# Patient Record
Sex: FEMALE | Race: BLACK OR AFRICAN AMERICAN | NOT HISPANIC OR LATINO | URBAN - METROPOLITAN AREA
[De-identification: names, ages, dates, MRNs, and addresses within clinical notes are randomized per-mention and may not be internally consistent; named-entity substitution may affect disease eponyms.]

---

## 2022-11-29 ENCOUNTER — INPATIENT (INPATIENT)
Facility: HOSPITAL | Age: 62
LOS: 20 days | Discharge: LEFT BEFORE DISCHARGE | End: 2022-12-20
Attending: INTERNAL MEDICINE | Admitting: INTERNAL MEDICINE
Payer: MEDICARE

## 2022-11-29 VITALS
OXYGEN SATURATION: 99 % | DIASTOLIC BLOOD PRESSURE: 84 MMHG | SYSTOLIC BLOOD PRESSURE: 140 MMHG | TEMPERATURE: 105 F | RESPIRATION RATE: 24 BRPM | HEART RATE: 135 BPM

## 2022-11-29 LAB
A1C WITH ESTIMATED AVERAGE GLUCOSE RESULT: 6 % — HIGH (ref 4–5.6)
ALBUMIN SERPL ELPH-MCNC: 4.2 G/DL — SIGNIFICANT CHANGE UP (ref 3.3–5)
ALP SERPL-CCNC: 133 U/L — HIGH (ref 40–120)
ALT FLD-CCNC: 50 U/L — HIGH (ref 4–33)
AMMONIA BLD-MCNC: 28 UMOL/L — SIGNIFICANT CHANGE UP (ref 11–55)
ANION GAP SERPL CALC-SCNC: 15 MMOL/L — HIGH (ref 7–14)
ANISOCYTOSIS BLD QL: SIGNIFICANT CHANGE UP
APPEARANCE UR: ABNORMAL
APTT BLD: 28 SEC — SIGNIFICANT CHANGE UP (ref 27–36.3)
AST SERPL-CCNC: 103 U/L — HIGH (ref 4–32)
BACTERIA # UR AUTO: NEGATIVE — SIGNIFICANT CHANGE UP
BASE EXCESS BLDV CALC-SCNC: -0.3 MMOL/L — SIGNIFICANT CHANGE UP (ref -2–3)
BASE EXCESS BLDV CALC-SCNC: 1.9 MMOL/L — SIGNIFICANT CHANGE UP (ref -2–3)
BASOPHILS # BLD AUTO: 0 K/UL — SIGNIFICANT CHANGE UP (ref 0–0.2)
BASOPHILS NFR BLD AUTO: 0 % — SIGNIFICANT CHANGE UP (ref 0–2)
BILIRUB SERPL-MCNC: 0.6 MG/DL — SIGNIFICANT CHANGE UP (ref 0.2–1.2)
BILIRUB UR-MCNC: NEGATIVE — SIGNIFICANT CHANGE UP
BLD GP AB SCN SERPL QL: NEGATIVE — SIGNIFICANT CHANGE UP
BLOOD GAS VENOUS COMPREHENSIVE RESULT: SIGNIFICANT CHANGE UP
BLOOD GAS VENOUS COMPREHENSIVE RESULT: SIGNIFICANT CHANGE UP
BUN SERPL-MCNC: 20 MG/DL — SIGNIFICANT CHANGE UP (ref 7–23)
CALCIUM SERPL-MCNC: 10.1 MG/DL — SIGNIFICANT CHANGE UP (ref 8.4–10.5)
CHLORIDE BLDV-SCNC: 100 MMOL/L — SIGNIFICANT CHANGE UP (ref 96–108)
CHLORIDE BLDV-SCNC: 102 MMOL/L — SIGNIFICANT CHANGE UP (ref 96–108)
CHLORIDE SERPL-SCNC: 99 MMOL/L — SIGNIFICANT CHANGE UP (ref 98–107)
CK MB BLD-MCNC: 1.2 % — SIGNIFICANT CHANGE UP (ref 0–2.5)
CK MB CFR SERPL CALC: 2.4 NG/ML — SIGNIFICANT CHANGE UP
CK SERPL-CCNC: 192 U/L — HIGH (ref 25–170)
CO2 BLDV-SCNC: 25.1 MMOL/L — SIGNIFICANT CHANGE UP (ref 22–26)
CO2 BLDV-SCNC: 27.9 MMOL/L — HIGH (ref 22–26)
CO2 SERPL-SCNC: 25 MMOL/L — SIGNIFICANT CHANGE UP (ref 22–31)
COLOR SPEC: SIGNIFICANT CHANGE UP
CREAT SERPL-MCNC: 1.01 MG/DL — SIGNIFICANT CHANGE UP (ref 0.5–1.3)
DACRYOCYTES BLD QL SMEAR: SLIGHT — SIGNIFICANT CHANGE UP
DIFF PNL FLD: ABNORMAL
EGFR: 63 ML/MIN/1.73M2 — SIGNIFICANT CHANGE UP
EOSINOPHIL # BLD AUTO: 0 K/UL — SIGNIFICANT CHANGE UP (ref 0–0.5)
EOSINOPHIL NFR BLD AUTO: 0 % — SIGNIFICANT CHANGE UP (ref 0–6)
EPI CELLS # UR: 3 /HPF — SIGNIFICANT CHANGE UP (ref 0–5)
ESTIMATED AVERAGE GLUCOSE: 126 — SIGNIFICANT CHANGE UP
FLUAV AG NPH QL: SIGNIFICANT CHANGE UP
FLUBV AG NPH QL: SIGNIFICANT CHANGE UP
GAS PNL BLDV: 135 MMOL/L — LOW (ref 136–145)
GAS PNL BLDV: 135 MMOL/L — LOW (ref 136–145)
GAS PNL BLDV: SIGNIFICANT CHANGE UP
GAS PNL BLDV: SIGNIFICANT CHANGE UP
GIANT PLATELETS BLD QL SMEAR: PRESENT — SIGNIFICANT CHANGE UP
GLUCOSE BLDV-MCNC: 132 MG/DL — HIGH (ref 70–99)
GLUCOSE BLDV-MCNC: 177 MG/DL — HIGH (ref 70–99)
GLUCOSE SERPL-MCNC: 229 MG/DL — HIGH (ref 70–99)
GLUCOSE UR QL: ABNORMAL
HCO3 BLDV-SCNC: 24 MMOL/L — SIGNIFICANT CHANGE UP (ref 22–29)
HCO3 BLDV-SCNC: 27 MMOL/L — SIGNIFICANT CHANGE UP (ref 22–29)
HCT VFR BLD CALC: 31.2 % — LOW (ref 34.5–45)
HCT VFR BLDA CALC: 29 % — LOW (ref 34.5–46.5)
HCT VFR BLDA CALC: 30 % — LOW (ref 34.5–46.5)
HGB BLD CALC-MCNC: 10 G/DL — LOW (ref 11.5–15.5)
HGB BLD CALC-MCNC: 9.5 G/DL — LOW (ref 11.5–15.5)
HGB BLD-MCNC: 10.3 G/DL — LOW (ref 11.5–15.5)
HYALINE CASTS # UR AUTO: 2 /LPF — SIGNIFICANT CHANGE UP (ref 0–7)
IANC: 0.99 K/UL — LOW (ref 1.8–7.4)
INR BLD: 1.14 RATIO — SIGNIFICANT CHANGE UP (ref 0.88–1.16)
KETONES UR-MCNC: NEGATIVE — SIGNIFICANT CHANGE UP
LACTATE BLDV-MCNC: 3.7 MMOL/L — HIGH (ref 0.5–2)
LACTATE BLDV-MCNC: 4.3 MMOL/L — CRITICAL HIGH (ref 0.5–2)
LEUKOCYTE ESTERASE UR-ACNC: NEGATIVE — SIGNIFICANT CHANGE UP
LYMPHOCYTES # BLD AUTO: 0.89 K/UL — LOW (ref 1–3.3)
LYMPHOCYTES # BLD AUTO: 43.6 % — SIGNIFICANT CHANGE UP (ref 13–44)
MACROCYTES BLD QL: SIGNIFICANT CHANGE UP
MCHC RBC-ENTMCNC: 33 GM/DL — SIGNIFICANT CHANGE UP (ref 32–36)
MCHC RBC-ENTMCNC: 35 PG — HIGH (ref 27–34)
MCV RBC AUTO: 106.1 FL — HIGH (ref 80–100)
MICROCYTES BLD QL: SLIGHT — SIGNIFICANT CHANGE UP
MONOCYTES # BLD AUTO: 0.22 K/UL — SIGNIFICANT CHANGE UP (ref 0–0.9)
MONOCYTES NFR BLD AUTO: 10.7 % — SIGNIFICANT CHANGE UP (ref 2–14)
NEUTROPHILS # BLD AUTO: 0.89 K/UL — LOW (ref 1.8–7.4)
NEUTROPHILS NFR BLD AUTO: 43.6 % — SIGNIFICANT CHANGE UP (ref 43–77)
NITRITE UR-MCNC: NEGATIVE — SIGNIFICANT CHANGE UP
NRBC # BLD: 3 /100 — HIGH (ref 0–0)
PCO2 BLDV: 37 MMHG — LOW (ref 39–42)
PCO2 BLDV: 41 MMHG — SIGNIFICANT CHANGE UP (ref 39–42)
PH BLDV: 7.42 — SIGNIFICANT CHANGE UP (ref 7.32–7.43)
PH BLDV: 7.42 — SIGNIFICANT CHANGE UP (ref 7.32–7.43)
PH UR: 6 — SIGNIFICANT CHANGE UP (ref 5–8)
PLAT MORPH BLD: NORMAL — SIGNIFICANT CHANGE UP
PLATELET # BLD AUTO: 95 K/UL — LOW (ref 150–400)
PLATELET COUNT - ESTIMATE: ABNORMAL
PO2 BLDV: 40 MMHG — SIGNIFICANT CHANGE UP
PO2 BLDV: 60 MMHG — SIGNIFICANT CHANGE UP
POIKILOCYTOSIS BLD QL AUTO: SLIGHT — SIGNIFICANT CHANGE UP
POLYCHROMASIA BLD QL SMEAR: SLIGHT — SIGNIFICANT CHANGE UP
POTASSIUM BLDV-SCNC: 2.5 MMOL/L — CRITICAL LOW (ref 3.5–5.1)
POTASSIUM BLDV-SCNC: 3.1 MMOL/L — LOW (ref 3.5–5.1)
POTASSIUM SERPL-MCNC: 3.2 MMOL/L — LOW (ref 3.5–5.3)
POTASSIUM SERPL-SCNC: 3.2 MMOL/L — LOW (ref 3.5–5.3)
PROT SERPL-MCNC: 7 G/DL — SIGNIFICANT CHANGE UP (ref 6–8.3)
PROT UR-MCNC: ABNORMAL
PROTHROM AB SERPL-ACNC: 13.2 SEC — SIGNIFICANT CHANGE UP (ref 10.5–13.4)
RBC # BLD: 2.94 M/UL — LOW (ref 3.8–5.2)
RBC # FLD: 14.9 % — HIGH (ref 10.3–14.5)
RBC BLD AUTO: ABNORMAL
RBC CASTS # UR COMP ASSIST: 1 /HPF — SIGNIFICANT CHANGE UP (ref 0–4)
RH IG SCN BLD-IMP: POSITIVE — SIGNIFICANT CHANGE UP
RSV RNA NPH QL NAA+NON-PROBE: SIGNIFICANT CHANGE UP
SAO2 % BLDV: 64 % — SIGNIFICANT CHANGE UP
SAO2 % BLDV: 90.8 % — SIGNIFICANT CHANGE UP
SARS-COV-2 RNA SPEC QL NAA+PROBE: SIGNIFICANT CHANGE UP
SODIUM SERPL-SCNC: 139 MMOL/L — SIGNIFICANT CHANGE UP (ref 135–145)
SP GR SPEC: 1.03 — SIGNIFICANT CHANGE UP (ref 1.01–1.05)
TROPONIN T, HIGH SENSITIVITY RESULT: 22 NG/L — SIGNIFICANT CHANGE UP
UROBILINOGEN FLD QL: SIGNIFICANT CHANGE UP
VARIANT LYMPHS # BLD: 2.1 % — SIGNIFICANT CHANGE UP (ref 0–6)
WBC # BLD: 2.04 K/UL — LOW (ref 3.8–10.5)
WBC # FLD AUTO: 2.04 K/UL — LOW (ref 3.8–10.5)
WBC UR QL: 0 /HPF — SIGNIFICANT CHANGE UP (ref 0–5)

## 2022-11-29 PROCEDURE — 99285 EMERGENCY DEPT VISIT HI MDM: CPT | Mod: FS

## 2022-11-29 PROCEDURE — 70496 CT ANGIOGRAPHY HEAD: CPT | Mod: 26,MA

## 2022-11-29 PROCEDURE — 71045 X-RAY EXAM CHEST 1 VIEW: CPT | Mod: 26

## 2022-11-29 PROCEDURE — 0042T: CPT

## 2022-11-29 PROCEDURE — 70498 CT ANGIOGRAPHY NECK: CPT | Mod: 26,MA

## 2022-11-29 RX ORDER — SODIUM CHLORIDE 9 MG/ML
1000 INJECTION INTRAMUSCULAR; INTRAVENOUS; SUBCUTANEOUS ONCE
Refills: 0 | Status: COMPLETED | OUTPATIENT
Start: 2022-11-29 | End: 2022-11-29

## 2022-11-29 RX ORDER — CEFTRIAXONE 500 MG/1
2000 INJECTION, POWDER, FOR SOLUTION INTRAMUSCULAR; INTRAVENOUS ONCE
Refills: 0 | Status: COMPLETED | OUTPATIENT
Start: 2022-11-29 | End: 2022-11-29

## 2022-11-29 RX ORDER — SODIUM CHLORIDE 9 MG/ML
50 INJECTION INTRAMUSCULAR; INTRAVENOUS; SUBCUTANEOUS
Refills: 0 | Status: DISCONTINUED | OUTPATIENT
Start: 2022-11-29 | End: 2022-11-29

## 2022-11-29 RX ORDER — ACYCLOVIR SODIUM 500 MG
650 VIAL (EA) INTRAVENOUS ONCE
Refills: 0 | Status: COMPLETED | OUTPATIENT
Start: 2022-11-29 | End: 2022-11-29

## 2022-11-29 RX ORDER — POTASSIUM CHLORIDE 20 MEQ
10 PACKET (EA) ORAL ONCE
Refills: 0 | Status: COMPLETED | OUTPATIENT
Start: 2022-11-29 | End: 2022-11-29

## 2022-11-29 RX ORDER — AMPICILLIN TRIHYDRATE 250 MG
2 CAPSULE ORAL ONCE
Refills: 0 | Status: COMPLETED | OUTPATIENT
Start: 2022-11-29 | End: 2022-11-29

## 2022-11-29 RX ORDER — VANCOMYCIN HCL 1 G
1000 VIAL (EA) INTRAVENOUS ONCE
Refills: 0 | Status: COMPLETED | OUTPATIENT
Start: 2022-11-29 | End: 2022-11-29

## 2022-11-29 RX ORDER — ACETAMINOPHEN 500 MG
1000 TABLET ORAL ONCE
Refills: 0 | Status: COMPLETED | OUTPATIENT
Start: 2022-11-29 | End: 2022-11-29

## 2022-11-29 RX ORDER — ACYCLOVIR SODIUM 500 MG
800 VIAL (EA) INTRAVENOUS ONCE
Refills: 0 | Status: DISCONTINUED | OUTPATIENT
Start: 2022-11-29 | End: 2022-11-29

## 2022-11-29 RX ADMIN — Medication 100 MILLIEQUIVALENT(S): at 18:44

## 2022-11-29 RX ADMIN — SODIUM CHLORIDE 1000 MILLILITER(S): 9 INJECTION INTRAMUSCULAR; INTRAVENOUS; SUBCUTANEOUS at 17:33

## 2022-11-29 RX ADMIN — SODIUM CHLORIDE 1000 MILLILITER(S): 9 INJECTION INTRAMUSCULAR; INTRAVENOUS; SUBCUTANEOUS at 18:44

## 2022-11-29 RX ADMIN — Medication 400 MILLIGRAM(S): at 17:32

## 2022-11-29 RX ADMIN — Medication 250 MILLIGRAM(S): at 23:23

## 2022-11-29 RX ADMIN — CEFTRIAXONE 100 MILLIGRAM(S): 500 INJECTION, POWDER, FOR SOLUTION INTRAMUSCULAR; INTRAVENOUS at 20:56

## 2022-11-29 NOTE — ED PROVIDER NOTE - PROGRESS NOTE DETAILS
-Yumiko Aguilar D.O: given no known source of fever and presentation of AMS will obtain LP, consent signed, son at bedside with patient, will treat empirically w/ abx and acyclovir. Patient still slurring words mildly but mental status has improved, more conversant. Neuro recs appreciated. TBA for MRI/vEEG.

## 2022-11-29 NOTE — ED ADULT NURSE NOTE - SEPSIS REFERENCE DATA CRITERIA 2
Patient, Diana M Sippy calling for medication refill. Medication(s) set up as pending orders from medication list.    Caller has been advised that their call does not guarantee an immediate refill. This refill will be reviewed within 24-72 hours by a qualified provider who will determine whether he or she can refill the medication.    Patient has contacted the pharmacy?  Yes    Patient advised he or she will receive a call back.    Additional information:     patient has a few pills left.     Patient’s preferred pharmacy has been noted and populated.       [facility] - [place], [place] - [address]  [address] [address]  Phone: [phone] Fax: [phone]       Abnormal Lactate: > 2

## 2022-11-29 NOTE — ED PROVIDER NOTE - PHYSICAL EXAMINATION
CONSTITUTIONAL: nontoxic appearing; well-nourished; in no apparent distress;  HEAD: Normocephalic, atraumatic;  EYES: PERRL, EOM intact, conjunctiva and sclera WNL;  ENT: normal nose;   NECK/LYMPH: Supple; non-tender;  CARD: Normal S1, S2; no murmurs, rubs, or gallops noted  RESP: Normal chest excursion with respiration; breath sounds clear and equal bilaterally; no wheezes, rhonchi, or rales noted  ABD/GI: soft, non-distended; non-tender; no palpable organomegaly, no pulsatile mass  EXT/MS: moves all extremities; distal pulses are normal,   SKIN: Normal for age and race; warm; dry; good turgor; no apparent lesions or exudate noted  NEURO: Awake, altered mentation, slurred speech, no facial droop, + R arm drift, ?asterixis

## 2022-11-29 NOTE — ED ADULT NURSE NOTE - OBJECTIVE STATEMENT
Pt sleepy and OX4, brought in from home, last known well at approx 1400, noticed to be weaker than usual; code stroke called then cancelled s/p CT scan; pt noted to be febrile 105.6 rectally, tachy 125 bpm; skin intact; no urinary complaints; 18 G placed in Right AC and 18G in Left using aseptic technique; Pt has Hx of Multiple Myeloma, last tx Weds Nov 23rd at [facility] in [place]; pt has been treated for MM for 10 yrs; is now on a trial medication Blenrep, approx 4 months of tx q monthly; Labs drawn and sent, fluid up as per MD. Family at bedside.

## 2022-11-29 NOTE — STROKE CODE NOTE - MRS SCORE
(0) No symptoms (1) No significant disability. Able to carry out all usual activities, despite some symptoms.

## 2022-11-29 NOTE — CONSULT NOTE ADULT - ATTENDING COMMENTS
Seen and examined with house staff, agree with note with exceptions    Myeloma, heavily treated, currently on belantamab mafadotin at [facility] (per son on protocol since late 9/22), also promacta for chronic low plt  she presented to ER with confusion  was found to have fever to 105  CT/CTA negative  Labs notable for neutropenia, elevated LA and procalcitonin  She had an LP which was acellular and with normal protein  bcx positive for polymicrobial sepsis  CT chest with multilobar PNA  BP has been labile and tenuous and currently on BIPAP for hypoxemia    On exam she is alert, inattentive, able to tell me her name and name of her oncologist  no focal weakness   no asterixis noted  not walked for safety    Impression is toxic metabolic encephalopathy in setting of neutropenic sepsis likely lung source  symptoms not related to myeloma or myeloma med  doubt need for EEG or MRI  recommend care per MICU/medicine/onc teams  I did d/w son by phone

## 2022-11-29 NOTE — ED PROVIDER NOTE - ATTENDING APP SHARED VISIT CONTRIBUTION OF CARE
I have personally performed a face to face medical and diagnostic evaluation of the patient. I have discussed with and reviewed the Resident's and/or ACP's and/or Medical/PA/NP student's note and agree with the History, ROS, Physical Exam and MDM unless otherwise indicated. A brief summary of my personal evaluation and impression can be found below.    62y F with PMHx of Multiple myeloma formerly on prednisone, BIBEMS w/ concern AMS. Patient arrived to ED as code stroke given acute change in mental status, slurred speech and RUE drift. Per son, patient's LNW at 2:45 when pt was dropped off from optho clinic visit. Per son, pt was scheduled to have f/u appointment w/ optho today from prior ED visit for "dry eyes", received no medication or sedation in clinic. States friend drove her to appointment and when she picked up patient around 2:30 noted patient began to appear "sleepy" in the car ride home and then noted slurred speech and confusion at 2:45pm. Patient has no prior known hx of stroke, ICH, no recent trauma or falls. Not on AC. At baseline per son pt is A&Ox4 ambulates with cane.    PMD [facility]    On exam: patient slurring words, difficulty to understand, +RUE drift and ?asterixis like motion of R wrist, MS 5/5 B/L, no appreciable R-facial droop as reported by EMS, is following commands. Lungs CTAB, tachycardic on arrival, no LE edema, abd soft, NT, no rash, PERRLA 2mm.     Arrived as code stroke, CT head neg, plan initially for tPA, once pt arrived to room rectal temp 105F, c./f stroke mimicker, possible encephalitis vs. other infectious etiology. Will obtain w/u for infectious source, if negative, likely LP, will cover for meningoencephalitis, IVF, IV tylenol, appreciate neuro recs, reassess. TBA.

## 2022-11-29 NOTE — CONSULT NOTE ADULT - SUBJECTIVE AND OBJECTIVE BOX
INCOMPLETE     HPI: Patient is a 63 yo F w/ pmhx of       REVIEW OF SYSTEMS    A 10-system ROS was performed and is negative except for those items noted above and/or in the HPI.    PAST MEDICAL & SURGICAL HISTORY:    FAMILY HISTORY:    SOCIAL HISTORY:   T/E/D:   Occupation:   Lives with:     MEDICATIONS (HOME):  Home Medications:    MEDICATIONS  (STANDING):  acetaminophen   IVPB .. 1000 milliGRAM(s) IV Intermittent Once  sodium chloride 0.9% Bolus 1000 milliLiter(s) IV Bolus once    MEDICATIONS  (PRN):    ALLERGIES/INTOLERANCES:  Allergies  No Known Allergies    Intolerances    VITALS & EXAMINATION:  Vital Signs Last 24 Hrs  T(C): --  T(F): --  HR: --  BP: --  BP(mean): --  RR: --  SpO2: --        General:  Constitutional: Female, appears stated age, in no apparent distress including pain. Warm to the touch over forehead.   Head: Normocephalic & Atraumatic.    Neurological:  MS: Eyes open. Awake, alert, initially A&Ox0 then improved to A&Ox4. Initially following some simple commands then following all simple, 1 step midline commands.     Language: Speech is mildly dysarthric, initially with mild to moderate dysfluency that improved to fluent. Intact repetition throughout the exam.     CNs: VFF. EOMI no nystagmus. V1-3 intact to LT b/l. R nasolabial fold flattening that corrects with movements.     Motor: Normal muscle bulk & tone. No noticeable tremor. Initially with b/l UE drift, then was without drift b/l. B/l LE initially no effort against gravity then improved to no drifts b/l.     Sensation: Grimaces to noxious stimuli b/l symmetric on both sides.     Cortical: Extinction on DSS (neglect): none    Coordination: No dysmetria but moderate action tremors and asterixis seen b/l UE.     Gait: Deferred.         LABORATORY:  CBC                       10.3   2.04  )-----------( 95       ( 29 Nov 2022 16:40 )             31.2     Chem 11-29    139  |  99  |  20  ----------------------------<  229<H>  3.2<L>   |  25  |  1.01    Ca    10.1      29 Nov 2022 16:40    TPro  7.0  /  Alb  4.2  /  TBili  0.6  /  DBili  x   /  AST  103<H>  /  ALT  50<H>  /  AlkPhos  133<H>  11-29    LFTs LIVER FUNCTIONS - ( 29 Nov 2022 16:40 )  Alb: 4.2 g/dL / Pro: 7.0 g/dL / ALK PHOS: 133 U/L / ALT: 50 U/L / AST: 103 U/L / GGT: x           Coagulopathy PT/INR - ( 29 Nov 2022 16:40 )   PT: 13.2 sec;   INR: 1.14 ratio         PTT - ( 29 Nov 2022 16:40 )  PTT:28.0 sec  Lipid Panel   A1c   Cardiac enzymes CARDIAC MARKERS ( 29 Nov 2022 16:40 )  x     / x     / 192 U/L / x     / 2.4 ng/mL      U/A   CSF  Immunological  Other    STUDIES & IMAGING:  Studies (EKG, EEG, EMG, etc):     Radiology (XR, CT, MR, U/S, TTE/SHERLEY):    Brain CT:    No acute hemorrhage, hydrocephalus, midline shift or extra-axial   collections are identified.    The orbits are not remarkable in appearance.    The calvarium is intact.    The visualized paranasal sinuses and tympanomastoid cavities are free of   acute disease.    Neck CTA:    The study is limited by poor bolus timing.    The aortic arch and great vessels are patent.    The common carotid, internal carotid external carotid arteries are   patent. Both vertebral arteries and their origins are well visualized and   are patent.    There is a partially visualized right lung mass measuring 1.8 x 2.2 cm.    Brain CTA:    No large vessel occlusion or stenosis is identified. No vascular aneurysm   is visualized. No abnormal vessels are seen.    Perfusion maps:    CBF less than 30%: 0 mL  Tmax greater than 6 seconds: 0 mL  Mismatch volume 0 mL.    There is no evidence for core infarct or area of brain at risk.    IMPRESSION:    Brain CT: No acute hemorrhage, extra-axial collection or displaced   calvarial fracture.    Neck CTA: Limited study. No hemodynamically significant stenosis.   Partially visualized right lung mass measuring 1.8 x 2.2 cm. Follow-up   chest CT is recommended.    Brain CTA: Limited study No evidence for large vessel occlusion.    Perfusion maps: No evidence for core infarct or area of brain at risk.          The results of the brain CT were discussed with Dr. Aguilar emergency   Department at 4:50 PM on 11/29/2022    The results of the perfusion maps and head and neck CTA were discussed   with WILL Morrow at 5:05 PM on 11/29/2022    --- End of Report ---   HPI: Patient is a 63 yo R-H F w/ pmhx of MM (diagnosed ~10 years ago, currently on Promacta treatments since June 2022 - most recently last Wednesday; followed by oncologist, Dr. Lopez at [facility]), HTN, neuropathic R hip pain, recent R corneal tear, presents to [facility] ED for change in mental status. LKW approximately 14:30. Patient was in normal state of health this morning as per son, Nacho (presenting with patient), when he awoke and briefly witnessed patient walking and speaking normally. Patient went to a follow up ophtho appointment for a recently diagnosed corneal abrasion this past Monday. Patient was picked up by a friend around 10:00. During the appointment, while in the waiting room, patient was sleeping but without complaints. Son reports patient has been sleeping poorly lately. As per son/friend, Ophtho did not notice any differences in her mental status at that time. On the way back from appointment around 14:30, patient stopped responding to friend appropriately in the car and she appeared more tired & weaker than usual. This change in speech persisted when they arrived home and then son called EMS. On their evaluation, patient with persisting word finding difficulties and generalized weakness, but leaning to the R side. Throughout code stroke patient's exam fluctuating and tpa was pre mixed but then patient was excluded for reasons listed below. Patient reports some difficulty remembering what happened today, but reports she feels generally weak and very tired. Patient then denied HA, changes in vision, N/V, imbalance, numbness or tingling. Denies recent infection or sick contacts. At baseline patient uses cane to ambulate for chronic pain, but occasionally does not need. Otherwise independent with ADLs. Lives alone, but above her sister home.       REVIEW OF SYSTEMS    A 10-system ROS was performed and is negative except for those items noted above and/or in the HPI.    PAST MEDICAL & SURGICAL HISTORY:    FAMILY HISTORY:    SOCIAL HISTORY:   T/E/D:   Occupation:   Lives with:     MEDICATIONS (HOME):  Home Medications:    MEDICATIONS  (STANDING):  acetaminophen   IVPB .. 1000 milliGRAM(s) IV Intermittent Once  sodium chloride 0.9% Bolus 1000 milliLiter(s) IV Bolus once    MEDICATIONS  (PRN):    ALLERGIES/INTOLERANCES:  Allergies  No Known Allergies    Intolerances    VITALS & EXAMINATION:  Vital Signs Last 24 Hrs  T(C): --  T(F): --  HR: --  BP: --  BP(mean): --  RR: --  SpO2: --        General:  Constitutional: Female, appears stated age, in no apparent distress including pain. Warm to the touch over forehead.   Head: Normocephalic & Atraumatic.    Neurological:  MS: Eyes open. Awake, alert, initially A&Ox0 then improved to A&Ox4. Initially following some simple commands then following all simple, 1 step midline commands.     Language: Speech is mildly dysarthric, initially with mild to moderate dysfluency that improved to fluent. Intact repetition throughout the exam.     CNs: VFF. EOMI no nystagmus. V1-3 intact to LT b/l. R nasolabial fold flattening that corrects with movements.     Motor: Normal muscle bulk & tone. No noticeable tremor. Initially with b/l UE drift, then was without drift b/l. B/l LE initially no effort against gravity then improved to no drifts b/l. On subsequent examination, patient with drift in RLE, but reports she has chronic pain. Grossly 5/5 throughout UE b/l. 4-/5 in R HF, 5/5 in L HF, and 5/5 in DF/PF b/l.    Sensation: Grimaces to noxious stimuli b/l symmetric on both sides.     Cortical: Extinction on DSS (neglect): none    Coordination: No dysmetria but moderate action tremors and asterixis seen b/l UE.     Gait: Deferred.         LABORATORY:  CBC                       10.3   2.04  )-----------( 95       ( 29 Nov 2022 16:40 )             31.2     Chem 11-29    139  |  99  |  20  ----------------------------<  229<H>  3.2<L>   |  25  |  1.01    Ca    10.1      29 Nov 2022 16:40    TPro  7.0  /  Alb  4.2  /  TBili  0.6  /  DBili  x   /  AST  103<H>  /  ALT  50<H>  /  AlkPhos  133<H>  11-29    LFTs LIVER FUNCTIONS - ( 29 Nov 2022 16:40 )  Alb: 4.2 g/dL / Pro: 7.0 g/dL / ALK PHOS: 133 U/L / ALT: 50 U/L / AST: 103 U/L / GGT: x           Coagulopathy PT/INR - ( 29 Nov 2022 16:40 )   PT: 13.2 sec;   INR: 1.14 ratio         PTT - ( 29 Nov 2022 16:40 )  PTT:28.0 sec  Lipid Panel   A1c   Cardiac enzymes CARDIAC MARKERS ( 29 Nov 2022 16:40 )  x     / x     / 192 U/L / x     / 2.4 ng/mL      U/A   CSF  Immunological  Other    STUDIES & IMAGING:  Studies (EKG, EEG, EMG, etc):     Radiology (XR, CT, MR, U/S, TTE/SHERLEY):    Brain CT:    No acute hemorrhage, hydrocephalus, midline shift or extra-axial   collections are identified.    The orbits are not remarkable in appearance.    The calvarium is intact.    The visualized paranasal sinuses and tympanomastoid cavities are free of   acute disease.    Neck CTA:    The study is limited by poor bolus timing.    The aortic arch and great vessels are patent.    The common carotid, internal carotid external carotid arteries are   patent. Both vertebral arteries and their origins are well visualized and   are patent.    There is a partially visualized right lung mass measuring 1.8 x 2.2 cm.    Brain CTA:    No large vessel occlusion or stenosis is identified. No vascular aneurysm   is visualized. No abnormal vessels are seen.    Perfusion maps:    CBF less than 30%: 0 mL  Tmax greater than 6 seconds: 0 mL  Mismatch volume 0 mL.    There is no evidence for core infarct or area of brain at risk.    IMPRESSION:    Brain CT: No acute hemorrhage, extra-axial collection or displaced   calvarial fracture.    Neck CTA: Limited study. No hemodynamically significant stenosis.   Partially visualized right lung mass measuring 1.8 x 2.2 cm. Follow-up   chest CT is recommended.    Brain CTA: Limited study No evidence for large vessel occlusion.    Perfusion maps: No evidence for core infarct or area of brain at risk.          The results of the brain CT were discussed with Dr. Aguilar emergency   Department at 4:50 PM on 11/29/2022    The results of the perfusion maps and head and neck CTA were discussed   with WILL Morrow at 5:05 PM on 11/29/2022    --- End of Report ---   HPI: Patient is a 63 yo R-H F w/ pmhx of MM (diagnosed ~10 years ago, currently on Promacta treatments since June 2022 - most recently last Wednesday; followed by oncologist, Dr. Lopez at [facility]), HTN, neuropathic R hip pain, recent R corneal tear, presents to [facility] ED for change in mental status. LKW approximately 14:30. Patient was in normal state of health this morning as per son, Nacho (presenting with patient), when he awoke and briefly witnessed patient walking and speaking normally. Patient went to a follow up ophtho appointment for a recently diagnosed corneal abrasion this past Monday. Patient was picked up by a friend around 10:00. During the appointment, while in the waiting room, patient was sleeping but without complaints. Son reports patient has been sleeping poorly lately. As per son/friend, Ophtho did not notice any differences in her mental status at that time. On the way back from appointment around 14:30, patient stopped responding to friend appropriately in the car and she appeared more tired & weaker than usual. This change in speech persisted when they arrived home and then son called EMS. On their evaluation, patient with persisting word finding difficulties and generalized weakness, but leaning to the R side. Throughout code stroke patient's exam fluctuating and tpa was pre mixed but then patient was excluded for reasons listed below. Patient reports some difficulty remembering what happened today, but reports she feels generally weak and very tired. Son reports patient's speech is close to baseline now. Patient then denied HA, changes in vision, N/V, imbalance, numbness or tingling. Denies recent infection or sick contacts. At baseline patient uses cane to ambulate for chronic pain, but occasionally does not need. Otherwise independent with ADLs. Lives alone, but above her sister home.       REVIEW OF SYSTEMS    A 10-system ROS was performed and is negative except for those items noted above and/or in the HPI.    PAST MEDICAL & SURGICAL HISTORY:    FAMILY HISTORY:    SOCIAL HISTORY:   T/E/D:   Occupation:   Lives with:     MEDICATIONS (HOME):  Home Medications:    MEDICATIONS  (STANDING):  acetaminophen   IVPB .. 1000 milliGRAM(s) IV Intermittent Once  sodium chloride 0.9% Bolus 1000 milliLiter(s) IV Bolus once    MEDICATIONS  (PRN):    ALLERGIES/INTOLERANCES:  Allergies  No Known Allergies    Intolerances    VITALS & EXAMINATION:  Vital Signs Last 24 Hrs  T(C): --  T(F): --  HR: --  BP: --  BP(mean): --  RR: --  SpO2: --        General:  Constitutional: Female, appears stated age, in no apparent distress including pain. Warm to the touch over forehead.   Head: Normocephalic & Atraumatic.    Neurological:  MS: Eyes open. Awake, alert, initially A&Ox0 then improved to A&Ox4. Initially following some simple commands then following all simple, 1 step midline commands.     Language: Speech is mildly dysarthric, initially with mild to moderate dysfluency that improved to fluent. Intact repetition throughout the exam.     CNs: VFF. EOMI no nystagmus. V1-3 intact to LT b/l. R nasolabial fold flattening that corrects with movements.     Motor: Normal muscle bulk & tone. No noticeable tremor. Initially with b/l UE drift, then was without drift b/l. B/l LE initially no effort against gravity then improved to no drifts b/l. On subsequent examination, patient with drift in RLE, but reports she has chronic pain. Grossly 5/5 throughout UE b/l. 4-/5 in R HF, 5/5 in L HF, and 5/5 in DF/PF b/l.    Sensation: Grimaces to noxious stimuli b/l symmetric on both sides.     Cortical: Extinction on DSS (neglect): none    Coordination: No dysmetria but moderate action tremors and asterixis seen b/l UE.     Gait: Deferred.         LABORATORY:  CBC                       10.3   2.04  )-----------( 95       ( 29 Nov 2022 16:40 )             31.2     Chem 11-29    139  |  99  |  20  ----------------------------<  229<H>  3.2<L>   |  25  |  1.01    Ca    10.1      29 Nov 2022 16:40    TPro  7.0  /  Alb  4.2  /  TBili  0.6  /  DBili  x   /  AST  103<H>  /  ALT  50<H>  /  AlkPhos  133<H>  11-29    LFTs LIVER FUNCTIONS - ( 29 Nov 2022 16:40 )  Alb: 4.2 g/dL / Pro: 7.0 g/dL / ALK PHOS: 133 U/L / ALT: 50 U/L / AST: 103 U/L / GGT: x           Coagulopathy PT/INR - ( 29 Nov 2022 16:40 )   PT: 13.2 sec;   INR: 1.14 ratio         PTT - ( 29 Nov 2022 16:40 )  PTT:28.0 sec  Lipid Panel   A1c   Cardiac enzymes CARDIAC MARKERS ( 29 Nov 2022 16:40 )  x     / x     / 192 U/L / x     / 2.4 ng/mL      U/A   CSF  Immunological  Other    STUDIES & IMAGING:  Studies (EKG, EEG, EMG, etc):     Radiology (XR, CT, MR, U/S, TTE/SHERLEY):    Brain CT:    No acute hemorrhage, hydrocephalus, midline shift or extra-axial   collections are identified.    The orbits are not remarkable in appearance.    The calvarium is intact.    The visualized paranasal sinuses and tympanomastoid cavities are free of   acute disease.    Neck CTA:    The study is limited by poor bolus timing.    The aortic arch and great vessels are patent.    The common carotid, internal carotid external carotid arteries are   patent. Both vertebral arteries and their origins are well visualized and   are patent.    There is a partially visualized right lung mass measuring 1.8 x 2.2 cm.    Brain CTA:    No large vessel occlusion or stenosis is identified. No vascular aneurysm   is visualized. No abnormal vessels are seen.    Perfusion maps:    CBF less than 30%: 0 mL  Tmax greater than 6 seconds: 0 mL  Mismatch volume 0 mL.    There is no evidence for core infarct or area of brain at risk.    IMPRESSION:    Brain CT: No acute hemorrhage, extra-axial collection or displaced   calvarial fracture.    Neck CTA: Limited study. No hemodynamically significant stenosis.   Partially visualized right lung mass measuring 1.8 x 2.2 cm. Follow-up   chest CT is recommended.    Brain CTA: Limited study No evidence for large vessel occlusion.    Perfusion maps: No evidence for core infarct or area of brain at risk.          The results of the brain CT were discussed with Dr. Aguilar emergency   Department at 4:50 PM on 11/29/2022    The results of the perfusion maps and head and neck CTA were discussed   with WILL Morrow at 5:05 PM on 11/29/2022    --- End of Report ---

## 2022-11-29 NOTE — ED PROVIDER NOTE - CLINICAL SUMMARY MEDICAL DECISION MAKING FREE TEXT BOX
62yoF with PMH Multiple myeloma formerly on prednisone, BIBEMS w/ concern for stroke/syncope. LKN 2:30PM after opthalmology appt after having eye irritation. pt returned from appt and was altered, ?facial droop by EMS, slurred speech, and weakness/fatigued. no witnessed syncope. pt with no prior hx of CVA. on no anticoagulants. no known fevers, cough, sore throat, numbness, tingling, dizziness.    code stroke initiated, CTAs, perfusion negative, + large lung mass found on CTA neck, found to be febrile to 105F, deemed no longer candidate for TPA    plan for sepsis w/u, r/o UTI, PNA, meningitis, encephalitis, admit

## 2022-11-29 NOTE — ED PROVIDER NOTE - OBJECTIVE STATEMENT
62yoF with PMH Multiple myeloma 62yoF with PMH Multiple myeloma formerly on prednisone, BIBEMS w/ concern for stroke/syncope. LKN 2:30PM after opthalmology appt after having eye irritation. pt returned from appt and was altered, ?facial droop by EMS, slurred speech, and weakness/fatigued. no witnessed syncope. pt with no prior hx of CVA. on no anticoagulants. no known fevers, cough, sore throat, numbness, tingling, dizziness. 62yoF with PMH Multiple myeloma formerly on prednisone, BIBEMS w/ concern for stroke/syncope. LKN 2:30PM after opthalmology appt after having eye irritation. pt returned from appt and was altered, ?facial droop by EMS, slurred speech, and weakness/fatigued. no witnessed syncope. pt with no prior hx of CVA. on no anticoagulants. no known fevers, cough, sore throat, numbness, tingling, dizziness.    PMD [facility]

## 2022-11-29 NOTE — ED ADULT TRIAGE NOTE - CHIEF COMPLAINT QUOTE
pt notification to r/o stroke. pt LKN 13:45. as per EMS pt had syncopal episode then developed expressive aphasia and R upper extremity weakness. Code stroke called, pt brought directly to CT scan.

## 2022-11-29 NOTE — ED PROVIDER NOTE - NS ED ATTENDING STATEMENT MOD
This was a shared visit with the ARDEN. I reviewed and verified the documentation and independently performed the documented:

## 2022-11-29 NOTE — ED ADULT NURSE NOTE - NSIMPLEMENTINTERV_GEN_ALL_ED
Implemented All Fall Risk Interventions:  [place] to call system. Call bell, personal items and telephone within reach. Instruct patient to call for assistance. Room bathroom lighting operational. Non-slip footwear when patient is off stretcher. Physically safe environment: no spills, clutter or unnecessary equipment. Stretcher in lowest position, wheels locked, appropriate side rails in place. Provide visual cue, wrist band, yellow gown, etc. Monitor gait and stability. Monitor for mental status changes and reorient to person, place, and time. Review medications for side effects contributing to fall risk. Reinforce activity limits and safety measures with patient and family.

## 2022-11-29 NOTE — CONSULT NOTE ADULT - ASSESSMENT
INCOMPLETE     Assessment:    NIHSS: 14 -->   mRS: 1    Patient is not a tPA candidate because their symptoms were rapidly improving and other cause of deficits found to be more likely given fever (discussed with stroke fellow).   Patient is not a mechanical thrombectomy candidate because no evidence of LVO.    Impression: AMS, tremors/asterixis, generalized weakness, fevers, likely due to diffuse cerebral dysfunction from toxic metabolic or infectious etiologies. Low suspicion for acute ischemic stroke.     Recommendations    [] Consider MRI brain w/w/o contrast if workup negative and symptoms persist  [] UA, Utox, UCx, CXR, BCx, CBC, CMP, Coag, Mag, phos, syphillis screen  [] TSH, Folate, B12, homocysteine, methylmalonic acid, lactate, CPK, ammonia  [] Further toxic metabolic workup as per primary team  [] No need for permissive HTN at this time, BP parameters per primary team.  [] Neurochecks q4hr   [] PT/OT evaluation if patient experiencing persistent weakness while inpatient  [] NPO until dysphagia screening    Patient case discussed with stroke fellow, Dr. Rodriguez, under supervision of stroke attending, Dr. Adair. Patient to be seen on morning rounds by general neurology.      61 yo R-H F w/ pmhx of MM (diagnosed ~10 years ago, currently on Promacta treatments since June 2022 - most recently last Wednesday; followed by oncologist, Dr. Lopez at [facility]), HTN, neuropathic R hip pain, recent R corneal tear, presents to [facility] ED for change in mental status. LKW approximately 14:30. Patient was in normal state of health this morning as per son, Nacho (presenting with patient), when he awoke and briefly witnessed patient walking and speaking normally. Patient went to a follow up ophtho appointment for a recently diagnosed corneal abrasion this past Monday. Patient was picked up by a friend around 10:00. During the appointment, while in the waiting room, patient was sleeping but without complaints. Son reports patient has been sleeping poorly lately. As per son/friend, Ophtho did not notice any differences in her mental status at that time. On the way back from appointment around 14:30, patient stopped responding to friend appropriately in the car and she appeared more tired & weaker than usual. This change in speech persisted when they arrived home and then son called EMS. On their evaluation, patient with persisting word finding difficulties and generalized weakness, but leaning to the R side. Throughout code stroke patient's exam fluctuating and tpa was pre mixed but then patient was excluded for reasons listed below. Patient reports some difficulty remembering what happened today, but reports she feels generally weak and very tired. CTH unremarkable, CTA H/N w/ IV contrast w/o LVO hemodynamically significant stenosis. CTP unremarkable Exam as above, fluctuating.    NIHSS: 14 --> 3  mRS: 1    Patient is not a tPA candidate because their symptoms were rapidly improving, no significant stenosis or LVO to implicate cause of aphasia, and other cause of deficits found to be more likely given fever (discussed with stroke fellow).   Patient is not a mechanical thrombectomy candidate because no evidence of LVO.    Impression: AMS, tremors/asterixis, generalized weakness, fevers, likely due to diffuse cerebral dysfunction from toxic metabolic or infectious etiologies. If basic workup is negative, would rule out meningoencephalitis. Consider transient focal neurologic symptoms due to focal seizures w/ impaired awareness. Also rule out brain metastases given malignancy & R lung mass. Low suspicion for acute ischemic stroke.     Recommendations    [] Consider MRI brain w/w/o contrast if workup negative and symptoms persist  [] vEEG  [] Consider LP after basic workup, w/ CSF studies, culture   [] UA, Utox, UCx, CXR, BCx, CBC, CMP, Coag, Mag, phos, syphillis screen  [] TSH, Folate, B12, homocysteine, methylmalonic acid, lactate, CPK, ammonia  [] Further toxic metabolic workup as per primary team  [] Workup of lung mass per primary team  [] Empiric coverage for meningoencephalitis   [] No need for permissive HTN at this time, BP parameters per primary team.  [] Neurochecks q4hr   [] PT/OT evaluation if patient experiencing persistent weakness while inpatient  [] NPO until dysphagia screening    Patient case discussed with stroke fellow, Dr. Rodriguez, under supervision of stroke attending, Dr. Adair. Patient to be seen on morning rounds by general neurology.      61 yo R-H F w/ pmhx of MM (diagnosed ~10 years ago, currently on Promacta treatments since June 2022 - most recently last Wednesday; followed by oncologist, Dr. Lopez at [facility]), HTN, neuropathic R hip pain, recent R corneal tear, presents to [facility] ED for change in mental status. LKW approximately 14:30. Patient was in normal state of health this morning as per son, Nacho (presenting with patient), when he awoke and briefly witnessed patient walking and speaking normally. Patient went to a follow up ophtho appointment for a recently diagnosed corneal abrasion this past Monday. Patient was picked up by a friend around 10:00. During the appointment, while in the waiting room, patient was sleeping but without complaints. Son reports patient has been sleeping poorly lately. As per son/friend, Ophtho did not notice any differences in her mental status at that time. On the way back from appointment around 14:30, patient stopped responding to friend appropriately in the car and she appeared more tired & weaker than usual. This change in speech persisted when they arrived home and then son called EMS. On their evaluation, patient with persisting word finding difficulties and generalized weakness, but leaning to the R side. Throughout code stroke patient's exam fluctuating and tpa was pre mixed but then patient was excluded for reasons listed below. Patient reports some difficulty remembering what happened today, but reports she feels generally weak and very tired. CTH unremarkable, CTA H/N w/ IV contrast w/o LVO hemodynamically significant stenosis. CTP unremarkable Exam as above, fluctuating.    NIHSS: 14 --> 3  mRS: 1    Patient is not a tPA candidate because their symptoms were rapidly improving, no significant stenosis or LVO to implicate cause of aphasia, and other cause of deficits found to be more likely given fever (discussed with stroke fellow).   Patient is not a mechanical thrombectomy candidate because no evidence of LVO.    Impression: AMS, tremors/asterixis, generalized weakness, fevers, likely due to diffuse cerebral dysfunction from toxic metabolic or infectious etiologies. If basic workup is negative, would rule out meningoencephalitis. Consider transient focal neurologic symptoms due to focal seizures w/ impaired awareness. Also rule out brain metastases given malignancy & R lung mass. Low suspicion for acute ischemic stroke.     Recommendations    [] Consider MRI brain w/w/o contrast if workup negative and symptoms persist  [] vEEG  [] Consider LP after basic workup, w/ CSF studies, culture   [] rCTH for change in neuro exam   [] UA, Utox, UCx, CXR, BCx, CBC, CMP, Coag, Mag, phos, syphillis screen  [] TSH, Folate, B12, homocysteine, methylmalonic acid, lactate, CPK, ammonia  [] Further toxic metabolic workup as per primary team  [] Workup of lung mass per primary team  [] Empiric coverage for meningoencephalitis   [] DVT PPx with Lovenox 40mg subq daily  [] No need for permissive HTN at this time, BP parameters per primary team.  [] Neurochecks q4hr   [] PT/OT evaluation if patient experiencing persistent weakness while inpatient  [] NPO until dysphagia screening  [] if patient has a convulsion, please document accurately the length of the episode, and specifically what the patient was doing paying attention to eye opening vs closure, gaze deviation, shaking of extremities, tongue bite, urinary incontinence, any derangement of vital signs  [] If patient has a generalized tonic clonic episode for >3 minutes or significant derangement of vital signs may administer Ativan 2mg IV  [] Seizure precautions  [] Fall Precautions    RESCUE: Ativan 2mg IV and 2nd line     Patient case discussed with stroke fellow, Dr. Rodriguez, under supervision of stroke attending, Dr. Adair. Patient to be seen on morning rounds by general neurology.      63 yo R-H F w/ pmhx of MM (diagnosed ~10 years ago, currently on Promacta treatments since June 2022 - most recently last Wednesday; followed by oncologist, Dr. Lopez at [facility]), HTN, neuropathic R hip pain, recent R corneal tear, presents to [facility] ED for change in mental status. LKW approximately 14:30. Patient was in normal state of health this morning as per son, Nacho (presenting with patient), when he awoke and briefly witnessed patient walking and speaking normally. Patient went to a follow up ophtho appointment for a recently diagnosed corneal abrasion this past Monday. Patient was picked up by a friend around 10:00. During the appointment, while in the waiting room, patient was sleeping but without complaints. Son reports patient has been sleeping poorly lately. As per son/friend, Ophtho did not notice any differences in her mental status at that time. On the way back from appointment around 14:30, patient stopped responding to friend appropriately in the car and she appeared more tired & weaker than usual. This change in speech persisted when they arrived home and then son called EMS. On their evaluation, patient with persisting word finding difficulties and generalized weakness, but leaning to the R side. Throughout code stroke patient's exam fluctuating and tpa was pre mixed but then patient was excluded for reasons listed below. Patient reports some difficulty remembering what happened today, but reports she feels generally weak and very tired. CTH unremarkable, CTA H/N w/ IV contrast w/o LVO hemodynamically significant stenosis. CTP unremarkable Exam as above, fluctuating.    NIHSS: 14 --> 3  mRS: 1    Patient is not a tPA candidate because their symptoms were rapidly improving, no significant stenosis or LVO to implicate cause of aphasia, and other cause of deficits found to be more likely given fever (discussed with stroke fellow).   Patient is not a mechanical thrombectomy candidate because no evidence of LVO.    Impression: AMS, tremors/asterixis, generalized weakness, fevers, likely due to diffuse cerebral dysfunction from toxic metabolic or infectious etiologies. If basic workup is negative, would rule out meningoencephalitis. Consider transient focal neurologic symptoms due to focal seizures w/ impaired awareness. Also rule out brain metastases given malignancy & R lung mass. Low suspicion for acute ischemic stroke.     Recommendations    [] Consider MRI brain w/w/o contrast if workup negative and symptoms persist  [] vEEG  [] Consider LP after basic workup, w/ CSF studies, culture   [] rCTH for change in neuro exam   [] UA, Utox, UCx, CXR, BCx, CBC, CMP, Coag, Mag, phos, syphillis screen  [] TSH, Folate, B12, homocysteine, methylmalonic acid, lactate, CPK, ammonia  [] Further toxic metabolic workup as per primary team  [] Workup of lung mass per primary team  [] Empiric coverage for meningoencephalitis   [] DVT PPx with Lovenox 40mg subq daily  [] No need for permissive HTN at this time, BP parameters per primary team.  [] Neurochecks q4hr   [] PT/OT evaluation if patient experiencing persistent weakness while inpatient  [] NPO until dysphagia screening  [] if patient has a convulsion, please document accurately the length of the episode, and specifically what the patient was doing paying attention to eye opening vs closure, gaze deviation, shaking of extremities, tongue bite, urinary incontinence, any derangement of vital signs  [] If patient has a generalized tonic clonic episode for >3 minutes or significant derangement of vital signs may administer Ativan 2mg IV  [] Seizure precautions  [] Fall Precautions    Patient case discussed with stroke fellow, Dr. Rodriguez, under supervision of stroke attending, Dr. Adair. Patient to be seen on morning rounds by general neurology.

## 2022-11-30 DIAGNOSIS — J96.01 ACUTE RESPIRATORY FAILURE WITH HYPOXIA: ICD-10-CM

## 2022-11-30 DIAGNOSIS — I10 ESSENTIAL (PRIMARY) HYPERTENSION: ICD-10-CM

## 2022-11-30 DIAGNOSIS — Z29.9 ENCOUNTER FOR PROPHYLACTIC MEASURES, UNSPECIFIED: ICD-10-CM

## 2022-11-30 DIAGNOSIS — A41.9 SEPSIS, UNSPECIFIED ORGANISM: ICD-10-CM

## 2022-11-30 DIAGNOSIS — C90.00 MULTIPLE MYELOMA NOT HAVING ACHIEVED REMISSION: ICD-10-CM

## 2022-11-30 DIAGNOSIS — R47.81 SLURRED SPEECH: ICD-10-CM

## 2022-11-30 DIAGNOSIS — E11.9 TYPE 2 DIABETES MELLITUS WITHOUT COMPLICATIONS: ICD-10-CM

## 2022-11-30 DIAGNOSIS — R91.8 OTHER NONSPECIFIC ABNORMAL FINDING OF LUNG FIELD: ICD-10-CM

## 2022-11-30 DIAGNOSIS — R41.82 ALTERED MENTAL STATUS, UNSPECIFIED: ICD-10-CM

## 2022-11-30 LAB
ALBUMIN SERPL ELPH-MCNC: 2.8 G/DL — LOW (ref 3.3–5)
ALBUMIN SERPL ELPH-MCNC: 3.2 G/DL — LOW (ref 3.3–5)
ALP SERPL-CCNC: 81 U/L — SIGNIFICANT CHANGE UP (ref 40–120)
ALP SERPL-CCNC: 91 U/L — SIGNIFICANT CHANGE UP (ref 40–120)
ALT FLD-CCNC: 49 U/L — HIGH (ref 4–33)
ALT FLD-CCNC: 66 U/L — HIGH (ref 4–33)
AMMONIA BLD-MCNC: 30 UMOL/L — SIGNIFICANT CHANGE UP (ref 11–55)
ANION GAP SERPL CALC-SCNC: 16 MMOL/L — HIGH (ref 7–14)
ANION GAP SERPL CALC-SCNC: 19 MMOL/L — HIGH (ref 7–14)
APPEARANCE CSF: CLEAR — SIGNIFICANT CHANGE UP
APPEARANCE SPUN FLD: COLORLESS — SIGNIFICANT CHANGE UP
AST SERPL-CCNC: 121 U/L — HIGH (ref 4–32)
AST SERPL-CCNC: 175 U/L — HIGH (ref 4–32)
B PERT DNA SPEC QL NAA+PROBE: SIGNIFICANT CHANGE UP
B PERT+PARAPERT DNA PNL SPEC NAA+PROBE: SIGNIFICANT CHANGE UP
BASE EXCESS BLDV CALC-SCNC: -5.5 MMOL/L — LOW (ref -2–3)
BASOPHILS # BLD AUTO: 0.01 K/UL — SIGNIFICANT CHANGE UP (ref 0–0.2)
BASOPHILS # BLD AUTO: 0.01 K/UL — SIGNIFICANT CHANGE UP (ref 0–0.2)
BASOPHILS NFR BLD AUTO: 0.5 % — SIGNIFICANT CHANGE UP (ref 0–2)
BASOPHILS NFR BLD AUTO: 1.3 % — SIGNIFICANT CHANGE UP (ref 0–2)
BILIRUB SERPL-MCNC: 0.6 MG/DL — SIGNIFICANT CHANGE UP (ref 0.2–1.2)
BILIRUB SERPL-MCNC: 0.9 MG/DL — SIGNIFICANT CHANGE UP (ref 0.2–1.2)
BLOOD GAS ARTERIAL COMPREHENSIVE RESULT: SIGNIFICANT CHANGE UP
BORDETELLA PARAPERTUSSIS (RAPRVP): SIGNIFICANT CHANGE UP
BUN SERPL-MCNC: 24 MG/DL — HIGH (ref 7–23)
BUN SERPL-MCNC: 32 MG/DL — HIGH (ref 7–23)
C PNEUM DNA SPEC QL NAA+PROBE: SIGNIFICANT CHANGE UP
CA-I SERPL-SCNC: 1.05 MMOL/L — LOW (ref 1.15–1.33)
CALCIUM SERPL-MCNC: 8 MG/DL — LOW (ref 8.4–10.5)
CALCIUM SERPL-MCNC: 8.4 MG/DL — SIGNIFICANT CHANGE UP (ref 8.4–10.5)
CHLORIDE BLDV-SCNC: 104 MMOL/L — SIGNIFICANT CHANGE UP (ref 96–108)
CHLORIDE SERPL-SCNC: 103 MMOL/L — SIGNIFICANT CHANGE UP (ref 98–107)
CHLORIDE SERPL-SCNC: 105 MMOL/L — SIGNIFICANT CHANGE UP (ref 98–107)
CK SERPL-CCNC: 1882 U/L — HIGH (ref 25–170)
CO2 BLDV-SCNC: 23 MMOL/L — SIGNIFICANT CHANGE UP (ref 22–26)
CO2 SERPL-SCNC: 19 MMOL/L — LOW (ref 22–31)
CO2 SERPL-SCNC: 21 MMOL/L — LOW (ref 22–31)
COLOR CSF: COLORLESS — SIGNIFICANT CHANGE UP
CREAT SERPL-MCNC: 1.38 MG/DL — HIGH (ref 0.5–1.3)
CREAT SERPL-MCNC: 1.51 MG/DL — HIGH (ref 0.5–1.3)
CSF PCR RESULT: SIGNIFICANT CHANGE UP
D DIMER BLD IA.RAPID-MCNC: 3097 NG/ML DDU — HIGH
DAT POLY-SP REAG RBC QL: NEGATIVE — SIGNIFICANT CHANGE UP
E COLI DNA BLD POS QL NAA+NON-PROBE: SIGNIFICANT CHANGE UP
EGFR: 39 ML/MIN/1.73M2 — LOW
EGFR: 43 ML/MIN/1.73M2 — LOW
EOSINOPHIL # BLD AUTO: 0 K/UL — SIGNIFICANT CHANGE UP (ref 0–0.5)
EOSINOPHIL # BLD AUTO: 0 K/UL — SIGNIFICANT CHANGE UP (ref 0–0.5)
EOSINOPHIL NFR BLD AUTO: 0 % — SIGNIFICANT CHANGE UP (ref 0–6)
EOSINOPHIL NFR BLD AUTO: 0 % — SIGNIFICANT CHANGE UP (ref 0–6)
FIBRINOGEN PPP-MCNC: 347 MG/DL — SIGNIFICANT CHANGE UP (ref 200–465)
FLUAV SUBTYP SPEC NAA+PROBE: SIGNIFICANT CHANGE UP
FLUBV RNA SPEC QL NAA+PROBE: SIGNIFICANT CHANGE UP
FOLATE SERPL-MCNC: 9.3 NG/ML — SIGNIFICANT CHANGE UP (ref 3.1–17.5)
GAS PNL BLDA: SIGNIFICANT CHANGE UP
GAS PNL BLDV: 136 MMOL/L — SIGNIFICANT CHANGE UP (ref 136–145)
GAS PNL BLDV: SIGNIFICANT CHANGE UP
GAS PNL BLDV: SIGNIFICANT CHANGE UP
GLUCOSE BLDC GLUCOMTR-MCNC: 113 MG/DL — HIGH (ref 70–99)
GLUCOSE BLDC GLUCOMTR-MCNC: 125 MG/DL — HIGH (ref 70–99)
GLUCOSE BLDC GLUCOMTR-MCNC: 134 MG/DL — HIGH (ref 70–99)
GLUCOSE BLDC GLUCOMTR-MCNC: 134 MG/DL — HIGH (ref 70–99)
GLUCOSE BLDV-MCNC: 123 MG/DL — HIGH (ref 70–99)
GLUCOSE CSF-MCNC: 95 MG/DL — HIGH (ref 40–70)
GLUCOSE SERPL-MCNC: 125 MG/DL — HIGH (ref 70–99)
GLUCOSE SERPL-MCNC: 128 MG/DL — HIGH (ref 70–99)
GRAM STN FLD: SIGNIFICANT CHANGE UP
HADV DNA SPEC QL NAA+PROBE: SIGNIFICANT CHANGE UP
HAPTOGLOB SERPL-MCNC: <20 MG/DL — LOW (ref 34–200)
HCO3 BLDV-SCNC: 22 MMOL/L — SIGNIFICANT CHANGE UP (ref 22–29)
HCOV 229E RNA SPEC QL NAA+PROBE: SIGNIFICANT CHANGE UP
HCOV HKU1 RNA SPEC QL NAA+PROBE: SIGNIFICANT CHANGE UP
HCOV NL63 RNA SPEC QL NAA+PROBE: SIGNIFICANT CHANGE UP
HCOV OC43 RNA SPEC QL NAA+PROBE: SIGNIFICANT CHANGE UP
HCT VFR BLD CALC: 27 % — LOW (ref 34.5–45)
HCT VFR BLD CALC: 30.1 % — LOW (ref 34.5–45)
HCT VFR BLDA CALC: 30 % — LOW (ref 34.5–46.5)
HCYS SERPL-MCNC: 4.7 UMOL/L — SIGNIFICANT CHANGE UP
HGB BLD CALC-MCNC: 10 G/DL — LOW (ref 11.5–15.5)
HGB BLD-MCNC: 8.4 G/DL — LOW (ref 11.5–15.5)
HGB BLD-MCNC: 9.3 G/DL — LOW (ref 11.5–15.5)
HMPV RNA SPEC QL NAA+PROBE: SIGNIFICANT CHANGE UP
HPIV1 RNA SPEC QL NAA+PROBE: SIGNIFICANT CHANGE UP
HPIV2 RNA SPEC QL NAA+PROBE: SIGNIFICANT CHANGE UP
HPIV3 RNA SPEC QL NAA+PROBE: SIGNIFICANT CHANGE UP
HPIV4 RNA SPEC QL NAA+PROBE: SIGNIFICANT CHANGE UP
IANC: 0.39 K/UL — LOW (ref 1.8–7.4)
IANC: 0.97 K/UL — LOW (ref 1.8–7.4)
IMM GRANULOCYTES NFR BLD AUTO: 6.3 % — HIGH (ref 0–0.9)
IMM GRANULOCYTES NFR BLD AUTO: 6.6 % — HIGH (ref 0–0.9)
LABORATORY COMMENT REPORT: SIGNIFICANT CHANGE UP
LACTATE BLDV-MCNC: 4.8 MMOL/L — CRITICAL HIGH (ref 0.5–2)
LACTATE SERPL-SCNC: 4.3 MMOL/L — CRITICAL HIGH (ref 0.5–2)
LACTATE SERPL-SCNC: 6.3 MMOL/L — CRITICAL HIGH (ref 0.5–2)
LDH SERPL L TO P-CCNC: 1080 U/L — HIGH (ref 135–225)
LYMPHOCYTES # BLD AUTO: 0.25 K/UL — LOW (ref 1–3.3)
LYMPHOCYTES # BLD AUTO: 0.47 K/UL — LOW (ref 1–3.3)
LYMPHOCYTES # BLD AUTO: 25.7 % — SIGNIFICANT CHANGE UP (ref 13–44)
LYMPHOCYTES # BLD AUTO: 31.3 % — SIGNIFICANT CHANGE UP (ref 13–44)
LYMPHOCYTES # CSF: 7 % — SIGNIFICANT CHANGE UP
M PNEUMO DNA SPEC QL NAA+PROBE: SIGNIFICANT CHANGE UP
MAGNESIUM SERPL-MCNC: 1.2 MG/DL — LOW (ref 1.6–2.6)
MAGNESIUM SERPL-MCNC: 1.9 MG/DL — SIGNIFICANT CHANGE UP (ref 1.6–2.6)
MCHC RBC-ENTMCNC: 30.9 GM/DL — LOW (ref 32–36)
MCHC RBC-ENTMCNC: 31.1 GM/DL — LOW (ref 32–36)
MCHC RBC-ENTMCNC: 33.7 PG — SIGNIFICANT CHANGE UP (ref 27–34)
MCHC RBC-ENTMCNC: 34.6 PG — HIGH (ref 27–34)
MCV RBC AUTO: 109.1 FL — HIGH (ref 80–100)
MCV RBC AUTO: 111.1 FL — HIGH (ref 80–100)
METHOD TYPE: SIGNIFICANT CHANGE UP
MONOCYTES # BLD AUTO: 0.1 K/UL — SIGNIFICANT CHANGE UP (ref 0–0.9)
MONOCYTES # BLD AUTO: 0.26 K/UL — SIGNIFICANT CHANGE UP (ref 0–0.9)
MONOCYTES NFR BLD AUTO: 12.5 % — SIGNIFICANT CHANGE UP (ref 2–14)
MONOCYTES NFR BLD AUTO: 14.2 % — HIGH (ref 2–14)
MONOS+MACROS NFR CSF: 5 % — SIGNIFICANT CHANGE UP
NEUTROPHILS # BLD AUTO: 0.39 K/UL — LOW (ref 1.8–7.4)
NEUTROPHILS # BLD AUTO: 0.97 K/UL — LOW (ref 1.8–7.4)
NEUTROPHILS # CSF: 0 % — SIGNIFICANT CHANGE UP
NEUTROPHILS NFR BLD AUTO: 48.6 % — SIGNIFICANT CHANGE UP (ref 43–77)
NEUTROPHILS NFR BLD AUTO: 53 % — SIGNIFICANT CHANGE UP (ref 43–77)
NIGHT BLUE STAIN TISS: SIGNIFICANT CHANGE UP
NRBC # BLD: 7 /100 WBCS — HIGH (ref 0–0)
NRBC # BLD: 9 /100 WBCS — HIGH (ref 0–0)
NRBC # FLD: 0.07 K/UL — HIGH (ref 0–0)
NRBC # FLD: 0.12 K/UL — HIGH (ref 0–0)
NRBC NFR CSF: 1 CELLS/UL — SIGNIFICANT CHANGE UP (ref 0–5)
PCO2 BLDV: 48 MMHG — HIGH (ref 39–42)
PH BLDV: 7.26 — LOW (ref 7.32–7.43)
PHOSPHATE SERPL-MCNC: 3.8 MG/DL — SIGNIFICANT CHANGE UP (ref 2.5–4.5)
PHOSPHATE SERPL-MCNC: 5.8 MG/DL — HIGH (ref 2.5–4.5)
PLATELET # BLD AUTO: 42 K/UL — LOW (ref 150–400)
PLATELET # BLD AUTO: 52 K/UL — LOW (ref 150–400)
PO2 BLDV: 23 MMHG — SIGNIFICANT CHANGE UP
POTASSIUM BLDV-SCNC: 4.5 MMOL/L — SIGNIFICANT CHANGE UP (ref 3.5–5.1)
POTASSIUM SERPL-MCNC: 3.4 MMOL/L — LOW (ref 3.5–5.3)
POTASSIUM SERPL-MCNC: 4.7 MMOL/L — SIGNIFICANT CHANGE UP (ref 3.5–5.3)
POTASSIUM SERPL-SCNC: 3.4 MMOL/L — LOW (ref 3.5–5.3)
POTASSIUM SERPL-SCNC: 4.7 MMOL/L — SIGNIFICANT CHANGE UP (ref 3.5–5.3)
PROCALCITONIN SERPL-MCNC: 8.73 NG/ML — HIGH (ref 0.02–0.1)
PROT CSF-MCNC: 33 MG/DL — SIGNIFICANT CHANGE UP (ref 15–45)
PROT SERPL-MCNC: 4.8 G/DL — LOW (ref 6–8.3)
PROT SERPL-MCNC: 5.8 G/DL — LOW (ref 6–8.3)
RAPID RVP RESULT: SIGNIFICANT CHANGE UP
RBC # BLD: 2.43 M/UL — LOW (ref 3.8–5.2)
RBC # BLD: 2.76 M/UL — LOW (ref 3.8–5.2)
RBC # CSF: 0 CELLS/UL — SIGNIFICANT CHANGE UP (ref 0–0)
RBC # FLD: 15.1 % — HIGH (ref 10.3–14.5)
RBC # FLD: 15.6 % — HIGH (ref 10.3–14.5)
RETICS #: 46.6 K/UL — SIGNIFICANT CHANGE UP (ref 25–125)
RETICS/RBC NFR: 1.9 % — SIGNIFICANT CHANGE UP (ref 0.5–2.5)
RSV RNA SPEC QL NAA+PROBE: SIGNIFICANT CHANGE UP
RV+EV RNA SPEC QL NAA+PROBE: SIGNIFICANT CHANGE UP
SAO2 % BLDV: 27.1 % — SIGNIFICANT CHANGE UP
SARS-COV-2 RNA SPEC QL NAA+PROBE: SIGNIFICANT CHANGE UP
SODIUM SERPL-SCNC: 141 MMOL/L — SIGNIFICANT CHANGE UP (ref 135–145)
SODIUM SERPL-SCNC: 142 MMOL/L — SIGNIFICANT CHANGE UP (ref 135–145)
SOURCE HSV 1/2: SIGNIFICANT CHANGE UP
SPECIMEN SOURCE: SIGNIFICANT CHANGE UP
T PALLIDUM AB TITR SER: NEGATIVE — SIGNIFICANT CHANGE UP
TOTAL CELLS COUNTED, SPINAL FLUID: 12 CELLS — SIGNIFICANT CHANGE UP
TROPONIN T, HIGH SENSITIVITY RESULT: 38 NG/L — SIGNIFICANT CHANGE UP
TROPONIN T, HIGH SENSITIVITY RESULT: 38 NG/L — SIGNIFICANT CHANGE UP
TSH SERPL-MCNC: 3.83 UIU/ML — SIGNIFICANT CHANGE UP (ref 0.27–4.2)
TUBE TYPE: SIGNIFICANT CHANGE UP
VIT B12 SERPL-MCNC: 1144 PG/ML — HIGH (ref 200–900)
WBC # BLD: 0.8 K/UL — CRITICAL LOW (ref 3.8–10.5)
WBC # BLD: 1.83 K/UL — LOW (ref 3.8–10.5)
WBC # FLD AUTO: 0.8 K/UL — CRITICAL LOW (ref 3.8–10.5)
WBC # FLD AUTO: 1.83 K/UL — LOW (ref 3.8–10.5)

## 2022-11-30 PROCEDURE — 74018 RADEX ABDOMEN 1 VIEW: CPT | Mod: 26

## 2022-11-30 PROCEDURE — 71275 CT ANGIOGRAPHY CHEST: CPT | Mod: 26

## 2022-11-30 PROCEDURE — 99223 1ST HOSP IP/OBS HIGH 75: CPT | Mod: GC

## 2022-11-30 PROCEDURE — 71045 X-RAY EXAM CHEST 1 VIEW: CPT | Mod: 26,76

## 2022-11-30 PROCEDURE — 93306 TTE W/DOPPLER COMPLETE: CPT | Mod: 26

## 2022-11-30 PROCEDURE — 99223 1ST HOSP IP/OBS HIGH 75: CPT

## 2022-11-30 PROCEDURE — 43753 TX GASTRO INTUB W/ASP: CPT

## 2022-11-30 PROCEDURE — 86077 PHYS BLOOD BANK SERV XMATCH: CPT

## 2022-11-30 PROCEDURE — 12345: CPT | Mod: NC

## 2022-11-30 PROCEDURE — 31500 INSERT EMERGENCY AIRWAY: CPT

## 2022-11-30 RX ORDER — SODIUM CHLORIDE 9 MG/ML
1000 INJECTION, SOLUTION INTRAVENOUS ONCE
Refills: 0 | Status: COMPLETED | OUTPATIENT
Start: 2022-11-30 | End: 2022-11-30

## 2022-11-30 RX ORDER — NOREPINEPHRINE BITARTRATE/D5W 8 MG/250ML
0.2 PLASTIC BAG, INJECTION (ML) INTRAVENOUS
Qty: 8 | Refills: 0 | Status: DISCONTINUED | OUTPATIENT
Start: 2022-11-30 | End: 2022-12-02

## 2022-11-30 RX ORDER — AMPICILLIN TRIHYDRATE 250 MG
2 CAPSULE ORAL EVERY 4 HOURS
Refills: 0 | Status: DISCONTINUED | OUTPATIENT
Start: 2022-11-30 | End: 2022-11-30

## 2022-11-30 RX ORDER — PROPOFOL 10 MG/ML
10 INJECTION, EMULSION INTRAVENOUS
Qty: 1000 | Refills: 0 | Status: DISCONTINUED | OUTPATIENT
Start: 2022-11-30 | End: 2022-12-01

## 2022-11-30 RX ORDER — ENOXAPARIN SODIUM 100 MG/ML
40 INJECTION SUBCUTANEOUS EVERY 24 HOURS
Refills: 0 | Status: DISCONTINUED | OUTPATIENT
Start: 2022-11-30 | End: 2022-12-03

## 2022-11-30 RX ORDER — ATOVAQUONE 750 MG/5ML
5 SUSPENSION ORAL
Qty: 0 | Refills: 0 | DISCHARGE

## 2022-11-30 RX ORDER — DEXTROSE 50 % IN WATER 50 %
25 SYRINGE (ML) INTRAVENOUS ONCE
Refills: 0 | Status: DISCONTINUED | OUTPATIENT
Start: 2022-11-30 | End: 2022-12-20

## 2022-11-30 RX ORDER — ACYCLOVIR SODIUM 500 MG
650 VIAL (EA) INTRAVENOUS EVERY 8 HOURS
Refills: 0 | Status: DISCONTINUED | OUTPATIENT
Start: 2022-11-30 | End: 2022-11-30

## 2022-11-30 RX ORDER — SODIUM CHLORIDE 9 MG/ML
1000 INJECTION, SOLUTION INTRAVENOUS
Refills: 0 | Status: DISCONTINUED | OUTPATIENT
Start: 2022-11-30 | End: 2022-12-20

## 2022-11-30 RX ORDER — GABAPENTIN 400 MG/1
1 CAPSULE ORAL
Qty: 0 | Refills: 0 | DISCHARGE

## 2022-11-30 RX ORDER — MIDODRINE HYDROCHLORIDE 2.5 MG/1
10 TABLET ORAL EVERY 8 HOURS
Refills: 0 | Status: DISCONTINUED | OUTPATIENT
Start: 2022-11-30 | End: 2022-11-30

## 2022-11-30 RX ORDER — MAGNESIUM SULFATE 500 MG/ML
2 VIAL (ML) INJECTION ONCE
Refills: 0 | Status: COMPLETED | OUTPATIENT
Start: 2022-11-30 | End: 2022-11-30

## 2022-11-30 RX ORDER — MIDODRINE HYDROCHLORIDE 2.5 MG/1
20 TABLET ORAL EVERY 8 HOURS
Refills: 0 | Status: DISCONTINUED | OUTPATIENT
Start: 2022-11-30 | End: 2022-12-01

## 2022-11-30 RX ORDER — MAGNESIUM OXIDE 400 MG ORAL TABLET 241.3 MG
2 TABLET ORAL
Qty: 0 | Refills: 0 | DISCHARGE

## 2022-11-30 RX ORDER — PYRIDOXINE HCL (VITAMIN B6) 100 MG
1 TABLET ORAL
Qty: 0 | Refills: 0 | DISCHARGE

## 2022-11-30 RX ORDER — ACYCLOVIR SODIUM 500 MG
1 VIAL (EA) INTRAVENOUS
Qty: 0 | Refills: 0 | DISCHARGE

## 2022-11-30 RX ORDER — CEFEPIME 1 G/1
2000 INJECTION, POWDER, FOR SOLUTION INTRAMUSCULAR; INTRAVENOUS EVERY 8 HOURS
Refills: 0 | Status: DISCONTINUED | OUTPATIENT
Start: 2022-11-30 | End: 2022-11-30

## 2022-11-30 RX ORDER — CHOLECALCIFEROL (VITAMIN D3) 125 MCG
1 CAPSULE ORAL
Qty: 0 | Refills: 0 | DISCHARGE

## 2022-11-30 RX ORDER — INSULIN LISPRO 100/ML
VIAL (ML) SUBCUTANEOUS EVERY 6 HOURS
Refills: 0 | Status: DISCONTINUED | OUTPATIENT
Start: 2022-11-30 | End: 2022-12-05

## 2022-11-30 RX ORDER — FILGRASTIM 480MCG/1.6
300 VIAL (ML) INJECTION ONCE
Refills: 0 | Status: COMPLETED | OUTPATIENT
Start: 2022-11-30 | End: 2022-11-30

## 2022-11-30 RX ORDER — CHLORHEXIDINE GLUCONATE 213 G/1000ML
15 SOLUTION TOPICAL EVERY 12 HOURS
Refills: 0 | Status: DISCONTINUED | OUTPATIENT
Start: 2022-11-30 | End: 2022-12-03

## 2022-11-30 RX ORDER — TIMOLOL 0.5 %
1 DROPS OPHTHALMIC (EYE)
Qty: 0 | Refills: 0 | DISCHARGE

## 2022-11-30 RX ORDER — ELTROMBOPAG OLAMINE 50 MG/1
1 TABLET, FILM COATED ORAL
Qty: 0 | Refills: 0 | DISCHARGE

## 2022-11-30 RX ORDER — DEXTROSE 50 % IN WATER 50 %
12.5 SYRINGE (ML) INTRAVENOUS ONCE
Refills: 0 | Status: DISCONTINUED | OUTPATIENT
Start: 2022-11-30 | End: 2022-12-20

## 2022-11-30 RX ORDER — SODIUM CHLORIDE 9 MG/ML
1000 INJECTION INTRAMUSCULAR; INTRAVENOUS; SUBCUTANEOUS ONCE
Refills: 0 | Status: COMPLETED | OUTPATIENT
Start: 2022-11-30 | End: 2022-11-30

## 2022-11-30 RX ORDER — AMLODIPINE BESYLATE 2.5 MG/1
1 TABLET ORAL
Qty: 0 | Refills: 0 | DISCHARGE

## 2022-11-30 RX ORDER — PROPOFOL 10 MG/ML
8 INJECTION, EMULSION INTRAVENOUS ONCE
Refills: 0 | Status: COMPLETED | OUTPATIENT
Start: 2022-11-30 | End: 2022-11-30

## 2022-11-30 RX ORDER — TENOFOVIR DISOPROXIL FUMARATE 300 MG/1
1 TABLET, FILM COATED ORAL
Qty: 0 | Refills: 0 | DISCHARGE

## 2022-11-30 RX ORDER — MIDODRINE HYDROCHLORIDE 2.5 MG/1
20 TABLET ORAL ONCE
Refills: 0 | Status: COMPLETED | OUTPATIENT
Start: 2022-11-30 | End: 2022-11-30

## 2022-11-30 RX ORDER — CEFEPIME 1 G/1
2000 INJECTION, POWDER, FOR SOLUTION INTRAMUSCULAR; INTRAVENOUS EVERY 12 HOURS
Refills: 0 | Status: DISCONTINUED | OUTPATIENT
Start: 2022-11-30 | End: 2022-12-05

## 2022-11-30 RX ORDER — GLUCAGON INJECTION, SOLUTION 0.5 MG/.1ML
1 INJECTION, SOLUTION SUBCUTANEOUS ONCE
Refills: 0 | Status: DISCONTINUED | OUTPATIENT
Start: 2022-11-30 | End: 2022-12-20

## 2022-11-30 RX ORDER — ACETAMINOPHEN 500 MG
1000 TABLET ORAL ONCE
Refills: 0 | Status: COMPLETED | OUTPATIENT
Start: 2022-12-01 | End: 2022-12-01

## 2022-11-30 RX ORDER — ACETAMINOPHEN 500 MG
1000 TABLET ORAL ONCE
Refills: 0 | Status: COMPLETED | OUTPATIENT
Start: 2022-11-30 | End: 2022-11-30

## 2022-11-30 RX ORDER — DEXTROSE 50 % IN WATER 50 %
15 SYRINGE (ML) INTRAVENOUS ONCE
Refills: 0 | Status: DISCONTINUED | OUTPATIENT
Start: 2022-11-30 | End: 2022-12-20

## 2022-11-30 RX ORDER — METFORMIN HYDROCHLORIDE 850 MG/1
1 TABLET ORAL
Qty: 0 | Refills: 0 | DISCHARGE

## 2022-11-30 RX ORDER — MIDAZOLAM HYDROCHLORIDE 1 MG/ML
6 INJECTION, SOLUTION INTRAMUSCULAR; INTRAVENOUS ONCE
Refills: 0 | Status: DISCONTINUED | OUTPATIENT
Start: 2022-11-30 | End: 2022-11-30

## 2022-11-30 RX ORDER — VANCOMYCIN HCL 1 G
1250 VIAL (EA) INTRAVENOUS EVERY 12 HOURS
Refills: 0 | Status: DISCONTINUED | OUTPATIENT
Start: 2022-11-30 | End: 2022-11-30

## 2022-11-30 RX ADMIN — CEFEPIME 100 MILLIGRAM(S): 1 INJECTION, POWDER, FOR SOLUTION INTRAMUSCULAR; INTRAVENOUS at 06:40

## 2022-11-30 RX ADMIN — Medication 200 GRAM(S): at 07:53

## 2022-11-30 RX ADMIN — Medication 0.25 MILLIGRAM(S): at 15:48

## 2022-11-30 RX ADMIN — MIDAZOLAM HYDROCHLORIDE 6 MILLIGRAM(S): 1 INJECTION, SOLUTION INTRAMUSCULAR; INTRAVENOUS at 22:31

## 2022-11-30 RX ADMIN — SODIUM CHLORIDE 1000 MILLILITER(S): 9 INJECTION INTRAMUSCULAR; INTRAVENOUS; SUBCUTANEOUS at 06:07

## 2022-11-30 RX ADMIN — PROPOFOL 5.22 MICROGRAM(S)/KG/MIN: 10 INJECTION, EMULSION INTRAVENOUS at 22:31

## 2022-11-30 RX ADMIN — SODIUM CHLORIDE 1000 MILLILITER(S): 9 INJECTION, SOLUTION INTRAVENOUS at 03:54

## 2022-11-30 RX ADMIN — Medication 32.6 MICROGRAM(S)/KG/MIN: at 20:46

## 2022-11-30 RX ADMIN — Medication 263 MILLIGRAM(S): at 02:04

## 2022-11-30 RX ADMIN — Medication 1000 MILLIGRAM(S): at 20:56

## 2022-11-30 RX ADMIN — Medication 200 GRAM(S): at 00:31

## 2022-11-30 RX ADMIN — CEFEPIME 100 MILLIGRAM(S): 1 INJECTION, POWDER, FOR SOLUTION INTRAMUSCULAR; INTRAVENOUS at 17:29

## 2022-11-30 RX ADMIN — Medication 400 MILLIGRAM(S): at 20:46

## 2022-11-30 RX ADMIN — MIDODRINE HYDROCHLORIDE 20 MILLIGRAM(S): 2.5 TABLET ORAL at 12:25

## 2022-11-30 RX ADMIN — MIDODRINE HYDROCHLORIDE 20 MILLIGRAM(S): 2.5 TABLET ORAL at 15:47

## 2022-11-30 RX ADMIN — Medication 25 GRAM(S): at 09:09

## 2022-11-30 RX ADMIN — MIDODRINE HYDROCHLORIDE 10 MILLIGRAM(S): 2.5 TABLET ORAL at 08:10

## 2022-11-30 RX ADMIN — PROPOFOL 8 MILLIGRAM(S): 10 INJECTION, EMULSION INTRAVENOUS at 22:00

## 2022-11-30 NOTE — CONSULT NOTE ADULT - ASSESSMENT
Patient is a 62 yoF w/ pmhx of MM (diagnosed ~10 years ago, currently on Promacta treatments since June 2022 - most recently last Wednesday; followed by oncologist, Dr. Lopez at [facility]), HTN, neuropathic R hip pain, recent R corneal tear admitted for AMS, code stroke called no acute infarct or hemorrhage, s/p LP findings not consistent with meningitis, course complicated by hypotension and hypoxia, placed on BiPAP. MICU consulted for new BiPAP.     #Hypoxia  - s/p 4L IVF in ED  - may be in setting of volume overload from recent fluid resucitation  - recommend stat IV lasix 20  - agree with stat CTA chest to rule out PE given history of MM  - continue with BiPAP   - obtain ECHO  - obtain abg while on BiPAP    #Hypotension  - procal elevated to 8.73 with high fever up to 105.5, concern for bacterial infection unclear source  - s/p LP results unrevealing  - continue with broad spectrum antibiotics   - give stat dose midodrine 10 now, continue as needed for MAP goal > 65  - send full RVP  - f/u cultures, infectious workup      The patient does not have any MICU needs at this time. Please re-consult as needed.       Johnson Thompson MD  Internal Medicine, PGY-2

## 2022-11-30 NOTE — RAPID RESPONSE TEAM SUMMARY - NSADDTLFINDINGSRRT_GEN_ALL_CORE
Given additional 20 mg midodrine without improvement in BP. No additional fluids given pulmonary edema noted on imaging. Started on Levophed for BP support. DDx septic shock from bacteremia/PNA, active hemolysis (TTP?). Transferred to MICU for management.    Given 20 mg midodrine in addition to 10 mg midodrine patient received prior to RRT without improvement in BP. No additional fluids given pulmonary edema noted on imaging. Started on Levophed for BP support. DDx septic shock from bacteremia/PNA. Concern for active hemolysis (TTP?, severe sepsis, DIC). Transferred to MICU for management.    Given 20 mg midodrine in addition to 10 mg midodrine patient received prior to RRT without improvement in BP. No additional fluids given pulmonary edema noted on imaging. Started on Levophed for BP support. DDx septic shock from bacteremia/PNA. Concern for active hemolysis (TMA, severe sepsis, DIC, TTP?). Transferred to MICU for management.

## 2022-11-30 NOTE — PROGRESS NOTE ADULT - PROBLEM SELECTOR PLAN 3
-Pt diagnosed w/ MM ~10 years ago, currently on Promacta treatments since June 2022 - most recently last Wednesday; followed by oncologist, Dr. Lopez at [facility])  -Will f/u heme/onc recs -Pt diagnosed w/ MM ~10 years ago, currently on Promacta treatments since June 2022 - most recently last Wednesday; followed by oncologist, Dr. Lopez at [facility])  -Will f/u heme/onc recs-specifically about toxicity related to promacta. LFTs WNL -Pt with AMS and slurred speech at admission. Now improved and close to baseline-likely 2/2 to toxic metabolic encephalopathy  -Code stoke called and pt evaluated by neurology. No TPA given because AMS etiology likely 2/2 diffuse cerebral dysfunction from toxic metabolic or infectious etiologies.  -Infectious workup as above. Metabolic workup pending  -CTA head and neck showed no acute hemorrhage, extra-axial collection or displaced calvarial fracture. No hemodynamically significant stenosis. Partially visualized right lung mass measuring 1.8 x 2.2 cm. No evidence for large vessel occlusion.

## 2022-11-30 NOTE — CHART NOTE - NSCHARTNOTEFT_GEN_A_CORE
Johnson Thompson MD  Internal Medicine, PGY-2      MICU Accept Note    HPI / INTERVAL HISTORY:  HPI:  Patient is a 62 yoF w/ pmhx of MM (diagnosed ~10 years ago, currently on Promacta treatments since 2022 - most recently last Wednesday; followed by oncologist, Dr. Lopez at [facility]), HTN, neuropathic R hip pain, recent R corneal tear, presents to [facility] ED for change in mental status. LKW approximately 14:30. Patient was in normal state of health this morning as per son, Nacho. Patient went to a follow up ophtho appointment for a recently diagnosed corneal abrasion this past Monday. Patient was picked up by a friend around 10:00 for an optho appt with normal mental status, pt was just complaining of being sleepy. On the way back from appointment around 14:30, patient stopped responding to friend appropriately in the car and she appeared more tired & weaker than usual. She started having slurred speech when they arrived home and then son called EMS. In the ED, patient with persisting word finding difficulties and generalized weakness, but leaning to the R side. Patient reports some difficulty remembering what happened today, but reports she feels generally weak and very tired. Code stroke called, but TPA not given because of improvement in pt's symptoms. Son reports patient's speech is close to baseline now. Patient then denied chest pain, SOB, HA, changes in vision, N/V, imbalance, numbness or tingling. Denies recent infection or sick contacts. Pt states that she had a similar episode a few years ago with fever which she was told was likely from a MM, she improved after getting steroids.     In the ED pt was febrile 105.5, , /84, RR 24 and saturating well on RA. Pt given 2L IVF bolus and 1x vanc/ceftriaxone/acyclovir/ampicillin.     Two RRTs called for hypoxia and hypotension. The patient was placed on BiPAP with improvement in oxygen saturations. She was given midodrine 30 but continued to be hypotensive requiring pressor support. Assessed at bedside. The patient is AOx2 (name, location). She does not report any shortness of breath, currently comfortable on BiPAP. She will be admitted to MICU.         REVIEW OF SYSTEMS:  [ ] Unable to assess ROS because ______  [ ] Negative except as stated in HPI  CONSTITUTIONAL: No fever, chills, night sweats, or fatigue  EYES: No eye pain, visual disturbances, or discharge  ENMT:  No difficulty hearing, tinnitus, vertigo; No sinus or throat pain  NECK: No pain or stiffness  BREASTS: No pain, masses, or nipple discharge  RESPIRATORY: No cough, wheezing, or hemoptysis; No shortness of breath  CARDIOVASCULAR: No chest pain, palpitations, dizziness, or leg swelling  GASTROINTESTINAL: No abdominal or epigastric pain. No nausea, vomiting, or hematemesis; No diarrhea or constipation. No melena or hematochezia.  GENITOURINARY: No dysuria, frequency, hematuria, or incontinence  NEUROLOGICAL: No headaches, memory loss, loss of strength, numbness, or tremors  SKIN: No itching, burning, rashes, or lesions   LYMPH NODES: No enlarged glands  ENDOCRINE: No heat or cold intolerance; No hair loss  MUSCULOSKELETAL: No joint pain or swelling; No muscle, back, or extremity pain  PSYCHIATRIC: No depression, anxiety, mood swings, or difficulty sleeping  HEME/LYMPH: No easy bruising, or bleeding gums  ALLERGY AND IMMUNOLOGIC: No hives or eczema    PMH/PSH:  PAST MEDICAL & SURGICAL HISTORY:  Multiple myeloma      Type 2 diabetes mellitus      FAMILY HISTORY:  No pertinent family history in first degree relatives      SOCIAL HISTORY:  No history of alcohol, tobacco, or recreational drug use.       HOME MEDICATIONS:  Home Medications:  acyclovir 400 mg oral tablet: 1 tab(s) orally 2 times a day (:56)  amLODIPine 5 mg oral tablet: 1 tab(s) orally once a day (:56)  atovaquone 750 mg/5 mL oral suspension: 5 milliliter(s) orally once a day (:56)  cholecalciferol 50 mcg (2000 intl units) oral tablet: 1 tab(s) orally once a day (:56)  gabapentin 400 mg oral tablet: 1 tab(s) orally 3 times a day (:56)  magnesium oxide 250 mg oral tablet: 2 tab(s) orally once a day, As Needed (:56)  metFORMIN 500 mg oral tablet, extended release: 1 tab(s) orally once a day (:56)  predniSONE 5 mg oral tablet: 1 tab(s) orally once a day (2022 02:56)  Promacta 50 mg oral tablet: 1 tab(s) orally once a day (2022 02:56)  pyridoxine 100 mg oral tablet: 1 tab(s) orally once a day (2022 02:56)  Timoptic 0.5% ophthalmic solution: 1 drop(s) to each affected eye once a day (2022 02:56)  Vemlidy 25 mg oral tablet: 1 tab(s) orally once a day (2022 02:56)      ALLERGIES:  Allergies    Bactrim (Other)  fluconazole (Vomiting; Nausea)  levofloxacin (Vomiting; Nausea)  penicillin (Other)    Intolerances        OBJECTIVE:  ICU Vital Signs Last 24 Hrs  T(C): 37.5 (2022 11:35), Max: 40.8 (2022 16:43)  T(F): 99.5 (2022 11:35), Max: 105.5 (2022 16:43)  HR: 120 (2022 11:35) (70 - 135)  BP: 76/56 (2022 11:35) (67/39 - 151/83)  BP(mean): 64 (2022 11:11) (49 - 80)  ABP: --  ABP(mean): --  RR: 32 (2022 11:35) (18 - 33)  SpO2: 88% (2022 11:35) (82% - 100%)    O2 Parameters below as of 2022 11:11  Patient On (Oxygen Delivery Method): BiPAP/CPAP            I/O Summary 24H    CAPILLARY BLOOD GLUCOSE      POCT Blood Glucose.: 125 mg/dL (2022 11:44)      PHYSICAL EXAM  GENERAL: NAD, lying in bed comfortably  HEAD: Atraumatic, Normocephalic  EYES: EOMI, PERRLA, conjunctiva and sclera clear  ENT: Moist mucous membranes  NECK: Supple, No JVD  CHEST/LUNG: BiPAP  HEART: Regular rate and rhythm; No murmurs, rubs, or gallops  ABDOMEN: Bowel sounds present; Soft, Nontender, Nondistended. No hepatomegaly  EXTREMITIES: 2+ Peripheral Pulses, brisk capillary refill. No clubbing, cyanosis, or edema  NERVOUS SYSTEM: Alert & Oriented X2, speech clear. Follows commands  MSK: FROM all 4 extremities, full and equal strength  SKIN: No rashes or lesions      HOSPITAL MEDICATIONS:  MEDICATIONS  (STANDING):  acetaminophen   IVPB .. 1000 milliGRAM(s) IV Intermittent once  cefepime   IVPB 2000 milliGRAM(s) IV Intermittent every 12 hours  dextrose 5%. 1000 milliLiter(s) (50 mL/Hr) IV Continuous <Continuous>  dextrose 5%. 1000 milliLiter(s) (100 mL/Hr) IV Continuous <Continuous>  dextrose 50% Injectable 25 Gram(s) IV Push once  dextrose 50% Injectable 12.5 Gram(s) IV Push once  dextrose 50% Injectable 25 Gram(s) IV Push once  enoxaparin Injectable 40 milliGRAM(s) SubCutaneous every 24 hours  glucagon  Injectable 1 milliGRAM(s) IntraMuscular once  insulin lispro (ADMELOG) corrective regimen sliding scale   SubCutaneous every 6 hours  midodrine 20 milliGRAM(s) Oral every 8 hours    MEDICATIONS  (PRN):  dextrose Oral Gel 15 Gram(s) Oral once PRN Blood Glucose LESS THAN 70 milliGRAM(s)/deciliter        LABS:  CBC 22 @ 07:19                        8.4    1.83  )-----------( 42                    27.0       Hgb trend: 8.4 <-- , 10.3 <--   WBC trend: 1.83 <-- , 2.04 <--     CMP 22 @ 07:19    142  |  105  |  24<H>  ----------------------------<  125<H>  3.4<L>   |  21<L>  |  1.38<H>    Ca    8.0<L>      22 @ 07:19  Phos  3.8       Mg     1.20         TPro  4.8<L>  /  Alb  2.8<L>  /  TBili  0.6  /  DBili  x   /  AST  121<H>  /  ALT  49<H>  /  AlkPhos  81        Serum Cr trend: 1.38 <-- , 1.01 <--   PT/INR - ( 2022 16:40 )   PT: 13.2 sec;   INR: 1.14 ratio         PTT - ( 2022 16:40 ):28.0 sec  ABG Trend:   22 @ 07:44 pH 7.29<L> | pCO2 41<H> | PO2 140<H> | HCO3 20<L> | FiO2 40    VBG Trend:   22 @ 18:53 - pH: 7.42  | pCO2: 37    | pO2: 60    | HCO3: 24    | Lactate: 3.7    22 @ 17:41 - pH: 7.42  | pCO2: 41    | pO2: 40    | HCO3: 27    | Lactate: 4.3        Urinalysis Basic - ( 2022 17:00 )    Color: Light Yellow / Appearance: Turbid / S.032 / pH: x  Gluc: x / Ketone: Negative  / Bili: Negative / Urobili: <2 mg/dL   Blood: x / Protein: 30 mg/dL / Nitrite: Negative   Leuk Esterase: Negative / RBC: 1 /HPF / WBC 0 /HPF   Sq Epi: x / Non Sq Epi: 3 /HPF / Bacteria: Negative        MICROBIOLOGY:     Culture - CSF with Gram Stain (collected 2022 23:00)  Source: .CSF CSF  Gram Stain (2022 03:10):    No polymorphonuclear leukocytes seen    No organisms seen    by cytocentrifuge    Culture - Acid Fast - CSF (collected 2022 23:00)  Source: .CSF CSF    Culture - Blood (collected 2022 18:33)  Source: .Blood Blood-Peripheral  Gram Stain (2022 11:07):    Growth in anaerobic bottle: Gram Negative Rods    Growth in aerobic bottle: Gram Negative Rods  Preliminary Report (2022 11:08):    Growth in anaerobic bottle: Gram Negative Rods    Growth in aerobic bottle: Gram Negative Rods    Culture - Blood (collected 2022 18:33)  Source: .Blood Blood-Peripheral  Gram Stain (2022 11:33):    Growth in anaerobic bottle: Gram Negative Rods    Growth in aerobic bottle: Gram Negative Rods  Preliminary Report (2022 11:33):    Growth in anaerobic bottle: Gram Negative Rods    Growth in aerobic bottle: Gram Negative Rods    ***Blood Panel PCR results on this specimen are available    approximately 3 hours after the Gram stain result.***    Gram stain, PCR, and/or culture results may not always    correspond due to difference in methodologies.    ************************************************************    This PCR assay was performed by multiplex PCR. This    Assay tests for 66 bacterial and resistance gene targets.    Please refer to the [facility] test directory    at [email or web address] for details.  Organism: Blood Culture PCR (2022 11:48)  Organism: Blood Culture PCR (2022 11:48)        RADIOLOGY & ADDITIONAL TESTS: Reviewed.    ASSESSMENT  Patient is a 63 yo F w/ PMHx of MM (diagnosed ~10 years ago, currently on Promacta treatments since 2022 - most recently last Wednesday; followed by oncologist, Dr. Lopez at [facility]), HTN, neuropathic R hip pain, recent R corneal tear admitted for AMS, code stroke called no acute infarct or hemorrhage, s/p LP findings not consistent with meningitis, course complicated by hypotension and hypoxia, placed on BiPAP, requiring pressors, admitted to MICU with sepsis likely secondary to pneumonia.     PLAN  Neuro  - s/p code stroke --> no ischemia or hemorrhage on CT H+N  - s/p LP results not consistent with encephalitis/meningitis   - AOx2, does not know year  - baseline is AOx3  - Neurology following    Cardiovascular  #Shock  - suspect sepsis with pulmonary source  - s/p 4L IVF in ED  - started on levophed gtt, goal MAP > 65  - obtain ECHO   - TSH wnl  - trend lactate q8h    Pulmonary  #AHRF  - placed on BiPAP, 10/5 on 50%  - suspect secondary to infection with procalcitonin of 8.73  - continue on cefepime for now  - POCUS with B-lines, R > L, hold off on more fluids  - f/u MRSA/MSSA     GI  - keep NPO for now    Renal  - SCr 1.38 up from 1.01  - CPK elevated up to 1800  - hold off on fluid resuscitation for now give POCUS findings above  - continue to trend BMP and CPK    ID  #GNR bacteremia  - s/p vanc/CTX/ampicillin/acyclovir  - continue with cefepime 2g q8h  - f/u speciation and sensitivities  - consider ID consult    Endo  - no history of diabetes, A1C 6.0  - TSH wnl    Heme  - new thrombocytopenia, anemia, and +hemolysis labs (although bili is wnl)  - concern for MAHA  - check Direct Yisel  - can check smear for schistocytes  - monitor CBC q8h    hold off on DVT ppx for now    Ethics  - updated son         [facility] Johnson Thompson MD  Internal Medicine, PGY-2      MICU Accept Note    HPI / INTERVAL HISTORY:  HPI:  Patient is a 62 yoF w/ pmhx of MM (diagnosed ~10 years ago, currently on Promacta treatments since 2022 - most recently last Wednesday; followed by oncologist, Dr. Lopez at [facility]), HTN, neuropathic R hip pain, recent R corneal tear, presents to [facility] ED for change in mental status. LKW approximately 14:30. Patient was in normal state of health this morning as per son, Nacho. Patient went to a follow up ophtho appointment for a recently diagnosed corneal abrasion this past Monday. Patient was picked up by a friend around 10:00 for an optho appt with normal mental status, pt was just complaining of being sleepy. On the way back from appointment around 14:30, patient stopped responding to friend appropriately in the car and she appeared more tired & weaker than usual. She started having slurred speech when they arrived home and then son called EMS. In the ED, patient with persisting word finding difficulties and generalized weakness, but leaning to the R side. Patient reports some difficulty remembering what happened today, but reports she feels generally weak and very tired. Code stroke called, but TPA not given because of improvement in pt's symptoms. Son reports patient's speech is close to baseline now. Patient then denied chest pain, SOB, HA, changes in vision, N/V, imbalance, numbness or tingling. Denies recent infection or sick contacts. Pt states that she had a similar episode a few years ago with fever which she was told was likely from a MM, she improved after getting steroids.     In the ED pt was febrile 105.5, , /84, RR 24 and saturating well on RA. Pt given 2L IVF bolus and 1x vanc/ceftriaxone/acyclovir/ampicillin.     Two RRTs called for hypoxia and hypotension. The patient was placed on BiPAP with improvement in oxygen saturations. She was given midodrine 30 but continued to be hypotensive requiring pressor support. Assessed at bedside. The patient is AOx2 (name, location). She does not report any shortness of breath, currently comfortable on BiPAP. She will be admitted to MICU.         REVIEW OF SYSTEMS:  [ ] Unable to assess ROS because ______  [ ] Negative except as stated in HPI  CONSTITUTIONAL: No fever, chills, night sweats, or fatigue  EYES: No eye pain, visual disturbances, or discharge  ENMT:  No difficulty hearing, tinnitus, vertigo; No sinus or throat pain  NECK: No pain or stiffness  BREASTS: No pain, masses, or nipple discharge  RESPIRATORY: No cough, wheezing, or hemoptysis; No shortness of breath  CARDIOVASCULAR: No chest pain, palpitations, dizziness, or leg swelling  GASTROINTESTINAL: No abdominal or epigastric pain. No nausea, vomiting, or hematemesis; No diarrhea or constipation. No melena or hematochezia.  GENITOURINARY: No dysuria, frequency, hematuria, or incontinence  NEUROLOGICAL: No headaches, memory loss, loss of strength, numbness, or tremors  SKIN: No itching, burning, rashes, or lesions   LYMPH NODES: No enlarged glands  ENDOCRINE: No heat or cold intolerance; No hair loss  MUSCULOSKELETAL: No joint pain or swelling; No muscle, back, or extremity pain  PSYCHIATRIC: No depression, anxiety, mood swings, or difficulty sleeping  HEME/LYMPH: No easy bruising, or bleeding gums  ALLERGY AND IMMUNOLOGIC: No hives or eczema    PMH/PSH:  PAST MEDICAL & SURGICAL HISTORY:  Multiple myeloma      Type 2 diabetes mellitus      FAMILY HISTORY:  No pertinent family history in first degree relatives      SOCIAL HISTORY:  No history of alcohol, tobacco, or recreational drug use.       HOME MEDICATIONS:  Home Medications:  acyclovir 400 mg oral tablet: 1 tab(s) orally 2 times a day (:56)  amLODIPine 5 mg oral tablet: 1 tab(s) orally once a day (:56)  atovaquone 750 mg/5 mL oral suspension: 5 milliliter(s) orally once a day (:56)  cholecalciferol 50 mcg (2000 intl units) oral tablet: 1 tab(s) orally once a day (:56)  gabapentin 400 mg oral tablet: 1 tab(s) orally 3 times a day (:56)  magnesium oxide 250 mg oral tablet: 2 tab(s) orally once a day, As Needed (:56)  metFORMIN 500 mg oral tablet, extended release: 1 tab(s) orally once a day (:56)  predniSONE 5 mg oral tablet: 1 tab(s) orally once a day (2022 02:56)  Promacta 50 mg oral tablet: 1 tab(s) orally once a day (2022 02:56)  pyridoxine 100 mg oral tablet: 1 tab(s) orally once a day (2022 02:56)  Timoptic 0.5% ophthalmic solution: 1 drop(s) to each affected eye once a day (2022 02:56)  Vemlidy 25 mg oral tablet: 1 tab(s) orally once a day (2022 02:56)      ALLERGIES:  Allergies    Bactrim (Other)  fluconazole (Vomiting; Nausea)  levofloxacin (Vomiting; Nausea)  penicillin (Other)    Intolerances        OBJECTIVE:  ICU Vital Signs Last 24 Hrs  T(C): 37.5 (2022 11:35), Max: 40.8 (2022 16:43)  T(F): 99.5 (2022 11:35), Max: 105.5 (2022 16:43)  HR: 120 (2022 11:35) (70 - 135)  BP: 76/56 (2022 11:35) (67/39 - 151/83)  BP(mean): 64 (2022 11:11) (49 - 80)  ABP: --  ABP(mean): --  RR: 32 (2022 11:35) (18 - 33)  SpO2: 88% (2022 11:35) (82% - 100%)    O2 Parameters below as of 2022 11:11  Patient On (Oxygen Delivery Method): BiPAP/CPAP            I/O Summary 24H    CAPILLARY BLOOD GLUCOSE      POCT Blood Glucose.: 125 mg/dL (2022 11:44)      PHYSICAL EXAM  GENERAL: NAD, lying in bed comfortably  HEAD: Atraumatic, Normocephalic  EYES: EOMI, PERRLA, conjunctiva and sclera clear  ENT: Moist mucous membranes  NECK: Supple, No JVD  CHEST/LUNG: BiPAP  HEART: Regular rate and rhythm; No murmurs, rubs, or gallops  ABDOMEN: Bowel sounds present; Soft, Nontender, Nondistended. No hepatomegaly  EXTREMITIES: 2+ Peripheral Pulses, brisk capillary refill. No clubbing, cyanosis, or edema  NERVOUS SYSTEM: Alert & Oriented X2, speech clear. Follows commands  MSK: FROM all 4 extremities, full and equal strength  SKIN: No rashes or lesions      HOSPITAL MEDICATIONS:  MEDICATIONS  (STANDING):  acetaminophen   IVPB .. 1000 milliGRAM(s) IV Intermittent once  cefepime   IVPB 2000 milliGRAM(s) IV Intermittent every 12 hours  dextrose 5%. 1000 milliLiter(s) (50 mL/Hr) IV Continuous <Continuous>  dextrose 5%. 1000 milliLiter(s) (100 mL/Hr) IV Continuous <Continuous>  dextrose 50% Injectable 25 Gram(s) IV Push once  dextrose 50% Injectable 12.5 Gram(s) IV Push once  dextrose 50% Injectable 25 Gram(s) IV Push once  enoxaparin Injectable 40 milliGRAM(s) SubCutaneous every 24 hours  glucagon  Injectable 1 milliGRAM(s) IntraMuscular once  insulin lispro (ADMELOG) corrective regimen sliding scale   SubCutaneous every 6 hours  midodrine 20 milliGRAM(s) Oral every 8 hours    MEDICATIONS  (PRN):  dextrose Oral Gel 15 Gram(s) Oral once PRN Blood Glucose LESS THAN 70 milliGRAM(s)/deciliter        LABS:  CBC 22 @ 07:19                        8.4    1.83  )-----------( 42                    27.0       Hgb trend: 8.4 <-- , 10.3 <--   WBC trend: 1.83 <-- , 2.04 <--     CMP 22 @ 07:19    142  |  105  |  24<H>  ----------------------------<  125<H>  3.4<L>   |  21<L>  |  1.38<H>    Ca    8.0<L>      22 @ 07:19  Phos  3.8       Mg     1.20         TPro  4.8<L>  /  Alb  2.8<L>  /  TBili  0.6  /  DBili  x   /  AST  121<H>  /  ALT  49<H>  /  AlkPhos  81        Serum Cr trend: 1.38 <-- , 1.01 <--   PT/INR - ( 2022 16:40 )   PT: 13.2 sec;   INR: 1.14 ratio         PTT - ( 2022 16:40 ):28.0 sec  ABG Trend:   22 @ 07:44 pH 7.29<L> | pCO2 41<H> | PO2 140<H> | HCO3 20<L> | FiO2 40    VBG Trend:   22 @ 18:53 - pH: 7.42  | pCO2: 37    | pO2: 60    | HCO3: 24    | Lactate: 3.7    22 @ 17:41 - pH: 7.42  | pCO2: 41    | pO2: 40    | HCO3: 27    | Lactate: 4.3        Urinalysis Basic - ( 2022 17:00 )    Color: Light Yellow / Appearance: Turbid / S.032 / pH: x  Gluc: x / Ketone: Negative  / Bili: Negative / Urobili: <2 mg/dL   Blood: x / Protein: 30 mg/dL / Nitrite: Negative   Leuk Esterase: Negative / RBC: 1 /HPF / WBC 0 /HPF   Sq Epi: x / Non Sq Epi: 3 /HPF / Bacteria: Negative        MICROBIOLOGY:     Culture - CSF with Gram Stain (collected 2022 23:00)  Source: .CSF CSF  Gram Stain (2022 03:10):    No polymorphonuclear leukocytes seen    No organisms seen    by cytocentrifuge    Culture - Acid Fast - CSF (collected 2022 23:00)  Source: .CSF CSF    Culture - Blood (collected 2022 18:33)  Source: .Blood Blood-Peripheral  Gram Stain (2022 11:07):    Growth in anaerobic bottle: Gram Negative Rods    Growth in aerobic bottle: Gram Negative Rods  Preliminary Report (2022 11:08):    Growth in anaerobic bottle: Gram Negative Rods    Growth in aerobic bottle: Gram Negative Rods    Culture - Blood (collected 2022 18:33)  Source: .Blood Blood-Peripheral  Gram Stain (2022 11:33):    Growth in anaerobic bottle: Gram Negative Rods    Growth in aerobic bottle: Gram Negative Rods  Preliminary Report (2022 11:33):    Growth in anaerobic bottle: Gram Negative Rods    Growth in aerobic bottle: Gram Negative Rods    ***Blood Panel PCR results on this specimen are available    approximately 3 hours after the Gram stain result.***    Gram stain, PCR, and/or culture results may not always    correspond due to difference in methodologies.    ************************************************************    This PCR assay was performed by multiplex PCR. This    Assay tests for 66 bacterial and resistance gene targets.    Please refer to the [facility] test directory    at [email or web address] for details.  Organism: Blood Culture PCR (2022 11:48)  Organism: Blood Culture PCR (2022 11:48)        RADIOLOGY & ADDITIONAL TESTS: Reviewed.    ASSESSMENT  Patient is a 61 yo F w/ PMHx of MM (diagnosed ~10 years ago, currently on Promacta treatments since 2022 - most recently last Wednesday; followed by oncologist, Dr. Lopez at [facility]), HTN, neuropathic R hip pain, recent R corneal tear admitted for AMS, code stroke called no acute infarct or hemorrhage, s/p LP findings not consistent with meningitis, course complicated by hypotension and hypoxia, placed on BiPAP, requiring pressors, admitted to MICU with sepsis likely secondary to pneumonia.     PLAN  Neuro  - s/p code stroke --> no ischemia or hemorrhage on CT H+N  - s/p LP results not consistent with encephalitis/meningitis   - AOx2, does not know year  - baseline is AOx3  - Neurology following    Cardiovascular  #Shock  - suspect sepsis with pulmonary source  - s/p 4L IVF in ED  - started on levophed gtt, goal MAP > 65  - obtain ECHO   - TSH wnl  - trend lactate q8h    Pulmonary  #AHRF  - placed on BiPAP, 10/5 on 50%  - suspect secondary to infection with procalcitonin of 8.73  - continue on cefepime for now  - POCUS with B-lines, R > L, hold off on more fluids  - f/u MRSA/MSSA     GI  - keep NPO for now    Renal  - SCr 1.38 up from 1.01  - CPK elevated up to 1800  - hold off on fluid resuscitation for now give POCUS findings above  - continue to trend BMP and CPK    ID  #GNR bacteremia  - s/p vanc/CTX/ampicillin/acyclovir  - continue with cefepime 2g q8h  - f/u speciation and sensitivities  - consider ID consult    Endo  - no history of diabetes, A1C 6.0  - TSH wnl    Heme  - new thrombocytopenia, anemia, and +hemolysis labs (although bili is wnl)  - concern for MAHA  - check Direct Yisel  - can check smear for schistocytes  - monitor CBC q8h    hold off on DVT ppx for now    Ethics  - updated son, he is pediatric resident and patient's HCP  - currently full code          GOC

## 2022-11-30 NOTE — H&P ADULT - PROBLEM SELECTOR PLAN 1
-Pt febrile in the ED to 105.5, tachycardic to 135, WBC of 2.04 and tachypneic to 24   -UA negative, RVP negative and chest x-ray with clear lung  -UCx and BCx pending  -Due to high fevers with AMS/slurred speech, concern for meningitis. LP done in the ED, will f/u results.  -Will cont to monitor fever curve and wbc -Pt febrile in the ED to 105.5, tachycardic to 135, WBC of 2.04 and tachypneic to 24   -UA negative, RVP negative and chest x-ray with clear lung  -UCx and BCx pending  -Due to high fevers with AMS/slurred speech, concern for meningitis. LP done in the ED, will f/u results.  -Will cont to monitor fever curve and wbc  -Pt given IV acyclovir, vanc and ceftriaxone in the ED. Will cont for empiric meningitis coverage -Pt febrile in the ED to 105.5, tachycardic to 135, WBC of 2.04 and tachypneic to 24   -UA negative, RVP negative and chest x-ray with clear lung  -UCx and BCx pending  -Due to high fevers with AMS/slurred speech, concern for meningitis. LP done in the ED, will f/u results.  -Will cont to monitor fever curve and wbc  -Pt given IV acyclovir, ampicillin, vanc and ceftriaxone in the ED. Will cont for empiric meningitis coverage -Pt febrile in the ED to 105.5, tachycardic to 135, WBC of 2.04 and tachypneic to 24   -UA negative, RVP negative and chest x-ray with clear lung  -UCx and BCx pending  -Due to high fevers with AMS/slurred speech, concern for meningitis. LP done in the ED, will f/u results.  -Will cont to monitor fever curve and wbc  -Pt given IV acyclovir, ampicillin, vanc and ceftriaxone in the ED. Will cont for empiric meningitis coverage  -ID consult in the AM

## 2022-11-30 NOTE — ED ADULT NURSE REASSESSMENT NOTE - NS ED NURSE REASSESS COMMENT FT1
Report received from RIVER Noel. Patient is arousable to verbal, alert and oriented x3, on BiPAP. Respirations even, unlabored, saturating 90% to 92% on the BiPAP. She is on cardiac monitor showing sinus tachycardia in 100s to 110s.

## 2022-11-30 NOTE — H&P ADULT - NSHPLABSRESULTS_GEN_ALL_CORE
.  LABS:                         10.3   2.04  )-----------( 95       ( 2022 16:40 )             31.2         139  |  99  |  20  ----------------------------<  229<H>  3.2<L>   |  25  |  1.01    Ca    10.1      2022 16:40    TPro  7.0  /  Alb  4.2  /  TBili  0.6  /  DBili  x   /  AST  103<H>  /  ALT  50<H>  /  AlkPhos  133<H>      PT/INR - ( 2022 16:40 )   PT: 13.2 sec;   INR: 1.14 ratio         PTT - ( 2022 16:40 )  PTT:28.0 sec  Urinalysis Basic - ( 2022 17:00 )    Color: Light Yellow / Appearance: Turbid / S.032 / pH: x  Gluc: x / Ketone: Negative  / Bili: Negative / Urobili: <2 mg/dL   Blood: x / Protein: 30 mg/dL / Nitrite: Negative   Leuk Esterase: Negative / RBC: 1 /HPF / WBC 0 /HPF   Sq Epi: x / Non Sq Epi: 3 /HPF / Bacteria: Negative            RADIOLOGY, EKG & ADDITIONAL TESTS: Reviewed.

## 2022-11-30 NOTE — H&P ADULT - ASSESSMENT
Patient is a 62 yoF w/ pmhx of MM (diagnosed ~10 years ago, currently on Promacta treatments since June 2022 - most recently last Wednesday; followed by oncologist, Dr. Lopez at [facility]), HTN, neuropathic R hip pain, recent R corneal tear admitted for AMS

## 2022-11-30 NOTE — ED ADULT NURSE REASSESSMENT NOTE - NS ED NURSE REASSESS COMMENT FT1
Rapid response called due to concern for patient at 11:35 AM. Patient is AA&Ox3, follows commands. She demonstrates increased work of breathing on FiO2 60% on BiPAP, is tachypneic. . BiPAP FiO2 increased to 100% and patient saturates 100%. Team arrived to evaluate patient at bedside.

## 2022-11-30 NOTE — H&P ADULT - PROBLEM SELECTOR PLAN 2
diffuse cerebral dysfunction from toxic metabolic or infectious etiologies -Pt with AMS and slurred speech at admission. Now improved and close to baseline  -Code stoke called and pt evaluated by neurology. No TPA given because AMS etiology likely 2/2 diffuse cerebral dysfunction from toxic metabolic or infectious etiologies.  -Infectious workup as above. Metabolic workup pending  -Pt with similar history in the past due to MM and resolved after steroid administration per pt. Will consult heme/onc in the AM for further recommendations  -Cont neurochecks Q4  -NPO until S&S eval   -Will f/u neurology recs -Pt with AMS and slurred speech at admission. Now improved and close to baseline  -Code stoke called and pt evaluated by neurology. No TPA given because AMS etiology likely 2/2 diffuse cerebral dysfunction from toxic metabolic or infectious etiologies.  -Infectious workup as above. Metabolic workup pending  -Pt with similar history in the past due to MM and resolved after steroid administration per pt. Will consult heme/onc in the AM for further recommendations. Will hold steroid for now due to concern for meningitis   -Cont neurochecks Q4  -NPO until S&S eval   -Will f/u neurology recs -Pt with AMS and slurred speech at admission. Now improved and close to baseline  -Code stoke called and pt evaluated by neurology. No TPA given because AMS etiology likely 2/2 diffuse cerebral dysfunction from toxic metabolic or infectious etiologies.  -Infectious workup as above. Metabolic workup pending  -CTA head and neck showed no acute hemorrhage, extra-axial collection or displaced calvarial fracture. No hemodynamically significant stenosis. Partially visualized right lung mass measuring 1.8 x 2.2 cm. No evidence for large vessel occlusion.  -Pt with similar history in the past due to MM and resolved after steroid administration per pt. Will consult heme/onc in the AM for further recommendations. Will hold steroid for now due to concern for meningitis   -Cont neurochecks Q4  -NPO until S&S eval   -Will f/u neurology recs

## 2022-11-30 NOTE — H&P ADULT - ATTENDING COMMENTS
Pt with multiple myeloma p/w AMS and slurred speech.  Pt now hypotensive and tachycardic.  Initially febrile to 105F.  On exam: NAD, confused, A+O to self and  "hospital".  No neck stiffness.  Heart tachy, regular.  Lungs CTA, abd s/nt/nd BS normal.    Labs reviewed   CXR film reviewed : clear lungs  CT head reviewed  Will treat sepsis with vancomycin and cefepime.  CSF studies not c/w meningitis, however will continue acyclovir and ampicillin for now  f/u cultues  fluid resuscitation

## 2022-11-30 NOTE — H&P ADULT - NSHPPHYSICALEXAM_GEN_ALL_CORE
LOS:     VITALS:   T(C): 36.7 (11-29-22 @ 22:51), Max: 40.8 (11-29-22 @ 16:43)  HR: 111 (11-29-22 @ 23:31) (70 - 135)  BP: 105/48 (11-29-22 @ 23:31) (105/48 - 151/83)  RR: 19 (11-29-22 @ 23:31) (18 - 25)  SpO2: 95% (11-29-22 @ 23:31) (95% - 99%)    GENERAL: NAD, lying in bed comfortably  HEAD:  Atraumatic, Normocephalic  EYES: EOMI, PERRLA, conjunctiva and sclera clear  ENT: Moist mucous membranes  NECK: Supple, No JVD  CHEST/LUNG: Clear to auscultation bilaterally; No rales, rhonchi, wheezing, or rubs. Unlabored respirations  HEART: Tachycardic and Regular rhythm; No murmurs, rubs, or gallops  ABDOMEN: BSx4; Soft, nontender, nondistended  EXTREMITIES:  2+ Peripheral Pulses, brisk capillary refill. No clubbing, cyanosis, or edema  NERVOUS SYSTEM:  A&Ox3, no focal deficits. Slurred speech with moderate tremors   SKIN: No rashes or lesions  Psych: Normal mood and affect

## 2022-11-30 NOTE — H&P ADULT - PROBLEM SELECTOR PLAN 4
-Hold home medication  -Will start pt on SSI -Pt with Partially visualized right lung mass measuring 1.8 x 2.2 cm  -Will get CT chest w/ IV contrast to further evaluate

## 2022-11-30 NOTE — CONSULT NOTE ADULT - SUBJECTIVE AND OBJECTIVE BOX
CHIEF COMPLAINT: slurred speech    HPI:  Patient is a 62 yoF w/ pmhx of MM (diagnosed ~10 years ago, currently on Promacta treatments since 2022 - most recently last Wednesday; followed by oncologist, Dr. Lopez at [facility]), HTN, neuropathic R hip pain, recent R corneal tear, presents to [facility] ED for change in mental status. LKW approximately 14:30. Patient was in normal state of health this morning as per son, Nacho. Patient went to a follow up ophtho appointment for a recently diagnosed corneal abrasion this past Monday. Patient was picked up by a friend around 10:00 for an optho appt with normal mental status, pt was just complaining of being sleepy. On the way back from appointment around 14:30, patient stopped responding to friend appropriately in the car and she appeared more tired & weaker than usual. She started having slurred speech when they arrived home and then son called EMS. In the ED, patient with persisting word finding difficulties and generalized weakness, but leaning to the R side. Patient reports some difficulty remembering what happened today, but reports she feels generally weak and very tired. Code stroke called, but TPA not given because of imporvement in pt's symptoms. Son reports patient's speech is close to baseline now. Patient then denied chest pain, SOB, HA, changes in vision, N/V, imbalance, numbness or tingling. Denies recent infection or sick contacts. Pt states that she had a similar episode a few years ago with fever which she was told was likely from a MM, she improved after getting steroids.     In the ED pt was febrile 105.5, , /84, RR 24 and saturating well on RA. Pt given 2L IVF bolus and 1x vanc/ceftriaxone/acyclovir/ampicillin (2022 02:42)      PAST MEDICAL & SURGICAL HISTORY:  Multiple myeloma      Type 2 diabetes mellitus          FAMILY HISTORY:  No pertinent family history in first degree relatives        SOCIAL HISTORY:  No alcohol, tobacco, or recreational drug use.     Allergies    Bactrim (Other)  fluconazole (Vomiting; Nausea)  levofloxacin (Vomiting; Nausea)  penicillin (Other)    Intolerances        HOME MEDICATIONS:    REVIEW OF SYSTEMS:  CONSTITUTIONAL: No weakness, fevers or chills  EYES/ENT: No visual changes;  No vertigo or throat pain   NECK: No pain or stiffness  RESPIRATORY: No cough, wheezing, hemoptysis; No shortness of breath  CARDIOVASCULAR: No chest pain or palpitations  GASTROINTESTINAL: No abdominal or epigastric pain. No nausea, vomiting, or hematemesis; No diarrhea or constipation. No melena or hematochezia.  GENITOURINARY: No dysuria, frequency or hematuria  NEUROLOGICAL: No numbness or weakness  SKIN: No itching, rashes      OBJECTIVE:  ICU Vital Signs Last 24 Hrs  T(C): 37.2 (2022 03:54), Max: 40.8 (2022 16:43)  T(F): 98.9 (2022 03:54), Max: 105.5 (2022 16:43)  HR: 119 (2022 08:00) (70 - 135)  BP: 85/57 (2022 08:00) (70/47 - 151/83)  BP(mean): 64 (2022 03:54) (61 - 64)  ABP: --  ABP(mean): --  RR: 18 (2022 03:17) (18 - 25)  SpO2: 100% (2022 08:00) (95% - 100%)    O2 Parameters below as of 2022 08:00  Patient On (Oxygen Delivery Method): BiPAP/CPAP      CAPILLARY BLOOD GLUCOSE      POCT Blood Glucose.: 134 mg/dL (2022 07:09)      PHYSICAL EXAM:  GENERAL: NAD, lying in bed comfortably  HEAD:  Atraumatic, Normocephalic  EYES: EOMI, PERRLA, conjunctiva and sclera clear  ENT: Moist mucous membranes  NECK: Supple, No JVD  CHEST/LUNG: Clear to auscultation bilaterally; No rales, rhonchi, wheezing, or rubs. Unlabored respirations  HEART: Regular rate and rhythm; No murmurs, rubs, or gallops  ABDOMEN: Bowel sounds present; Soft, Nontender, Nondistended. No hepatomegally  EXTREMITIES:  2+ Peripheral Pulses, brisk capillary refill. No clubbing, cyanosis, or edema  NERVOUS SYSTEM:  Alert & Oriented X3, speech clear. No deficits   MSK: FROM all 4 extremities, full and equal strength  SKIN: No rashes or lesions      HOSPITAL MEDICATIONS:  MEDICATIONS  (STANDING):  acetaminophen   IVPB .. 1000 milliGRAM(s) IV Intermittent once  acyclovir IVPB 650 milliGRAM(s) IV Intermittent every 8 hours  ampicillin  IVPB 2 Gram(s) IV Intermittent every 4 hours  cefepime   IVPB 2000 milliGRAM(s) IV Intermittent every 8 hours  dextrose 5%. 1000 milliLiter(s) (50 mL/Hr) IV Continuous <Continuous>  dextrose 5%. 1000 milliLiter(s) (100 mL/Hr) IV Continuous <Continuous>  dextrose 50% Injectable 25 Gram(s) IV Push once  dextrose 50% Injectable 12.5 Gram(s) IV Push once  dextrose 50% Injectable 25 Gram(s) IV Push once  enoxaparin Injectable 40 milliGRAM(s) SubCutaneous every 24 hours  glucagon  Injectable 1 milliGRAM(s) IntraMuscular once  insulin lispro (ADMELOG) corrective regimen sliding scale   SubCutaneous every 6 hours  midodrine 10 milliGRAM(s) Oral every 8 hours  vancomycin  IVPB 1250 milliGRAM(s) IV Intermittent every 12 hours    MEDICATIONS  (PRN):  dextrose Oral Gel 15 Gram(s) Oral once PRN Blood Glucose LESS THAN 70 milliGRAM(s)/deciliter      LABS:                        10.3   2.04  )-----------( 95       ( 2022 16:40 )             31.2         139  |  99  |  20  ----------------------------<  229<H>  3.2<L>   |  25  |  1.01    Ca    10.1      2022 16:40    TPro  7.0  /  Alb  4.2  /  TBili  0.6  /  DBili  x   /  AST  103<H>  /  ALT  50<H>  /  AlkPhos  133<H>      PT/INR - ( 2022 16:40 )   PT: 13.2 sec;   INR: 1.14 ratio         PTT - ( 2022 16:40 )  PTT:28.0 sec  Urinalysis Basic - ( 2022 17:00 )    Color: Light Yellow / Appearance: Turbid / S.032 / pH: x  Gluc: x / Ketone: Negative  / Bili: Negative / Urobili: <2 mg/dL   Blood: x / Protein: 30 mg/dL / Nitrite: Negative   Leuk Esterase: Negative / RBC: 1 /HPF / WBC 0 /HPF   Sq Epi: x / Non Sq Epi: 3 /HPF / Bacteria: Negative      Arterial Blood Gas:   @ 07:44  7.29/41/140/20/98.6/-6.4  ABG lactate: --    Venous Blood Gas:   @ 18:53  7.42/37/60/24/90.8  VBG Lactate: 3.7  Venous Blood Gas:   @ 17:41  7.42/41/40/27/64.0  VBG Lactate: 4.3      MICROBIOLOGY:     RADIOLOGY:  [ ] Reviewed and interpreted by me    EKG:      Clarence Rubio   Internal Medicine PGY2

## 2022-11-30 NOTE — H&P ADULT - PROBLEM SELECTOR PLAN 6
DVT ppx: Lovenox 40mg  Hold PO meds until S&S eval in the AM -Pt hypotensive in the ED 87/49. Hold home medications

## 2022-11-30 NOTE — CONSULT NOTE ADULT - ATTENDING COMMENTS
62 f with HTN, MM (diagnosed ~10 years ago, currently on Promacta treatments since June 2022m brought in for AMS, lethargy, ?slurred speech here febrile to 105.5, hypotensive, tachycardic, tachypneic and hypoxic   WBC: 1.8, ANC: 970  lactate: 6  s/p code stroke but CT negative, s/p LP WBC: 1, neutrophil: 0, glucose: 93, protein 33, negative PCR and gram stain  blood cx: E-coli  chest CT: Posterior right upper lobe and right basilar consolidation and additional right middle lobe and left basilar patchy opacities; findings compatible with aspiration or infection.    fever, hypotension, septic shock with elevated lactate and worsening renal function, hypoxic resp failure due to RUL, RML and basilar pneumonia  MM with leukopenia  AMS due to septic encephalopathy, LP negative no meningoencephalitis    * discontinue amp, vanco, acyclovir  * switch cefepime to ceftriaxone as it is an E-coli and no ESBL detected  * f/u with MICU  * monitor CBC/diff and renal function  * f/u the final blood cx and repeat blood cx tomorrow    The above assessment and plan was discussed with the primary team    Michaela Lovell MD  contact on teams  After 5pm and on weekends call [phone] 62 f with HTN, MM (diagnosed ~10 years ago, currently on Promacta treatments since June 2022m brought in for AMS, lethargy, ?slurred speech here febrile to 105.5, hypotensive, tachycardic, tachypneic and hypoxic   WBC: 1.8, ANC: 970  lactate: 6  s/p code stroke but CT negative, s/p LP WBC: 1, neutrophil: 0, glucose: 93, protein 33, negative PCR and gram stain  blood cx: E-coli  chest CT: Posterior right upper lobe and right basilar consolidation and additional right middle lobe and left basilar patchy opacities; findings compatible with aspiration or infection.    fever, hypotension, septic shock with elevated lactate and worsening renal function, hypoxic resp failure due to E-coli bacteremia, RUL, RML and basilar pneumonia  MM with leukopenia  AMS due to septic encephalopathy, LP negative no meningoencephalitis    * discontinue amp, vanco, acyclovir  * switch cefepime to ceftriaxone as it is an E-coli and no ESBL detected  * f/u with MICU  * monitor CBC/diff and renal function  * f/u the final blood cx and repeat blood cx tomorrow    The above assessment and plan was discussed with the primary team    Michaela Lovell MD  contact on teams  After 5pm and on weekends call [phone]

## 2022-11-30 NOTE — PROGRESS NOTE ADULT - PROBLEM SELECTOR PLAN 5
-Hold home medication  -Will start pt on SSI -Pt with Partially visualized right lung mass measuring 1.8 x 2.2 cm  -CT shows multifocal PNA

## 2022-11-30 NOTE — H&P ADULT - NSHPREVIEWOFSYSTEMS_GEN_ALL_CORE
REVIEW OF SYSTEMS:    CONSTITUTIONAL: + weakness and fevers. No chills  EYES:  No visual changes, no eye pain   ENT: No vertigo or throat pain   NECK: No pain or stiffness  RESPIRATORY: No cough, wheezing, hemoptysis; No shortness of breath  CARDIOVASCULAR: No chest pain or palpitations  GASTROINTESTINAL: No abdominal or epigastric pain. No nausea, vomiting, or hematemesis; No diarrhea or constipation. No melena or hematochezia.  GENITOURINARY: No dysuria, frequency or hematuria  NEUROLOGICAL: + slurred speech  SKIN: No itching, rashes  Psych: no anxiety or depression

## 2022-11-30 NOTE — SWALLOW BEDSIDE ASSESSMENT ADULT - COMMENTS
Patient seen at bedside in the [facility], however, patient on BiPap at this time. T3, Juan Garcias, present at bedside and requesting to defer the swallow assessment at this time and to follow up this afternoon. This service to follow up this afternoon as schedule permits. Patient seen at bedside in the EDU, however, patient on BiPap at this time. Juan PENA, present at bedside and requesting to defer the swallow assessment at this time and to follow up this afternoon. This service to follow up this afternoon as schedule permits.    UPDATE: Per discussion with Juan PENA, team requesting swallow assessment to be deferred given patient is being transferred to MICU at this time. Team to reconsult this service when patient is medically optimized.

## 2022-11-30 NOTE — PROGRESS NOTE ADULT - SUBJECTIVE AND OBJECTIVE BOX
PROGRESS NOTE:     Patient is a 62y old  Female who presents with a chief complaint of slurred speech (2022 02:42)      ---------------------------------------------------  Juan Garcias  PGY-1, Internal Medicine  Available on [employer] Teams  Pager: [phone] ([facility]), or [phone] ([facility])  ---------------------------------------------------    SUBJECTIVE / OVERNIGHT EVENTS:    No acute events overnight. Patient examined at bedside with no acute complaints.     Pain:  Bowel Movements:  Urination:  OOB:  PT:    REVIEW OF SYSTEMS:    CONSTITUTIONAL: No weakness, fevers or chills  EYES/ENT: No visual changes;  No vertigo or throat pain   NECK: No pain or stiffness  RESPIRATORY: No cough, wheezing, hemoptysis; No shortness of breath  CARDIOVASCULAR: No chest pain or palpitations  GASTROINTESTINAL: No abdominal or epigastric pain. No nausea, vomiting, or hematemesis; No diarrhea or constipation. No melena or hematochezia.  GENITOURINARY: No dysuria, frequency or hematuria  NEUROLOGICAL: No numbness or weakness  SKIN: No itching, rashes      MEDICATIONS  (STANDING):  acetaminophen   IVPB .. 1000 milliGRAM(s) IV Intermittent once  acyclovir IVPB 650 milliGRAM(s) IV Intermittent every 8 hours  ampicillin  IVPB 2 Gram(s) IV Intermittent every 4 hours  cefepime   IVPB 2000 milliGRAM(s) IV Intermittent every 8 hours  dextrose 5%. 1000 milliLiter(s) (50 mL/Hr) IV Continuous <Continuous>  dextrose 5%. 1000 milliLiter(s) (100 mL/Hr) IV Continuous <Continuous>  dextrose 50% Injectable 25 Gram(s) IV Push once  dextrose 50% Injectable 12.5 Gram(s) IV Push once  dextrose 50% Injectable 25 Gram(s) IV Push once  enoxaparin Injectable 40 milliGRAM(s) SubCutaneous every 24 hours  glucagon  Injectable 1 milliGRAM(s) IntraMuscular once  insulin lispro (ADMELOG) corrective regimen sliding scale   SubCutaneous every 6 hours  vancomycin  IVPB 1250 milliGRAM(s) IV Intermittent every 12 hours    MEDICATIONS  (PRN):  dextrose Oral Gel 15 Gram(s) Oral once PRN Blood Glucose LESS THAN 70 milliGRAM(s)/deciliter      CAPILLARY BLOOD GLUCOSE      POCT Blood Glucose.: 134 mg/dL (2022 07:09)  POCT Blood Glucose.: 226 mg/dL (2022 16:28)    I&O's Summary      VITALS:   T(C): 37.2 (22 @ 03:54), Max: 40.8 (22 @ 16:43)  HR: 113 (22 @ 07:16) (70 - 135)  BP: 89/55 (22 @ 03:54) (87/49 - 151/83)  RR: 18 (22 @ 03:17) (18 - 25)  SpO2: 96% (22 @ 07:16) (95% - 100%)    GENERAL: NAD, lying in bed comfortably  HEAD:  Atraumatic, normocephalic  EYES: EOMI, PERRLA, conjunctiva and sclera clear  ENT: Moist mucous membranes  NECK: Supple, no JVD  HEART: Regular rate and rhythm, no murmurs, rubs, or gallops  LUNGS: Unlabored respirations.  Clear to auscultation bilaterally, no crackles, wheezing, or rhonchi  ABDOMEN: Soft, nontender, nondistended, +BS  EXTREMITIES: 2+ peripheral pulses bilaterally. No clubbing, cyanosis, or edema  NERVOUS SYSTEM:  A&Ox3, no focal deficits   SKIN: No rashes or lesions    LABS:                        10.3   2.04  )-----------( 95       ( 2022 16:40 )             31.2         139  |  99  |  20  ----------------------------<  229<H>  3.2<L>   |  25  |  1.01    Ca    10.1      2022 16:40    TPro  7.0  /  Alb  4.2  /  TBili  0.6  /  DBili  x   /  AST  103<H>  /  ALT  50<H>  /  AlkPhos  133<H>      PT/INR - ( 2022 16:40 )   PT: 13.2 sec;   INR: 1.14 ratio         PTT - ( 2022 16:40 )  PTT:28.0 sec  CARDIAC MARKERS ( 2022 16:40 )  x     / x     / 192 U/L / x     / 2.4 ng/mL      Urinalysis Basic - ( 2022 17:00 )    Color: Light Yellow / Appearance: Turbid / S.032 / pH: x  Gluc: x / Ketone: Negative  / Bili: Negative / Urobili: <2 mg/dL   Blood: x / Protein: 30 mg/dL / Nitrite: Negative   Leuk Esterase: Negative / RBC: 1 /HPF / WBC 0 /HPF   Sq Epi: x / Non Sq Epi: 3 /HPF / Bacteria: Negative        Culture - CSF with Gram Stain (collected 2022 23:00)  Source: .CSF CSF  Gram Stain (2022 03:10):    No polymorphonuclear leukocytes seen    No organisms seen    by cytocentrifuge        RADIOLOGY & ADDITIONAL TESTS:  Results Reviewed:   Imaging Personally Reviewed:  Electrocardiogram Personally Reviewed:    COORDINATION OF CARE:  Care Discussed with Consultants/Other Providers [Y/N]:  Prior or Outpatient Records Reviewed [Y/N]:     PROGRESS NOTE:     Patient is a 62y old  Female who presents with a chief complaint of slurred speech (2022 02:42)      ---------------------------------------------------  Juan Garcias  PGY-1, Internal Medicine  Available on [employer] Teams  Pager: [phone] ([facility]), or [phone] ([facility])  ---------------------------------------------------    SUBJECTIVE / OVERNIGHT EVENTS: Overnight noted to be hypotensive with worsening respiratory distress. at the bedside, pt is alert, able to speak with mask on, reports breathing is more comfortable on the bipap. received dose of midodrine in AM with mild improvement of BPs of SBPS of 70s to now 80s    No acute events overnight. Patient examined at bedside with no acute complaints.     Pain:  Bowel Movements:  Urination:  OOB:  PT:    REVIEW OF SYSTEMS:    CONSTITUTIONAL: No weakness, fevers or chills  EYES/ENT: No visual changes;  No vertigo or throat pain   NECK: No pain or stiffness  RESPIRATORY: No cough, wheezing, hemoptysis; No shortness of breath  CARDIOVASCULAR: No chest pain or palpitations  GASTROINTESTINAL: No abdominal or epigastric pain. No nausea, vomiting, or hematemesis; No diarrhea or constipation. No melena or hematochezia.  GENITOURINARY: No dysuria, frequency or hematuria  NEUROLOGICAL: No numbness or weakness  SKIN: No itching, rashes      MEDICATIONS  (STANDING):  acetaminophen   IVPB .. 1000 milliGRAM(s) IV Intermittent once  acyclovir IVPB 650 milliGRAM(s) IV Intermittent every 8 hours  ampicillin  IVPB 2 Gram(s) IV Intermittent every 4 hours  cefepime   IVPB 2000 milliGRAM(s) IV Intermittent every 8 hours  dextrose 5%. 1000 milliLiter(s) (50 mL/Hr) IV Continuous <Continuous>  dextrose 5%. 1000 milliLiter(s) (100 mL/Hr) IV Continuous <Continuous>  dextrose 50% Injectable 25 Gram(s) IV Push once  dextrose 50% Injectable 12.5 Gram(s) IV Push once  dextrose 50% Injectable 25 Gram(s) IV Push once  enoxaparin Injectable 40 milliGRAM(s) SubCutaneous every 24 hours  glucagon  Injectable 1 milliGRAM(s) IntraMuscular once  insulin lispro (ADMELOG) corrective regimen sliding scale   SubCutaneous every 6 hours  vancomycin  IVPB 1250 milliGRAM(s) IV Intermittent every 12 hours    MEDICATIONS  (PRN):  dextrose Oral Gel 15 Gram(s) Oral once PRN Blood Glucose LESS THAN 70 milliGRAM(s)/deciliter      CAPILLARY BLOOD GLUCOSE      POCT Blood Glucose.: 134 mg/dL (2022 07:09)  POCT Blood Glucose.: 226 mg/dL (2022 16:28)    I&O's Summary      VITALS:   T(C): 37.2 (22 @ 03:54), Max: 40.8 (22 @ 16:43)  HR: 113 (22 @ 07:16) (70 - 135)  BP: 89/55 (22 @ 03:54) (87/49 - 151/83)  RR: 18 (22 @ 03:17) (18 - 25)  SpO2: 96% (22 @ 07:16) (95% - 100%)    GENERAL: NAD, moderate distress  HEAD:  Atraumatic, normocephalic  EYES: EOMI, PERRLA, conjunctiva and sclera clear  ENT: Moist mucous membranes  NECK: Supple, no JVD  HEART: Regular rate and rhythm, no murmurs, rubs, or gallops  LUNGS: +accessory muscle use, mostly clear to auscultation, labored  ABDOMEN: Soft, nontender, nondistended, +BS  EXTREMITIES: 2+ peripheral pulses bilaterally. No clubbing, cyanosis, or edema  NERVOUS SYSTEM:  A&Ox3, no focal deficits   SKIN: No rashes or lesions    LABS:                        10.3   2.04  )-----------( 95       ( 2022 16:40 )             31.2         139  |  99  |  20  ----------------------------<  229<H>  3.2<L>   |  25  |  1.01    Ca    10.1      2022 16:40    TPro  7.0  /  Alb  4.2  /  TBili  0.6  /  DBili  x   /  AST  103<H>  /  ALT  50<H>  /  AlkPhos  133<H>  11-29    PT/INR - ( 2022 16:40 )   PT: 13.2 sec;   INR: 1.14 ratio         PTT - ( 2022 16:40 )  PTT:28.0 sec  CARDIAC MARKERS ( 2022 16:40 )  x     / x     / 192 U/L / x     / 2.4 ng/mL      Urinalysis Basic - ( 2022 17:00 )    Color: Light Yellow / Appearance: Turbid / S.032 / pH: x  Gluc: x / Ketone: Negative  / Bili: Negative / Urobili: <2 mg/dL   Blood: x / Protein: 30 mg/dL / Nitrite: Negative   Leuk Esterase: Negative / RBC: 1 /HPF / WBC 0 /HPF   Sq Epi: x / Non Sq Epi: 3 /HPF / Bacteria: Negative        Culture - CSF with Gram Stain (collected 2022 23:00)  Source: .CSF CSF  Gram Stain (2022 03:10):    No polymorphonuclear leukocytes seen    No organisms seen    by cytocentrifuge        RADIOLOGY & ADDITIONAL TESTS:  Results Reviewed:   Imaging Personally Reviewed:  Electrocardiogram Personally Reviewed:    COORDINATION OF CARE:  Care Discussed with Consultants/Other Providers [Y/N]:  Prior or Outpatient Records Reviewed [Y/N]:     PROGRESS NOTE:     Patient is a 62y old  Female who presents with a chief complaint of slurred speech (2022 02:42)      ---------------------------------------------------  Juan Garcias  PGY-1, Internal Medicine  Available on [employer] Teams  Pager: [phone] ([facility]), or [phone] ([facility])  ---------------------------------------------------    SUBJECTIVE / OVERNIGHT EVENTS: Overnight noted to be hypotensive with worsening respiratory distress. at the bedside, pt is alert, able to speak with mask on, reports breathing is more comfortable on the bipap. received dose of midodrine in AM with mild improvement of BPs of SBPS of 70s to now 80s    Patient admitted overnight; in brief, 63 yo F PMHx of MM on Promacta (last dose 2022) p/w with AMS and slurred speech, found to be in severe sepsis (T 105.5F, , RR 25, WBC 2.9 with IANC of 0.99) 2/2 to PNA confirmed on CT chest. Patient persistently hypotensive despite fluid resuscitations (s/p 4L NS) and midodrine 30 mg x1. Patient also with worsening respiratory distress, notable increased work of breathing with accessory muscle use, requiring BIPAP support. RRT called for worsening WOB and hypotension, patient started on levophed and transferred to MICU.    REVIEW OF SYSTEMS:    CONSTITUTIONAL: +weakness, fevers or chills  EYES/ENT: No visual changes;  No vertigo or throat pain   NECK: No pain or stiffness  RESPIRATORY: No cough, wheezing, hemoptysis; +shortness of breath  CARDIOVASCULAR: No chest pain or palpitations  GASTROINTESTINAL: No abdominal or epigastric pain. No nausea, vomiting, or hematemesis; No diarrhea or constipation. No melena or hematochezia.  GENITOURINARY: No dysuria, frequency or hematuria  NEUROLOGICAL: No numbness or weakness  SKIN: No itching, rashes      MEDICATIONS  (STANDING):  acetaminophen   IVPB .. 1000 milliGRAM(s) IV Intermittent once  acyclovir IVPB 650 milliGRAM(s) IV Intermittent every 8 hours  ampicillin  IVPB 2 Gram(s) IV Intermittent every 4 hours  cefepime   IVPB 2000 milliGRAM(s) IV Intermittent every 8 hours  dextrose 5%. 1000 milliLiter(s) (50 mL/Hr) IV Continuous <Continuous>  dextrose 5%. 1000 milliLiter(s) (100 mL/Hr) IV Continuous <Continuous>  dextrose 50% Injectable 25 Gram(s) IV Push once  dextrose 50% Injectable 12.5 Gram(s) IV Push once  dextrose 50% Injectable 25 Gram(s) IV Push once  enoxaparin Injectable 40 milliGRAM(s) SubCutaneous every 24 hours  glucagon  Injectable 1 milliGRAM(s) IntraMuscular once  insulin lispro (ADMELOG) corrective regimen sliding scale   SubCutaneous every 6 hours  vancomycin  IVPB 1250 milliGRAM(s) IV Intermittent every 12 hours    MEDICATIONS  (PRN):  dextrose Oral Gel 15 Gram(s) Oral once PRN Blood Glucose LESS THAN 70 milliGRAM(s)/deciliter      CAPILLARY BLOOD GLUCOSE      POCT Blood Glucose.: 134 mg/dL (2022 07:09)  POCT Blood Glucose.: 226 mg/dL (2022 16:28)        VITALS:   T(C): 37.2 (22 @ 03:54), Max: 40.8 (22 @ 16:43)  HR: 113 (22 @ 07:16) (70 - 135)  BP: 89/55 (22 @ 03:54) (87/49 - 151/83)  RR: 18 (22 @ 03:17) (18 - 25)  SpO2: 96% (22 @ 07:16) (95% - 100%)    GENERAL: Uncomfortable appearing, writhing and repositioning 2/2 to discomfort, abdominally breathing, in moderate distress  HEAD:  Atraumatic, normocephalic  EYES: EOMI, PERRLA, conjunctiva and sclera clear  ENT: Moist mucous membranes  NECK: Supple, no JVD  HEART: Regular rate and rhythm, no murmurs, rubs, or gallops  LUNGS: +accessory muscle use, decreased breath sounds at the bases L > R, mild crackles appreciated at the mid lung fields   ABDOMEN: Soft, nontender, nondistended, +BS  EXTREMITIES: +1 nonpitting edema; 2+ peripheral pulses bilaterally  NERVOUS SYSTEM:  A&Ox3, no focal deficits, hypophonic no slurring of speech but limited evaluation 2/2 to BIPAP  SKIN: No rashes or lesions    LABS:                        10.3   2.04  )-----------( 95       ( 2022 16:40 )             31.2         139  |  99  |  20  ----------------------------<  229<H>  3.2<L>   |  25  |  1.01    Ca    10.1      2022 16:40    TPro  7.0  /  Alb  4.2  /  TBili  0.6  /  DBili  x   /  AST  103<H>  /  ALT  50<H>  /  AlkPhos  133<H>  11    PT/INR - ( 2022 16:40 )   PT: 13.2 sec;   INR: 1.14 ratio      Procal: 8.7    Lactate: 4.1    PTT - ( 2022 16:40 )  PTT:28.0 sec  CARDIAC MARKERS ( 2022 16:40 )  x     / x     / 192 U/L / x     / 2.4 ng/mL      Urinalysis Basic - ( 2022 17:00 )    Color: Light Yellow / Appearance: Turbid / S.032 / pH: x  Gluc: x / Ketone: Negative  / Bili: Negative / Urobili: <2 mg/dL   Blood: x / Protein: 30 mg/dL / Nitrite: Negative   Leuk Esterase: Negative / RBC: 1 /HPF / WBC 0 /HPF   Sq Epi: x / Non Sq Epi: 3 /HPF / Bacteria: Negative        Culture - CSF with Gram Stain (collected 2022 23:00)  Source: .CSF CSF  Gram Stain (2022 03:10):    No polymorphonuclear leukocytes seen    No organisms seen    by cytocentrifuge        RADIOLOGY & ADDITIONAL TESTS:    < from: CT Angio Chest PE Protocol w/ IV Cont (22 @ 10:12) >  IMPRESSION:    No pulmonary embolism.    Posterior right upper lobe and right basilar consolidation and additional   right middle lobe and left basilar patchy opacities; findings compatible   with aspiration or infection.    --- End of Report ---    < end of copied text >      < from: CT Angio Neck w/ IV Cont (22 @ 16:51) >  IMPRESSION:    Brain CT: No acute hemorrhage, extra-axial collection or displaced   calvarial fracture.    Neck CTA: Limited study. No hemodynamically significant stenosis.   Partially visualized right lung mass measuring 1.8 x 2.2 cm. Follow-up   chest CT is recommended.    Brain CTA: Limited study No evidence for large vessel occlusion.    Perfusion maps: No evidence for core infarct or area of brain at risk.    < end of copied text >      < from: Xray Chest 1 View AP/PA (22 @ 20:13) >  IMPRESSION: Right midlung opacity that should be further evaluated with   CT.    < end of copied text >

## 2022-11-30 NOTE — PROGRESS NOTE ADULT - PROBLEM SELECTOR PLAN 4
-Pt with Partially visualized right lung mass measuring 1.8 x 2.2 cm  -Will get CT chest w/ IV contrast to further evaluate -Pt with Partially visualized right lung mass measuring 1.8 x 2.2 cm  -Will get CT chest w/ IV contrast to further evaluate-shows multifocal PNA -Pt diagnosed w/ MM ~10 years ago, currently on Promacta treatments since June 2022 - most recently last Wednesday; followed by oncologist, Dr. Lopez at [facility])  -Will f/u heme/onc recs-specifically about toxicity related to promacta. LFTs WNL

## 2022-11-30 NOTE — H&P ADULT - PROBLEM SELECTOR PLAN 3
-Pt diagnosed w/ MM ~10 years ago, currently on Promacta treatments since June 2022 - most recently last Wednesday; followed by oncologist, Dr. Lopez at [facility] [place])  -Will f/u heme/onc recs

## 2022-11-30 NOTE — PROGRESS NOTE ADULT - PROBLEM SELECTOR PLAN 2
-Pt with AMS and slurred speech at admission. Now improved and close to baseline  -Code stoke called and pt evaluated by neurology. No TPA given because AMS etiology likely 2/2 diffuse cerebral dysfunction from toxic metabolic or infectious etiologies.  -Infectious workup as above. Metabolic workup pending  -CTA head and neck showed no acute hemorrhage, extra-axial collection or displaced calvarial fracture. No hemodynamically significant stenosis. Partially visualized right lung mass measuring 1.8 x 2.2 cm. No evidence for large vessel occlusion.  -Pt with similar history in the past due to MM and resolved after steroid administration per pt. Will consult heme/onc in the AM for further recommendations. Will hold steroid for now due to concern for meningitis   -Cont neurochecks Q4  -NPO until S&S eval   -Will f/u neurology recs -Pt with AMS and slurred speech at admission. Now improved and close to baseline-likely 2/2 to toxic metabolic encephalopathy  -Code stoke called and pt evaluated by neurology. No TPA given because AMS etiology likely 2/2 diffuse cerebral dysfunction from toxic metabolic or infectious etiologies.  -Infectious workup as above. Metabolic workup pending  -CTA head and neck showed no acute hemorrhage, extra-axial collection or displaced calvarial fracture. No hemodynamically significant stenosis. Partially visualized right lung mass measuring 1.8 x 2.2 cm. No evidence for large vessel occlusion.  -Pt with similar history in the past due to MM and resolved after steroid administration per pt. Will consult heme/onc in the AM for further recommendations. Will hold steroid for now due to concern for meningitis   -Cont neurochecks Q4  -NPO until S&S eval   -Will f/u neurology recs -Patient presented with resp distress, tachypneic to 25  -Progressively worsening oxygen requirements since then, requiring BIPAP support  -on BIPAP requiring uptitration to FiO2 100%, still in notable resp distress   -Pt requiring transfer to MICU for pressor support and likely mechnical ventilation  -ABG with pH 7.29, pCO2 41

## 2022-11-30 NOTE — H&P ADULT - HISTORY OF PRESENT ILLNESS
Patient is a 62 yoF w/ pmhx of MM (diagnosed ~10 years ago, currently on Promacta treatments since June 2022 - most recently last Wednesday; followed by oncologist, Dr. Lopez at [facility]), HTN, neuropathic R hip pain, recent R corneal tear, presents to [facility] ED for change in mental status. LKW approximately 14:30. Patient was in normal state of health this morning as per son, Nacho. Patient went to a follow up ophtho appointment for a recently diagnosed corneal abrasion this past Monday. Patient was picked up by a friend around 10:00 for an optho appt with normal mental status, pt was just complaining of being sleepy. On the way back from appointment around 14:30, patient stopped responding to friend appropriately in the car and she appeared more tired & weaker than usual. She started having slurred speech when they arrived home and then son called EMS. In the ED, patient with persisting word finding difficulties and generalized weakness, but leaning to the R side. Patient reports some difficulty remembering what happened today, but reports she feels generally weak and very tired. Code stroke called, but TPA not given because of imporvement in pt's symptoms. Son reports patient's speech is close to baseline now. Patient then denied chest pain, SOB, HA, changes in vision, N/V, imbalance, numbness or tingling. Denies recent infection or sick contacts. Pt states that she had a similar episode a few years ago with fever which she was told was likely from a MM, she improved after getting steroids.     In Patient is a 62 yoF w/ pmhx of MM (diagnosed ~10 years ago, currently on Promacta treatments since June 2022 - most recently last Wednesday; followed by oncologist, Dr. Lopez at [facility]), HTN, neuropathic R hip pain, recent R corneal tear, presents to [facility] ED for change in mental status. LKW approximately 14:30. Patient was in normal state of health this morning as per son, Nacho. Patient went to a follow up ophtho appointment for a recently diagnosed corneal abrasion this past Monday. Patient was picked up by a friend around 10:00 for an optho appt with normal mental status, pt was just complaining of being sleepy. On the way back from appointment around 14:30, patient stopped responding to friend appropriately in the car and she appeared more tired & weaker than usual. She started having slurred speech when they arrived home and then son called EMS. In the ED, patient with persisting word finding difficulties and generalized weakness, but leaning to the R side. Patient reports some difficulty remembering what happened today, but reports she feels generally weak and very tired. Code stroke called, but TPA not given because of imporvement in pt's symptoms. Son reports patient's speech is close to baseline now. Patient then denied chest pain, SOB, HA, changes in vision, N/V, imbalance, numbness or tingling. Denies recent infection or sick contacts. Pt states that she had a similar episode a few years ago with fever which she was told was likely from a MM, she improved after getting steroids.     In the ED pt was febrile 105.5, , /84, RR 24 and saturating well on RA. Pt given 2L IVF bolus and 1x vanc/ceftriaxone/acyclovir  Patient is a 62 yoF w/ pmhx of MM (diagnosed ~10 years ago, currently on Promacta treatments since June 2022 - most recently last Wednesday; followed by oncologist, Dr. Lopez at [facility]), HTN, neuropathic R hip pain, recent R corneal tear, presents to [facility] ED for change in mental status. LKW approximately 14:30. Patient was in normal state of health this morning as per son, [place]. Patient went to a follow up ophtho appointment for a recently diagnosed corneal abrasion this past Monday. Patient was picked up by a friend around 10:00 for an optho appt with normal mental status, pt was just complaining of being sleepy. On the way back from appointment around 14:30, patient stopped responding to friend appropriately in the car and she appeared more tired & weaker than usual. She started having slurred speech when they arrived home and then son called EMS. In the ED, patient with persisting word finding difficulties and generalized weakness, but leaning to the R side. Patient reports some difficulty remembering what happened today, but reports she feels generally weak and very tired. Code stroke called, but TPA not given because of imporvement in pt's symptoms. Son reports patient's speech is close to baseline now. Patient then denied chest pain, SOB, HA, changes in vision, N/V, imbalance, numbness or tingling. Denies recent infection or sick contacts. Pt states that she had a similar episode a few years ago with fever which she was told was likely from a MM, she improved after getting steroids.     In the ED pt was febrile 105.5, , /84, RR 24 and saturating well on RA. Pt given 2L IVF bolus and 1x vanc/ceftriaxone/acyclovir/ampicillin

## 2022-11-30 NOTE — CONSULT NOTE ADULT - ASSESSMENT
WORKUP  -Pt initially arrived with AMS, slurred speech and weakness, with Temp of 105.5.     -Concern for stroke vs ICH vs meningitis  -Pt received several Liters IVF, as well as IV acyclovir, ampicillin, vanc and ceftriaxone.  -Mental status improved; stroke/ICH workup negative; LP CSF grossly normal.   -Subsequently, pt     DIAGNOSIS and IMPRESSION    RECOMMENDATIONS    Pt to be seen. Preliminary Note.   All recommendations are tentative pending Attending Attestation.    Alfred Leslie MD PhD  PGY-1, Internal Medicine  [employer] Teams Preferred  After 5pm/weekends call [phone]  WORKUP  -Pt initially arrived with AMS, slurred speech and weakness, with Temp of 105.5.     -Concern for stroke vs ICH vs meningitis  -Pt received several Liters IVF, as well as IV acyclovir, ampicillin, vanc and ceftriaxone.  -Mental status improved; stroke/ICH workup negative; LP CSF grossly normal.   -Subsequently, pt with increasing hypoxia and tachypnea requiring BIPAP, hypotensive, and tachycardic, c/w septic shock  -CT Chest notable for opacification of R middle lobe and partially R upper lobe  -BCx from admission growing GNR    DIAGNOSIS and IMPRESSION  Pt likely with septic shock 2/2 GNR bacteremia 2/2 pneumonia. Unlikely meningitis.    RECOMMENDATIONS  -DC vancomycin, acyclovir, and ampicillin  -Change Cefopime dosing to 2g q12h iso GARCIA  -Narrow Abx based on culture speciation and sensitivities    All recommendations are tentative pending Attending Attestation.    Alfred Leslie MD PhD  PGY-1, Internal Medicine  [employer] Teams Preferred  After 5pm/weekends call [phone]  WORKUP  -Pt initially arrived with AMS, slurred speech and weakness, with Temp of 105.5.     -Concern for stroke vs ICH vs meningitis  -Pt received several Liters IVF, as well as IV acyclovir, ampicillin, vanc and ceftriaxone.  -Mental status improved; stroke/ICH workup negative; LP CSF grossly normal.   -Subsequently, pt with increasing hypoxia and tachypnea requiring BIPAP, hypotensive, and tachycardic, c/w septic shock  -CT Chest notable for opacification of R middle lobe and partially R upper lobe  -BCx from admission growing GNR    DIAGNOSIS and IMPRESSION  Pt likely with septic shock 2/2 E-coli bacteremia 2/2 pneumonia. Unlikely meningitis.    RECOMMENDATIONS  -DC vancomycin, acyclovir, and ampicillin  -Change Cefepime to ceftriaxone  -Narrow Abx based on culture speciation and sensitivities    All recommendations are tentative pending Attending Attestation.    Alfred Leslie MD PhD  PGY-1, Internal Medicine  [employer] Teams Preferred  After 5pm/weekends call [phone]

## 2022-11-30 NOTE — CONSULT NOTE ADULT - SUBJECTIVE AND OBJECTIVE BOX
Patient is a 62y old  Female who presents with a chief complaint of slurred speech (2022 08:24)    HPI:  Patient is a 62 yoF w/ pmhx of MM (diagnosed ~10 years ago, currently on Promacta treatments since 2022 - most recently last Wednesday; followed by oncologist, Dr. Lopez at [facility]), HTN, neuropathic R hip pain, recent R corneal tear, presents to [facility] ED for change in mental status. LKW approximately 14:30. Patient was in normal state of health this morning as per son, Nacho. Patient went to a follow up ophtho appointment for a recently diagnosed corneal abrasion this past Monday. Patient was picked up by a friend around 10:00 for an optho appt with normal mental status, pt was just complaining of being sleepy. On the way back from appointment around 14:30, patient stopped responding to friend appropriately in the car and she appeared more tired & weaker than usual. She started having slurred speech when they arrived home and then son called EMS. In the ED, patient with persisting word finding difficulties and generalized weakness, but leaning to the R side. Patient reports some difficulty remembering what happened today, but reports she feels generally weak and very tired. Code stroke called, but TPA not given because of imporvement in pt's symptoms. Son reports patient's speech is close to baseline now. Patient then denied chest pain, SOB, HA, changes in vision, N/V, imbalance, numbness or tingling. Denies recent infection or sick contacts. Pt states that she had a similar episode a few years ago with fever which she was told was likely from a MM, she improved after getting steroids.     In the ED pt was febrile 105.5, , /84, RR 24 and saturating well on RA. Pt given 2L IVF bolus and 1x vanc/ceftriaxone/acyclovir/ampicillin (2022 02:42)       REVIEW OF SYSTEMS  Constitutional: No fevers, chills, weight loss or fatigue   Skin: No rash, no phlebitis	  Eyes: No discharge	  ENMT: No sore throat, oral thrush, ulcers or exudate  Respiratory: No cough, no SOB  Cardiovascular:  No chest pain, palpitations or edema   Gastrointestinal: No pain, nausea, vomiting, diarrhea or constipation	  Genitourinary: No dysuria, discharge or flank pain  MSK: No arthralgias or back pain   Neurological: No HA, no weakness, no seizures, no AMS       prior hospital charts reviewed [V]  primary team notes reviewed [V]  other consultant notes reviewed [V]    PAST MEDICAL & SURGICAL HISTORY:  Multiple myeloma      Type 2 diabetes mellitus          SOCIAL HISTORY:  - Denied smoking/vaping/alcohol/recreational drug use    FAMILY HISTORY:  No pertinent family history in first degree relatives        Allergies  Bactrim (Other)  fluconazole (Vomiting; Nausea)  levofloxacin (Vomiting; Nausea)  penicillin (Other)        ANTIMICROBIALS:  acyclovir IVPB 650 every 8 hours  ampicillin  IVPB 2 every 4 hours  cefepime   IVPB 2000 every 8 hours  vancomycin  IVPB 1250 every 12 hours      ANTIMICROBIALS (past 90 days):  MEDICATIONS  (STANDING):    acyclovir IVPB   263 mL/Hr IV Intermittent (22 @ 02:04)    ampicillin  IVPB   200 mL/Hr IV Intermittent (22 @ 07:53)    ampicillin  IVPB   200 mL/Hr IV Intermittent (22 @ 00:31)    cefepime   IVPB   100 mL/Hr IV Intermittent (22 @ 06:40)    cefTRIAXone   IVPB   100 mL/Hr IV Intermittent (22 @ 20:56)    vancomycin  IVPB.   250 mL/Hr IV Intermittent (22 @ 23:23)        OTHER MEDS:   MEDICATIONS  (STANDING):  acetaminophen   IVPB .. 1000 once  dextrose 50% Injectable 25 once  dextrose 50% Injectable 12.5 once  dextrose 50% Injectable 25 once  dextrose Oral Gel 15 once PRN  enoxaparin Injectable 40 every 24 hours  glucagon  Injectable 1 once  insulin lispro (ADMELOG) corrective regimen sliding scale  every 6 hours  midodrine 10 every 8 hours      VITALS:  Vital Signs Last 24 Hrs  T(F): 98.4 (22 @ 08:45), Max: 105.5 (22 @ 16:43)    Vital Signs Last 24 Hrs  HR: 108 (22 @ 09:12) (70 - 135)  BP: 77/60 (22 @ 09:12) (67/39 - 151/83)  RR: 27 (22 @ 09:12)  SpO2: 91% (22 @ 09:12) (91% - 100%)  Wt(kg): --    EXAM:  General: Patient in no acute distress   HEENT: NCAT, EOMI, PERRL, no oral lesions  CV: S1+S2, no m/r/g appreciated   Lungs: No respiratory distress, CTAB  Abd: Soft, nontender, no guarding, no rebound tenderness, + bowel sounds   Ext: No cyanosis, no edema  Neuro: Alert and oriented, no focal deficits, CN II-XII grossly intact   Skin: No rash   IV: No phlebitis      Labs:                        8.4    1.83  )-----------( 42       ( 2022 07:19 )             27.0         142  |  105  |  24<H>  ----------------------------<  125<H>  3.4<L>   |  21<L>  |  1.38<H>    Ca    8.0<L>      2022 07:19  Phos  3.8       Mg     1.20         TPro  4.8<L>  /  Alb  2.8<L>  /  TBili  0.6  /  DBili  x   /  AST  121<H>  /  ALT  49<H>  /  AlkPhos  81        WBC Trend:  WBC Count: 1.83 (22 @ 07:19)  WBC Count: 2.04 (22 @ 16:40)      Auto Neutrophil #: 0.97 K/uL (22 @ 07:19)  Auto Neutrophil #: 0.89 K/uL (22 @ 16:40)      Creatine Trend:  Creatinine, Serum: 1.38 ()  Creatinine, Serum: 1.01 ()      Liver Biochemical Testing Trend:  Alanine Aminotransferase (ALT/SGPT): 49 *H* ()  Alanine Aminotransferase (ALT/SGPT): 50 *H* ()  Aspartate Aminotransferase (AST/SGOT): 121 (22 @ 07:19)  Aspartate Aminotransferase (AST/SGOT): 103 (22 @ 16:40)  Bilirubin Total, Serum: 0.6 ()  Bilirubin Total, Serum: 0.6 ()      Trend LDH      Auto Eosinophil %: 0.0 % (22 @ 07:19)  Auto Eosinophil %: 0.0 % (22 @ 16:40)      Urinalysis Basic - ( 2022 17:00 )    Color: Light Yellow / Appearance: Turbid / S.032 / pH: x  Gluc: x / Ketone: Negative  / Bili: Negative / Urobili: <2 mg/dL   Blood: x / Protein: 30 mg/dL / Nitrite: Negative   Leuk Esterase: Negative / RBC: 1 /HPF / WBC 0 /HPF   Sq Epi: x / Non Sq Epi: 3 /HPF / Bacteria: Negative        MICROBIOLOGY:        Culture - CSF with Gram Stain (collected 2022 23:00)  Source: .CSF CSF    Culture - Acid Fast - CSF (collected 2022 23:00)  Source: .CSF CSF    Culture - Blood (collected 2022 18:33)  Source: .Blood Blood-Peripheral  Preliminary Report:    Growth in anaerobic bottle: Gram Negative Rods    ***Blood Panel PCR results on this specimen are available    approximately 3 hours after the Gram stain result.***    Gram stain, PCR, and/or culture results may not always    correspond due to difference in methodologies.    ************************************************************    This PCR assay was performed by multiplex PCR. This    Assay tests for 66 bacterial and resistance gene targets.    Please refer to the [facility] test directory    at [email or web address] for details.            Treponema Pallidum Antibody Interpretation: Negative (22 @ 18:53)                      Procalcitonin, Serum: 8.73 ()        D-Dimer Assay, Quantitative: 3097 ()        Troponin T, High Sensitivity Result: 38 ()  Troponin T, High Sensitivity Result: 38 ()  Troponin T, High Sensitivity Result: 22 ()    Blood Gas Arterial, Lactate: 4.1 ( @ 07:44)  Lactate, Blood: 6.3 ( @ 07:19)  Blood Gas Venous - Lactate: 3.7 ( @ 18:53)  Blood Gas Venous - Lactate: 4.3 ( @ 17:41)    A1C with Estimated Average Glucose Result: 6.0 % (22 @ 17:41)      RADIOLOGY:  imaging below personally reviewed   Patient is a 62y old  Female who presents with a chief complaint of slurred speech (2022 08:24)    HPI:  Patient is a 62 yoF w/ pmhx of MM (diagnosed ~10 years ago, currently on Promacta treatments since 2022 - most recently last Wednesday; followed by oncologist, Dr. Lopez at [facility]), HTN, neuropathic R hip pain, recent R corneal tear, presents to [facility] ED for change in mental status. LKW approximately 14:30. Patient was in normal state of health this morning as per son, Nacho. Patient went to a follow up ophtho appointment for a recently diagnosed corneal abrasion this past Monday. Patient was picked up by a friend around 10:00 for an optho appt with normal mental status, pt was just complaining of being sleepy. On the way back from appointment around 14:30, patient stopped responding to friend appropriately in the car and she appeared more tired & weaker than usual. She started having slurred speech when they arrived home and then son called EMS. In the ED, patient with persisting word finding difficulties and generalized weakness, but leaning to the R side. Patient reports some difficulty remembering what happened today, but reports she feels generally weak and very tired. Code stroke called, but TPA not given because of imporvement in pt's symptoms. Son reports patient's speech is close to baseline now. Patient then denied chest pain, SOB, HA, changes in vision, N/V, imbalance, numbness or tingling. Denies recent infection or sick contacts. Pt states that she had a similar episode a few years ago with fever which she was told was likely from a MM, she improved after getting steroids.     In the ED pt was febrile 105.5, , /84, RR 24 and saturating well on RA. Pt given 2L IVF bolus and 1x vanc/ceftriaxone/acyclovir/ampicillin (2022 02:42)       REVIEW OF SYSTEMS  Constitutional: No fevers, chills, weight loss; +fatigue   Skin: No rash, no phlebitis	  Eyes: No discharge	  ENMT: No sore throat, oral thrush, ulcers or exudate  Respiratory: +productive cough, +SOB  Cardiovascular:  No chest pain, palpitations or edema   Gastrointestinal: No pain, nausea, vomiting, diarrhea or constipation	  Genitourinary: No dysuria, discharge or flank pain  MSK: No arthralgias or back pain   Neurological: No HA, no weakness, no seizures, no AMS       prior hospital charts reviewed [V]  primary team notes reviewed [V]  other consultant notes reviewed [V]    PAST MEDICAL & SURGICAL HISTORY:  Multiple myeloma      Type 2 diabetes mellitus          SOCIAL HISTORY:  - Denied smoking/vaping/alcohol/recreational drug use    FAMILY HISTORY:  No pertinent family history in first degree relatives        Allergies  Bactrim (Other)  fluconazole (Vomiting; Nausea)  levofloxacin (Vomiting; Nausea)  penicillin (Other)        ANTIMICROBIALS:  acyclovir IVPB 650 every 8 hours  ampicillin  IVPB 2 every 4 hours  cefepime   IVPB 2000 every 8 hours  vancomycin  IVPB 1250 every 12 hours      ANTIMICROBIALS (past 90 days):  MEDICATIONS  (STANDING):    acyclovir IVPB   263 mL/Hr IV Intermittent (22 @ 02:04)    ampicillin  IVPB   200 mL/Hr IV Intermittent (22 @ 07:53)    ampicillin  IVPB   200 mL/Hr IV Intermittent (22 @ 00:31)    cefepime   IVPB   100 mL/Hr IV Intermittent (22 @ 06:40)    cefTRIAXone   IVPB   100 mL/Hr IV Intermittent (22 @ 20:56)    vancomycin  IVPB.   250 mL/Hr IV Intermittent (22 @ 23:23)        OTHER MEDS:   MEDICATIONS  (STANDING):  acetaminophen   IVPB .. 1000 once  dextrose 50% Injectable 25 once  dextrose 50% Injectable 12.5 once  dextrose 50% Injectable 25 once  dextrose Oral Gel 15 once PRN  enoxaparin Injectable 40 every 24 hours  glucagon  Injectable 1 once  insulin lispro (ADMELOG) corrective regimen sliding scale  every 6 hours  midodrine 10 every 8 hours      VITALS:  Vital Signs Last 24 Hrs  T(F): 98.4 (22 @ 08:45), Max: 105.5 (22 @ 16:43)    Vital Signs Last 24 Hrs  HR: 108 (22 @ 09:12) (70 - 135)  BP: 77/60 (22 @ 09:12) (67/39 - 151/83)  RR: 27 (22 @ 09:12)  SpO2: 91% (22 @ 09:12) (91% - 100%)  Wt(kg): --    EXAM:  General: Patient in respiratory distress  HEENT: NCAT, EOMI, PERRL, no oral lesions  CV: S1+S2, no m/r/g appreciated   Lungs: Diminished lung sounds in R mid-lung fields; tachypnea; accessory muscle use; no crackles  Abd: Soft, nontender, no guarding, no rebound tenderness, + bowel sounds   Ext: No cyanosis, no edema  Neuro: Alert and oriented, no focal deficits, CN II-XII grossly intact   Skin: No rash   IV: No phlebitis      Labs:                        8.4    1.83  )-----------( 42       ( 2022 07:19 )             27.0         142  |  105  |  24<H>  ----------------------------<  125<H>  3.4<L>   |  21<L>  |  1.38<H>    Ca    8.0<L>      2022 07:19  Phos  3.8       Mg     1.20         TPro  4.8<L>  /  Alb  2.8<L>  /  TBili  0.6  /  DBili  x   /  AST  121<H>  /  ALT  49<H>  /  AlkPhos  81        WBC Trend:  WBC Count: 1.83 (22 @ 07:19)  WBC Count: 2.04 (22 @ 16:40)      Auto Neutrophil #: 0.97 K/uL (22 @ 07:19)  Auto Neutrophil #: 0.89 K/uL (22 @ 16:40)      Creatine Trend:  Creatinine, Serum: 1.38 ()  Creatinine, Serum: 1.01 ()      Liver Biochemical Testing Trend:  Alanine Aminotransferase (ALT/SGPT): 49 *H* ()  Alanine Aminotransferase (ALT/SGPT): 50 *H* ()  Aspartate Aminotransferase (AST/SGOT): 121 (22 @ 07:19)  Aspartate Aminotransferase (AST/SGOT): 103 (22 @ 16:40)  Bilirubin Total, Serum: 0.6 ()  Bilirubin Total, Serum: 0.6 ()      Trend LDH      Auto Eosinophil %: 0.0 % (22 @ 07:19)  Auto Eosinophil %: 0.0 % (22 @ 16:40)      Urinalysis Basic - ( 2022 17:00 )    Color: Light Yellow / Appearance: Turbid / S.032 / pH: x  Gluc: x / Ketone: Negative  / Bili: Negative / Urobili: <2 mg/dL   Blood: x / Protein: 30 mg/dL / Nitrite: Negative   Leuk Esterase: Negative / RBC: 1 /HPF / WBC 0 /HPF   Sq Epi: x / Non Sq Epi: 3 /HPF / Bacteria: Negative        MICROBIOLOGY:        Culture - CSF with Gram Stain (collected 2022 23:00)  Source: .CSF CSF    Culture - Acid Fast - CSF (collected 2022 23:00)  Source: .CSF CSF    Culture - Blood (collected 2022 18:33)  Source: .Blood Blood-Peripheral  Preliminary Report:    Growth in anaerobic bottle: Gram Negative Rods    ***Blood Panel PCR results on this specimen are available    approximately 3 hours after the Gram stain result.***    Gram stain, PCR, and/or culture results may not always    correspond due to difference in methodologies.    ************************************************************    This PCR assay was performed by multiplex PCR. This    Assay tests for 66 bacterial and resistance gene targets.    Please refer to the [facility] test directory    at [email or web address] for details.            Treponema Pallidum Antibody Interpretation: Negative (22 @ 18:53)                      Procalcitonin, Serum: 8.73 ()        D-Dimer Assay, Quantitative: 3097 ()        Troponin T, High Sensitivity Result: 38 ()  Troponin T, High Sensitivity Result: 38 ()  Troponin T, High Sensitivity Result: 22 ()    Blood Gas Arterial, Lactate: 4.1 ( @ 07:44)  Lactate, Blood: 6.3 ( @ 07:19)  Blood Gas Venous - Lactate: 3.7 ( @ 18:53)  Blood Gas Venous - Lactate: 4.3 ( @ 17:41)    A1C with Estimated Average Glucose Result: 6.0 % (22 @ 17:41)      RADIOLOGY:  imaging below personally reviewed   Patient is a 62y old  Female who presents with a chief complaint of slurred speech (2022 08:24)    HPI:  Patient is a 62 yoF w/ pmhx of MM (diagnosed ~10 years ago, currently on Promacta treatments since 2022 - most recently last Wednesday; followed by oncologist, Dr. Lopez at [facility]), HTN, neuropathic R hip pain, recent R corneal tear, presents to [facility] ED for change in mental status. LKW approximately 14:30. Patient was in normal state of health this morning as per son, Nacho. Patient went to a follow up ophtho appointment for a recently diagnosed corneal abrasion this past Monday. Patient was picked up by a friend around 10:00 for an optho appt with normal mental status, pt was just complaining of being sleepy. On the way back from appointment around 14:30, patient stopped responding to friend appropriately in the car and she appeared more tired & weaker than usual. She started having slurred speech when they arrived home and then son called EMS. In the ED, patient with persisting word finding difficulties and generalized weakness, but leaning to the R side. Patient reports some difficulty remembering what happened today, but reports she feels generally weak and very tired. Code stroke called, but TPA not given because of imporvement in pt's symptoms. Son reports patient's speech is close to baseline now. Patient then denied chest pain, SOB, HA, changes in vision, N/V, imbalance, numbness or tingling. Denies recent infection or sick contacts. Pt states that she had a similar episode a few years ago with fever which she was told was likely from a MM, she improved after getting steroids.     In the ED pt was febrile 105.5, , /84, RR 24 and saturating well on RA. Pt given 2L IVF bolus and 1x vanc/ceftriaxone/acyclovir/ampicillin (2022 02:42)       REVIEW OF SYSTEMS  Constitutional: + fevers, chills, weight loss; +fatigue   Skin: No rash, no phlebitis	  Eyes: No discharge	  ENMT: No sore throat, oral thrush, ulcers or exudate  Respiratory: +productive cough, +SOB  Cardiovascular:  No chest pain, palpitations or edema   Gastrointestinal: No pain, nausea, vomiting, diarrhea or constipation	  Genitourinary: No dysuria, discharge or flank pain  MSK: No arthralgias or back pain   Neurological: No HA, no weakness, no seizures, no AMS   psych: no anxiety    prior hospital charts reviewed [V]  primary team notes reviewed [V]  other consultant notes reviewed [V]    PAST MEDICAL & SURGICAL HISTORY:  Multiple myeloma      Type 2 diabetes mellitus          SOCIAL HISTORY:  used to live alone but now with her sister, independent walks with a cane  no smoking/vaping/alcohol/recreational drug use    FAMILY HISTORY:  No recent febrile illness in family members        Allergies  Bactrim (Other)  fluconazole (Vomiting; Nausea)  levofloxacin (Vomiting; Nausea)  penicillin (Other)        ANTIMICROBIALS:  acyclovir IVPB 650 every 8 hours  ampicillin  IVPB 2 every 4 hours  cefepime   IVPB 2000 every 8 hours  vancomycin  IVPB 1250 every 12 hours      ANTIMICROBIALS (past 90 days):  MEDICATIONS  (STANDING):    acyclovir IVPB   263 mL/Hr IV Intermittent (22 @ 02:04)    ampicillin  IVPB   200 mL/Hr IV Intermittent (22 @ 07:53)    ampicillin  IVPB   200 mL/Hr IV Intermittent (22 @ 00:31)    cefepime   IVPB   100 mL/Hr IV Intermittent (22 @ 06:40)    cefTRIAXone   IVPB   100 mL/Hr IV Intermittent (22 @ 20:56)    vancomycin  IVPB.   250 mL/Hr IV Intermittent (22 @ 23:23)        OTHER MEDS:   MEDICATIONS  (STANDING):  acetaminophen   IVPB .. 1000 once  dextrose 50% Injectable 25 once  dextrose 50% Injectable 12.5 once  dextrose 50% Injectable 25 once  dextrose Oral Gel 15 once PRN  enoxaparin Injectable 40 every 24 hours  glucagon  Injectable 1 once  insulin lispro (ADMELOG) corrective regimen sliding scale  every 6 hours  midodrine 10 every 8 hours      VITALS:  Vital Signs Last 24 Hrs  T(F): 98.4 (22 @ 08:45), Max: 105.5 (22 @ 16:43)    Vital Signs Last 24 Hrs  HR: 108 (22 @ 09:12) (70 - 135)  BP: 77/60 (22 @ 09:12) (67/39 - 151/83)  RR: 27 (22 @ 09:12)  SpO2: 91% (11-30-22 @ 09:12) (91% - 100%)  Wt(kg): --    EXAM:  General: Patient in respiratory distress  Head: atraumatic, normocephalic  Eyes:  NCAT, EOMI  ENT: no oral lesions  CV: S1+S2, no m/r/g appreciated   Lungs: Diminished lung sounds in R mid-lung fields; tachypnea; accessory muscle use; no crackles, tachypneic on BiPAP  Abd: Soft, nontender, no guarding, no rebound tenderness, + bowel sounds   : no suprapubic or CVA tenderness  Ext: No cyanosis, no edema  Neuro: Alert and oriented, no focal deficits, CN II-XII grossly intact   Skin: No rash   vascular: No phlebitis  psych: normal affect      Labs:                        8.4    1.83  )-----------( 42       ( 2022 07:19 )             27.0         142  |  105  |  24<H>  ----------------------------<  125<H>  3.4<L>   |  21<L>  |  1.38<H>    Ca    8.0<L>      2022 07:19  Phos  3.8       Mg     1.20         TPro  4.8<L>  /  Alb  2.8<L>  /  TBili  0.6  /  DBili  x   /  AST  121<H>  /  ALT  49<H>  /  AlkPhos  81        WBC Trend:  WBC Count: 1.83 (22 @ 07:19)  WBC Count: 2.04 (22 @ 16:40)      Auto Neutrophil #: 0.97 K/uL (22 @ 07:19)  Auto Neutrophil #: 0.89 K/uL (22 @ 16:40)      Creatine Trend:  Creatinine, Serum: 1.38 ()  Creatinine, Serum: 1.01 ()      Liver Biochemical Testing Trend:  Alanine Aminotransferase (ALT/SGPT): 49 *H* ()  Alanine Aminotransferase (ALT/SGPT): 50 *H* ()  Aspartate Aminotransferase (AST/SGOT): 121 (22 @ 07:19)  Aspartate Aminotransferase (AST/SGOT): 103 (22 @ 16:40)  Bilirubin Total, Serum: 0.6 ()  Bilirubin Total, Serum: 0.6 ()      Trend LDH      Auto Eosinophil %: 0.0 % (22 @ 07:19)  Auto Eosinophil %: 0.0 % (22 @ 16:40)      Urinalysis Basic - ( 2022 17:00 )    Color: Light Yellow / Appearance: Turbid / S.032 / pH: x  Gluc: x / Ketone: Negative  / Bili: Negative / Urobili: <2 mg/dL   Blood: x / Protein: 30 mg/dL / Nitrite: Negative   Leuk Esterase: Negative / RBC: 1 /HPF / WBC 0 /HPF   Sq Epi: x / Non Sq Epi: 3 /HPF / Bacteria: Negative        MICROBIOLOGY:        Culture - CSF with Gram Stain (collected 2022 23:00)  Source: .CSF CSF    Culture - Acid Fast - CSF (collected 2022 23:00)  Source: .CSF CSF    Culture - Blood (collected 2022 18:33)  Source: .Blood Blood-Peripheral  Preliminary Report:    Growth in anaerobic bottle: Gram Negative Rods    ***Blood Panel PCR results on this specimen are available    approximately 3 hours after the Gram stain result.***    Gram stain, PCR, and/or culture results may not always    correspond due to difference in methodologies.    ************************************************************    This PCR assay was performed by multiplex PCR. This    Assay tests for 66 bacterial and resistance gene targets.    Please refer to the [facility] test directory    at [email or web address] for details.            Treponema Pallidum Antibody Interpretation: Negative (22 @ 18:53)                      Procalcitonin, Serum: 8.73 ()        D-Dimer Assay, Quantitative: 3097 ()        Troponin T, High Sensitivity Result: 38 ()  Troponin T, High Sensitivity Result: 38 ()  Troponin T, High Sensitivity Result: 22 ()    Blood Gas Arterial, Lactate: 4.1 ( @ 07:44)  Lactate, Blood: 6.3 ( @ 07:19)  Blood Gas Venous - Lactate: 3.7 ( @ 18:53)  Blood Gas Venous - Lactate: 4.3 ( @ 17:41)    A1C with Estimated Average Glucose Result: 6.0 % (22 @ 17:41)      RADIOLOGY:  imaging below personally reviewed  < from: CT Angio Chest PE Protocol w/ IV Cont (22 @ 10:12) >  IMPRESSION:    No pulmonary embolism.    Posterior right upper lobe and right basilar consolidation and additional   right middle lobe and left basilar patchy opacities; findings compatible   with aspiration or infection.    < end of copied text >

## 2022-11-30 NOTE — PATIENT PROFILE ADULT - FALL HARM RISK - HARM RISK INTERVENTIONS

## 2022-11-30 NOTE — H&P ADULT - NSHPSOCIALHISTORY_GEN_ALL_CORE
At baseline patient uses cane to ambulate for chronic pain, but occasionally does not need. Otherwise independent with ADLs. Lives alone, but above her sister home.

## 2022-11-30 NOTE — PROGRESS NOTE ADULT - ASSESSMENT
Patient is a 62 yoF w/ pmhx of MM (diagnosed ~10 years ago, currently on Promacta treatments since June 2022 - most recently last Wednesday; followed by oncologist, Dr. Lopez at [facility]), HTN, neuropathic R hip pain, recent R corneal tear admitted for AMS  Patient is a 62 yoF w/ pmhx of MM (on Promacta, last dose 11/23/2022) BIBEMS for AMS and slurred speech, found to be in severe sepsis 2/2 to GNR bacteremia with CT evidence of R middle lobe PNA. Patient transferred to MICU for acute hypoxemic respiratory failure and hypotension requiring pressor support

## 2022-11-30 NOTE — PROGRESS NOTE ADULT - PROBLEM SELECTOR PLAN 1
-Pt febrile in the ED to 105.5, tachycardic to 135, WBC of 2.04 and tachypneic to 24   -UA negative, RVP negative and chest x-ray with clear lung  -UCx and BCx pending  -Due to high fevers with AMS/slurred speech, concern for meningitis. LP done in the ED, will f/u results.  -Will cont to monitor fever curve and wbc  -Pt given IV acyclovir, ampicillin, vanc and ceftriaxone in the ED. Will cont for empiric meningitis coverage  -ID consult in the AM -2/2 to CAP  -stat CTA notes no PE but multifocal PNA  -Pt febrile in the ED to 105.5, tachycardic to 135, WBC of 2.04 and tachypneic to 24   -UA negative, RVP negative and chest x-ray with clear lung  -UCx and BCx pending  -Due to high fevers with AMS/slurred speech, concern for meningitis. LP done in the ED, will f/u results however does not look concerning for meningitis  -Will cont to monitor fever curve and wbc  -Pt given IV acyclovir, ampicillin, vanc and ceftriaxone in the ED. Will cont for empiric meningitis coverage  -ID consult in the AM however can likely titrate antibiotics to vanc and cefepime -2/2 to CAP  -stat CTA notes no PE but multifocal PNA  -Pt febrile in the ED to 105.5, tachycardic to 135, WBC of 2.04 and tachypneic to 24   -UA negative, RVP negative and chest x-ray with clear lung  -BCx c/f GNR bacteremia  - per ID: continue with cefepime (renally dose to 2g q12), can dc vanc, ampicillin, acyclovir  -Due to high fevers with AMS/slurred speech, concern for meningitis. LP done in the ED, will f/u results however does not look concerning for meningitis  -Will cont to monitor fever curve and wbc

## 2022-11-30 NOTE — CHART NOTE - NSCHARTNOTEFT_GEN_A_CORE
: Karen Hightower    INDICATION: critical illness    PROCEDURE:  [x] LIMITED ECHO  [x] LIMITED CHEST  [ ] LIMITED RETROPERITONEAL  [ ] LIMITED ABDOMINAL  [ ] LIMITED DVT  [ ] NEEDLE GUIDANCE VASCULAR  [ ] NEEDLE GUIDANCE THORACENTESIS  [ ] NEEDLE GUIDANCE PARACENTESIS  [ ] NEEDLE GUIDANCE PERICARDIOCENTESIS  [ ] OTHER    FINDINGS:  R anterior and basilar B lines with basilar consolidation  L a lines with intermittent B lines  LVEF grossly normal  RV < LV  IVC 1.6 cm    INTERPRETATION:    R side B lines > L  R basilar consolidation  cardiac function grossly normal      Images uploaded on [employer]

## 2022-12-01 LAB
-  AMIKACIN: SIGNIFICANT CHANGE UP
-  AMPICILLIN/SULBACTAM: SIGNIFICANT CHANGE UP
-  AMPICILLIN: SIGNIFICANT CHANGE UP
-  AZTREONAM: SIGNIFICANT CHANGE UP
-  CEFAZOLIN: SIGNIFICANT CHANGE UP
-  CEFEPIME: SIGNIFICANT CHANGE UP
-  CEFOXITIN: SIGNIFICANT CHANGE UP
-  CEFTRIAXONE: SIGNIFICANT CHANGE UP
-  CIPROFLOXACIN: SIGNIFICANT CHANGE UP
-  ERTAPENEM: SIGNIFICANT CHANGE UP
-  GENTAMICIN: SIGNIFICANT CHANGE UP
-  IMIPENEM: SIGNIFICANT CHANGE UP
-  LEVOFLOXACIN: SIGNIFICANT CHANGE UP
-  MEROPENEM: SIGNIFICANT CHANGE UP
-  PIPERACILLIN/TAZOBACTAM: SIGNIFICANT CHANGE UP
-  TOBRAMYCIN: SIGNIFICANT CHANGE UP
-  TRIMETHOPRIM/SULFAMETHOXAZOLE: SIGNIFICANT CHANGE UP
ALBUMIN SERPL ELPH-MCNC: 2.7 G/DL — LOW (ref 3.3–5)
ALBUMIN SERPL ELPH-MCNC: 2.8 G/DL — LOW (ref 3.3–5)
ALP SERPL-CCNC: 101 U/L — SIGNIFICANT CHANGE UP (ref 40–120)
ALP SERPL-CCNC: 85 U/L — SIGNIFICANT CHANGE UP (ref 40–120)
ALT FLD-CCNC: 56 U/L — HIGH (ref 4–33)
ALT FLD-CCNC: 60 U/L — HIGH (ref 4–33)
ANION GAP SERPL CALC-SCNC: 14 MMOL/L — SIGNIFICANT CHANGE UP (ref 7–14)
ANION GAP SERPL CALC-SCNC: 16 MMOL/L — HIGH (ref 7–14)
AST SERPL-CCNC: 139 U/L — HIGH (ref 4–32)
AST SERPL-CCNC: 174 U/L — HIGH (ref 4–32)
BASOPHILS # BLD AUTO: 0 K/UL — SIGNIFICANT CHANGE UP (ref 0–0.2)
BASOPHILS # BLD AUTO: 0.01 K/UL — SIGNIFICANT CHANGE UP (ref 0–0.2)
BASOPHILS NFR BLD AUTO: 0 % — SIGNIFICANT CHANGE UP (ref 0–2)
BASOPHILS NFR BLD AUTO: 1.1 % — SIGNIFICANT CHANGE UP (ref 0–2)
BILIRUB SERPL-MCNC: 1.3 MG/DL — HIGH (ref 0.2–1.2)
BILIRUB SERPL-MCNC: 1.5 MG/DL — HIGH (ref 0.2–1.2)
BUN SERPL-MCNC: 39 MG/DL — HIGH (ref 7–23)
BUN SERPL-MCNC: 44 MG/DL — HIGH (ref 7–23)
CALCIUM SERPL-MCNC: 7.5 MG/DL — LOW (ref 8.4–10.5)
CALCIUM SERPL-MCNC: 8 MG/DL — LOW (ref 8.4–10.5)
CHLORIDE SERPL-SCNC: 102 MMOL/L — SIGNIFICANT CHANGE UP (ref 98–107)
CHLORIDE SERPL-SCNC: 109 MMOL/L — HIGH (ref 98–107)
CK SERPL-CCNC: 2428 U/L — HIGH (ref 25–170)
CK SERPL-CCNC: 2881 U/L — HIGH (ref 25–170)
CO2 SERPL-SCNC: 18 MMOL/L — LOW (ref 22–31)
CO2 SERPL-SCNC: 18 MMOL/L — LOW (ref 22–31)
CREAT SERPL-MCNC: 1.28 MG/DL — SIGNIFICANT CHANGE UP (ref 0.5–1.3)
CREAT SERPL-MCNC: 1.48 MG/DL — HIGH (ref 0.5–1.3)
CRYPTOC AG CSF-ACNC: NEGATIVE — SIGNIFICANT CHANGE UP
CULTURE RESULTS: SIGNIFICANT CHANGE UP
EGFR: 40 ML/MIN/1.73M2 — LOW
EGFR: 47 ML/MIN/1.73M2 — LOW
EOSINOPHIL # BLD AUTO: 0 K/UL — SIGNIFICANT CHANGE UP (ref 0–0.5)
EOSINOPHIL # BLD AUTO: 0 K/UL — SIGNIFICANT CHANGE UP (ref 0–0.5)
EOSINOPHIL NFR BLD AUTO: 0 % — SIGNIFICANT CHANGE UP (ref 0–6)
EOSINOPHIL NFR BLD AUTO: 0 % — SIGNIFICANT CHANGE UP (ref 0–6)
GAS PNL BLDA: SIGNIFICANT CHANGE UP
GLUCOSE BLDC GLUCOMTR-MCNC: 108 MG/DL — HIGH (ref 70–99)
GLUCOSE BLDC GLUCOMTR-MCNC: 120 MG/DL — HIGH (ref 70–99)
GLUCOSE BLDC GLUCOMTR-MCNC: 166 MG/DL — HIGH (ref 70–99)
GLUCOSE BLDC GLUCOMTR-MCNC: 87 MG/DL — SIGNIFICANT CHANGE UP (ref 70–99)
GLUCOSE BLDC GLUCOMTR-MCNC: 89 MG/DL — SIGNIFICANT CHANGE UP (ref 70–99)
GLUCOSE SERPL-MCNC: 123 MG/DL — HIGH (ref 70–99)
GLUCOSE SERPL-MCNC: 170 MG/DL — HIGH (ref 70–99)
HAPTOGLOB SERPL-MCNC: 74 MG/DL — SIGNIFICANT CHANGE UP (ref 34–200)
HCT VFR BLD CALC: 28.6 % — LOW (ref 34.5–45)
HCT VFR BLD CALC: 29.3 % — LOW (ref 34.5–45)
HCV AB S/CO SERPL IA: 0.04 S/CO — SIGNIFICANT CHANGE UP (ref 0–0.99)
HCV AB SERPL-IMP: SIGNIFICANT CHANGE UP
HGB BLD-MCNC: 9.3 G/DL — LOW (ref 11.5–15.5)
HGB BLD-MCNC: 9.5 G/DL — LOW (ref 11.5–15.5)
IANC: 0.5 K/UL — LOW (ref 1.8–7.4)
IANC: 0.79 K/UL — LOW (ref 1.8–7.4)
IMM GRANULOCYTES NFR BLD AUTO: 2.2 % — HIGH (ref 0–0.9)
IMM GRANULOCYTES NFR BLD AUTO: 4.8 % — HIGH (ref 0–0.9)
LDH SERPL L TO P-CCNC: 1352 U/L — HIGH (ref 135–225)
LDH SERPL L TO P-CCNC: 878 U/L — HIGH (ref 135–225)
LYMPHOCYTES # BLD AUTO: 0.31 K/UL — LOW (ref 1–3.3)
LYMPHOCYTES # BLD AUTO: 0.33 K/UL — LOW (ref 1–3.3)
LYMPHOCYTES # BLD AUTO: 26.4 % — SIGNIFICANT CHANGE UP (ref 13–44)
LYMPHOCYTES # BLD AUTO: 33.7 % — SIGNIFICANT CHANGE UP (ref 13–44)
MAGNESIUM SERPL-MCNC: 2 MG/DL — SIGNIFICANT CHANGE UP (ref 1.6–2.6)
MAGNESIUM SERPL-MCNC: 2.1 MG/DL — SIGNIFICANT CHANGE UP (ref 1.6–2.6)
MCHC RBC-ENTMCNC: 32.4 GM/DL — SIGNIFICANT CHANGE UP (ref 32–36)
MCHC RBC-ENTMCNC: 32.5 GM/DL — SIGNIFICANT CHANGE UP (ref 32–36)
MCHC RBC-ENTMCNC: 33.7 PG — SIGNIFICANT CHANGE UP (ref 27–34)
MCHC RBC-ENTMCNC: 35.1 PG — HIGH (ref 27–34)
MCV RBC AUTO: 103.6 FL — HIGH (ref 80–100)
MCV RBC AUTO: 108.1 FL — HIGH (ref 80–100)
METHOD TYPE: SIGNIFICANT CHANGE UP
MONOCYTES # BLD AUTO: 0.07 K/UL — SIGNIFICANT CHANGE UP (ref 0–0.9)
MONOCYTES # BLD AUTO: 0.08 K/UL — SIGNIFICANT CHANGE UP (ref 0–0.9)
MONOCYTES NFR BLD AUTO: 5.6 % — SIGNIFICANT CHANGE UP (ref 2–14)
MONOCYTES NFR BLD AUTO: 8.7 % — SIGNIFICANT CHANGE UP (ref 2–14)
NEUTROPHILS # BLD AUTO: 0.5 K/UL — LOW (ref 1.8–7.4)
NEUTROPHILS # BLD AUTO: 0.79 K/UL — LOW (ref 1.8–7.4)
NEUTROPHILS NFR BLD AUTO: 54.3 % — SIGNIFICANT CHANGE UP (ref 43–77)
NEUTROPHILS NFR BLD AUTO: 63.2 % — SIGNIFICANT CHANGE UP (ref 43–77)
NRBC # BLD: 5 /100 WBCS — HIGH (ref 0–0)
NRBC # BLD: 5 /100 WBCS — HIGH (ref 0–0)
NRBC # FLD: 0.05 K/UL — HIGH (ref 0–0)
NRBC # FLD: 0.06 K/UL — HIGH (ref 0–0)
ORGANISM # SPEC MICROSCOPIC CNT: SIGNIFICANT CHANGE UP
PHOSPHATE SERPL-MCNC: 4.9 MG/DL — HIGH (ref 2.5–4.5)
PHOSPHATE SERPL-MCNC: 5.2 MG/DL — HIGH (ref 2.5–4.5)
PLATELET # BLD AUTO: 37 K/UL — LOW (ref 150–400)
PLATELET # BLD AUTO: 41 K/UL — LOW (ref 150–400)
POTASSIUM SERPL-MCNC: 3.5 MMOL/L — SIGNIFICANT CHANGE UP (ref 3.5–5.3)
POTASSIUM SERPL-MCNC: 5.6 MMOL/L — HIGH (ref 3.5–5.3)
POTASSIUM SERPL-SCNC: 3.5 MMOL/L — SIGNIFICANT CHANGE UP (ref 3.5–5.3)
POTASSIUM SERPL-SCNC: 5.6 MMOL/L — HIGH (ref 3.5–5.3)
PROT SERPL-MCNC: 5.4 G/DL — LOW (ref 6–8.3)
PROT SERPL-MCNC: 5.5 G/DL — LOW (ref 6–8.3)
RBC # BLD: 2.71 M/UL — LOW (ref 3.8–5.2)
RBC # BLD: 2.76 M/UL — LOW (ref 3.8–5.2)
RBC # FLD: 15.1 % — HIGH (ref 10.3–14.5)
RBC # FLD: 15.7 % — HIGH (ref 10.3–14.5)
SODIUM SERPL-SCNC: 136 MMOL/L — SIGNIFICANT CHANGE UP (ref 135–145)
SODIUM SERPL-SCNC: 141 MMOL/L — SIGNIFICANT CHANGE UP (ref 135–145)
SPECIMEN SOURCE: SIGNIFICANT CHANGE UP
VANCOMYCIN FLD-MCNC: 7.8 UG/ML — SIGNIFICANT CHANGE UP
WBC # BLD: 0.92 K/UL — CRITICAL LOW (ref 3.8–10.5)
WBC # BLD: 1.25 K/UL — LOW (ref 3.8–10.5)
WBC # FLD AUTO: 0.92 K/UL — CRITICAL LOW (ref 3.8–10.5)
WBC # FLD AUTO: 1.25 K/UL — LOW (ref 3.8–10.5)

## 2022-12-01 PROCEDURE — 36620 INSERTION CATHETER ARTERY: CPT | Mod: LT,GC

## 2022-12-01 PROCEDURE — 99232 SBSQ HOSP IP/OBS MODERATE 35: CPT

## 2022-12-01 RX ORDER — MIDAZOLAM HYDROCHLORIDE 1 MG/ML
2 INJECTION, SOLUTION INTRAMUSCULAR; INTRAVENOUS ONCE
Refills: 0 | Status: DISCONTINUED | OUTPATIENT
Start: 2022-12-01 | End: 2022-12-01

## 2022-12-01 RX ORDER — ACETAMINOPHEN 500 MG
650 TABLET ORAL ONCE
Refills: 0 | Status: COMPLETED | OUTPATIENT
Start: 2022-12-01 | End: 2022-12-01

## 2022-12-01 RX ORDER — ACETAMINOPHEN 500 MG
1000 TABLET ORAL ONCE
Refills: 0 | Status: COMPLETED | OUTPATIENT
Start: 2022-12-01 | End: 2022-12-01

## 2022-12-01 RX ORDER — FILGRASTIM 480MCG/1.6
480 VIAL (ML) INJECTION DAILY
Refills: 0 | Status: DISCONTINUED | OUTPATIENT
Start: 2022-12-01 | End: 2022-12-07

## 2022-12-01 RX ORDER — VANCOMYCIN HCL 1 G
1500 VIAL (EA) INTRAVENOUS ONCE
Refills: 0 | Status: COMPLETED | OUTPATIENT
Start: 2022-12-01 | End: 2022-12-01

## 2022-12-01 RX ORDER — DEXMEDETOMIDINE HYDROCHLORIDE IN 0.9% SODIUM CHLORIDE 4 UG/ML
0.02 INJECTION INTRAVENOUS
Qty: 400 | Refills: 0 | Status: DISCONTINUED | OUTPATIENT
Start: 2022-12-01 | End: 2022-12-03

## 2022-12-01 RX ORDER — ACETAMINOPHEN 500 MG
650 TABLET ORAL EVERY 6 HOURS
Refills: 0 | Status: DISCONTINUED | OUTPATIENT
Start: 2022-12-01 | End: 2022-12-02

## 2022-12-01 RX ORDER — PHENYLEPHRINE HYDROCHLORIDE 10 MG/ML
0.03 INJECTION INTRAVENOUS
Qty: 40 | Refills: 0 | Status: DISCONTINUED | OUTPATIENT
Start: 2022-12-01 | End: 2022-12-03

## 2022-12-01 RX ADMIN — Medication 300 MILLIGRAM(S): at 04:50

## 2022-12-01 RX ADMIN — CHLORHEXIDINE GLUCONATE 15 MILLILITER(S): 213 SOLUTION TOPICAL at 18:54

## 2022-12-01 RX ADMIN — MIDODRINE HYDROCHLORIDE 20 MILLIGRAM(S): 2.5 TABLET ORAL at 10:55

## 2022-12-01 RX ADMIN — DEXMEDETOMIDINE HYDROCHLORIDE IN 0.9% SODIUM CHLORIDE 0.5 MICROGRAM(S)/KG/HR: 4 INJECTION INTRAVENOUS at 20:00

## 2022-12-01 RX ADMIN — DEXMEDETOMIDINE HYDROCHLORIDE IN 0.9% SODIUM CHLORIDE 0.5 MICROGRAM(S)/KG/HR: 4 INJECTION INTRAVENOUS at 19:49

## 2022-12-01 RX ADMIN — Medication 32.6 MICROGRAM(S)/KG/MIN: at 19:49

## 2022-12-01 RX ADMIN — CHLORHEXIDINE GLUCONATE 15 MILLILITER(S): 213 SOLUTION TOPICAL at 06:32

## 2022-12-01 RX ADMIN — CEFEPIME 100 MILLIGRAM(S): 1 INJECTION, POWDER, FOR SOLUTION INTRAMUSCULAR; INTRAVENOUS at 18:10

## 2022-12-01 RX ADMIN — PHENYLEPHRINE HYDROCHLORIDE 1 MICROGRAM(S)/KG/MIN: 10 INJECTION INTRAVENOUS at 20:00

## 2022-12-01 RX ADMIN — PHENYLEPHRINE HYDROCHLORIDE 1 MICROGRAM(S)/KG/MIN: 10 INJECTION INTRAVENOUS at 19:49

## 2022-12-01 RX ADMIN — ENOXAPARIN SODIUM 40 MILLIGRAM(S): 100 INJECTION SUBCUTANEOUS at 11:38

## 2022-12-01 RX ADMIN — CEFEPIME 100 MILLIGRAM(S): 1 INJECTION, POWDER, FOR SOLUTION INTRAMUSCULAR; INTRAVENOUS at 06:32

## 2022-12-01 RX ADMIN — Medication 1: at 07:02

## 2022-12-01 RX ADMIN — PROPOFOL 5.22 MICROGRAM(S)/KG/MIN: 10 INJECTION, EMULSION INTRAVENOUS at 10:56

## 2022-12-01 RX ADMIN — Medication 300 MICROGRAM(S): at 04:50

## 2022-12-01 RX ADMIN — Medication 400 MILLIGRAM(S): at 04:50

## 2022-12-01 RX ADMIN — MIDAZOLAM HYDROCHLORIDE 2 MILLIGRAM(S): 1 INJECTION, SOLUTION INTRAMUSCULAR; INTRAVENOUS at 16:28

## 2022-12-01 RX ADMIN — Medication 400 MILLIGRAM(S): at 10:55

## 2022-12-01 RX ADMIN — Medication 32.6 MICROGRAM(S)/KG/MIN: at 20:00

## 2022-12-01 RX ADMIN — Medication 1000 MILLIGRAM(S): at 11:25

## 2022-12-01 RX ADMIN — Medication 650 MILLIGRAM(S): at 21:25

## 2022-12-01 RX ADMIN — Medication 1000 MILLIGRAM(S): at 06:24

## 2022-12-01 RX ADMIN — Medication 32.6 MICROGRAM(S)/KG/MIN: at 11:06

## 2022-12-01 NOTE — DIETITIAN INITIAL EVALUATION ADULT - PERTINENT MEDS FT
MEDICATIONS  (STANDING):  cefepime   IVPB 2000 milliGRAM(s) IV Intermittent every 12 hours  chlorhexidine 0.12% Liquid 15 milliLiter(s) Oral Mucosa every 12 hours  dexMEDEtomidine Infusion 0.023 MICROgram(s)/kG/Hr (0.5 mL/Hr) IV Continuous <Continuous>  dextrose 5%. 1000 milliLiter(s) (50 mL/Hr) IV Continuous <Continuous>  dextrose 5%. 1000 milliLiter(s) (100 mL/Hr) IV Continuous <Continuous>  dextrose 50% Injectable 25 Gram(s) IV Push once  dextrose 50% Injectable 12.5 Gram(s) IV Push once  dextrose 50% Injectable 25 Gram(s) IV Push once  enoxaparin Injectable 40 milliGRAM(s) SubCutaneous every 24 hours  filgrastim-sndz (ZARXIO) Injectable 480 MICROGram(s) SubCutaneous daily  glucagon  Injectable 1 milliGRAM(s) IntraMuscular once  insulin lispro (ADMELOG) corrective regimen sliding scale   SubCutaneous every 6 hours  midodrine 20 milliGRAM(s) Oral every 8 hours  norepinephrine Infusion 0.2 MICROgram(s)/kG/Min (32.6 mL/Hr) IV Continuous <Continuous>  propofol Infusion 10 MICROgram(s)/kG/Min (5.22 mL/Hr) IV Continuous <Continuous>    MEDICATIONS  (PRN):  dextrose Oral Gel 15 Gram(s) Oral once PRN Blood Glucose LESS THAN 70 milliGRAM(s)/deciliter

## 2022-12-01 NOTE — PROGRESS NOTE ADULT - SUBJECTIVE AND OBJECTIVE BOX
Follow Up:  septic shock, E-coli bacteremia, pneumonia    Interval History/ROS: pt was intubated and transferred to MICU, now neutropenic and still febrile    ROS:    Unobtainable because: intubated        Allergies  Bactrim (Other)  fluconazole (Vomiting; Nausea)  levofloxacin (Vomiting; Nausea)  penicillin (Other)        ANTIMICROBIALS:  cefepime   IVPB 2000 every 12 hours      OTHER MEDS:  chlorhexidine 0.12% Liquid 15 milliLiter(s) Oral Mucosa every 12 hours  dexMEDEtomidine Infusion 0.023 MICROgram(s)/kG/Hr IV Continuous <Continuous>  dextrose 5%. 1000 milliLiter(s) IV Continuous <Continuous>  dextrose 5%. 1000 milliLiter(s) IV Continuous <Continuous>  dextrose 50% Injectable 25 Gram(s) IV Push once  dextrose 50% Injectable 12.5 Gram(s) IV Push once  dextrose 50% Injectable 25 Gram(s) IV Push once  dextrose Oral Gel 15 Gram(s) Oral once PRN  enoxaparin Injectable 40 milliGRAM(s) SubCutaneous every 24 hours  filgrastim-sndz (ZARXIO) Injectable 480 MICROGram(s) SubCutaneous daily  glucagon  Injectable 1 milliGRAM(s) IntraMuscular once  insulin lispro (ADMELOG) corrective regimen sliding scale   SubCutaneous every 6 hours  norepinephrine Infusion 0.2 MICROgram(s)/kG/Min IV Continuous <Continuous>  phenylephrine    Infusion 0.031 MICROgram(s)/kG/Min IV Continuous <Continuous>  propofol Infusion 10 MICROgram(s)/kG/Min IV Continuous <Continuous>      Vital Signs Last 24 Hrs  T(C): 37.9 (01 Dec 2022 12:00), Max: 38.8 (2022 20:30)  T(F): 100.2 (01 Dec 2022 12:00), Max: 101.8 (2022 20:30)  HR: 145 (01 Dec 2022 12:00) (110 - 145)  BP: 70/53 (01 Dec 2022 12:00) (70/53 - 125/104)  BP(mean): 59 (01 Dec 2022 12:00) (50 - 113)  RR: 30 (01 Dec 2022 12:00) (21 - 41)  SpO2: 98% (01 Dec 2022 12:00) (88% - 100%)    Parameters below as of 01 Dec 2022 08:00  Patient On (Oxygen Delivery Method): ventilator,A/C    O2 Concentration (%): 40    Physical Exam:  General:    intubated sedated  Cardio:    tachy regular S1, S2  Respiratory:    ET on vent  abd:     soft,   BS +  :     ortiz  Musculoskeletal:   no joint swelling  vascular: no phlebitis  Skin:    no rash                        9.5    0.92  )-----------( 41       ( 01 Dec 2022 08:30 )             29.3       12-    136  |  102  |  44<H>  ----------------------------<  170<H>  5.6<H>   |  18<L>  |  1.48<H>    Ca    7.5<L>      01 Dec 2022 08:30  Phos  5.2       Mg     2.00         TPro  5.5<L>  /  Alb  2.8<L>  /  TBili  1.3<H>  /  DBili  x   /  AST  174<H>  /  ALT  60<H>  /  AlkPhos  85        Urinalysis Basic - ( 2022 17:00 )    Color: Light Yellow / Appearance: Turbid / S.032 / pH: x  Gluc: x / Ketone: Negative  / Bili: Negative / Urobili: <2 mg/dL   Blood: x / Protein: 30 mg/dL / Nitrite: Negative   Leuk Esterase: Negative / RBC: 1 /HPF / WBC 0 /HPF   Sq Epi: x / Non Sq Epi: 3 /HPF / Bacteria: Negative        MICROBIOLOGY:  Vancomycin Level, Random: 7.8 ug/mL (22 @ 23:05)  v  .CSF CSF  22   No growth  --    No polymorphonuclear leukocytes seen  No organisms seen  by cytocentrifuge      .Blood Blood-Peripheral  22   Growth in anaerobic bottle: Escherichia coli  Growth in aerobic bottle: Gram Negative Rods  ***Blood Panel PCR results on this specimen are available  approximately 3 hours after the Gram stain result.***  Gram stain, PCR, and/or culture results may not always  correspond due to difference in methodologies.  ************************************************************  This PCR assay was performed by multiplex PCR. This  Assay tests for 66 bacterial and resistance gene targets.  Please refer [facility] test directory  at [email or web address] for details.  --  Blood Culture PCR      Clean Catch Clean Catch (Midstream)  22   >=3 organisms. Probable collection contamination.  --  --          Rapid RVP Result: NotDetec ( @ 09:12)  HSV 1/2 PCR: NotDetec ( @ 23:00)        RADIOLOGY:  Images independently visualized and reviewed personally, findings as below  < from: Xray Chest 1 View- PORTABLE-Urgent (Xray Chest 1 View- PORTABLE-Urgent .) (22 @ 21:19) >  IMPRESSION:  Status post intubation and enteric tube placement.  Right upper and lower lobe pneumonia.    < end of copied text >  < from: CT Angio Chest PE Protocol w/ IV Cont (22 @ 10:12) >  IMPRESSION:    No pulmonary embolism.    Posterior right upper lobe and right basilar consolidation and additional   right middle lobe and left basilar patchy opacities; findings compatible   with aspiration or infection.    < end of copied text >

## 2022-12-01 NOTE — PROGRESS NOTE ADULT - ASSESSMENT
Patient is a 63 yo F w/ PMHx of MM (diagnosed ~10 years ago, currently on Promacta treatments since June 2022 - most recently last Wednesday; followed by oncologist, Dr. Lopez at [facility]), HTN, neuropathic R hip pain, recent R corneal tear admitted for AMS, code stroke called no acute infarct or hemorrhage, s/p LP findings not consistent with meningitis, course complicated by hypotension and hypoxia, placed on BiPAP, requiring pressors, admitted to MICU with sepsis likely secondary to pneumonia.     PLAN  Neuro  - s/p code stroke --> no ischemia or hemorrhage on CT H+N  - s/p LP results not consistent with encephalitis/meningitis   - AOx2, does not know year  - baseline is AOx3  - Neurology following    Cardiovascular  #Shock  - suspect sepsis with pulmonary source  - s/p 4L IVF in ED  - started on levophed gtt, goal MAP > 65  - obtain ECHO   - TSH wnl  - trend lactate q8h    Pulmonary  #AHRF  - placed on BiPAP, 10/5 on 50%  - suspect secondary to infection with procalcitonin of 8.73  - continue on cefepime for now  - POCUS with B-lines, R > L, hold off on more fluids  - f/u MRSA/MSSA     GI  - keep NPO for now    Renal  - SCr 1.38 up from 1.01  - CPK elevated up to 1800  - hold off on fluid resuscitation for now give POCUS findings above  - continue to trend BMP and CPK    ID  #GNR bacteremia  - s/p vanc/CTX/ampicillin/acyclovir  - continue with cefepime 2g q8h  - f/u speciation and sensitivities  - consider ID consult    Endo  - no history of diabetes, A1C 6.0  - TSH wnl    Heme  - new thrombocytopenia, anemia, and +hemolysis labs (although bili is wnl)  - concern for MAHA  - check Direct Yisel  - can check smear for schistocytes  - monitor CBC q8h    hold off on DVT ppx for now    Ethics  - updated son, he is pediatric resident and patient's HCP  - currently full code   Patient is a 61 yo F w/ PMHx of MM (diagnosed ~10 years ago, currently on Promacta treatments since June 2022 - most recently last Wednesday; followed by oncologist, Dr. Lopez at [facility]), HTN, neuropathic R hip pain, recent R corneal tear admitted for AMS, code stroke called no acute infarct or hemorrhage, s/p LP findings not consistent with meningitis, course complicated by hypotension and hypoxia, placed on BiPAP, requiring pressors, admitted to MICU with sepsis likely secondary to pneumonia.     PLAN  Neuro  - s/p code stroke --> no ischemia or hemorrhage on CT H+N  - s/p LP results not consistent with encephalitis/meningitis   - baseline is AOx3  - Neurology following  - currently sedated on propofol, transitioned to precedex only    Cardiovascular  #Shock  - suspect sepsis from E. Coli bacteremia with pulmonary or urinary source?  - s/p 4L IVF in ED  - started on levophed gtt, goal MAP > 65, wean off and transition to phenylephrine due to persistent tachycardia  - ECHO wnl  - TSH wnl  - trend lactate q8h    Pulmonary  #AHRF  - placed on BiPAP, 10/5 on 50%, then intubated 11/30 for increased work of breathing  - suspect secondary to infection with procalcitonin of 8.73  - continue on cefepime for now for E. Coli bacteremia   - POCUS with B-lines, R > L, hold off on more fluids  - f/u MRSA/MSSA     GI  - keep NPO for now,     Renal  - SCr 1.5 up from 1.01  - CPK elevated up to 1800  - hold off on fluid resuscitation for now give POCUS findings above  - continue to trend BMP and CPK  - suspect secondary to sepsis    ID  #E. Coli bacteremia  - s/p vanc/CTX/ampicillin/acyclovir  - continue with cefepime 2g q8h  - f/u sensitivities  - consider ID consult    Endo  - no history of diabetes, A1C 6.0  - TSH wnl    Heme  #MM  - holding anti-myeloma therapy  - started filgrastim per heme/onc    - new thrombocytopenia, anemia, and +hemolysis labs (bili uptrending)  - direct dede negative  - concern for MAHA, possible HUS with E. Coli bacteremia?  - some schistocytes on peripheral smear, confirmed with hematology/oncology, however higher suspicion for lab abnormalities secondary to sepsis  - monitor CBC q8h and hemolysis labs daily    hold off on DVT ppx for now    Ethics  - updated son, he is pediatric resident and patient's HCP  - currently full code

## 2022-12-01 NOTE — DIETITIAN INITIAL EVALUATION ADULT - PERTINENT LABORATORY DATA
12-01    136  |  102  |  44<H>  ----------------------------<  170<H>  5.6<H>   |  18<L>  |  1.48<H>    Ca    7.5<L>      01 Dec 2022 08:30  Phos  5.2     12-01  Mg     2.00     12-01    TPro  5.5<L>  /  Alb  2.8<L>  /  TBili  1.3<H>  /  DBili  x   /  AST  174<H>  /  ALT  60<H>  /  AlkPhos  85  12-01  POCT Blood Glucose.: 120 mg/dL (12-01-22 @ 11:36)  A1C with Estimated Average Glucose Result: 6.0 % (11-29-22 @ 17:41)

## 2022-12-01 NOTE — PROGRESS NOTE ADULT - SUBJECTIVE AND OBJECTIVE BOX
INTERVAL HPI/OVERNIGHT EVENTS: Intubated overnight.    SUBJECTIVE: Patient seen and examined at bedside.       VITAL SIGNS:  ICU Vital Signs Last 24 Hrs  T(C): 37.8 (01 Dec 2022 04:00), Max: 38.8 (2022 20:30)  T(F): 100.1 (01 Dec 2022 04:00), Max: 101.8 (2022 20:30)  HR: 117 (01 Dec 2022 07:30) (94 - 134)  BP: 105/71 (01 Dec 2022 07:30) (67/39 - 132/90)  BP(mean): 83 (01 Dec 2022 07:30) (49 - 113)  ABP: --  ABP(mean): --  RR: 26 (01 Dec 2022 07:30) (21 - 41)  SpO2: 98% (01 Dec 2022 07:30) (82% - 100%)    O2 Parameters below as of 01 Dec 2022 07:00  Patient On (Oxygen Delivery Method): Volume AC 26/450/10    O2 Concentration (%): 40      Mode: AC/ CMV (Assist Control/ Continuous Mandatory Ventilation), RR (machine): 26, TV (machine): 450, FiO2: 100, PEEP: 10, ITime: 0.75, MAP: 15, PIP: 26  Plateau pressure:   P/F ratio:     11-30 @ 07:01  -  12-01 @ 07:00  --------------------------------------------------------  IN: 1240 mL / OUT: 1480 mL / NET: -240 mL      CAPILLARY BLOOD GLUCOSE      POCT Blood Glucose.: 166 mg/dL (01 Dec 2022 06:35)    ECG:    PHYSICAL EXAM:    General:   HEENT:   Neck:   Respiratory:   Cardiovascular:   Abdomen:   Extremities:  Neurological:    MEDICATIONS:  MEDICATIONS  (STANDING):  cefepime   IVPB 2000 milliGRAM(s) IV Intermittent every 12 hours  chlorhexidine 0.12% Liquid 15 milliLiter(s) Oral Mucosa every 12 hours  dextrose 5%. 1000 milliLiter(s) (50 mL/Hr) IV Continuous <Continuous>  dextrose 5%. 1000 milliLiter(s) (100 mL/Hr) IV Continuous <Continuous>  dextrose 50% Injectable 25 Gram(s) IV Push once  dextrose 50% Injectable 12.5 Gram(s) IV Push once  dextrose 50% Injectable 25 Gram(s) IV Push once  enoxaparin Injectable 40 milliGRAM(s) SubCutaneous every 24 hours  glucagon  Injectable 1 milliGRAM(s) IntraMuscular once  insulin lispro (ADMELOG) corrective regimen sliding scale   SubCutaneous every 6 hours  midodrine 20 milliGRAM(s) Oral every 8 hours  norepinephrine Infusion 0.2 MICROgram(s)/kG/Min (32.6 mL/Hr) IV Continuous <Continuous>  propofol Infusion 10 MICROgram(s)/kG/Min (5.22 mL/Hr) IV Continuous <Continuous>    MEDICATIONS  (PRN):  dextrose Oral Gel 15 Gram(s) Oral once PRN Blood Glucose LESS THAN 70 milliGRAM(s)/deciliter      ALLERGIES:  Allergies    Bactrim (Other)  fluconazole (Vomiting; Nausea)  levofloxacin (Vomiting; Nausea)  penicillin (Other)    Intolerances        LABS:                        9.3    0.80  )-----------( 52       ( 2022 15:30 )             30.1     11-30    141  |  103  |  32<H>  ----------------------------<  128<H>  4.7   |  19<L>  |  1.51<H>    Ca    8.4      2022 15:30  Phos  5.8     11-30  Mg     1.90     11-30    TPro  5.8<L>  /  Alb  3.2<L>  /  TBili  0.9  /  DBili  x   /  AST  175<H>  /  ALT  66<H>  /  AlkPhos  91  11-30    PT/INR - ( 2022 16:40 )   PT: 13.2 sec;   INR: 1.14 ratio         PTT - ( 2022 16:40 )  PTT:28.0 sec  Urinalysis Basic - ( 2022 17:00 )    Color: Light Yellow / Appearance: Turbid / S.032 / pH: x  Gluc: x / Ketone: Negative  / Bili: Negative / Urobili: <2 mg/dL   Blood: x / Protein: 30 mg/dL / Nitrite: Negative   Leuk Esterase: Negative / RBC: 1 /HPF / WBC 0 /HPF   Sq Epi: x / Non Sq Epi: 3 /HPF / Bacteria: Negative        RADIOLOGY & ADDITIONAL TESTS: Reviewed.         INTERVAL HPI/OVERNIGHT EVENTS: Intubated overnight for increased work of breathing, concern for airway compromise.     SUBJECTIVE: Patient seen and examined at bedside. Intubated and sedated on propofol. On levophed for pressors. Net negative 1L UOP. OG tube in place.       VITAL SIGNS:  ICU Vital Signs Last 24 Hrs  T(C): 37.8 (01 Dec 2022 04:00), Max: 38.8 (2022 20:30)  T(F): 100.1 (01 Dec 2022 04:00), Max: 101.8 (2022 20:30)  HR: 117 (01 Dec 2022 07:30) (94 - 134)  BP: 105/71 (01 Dec 2022 07:30) (67/39 - 132/90)  BP(mean): 83 (01 Dec 2022 07:30) (49 - 113)  ABP: --  ABP(mean): --  RR: 26 (01 Dec 2022 07:30) (21 - 41)  SpO2: 98% (01 Dec 2022 07:30) (82% - 100%)    O2 Parameters below as of 01 Dec 2022 07:00  Patient On (Oxygen Delivery Method): Volume AC 26/450/10    O2 Concentration (%): 40      Mode: AC/ CMV (Assist Control/ Continuous Mandatory Ventilation), RR (machine): 26, TV (machine): 450, FiO2: 100, PEEP: 10, ITime: 0.75, MAP: 15, PIP: 26  Plateau pressure:   P/F ratio:     11-30 @ 07:01  -  12-01 @ 07:00  --------------------------------------------------------  IN: 1240 mL / OUT: 1480 mL / NET: -240 mL      CAPILLARY BLOOD GLUCOSE      POCT Blood Glucose.: 166 mg/dL (01 Dec 2022 06:35)    ECG:    PHYSICAL EXAM:  GENERAL: intubated, sedated  HEAD: Atraumatic, Normocephalic  EYES: EOMI, PERRLA, conjunctiva and sclera clear  ENT: Moist mucous membranes  NECK: Supple, No JVD  CHEST/LUNG: Clear to auscultation bilaterally; No rales, rhonchi, wheezing, or rubs. Unlabored respirations  HEART: Tachycardiac  ABDOMEN: Bowel sounds present; Soft, Nontender, Nondistended.   EXTREMITIES: 2+ Peripheral Pulses, brisk capillary refill. No clubbing, cyanosis, or edema  NERVOUS SYSTEM: sedated  MSK: FROM all 4 extremities, full and equal strength  SKIN: No rashes or lesions    MEDICATIONS:  MEDICATIONS  (STANDING):  cefepime   IVPB 2000 milliGRAM(s) IV Intermittent every 12 hours  chlorhexidine 0.12% Liquid 15 milliLiter(s) Oral Mucosa every 12 hours  dextrose 5%. 1000 milliLiter(s) (50 mL/Hr) IV Continuous <Continuous>  dextrose 5%. 1000 milliLiter(s) (100 mL/Hr) IV Continuous <Continuous>  dextrose 50% Injectable 25 Gram(s) IV Push once  dextrose 50% Injectable 12.5 Gram(s) IV Push once  dextrose 50% Injectable 25 Gram(s) IV Push once  enoxaparin Injectable 40 milliGRAM(s) SubCutaneous every 24 hours  glucagon  Injectable 1 milliGRAM(s) IntraMuscular once  insulin lispro (ADMELOG) corrective regimen sliding scale   SubCutaneous every 6 hours  midodrine 20 milliGRAM(s) Oral every 8 hours  norepinephrine Infusion 0.2 MICROgram(s)/kG/Min (32.6 mL/Hr) IV Continuous <Continuous>  propofol Infusion 10 MICROgram(s)/kG/Min (5.22 mL/Hr) IV Continuous <Continuous>    MEDICATIONS  (PRN):  dextrose Oral Gel 15 Gram(s) Oral once PRN Blood Glucose LESS THAN 70 milliGRAM(s)/deciliter      ALLERGIES:  Allergies    Bactrim (Other)  fluconazole (Vomiting; Nausea)  levofloxacin (Vomiting; Nausea)  penicillin (Other)    Intolerances        LABS:                        9.3    0.80  )-----------( 52       ( 2022 15:30 )             30.1     11-30    141  |  103  |  32<H>  ----------------------------<  128<H>  4.7   |  19<L>  |  1.51<H>    Ca    8.4      2022 15:30  Phos  5.8     11-30  Mg     1.90     -30    TPro  5.8<L>  /  Alb  3.2<L>  /  TBili  0.9  /  DBili  x   /  AST  175<H>  /  ALT  66<H>  /  AlkPhos  91  11-30    PT/INR - ( 2022 16:40 )   PT: 13.2 sec;   INR: 1.14 ratio         PTT - ( 2022 16:40 )  PTT:28.0 sec  Urinalysis Basic - ( 2022 17:00 )    Color: Light Yellow / Appearance: Turbid / S.032 / pH: x  Gluc: x / Ketone: Negative  / Bili: Negative / Urobili: <2 mg/dL   Blood: x / Protein: 30 mg/dL / Nitrite: Negative   Leuk Esterase: Negative / RBC: 1 /HPF / WBC 0 /HPF   Sq Epi: x / Non Sq Epi: 3 /HPF / Bacteria: Negative        RADIOLOGY & ADDITIONAL TESTS: Reviewed.

## 2022-12-01 NOTE — DIETITIAN INITIAL EVALUATION ADULT - OTHER INFO
63 y/o female  w/ PMHx of MM (diagnosed ~10 years ago, currently on Promacta treatments since June 2022 - most recently last Wednesday; followed by oncologist, Dr. Lopez at [facility]), HTN, neuropathic R hip pain, recent R corneal tear admitted for AMS, code stroke called no acute infarct or hemorrhage, s/p LP findings not consistent with meningitis, course complicated by hypotension and hypoxia, placed on BiPAP, requiring pressors, admitted to MICU with sepsis likely secondary to pneumonia. Pt intubated and sedated on precedex and propofol. Propofol is currently running @ 26.1 mL/hr and will contribute approx 626 non-nutritive lipid calories/day if propofol maintained at this hourly rate. Pt hemodynamically unstable to initiate TF and remains NPO. NGT to LWCS with 100 mL output over the past 24 hrs. Per RN, pt with a dark, red BM earlier today - will monitor. Renal electrolites elevated along with BUN/Crea. If TF is initiated, would suggest Nepro with Carb Steady @ goal rate of 25 mL/hr x 24 hrs which will offer pt 1080 kcals, 49 gms protein, 436 mL free H2O in 600 mL total volume. To meet pt's estimated protein need, suggest adding No Carb Prosource x 4 (60 gms protein) which will provide a daily protein amount of 105 gms. Enteral recommendation will give pt 19 kcal/kg (TF+Propofol) and 1.2 gms protein/kg (TF+Prosource) of dosing weight. FS 89 - 166 with Admelog ISS coverage. RDN services to remain available as needed.

## 2022-12-01 NOTE — PROGRESS NOTE ADULT - ASSESSMENT
62 f with HTN, MM (diagnosed ~10 years ago, currently on Promacta treatments since June 2022m brought in for AMS, lethargy, ?slurred speech here febrile to 105.5, hypotensive, tachycardic, tachypneic and hypoxic   WBC: 1.8, ANC: 970  lactate: 6  s/p code stroke but CT negative, s/p LP WBC: 1, neutrophil: 0, glucose: 93, protein 33, negative PCR and gram stain  blood cx: E-coli  chest CT: Posterior right upper lobe and right basilar consolidation and additional right middle lobe and left basilar patchy opacities; findings compatible with aspiration or infection.    fever, hypotension, septic shock with elevated lactate and worsening renal function, hypoxic resp failure due to E-coli bacteremia, RUL, RML and basilar pneumonia  MM with leukopenia and now also neutropenic  AMS due to septic encephalopathy, LP negative no meningoencephalitis    * f/u the E-coli sensitivities  * repeat blood cx  * sputum cx  * c/w cefepime as pt is neutropenic  * monitor CBC/diff and renal function    The above assessment and plan was discussed with the primary team    Michaela Lovell MD  contact on teams  After 5pm and on weekends call [phone].

## 2022-12-01 NOTE — CONSULT NOTE ADULT - SUBJECTIVE AND OBJECTIVE BOX
HPI  63 yo woman on multiple immune suppressive medications for ITP and myeloma presented with slurred speech fatigue to ED code stroke canceled as sx improved then became impressively febrile to 105F started biPaP, broad spectrum antibiotics. Eventually deteriorated required intubation.  Myeloma dx 2009 rev/dx initially, then d/c. 2011 back pain igA spike 4900 high k/l ratio. Rt to l spne, SI jts. restarted Rev/Dex with improvement M-spike   jan 2015 progessed rx with bortezomib with response briefly. July 2015 worse M-spike rx with carfilzomib, pom/Dex then DCEP chemo, followed by PBSCT.  then maintenance revlimid.  2017 reactivation hep B. 2018 new skeletal lesions started brown/velcade/dex developed neutr fever and superficial phlebitis tx with abx and apixaban a/c.  2020 had another PBSCT then haplo allo stem cell transplant.  2020 maintenance pomalidomide.   nov 2021 new lesions in vert bodies and scattered lesions c/w extraosseal myeloma.  dec 2021 started belantamab has had marked thrombocytopenia requiring plt transfusions. BM may 2022 no myeloma  june 2022 started Promacta.  multiple adm for neutropenia and thrombocytopenia  sept 2022 incr light chains restarted belantamab.  pmh sh fh unchanged comp ros unable pt intubated  physical  intubated  vs  pulse 117 bp 105/71 resp 26   lungs clear   cor s1s2  abd soft nontender  ext no edema  skin warm, dry    data  wbc 0.8 hgb 9.3 hct 30 plt 61271 anc 0.39 cr 1.5 ast 175 alt 66

## 2022-12-01 NOTE — DIETITIAN INITIAL EVALUATION ADULT - NSFNSGIIOFT_GEN_A_CORE
11-30-22 @ 07:01  -  12-01-22 @ 07:00  --------------------------------------------------------  OUT:    Nasogastric/Oral tube (mL): 100 mL  Total OUT: 100 mL    Total NET: -100 mL

## 2022-12-01 NOTE — CONSULT NOTE ADULT - ASSESSMENT
multiple myeloma, s/p 2 transplants lately on belantamab with neutropenia  likely sepsis intubated     hold antimyeloma therapy for now  care per ICU for sepsis, hypotension  would start filgrastim 480 mcg sq daily until anc nl.

## 2022-12-02 LAB
ALBUMIN SERPL ELPH-MCNC: 2.5 G/DL — LOW (ref 3.3–5)
ALP SERPL-CCNC: 95 U/L — SIGNIFICANT CHANGE UP (ref 40–120)
ALT FLD-CCNC: 58 U/L — HIGH (ref 4–33)
ANION GAP SERPL CALC-SCNC: 14 MMOL/L — SIGNIFICANT CHANGE UP (ref 7–14)
ANISOCYTOSIS BLD QL: SIGNIFICANT CHANGE UP
APPEARANCE UR: ABNORMAL
AST SERPL-CCNC: 131 U/L — HIGH (ref 4–32)
BASOPHILS # BLD AUTO: 0.01 K/UL — SIGNIFICANT CHANGE UP (ref 0–0.2)
BASOPHILS NFR BLD AUTO: 0.9 % — SIGNIFICANT CHANGE UP (ref 0–2)
BILIRUB SERPL-MCNC: 1.4 MG/DL — HIGH (ref 0.2–1.2)
BILIRUB UR-MCNC: NEGATIVE — SIGNIFICANT CHANGE UP
BUN SERPL-MCNC: 44 MG/DL — HIGH (ref 7–23)
BURR CELLS BLD QL SMEAR: PRESENT — SIGNIFICANT CHANGE UP
CALCIUM SERPL-MCNC: 7.8 MG/DL — LOW (ref 8.4–10.5)
CHLORIDE SERPL-SCNC: 109 MMOL/L — HIGH (ref 98–107)
CK SERPL-CCNC: 2070 U/L — HIGH (ref 25–170)
CO2 SERPL-SCNC: 18 MMOL/L — LOW (ref 22–31)
COLOR SPEC: YELLOW — SIGNIFICANT CHANGE UP
CREAT SERPL-MCNC: 1.3 MG/DL — SIGNIFICANT CHANGE UP (ref 0.5–1.3)
CULTURE RESULTS: NO GROWTH — SIGNIFICANT CHANGE UP
DIFF PNL FLD: ABNORMAL
EBV PCR: SIGNIFICANT CHANGE UP IU/ML
EGFR: 46 ML/MIN/1.73M2 — LOW
ELLIPTOCYTES BLD QL SMEAR: SLIGHT — SIGNIFICANT CHANGE UP
EOSINOPHIL # BLD AUTO: 0 K/UL — SIGNIFICANT CHANGE UP (ref 0–0.5)
EOSINOPHIL NFR BLD AUTO: 0 % — SIGNIFICANT CHANGE UP (ref 0–6)
FERRITIN SERPL-MCNC: HIGH NG/ML (ref 15–150)
GAS PNL BLDA: SIGNIFICANT CHANGE UP
GIANT PLATELETS BLD QL SMEAR: PRESENT — SIGNIFICANT CHANGE UP
GLUCOSE BLDC GLUCOMTR-MCNC: 119 MG/DL — HIGH (ref 70–99)
GLUCOSE BLDC GLUCOMTR-MCNC: 168 MG/DL — HIGH (ref 70–99)
GLUCOSE BLDC GLUCOMTR-MCNC: 234 MG/DL — HIGH (ref 70–99)
GLUCOSE BLDC GLUCOMTR-MCNC: 87 MG/DL — SIGNIFICANT CHANGE UP (ref 70–99)
GLUCOSE SERPL-MCNC: 114 MG/DL — HIGH (ref 70–99)
GLUCOSE UR QL: NEGATIVE — SIGNIFICANT CHANGE UP
GRAM STN FLD: SIGNIFICANT CHANGE UP
HCT VFR BLD CALC: 27.6 % — LOW (ref 34.5–45)
HGB BLD-MCNC: 8.9 G/DL — LOW (ref 11.5–15.5)
IANC: 0.72 K/UL — LOW (ref 1.8–7.4)
KETONES UR-MCNC: ABNORMAL
LACTATE SERPL-SCNC: 3 MMOL/L — HIGH (ref 0.5–2)
LEUKOCYTE ESTERASE UR-ACNC: NEGATIVE — SIGNIFICANT CHANGE UP
LYMPHOCYTES # BLD AUTO: 0.38 K/UL — LOW (ref 1–3.3)
LYMPHOCYTES # BLD AUTO: 30.3 % — SIGNIFICANT CHANGE UP (ref 13–44)
MACROCYTES BLD QL: SIGNIFICANT CHANGE UP
MAGNESIUM SERPL-MCNC: 2.2 MG/DL — SIGNIFICANT CHANGE UP (ref 1.6–2.6)
MANUAL SMEAR VERIFICATION: SIGNIFICANT CHANGE UP
MCHC RBC-ENTMCNC: 32.2 GM/DL — SIGNIFICANT CHANGE UP (ref 32–36)
MCHC RBC-ENTMCNC: 34.5 PG — HIGH (ref 27–34)
MCV RBC AUTO: 107 FL — HIGH (ref 80–100)
METAMYELOCYTES # FLD: 0.9 % — SIGNIFICANT CHANGE UP (ref 0–1)
METHYLMALONATE SERPL-SCNC: 142 NMOL/L — SIGNIFICANT CHANGE UP (ref 0–378)
MONOCYTES # BLD AUTO: 0.14 K/UL — SIGNIFICANT CHANGE UP (ref 0–0.9)
MONOCYTES NFR BLD AUTO: 11.6 % — SIGNIFICANT CHANGE UP (ref 2–14)
MRSA PCR RESULT.: SIGNIFICANT CHANGE UP
NEUTROPHILS # BLD AUTO: 0.68 K/UL — LOW (ref 1.8–7.4)
NEUTROPHILS NFR BLD AUTO: 51.8 % — SIGNIFICANT CHANGE UP (ref 43–77)
NEUTS BAND # BLD: 2.7 % — SIGNIFICANT CHANGE UP (ref 0–6)
NITRITE UR-MCNC: NEGATIVE — SIGNIFICANT CHANGE UP
NRBC # BLD: 8 /100 — HIGH (ref 0–0)
OVALOCYTES BLD QL SMEAR: SLIGHT — SIGNIFICANT CHANGE UP
PH UR: 6 — SIGNIFICANT CHANGE UP (ref 5–8)
PHOSPHATE SERPL-MCNC: 4.6 MG/DL — HIGH (ref 2.5–4.5)
PLAT MORPH BLD: ABNORMAL
PLATELET # BLD AUTO: 32 K/UL — LOW (ref 150–400)
PLATELET COUNT - ESTIMATE: ABNORMAL
POIKILOCYTOSIS BLD QL AUTO: SIGNIFICANT CHANGE UP
POLYCHROMASIA BLD QL SMEAR: SLIGHT — SIGNIFICANT CHANGE UP
POTASSIUM SERPL-MCNC: 3.6 MMOL/L — SIGNIFICANT CHANGE UP (ref 3.5–5.3)
POTASSIUM SERPL-SCNC: 3.6 MMOL/L — SIGNIFICANT CHANGE UP (ref 3.5–5.3)
PROT SERPL-MCNC: 5.4 G/DL — LOW (ref 6–8.3)
PROT UR-MCNC: ABNORMAL
RBC # BLD: 2.58 M/UL — LOW (ref 3.8–5.2)
RBC # FLD: 15.1 % — HIGH (ref 10.3–14.5)
RBC BLD AUTO: ABNORMAL
S AUREUS DNA NOSE QL NAA+PROBE: SIGNIFICANT CHANGE UP
SCHISTOCYTES BLD QL AUTO: SLIGHT — SIGNIFICANT CHANGE UP
SMUDGE CELLS # BLD: PRESENT — SIGNIFICANT CHANGE UP
SODIUM SERPL-SCNC: 141 MMOL/L — SIGNIFICANT CHANGE UP (ref 135–145)
SP GR SPEC: 1.03 — SIGNIFICANT CHANGE UP (ref 1.01–1.05)
SPECIMEN SOURCE: SIGNIFICANT CHANGE UP
SPECIMEN SOURCE: SIGNIFICANT CHANGE UP
UROBILINOGEN FLD QL: SIGNIFICANT CHANGE UP
VARIANT LYMPHS # BLD: 1.8 % — SIGNIFICANT CHANGE UP (ref 0–6)
VDRL CSF-TITR: SIGNIFICANT CHANGE UP
WBC # BLD: 1.24 K/UL — LOW (ref 3.8–10.5)
WBC # FLD AUTO: 1.24 K/UL — LOW (ref 3.8–10.5)
WNV IGG CSF IA-ACNC: NEGATIVE — SIGNIFICANT CHANGE UP
WNV IGM CSF IA-ACNC: NEGATIVE — SIGNIFICANT CHANGE UP

## 2022-12-02 PROCEDURE — 99232 SBSQ HOSP IP/OBS MODERATE 35: CPT | Mod: GC

## 2022-12-02 PROCEDURE — 99291 CRITICAL CARE FIRST HOUR: CPT | Mod: FS

## 2022-12-02 RX ORDER — ACETAMINOPHEN 500 MG
1000 TABLET ORAL ONCE
Refills: 0 | Status: COMPLETED | OUTPATIENT
Start: 2022-12-02 | End: 2022-12-02

## 2022-12-02 RX ORDER — AZITHROMYCIN 500 MG/1
TABLET, FILM COATED ORAL
Refills: 0 | Status: DISCONTINUED | OUTPATIENT
Start: 2022-12-02 | End: 2022-12-03

## 2022-12-02 RX ORDER — AZITHROMYCIN 500 MG/1
500 TABLET, FILM COATED ORAL EVERY 24 HOURS
Refills: 0 | Status: DISCONTINUED | OUTPATIENT
Start: 2022-12-03 | End: 2022-12-03

## 2022-12-02 RX ORDER — POLYETHYLENE GLYCOL 3350 17 G/17G
17 POWDER, FOR SOLUTION ORAL DAILY
Refills: 0 | Status: DISCONTINUED | OUTPATIENT
Start: 2022-12-02 | End: 2022-12-11

## 2022-12-02 RX ORDER — SENNA PLUS 8.6 MG/1
10 TABLET ORAL DAILY
Refills: 0 | Status: DISCONTINUED | OUTPATIENT
Start: 2022-12-02 | End: 2022-12-11

## 2022-12-02 RX ORDER — AZITHROMYCIN 500 MG/1
500 TABLET, FILM COATED ORAL ONCE
Refills: 0 | Status: COMPLETED | OUTPATIENT
Start: 2022-12-02 | End: 2022-12-02

## 2022-12-02 RX ADMIN — Medication 400 MILLIGRAM(S): at 13:58

## 2022-12-02 RX ADMIN — CHLORHEXIDINE GLUCONATE 15 MILLILITER(S): 213 SOLUTION TOPICAL at 06:31

## 2022-12-02 RX ADMIN — Medication 1 DROP(S): at 17:08

## 2022-12-02 RX ADMIN — SENNA PLUS 10 MILLILITER(S): 8.6 TABLET ORAL at 17:07

## 2022-12-02 RX ADMIN — Medication 1000 MILLIGRAM(S): at 21:57

## 2022-12-02 RX ADMIN — PHENYLEPHRINE HYDROCHLORIDE 1 MICROGRAM(S)/KG/MIN: 10 INJECTION INTRAVENOUS at 19:41

## 2022-12-02 RX ADMIN — Medication 2: at 23:58

## 2022-12-02 RX ADMIN — AZITHROMYCIN 255 MILLIGRAM(S): 500 TABLET, FILM COATED ORAL at 12:27

## 2022-12-02 RX ADMIN — POLYETHYLENE GLYCOL 3350 17 GRAM(S): 17 POWDER, FOR SOLUTION ORAL at 12:27

## 2022-12-02 RX ADMIN — Medication 480 MICROGRAM(S): at 12:55

## 2022-12-02 RX ADMIN — CEFEPIME 100 MILLIGRAM(S): 1 INJECTION, POWDER, FOR SOLUTION INTRAMUSCULAR; INTRAVENOUS at 17:07

## 2022-12-02 RX ADMIN — Medication 400 MILLIGRAM(S): at 04:42

## 2022-12-02 RX ADMIN — Medication 400 MILLIGRAM(S): at 21:27

## 2022-12-02 RX ADMIN — Medication 1: at 17:15

## 2022-12-02 RX ADMIN — CHLORHEXIDINE GLUCONATE 15 MILLILITER(S): 213 SOLUTION TOPICAL at 17:07

## 2022-12-02 RX ADMIN — DEXMEDETOMIDINE HYDROCHLORIDE IN 0.9% SODIUM CHLORIDE 0.5 MICROGRAM(S)/KG/HR: 4 INJECTION INTRAVENOUS at 19:41

## 2022-12-02 RX ADMIN — DEXMEDETOMIDINE HYDROCHLORIDE IN 0.9% SODIUM CHLORIDE 0.5 MICROGRAM(S)/KG/HR: 4 INJECTION INTRAVENOUS at 08:28

## 2022-12-02 RX ADMIN — PHENYLEPHRINE HYDROCHLORIDE 1 MICROGRAM(S)/KG/MIN: 10 INJECTION INTRAVENOUS at 08:28

## 2022-12-02 RX ADMIN — CEFEPIME 100 MILLIGRAM(S): 1 INJECTION, POWDER, FOR SOLUTION INTRAMUSCULAR; INTRAVENOUS at 06:32

## 2022-12-02 NOTE — PROGRESS NOTE ADULT - ASSESSMENT
62 f with HTN, MM (diagnosed ~10 years ago, currently on Promacta treatments since June 2022m brought in for AMS, lethargy, ?slurred speech here febrile to 105.5, hypotensive, tachycardic, tachypneic and hypoxic   WBC: 1.8, ANC: 970  lactate: 6  s/p code stroke but CT negative, s/p LP WBC: 1, neutrophil: 0, glucose: 93, protein 33, negative PCR and gram stain  blood cx: E-coli  chest CT: Posterior right upper lobe and right basilar consolidation and additional right middle lobe and left basilar patchy opacities; findings compatible with aspiration or infection.    fever, hypotension, septic shock with elevated lactate and worsening renal function, hypoxic resp failure due to E-coli bacteremia (pan sensitive), RUL, RML and basilar pneumonia  MM with leukopenia and then neutropenic s/p zarxio now ANC>500  AMS due to septic encephalopathy, LP negative no meningoencephalitis    * f/u the repeat blood cx  * f/u the sputum cx  * c/w cefepime as pt is neutropenic  * monitor CBC/diff and renal function    The above assessment and plan was discussed with ALISON Lovell MD  contact on teams  After 5pm and on weekends call [phone].

## 2022-12-02 NOTE — PROGRESS NOTE ADULT - CRITICAL CARE ATTENDING COMMENT
Agree with above  MM ?remission now AMS and fever. LP no evidence of meningitis  Intubated for airway protection and Type 4 respiratory failure 26/450/30%/+10, decreased PEE P to 5  Tachycardic, transitioned from Levophed to Neosynephrine  E. coli bacteremia, on cefepime  Mild-to-moderate LV dysfunction, B-lines at bases  Renal dysfunction, trend creatinine and urine output, avoid nephrotoxins

## 2022-12-02 NOTE — PROGRESS NOTE ADULT - SUBJECTIVE AND OBJECTIVE BOX
Follow Up:  septic shock, E-coli bacteremia, pneumonia    Interval History/ROS: s/p zarxio and now ANC>500 but pt continues to be febrile    ROS:    Unobtainable because: intubated      Allergies  Bactrim (Other)  fluconazole (Vomiting; Nausea)  levofloxacin (Vomiting; Nausea)  penicillin (Other)        ANTIMICROBIALS:  cefepime   IVPB 2000 every 12 hours      OTHER MEDS:  acetaminophen     Tablet .. 650 milliGRAM(s) Oral every 6 hours PRN  chlorhexidine 0.12% Liquid 15 milliLiter(s) Oral Mucosa every 12 hours  dexMEDEtomidine Infusion 0.023 MICROgram(s)/kG/Hr IV Continuous <Continuous>  dextrose 5%. 1000 milliLiter(s) IV Continuous <Continuous>  dextrose 5%. 1000 milliLiter(s) IV Continuous <Continuous>  dextrose 50% Injectable 25 Gram(s) IV Push once  dextrose 50% Injectable 12.5 Gram(s) IV Push once  dextrose 50% Injectable 25 Gram(s) IV Push once  dextrose Oral Gel 15 Gram(s) Oral once PRN  enoxaparin Injectable 40 milliGRAM(s) SubCutaneous every 24 hours  filgrastim-sndz (ZARXIO) Injectable 480 MICROGram(s) SubCutaneous daily  glucagon  Injectable 1 milliGRAM(s) IntraMuscular once  insulin lispro (ADMELOG) corrective regimen sliding scale   SubCutaneous every 6 hours  phenylephrine    Infusion 0.031 MICROgram(s)/kG/Min IV Continuous <Continuous>      Vital Signs Last 24 Hrs  T(C): 38.8 (02 Dec 2022 08:00), Max: 38.9 (02 Dec 2022 04:00)  T(F): 101.9 (02 Dec 2022 08:00), Max: 102 (02 Dec 2022 04:00)  HR: 84 (02 Dec 2022 10:00) (80 - 145)  BP: 99/66 (02 Dec 2022 09:00) (70/53 - 110/76)  BP(mean): 73 (02 Dec 2022 09:00) (59 - 92)  RR: 17 (02 Dec 2022 10:00) (17 - 30)  SpO2: 100% (02 Dec 2022 10:00) (96% - 100%)    Parameters below as of 02 Dec 2022 10:00  Patient On (Oxygen Delivery Method): ventilator    O2 Concentration (%): 30    Physical Exam:  General:    intubated sedated  Cardio:    tachy regular S1, S2  Respiratory:    ET on vent  abd:     soft,   BS +  :     ortiz  Musculoskeletal:   no joint swelling  vascular: no phlebitis  Skin:    no rash                          8.9    1.24  )-----------( 32       ( 02 Dec 2022 02:56 )             27.6       12-02    141  |  109<H>  |  44<H>  ----------------------------<  114<H>  3.6   |  18<L>  |  1.30    Ca    7.8<L>      02 Dec 2022 02:56  Phos  4.6     12-02  Mg     2.20     12-02    TPro  5.4<L>  /  Alb  2.5<L>  /  TBili  1.4<H>  /  DBili  x   /  AST  131<H>  /  ALT  58<H>  /  AlkPhos  95  12-02          MICROBIOLOGY:  v  ET Tube ET Tube  12-01-22 --  --    Moderate polymorphonuclear leukocytes per low power field  No Squamous epithelial cells per low power field  No organisms seen per oil power field      .CSF CSF  11-29-22   No growth  --    No polymorphonuclear leukocytes seen  No organisms seen  by cytocentrifuge      .Blood Blood-Peripheral  11-29-22   Growth in aerobic and anaerobic bottles: Escherichia coli  ***Blood Panel PCR results on this specimen are available  approximately 3 hours after the Gram stain result.***  Gram stain, PCR, and/or culture results may not always  correspond due to difference in methodologies.  ************************************************************  This PCR assay was performed by multiplex PCR. This  Assay tests for 66 bacterial and resistance gene targets.  Please refer to the [facility] test directory  at [email or web address] for details.  --  Blood Culture PCR  Escherichia coli      Clean Catch Clean Catch (Midstream)  11-29-22   >=3 organisms. Probable collection contamination.  --  --          Rapid RVP Result: [place] (11-30 @ 09:12)  HSV 1/2 PCR: NotDetec (11-29 @ 23:00)        RADIOLOGY:  Images independently visualized and reviewed personally, findings as below  < from: Xray Chest 1 View- PORTABLE-Urgent (Xray Chest 1 View- PORTABLE-Urgent .) (11.30.22 @ 21:19) >    IMPRESSION:  Status post intubation and enteric tube placement.  Right upper and lower lobe pneumonia.    < end of copied text >  < from: CT Angio Chest PE Protocol w/ IV Cont (11.30.22 @ 10:12) >  IMPRESSION:    No pulmonary embolism.    Posterior right upper lobe and right basilar consolidation and additional   right middle lobe and left basilar patchy opacities; findings compatible   with aspiration or infection.      < end of copied text >

## 2022-12-02 NOTE — PROGRESS NOTE ADULT - SUBJECTIVE AND OBJECTIVE BOX
Significant recent/past 24 hr events:    Subjective:    Review of Systems         [ ] A ten-point review of systems was otherwise negative except as noted.  [ ] Due to altered mental status/intubation, subjective information were not able to be obtained from the patient. History was obtained, to the extent possible, from review of the chart and collateral sources of information.      Patient is a 62y old  Female who presents with a chief complaint of slurred speech (02 Dec 2022 11:03)    HPI:  Patient is a 62 yoF w/ pmhx of MM (diagnosed ~10 years ago, currently on Promacta treatments since June 2022 - most recently last Wednesday; followed by oncologist, Dr. Lopez at [facility]), HTN, neuropathic R hip pain, recent R corneal tear, presents to [facility] ED for change in mental status. LKW approximately 14:30. Patient was in normal state of health this morning as per son, Nacho. Patient went to a follow up ophtho appointment for a recently diagnosed corneal abrasion this past Monday. Patient was picked up by a friend around 10:00 for an optho appt with normal mental status, pt was just complaining of being sleepy. On the way back from appointment around 14:30, patient stopped responding to friend appropriately in the car and she appeared more tired & weaker than usual. She started having slurred speech when they arrived home and then son called EMS. In the ED, patient with persisting word finding difficulties and generalized weakness, but leaning to the R side. Patient reports some difficulty remembering what happened today, but reports she feels generally weak and very tired. Code stroke called, but TPA not given because of imporvement in pt's symptoms. Son reports patient's speech is close to baseline now. Patient then denied chest pain, SOB, HA, changes in vision, N/V, imbalance, numbness or tingling. Denies recent infection or sick contacts. Pt states that she had a similar episode a few years ago with fever which she was told was likely from a MM, she improved after getting steroids.     In the ED pt was febrile 105.5, , /84, RR 24 and saturating well on RA. Pt given 2L IVF bolus and 1x vanc/ceftriaxone/acyclovir/ampicillin (30 Nov 2022 02:42)    PAST MEDICAL & SURGICAL HISTORY:  Multiple myeloma      Type 2 diabetes mellitus        FAMILY HISTORY:  No pertinent family history in first degree relatives        Vitals   ICU Vital Signs Last 24 Hrs  T(C): 38.8 (02 Dec 2022 08:00), Max: 38.9 (02 Dec 2022 04:00)  T(F): 101.9 (02 Dec 2022 08:00), Max: 102 (02 Dec 2022 04:00)  HR: 81 (02 Dec 2022 11:06) (80 - 145)  BP: 99/66 (02 Dec 2022 09:00) (70/53 - 110/76)  BP(mean): 73 (02 Dec 2022 09:00) (59 - 92)  ABP: 106/60 (02 Dec 2022 11:00) (91/68 - 124/69)  ABP(mean): 77 (02 Dec 2022 11:00) (72 - 89)  RR: 18 (02 Dec 2022 11:00) (17 - 30)  SpO2: 99% (02 Dec 2022 11:06) (96% - 100%)    O2 Parameters below as of 02 Dec 2022 11:00  Patient On (Oxygen Delivery Method): ventilator    O2 Concentration (%): 30        Physical Exam:   Constitutional: NAD, well-groomed, well-developed  HEENT: PERRLA, EOMI, no drainage or redness  Neck: supple,  No JVD, Trachea midline  Back: Normal spine flexure, No CVA tenderness, No deformity or limitation of movement  Respiratory: Breath Sounds equal & clear bilaterally to auscultation, no accessory muscle use noted  Cardiovascular: Regular rate, regular rhythm, normal S1, S2; no murmurs or rub  Gastrointestinal: Soft, non-tender, non distended, no hepatosplenomegaly, normal bowel sounds  Extremities: [place] x 4, no peripheral edema, no cyanosis, no clubbing   Vascular: Equal and normal pulses: 2+ peripheral pulses throughout  Neurological: A+O x 3; speech clear and intact; no sensory, motor  deficits, normal reflexes  Psychiatric: calm, normal mood, normal affect  Musculoskeletal: No joint swelling or deformity; no limitation of movement  Skin: warm, dry, well perfused, no rashes    VENT SETTINGS   Mode: AC/ CMV (Assist Control/ Continuous Mandatory Ventilation)  RR (machine): 14  TV (machine): 450  FiO2: 30  PEEP: 5  ITime: 0.6  MAP: 9  PIP: 24    ABG - ( 02 Dec 2022 01:40 )  pH, Arterial: 7.36  pH, Blood: x     /  pCO2: 33    /  pO2: 137   / HCO3: 19    / Base Excess: -6.1  /  SaO2: 98.5                I&O's Detail    01 Dec 2022 07:01  -  02 Dec 2022 07:00  --------------------------------------------------------  IN:    Dexmedetomidine: 54.2 mL    Dexmedetomidine: 489 mL    IV PiggyBack: 150 mL    Norepinephrine: 374.4 mL    Phenylephrine: 498.2 mL    Propofol: 62.6 mL  Total IN: 1628.4 mL    OUT:    Indwelling Catheter - Urethral (mL): 1901 mL  Total OUT: 1901 mL    Total NET: -272.6 mL      02 Dec 2022 07:01  -  02 Dec 2022 11:32  --------------------------------------------------------  IN:    Dexmedetomidine: 87.1 mL    Enteral Tube Flush: 30 mL    Glucerna 1.5: 20 mL    Phenylephrine: 84.9 mL  Total IN: 222 mL    OUT:    Indwelling Catheter - Urethral (mL): 160 mL  Total OUT: 160 mL    Total NET: 62 mL          LABS                        8.9    1.24  )-----------( 32       ( 02 Dec 2022 02:56 )             27.6     12-02    141  |  109<H>  |  44<H>  ----------------------------<  114<H>  3.6   |  18<L>  |  1.30    Ca    7.8<L>      02 Dec 2022 02:56  Phos  4.6     12-02  Mg     2.20     12-02    TPro  5.4<L>  /  Alb  2.5<L>  /  TBili  1.4<H>  /  DBili  x   /  AST  131<H>  /  ALT  58<H>  /  AlkPhos  95  12-02    LIVER FUNCTIONS - ( 02 Dec 2022 02:56 )  Alb: 2.5 g/dL / Pro: 5.4 g/dL / ALK PHOS: 95 U/L / ALT: 58 U/L / AST: 131 U/L / GGT: x             CARDIAC MARKERS ( 02 Dec 2022 02:56 )  [id number] U/L / CKMBx     / Troponin Tx     /  CARDIAC MARKERS ( 01 Dec 2022 18:30 )  [id number] U/L / CKMBx     / Troponin Tx     /          POCT Blood Glucose.: 87 mg/dL (12-02-22 @ 06:30)  POCT Blood Glucose.: 108 mg/dL *H* (12-01-22 @ 23:30)  POCT Blood Glucose.: 87 mg/dL (12-01-22 @ 17:24)  POCT Blood Glucose.: 120 mg/dL *H* (12-01-22 @ 11:36)        MEDICATIONS  (STANDING):  cefepime   IVPB 2000 milliGRAM(s) IV Intermittent every 12 hours  chlorhexidine 0.12% Liquid 15 milliLiter(s) Oral Mucosa every 12 hours  dexMEDEtomidine Infusion 0.023 MICROgram(s)/kG/Hr (0.5 mL/Hr) IV Continuous <Continuous>  dextrose 5%. 1000 milliLiter(s) (50 mL/Hr) IV Continuous <Continuous>  dextrose 5%. 1000 milliLiter(s) (100 mL/Hr) IV Continuous <Continuous>  dextrose 50% Injectable 25 Gram(s) IV Push once  dextrose 50% Injectable 12.5 Gram(s) IV Push once  dextrose 50% Injectable 25 Gram(s) IV Push once  enoxaparin Injectable 40 milliGRAM(s) SubCutaneous every 24 hours  filgrastim-sndz (ZARXIO) Injectable 480 MICROGram(s) SubCutaneous daily  glucagon  Injectable 1 milliGRAM(s) IntraMuscular once  insulin lispro (ADMELOG) corrective regimen sliding scale   SubCutaneous every 6 hours  phenylephrine    Infusion 0.031 MICROgram(s)/kG/Min (1 mL/Hr) IV Continuous <Continuous>    MEDICATIONS  (PRN):  acetaminophen     Tablet .. 650 milliGRAM(s) Oral every 6 hours PRN Temp greater or equal to 38C (100.4F)  dextrose Oral Gel 15 Gram(s) Oral once PRN Blood Glucose LESS THAN 70 milliGRAM(s)/deciliter      Allergies:  Bactrim (Other)  fluconazole (Vomiting; Nausea)  levofloxacin (Vomiting; Nausea)  penicillin (Other)        CRITICAL CARE TIME SPENT:  minutes of critical care time spent providing medical care for patient's acute illness/conditions that impairs at least one vital organ system and/or poses a high risk of imminent or life threatening deterioration in the patient's condition. It includes time spent evaluating and treating the patient's acute illness as well as time spent reviewing labs, radiology, discussing goals of care with patient and/or patient's family, and discussing the case with a multidisciplinary team, in an effort to prevent further life threatening deterioration or end organ damage. This time is independent of any procedures performed.         Significant recent/past 24 hr events:    Subjective:    Review of Systems         [ ] A ten-point review of systems was otherwise negative except as noted.  [x ] Due to altered mental status/intubation, subjective information were not able to be obtained from the patient. History was obtained, to the extent possible, from review of the chart and collateral sources of information.      Patient is a 62y old  Female who presents with a chief complaint of slurred speech (02 Dec 2022 11:03)    HPI:  Patient is a 62 yoF w/ pmhx of MM (diagnosed ~10 years ago, currently on Promacta treatments since June 2022 - most recently last Wednesday; followed by oncologist, Dr. Lopez at [facility]), HTN, neuropathic R hip pain, recent R corneal tear, presents to [facility] ED for change in mental status. LKW approximately 14:30. Patient was in normal state of health this morning as per son, Nacho. Patient went to a follow up ophtho appointment for a recently diagnosed corneal abrasion this past Monday. Patient was picked up by a friend around 10:00 for an optho appt with normal mental status, pt was just complaining of being sleepy. On the way back from appointment around 14:30, patient stopped responding to friend appropriately in the car and she appeared more tired & weaker than usual. She started having slurred speech when they arrived home and then son called EMS. In the ED, patient with persisting word finding difficulties and generalized weakness, but leaning to the R side. Patient reports some difficulty remembering what happened today, but reports she feels generally weak and very tired. Code stroke called, but TPA not given because of imporvement in pt's symptoms. Son reports patient's speech is close to baseline now. Patient then denied chest pain, SOB, HA, changes in vision, N/V, imbalance, numbness or tingling. Denies recent infection or sick contacts. Pt states that she had a similar episode a few years ago with fever which she was told was likely from a MM, she improved after getting steroids.     In the ED pt was febrile 105.5, , /84, RR 24 and saturating well on RA. Pt given 2L IVF bolus and 1x vanc/ceftriaxone/acyclovir/ampicillin (30 Nov 2022 02:42)    PAST MEDICAL & SURGICAL HISTORY:  Multiple myeloma      Type 2 diabetes mellitus        FAMILY HISTORY:  No pertinent family history in first degree relatives        Vitals   ICU Vital Signs Last 24 Hrs  T(C): 38.8 (02 Dec 2022 08:00), Max: 38.9 (02 Dec 2022 04:00)  T(F): 101.9 (02 Dec 2022 08:00), Max: 102 (02 Dec 2022 04:00)  HR: 81 (02 Dec 2022 11:06) (80 - 145)  BP: 99/66 (02 Dec 2022 09:00) (70/53 - 110/76)  BP(mean): 73 (02 Dec 2022 09:00) (59 - 92)  ABP: 106/60 (02 Dec 2022 11:00) (91/68 - 124/69)  ABP(mean): 77 (02 Dec 2022 11:00) (72 - 89)  RR: 18 (02 Dec 2022 11:00) (17 - 30)  SpO2: 99% (02 Dec 2022 11:06) (96% - 100%)    O2 Parameters below as of 02 Dec 2022 11:00  Patient On (Oxygen Delivery Method): ventilator    O2 Concentration (%): 30        Physical Exam:   HEENT: no drainage or redness  Neck: supple,  No JVD, Trachea midline  Gastrointestinal: Soft, non-tender, non distended, normal bowel sounds  Extremities: no peripheral edema, no cyanosis, no clubbing   Neurological: Pt is sedated and intubated   Psychiatric: Pt is sedated and intubated.   Musculoskeletal: No joint swelling or deformity  Skin: warm, dry     VENT SETTINGS   Mode: AC/ CMV (Assist Control/ Continuous Mandatory Ventilation)  RR (machine): 14  TV (machine): 450  FiO2: 30  PEEP: 5  ITime: 0.6  MAP: 9  PIP: 24    ABG - ( 02 Dec 2022 01:40 )  pH, Arterial: 7.36  pH, Blood: x     /  pCO2: 33    /  pO2: 137   / HCO3: 19    / Base Excess: -6.1  /  SaO2: 98.5                I&O's Detail    01 Dec 2022 07:01  -  02 Dec 2022 07:00  --------------------------------------------------------  IN:    Dexmedetomidine: 54.2 mL    Dexmedetomidine: 489 mL    IV PiggyBack: 150 mL    Norepinephrine: 374.4 mL    Phenylephrine: 498.2 mL    Propofol: 62.6 mL  Total IN: 1628.4 mL    OUT:    Indwelling Catheter - Urethral (mL): 1901 mL  Total OUT: 1901 mL    Total NET: -272.6 mL      02 Dec 2022 07:01  -  02 Dec 2022 11:32  --------------------------------------------------------  IN:    Dexmedetomidine: 87.1 mL    Enteral Tube Flush: 30 mL    Glucerna 1.5: 20 mL    Phenylephrine: 84.9 mL  Total IN: 222 mL    OUT:    Indwelling Catheter - Urethral (mL): 160 mL  Total OUT: 160 mL    Total NET: 62 mL          LABS                        8.9    1.24  )-----------( 32       ( 02 Dec 2022 02:56 )             27.6     12-02    141  |  109<H>  |  44<H>  ----------------------------<  114<H>  3.6   |  18<L>  |  1.30    Ca    7.8<L>      02 Dec 2022 02:56  Phos  4.6     12-02  Mg     2.20     12-02    TPro  5.4<L>  /  Alb  2.5<L>  /  TBili  1.4<H>  /  DBili  x   /  AST  131<H>  /  ALT  58<H>  /  AlkPhos  95  12-02    LIVER FUNCTIONS - ( 02 Dec 2022 02:56 )  Alb: 2.5 g/dL / Pro: 5.4 g/dL / ALK PHOS: 95 U/L / ALT: 58 U/L / AST: 131 U/L / GGT: x             CARDIAC MARKERS ( 02 Dec 2022 02:56 )  [id number] U/L / CKMBx     / Troponin Tx     /  CARDIAC MARKERS ( 01 Dec 2022 18:30 )  [id number] U/L / CKMBx     / Troponin Tx     /          POCT Blood Glucose.: 87 mg/dL (12-02-22 @ 06:30)  POCT Blood Glucose.: 108 mg/dL *H* (12-01-22 @ 23:30)  POCT Blood Glucose.: 87 mg/dL (12-01-22 @ 17:24)  POCT Blood Glucose.: 120 mg/dL *H* (12-01-22 @ 11:36)        MEDICATIONS  (STANDING):  cefepime   IVPB 2000 milliGRAM(s) IV Intermittent every 12 hours  chlorhexidine 0.12% Liquid 15 milliLiter(s) Oral Mucosa every 12 hours  dexMEDEtomidine Infusion 0.023 MICROgram(s)/kG/Hr (0.5 mL/Hr) IV Continuous <Continuous>  dextrose 5%. 1000 milliLiter(s) (50 mL/Hr) IV Continuous <Continuous>  dextrose 5%. 1000 milliLiter(s) (100 mL/Hr) IV Continuous <Continuous>  dextrose 50% Injectable 25 Gram(s) IV Push once  dextrose 50% Injectable 12.5 Gram(s) IV Push once  dextrose 50% Injectable 25 Gram(s) IV Push once  enoxaparin Injectable 40 milliGRAM(s) SubCutaneous every 24 hours  filgrastim-sndz (ZARXIO) Injectable 480 MICROGram(s) SubCutaneous daily  glucagon  Injectable 1 milliGRAM(s) IntraMuscular once  insulin lispro (ADMELOG) corrective regimen sliding scale   SubCutaneous every 6 hours  phenylephrine    Infusion 0.031 MICROgram(s)/kG/Min (1 mL/Hr) IV Continuous <Continuous>    MEDICATIONS  (PRN):  acetaminophen     Tablet .. 650 milliGRAM(s) Oral every 6 hours PRN Temp greater or equal to 38C (100.4F)  dextrose Oral Gel 15 Gram(s) Oral once PRN Blood Glucose LESS THAN 70 milliGRAM(s)/deciliter      Allergies:  Bactrim (Other)  fluconazole (Vomiting; Nausea)  levofloxacin (Vomiting; Nausea)  penicillin (Other)           Significant recent/past 24 hr events: Pt off levo and started phenylephrine. Put on precedex. POCUS showed B lines posteriorly with plump IVC. Two BCx and one sputum Cx sent- results pending.     Subjective: Pt is sedated and intubated- subjective information was not obtained.     Review of Systems         [ ] A ten-point review of systems was otherwise negative except as noted.  [x ] Due to altered mental status/intubation, subjective information were not able to be obtained from the patient. History was obtained, to the extent possible, from review of the chart and collateral sources of information.      Patient is a 62y old  Female who presents with a chief complaint of slurred speech (02 Dec 2022 11:03)    HPI:  Patient is a 62 yoF w/ pmhx of MM (diagnosed ~10 years ago, currently on Promacta treatments since June 2022 - most recently last Wednesday; followed by oncologist, Dr. Lopez at [facility]), HTN, neuropathic R hip pain, recent R corneal tear, presents to [facility] ED for change in mental status. LKW approximately 14:30. Patient was in normal state of health this morning as per son, Nacho. Patient went to a follow up ophtho appointment for a recently diagnosed corneal abrasion this past Monday. Patient was picked up by a friend around 10:00 for an optho appt with normal mental status, pt was just complaining of being sleepy. On the way back from appointment around 14:30, patient stopped responding to friend appropriately in the car and she appeared more tired & weaker than usual. She started having slurred speech when they arrived home and then son called EMS. In the ED, patient with persisting word finding difficulties and generalized weakness, but leaning to the R side. Patient reports some difficulty remembering what happened today, but reports she feels generally weak and very tired. Code stroke called, but TPA not given because of imporvement in pt's symptoms. Son reports patient's speech is close to baseline now. Patient then denied chest pain, SOB, HA, changes in vision, N/V, imbalance, numbness or tingling. Denies recent infection or sick contacts. Pt states that she had a similar episode a few years ago with fever which she was told was likely from a MM, she improved after getting steroids.     In the ED pt was febrile 105.5, , /84, RR 24 and saturating well on RA. Pt given 2L IVF bolus and 1x vanc/ceftriaxone/acyclovir/ampicillin (30 Nov 2022 02:42)    PAST MEDICAL & SURGICAL HISTORY:  Multiple myeloma      Type 2 diabetes mellitus        FAMILY HISTORY:  No pertinent family history in first degree relatives        Vitals   ICU Vital Signs Last 24 Hrs  T(C): 38.8 (02 Dec 2022 08:00), Max: 38.9 (02 Dec 2022 04:00)  T(F): 101.9 (02 Dec 2022 08:00), Max: 102 (02 Dec 2022 04:00)  HR: 81 (02 Dec 2022 11:06) (80 - 145)  BP: 99/66 (02 Dec 2022 09:00) (70/53 - 110/76)  BP(mean): 73 (02 Dec 2022 09:00) (59 - 92)  ABP: 106/60 (02 Dec 2022 11:00) (91/68 - 124/69)  ABP(mean): 77 (02 Dec 2022 11:00) (72 - 89)  RR: 18 (02 Dec 2022 11:00) (17 - 30)  SpO2: 99% (02 Dec 2022 11:06) (96% - 100%)    O2 Parameters below as of 02 Dec 2022 11:00  Patient On (Oxygen Delivery Method): ventilator    O2 Concentration (%): 30        Physical Exam:   HEENT: no drainage or redness  Neck: supple,  No JVD, Trachea midline  Gastrointestinal: Soft, non-tender, non distended, normal bowel sounds  Extremities: no peripheral edema, no cyanosis, no clubbing   Neurological: Pt is sedated and intubated   Psychiatric: Pt is sedated and intubated.   Musculoskeletal: No joint swelling or deformity  Skin: warm, dry     VENT SETTINGS   Mode: AC/ CMV (Assist Control/ Continuous Mandatory Ventilation)  RR (machine): 14  TV (machine): 450  FiO2: 30  PEEP: 5  ITime: 0.6  MAP: 9  PIP: 24    ABG - ( 02 Dec 2022 01:40 )  pH, Arterial: 7.36  pH, Blood: x     /  pCO2: 33    /  pO2: 137   / HCO3: 19    / Base Excess: -6.1  /  SaO2: 98.5                I&O's Detail    01 Dec 2022 07:01  -  02 Dec 2022 07:00  --------------------------------------------------------  IN:    Dexmedetomidine: 54.2 mL    Dexmedetomidine: 489 mL    IV PiggyBack: 150 mL    Norepinephrine: 374.4 mL    Phenylephrine: 498.2 mL    Propofol: 62.6 mL  Total IN: 1628.4 mL    OUT:    Indwelling Catheter - Urethral (mL): 1901 mL  Total OUT: 1901 mL    Total NET: -272.6 mL      02 Dec 2022 07:01  -  02 Dec 2022 11:32  --------------------------------------------------------  IN:    Dexmedetomidine: 87.1 mL    Enteral Tube Flush: 30 mL    Glucerna 1.5: 20 mL    Phenylephrine: 84.9 mL  Total IN: 222 mL    OUT:    Indwelling Catheter - Urethral (mL): 160 mL  Total OUT: 160 mL    Total NET: 62 mL          LABS                        8.9    1.24  )-----------( 32       ( 02 Dec 2022 02:56 )             27.6     12-02    141  |  109<H>  |  44<H>  ----------------------------<  114<H>  3.6   |  18<L>  |  1.30    Ca    7.8<L>      02 Dec 2022 02:56  Phos  4.6     12-02  Mg     2.20     12-02    TPro  5.4<L>  /  Alb  2.5<L>  /  TBili  1.4<H>  /  DBili  x   /  AST  131<H>  /  ALT  58<H>  /  AlkPhos  95  12-02    LIVER FUNCTIONS - ( 02 Dec 2022 02:56 )  Alb: 2.5 g/dL / Pro: 5.4 g/dL / ALK PHOS: 95 U/L / ALT: 58 U/L / AST: 131 U/L / GGT: x             CARDIAC MARKERS ( 02 Dec 2022 02:56 )  [id number] U/L / CKMBx     / Troponin Tx     /  CARDIAC MARKERS ( 01 Dec 2022 18:30 )  [id number] U/L / CKMBx     / Troponin Tx     /          POCT Blood Glucose.: 87 mg/dL (12-02-22 @ 06:30)  POCT Blood Glucose.: 108 mg/dL *H* (12-01-22 @ 23:30)  POCT Blood Glucose.: 87 mg/dL (12-01-22 @ 17:24)  POCT Blood Glucose.: 120 mg/dL *H* (12-01-22 @ 11:36)        MEDICATIONS  (STANDING):  cefepime   IVPB 2000 milliGRAM(s) IV Intermittent every 12 hours  chlorhexidine 0.12% Liquid 15 milliLiter(s) Oral Mucosa every 12 hours  dexMEDEtomidine Infusion 0.023 MICROgram(s)/kG/Hr (0.5 mL/Hr) IV Continuous <Continuous>  dextrose 5%. 1000 milliLiter(s) (50 mL/Hr) IV Continuous <Continuous>  dextrose 5%. 1000 milliLiter(s) (100 mL/Hr) IV Continuous <Continuous>  dextrose 50% Injectable 25 Gram(s) IV Push once  dextrose 50% Injectable 12.5 Gram(s) IV Push once  dextrose 50% Injectable 25 Gram(s) IV Push once  enoxaparin Injectable 40 milliGRAM(s) SubCutaneous every 24 hours  filgrastim-sndz (ZARXIO) Injectable 480 MICROGram(s) SubCutaneous daily  glucagon  Injectable 1 milliGRAM(s) IntraMuscular once  insulin lispro (ADMELOG) corrective regimen sliding scale   SubCutaneous every 6 hours  phenylephrine    Infusion 0.031 MICROgram(s)/kG/Min (1 mL/Hr) IV Continuous <Continuous>    MEDICATIONS  (PRN):  acetaminophen     Tablet .. 650 milliGRAM(s) Oral every 6 hours PRN Temp greater or equal to 38C (100.4F)  dextrose Oral Gel 15 Gram(s) Oral once PRN Blood Glucose LESS THAN 70 milliGRAM(s)/deciliter      Allergies:  Bactrim (Other)  fluconazole (Vomiting; Nausea)  levofloxacin (Vomiting; Nausea)  penicillin (Other)           Significant recent/past 24 hr events: Pt off levo and started phenylephrine. Put on precedex. POCUS showed B lines posteriorly with plump IVC. Two BCx and one sputum Cx sent- results pending.     Subjective: Pt is sedated and intubated- subjective information was not obtained.     Review of Systems         [ ] A ten-point review of systems was otherwise negative except as noted.  [x ] Due to altered mental status/intubation, subjective information were not able to be obtained from the patient. History was obtained, to the extent possible, from review of the chart and collateral sources of information.      Patient is a 62y old  Female who presents with a chief complaint of slurred speech (02 Dec 2022 11:03)    HPI:  Patient is a 62 yoF w/ pmhx of MM (diagnosed ~10 years ago, currently on Promacta treatments since June 2022 - most recently last Wednesday; followed by oncologist, Dr. Lopez at [facility]), HTN, neuropathic R hip pain, recent R corneal tear, presents to [facility] ED for change in mental status. LKW approximately 14:30. Patient was in normal state of health this morning as per son, Nacho. Patient went to a follow up ophtho appointment for a recently diagnosed corneal abrasion this past Monday. Patient was picked up by a friend around 10:00 for an optho appt with normal mental status, pt was just complaining of being sleepy. On the way back from appointment around 14:30, patient stopped responding to friend appropriately in the car and she appeared more tired & weaker than usual. She started having slurred speech when they arrived home and then son called EMS. In the ED, patient with persisting word finding difficulties and generalized weakness, but leaning to the R side. Patient reports some difficulty remembering what happened today, but reports she feels generally weak and very tired. Code stroke called, but TPA not given because of imporvement in pt's symptoms. Son reports patient's speech is close to baseline now. Patient then denied chest pain, SOB, HA, changes in vision, N/V, imbalance, numbness or tingling. Denies recent infection or sick contacts. Pt states that she had a similar episode a few years ago with fever which she was told was likely from a MM, she improved after getting steroids.     In the ED pt was febrile 105.5, , /84, RR 24 and saturating well on RA. Pt given 2L IVF bolus and 1x vanc/ceftriaxone/acyclovir/ampicillin (30 Nov 2022 02:42)    PAST MEDICAL & SURGICAL HISTORY:  Multiple myeloma      Type 2 diabetes mellitus        FAMILY HISTORY:  No pertinent family history in first degree relatives        Vitals   ICU Vital Signs Last 24 Hrs  T(C): 38.8 (02 Dec 2022 08:00), Max: 38.9 (02 Dec 2022 04:00)  T(F): 101.9 (02 Dec 2022 08:00), Max: 102 (02 Dec 2022 04:00)  HR: 81 (02 Dec 2022 11:06) (80 - 145)  BP: 99/66 (02 Dec 2022 09:00) (70/53 - 110/76)  BP(mean): 73 (02 Dec 2022 09:00) (59 - 92)  ABP: 106/60 (02 Dec 2022 11:00) (91/68 - 124/69)  ABP(mean): 77 (02 Dec 2022 11:00) (72 - 89)  RR: 18 (02 Dec 2022 11:00) (17 - 30)  SpO2: 99% (02 Dec 2022 11:06) (96% - 100%)    O2 Parameters below as of 02 Dec 2022 11:00  Patient On (Oxygen Delivery Method): ventilator    O2 Concentration (%): 30        Physical Exam:   HEENT: no drainage or redness  Neck: supple,  No JVD, Trachea midline  Gastrointestinal: Soft, non-tender, non distended, normal bowel sounds  Extremities: no peripheral edema, no cyanosis, no clubbing   Neurological: Pt is sedated and intubated   Psychiatric: Pt is sedated and intubated.   Musculoskeletal: No joint swelling or deformity  Skin: warm, dry    VENT SETTINGS   Mode: AC/ CMV (Assist Control/ Continuous Mandatory Ventilation)  RR (machine): 14  TV (machine): 450  FiO2: 30  PEEP: 5  ITime: 0.6  MAP: 9  PIP: 24    ABG - ( 02 Dec 2022 01:40 )  pH, Arterial: 7.36  pH, Blood: x     /  pCO2: 33    /  pO2: 137   / HCO3: 19    / Base Excess: -6.1  /  SaO2: 98.5                I&O's Detail    01 Dec 2022 07:01  -  02 Dec 2022 07:00  --------------------------------------------------------  IN:    Dexmedetomidine: 54.2 mL    Dexmedetomidine: 489 mL    IV PiggyBack: 150 mL    Norepinephrine: 374.4 mL    Phenylephrine: 498.2 mL    Propofol: 62.6 mL  Total IN: 1628.4 mL    OUT:    Indwelling Catheter - Urethral (mL): 1901 mL  Total OUT: 1901 mL    Total NET: -272.6 mL      02 Dec 2022 07:01  -  02 Dec 2022 11:32  --------------------------------------------------------  IN:    Dexmedetomidine: 87.1 mL    Enteral Tube Flush: 30 mL    Glucerna 1.5: 20 mL    Phenylephrine: 84.9 mL  Total IN: 222 mL    OUT:    Indwelling Catheter - Urethral (mL): 160 mL  Total OUT: 160 mL    Total NET: 62 mL          LABS                        8.9    1.24  )-----------( 32       ( 02 Dec 2022 02:56 )             27.6     12-02    141  |  109<H>  |  44<H>  ----------------------------<  114<H>  3.6   |  18<L>  |  1.30    Ca    7.8<L>      02 Dec 2022 02:56  Phos  4.6     12-02  Mg     2.20     12-02    TPro  5.4<L>  /  Alb  2.5<L>  /  TBili  1.4<H>  /  DBili  x   /  AST  131<H>  /  ALT  58<H>  /  AlkPhos  95  12-02    LIVER FUNCTIONS - ( 02 Dec 2022 02:56 )  Alb: 2.5 g/dL / Pro: 5.4 g/dL / ALK PHOS: 95 U/L / ALT: 58 U/L / AST: 131 U/L / GGT: x             CARDIAC MARKERS ( 02 Dec 2022 02:56 )  [id number] U/L / CKMBx     / Troponin Tx     /  CARDIAC MARKERS ( 01 Dec 2022 18:30 )  [id number] U/L / CKMBx     / Troponin Tx     /          POCT Blood Glucose.: 87 mg/dL (12-02-22 @ 06:30)  POCT Blood Glucose.: 108 mg/dL *H* (12-01-22 @ 23:30)  POCT Blood Glucose.: 87 mg/dL (12-01-22 @ 17:24)  POCT Blood Glucose.: 120 mg/dL *H* (12-01-22 @ 11:36)        MEDICATIONS  (STANDING):  cefepime   IVPB 2000 milliGRAM(s) IV Intermittent every 12 hours  chlorhexidine 0.12% Liquid 15 milliLiter(s) Oral Mucosa every 12 hours  dexMEDEtomidine Infusion 0.023 MICROgram(s)/kG/Hr (0.5 mL/Hr) IV Continuous <Continuous>  dextrose 5%. 1000 milliLiter(s) (50 mL/Hr) IV Continuous <Continuous>  dextrose 5%. 1000 milliLiter(s) (100 mL/Hr) IV Continuous <Continuous>  dextrose 50% Injectable 25 Gram(s) IV Push once  dextrose 50% Injectable 12.5 Gram(s) IV Push once  dextrose 50% Injectable 25 Gram(s) IV Push once  enoxaparin Injectable 40 milliGRAM(s) SubCutaneous every 24 hours  filgrastim-sndz (ZARXIO) Injectable 480 MICROGram(s) SubCutaneous daily  glucagon  Injectable 1 milliGRAM(s) IntraMuscular once  insulin lispro (ADMELOG) corrective regimen sliding scale   SubCutaneous every 6 hours  phenylephrine    Infusion 0.031 MICROgram(s)/kG/Min (1 mL/Hr) IV Continuous <Continuous>    MEDICATIONS  (PRN):  acetaminophen     Tablet .. 650 milliGRAM(s) Oral every 6 hours PRN Temp greater or equal to 38C (100.4F)  dextrose Oral Gel 15 Gram(s) Oral once PRN Blood Glucose LESS THAN 70 milliGRAM(s)/deciliter      Allergies:  Bactrim (Other)  fluconazole (Vomiting; Nausea)  levofloxacin (Vomiting; Nausea)  penicillin (Other)           Significant recent/past 24 hr events: Pt off levo and started phenylephrine. Put on precedex. POCUS showed B lines posteriorly with plump IVC. Two BCx and one sputum Cx sent- results pending.     Subjective: Pt is sedated and intubated- subjective information was not obtained.     Review of Systems         [ ] A ten-point review of systems was otherwise negative except as noted.  [x ] Due to altered mental status/intubation, subjective information were not able to be obtained from the patient. History was obtained, to the extent possible, from review of the chart and collateral sources of information.      Patient is a 62y old  Female who presents with a chief complaint of slurred speech (02 Dec 2022 11:03)    HPI:  Patient is a 62 yoF w/ pmhx of MM (diagnosed ~10 years ago, currently on Promacta treatments since June 2022 - most recently last Wednesday; followed by oncologist, Dr. Lopez at [facility]), HTN, neuropathic R hip pain, recent R corneal tear, presents to [facility] ED for change in mental status. LKW approximately 14:30. Patient was in normal state of health this morning as per son, Nacho. Patient went to a follow up ophtho appointment for a recently diagnosed corneal abrasion this past Monday. Patient was picked up by a friend around 10:00 for an optho appt with normal mental status, pt was just complaining of being sleepy. On the way back from appointment around 14:30, patient stopped responding to friend appropriately in the car and she appeared more tired & weaker than usual. She started having slurred speech when they arrived home and then son called EMS. In the ED, patient with persisting word finding difficulties and generalized weakness, but leaning to the R side. Patient reports some difficulty remembering what happened today, but reports she feels generally weak and very tired. Code stroke called, but TPA not given because of imporvement in pt's symptoms. Son reports patient's speech is close to baseline now. Patient then denied chest pain, SOB, HA, changes in vision, N/V, imbalance, numbness or tingling. Denies recent infection or sick contacts. Pt states that she had a similar episode a few years ago with fever which she was told was likely from a MM, she improved after getting steroids.     In the ED pt was febrile 105.5, , /84, RR 24 and saturating well on RA. Pt given 2L IVF bolus and 1x vanc/ceftriaxone/acyclovir/ampicillin s/p LP in ER negative results.  Patient was RR later that day for hypotension/ hypoxia requiring intubation and pressor support, brought to MICU in shock.     PAST MEDICAL & SURGICAL HISTORY:  Multiple myeloma      Type 2 diabetes mellitus        FAMILY HISTORY:  No pertinent family history in first degree relatives        Vitals   ICU Vital Signs Last 24 Hrs  T(C): 38.8 (02 Dec 2022 08:00), Max: 38.9 (02 Dec 2022 04:00)  T(F): 101.9 (02 Dec 2022 08:00), Max: 102 (02 Dec 2022 04:00)  HR: 81 (02 Dec 2022 11:06) (80 - 145)  BP: 99/66 (02 Dec 2022 09:00) (70/53 - 110/76)  BP(mean): 73 (02 Dec 2022 09:00) (59 - 92)  ABP: 106/60 (02 Dec 2022 11:00) (91/68 - 124/69)  ABP(mean): 77 (02 Dec 2022 11:00) (72 - 89)  RR: 18 (02 Dec 2022 11:00) (17 - 30)  SpO2: 99% (02 Dec 2022 11:06) (96% - 100%)    O2 Parameters below as of 02 Dec 2022 11:00  Patient On (Oxygen Delivery Method): ventilator    O2 Concentration (%): 30        Physical Exam:   HEENT: no drainage or redness  Neck: supple,  No JVD, Trachea midline  Gastrointestinal: Soft, non-tender, non distended, normal bowel sounds  Extremities: no peripheral edema, no cyanosis, no clubbing   Neurological: Pt is sedated and intubated   Psychiatric: Pt is sedated and intubated.   Musculoskeletal: No joint swelling or deformity  Skin: warm, dry    VENT SETTINGS   Mode: AC/ CMV (Assist Control/ Continuous Mandatory Ventilation)  RR (machine): 14  TV (machine): 450  FiO2: 30  PEEP: 5  ITime: 0.6  MAP: 9  PIP: 24    ABG - ( 02 Dec 2022 01:40 )  pH, Arterial: 7.36  pH, Blood: x     /  pCO2: 33    /  pO2: 137   / HCO3: 19    / Base Excess: -6.1  /  SaO2: 98.5                I&O's Detail    01 Dec 2022 07:01  -  02 Dec 2022 07:00  --------------------------------------------------------  IN:    Dexmedetomidine: 54.2 mL    Dexmedetomidine: 489 mL    IV PiggyBack: 150 mL    Norepinephrine: 374.4 mL    Phenylephrine: 498.2 mL    Propofol: 62.6 mL  Total IN: 1628.4 mL    OUT:    Indwelling Catheter - Urethral (mL): 1901 mL  Total OUT: 1901 mL    Total NET: -272.6 mL      02 Dec 2022 07:01  -  02 Dec 2022 11:32  --------------------------------------------------------  IN:    Dexmedetomidine: 87.1 mL    Enteral Tube Flush: 30 mL    Glucerna 1.5: 20 mL    Phenylephrine: 84.9 mL  Total IN: 222 mL    OUT:    Indwelling Catheter - Urethral (mL): 160 mL  Total OUT: 160 mL    Total NET: 62 mL          LABS                        8.9    1.24  )-----------( 32       ( 02 Dec 2022 02:56 )             27.6     12-02    141  |  109<H>  |  44<H>  ----------------------------<  114<H>  3.6   |  18<L>  |  1.30    Ca    7.8<L>      02 Dec 2022 02:56  Phos  4.6     12-02  Mg     2.20     12-02    TPro  5.4<L>  /  Alb  2.5<L>  /  TBili  1.4<H>  /  DBili  x   /  AST  131<H>  /  ALT  58<H>  /  AlkPhos  95  12-02    LIVER FUNCTIONS - ( 02 Dec 2022 02:56 )  Alb: 2.5 g/dL / Pro: 5.4 g/dL / ALK PHOS: 95 U/L / ALT: 58 U/L / AST: 131 U/L / GGT: x             CARDIAC MARKERS ( 02 Dec 2022 02:56 )  [id number] U/L / CKMBx     / Troponin Tx     /  CARDIAC MARKERS ( 01 Dec 2022 18:30 )  [id number] U/L / CKMBx     / Troponin Tx     /          POCT Blood Glucose.: 87 mg/dL (12-02-22 @ 06:30)  POCT Blood Glucose.: 108 mg/dL *H* (12-01-22 @ 23:30)  POCT Blood Glucose.: 87 mg/dL (12-01-22 @ 17:24)  POCT Blood Glucose.: 120 mg/dL *H* (12-01-22 @ 11:36)        MEDICATIONS  (STANDING):  cefepime   IVPB 2000 milliGRAM(s) IV Intermittent every 12 hours  chlorhexidine 0.12% Liquid 15 milliLiter(s) Oral Mucosa every 12 hours  dexMEDEtomidine Infusion 0.023 MICROgram(s)/kG/Hr (0.5 mL/Hr) IV Continuous <Continuous>  dextrose 5%. 1000 milliLiter(s) (50 mL/Hr) IV Continuous <Continuous>  dextrose 5%. 1000 milliLiter(s) (100 mL/Hr) IV Continuous <Continuous>  dextrose 50% Injectable 25 Gram(s) IV Push once  dextrose 50% Injectable 12.5 Gram(s) IV Push once  dextrose 50% Injectable 25 Gram(s) IV Push once  enoxaparin Injectable 40 milliGRAM(s) SubCutaneous every 24 hours  filgrastim-sndz (ZARXIO) Injectable 480 MICROGram(s) SubCutaneous daily  glucagon  Injectable 1 milliGRAM(s) IntraMuscular once  insulin lispro (ADMELOG) corrective regimen sliding scale   SubCutaneous every 6 hours  phenylephrine    Infusion 0.031 MICROgram(s)/kG/Min (1 mL/Hr) IV Continuous <Continuous>    MEDICATIONS  (PRN):  acetaminophen     Tablet .. 650 milliGRAM(s) Oral every 6 hours PRN Temp greater or equal to 38C (100.4F)  dextrose Oral Gel 15 Gram(s) Oral once PRN Blood Glucose LESS THAN 70 milliGRAM(s)/deciliter      Allergies:  Bactrim (Other)  fluconazole (Vomiting; Nausea)  levofloxacin (Vomiting; Nausea)  penicillin (Other)         Significant recent/past 24 hr events: Pt off levo and started phenylephrine. Put on precedex. POCUS showed B lines posteriorly with plump IVC. Two BCx and one sputum Cx sent- results pending.     Subjective: Pt is sedated and intubated- subjective information was not obtained.     Review of Systems         [ ] A ten-point review of systems was otherwise negative except as noted.  [x ] Due to altered mental status/intubation, subjective information were not able to be obtained from the patient. History was obtained, to the extent possible, from review of the chart and collateral sources of information.      Patient is a 62y old  Female who presents with a chief complaint of slurred speech (02 Dec 2022 11:03)    HPI:  Patient is a 62 yoF w/ pmhx of MM (diagnosed ~10 years ago, currently on Promacta treatments since June 2022 - most recently last Wednesday; followed by oncologist, Dr. Lopez at [facility]), HTN, neuropathic R hip pain, recent R corneal tear, presents to [facility] ED for change in mental status. LKW approximately 14:30. Patient was in normal state of health this morning as per son, Nacho. Patient went to a follow up ophtho appointment for a recently diagnosed corneal abrasion this past Monday. Patient was picked up by a friend around 10:00 for an optho appt with normal mental status, pt was just complaining of being sleepy. On the way back from appointment around 14:30, patient stopped responding to friend appropriately in the car and she appeared more tired & weaker than usual. She started having slurred speech when they arrived home and then son called EMS. In the ED, patient with persisting word finding difficulties and generalized weakness, but leaning to the R side. Patient reports some difficulty remembering what happened today, but reports she feels generally weak and very tired. Code stroke called, but TPA not given because of imporvement in pt's symptoms. Son reports patient's speech is close to baseline now. Patient then denied chest pain, SOB, HA, changes in vision, N/V, imbalance, numbness or tingling. Denies recent infection or sick contacts. Pt states that she had a similar episode a few years ago with fever which she was told was likely from a MM, she improved after getting steroids.     In the ED pt was febrile 105.5, , /84, RR 24 and saturating well on RA. Pt given 2L IVF bolus and 1x vanc/ceftriaxone/acyclovir/ampicillin s/p LP in ER negative results.  Patient was RR later that day for hypotension/ hypoxia requiring intubation and pressor support, brought to MICU in shock.     PAST MEDICAL & SURGICAL HISTORY:  Multiple myeloma      Type 2 diabetes mellitus        FAMILY HISTORY:  No pertinent family history in first degree relatives        Vitals   ICU Vital Signs Last 24 Hrs  T(C): 38.8 (02 Dec 2022 08:00), Max: 38.9 (02 Dec 2022 04:00)  T(F): 101.9 (02 Dec 2022 08:00), Max: 102 (02 Dec 2022 04:00)  HR: 81 (02 Dec 2022 11:06) (80 - 145)  BP: 99/66 (02 Dec 2022 09:00) (70/53 - 110/76)  BP(mean): 73 (02 Dec 2022 09:00) (59 - 92)  ABP: 106/60 (02 Dec 2022 11:00) (91/68 - 124/69)  ABP(mean): 77 (02 Dec 2022 11:00) (72 - 89)  RR: 18 (02 Dec 2022 11:00) (17 - 30)  SpO2: 99% (02 Dec 2022 11:06) (96% - 100%)    O2 Parameters below as of 02 Dec 2022 11:00  Patient On (Oxygen Delivery Method): ventilator    O2 Concentration (%): 30        Physical Exam:   HEENT: no drainage or redness  Neck: supple,  No JVD, Trachea midline  Gastrointestinal: Soft, non-tender, non distended, normal bowel sounds  Extremities: no peripheral edema, no cyanosis, no clubbing   Neurological: Pt is sedated and intubated   Psychiatric: Pt is sedated and intubated.   Musculoskeletal: No joint swelling or deformity  Skin: warm, dry    VENT SETTINGS   Mode: AC/ CMV (Assist Control/ Continuous Mandatory Ventilation)  RR (machine): 14  TV (machine): 450  FiO2: 30  PEEP: 5  ITime: 0.6  MAP: 9  PIP: 24    ABG - ( 02 Dec 2022 01:40 )  pH, Arterial: 7.36  pH, Blood: x     /  pCO2: 33    /  pO2: 137   / HCO3: 19    / Base Excess: -6.1  /  SaO2: 98.5                I&O's Detail    01 Dec 2022 07:01  -  02 Dec 2022 07:00  --------------------------------------------------------  IN:    Dexmedetomidine: 54.2 mL    Dexmedetomidine: 489 mL    IV PiggyBack: 150 mL    Norepinephrine: 374.4 mL    Phenylephrine: 498.2 mL    Propofol: 62.6 mL  Total IN: 1628.4 mL    OUT:    Indwelling Catheter - Urethral (mL): 1901 mL  Total OUT: 1901 mL    Total NET: -272.6 mL      02 Dec 2022 07:01  -  02 Dec 2022 11:32  --------------------------------------------------------  IN:    Dexmedetomidine: 87.1 mL    Enteral Tube Flush: 30 mL    Glucerna 1.5: 20 mL    Phenylephrine: 84.9 mL  Total IN: 222 mL    OUT:    Indwelling Catheter - Urethral (mL): 160 mL  Total OUT: 160 mL    Total NET: 62 mL          LABS                        8.9    1.24  )-----------( 32       ( 02 Dec 2022 02:56 )             27.6     12-02    141  |  109<H>  |  44<H>  ----------------------------<  114<H>  3.6   |  18<L>  |  1.30    Ca    7.8<L>      02 Dec 2022 02:56  Phos  4.6     12-02  Mg     2.20     12-02    TPro  5.4<L>  /  Alb  2.5<L>  /  TBili  1.4<H>  /  DBili  x   /  AST  131<H>  /  ALT  58<H>  /  AlkPhos  95  12-02    LIVER FUNCTIONS - ( 02 Dec 2022 02:56 )  Alb: 2.5 g/dL / Pro: 5.4 g/dL / ALK PHOS: 95 U/L / ALT: 58 U/L / AST: 131 U/L / GGT: x             CARDIAC MARKERS ( 02 Dec 2022 02:56 )  [id number] U/L / CKMBx     / Troponin Tx     /  CARDIAC MARKERS ( 01 Dec 2022 18:30 )  [id number] U/L / CKMBx     / Troponin Tx     /          POCT Blood Glucose.: 87 mg/dL (12-02-22 @ 06:30)  POCT Blood Glucose.: 108 mg/dL *H* (12-01-22 @ 23:30)  POCT Blood Glucose.: 87 mg/dL (12-01-22 @ 17:24)  POCT Blood Glucose.: 120 mg/dL *H* (12-01-22 @ 11:36)        MEDICATIONS  (STANDING):  cefepime   IVPB 2000 milliGRAM(s) IV Intermittent every 12 hours  chlorhexidine 0.12% Liquid 15 milliLiter(s) Oral Mucosa every 12 hours  dexMEDEtomidine Infusion 0.023 MICROgram(s)/kG/Hr (0.5 mL/Hr) IV Continuous <Continuous>  dextrose 5%. 1000 milliLiter(s) (50 mL/Hr) IV Continuous <Continuous>  dextrose 5%. 1000 milliLiter(s) (100 mL/Hr) IV Continuous <Continuous>  dextrose 50% Injectable 25 Gram(s) IV Push once  dextrose 50% Injectable 12.5 Gram(s) IV Push once  dextrose 50% Injectable 25 Gram(s) IV Push once  enoxaparin Injectable 40 milliGRAM(s) SubCutaneous every 24 hours  filgrastim-sndz (ZARXIO) Injectable 480 MICROGram(s) SubCutaneous daily  glucagon  Injectable 1 milliGRAM(s) IntraMuscular once  insulin lispro (ADMELOG) corrective regimen sliding scale   SubCutaneous every 6 hours  phenylephrine    Infusion 0.031 MICROgram(s)/kG/Min (1 mL/Hr) IV Continuous <Continuous>    MEDICATIONS  (PRN):  acetaminophen     Tablet .. 650 milliGRAM(s) Oral every 6 hours PRN Temp greater or equal to 38C (100.4F)  dextrose Oral Gel 15 Gram(s) Oral once PRN Blood Glucose LESS THAN 70 milliGRAM(s)/deciliter      Allergies:  Bactrim (Other)  fluconazole (Vomiting; Nausea)  levofloxacin (Vomiting; Nausea)  penicillin (Other)

## 2022-12-02 NOTE — PROGRESS NOTE ADULT - ASSESSMENT
Assessment and Plan:   · Assessment	  Patient is a 61 yo F w/ PMHx of MM (diagnosed ~10 years ago, currently on Promacta treatments since June 2022 - most recently last Wednesday; followed by oncologist, Dr. Lopez at [facility]), HTN, neuropathic R hip pain, recent R corneal tear admitted for AMS, code stroke called no acute infarct or hemorrhage, s/p LP findings not consistent with meningitis, course complicated by hypotension and hypoxia, placed on BiPAP, requiring pressors, admitted to MICU with sepsis likely secondary to pneumonia.     PLAN  Neuro  - s/p code stroke --> no ischemia or hemorrhage on CT H+N  - s/p LP results not consistent with encephalitis/meningitis   - baseline is AOx3  - Neurology following  - currently sedated on propofol, transitioned to precedex only    Cardiovascular  #Shock  - suspect sepsis from E. Coli bacteremia with pulmonary or urinary source?  - s/p 4L IVF in ED  - started on levophed gtt, goal MAP > 65, wean off and transition to phenylephrine due to persistent tachycardia  - ECHO wnl  - TSH wnl  - trend lactate q8h    Pulmonary  #AHRF  - placed on BiPAP, 10/5 on 50%, then intubated 11/30 for increased work of breathing  - suspect secondary to infection with procalcitonin of 8.73  - continue on cefepime for now for E. Coli bacteremia   - POCUS with B-lines, R > L, hold off on more fluids  - f/u MRSA/MSSA     GI  - keep NPO for now,     Renal  - SCr 1.5 up from 1.01  - CPK elevated up to 1800  - hold off on fluid resuscitation for now give POCUS findings above  - continue to trend BMP and CPK  - suspect secondary to sepsis    ID  #E. Coli bacteremia  - s/p vanc/CTX/ampicillin/acyclovir  - continue with cefepime 2g q8h  - f/u sensitivities  - consider ID consult    Endo  - no history of diabetes, A1C 6.0  - TSH wnl    Heme  #MM  - holding anti-myeloma therapy  - started filgrastim per heme/onc    - new thrombocytopenia, anemia, and +hemolysis labs (bili uptrending)  - direct dede negative  - concern for MAHA, possible HUS with E. Coli bacteremia?  - some schistocytes on peripheral smear, confirmed with hematology/oncology, however higher suspicion for lab abnormalities secondary to sepsis  - monitor CBC q8h and hemolysis labs daily    hold off on DVT ppx for now    Ethics  - updated son, he is pediatric resident and patient's HCP  - currently full code   Assessment and Plan:   · Assessment	  Patient is a 63 yo F w/ PMHx of MM (diagnosed ~10 years ago, currently on Promacta treatments since June 2022 - most recently last Wednesday; followed by oncologist, Dr. Lopez at [facility]), HTN, neuropathic R hip pain, recent R corneal tear admitted for AMS, code stroke called no acute infarct or hemorrhage, s/p LP findings not consistent with meningitis, course complicated by hypotension and hypoxia, placed on BiPAP, requiring pressors, admitted to MICU with sepsis likely secondary to pneumonia.     PLAN  Neuro  - s/p code stroke --> no ischemia or hemorrhage on CT H+N  - s/p LP results not consistent with encephalitis/meningitis   - baseline is AOx3  - Neurology following  - currently on precedex only    Cardiovascular  #Shock  - suspect sepsis from E. Coli bacteremia with pulmonary or urinary source?  - s/p 4L IVF in ED  - started on levophed gtt, goal MAP > 65, wean off and transition to phenylephrine due to persistent tachycardia  - ECHO wnl  - TSH wnl  - trend lactate q8h; lactate this morning 3.0     Pulmonary  #AHRF  - placed on BiPAP, 10/5 on 50%, then intubated 11/30 for increased work of breathing  - suspect secondary to infection with procalcitonin of 8.73  - continue on cefepime for now for E. Coli bacteremia   - POCUS with B-lines, R > L, hold off on more fluids  - f/u MRSA/MSSA   - ID following     GI  - Start tube feeds now as FS in the 70's this morning     Renal  - SCr 1.5 up from 1.01  - CPK elevated up to 1800  - hold off on fluid resuscitation for now give POCUS findings above  - continue to trend BMP and CPK  - suspect secondary to sepsis    ID  #E. Coli bacteremia  - s/p vanc/CTX/ampicillin/acyclovir  - continue with cefepime 2g q8h  - f/u sensitivities  - ID following     Endo  - no history of diabetes, A1C 6.0  - TSH wnl    Heme  #MM  - holding anti-myeloma therapy  - started filgrastim per heme/onc    - new thrombocytopenia, anemia, and +hemolysis labs (bili uptrending)  - direct dede negative  - concern for MAHA, possible HUS with E. Coli bacteremia?  - some schistocytes on peripheral smear, confirmed with hematology/oncology, however higher suspicion for lab abnormalities secondary to sepsis  - monitor CBC q8h and hemolysis labs daily    hold off on DVT ppx for now  Ordered     Ethics  - updated son, he is pediatric resident and patient's HCP  - currently full code   Assessment and Plan:   · Assessment	  Patient is a 63 yo F w/ PMHx of MM (diagnosed ~10 years ago, currently on Promacta treatments since June 2022 - most recently last Wednesday; followed by oncologist, Dr. Lopez at [facility]), HTN, neuropathic R hip pain, recent R corneal tear admitted for AMS, code stroke called no acute infarct or hemorrhage, s/p LP findings not consistent with meningitis, course complicated by hypotension and hypoxia, placed on BiPAP, requiring pressors, admitted to MICU with sepsis likely secondary to pneumonia.     PLAN  Neuro  - s/p code stroke --> no ischemia or hemorrhage on CT H+N  - s/p LP results not consistent with encephalitis/meningitis   - baseline is AOx3  - Neurology following  - currently on precedex only    Cardiovascular  #Shock  - suspect sepsis from E. Coli bacteremia with pulmonary or urinary source?  - s/p 4L IVF in ED  - started on levophed gtt, goal MAP > 65, wean off and transition to phenylephrine due to persistent tachycardia  - ECHO wnl  - TSH wnl  - trend lactate q8h; lactate this morning 3.0     Pulmonary  #AHRF  - placed on BiPAP, 10/5 on 50%, then intubated 11/30 for increased work of breathing  - suspect secondary to infection with procalcitonin of 8.73  - continue on cefepime for now for E. Coli bacteremia   - POCUS with B-lines, R > L, hold off on more fluids  - f/u MRSA/MSSA, blood culture and sputum culture results pending   - ID following     GI  - Start tube feeds now as FS in the 70's this morning     Renal  - SCr 1.5 up from 1.01  - CPK elevated up to 1800  - hold off on fluid resuscitation for now give POCUS findings above  - continue to trend BMP and CPK  - suspect secondary to sepsis    ID  #E. Coli bacteremia  - s/p vanc/CTX/ampicillin/acyclovir  - continue with cefepime 2g q8h  - f/u sensitivities  - ID following     Endo  - no history of diabetes, A1C 6.0  - TSH wnl    Heme  #MM  - holding anti-myeloma therapy  - started filgrastim per heme/onc    - new thrombocytopenia, anemia, and +hemolysis labs (bili uptrending)  - direct dede negative  - concern for MAHA, possible HUS with E. Coli bacteremia?  - some schistocytes on peripheral smear, confirmed with hematology/oncology, however higher suspicion for lab abnormalities secondary to sepsis  - monitor CBC q8h and hemolysis labs daily  - Transfusion protocol: 10 always, 20 fever, 50 procedures/active bleeding/etc     hold off on DVT ppx for now  Ordered intermittent pneumatic compression     Ethics  - updated son, he is pediatric resident and patient's HCP  - currently full code   Assessment and Plan:   · Assessment	  Patient is a 63 yo F w/ PMHx of MM (diagnosed ~10 years ago, currently on Promacta treatments since June 2022 - most recently last Wednesday; followed by oncologist, Dr. Lopez at [facility]), HTN, neuropathic R hip pain, recent R corneal tear admitted for AMS, code stroke called no acute infarct or hemorrhage, s/p LP findings not consistent with meningitis, course complicated by hypotension and hypoxia, placed on BiPAP, requiring pressors, admitted to MICU with sepsis likely secondary to pneumonia.     PLAN  Neuro  - s/p code stroke --> no ischemia or hemorrhage on CT H+N  - s/p LP results not consistent with encephalitis/meningitis   - baseline is AOx3  - Neurology following  - currently on precedex     Cardiovascular  #Shock  - suspect sepsis from E. Coli bacteremia with pulmonary or urinary source?  - s/p 4L IVF in ED  - started on levophed gtt, goal MAP > 65, wean off and transition to phenylephrine due to persistent tachycardia  - POCUS today 12/2 showed mild to moderate decreased contractility of the LV and decreased LVOT   - ECHO wnl  - TSH wnl  - trend lactate q8h; lactate this morning 3.0     Pulmonary  #AHRF  - placed on BiPAP, 10/5 on 50%, then intubated 11/30 for increased work of breathing  - suspect secondary to infection with procalcitonin of 8.73  - continue on cefepime for now for E. Coli bacteremia   - POCUS with B-lines inferiorly 12/2/2022  - f/u MRSA/MSSA, blood culture and sputum culture results pending   - ID following     GI  - Start tube feeds now as FS 83 this morning, currently 109      Renal  - SCr 1.5 up from 1.01  - CPK elevated up to 1800  - hold off on fluid resuscitation for now give POCUS findings above  - continue to trend BMP and CPK  - suspect secondary to sepsis    ID  #E. Coli bacteremia  - s/p vanc/CTX/ampicillin/acyclovir  - continue with cefepime 2g q8h  - f/u sensitivities  - ID following     Endo  - no history of diabetes, A1C 6.0  - TSH wnl    Heme  #MM  - holding anti-myeloma therapy  - started filgrastim per heme/onc    - new thrombocytopenia, anemia, and +hemolysis labs (bili uptrending)  - direct dede negative  - concern for MAHA, possible HUS with E. Coli bacteremia?  - some schistocytes on peripheral smear, confirmed with hematology/oncology, however higher suspicion for lab abnormalities secondary to sepsis  - monitor CBC q8h and hemolysis labs daily  - Transfusion protocol: 10 always, 20 fever, 50 procedures/active bleeding/etc     hold off on DVT ppx for now  Ordered intermittent pneumatic compression     Ethics  - updated son, he is pediatric resident and patient's HCP  - currently full code   Assessment and Plan:   · Assessment	  Patient is a 61 yo F w/ PMHx of MM (diagnosed ~10 years ago, currently on Promacta treatments since June 2022 - most recently last Wednesday; followed by oncologist, Dr. Lopez at [facility] [place]), HTN, neuropathic R hip pain, recent R corneal tear admitted for AMS, code stroke called no acute infarct or hemorrhage, s/p LP findings not consistent with meningitis, course complicated by hypotension and hypoxia, placed on BiPAP, requiring pressors, admitted to MICU with sepsis likely secondary to pneumonia.     PLAN  Neuro  - s/p code stroke --> no ischemia or hemorrhage on CT H+N  - s/p LP results not consistent with encephalitis/meningitis   - baseline is AOx3  - Neurology following  - currently on precedex     Cardiovascular  #Shock  - suspect sepsis from E. Coli bacteremia with pulmonary or urinary source?  - s/p 4L IVF in ED  - started on levophed gtt, goal MAP > 65, wean off and transition to phenylephrine due to persistent tachycardia  - POCUS today 12/2 showed mild to moderate decreased contractility of the LV and decreased LVOT   - ECHO wnl  - TSH wnl  - trend lactate q8h; lactate this morning 3.0     Pulmonary  #AHRF  - placed on BiPAP, 10/5 on 50%, then intubated 11/30 for increased work of breathing  - suspect secondary to infection with procalcitonin of 8.73  - continue on cefepime for now for E. Coli bacteremia   - POCUS today with B-lines inferiorly 12/2/2022  - f/u MRSA/MSSA, blood culture and sputum culture results pending   - ID following     GI  - Start tube feeds now as FS 83 this morning, currently 109      Renal  - SCr 1.5 up from 1.01, today 1.3  - CPK elevated up to 1800  - hold off on fluid resuscitation for now give POCUS findings above  - continue to trend BMP and CPK  - suspect secondary to sepsis    ID  #E. Coli bacteremia  - s/p vanc/CTX/ampicillin/acyclovir  - continue with cefepime 2g q8h  - Continue azithromycin 500 mg, pending urinary legionella results   - f/u sensitivities  - ID following     Endo  - no history of diabetes, A1C 6.0  - TSH wnl    Heme  #MM  - holding anti-myeloma therapy  - started filgrastim per heme/onc    - new thrombocytopenia, anemia, and +hemolysis labs (bili uptrending)  - direct dede negative  - concern for MAHA, possible HUS with E. Coli bacteremia?  - some schistocytes on peripheral smear, confirmed with hematology/oncology, however higher suspicion for lab abnormalities secondary to sepsis  - monitor CBC q8h and hemolysis labs daily  - Transfusion protocol: 10 always, 20 fever, 50 procedures/active bleeding/etc     hold off on DVT ppx for now  Ordered intermittent pneumatic compression     Ethics  - updated son, he is pediatric resident and patient's HCP  - currently full code   Assessment and Plan:   · Assessment	  Patient is a 61 yo F w/ PMHx of MM (diagnosed ~10 years ago, currently on Promacta treatments since June 2022 - most recently last Wednesday; followed by oncologist, Dr. Lopez at [facility]), HTN, neuropathic R hip pain, recent R corneal tear admitted for AMS, code stroke called no acute infarct or hemorrhage, s/p LP findings not consistent with meningitis, course complicated by hypotension and hypoxia, placed on BiPAP, requiring pressors, admitted to MICU with sepsis likely secondary to pneumonia.     PLAN  Neuro  - s/p code stroke --> no ischemia or hemorrhage on CT H+N  - s/p LP results not consistent with encephalitis/meningitis   - baseline is AOx3  - Neurology following  - currently on precedex     Cardiovascular  #Shock  - suspect sepsis from E. Coli bacteremia with pulmonary or urinary source?  - s/p 4L IVF in ED  - started on levophed gtt, goal MAP > 65, wean off and transition to phenylephrine due to persistent tachycardia  - POCUS today 12/2 showed mild to moderate decreased contractility of the LV and decreased LVOT   - ECHO wnl  - TSH wnl  - trend lactate q8h; lactate this morning 3.0     Pulmonary  #AHRF  - placed on BiPAP, 10/5 on 50%, then intubated 11/30 for increased work of breathing  - suspect secondary to infection with procalcitonin of 8.73  - continue on cefepime for now for E. Coli bacteremia   - POCUS today with B-lines inferiorly 12/2/2022  - f/u MRSA/MSSA, blood culture and sputum culture results pending   - ID following     GI  - Start tube feeds now as FS 83 this morning, currently 109      Renal  - SCr 1.5 up from 1.01, today 1.3  - CPK elevated up to 1800  - hold off on fluid resuscitation for now give POCUS findings above  - continue to trend BMP and CPK  - suspect secondary to sepsis    ID  #E. Coli bacteremia  - s/p vanc/CTX/ampicillin/acyclovir  - continue with cefepime 2g q8h  - Continue azithromycin 500 mg, pending urinary legionella results   - f/u sensitivities  - ID following   - as per nurse, pt has sacral decub. f/u with wound care nurse     Endo  - no history of diabetes, A1C 6.0  - TSH wnl    Heme  #MM  - holding anti-myeloma therapy  - started filgrastim per heme/onc    - new thrombocytopenia, anemia, and +hemolysis labs (bili uptrending)  - direct dede negative  - concern for MAHA, possible HUS with E. Coli bacteremia?  - some schistocytes on peripheral smear, confirmed with hematology/oncology, however higher suspicion for lab abnormalities secondary to sepsis  - monitor CBC q8h and hemolysis labs daily  - Transfusion protocol: 10 always, 20 fever, 50 procedures/active bleeding/etc     hold off on DVT ppx for now  Ordered intermittent pneumatic compression     Ethics  - updated son, he is pediatric resident and patient's HCP  - currently full code   Assessment and Plan:   · Assessment	  Patient is a 61 yo F w/ PMHx of MM (diagnosed ~10 years ago, currently on Promacta treatments since June 2022 - most recently last Wednesday; followed by oncologist, Dr. Lopez at [facility]), HTN, neuropathic R hip pain, recent R corneal tear admitted for AMS, code stroke called no acute infarct or hemorrhage, s/p LP findings not consistent with meningitis, course complicated by hypotension and hypoxia, placed on BiPAP, requiring pressors, admitted to MICU with sepsis likely secondary to pneumonia.     PLAN  Neuro  - s/p code stroke --> no ischemia or hemorrhage on CT H+N  - s/p LP results not consistent with encephalitis/meningitis   - baseline is AOx3  - Neurology following  - currently on precedex     Cardiovascular  #Shock  - suspect sepsis from E. Coli bacteremia with pulmonary or urinary source?  - s/p 4L IVF in ED  - patient tachycardic on levophed-> transitioned to phenylephrine   - POCUS today 12/2 showed mild to moderate decreased contractility of the LV VTI 14.4   - ECHO 11/30 EF 55 normal LV/RV   - TSH wnl  - lactate this morning 3.0- trend daily      Pulmonary  #AHRF- likely secondary to PNA infection vs Aspiration   - placed on BiPAP, 10/5 on 50%, then intubated 11/30 for increased work of breathing  - MV: 14/450/5/30%   - suspect secondary to infection with procalcitonin of 8.73  - continue on cefepime for now for E. Coli bacteremia and neutropenia   - POCUS today with B-lines inferiorly 12/2/2022  - f/u MRSA/MSSA, Sputum negative   - ID following recs appreciated   - CTA 11/30: neg for PE.  RUL R basilar consolidation RML basilar opacity    GI  #NPO with tube feeds  - Glucerna started today titrate to goal  - Nutrition consult placed F/U recs  - bowel regimen started today 12/2    #Elevated liver enzymes  - likely in setting of sepsis and hypotension  - LFTs trending down   - bili also elevated 1.3-> 1.5-> 1.4 continue to monitor     Renal  - SCr 1.5 up from 1.01, today 1.3 peaked at 1.5   - CPK elevated up to 1800-> 2800-> 1800 trend daily   - hold off on fluid resuscitation for now give POCUS findings above  - suspect secondary to sepsis  - Is and Os -272/ -512 for LOS making 50-70cc/ hr     ID  #E. Coli bacteremia- pan sensitive 11/29   - s/p vanc/CTX/ampicillin/acyclovir in ER for suspected meningitis workup   - continue with cefepime 2g q8h (11/30- )   - azithromycin 500 mg started (12/2- ) , pending urinary legionella results   -Repeat Bcx sent 12/2 F/U results for clearance   - Fungitel sent F/U   - F/U MRSA  - ID following   - as per nurse, pt has sacral decub. f/u with wound care nurse     Endo  - no history of diabetes, A1C 6.0  - TSH wnl  - FS stable     Heme  #MM  - holding anti-myeloma therapy  - started filgrastim per heme/onc  - Hemonc following recs appreciated     #Panycotopenia  - new thrombocytopenia, anemia, and +hemolysis labs (bili uptrending)  -? reported history as per hemonc of ITP? should clarify with [facility]   - direct dede negative  - concern for MAHA, possible HUS with E. Coli bacteremia?  - some schistocytes on peripheral smear, confirmed with hematology/oncology, however higher suspicion for lab abnormalities secondary to sepsis  - monitor CBC q12 and hemolysis labs daily  - Platelet transfusion protocol: <10, <20 with fever, <50 procedures/active bleeding/etc   - Transfuse PRBC < 7    #DVT ppx  hold off on DVT ppx for now  -Ordered intermittent pneumatic compression     Skin  #Suspected DTI on sacrum and ear as per nurse  - Wound care recs placed- F/u     Ethics  - updated son, he is pediatric resident and patient's HCP  - currently full code   Assessment and Plan:   · Assessment	  Patient is a 63 yo F w/ PMHx of MM (diagnosed ~10 years ago, currently on Promacta treatments since June 2022 - most recently last Wednesday; followed by oncologist, Dr. Lopez at [facility]), HTN, neuropathic R hip pain, recent R corneal tear admitted for AMS, code stroke called no acute infarct or hemorrhage, s/p LP findings not consistent with meningitis, course complicated by hypotension and hypoxia, placed on BiPAP, requiring pressors, admitted to MICU with sepsis likely secondary to pneumonia.     PLAN  Neuro  - s/p code stroke --> no ischemia or hemorrhage on CT H+N  - s/p LP results not consistent with encephalitis/meningitis   - baseline is AOx3  - Neurology following  - currently on precedex     Cardiovascular  #Shock  - suspect sepsis from E. Coli bacteremia with pulmonary or urinary source?  - s/p 4L IVF in ED  - patient tachycardic on levophed-> transitioned to phenylephrine   - POCUS today 12/2 showed mild to moderate decreased contractility of the LV VTI 14.4   - ECHO 11/30 EF 55 normal LV/RV   - TSH wnl  - lactate this morning 3.0- trend daily      Pulmonary  #AHRF- likely secondary to PNA infection vs Aspiration   - placed on BiPAP, 10/5 on 50%, then intubated 11/30 for increased work of breathing  - MV: 14/450/5/30%   - suspect secondary to infection with procalcitonin of 8.73  - continue on cefepime for now for E. Coli bacteremia and neutropenia   - POCUS today with B-lines inferiorly 12/2/2022  - f/u MRSA/MSSA, Sputum negative   - ID following recs appreciated   - CTA 11/30: neg for PE.  RUL R basilar consolidation RML basilar opacity    GI  #NPO with tube feeds  - Jevity started today titrate to goal  - Nutrition consult placed F/U recs  - bowel regimen started today 12/2  - Hx hep b 2017 in ID note     #Elevated liver enzymes  - likely in setting of sepsis and hypotension  - LFTs trending down   - bili also elevated 1.3-> 1.5-> 1.4 continue to monitor     Renal  - SCr 1.5 up from 1.01, today 1.3 peaked at 1.5   - CPK elevated up to 1800-> 2800-> 1800 trend daily   - hold off on fluid resuscitation for now give POCUS findings above  - suspect secondary to sepsis  - Is and Os -272/ -512 for LOS making 50-70cc/ hr   - Repeat UA and UCx today     ID  #E. Coli bacteremia- pan sensitive 11/29   - s/p vanc/CTX/ampicillin/acyclovir in ER for suspected meningitis workup   - continue with cefepime 2g q8h (11/30- )   - azithromycin 500 mg started (12/2- ) , pending urinary legionella results   -Repeat Bcx sent 12/2 F/U results for clearance   - Fungitel sent F/U   - F/U MRSA  - ID following   - as per nurse, pt has sacral decub. f/u with wound care nurse     Endo  - no history of diabetes, A1C 6.0  - TSH wnl  - FS stable     Heme  #MM  - holding anti-myeloma therapy  - started filgrastim per heme/onc  - Hemonc following recs appreciated     #Panycotopenia  - new thrombocytopenia, anemia, and +hemolysis labs (bili uptrending)  -? reported history as per hemonc of ITP? should clarify with [facility]   - direct dede negative  - concern for MAHA, possible HUS with E. Coli bacteremia?  - some schistocytes on peripheral smear, confirmed with hematology/oncology, however higher suspicion for lab abnormalities secondary to sepsis  - monitor CBC q12 and hemolysis labs daily  - Platelet transfusion protocol: <10, <20 with fever, <50 procedures/active bleeding/etc   - Transfuse PRBC < 7    #DVT ppx  hold off on DVT ppx for now  -Ordered intermittent pneumatic compression     Skin  #Suspected DTI on sacrum and ear as per nurse  - Wound care recs placed- F/u     Ethics  - updated son, he is pediatric resident and patient's HCP  - currently full code   Assessment and Plan:   · Assessment	  Patient is a 61 yo F w/ PMHx of MM (diagnosed ~10 years ago, currently on Promacta treatments since June 2022 - most recently last Wednesday; followed by oncologist, Dr. Lopez at [facility]), HTN, neuropathic R hip pain, recent R corneal tear admitted for AMS, code stroke called no acute infarct or hemorrhage, s/p LP findings not consistent with meningitis, course complicated by hypotension and hypoxia, placed on BiPAP, requiring pressors, admitted to MICU with sepsis likely secondary to pneumonia.     PLAN  Neuro  - s/p code stroke --> no ischemia or hemorrhage on CT H+N  - s/p LP results not consistent with encephalitis/meningitis   - baseline is AOx3  - Neurology following  - currently on precedex     Cardiovascular  #Shock  - suspect sepsis from E. Coli bacteremia with pulmonary or urinary source?  - s/p 4L IVF in ED  - patient tachycardic on levophed-> transitioned to phenylephrine   - POCUS today 12/2 showed mild to moderate decreased contractility of the LV VTI 14.4   - ECHO 11/30 EF 55 normal LV/RV   - TSH wnl  - lactate this morning 3.0- trend daily      Pulmonary  #AHRF- likely secondary to PNA infection vs Aspiration   - placed on BiPAP, 10/5 on 50%, then intubated 11/30 for increased work of breathing  - MV: 14/450/5/30%   - suspect secondary to infection with procalcitonin of 8.73  - continue on cefepime for now for E. Coli bacteremia and neutropenia   - POCUS today with B-lines inferiorly 12/2/2022  - f/u MRSA/MSSA, Sputum negative   - ID following recs appreciated   - CTA 11/30: neg for PE.  RUL R basilar consolidation RML basilar opacity    GI  #NPO with tube feeds  - Jevity started today titrate to goal  - Nutrition consult placed F/U recs  - bowel regimen started today 12/2  - Hx hep b 2017 in ID note     #Elevated liver enzymes  - likely in setting of sepsis and hypotension  - LFTs trending down   - bili also elevated 1.3-> 1.5-> 1.4 continue to monitor     Renal  - SCr 1.5 up from 1.01, today 1.3 peaked at 1.5   - CPK elevated up to 1800-> 2800-> 1800 trend daily   - hold off on fluid resuscitation for now give POCUS findings above  - suspect secondary to sepsis  - Is and Os -272/ -512 for LOS making 50-70cc/ hr   - Repeat UA and UCx today     ID  #E. Coli bacteremia- pan sensitive 11/29   - s/p vanc/CTX/ampicillin/acyclovir in ER for suspected meningitis workup   - continue with cefepime 2g q8h (11/30- )   - azithromycin 500 mg started (12/2- ) , pending urinary legionella results   -Repeat Bcx sent 12/2 F/U results for clearance   - Fungitel sent F/U   - F/U MRSA  - ID following   - as per nurse, pt has sacral decub. f/u with wound care nurse     Endo  - no history of diabetes, A1C 6.0  - TSH wnl  - FS stable     Heme  #MM  - holding anti-myeloma therapy  - started filgrastim per heme/onc  - Hemonc following recs appreciated     #Panycotopenia  - new thrombocytopenia, anemia, and +hemolysis labs (bili uptrending)  -? reported history as per hemonc of ITP? should clarify with [facility]   - direct dede negative  - concern for MAHA, possible HUS with E. Coli bacteremia?  - some schistocytes on peripheral smear, confirmed with hematology/oncology, however higher suspicion for lab abnormalities secondary to sepsis  - monitor CBC q12 and hemolysis labs daily  - Platelet transfusion protocol: <10, <20 with fever, <50 procedures/active bleeding/etc   - Transfuse PRBC < 7    #DVT ppx  hold off on DVT ppx for now  -Ordered intermittent pneumatic compression     Skin  #Suspected DTI on sacrum and ear as per nurse  - Wound care recs placed- F/u     Ethics  - updated son, he is pediatric resident and patient's HCP  - currently full code

## 2022-12-03 LAB
ALBUMIN SERPL ELPH-MCNC: 2.3 G/DL — LOW (ref 3.3–5)
ALBUMIN SERPL ELPH-MCNC: 2.3 G/DL — LOW (ref 3.3–5)
ALP SERPL-CCNC: 114 U/L — SIGNIFICANT CHANGE UP (ref 40–120)
ALP SERPL-CCNC: 125 U/L — HIGH (ref 40–120)
ALT FLD-CCNC: 63 U/L — HIGH (ref 4–33)
ALT FLD-CCNC: 64 U/L — HIGH (ref 4–33)
ANION GAP SERPL CALC-SCNC: 12 MMOL/L — SIGNIFICANT CHANGE UP (ref 7–14)
ANION GAP SERPL CALC-SCNC: 13 MMOL/L — SIGNIFICANT CHANGE UP (ref 7–14)
AST SERPL-CCNC: 118 U/L — HIGH (ref 4–32)
AST SERPL-CCNC: 133 U/L — HIGH (ref 4–32)
BASOPHILS # BLD AUTO: 0.01 K/UL — SIGNIFICANT CHANGE UP (ref 0–0.2)
BASOPHILS # BLD AUTO: 0.01 K/UL — SIGNIFICANT CHANGE UP (ref 0–0.2)
BASOPHILS NFR BLD AUTO: 0.4 % — SIGNIFICANT CHANGE UP (ref 0–2)
BASOPHILS NFR BLD AUTO: 0.6 % — SIGNIFICANT CHANGE UP (ref 0–2)
BILIRUB SERPL-MCNC: 1 MG/DL — SIGNIFICANT CHANGE UP (ref 0.2–1.2)
BILIRUB SERPL-MCNC: 1 MG/DL — SIGNIFICANT CHANGE UP (ref 0.2–1.2)
BLOOD GAS ARTERIAL - LYTES,HGB,ICA,LACT RESULT: SIGNIFICANT CHANGE UP
BLOOD GAS ARTERIAL COMPREHENSIVE RESULT: SIGNIFICANT CHANGE UP
BUN SERPL-MCNC: 61 MG/DL — HIGH (ref 7–23)
BUN SERPL-MCNC: 63 MG/DL — HIGH (ref 7–23)
CALCIUM SERPL-MCNC: 9.1 MG/DL — SIGNIFICANT CHANGE UP (ref 8.4–10.5)
CALCIUM SERPL-MCNC: 9.6 MG/DL — SIGNIFICANT CHANGE UP (ref 8.4–10.5)
CHLORIDE SERPL-SCNC: 113 MMOL/L — HIGH (ref 98–107)
CHLORIDE SERPL-SCNC: 117 MMOL/L — HIGH (ref 98–107)
CK SERPL-CCNC: 1007 U/L — HIGH (ref 25–170)
CO2 SERPL-SCNC: 17 MMOL/L — LOW (ref 22–31)
CO2 SERPL-SCNC: 18 MMOL/L — LOW (ref 22–31)
CREAT SERPL-MCNC: 1.31 MG/DL — HIGH (ref 0.5–1.3)
CREAT SERPL-MCNC: 1.5 MG/DL — HIGH (ref 0.5–1.3)
CULTURE RESULTS: NO GROWTH — SIGNIFICANT CHANGE UP
D DIMER BLD IA.RAPID-MCNC: 3307 NG/ML DDU — HIGH
EGFR: 39 ML/MIN/1.73M2 — LOW
EGFR: 46 ML/MIN/1.73M2 — LOW
EOSINOPHIL # BLD AUTO: 0 K/UL — SIGNIFICANT CHANGE UP (ref 0–0.5)
EOSINOPHIL # BLD AUTO: 0 K/UL — SIGNIFICANT CHANGE UP (ref 0–0.5)
EOSINOPHIL NFR BLD AUTO: 0 % — SIGNIFICANT CHANGE UP (ref 0–6)
EOSINOPHIL NFR BLD AUTO: 0 % — SIGNIFICANT CHANGE UP (ref 0–6)
FERRITIN SERPL-MCNC: HIGH NG/ML (ref 15–150)
FUNGITELL: 39 PG/ML — SIGNIFICANT CHANGE UP
GLUCOSE BLDC GLUCOMTR-MCNC: 158 MG/DL — HIGH (ref 70–99)
GLUCOSE BLDC GLUCOMTR-MCNC: 166 MG/DL — HIGH (ref 70–99)
GLUCOSE BLDC GLUCOMTR-MCNC: 214 MG/DL — HIGH (ref 70–99)
GLUCOSE BLDC GLUCOMTR-MCNC: 237 MG/DL — HIGH (ref 70–99)
GLUCOSE SERPL-MCNC: 204 MG/DL — HIGH (ref 70–99)
GLUCOSE SERPL-MCNC: 231 MG/DL — HIGH (ref 70–99)
HAPTOGLOB SERPL-MCNC: 141 MG/DL — SIGNIFICANT CHANGE UP (ref 34–200)
HCT VFR BLD CALC: 22.9 % — LOW (ref 34.5–45)
HCT VFR BLD CALC: 22.9 % — LOW (ref 34.5–45)
HGB BLD-MCNC: 7.5 G/DL — LOW (ref 11.5–15.5)
HGB BLD-MCNC: 7.5 G/DL — LOW (ref 11.5–15.5)
IANC: 0.24 K/UL — LOW (ref 1.8–7.4)
IANC: 0.3 K/UL — LOW (ref 1.8–7.4)
IMM GRANULOCYTES NFR BLD AUTO: 45.3 % — HIGH (ref 0–0.9)
IMM GRANULOCYTES NFR BLD AUTO: 55.8 % — HIGH (ref 0–0.9)
INR BLD: 1.29 RATIO — HIGH (ref 0.88–1.16)
LDH SERPL L TO P-CCNC: 920 U/L — HIGH (ref 135–225)
LEGIONELLA AG UR QL: NEGATIVE — SIGNIFICANT CHANGE UP
LIDOCAIN IGE QN: 12 U/L — SIGNIFICANT CHANGE UP (ref 7–60)
LYMPHOCYTES # BLD AUTO: 0.5 K/UL — LOW (ref 1–3.3)
LYMPHOCYTES # BLD AUTO: 0.53 K/UL — LOW (ref 1–3.3)
LYMPHOCYTES # BLD AUTO: 22.9 % — SIGNIFICANT CHANGE UP (ref 13–44)
LYMPHOCYTES # BLD AUTO: 27.6 % — SIGNIFICANT CHANGE UP (ref 13–44)
MAGNESIUM SERPL-MCNC: 2.4 MG/DL — SIGNIFICANT CHANGE UP (ref 1.6–2.6)
MAGNESIUM SERPL-MCNC: 2.4 MG/DL — SIGNIFICANT CHANGE UP (ref 1.6–2.6)
MCHC RBC-ENTMCNC: 32.8 GM/DL — SIGNIFICANT CHANGE UP (ref 32–36)
MCHC RBC-ENTMCNC: 32.8 GM/DL — SIGNIFICANT CHANGE UP (ref 32–36)
MCHC RBC-ENTMCNC: 34.2 PG — HIGH (ref 27–34)
MCHC RBC-ENTMCNC: 34.2 PG — HIGH (ref 27–34)
MCV RBC AUTO: 104.6 FL — HIGH (ref 80–100)
MCV RBC AUTO: 104.6 FL — HIGH (ref 80–100)
MONOCYTES # BLD AUTO: 0.18 K/UL — SIGNIFICANT CHANGE UP (ref 0–0.9)
MONOCYTES # BLD AUTO: 0.24 K/UL — SIGNIFICANT CHANGE UP (ref 0–0.9)
MONOCYTES NFR BLD AUTO: 10.4 % — SIGNIFICANT CHANGE UP (ref 2–14)
MONOCYTES NFR BLD AUTO: 9.9 % — SIGNIFICANT CHANGE UP (ref 2–14)
NEUTROPHILS # BLD AUTO: 0.24 K/UL — LOW (ref 1.8–7.4)
NEUTROPHILS # BLD AUTO: 0.3 K/UL — LOW (ref 1.8–7.4)
NEUTROPHILS NFR BLD AUTO: 10.5 % — LOW (ref 43–77)
NEUTROPHILS NFR BLD AUTO: 16.6 % — LOW (ref 43–77)
NRBC # BLD: 2 /100 WBCS — HIGH (ref 0–0)
NRBC # BLD: 3 /100 WBCS — HIGH (ref 0–0)
NRBC # FLD: 0.05 K/UL — HIGH (ref 0–0)
NRBC # FLD: 0.05 K/UL — HIGH (ref 0–0)
PHOSPHATE SERPL-MCNC: 3.2 MG/DL — SIGNIFICANT CHANGE UP (ref 2.5–4.5)
PHOSPHATE SERPL-MCNC: 3.3 MG/DL — SIGNIFICANT CHANGE UP (ref 2.5–4.5)
PLATELET # BLD AUTO: 19 K/UL — CRITICAL LOW (ref 150–400)
PLATELET # BLD AUTO: 22 K/UL — LOW (ref 150–400)
POTASSIUM SERPL-MCNC: 3.4 MMOL/L — LOW (ref 3.5–5.3)
POTASSIUM SERPL-MCNC: 3.9 MMOL/L — SIGNIFICANT CHANGE UP (ref 3.5–5.3)
POTASSIUM SERPL-SCNC: 3.4 MMOL/L — LOW (ref 3.5–5.3)
POTASSIUM SERPL-SCNC: 3.9 MMOL/L — SIGNIFICANT CHANGE UP (ref 3.5–5.3)
PROT SERPL-MCNC: 5.3 G/DL — LOW (ref 6–8.3)
PROT SERPL-MCNC: 5.4 G/DL — LOW (ref 6–8.3)
PROTHROM AB SERPL-ACNC: 15 SEC — HIGH (ref 10.5–13.4)
RBC # BLD: 2.19 M/UL — LOW (ref 3.8–5.2)
RBC # FLD: 14.9 % — HIGH (ref 10.3–14.5)
RBC # FLD: 15.1 % — HIGH (ref 10.3–14.5)
RETICS #: 12.9 K/UL — LOW (ref 25–125)
RETICS/RBC NFR: 0.6 % — SIGNIFICANT CHANGE UP (ref 0.5–2.5)
SODIUM SERPL-SCNC: 143 MMOL/L — SIGNIFICANT CHANGE UP (ref 135–145)
SODIUM SERPL-SCNC: 147 MMOL/L — HIGH (ref 135–145)
SPECIMEN SOURCE: SIGNIFICANT CHANGE UP
TRIGL SERPL-MCNC: 519 MG/DL — HIGH
WBC # BLD: 1.81 K/UL — LOW (ref 3.8–10.5)
WBC # BLD: 2.31 K/UL — LOW (ref 3.8–10.5)
WBC # FLD AUTO: 1.81 K/UL — LOW (ref 3.8–10.5)
WBC # FLD AUTO: 2.31 K/UL — LOW (ref 3.8–10.5)

## 2022-12-03 PROCEDURE — 99291 CRITICAL CARE FIRST HOUR: CPT | Mod: FS

## 2022-12-03 RX ORDER — NOREPINEPHRINE BITARTRATE/D5W 8 MG/250ML
0.05 PLASTIC BAG, INJECTION (ML) INTRAVENOUS
Qty: 8 | Refills: 0 | Status: DISCONTINUED | OUTPATIENT
Start: 2022-12-03 | End: 2022-12-04

## 2022-12-03 RX ORDER — MIDODRINE HYDROCHLORIDE 2.5 MG/1
10 TABLET ORAL EVERY 8 HOURS
Refills: 0 | Status: DISCONTINUED | OUTPATIENT
Start: 2022-12-03 | End: 2022-12-13

## 2022-12-03 RX ORDER — POTASSIUM CHLORIDE 20 MEQ
40 PACKET (EA) ORAL ONCE
Refills: 0 | Status: COMPLETED | OUTPATIENT
Start: 2022-12-03 | End: 2022-12-03

## 2022-12-03 RX ORDER — ACETAMINOPHEN 500 MG
1000 TABLET ORAL ONCE
Refills: 0 | Status: COMPLETED | OUTPATIENT
Start: 2022-12-03 | End: 2022-12-03

## 2022-12-03 RX ORDER — FENTANYL CITRATE 50 UG/ML
100 INJECTION INTRAVENOUS ONCE
Refills: 0 | Status: DISCONTINUED | OUTPATIENT
Start: 2022-12-03 | End: 2022-12-03

## 2022-12-03 RX ADMIN — MIDODRINE HYDROCHLORIDE 10 MILLIGRAM(S): 2.5 TABLET ORAL at 22:23

## 2022-12-03 RX ADMIN — Medication 1: at 18:12

## 2022-12-03 RX ADMIN — CEFEPIME 100 MILLIGRAM(S): 1 INJECTION, POWDER, FOR SOLUTION INTRAMUSCULAR; INTRAVENOUS at 05:30

## 2022-12-03 RX ADMIN — CHLORHEXIDINE GLUCONATE 15 MILLILITER(S): 213 SOLUTION TOPICAL at 05:30

## 2022-12-03 RX ADMIN — Medication 2: at 12:05

## 2022-12-03 RX ADMIN — CEFEPIME 100 MILLIGRAM(S): 1 INJECTION, POWDER, FOR SOLUTION INTRAMUSCULAR; INTRAVENOUS at 18:17

## 2022-12-03 RX ADMIN — Medication 2: at 06:14

## 2022-12-03 RX ADMIN — Medication 8.16 MICROGRAM(S)/KG/MIN: at 20:04

## 2022-12-03 RX ADMIN — CHLORHEXIDINE GLUCONATE 15 MILLILITER(S): 213 SOLUTION TOPICAL at 18:17

## 2022-12-03 RX ADMIN — FENTANYL CITRATE 100 MICROGRAM(S): 50 INJECTION INTRAVENOUS at 08:30

## 2022-12-03 RX ADMIN — Medication 480 MICROGRAM(S): at 14:17

## 2022-12-03 RX ADMIN — Medication 40 MILLIEQUIVALENT(S): at 05:30

## 2022-12-03 RX ADMIN — Medication 1 DROP(S): at 06:23

## 2022-12-03 RX ADMIN — Medication 1: at 23:00

## 2022-12-03 RX ADMIN — Medication 400 MILLIGRAM(S): at 06:23

## 2022-12-03 RX ADMIN — Medication 8.16 MICROGRAM(S)/KG/MIN: at 10:43

## 2022-12-03 RX ADMIN — Medication 1 DROP(S): at 18:08

## 2022-12-03 RX ADMIN — PHENYLEPHRINE HYDROCHLORIDE 1 MICROGRAM(S)/KG/MIN: 10 INJECTION INTRAVENOUS at 09:01

## 2022-12-03 RX ADMIN — Medication 1000 MILLIGRAM(S): at 07:00

## 2022-12-03 RX ADMIN — DEXMEDETOMIDINE HYDROCHLORIDE IN 0.9% SODIUM CHLORIDE 0.5 MICROGRAM(S)/KG/HR: 4 INJECTION INTRAVENOUS at 09:03

## 2022-12-03 RX ADMIN — AZITHROMYCIN 255 MILLIGRAM(S): 500 TABLET, FILM COATED ORAL at 12:09

## 2022-12-03 NOTE — PROGRESS NOTE ADULT - ASSESSMENT
Patient carries the dx of multiple Myeloma from savanah 2011 IgG kappa. Over the course of disease she has received multiple lines of treatment including 2 autotransplants, last in 3/2020. She was restarted on Belantamab in September 2022 for increase in free light chain ratio and her last treatment was on 11/23/22. She has been on Promacta in June 2022 and also had Rituxan for thrombocytopenia in past Patient is a 62 yoF w/ pmhx of MM (diagnosed >10 years ago HTN, neuropathic R hip pain, recent R corneal tear, presened to [facility] ED for change in mental status 11/30/22.  In the ED, patient with persisting word finding difficulties and generalized weakness, but leaning to the R side. Patient reported some difficulty remembering what happened but reported she felt generally weak and very tired. Code stroke called, but TPA not given because of improvement in pt's symptoms.   In the ED pt was febrile 105.5, , /84, RR 24 and saturating well on RA. Pt given 2L IVF bolus and 1x vanc/ceftriaxone/acyclovir/ampicillin   Had E Coli sepsis. Was intubated but extubated 12/3/22    Oncologic history: Myeloma dx 2009 rev/dx initially, then d/c. 2011 back pain IgG spike 4900 high k/l ratio. Rt to l spine, SI jts. restarted Rev/Dex with improvement M-spike   jan 2015 progressed rx with bortezomib with response briefly. July 2015 worse M-spike rx with carfilzomib, pom/Dex then DCEP chemo, followed by PBSCT.  then maintenance revlimid.  2017 reactivation hep B. 2018 new skeletal lesions started brown/velcade/dex developed neutr fever and superficial phlebitis tx with abx and apixaban a/c.  2020 had another PBSCT then haplo allo stem cell transplant.  2020 maintenance pomalidomide. Nov 2021 new lesions in vert bodies and scattered lesions c/w extraosseal myeloma.  dec 2021 started belantamab has had marked thrombocytopenia requiring plt transfusions. BM may 2022 no myeloma  june 2022 started Promacta.  multiple adm for neutropenia and thrombocytopenia  sept 2022 incr light chains restarted belantamab, last treatment 11/23/22.  She is also needing management of corneal tear    Clinically patient is improving with treatment of sepsis. Continue supportive care, abx and Zarxio till ANC improvement. Continue Promacta and use platelets a needed.  No evidence clinically of microangiopathic hemolysis as questioned by staff.  Ferritin is high as acute phase reactant.

## 2022-12-03 NOTE — PROGRESS NOTE ADULT - SUBJECTIVE AND OBJECTIVE BOX
INTERVAL HPI/OVERNIGHT EVENTS: Ferritin 10,644, trig 519- labs concerning for HLH.    HPI:62 yoF w/ pmhx of MM (diagnosed ~10 years ago, currently on Promacta treatments since 2022 - most recently last Wednesday; followed by oncologist, Dr. Lopez at [facility]), HTN, neuropathic R hip pain, recent R corneal tear, presents to [facility] ED for change in mental status. LKW approximately 14:30. Patient was in normal state of health this morning as per son, Nacho. Patient went to a follow up ophtho appointment for a recently diagnosed corneal abrasion this past Monday. Patient was picked up by a friend around 10:00 for an optho appt with normal mental status, pt was just complaining of being sleepy. On the way back from appointment around 14:30, patient stopped responding to friend appropriately in the car and she appeared more tired & weaker than usual. She started having slurred speech when they arrived home and then son called EMS. In the ED, patient with persisting word finding difficulties and generalized weakness, but leaning to the R side. Patient reports some difficulty remembering what happened today, but reports she feels generally weak and very tired. Code stroke called, but TPA not given because of improvement in pt's symptoms.  Pt states that she had a similar episode a few years ago with fever which she was told was likely from a MM, she improved after getting steroids.     In the ED pt was febrile 105.5, , /84, RR 24 and saturating well on RA. Pt given 2L IVF bolus and 1x vanc/ceftriaxone/acyclovir/ampicillin s/p LP in ER negative results.  Patient was RR later that day for hypotension/ hypoxia requiring intubation and pressor support, brought to MICU in shock.       SUBJECTIVE: Patient seen and examined at bedside.     ROS: Unable to assess as patient intubated/ sedated.     OBJECTIVE:    VITAL SIGNS:  ICU Vital Signs Last 24 Hrs  T(C): 39.8 (03 Dec 2022 04:00), Max: 39.8 (03 Dec 2022 04:00)  T(F): 103.6 (03 Dec 2022 04:00), Max: 103.6 (03 Dec 2022 04:00)  HR: 72 (03 Dec 2022 07:00) (72 - 90)  BP: 99/66 (02 Dec 2022 09:00) (99/66 - 99/66)  BP(mean): 73 (02 Dec 2022 09:00) (73 - 73)  ABP: 104/59 (03 Dec 2022 07:00) (97/55 - 124/69)  ABP(mean): 75 (03 Dec 2022 07:00) (69 - 89)  RR: 17 (03 Dec 2022 07:00) (17 - 26)  SpO2: 100% (03 Dec 2022 07:00) (98% - 100%)    O2 Parameters below as of 03 Dec 2022 07:00  Patient On (Oxygen Delivery Method): ventilator    O2 Concentration (%): 30      Mode: AC/ CMV (Assist Control/ Continuous Mandatory Ventilation), RR (machine): 14, TV (machine): 450, FiO2: 30, PEEP: 5, ITime: 0.6, MAP: 10, PIP: 26    12-02 @ 07:01  -  12-03 @ 07:00  --------------------------------------------------------  IN: 2251.4 mL / OUT: 1160 mL / NET: 1091.4 mL      CAPILLARY BLOOD GLUCOSE      POCT Blood Glucose.: 237 mg/dL (03 Dec 2022 06:13)      PHYSICAL EXAM:    General: NAD  HEENT: NC/AT; PERRL, clear conjunctiva  Neck: supple  Respiratory: CTA b/l  Cardiovascular: +S1/S2; RRR  Abdomen: soft, NT/ND; +BS x4  Extremities: WWP, 2+ peripheral pulses b/l; no LE edema  Skin: normal color and turgor; no rash  Neurological:    MEDICATIONS:  MEDICATIONS  (STANDING):  artificial tears (preservative free) Ophthalmic Solution 1 Drop(s) Both EYES two times a day  azithromycin  IVPB 500 milliGRAM(s) IV Intermittent every 24 hours  azithromycin  IVPB      cefepime   IVPB 2000 milliGRAM(s) IV Intermittent every 12 hours  chlorhexidine 0.12% Liquid 15 milliLiter(s) Oral Mucosa every 12 hours  dexMEDEtomidine Infusion 0.023 MICROgram(s)/kG/Hr (0.5 mL/Hr) IV Continuous <Continuous>  dextrose 5%. 1000 milliLiter(s) (50 mL/Hr) IV Continuous <Continuous>  dextrose 5%. 1000 milliLiter(s) (100 mL/Hr) IV Continuous <Continuous>  dextrose 50% Injectable 25 Gram(s) IV Push once  dextrose 50% Injectable 12.5 Gram(s) IV Push once  dextrose 50% Injectable 25 Gram(s) IV Push once  filgrastim-sndz (ZARXIO) Injectable 480 MICROGram(s) SubCutaneous daily  glucagon  Injectable 1 milliGRAM(s) IntraMuscular once  insulin lispro (ADMELOG) corrective regimen sliding scale   SubCutaneous every 6 hours  phenylephrine    Infusion 0.031 MICROgram(s)/kG/Min (1 mL/Hr) IV Continuous <Continuous>  polyethylene glycol 3350 17 Gram(s) Oral daily  senna Syrup 10 milliLiter(s) Oral daily    MEDICATIONS  (PRN):  dextrose Oral Gel 15 Gram(s) Oral once PRN Blood Glucose LESS THAN 70 milliGRAM(s)/deciliter      ALLERGIES:  Allergies    Bactrim (Other)  fluconazole (Vomiting; Nausea)  levofloxacin (Vomiting; Nausea)  penicillin (Other)    Intolerances        LABS:                        7.5    1.81  )-----------( 22       ( 03 Dec 2022 03:15 )             22.9     12-    143  |  113<H>  |  63<H>  ----------------------------<  231<H>  3.4<L>   |  18<L>  |  1.50<H>    Ca    9.1      03 Dec 2022 03:15  Phos  3.3     12  Mg     2.40         TPro  5.3<L>  /  Alb  2.3<L>  /  TBili  1.0  /  DBili  x   /  AST  118<H>  /  ALT  63<H>  /  AlkPhos  114  12-    PT/INR - ( 03 Dec 2022 05:20 )   PT: 15.0 sec;   INR: 1.29 ratio           Urinalysis Basic - ( 02 Dec 2022 15:55 )    Color: Yellow / Appearance: Slightly Turbid / S.028 / pH: x  Gluc: x / Ketone: Trace  / Bili: Negative / Urobili: <2 mg/dL   Blood: x / Protein: 300 mg/dL / Nitrite: Negative   Leuk Esterase: Negative / RBC: 5 /HPF / WBC 16 /HPF   Sq Epi: x / Non Sq Epi: 5 /HPF / Bacteria: Negative        RADIOLOGY & ADDITIONAL TESTS: Reviewed. INTERVAL HPI/OVERNIGHT EVENTS: Ferritin 10,644, trig 519- labs concerning for HLH vs hemolytic E. coli     HPI:62 yoF w/ pmhx of MM (diagnosed ~10 years ago, currently on Promacta treatments since 2022 - most recently last Wednesday; followed by oncologist, Dr. Lopez at [facility]), HTN, neuropathic R hip pain, recent R corneal tear, presents to [facility] ED for change in mental status. LKW approximately 14:30. Patient was in normal state of health this morning as per son, Nacho. Patient went to a follow up ophtho appointment for a recently diagnosed corneal abrasion this past Monday. Patient was picked up by a friend around 10:00 for an optho appt with normal mental status, pt was just complaining of being sleepy. On the way back from appointment around 14:30, patient stopped responding to friend appropriately in the car and she appeared more tired & weaker than usual. She started having slurred speech when they arrived home and then son called EMS. In the ED, patient with persisting word finding difficulties and generalized weakness, but leaning to the R side. Patient reports some difficulty remembering what happened today, but reports she feels generally weak and very tired. Code stroke called, but TPA not given because of improvement in pt's symptoms.  Pt states that she had a similar episode a few years ago with fever which she was told was likely from a MM, she improved after getting steroids.     In the ED pt was febrile 105.5, , /84, RR 24 and saturating well on RA. Pt given 2L IVF bolus and 1x vanc/ceftriaxone/acyclovir/ampicillin s/p LP in ER negative results.  Patient was RR later that day for hypotension/ hypoxia requiring intubation and pressor support, brought to MICU in shock.       SUBJECTIVE: Patient seen and examined at bedside.     ROS: Unable to assess as patient intubated/ sedated.     OBJECTIVE:    VITAL SIGNS:  ICU Vital Signs Last 24 Hrs  T(C): 39.8 (03 Dec 2022 04:00), Max: 39.8 (03 Dec 2022 04:00)  T(F): 103.6 (03 Dec 2022 04:00), Max: 103.6 (03 Dec 2022 04:00)  HR: 72 (03 Dec 2022 07:00) (72 - 90)  BP: 99/66 (02 Dec 2022 09:00) (99/66 - 99/66)  BP(mean): 73 (02 Dec 2022 09:00) (73 - 73)  ABP: 104/59 (03 Dec 2022 07:00) (97/55 - 124/69)  ABP(mean): 75 (03 Dec 2022 07:00) (69 - 89)  RR: 17 (03 Dec 2022 07:00) (17 - 26)  SpO2: 100% (03 Dec 2022 07:00) (98% - 100%)    O2 Parameters below as of 03 Dec 2022 07:00  Patient On (Oxygen Delivery Method): ventilator    O2 Concentration (%): 30      Mode: AC/ CMV (Assist Control/ Continuous Mandatory Ventilation), RR (machine): 14, TV (machine): 450, FiO2: 30, PEEP: 5, ITime: 0.6, MAP: 10, PIP: 26    12-02 @ 07:01  -  12-03 @ 07:00  --------------------------------------------------------  IN: 2251.4 mL / OUT: 1160 mL / NET: 1091.4 mL      CAPILLARY BLOOD GLUCOSE      POCT Blood Glucose.: 237 mg/dL (03 Dec 2022 06:13)      PHYSICAL EXAM:    General: NAD  HEENT: NC/AT; PERRL, clear conjunctica on R, yellow conjunctiva on R  Neck: supple  Respiratory: CTA b/l  Cardiovascular: +S1/S2; RRR  Abdomen: soft, NT/ND; +BS x4  Extremities: WWP, 2+ peripheral pulses b/l; no LE edema  Skin: normal color and turgor; no rash  Neurological: Awake alert following commands while intubated     MEDICATIONS:  MEDICATIONS  (STANDING):  artificial tears (preservative free) Ophthalmic Solution 1 Drop(s) Both EYES two times a day  azithromycin  IVPB 500 milliGRAM(s) IV Intermittent every 24 hours  azithromycin  IVPB      cefepime   IVPB 2000 milliGRAM(s) IV Intermittent every 12 hours  chlorhexidine 0.12% Liquid 15 milliLiter(s) Oral Mucosa every 12 hours  dexMEDEtomidine Infusion 0.023 MICROgram(s)/kG/Hr (0.5 mL/Hr) IV Continuous <Continuous>  dextrose 5%. 1000 milliLiter(s) (50 mL/Hr) IV Continuous <Continuous>  dextrose 5%. 1000 milliLiter(s) (100 mL/Hr) IV Continuous <Continuous>  dextrose 50% Injectable 25 Gram(s) IV Push once  dextrose 50% Injectable 12.5 Gram(s) IV Push once  dextrose 50% Injectable 25 Gram(s) IV Push once  filgrastim-sndz (ZARXIO) Injectable 480 MICROGram(s) SubCutaneous daily  glucagon  Injectable 1 milliGRAM(s) IntraMuscular once  insulin lispro (ADMELOG) corrective regimen sliding scale   SubCutaneous every 6 hours  phenylephrine    Infusion 0.031 MICROgram(s)/kG/Min (1 mL/Hr) IV Continuous <Continuous>  polyethylene glycol 3350 17 Gram(s) Oral daily  senna Syrup 10 milliLiter(s) Oral daily    MEDICATIONS  (PRN):  dextrose Oral Gel 15 Gram(s) Oral once PRN Blood Glucose LESS THAN 70 milliGRAM(s)/deciliter      ALLERGIES:  Allergies    Bactrim (Other)  fluconazole (Vomiting; Nausea)  levofloxacin (Vomiting; Nausea)  penicillin (Other)    Intolerances        LABS:                        7.5    1.81  )-----------( 22       ( 03 Dec 2022 03:15 )             22.9     12    143  |  113<H>  |  63<H>  ----------------------------<  231<H>  3.4<L>   |  18<L>  |  1.50<H>    Ca    9.1      03 Dec 2022 03:15  Phos  3.3     12-  Mg     2.40     12    TPro  5.3<L>  /  Alb  2.3<L>  /  TBili  1.0  /  DBili  x   /  AST  118<H>  /  ALT  63<H>  /  AlkPhos  114  12    PT/INR - ( 03 Dec 2022 05:20 )   PT: 15.0 sec;   INR: 1.29 ratio           Urinalysis Basic - ( 02 Dec 2022 15:55 )    Color: Yellow / Appearance: Slightly Turbid / S.028 / pH: x  Gluc: x / Ketone: Trace  / Bili: Negative / Urobili: <2 mg/dL   Blood: x / Protein: 300 mg/dL / Nitrite: Negative   Leuk Esterase: Negative / RBC: 5 /HPF / WBC 16 /HPF   Sq Epi: x / Non Sq Epi: 5 /HPF / Bacteria: Negative        RADIOLOGY & ADDITIONAL TESTS: Reviewed. INTERVAL HPI/OVERNIGHT EVENTS: Ferritin 10,644, trig 519- labs concerning for HLH vs hemolytic E. coli     HPI:62 yoF w/ pmhx of MM (diagnosed ~10 years ago, currently on Promacta treatments since 2022 - most recently last Wednesday; followed by oncologist, Dr. Lopez at [facility]), HTN, neuropathic R hip pain, recent R corneal tear, presents to [facility] ED for change in mental status. LKW approximately 14:30. Patient was in normal state of health this morning as per son, Nacho. Patient went to a follow up ophtho appointment for a recently diagnosed corneal abrasion this past Monday. Patient was picked up by a friend around 10:00 for an optho appt with normal mental status, pt was just complaining of being sleepy. On the way back from appointment around 14:30, patient stopped responding to friend appropriately in the car and she appeared more tired & weaker than usual. She started having slurred speech when they arrived home and then son called EMS. In the ED, patient with persisting word finding difficulties and generalized weakness, but leaning to the R side. Patient reports some difficulty remembering what happened today, but reports she feels generally weak and very tired. Code stroke called, but TPA not given because of improvement in pt's symptoms.  Pt states that she had a similar episode a few years ago with fever which she was told was likely from a MM, she improved after getting steroids.     In the ED pt was febrile 105.5, , /84, RR 24 and saturating well on RA. Pt given 2L IVF bolus and 1x vanc/ceftriaxone/acyclovir/ampicillin s/p LP in ER negative results.  Patient was RR later that day for hypotension/ hypoxia requiring intubation and pressor support, brought to MICU in shock.       SUBJECTIVE: Patient seen and examined at bedside.     ROS: Unable to assess as patient intubated/ sedated.     OBJECTIVE:    VITAL SIGNS:  ICU Vital Signs Last 24 Hrs  T(C): 39.8 (03 Dec 2022 04:00), Max: 39.8 (03 Dec 2022 04:00)  T(F): 103.6 (03 Dec 2022 04:00), Max: 103.6 (03 Dec 2022 04:00)  HR: 72 (03 Dec 2022 07:00) (72 - 90)  BP: 99/66 (02 Dec 2022 09:00) (99/66 - 99/66)  BP(mean): 73 (02 Dec 2022 09:00) (73 - 73)  ABP: 104/59 (03 Dec 2022 07:00) (97/55 - 124/69)  ABP(mean): 75 (03 Dec 2022 07:00) (69 - 89)  RR: 17 (03 Dec 2022 07:00) (17 - 26)  SpO2: 100% (03 Dec 2022 07:00) (98% - 100%)    O2 Parameters below as of 03 Dec 2022 07:00  Patient On (Oxygen Delivery Method): ventilator    O2 Concentration (%): 30      Mode: AC/ CMV (Assist Control/ Continuous Mandatory Ventilation), RR (machine): 14, TV (machine): 450, FiO2: 30, PEEP: 5, ITime: 0.6, MAP: 10, PIP: 26    12-02 @ 07:01  -  12-03 @ 07:00  --------------------------------------------------------  IN: 2251.4 mL / OUT: 1160 mL / NET: 1091.4 mL      CAPILLARY BLOOD GLUCOSE      POCT Blood Glucose.: 237 mg/dL (03 Dec 2022 06:13)      PHYSICAL EXAM:    General: NAD  HEENT: NC/AT; PERRL, clear conjunctica on R, yellow conjunctiva on R  Neck: supple  Respiratory: CTA b/l  Cardiovascular: +S1/S2; RRR  Abdomen: soft, NT/ND; +BS x4  Extremities: WWP, 2+ peripheral pulses b/l; no LE edema  Skin: normal color and turgor; no rash  Neurological: Awake alert following commands, [place]    MEDICATIONS:  MEDICATIONS  (STANDING):  artificial tears (preservative free) Ophthalmic Solution 1 Drop(s) Both EYES two times a day  azithromycin  IVPB 500 milliGRAM(s) IV Intermittent every 24 hours  azithromycin  IVPB      cefepime   IVPB 2000 milliGRAM(s) IV Intermittent every 12 hours  chlorhexidine 0.12% Liquid 15 milliLiter(s) Oral Mucosa every 12 hours  dexMEDEtomidine Infusion 0.023 MICROgram(s)/kG/Hr (0.5 mL/Hr) IV Continuous <Continuous>  dextrose 5%. 1000 milliLiter(s) (50 mL/Hr) IV Continuous <Continuous>  dextrose 5%. 1000 milliLiter(s) (100 mL/Hr) IV Continuous <Continuous>  dextrose 50% Injectable 25 Gram(s) IV Push once  dextrose 50% Injectable 12.5 Gram(s) IV Push once  dextrose 50% Injectable 25 Gram(s) IV Push once  filgrastim-sndz (ZARXIO) Injectable 480 MICROGram(s) SubCutaneous daily  glucagon  Injectable 1 milliGRAM(s) IntraMuscular once  insulin lispro (ADMELOG) corrective regimen sliding scale   SubCutaneous every 6 hours  phenylephrine    Infusion 0.031 MICROgram(s)/kG/Min (1 mL/Hr) IV Continuous <Continuous>  polyethylene glycol 3350 17 Gram(s) Oral daily  senna Syrup 10 milliLiter(s) Oral daily    MEDICATIONS  (PRN):  dextrose Oral Gel 15 Gram(s) Oral once PRN Blood Glucose LESS THAN 70 milliGRAM(s)/deciliter      ALLERGIES:  Allergies    Bactrim (Other)  fluconazole (Vomiting; Nausea)  levofloxacin (Vomiting; Nausea)  penicillin (Other)    Intolerances        LABS:                        7.5    1.81  )-----------( 22       ( 03 Dec 2022 03:15 )             22.9     12-    143  |  113<H>  |  63<H>  ----------------------------<  231<H>  3.4<L>   |  18<L>  |  1.50<H>    Ca    9.1      03 Dec 2022 03:15  Phos  3.3     12-  Mg     2.40     12    TPro  5.3<L>  /  Alb  2.3<L>  /  TBili  1.0  /  DBili  x   /  AST  118<H>  /  ALT  63<H>  /  AlkPhos  114  12    PT/INR - ( 03 Dec 2022 05:20 )   PT: 15.0 sec;   INR: 1.29 ratio           Urinalysis Basic - ( 02 Dec 2022 15:55 )    Color: Yellow / Appearance: Slightly Turbid / S.028 / pH: x  Gluc: x / Ketone: Trace  / Bili: Negative / Urobili: <2 mg/dL   Blood: x / Protein: 300 mg/dL / Nitrite: Negative   Leuk Esterase: Negative / RBC: 5 /HPF / WBC 16 /HPF   Sq Epi: x / Non Sq Epi: 5 /HPF / Bacteria: Negative        RADIOLOGY & ADDITIONAL TESTS: Reviewed.

## 2022-12-03 NOTE — PROGRESS NOTE ADULT - CRITICAL CARE ATTENDING COMMENT
63 y/o F with PMH as above here with AMS  LP showed no e/o meningitis but BCXs (+) for e. coli  Source uncertain for bacteremia but surveillance cxs are negative  RUL pneumonia, now extubated  GARCIA, cont to monitor for now with good UOP  Pancytopenia, on Neupogen, follow up heme  Plan for bedside swallow eval  Consult PT  Rest of plan as above

## 2022-12-03 NOTE — PROGRESS NOTE ADULT - ASSESSMENT
Assessment and Plan:   	  Patient is a 61 yo F w/ PMHx of MM (diagnosed ~10 years ago, currently on Promacta treatments since June 2022 - most recently last Wednesday; followed by oncologist, Dr. Lopez at [facility]), ?ITP, Hep B, HTN, neuropathic R hip pain, recent R corneal tear admitted for AMS, code stroke called no acute infarct or hemorrhage, s/p LP findings not consistent with meningitis, course complicated by hypotension and hypoxia, placed on BiPAP, requiring pressors, admitted to MICU with sepsis likely secondary to pneumonia- intubated 11/30.     PLAN  Neuro  - s/p code stroke --> no ischemia or hemorrhage on CT H+N  - s/p LP results not consistent with encephalitis/meningitis   - baseline is AOx3  - Neurology following  - currently on precedex wean sedation as tolerated currently at 0.8    Cardiovascular  #Shock  - suspect sepsis from E. Coli bacteremia with GI or urinary source?  - s/p 4L IVF in ED  - patient tachycardic on levophed-> transitioned to phenylephrine currently at 0.4  - POCUS 12/2 showed mild to moderate decreased contractility of the LV VTI 14.4   - ECHO 11/30 EF 55 normal LV/RV   - TSH wnl       Pulmonary  #AHRF- likely secondary to PNA infection vs Aspiration   - placed on BiPAP, 10/5 on 50%, then intubated 11/30 for increased work of breathing  - MV: 14/450/5/30%   - suspect secondary to infection with procalcitonin of 8.73  - continue on cefepime for now for E. Coli bacteremia and neutropenia   - MRSA/MSSA neg   - ID following recs appreciated   - CTA 11/30: neg for PE.  RUL R basilar consolidation RML basilar opacity    GI  #NPO with tube feeds  - Jevity started today titrate to goal  - Nutrition consult placed F/U recs  - bowel regimen started today 12/2      #Elevated liver enzymes  - patient has reported hx of Hep B 2017 as per hemonc   - LFTs trending down   - bili also elevated 1.3-> 1.5-> 1.4 continue to monitor   - F/U hepatitis panel     Renal  - SCr 1.5 up from 1.03  - CPK elevated trending down 1800-> 2800-> 1007- no longer trending   - hold off on fluid resuscitation for now give POCUS findings above  - suspect secondary to sepsis  - Is and Os: pos 1.1 L yesterday, +608 ml for LOS   - Ucx 11/29 > 3 organisms likely contaminate  - Repeat UA 12/2- neg Ucx in lab     ID  #E. Coli bacteremia- pan sensitive 11/29   -Tmax overnight 103.6 WBC 1.81  on neupogen daily   - s/p vanc/CTX/ampicillin/acyclovir in ER for suspected meningitis workup   - continue with cefepime 2g q8h (11/30- )   - azithromycin 500 mg started (12/2- ) , pending urinary legionella results   -Repeat Bcx sent 12/2 NGTD   - Fungitel sent F/U   - MRSA neg  - ID following   - as per nurse, pt has sacral decub. f/u with wound care nurse     Endo  - no history of diabetes, A1C 6.0  - TSH wnl  - FS stable     Heme  #MM  - as per Hemon[facility] note MM in remission 5/22 has been on promacta since 6/22 has recieved multiple treatments for MM in the last also PBSCT x 2 and haplo allo stem cell transplant- last in 2020  - holding anti-myeloma therapy  - started filgrastim per heme/onc  - Hemonc following recs appreciated     #Panycotopenia  - new thrombocytopenia, anemia, and +hemolysis labs (bili uptrending)  -? reported history as per hemonc of ITP? should clarify with [facility]   - direct dede negative  - concern for MAHA, possible HUS with E. Coli bacteremia?   - some schistocytes on peripheral smear, confirmed with hematology/oncology, however higher suspicion for lab abnormalities secondary to sepsis  - monitor CBC q12 and hemolysis labs daily  - Platelet transfusion protocol: <10, <20 with fever, <50 procedures/active bleeding/etc   - Transfuse PRBC < 7    #Concern for HLH?  -ferritin 40076, trig 519, pancytopenia - 3 cell lines, persistent fever,   - F/U hepatitis panel  -F/U IL2 receptor  -F/U Abdominal US to eval for speenomegaly  - patient currently meets 4/5 criteria for HLH  - F/U with hemonc recs     #DVT ppx  hold off on DVT ppx for now  - SCDS     Skin  #Suspected DTI on sacrum and ear as per nurse  - Wound care recs placed- F/u     Ethics  - updated son, he is pediatric resident and patient's HCP  - currently full code   Assessment and Plan:   	  Patient is a 63 yo F w/ PMHx of MM (diagnosed ~10 years ago, currently on Promacta treatments since June 2022 - most recently last Wednesday; followed by oncologist, Dr. Lopez at [facility]), ?ITP, Hep B, HTN, neuropathic R hip pain, recent R corneal tear admitted for AMS, code stroke called no acute infarct or hemorrhage, s/p LP findings not consistent with meningitis, course complicated by hypotension and hypoxia, placed on BiPAP, requiring pressors, admitted to MICU with sepsis likely secondary to pneumonia- intubated 11/30.     PLAN  Neuro  - s/p code stroke --> no ischemia or hemorrhage on CT H+N  - s/p LP results not consistent with encephalitis/meningitis   - baseline is AOx3  - Neurology following  - currently on precedex wean sedation as tolerated currently at 0.8    Cardiovascular  #Shock  - suspect sepsis from E. Coli bacteremia with GI or urinary source?  - s/p 4L IVF in ED  - patient tachycardic on levophed-> transitioned to phenylephrine currently at 0.4  - POCUS 12/2 showed mild to moderate decreased contractility of the LV VTI 14.4   - ECHO 11/30 EF 55 normal LV/RV   - TSH wnl       Pulmonary  #AHRF- likely secondary to PNA infection vs Aspiration   - placed on BiPAP, 10/5 on 50%, then intubated 11/30 for increased work of breathing  - MV: 14/450/5/30%   - suspect secondary to infection with procalcitonin of 8.73  - continue on cefepime for now for E. Coli bacteremia and neutropenia   - MRSA/MSSA neg   - ID following recs appreciated   - CTA 11/30: neg for PE.  RUL R basilar consolidation RML basilar opacity    GI  #NPO with tube feeds  - Jevity started today titrate to goal  - Nutrition consult placed F/U recs  - bowel regimen started today 12/2      #Elevated liver enzymes  - patient has reported hx of Hep B 2017 as per hemonc   - LFTs trending down   - bili also elevated 1.3-> 1.5-> 1.4 continue to monitor   - F/U hepatitis panel     #R/o E. coli O157  - patient with e. coli bactermeia and evidence of hemolysis   - Shiga toxin/ GI PCR ordered     Renal  - SCr 1.5 up from 1.03  - CPK elevated trending down 1800-> 2800-> 1007- no longer trending   - hold off on fluid resuscitation for now give POCUS findings above  - suspect secondary to sepsis  - Is and Os: pos 1.1 L yesterday, +608 ml for LOS   - Ucx 11/29 > 3 organisms likely contaminate  - Repeat UA 12/2- neg Ucx in lab     ID  #E. Coli bacteremia- pan sensitive 11/29   -Tmax overnight 103.6 WBC 1.81  on neupogen daily   - s/p vanc/CTX/ampicillin/acyclovir in ER for suspected meningitis workup   - continue with cefepime 2g q8h (11/30- )   - azithromycin 500 mg started (12/2- ) , pending urinary legionella results   -Repeat Bcx sent 12/2 NGTD   - Fungitel sent F/U   - MRSA neg  - ID following   - as per nurse, pt has sacral decub. f/u with wound care nurse     Endo  - no history of diabetes, A1C 6.0  - TSH wnl  - FS stable     Heme  #MM  - as per Hemon[facility] note MM in remission 5/22 has been on promacta since 6/22 has recieved multiple treatments for MM in the last also PBSCT x 2 and haplo allo stem cell transplant- last in 2020  - holding anti-myeloma therapy  - started filgrastim per heme/onc  - Hemonc following recs appreciated     #Panycotopenia  #concern for MAHA, possible HUS with E. Coli bacteremia? vs HLH  - new thrombocytopenia, anemia, and +hemolysis labs (bili uptrending)  -? reported history as per hemonc of ITP? should clarify with [facility]   - direct dede negative  - some schistocytes on peripheral smear, confirmed with hematology/oncology, however higher suspicion for lab abnormalities secondary to sepsis  - monitor CBC q12 and hemolysis labs daily  - Platelet transfusion protocol: <10, <20 with fever, <50 procedures/active bleeding/etc   - Transfuse PRBC < 7  -ferritin 77786, trig 519, pancytopenia - 3 cell lines, persistent fever  - F/U hepatitis panel  -F/U IL2 receptor  -F/U Abdominal US to eval for speenomegaly  - F/U with hemonc recs     #DVT ppx  hold off on DVT ppx for now  - SCDS     Skin  #Suspected DTI on sacrum and ear as per nurse  - Wound care recs placed- F/u     Ethics  - updated son, he is pediatric resident and patient's HCP  - currently full code   Assessment and Plan:   	  Patient is a 63 yo F w/ PMHx of MM (diagnosed ~10 years ago, currently on Promacta treatments since June 2022 - most recently last Wednesday; followed by oncologist, Dr. Lopez at [facility]), ?ITP, Hep B, HTN, neuropathic R hip pain, recent R corneal tear admitted for AMS, code stroke called no acute infarct or hemorrhage, s/p LP findings not consistent with meningitis, course complicated by hypotension and hypoxia, placed on BiPAP, requiring pressors, admitted to MICU with sepsis likely secondary to pneumonia- intubated 11/30.     PLAN  Neuro  - s/p code stroke --> no ischemia or hemorrhage on CT H+N  - s/p LP results not consistent with encephalitis/meningitis   - baseline is AOx3  - Neurology following  - currently on precedex wean sedation as tolerated  for SBT     Cardiovascular  #Shock  - suspect sepsis from E. Coli bacteremia with GI or urinary source?  - s/p 4L IVF in ED  - patient tachycardic on levophed-> transitioned to phenylephrine  - will transition back to levophed due to decreased LV function/ sepsis if HR tolerates   - POCUS 12/2 showed mild to moderate decreased contractility of the LV VTI 14.4   - ECHO 11/30 EF 55 normal LV/RV   - TSH wnl       Pulmonary  #AHRF- likely secondary to PNA infection vs Aspiration   - placed on BiPAP, 10/5 on 50%, then intubated 11/30 for increased work of breathing  - MV: 14/450/5/30% - Doing well on SBT 5/5 30%- consider extubation today 12/3   - suspect secondary to infection with procalcitonin of 8.73  - continue on cefepime for now for E. Coli bacteremia and neutropenia   - MRSA/MSSA neg   - ID following recs appreciated   - CTA 11/30: neg for PE.  RUL R basilar consolidation RML basilar opacity    GI  #NPO with tube feeds  - Jevity started today titrate to goal  - Nutrition consult placed F/U recs  - bowel regimen started 12/2      #Elevated liver enzymes  - patient has reported hx of Hep B 2017 as per hemonc   - LFTs trending down   - bili also elevated 1.3-> 1.5-> 1.4 continue to monitor   - F/U hepatitis panel     #R/o E. coli O157  - patient with e. coli bactermeia and evidence of hemolysis   - Shiga toxin/ GI PCR ordered if patient has BM     Renal  - SCr 1.5 up from 1.03  - CPK elevated trending down 1800-> 2800-> 1007- no longer trending   - hold off on fluid resuscitation for now give POCUS findings above  - suspect secondary to sepsis  - Is and Os: pos 1.1 L yesterday, +608 ml for LOS   - Ucx 11/29 > 3 organisms likely contaminate  - Repeat UA 12/2- neg Ucx in lab     ID  #E. Coli bacteremia- pan sensitive 11/29   -Tmax overnight 103.6 WBC 1.81  on neupogen daily   - s/p vanc/CTX/ampicillin/acyclovir in ER for suspected meningitis workup   - continue with cefepime 2g q8h (11/30- )   - azithromycin 500 mg started (12/2- ) , pending urinary legionella results   -Repeat Bcx sent 12/2 NGTD   - Fungitel sent F/U   - MRSA neg  - ID following   - as per nurse, pt has sacral decub. f/u with wound care nurse     Endo  - no history of diabetes, A1C 6.0  - TSH wnl  - FS stable     Heme  #MM  - as per Hemon[facility] note MM in remission 5/22 has been on promacta since 6/22 has recieved multiple treatments for MM in the last also PBSCT x 2 and haplo allo stem cell transplant- last in 2020  - holding anti-myeloma therapy  - started filgrastim per heme/onc  - Hemonc following recs appreciated     #Panycotopenia  #concern for MAHA, possible HUS with E. Coli bacteremia? vs HLH  - new thrombocytopenia, anemia, and +hemolysis labs (bili uptrending)  -? reported history as per hemonc of ITP? should clarify with [facility]   - direct dede negative  - some schistocytes on peripheral smear, confirmed with hematology/oncology, however higher suspicion for lab abnormalities secondary to sepsis  - monitor CBC q12 and hemolysis labs daily  - Platelet transfusion protocol: <10, <20 with fever, <50 procedures/active bleeding/etc   - Transfuse PRBC < 7  -ferritin 81410, trig 519, pancytopenia - 3 cell lines, persistent fever  - F/U hepatitis panel  -F/U IL2 receptor  -F/U Abdominal US to eval for speenomegaly  - F/U with hemonc recs     #DVT ppx  hold off on DVT ppx for now  - SCDS     Skin  #Suspected DTI on sacrum and ear as per nurse  - Wound care recs placed- F/u     Ethics  - updated son, he is pediatric resident and patient's HCP  - currently full code   Assessment and Plan:   	  Patient is a 61 yo F w/ PMHx of MM (diagnosed ~10 years ago, currently on Promacta treatments since June 2022 - most recently last Wednesday; followed by oncologist, Dr. Lopez at [facility]), ?ITP, Hep B, HTN, neuropathic R hip pain, recent R corneal tear admitted for AMS, code stroke called no acute infarct or hemorrhage, s/p LP findings not consistent with meningitis, course complicated by hypotension and hypoxia, placed on BiPAP, requiring pressors, admitted to MICU with sepsis likely secondary to pneumonia- intubated 11/30.     PLAN  Neuro  - s/p code stroke --> no ischemia or hemorrhage on CT H+N  - s/p LP results not consistent with encephalitis/meningitis   - baseline is AOx3  - Neurology following  - awake alert, following commands     Cardiovascular  #Shock  - suspect sepsis from E. Coli bacteremia with GI or urinary source?  - s/p 4L IVF in ED  - patient tachycardic on levophed-> transitioned to phenylephrine  - will transition back to levophed due to decreased LV function/ sepsis if HR tolerates   - POCUS 12/2 showed mild to moderate decreased contractility of the LV VTI 14.4   - ECHO 11/30 EF 55 normal LV/RV   - TSH wnl       Pulmonary  #AHRF- likely secondary to PNA infection vs Aspiration   - placed on BiPAP, 10/5 on 50%, then intubated 11/30 for increased work of breathing  - Extubated 12/3 to face tent  - suspect secondary to infection with procalcitonin of 8.73  - continue on cefepime for now for E. Coli bacteremia and neutropenia   - MRSA/MSSA neg   - ID following recs appreciated   - CTA 11/30: neg for PE.  RUL R basilar consolidation RML basilar opacity    GI  #Diet  - OGT removed when patient extubated will attempt S/S eval and then order diet       #Elevated liver enzymes  - patient has reported hx of Hep B 2017 as per hemonc   - LFTs trending down   - bili also elevated 1.3-> 1.5-> 1.4 continue to monitor   - F/U hepatitis panel     #R/o E. coli O157  - patient with e. coli bactermeia and evidence of hemolysis   - Shiga toxin/ GI PCR ordered if patient has BM     Renal  #GARCIA  - SCr 1.5 up from 1.03  - CPK elevated trending down 1800-> 2800-> 1007- no longer trending   - hold off on fluid resuscitation for now give POCUS findings above  - suspect secondary to sepsis  - Is and Os: pos 1.1 L yesterday, +608 ml for LOS   - Ucx 11/29 > 3 organisms likely contaminate  - Repeat UA 12/2- neg Ucx in lab     ID  #E. Coli bacteremia- pan sensitive 11/29   -Tmax overnight 103.6 WBC 1.81  on neupogen daily   - s/p vanc/CTX/ampicillin/acyclovir in ER for suspected meningitis workup   - continue with cefepime 2g q8h (11/30- )   - azithromycin 500 mg started (12/2- ) , pending urinary legionella results   -Repeat Bcx sent 12/2 NGTD   - Fungitel sent F/U   - MRSA neg  - ID following   - as per nurse, pt has sacral decub. 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 Coli bacteremia? vs HLH?  - new thrombocytopenia, anemia, and +hemolysis labs (bili uptrending)  -? reported history as per hemonc of ITP? should clarify with [facility]   - direct dede negative  - some schistocytes on peripheral smear, confirmed with hematology/oncology, however higher suspicion for lab abnormalities secondary to sepsis  - monitor CBC q12 and hemolysis labs daily- improving   - Platelet transfusion protocol: <10, <20 with fever, <50 procedures/active bleeding/etc   - Transfuse PRBC < 7  -ferritin 85210, trig 519, pancytopenia - 3 cell lines, persistent fever  - F/U hepatitis panel, IL2 receptor  -F/U Abdominal US to eval for speenomegaly  - Patient received 1u plat 12/3  - Recs as per hemonc     #DVT ppx  hold off on DVT ppx for now  - SCDS     Skin  #Suspected DTI on sacrum and ear as per nurse  - Wound care recs placed- F/u     Ethics  - updated son, he is pediatric resident and patient's HCP  - currently full code   Assessment and Plan:   	  Patient is a 63 yo F w/ PMHx of MM (diagnosed ~10 years ago, currently on Promacta treatments since June 2022 - most recently last Wednesday; followed by oncologist, Dr. Lopez at [facility]), ?ITP, Hep B, HTN, neuropathic R hip pain, recent R corneal tear admitted for AMS, code stroke called no acute infarct or hemorrhage, s/p LP findings not consistent with meningitis, course complicated by hypotension and hypoxia, placed on BiPAP, requiring pressors, admitted to MICU with sepsis likely secondary to pneumonia- intubated 11/30.     PLAN  Neuro  - s/p code stroke --> no ischemia or hemorrhage on CT H+N  - s/p LP results not consistent with encephalitis/meningitis   - baseline is AOx3  - Neurology following  - awake alert, following commands     Cardiovascular  #Shock  - suspect sepsis from E. Coli bacteremia with GI or urinary source?  - s/p 4L IVF in ED  - patient tachycardic on levophed-> transitioned to phenylephrine  - will transition back to levophed due to decreased LV function/ sepsis if HR tolerates   - POCUS 12/2 showed mild to moderate decreased contractility of the LV VTI 14.4   - ECHO 11/30 EF 55 normal LV/RV   - TSH wnl       Pulmonary  #AHRF- likely secondary to PNA infection vs Aspiration   - placed on BiPAP, 10/5 on 50%, then intubated 11/30 for increased work of breathing  - Extubated 12/3 to face tent  - suspect secondary to infection with procalcitonin of 8.73  - continue on cefepime for now for E. Coli bacteremia and neutropenia   - MRSA/MSSA neg   - ID following recs appreciated   - CTA 11/30: neg for PE.  RUL R basilar consolidation RML basilar opacity    GI  #Diet  - OGT removed when patient extubated will attempt S/S eval and then order diet       #Elevated liver enzymes  - patient has reported hx of Hep B 2017 as per hemonc   - LFTs trending down   - bili also elevated 1.3-> 1.5-> 1.4 continue to monitor   - F/U hepatitis panel     #R/o E. coli O157  - patient with e. coli bactermeia and evidence of hemolysis   - Shiga toxin/ GI PCR ordered if patient has BM     Renal  #GARCIA  - SCr 1.5 up from 1.03  - CPK elevated trending down 1800-> 2800-> 1007- no longer trending   - hold off on fluid resuscitation for now give POCUS findings above  - suspect secondary to sepsis  - Is and Os: pos 1.1 L yesterday, +608 ml for LOS   - Ucx 11/29 > 3 organisms likely contaminate  - Repeat UA 12/2- neg Ucx in lab     ID  #E. Coli bacteremia- pan sensitive 11/29   -Tmax overnight 103.6 WBC 1.81  on neupogen daily   - s/p vanc/CTX/ampicillin/acyclovir in ER for suspected meningitis workup   - continue with cefepime 2g q8h (11/30- )   - azithromycin 500 mg started (12/2- ) , pending urinary legionella results   -Repeat Bcx sent 12/2 NGTD   - Fungitel sent F/U   - MRSA neg  - ID following   - as per nurse, pt has sacral decub. 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 Coli bacteremia? vs HLH?  - new thrombocytopenia, anemia, and +hemolysis labs (bili uptrending)  -? reported history as per hemonc of ITP? should clarify with [facility]   - direct dede negative  - some schistocytes on peripheral smear, confirmed with hematology/oncology, however higher suspicion for lab abnormalities secondary to sepsis  - monitor CBC q12 and hemolysis labs daily- improving   - Platelet transfusion protocol: <10, <20 with fever, <50 procedures/active bleeding/etc   - Transfuse PRBC < 7  -ferritin 87404, trig 519, pancytopenia - 3 cell lines, persistent fever  - F/U hepatitis panel, IL2 receptor  -F/U Abdominal US to eval for speenomegaly  - Patient received 1u plat 12/3  - Recs as per hemonc- non concerned of HLH or HUS as hapto is improving- believe all sepsis induced     #DVT ppx  hold off on DVT ppx for now  - SCDS     Skin  #Suspected DTI on sacrum and ear as per nurse  - Wound care recs placed- F/u     Ethics  - updated son, he is pediatric resident and patient's HCP  - currently full code    Dispo:  -PT consult placed

## 2022-12-03 NOTE — PROGRESS NOTE ADULT - SUBJECTIVE AND OBJECTIVE BOX
HPI:  Patient is a 62 yoF w/ pmhx of MM (diagnosed ~10 years ago, currently on Promacta treatments since June 2022 - most recently last Wednesday; followed by oncologist, Dr. Lopez at [facility]), HTN, neuropathic R hip pain, recent R corneal tear, presents to [facility] ED for change in mental status. LKW approximately 14:30. Patient was in normal state of health this morning as per son, Nacho. Patient went to a follow up ophtho appointment for a recently diagnosed corneal abrasion this past Monday. Patient was picked up by a friend around 10:00 for an optho appt with normal mental status, pt was just complaining of being sleepy. On the way back from appointment around 14:30, patient stopped responding to friend appropriately in the car and she appeared more tired & weaker than usual. She started having slurred speech when they arrived home and then son called EMS. In the ED, patient with persisting word finding difficulties and generalized weakness, but leaning to the R side. Patient reports some difficulty remembering what happened today, but reports she feels generally weak and very tired. Code stroke called, but TPA not given because of imporvement in pt's symptoms. Son reports patient's speech is close to baseline now. Patient then denied chest pain, SOB, HA, changes in vision, N/V, imbalance, numbness or tingling. Denies recent infection or sick contacts. Pt states that she had a similar episode a few years ago with fever which she was told was likely from a MM, she improved after getting steroids.     In the ED pt was febrile 105.5, , /84, RR 24 and saturating well on RA. Pt given 2L IVF bolus and 1x vanc/ceftriaxone/acyclovir/ampicillin (30 Nov 2022 02:42)    PAST MEDICAL & SURGICAL HISTORY:  Multiple myeloma      Type 2 diabetes mellitus        Allergies    Bactrim (Other)  fluconazole (Vomiting; Nausea)  levofloxacin (Vomiting; Nausea)  penicillin (Other)    Intolerances      Social History:  At baseline patient uses cane to ambulate for chronic pain, but occasionally does not need. Otherwise independent with ADLs. Lives alone, but above her sister home. (30 Nov 2022 02:42)    Medications:  artificial tears (preservative free) Ophthalmic Solution 1 Drop(s) Both EYES two times a day  azithromycin  IVPB 500 milliGRAM(s) IV Intermittent every 24 hours  azithromycin  IVPB      cefepime   IVPB 2000 milliGRAM(s) IV Intermittent every 12 hours  chlorhexidine 0.12% Liquid 15 milliLiter(s) Oral Mucosa every 12 hours  dextrose 5%. 1000 milliLiter(s) IV Continuous <Continuous>  dextrose 5%. 1000 milliLiter(s) IV Continuous <Continuous>  dextrose 50% Injectable 25 Gram(s) IV Push once  dextrose 50% Injectable 12.5 Gram(s) IV Push once  dextrose 50% Injectable 25 Gram(s) IV Push once  dextrose Oral Gel 15 Gram(s) Oral once PRN Blood Glucose LESS THAN 70 milliGRAM(s)/deciliter  filgrastim-sndz (ZARXIO) Injectable 480 MICROGram(s) SubCutaneous daily  glucagon  Injectable 1 milliGRAM(s) IntraMuscular once  insulin lispro (ADMELOG) corrective regimen sliding scale   SubCutaneous every 6 hours  norepinephrine Infusion 0.05 MICROgram(s)/kG/Min IV Continuous <Continuous>  polyethylene glycol 3350 17 Gram(s) Oral daily  senna Syrup 10 milliLiter(s) Oral daily    Labs:  CBC Full  -  ( 03 Dec 2022 11:55 )  WBC Count : 2.31 K/uL  RBC Count : 2.19 M/uL  Hemoglobin : 7.5 g/dL  Hematocrit : 22.9 %  Platelet Count - Automated : 19 K/uL  Mean Cell Volume : 104.6 fL  Mean Cell Hemoglobin : 34.2 pg  Mean Cell Hemoglobin Concentration : 32.8 gm/dL  Auto Neutrophil # : 0.24 K/uL  Auto Lymphocyte # : 0.53 K/uL  Auto Monocyte # : 0.24 K/uL  Auto Eosinophil # : 0.00 K/uL  Auto Basophil # : 0.01 K/uL  Auto Neutrophil % : 10.5 %  Auto Lymphocyte % : 22.9 %  Auto Monocyte % : 10.4 %  Auto Eosinophil % : 0.0 %  Auto Basophil % : 0.4 %  Platelet Count - Automated: 19: Test Repeated Called Dany SOLIS @12:51 pm K/uL (12.03.22 @ 11:55)   Platelet Count - Automated: 22: Test Repeated K/uL (12.03.22 @ 03:15)   Platelet Count - Automated: 32 K/uL (12.02.22 @ 02:56)   Platelet Count - Automated: 37 K/uL (12.01.22 @ 18:30)   Platelet Count - Automated: 41 K/uL (12.01.22 @ 08:30)   Platelet Count - Automated: 52 K/uL (11.30.22 @ 15:30)   Platelet Count - Automated: 42: Test Repeated K/uL (11.30.22 @ 07:19)   Platelet Count - Automated: 95 K/uL (11.29.22 @ 16:40)   Haptoglobin, Serum: <20 mg/dL (11.30.22 @ 08:59) Auto Neutrophil #: 0.24 K/uL (12.03.22 @ 11:55)   Auto Neutrophil #: 0.30 K/uL (12.03.22 @ 03:15)   Auto Neutrophil #: 0.68 K/uL (12.02.22 @ 02:56)   Auto Neutrophil #: 0.79 K/uL (12.01.22 @ 18:30)   Auto Neutrophil #: 0.50 K/uL (12.01.22 @ 08:30)   Auto Neutrophil #: 0.39 K/uL (11.30.22 @ 15:30)   Auto Neutrophil #: 0.97 K/uL (11.30.22 @ 07:19)   Auto Neutrophil #: 0.89 K/uL (11.29.22 @ 16:40)   Manual Differential (12.02.22 @ 02:56)   Peever Cell: Present   Anisocytosis: Moderate   Elliptocytes: Slight   Red Cell Morphology: Abnormal   Manual Smear Verification: See note: WBC: Slight Vacuolated Granulocytes Present.   Giant Platelets: Present   Platelet Morphology: Abnormal: PLT: Giant Platelets Present.   Reactive Lymphocytes %: 1.8 %   Macrocytosis: Moderate   Ovalocytes: Slight   Poikilocytosis: Moderate   Polychromasia: Slight   Schistocytes: Slight   Platelet Count - Estimate: Decreased   Band Neutrophils %: 2.7 %   Metamyelocytes %: 0.9 %   Nucleated RBC: 8 /100   Smudge Cells: Present   Reticulocyte Count (12.03.22 @ 03:15)   RBC Count: 2.19 M/uL   Reticulocyte Percent: 0.6 %   Absolute Reticulocytes: 12.9 K/uL   Reticulocyte Count (11.30.22 @ 07:19)   Reticulocyte Percent: 1.9 %   Absolute Reticulocytes: 46.6 K/uL  Haptoglobin, Serum: 141 mg/dL (12.03.22 @ 03:15)   Haptoglobin, Serum: 74 mg/dL (12.01.22 @ 20:28)   Haptoglobin, Serum: <20 mg/dL (11.30.22 @ 08:59)   D-Dimer Assay, Quantitative: 3307  Ferritin, Serum: 67770 ng/mL (12.03.22 @ 03:15)   Ferritin, Serum: 58570 ng/mL (12.02.22 @ 16:14)     12-03    147<H>  |  117<H>  |  61<H>  ----------------------------<  204<H>  3.9   |  17<L>  |  1.31<H>    Ca    9.6      03 Dec 2022 11:55  Phos  3.2     12-03  Mg     2.40     12-03    TPro  5.4<L>  /  Alb  2.3<L>  /  TBili  1.0  /  DBili  x   /  AST  133<H>  /  ALT  64<H>  /  AlkPhos  125<H>  12-03    Lactate Dehydrogenase, Serum (12.03.22 @ 03:15)   Lactate Dehydrogenase, Serum: 920 U/L   Lactate Dehydrogenase, Serum (12.01.22 @ 20:28)   Lactate Dehydrogenase, Serum: 878 U/L   Lactate Dehydrogenase, Serum (12.01.22 @ 08:30)   Lactate Dehydrogenase, Serum: 1352: SPECIMEN SEVERELY HEMOLYZED U/L   Lactate Dehydrogenase, Serum (11.30.22 @ 08:59)   Lactate Dehydrogenase, Serum: 1080: SPECIMEN MILDLY HEMOLYZED U/L     Radiology:     < from: CT Angio Chest PE Protocol w/ IV Cont (11.30.22 @ 10:12) >  MPRESSION:    No pulmonary embolism.    Posterior right upper lobe and right basilar consolidation and additional   right middle lobe and left basilar patchy opacities; findings compatible   with aspiration or infection.      < end of copied text >          ROS:  Patient comfortable without distress  No SOB or chest pain  No palpitation  No abdominal pain, diarrhaea or constipation  No weakness of extremities  No skin changes or swelling of legs  Rest of the comprehensive ROS was negative  Vital Signs Last 24 Hrs  T(C): 36.2 (03 Dec 2022 12:00), Max: 39.8 (03 Dec 2022 04:00)  T(F): 97.2 (03 Dec 2022 12:00), Max: 103.6 (03 Dec 2022 04:00)  HR: 75 (03 Dec 2022 13:00) (67 - 82)  BP: 102/61 (03 Dec 2022 08:00) (102/61 - 102/61)  BP(mean): 71 (03 Dec 2022 08:00) (71 - 71)  RR: 28 (03 Dec 2022 13:00) (14 - 28)  SpO2: 95% (03 Dec 2022 13:00) (95% - 100%)    Parameters below as of 03 Dec 2022 13:00  Patient On (Oxygen Delivery Method): face tent        Physical exam:  Patient alert and oriented  No distress  CVS: S1, S2 regular or murmur  Chest: bilateral breath sound without rales  Abdomen: soft, not tender, no organomegaly or masses  CNS: No focal neuro deficit  Musculoskeletal:  Normal range of motion  Skin: No rash    Assessment and Plan: HPI:  Patient is a 62 yoF w/ pmhx of MM (diagnosed >10 years ago, currently on [place], and Promacta for thrombocytopenia followed by oncologist, Dr. Santana at [facility]), HTN, neuropathic R hip pain, recent R corneal tear, presened to [facility] ED for change in mental status 11/30/22.  In the ED, patient with persisting word finding difficulties and generalized weakness, but leaning to the R side. Patient reported some difficulty remembering what happened but reported she felt generally weak and very tired. Code stroke called, but TPA not given because of improvement in pt's symptoms.   In the ED pt was febrile 105.5, , /84, RR 24 and saturating well on RA. Pt given 2L IVF bolus and 1x vanc/ceftriaxone/acyclovir/ampicillin   Had E Coli sepsis. Was intubated but extubated 12/3/22    PAST MEDICAL & SURGICAL HISTORY:  Multiple myeloma      Type 2 diabetes mellitus        Allergies    Bactrim (Other)  fluconazole (Vomiting; Nausea)  levofloxacin (Vomiting; Nausea)  penicillin (Other)    Intolerances      Social History:  At baseline patient uses cane to ambulate for chronic pain, but occasionally does not need. Otherwise independent with ADLs. Lives alone, but above her sister home. (30 Nov 2022 02:42)    Medications:  artificial tears (preservative free) Ophthalmic Solution 1 Drop(s) Both EYES two times a day  azithromycin  IVPB 500 milliGRAM(s) IV Intermittent every 24 hours  azithromycin  IVPB      cefepime   IVPB 2000 milliGRAM(s) IV Intermittent every 12 hours  chlorhexidine 0.12% Liquid 15 milliLiter(s) Oral Mucosa every 12 hours  dextrose 5%. 1000 milliLiter(s) IV Continuous <Continuous>  dextrose 5%. 1000 milliLiter(s) IV Continuous <Continuous>  dextrose 50% Injectable 25 Gram(s) IV Push once  dextrose 50% Injectable 12.5 Gram(s) IV Push once  dextrose 50% Injectable 25 Gram(s) IV Push once  dextrose Oral Gel 15 Gram(s) Oral once PRN Blood Glucose LESS THAN 70 milliGRAM(s)/deciliter  filgrastim-sndz (ZARXIO) Injectable 480 MICROGram(s) SubCutaneous daily  glucagon  Injectable 1 milliGRAM(s) IntraMuscular once  insulin lispro (ADMELOG) corrective regimen sliding scale   SubCutaneous every 6 hours  norepinephrine Infusion 0.05 MICROgram(s)/kG/Min IV Continuous <Continuous>  polyethylene glycol 3350 17 Gram(s) Oral daily  senna Syrup 10 milliLiter(s) Oral daily    Labs:  CBC Full  -  ( 03 Dec 2022 11:55 )  WBC Count : 2.31 K/uL  RBC Count : 2.19 M/uL  Hemoglobin : 7.5 g/dL  Hematocrit : 22.9 %  Platelet Count - Automated : 19 K/uL  Mean Cell Volume : 104.6 fL  Mean Cell Hemoglobin : 34.2 pg  Mean Cell Hemoglobin Concentration : 32.8 gm/dL  Auto Neutrophil # : 0.24 K/uL  Auto Lymphocyte # : 0.53 K/uL  Auto Monocyte # : 0.24 K/uL  Auto Eosinophil # : 0.00 K/uL  Auto Basophil # : 0.01 K/uL  Auto Neutrophil % : 10.5 %  Auto Lymphocyte % : 22.9 %  Auto Monocyte % : 10.4 %  Auto Eosinophil % : 0.0 %  Auto Basophil % : 0.4 %  Platelet Count - Automated: 19: Test Repeated Called Dany BAER PA @12:51 pm K/uL (12.03.22 @ 11:55)   Platelet Count - Automated: 22: Test Repeated K/uL (12.03.22 @ 03:15)   Platelet Count - Automated: 32 K/uL (12.02.22 @ 02:56)   Platelet Count - Automated: 37 K/uL (12.01.22 @ 18:30)   Platelet Count - Automated: 41 K/uL (12.01.22 @ 08:30)   Platelet Count - Automated: 52 K/uL (11.30.22 @ 15:30)   Platelet Count - Automated: 42: Test Repeated K/uL (11.30.22 @ 07:19)   Platelet Count - Automated: 95 K/uL (11.29.22 @ 16:40)   Haptoglobin, Serum: <20 mg/dL (11.30.22 @ 08:59) Auto Neutrophil #: 0.24 K/uL (12.03.22 @ 11:55)   Auto Neutrophil #: 0.30 K/uL (12.03.22 @ 03:15)   Auto Neutrophil #: 0.68 K/uL (12.02.22 @ 02:56)   Auto Neutrophil #: 0.79 K/uL (12.01.22 @ 18:30)   Auto Neutrophil #: 0.50 K/uL (12.01.22 @ 08:30)   Auto Neutrophil #: 0.39 K/uL (11.30.22 @ 15:30)   Auto Neutrophil #: 0.97 K/uL (11.30.22 @ 07:19)   Auto Neutrophil #: 0.89 K/uL (11.29.22 @ 16:40)   Manual Differential (12.02.22 @ 02:56)   Francisco Cell: Present   Anisocytosis: Moderate   Elliptocytes: Slight   Red Cell Morphology: Abnormal   Manual Smear Verification: See note: WBC: Slight Vacuolated Granulocytes Present.   Giant Platelets: Present   Platelet Morphology: Abnormal: PLT: Giant Platelets Present.   Reactive Lymphocytes %: 1.8 %   Macrocytosis: Moderate   Ovalocytes: Slight   Poikilocytosis: Moderate   Polychromasia: Slight   Schistocytes: Slight   Platelet Count - Estimate: Decreased   Band Neutrophils %: 2.7 %   Metamyelocytes %: 0.9 %   Nucleated RBC: 8 /100   Smudge Cells: Present   Reticulocyte Count (12.03.22 @ 03:15)   RBC Count: 2.19 M/uL   Reticulocyte Percent: 0.6 %   Absolute Reticulocytes: 12.9 K/uL   Reticulocyte Count (11.30.22 @ 07:19)   Reticulocyte Percent: 1.9 %   Absolute Reticulocytes: 46.6 K/uL  Haptoglobin, Serum: 141 mg/dL (12.03.22 @ 03:15)   Haptoglobin, Serum: 74 mg/dL (12.01.22 @ 20:28)   Haptoglobin, Serum: <20 mg/dL (11.30.22 @ 08:59)   D-Dimer Assay, Quantitative: 3307  Ferritin, Serum: 82966 ng/mL (12.03.22 @ 03:15)   Ferritin, Serum: 60009 ng/mL (12.02.22 @ 16:14)     12-03    147<H>  |  117<H>  |  61<H>  ----------------------------<  204<H>  3.9   |  17<L>  |  1.31<H>    Ca    9.6      03 Dec 2022 11:55  Phos  3.2     12-03  Mg     2.40     12-03    TPro  5.4<L>  /  Alb  2.3<L>  /  TBili  1.0  /  DBili  x   /  AST  133<H>  /  ALT  64<H>  /  AlkPhos  125<H>  12-03    Lactate Dehydrogenase, Serum (12.03.22 @ 03:15)   Lactate Dehydrogenase, Serum: 920 U/L   Lactate Dehydrogenase, Serum (12.01.22 @ 20:28)   Lactate Dehydrogenase, Serum: 878 U/L   Lactate Dehydrogenase, Serum (12.01.22 @ 08:30)   Lactate Dehydrogenase, Serum: 1352: SPECIMEN SEVERELY HEMOLYZED U/L   Lactate Dehydrogenase, Serum (11.30.22 @ 08:59)   Lactate Dehydrogenase, Serum: 1080: SPECIMEN MILDLY HEMOLYZED U/L     Radiology:     < from: CT Angio Chest PE Protocol w/ IV Cont (11.30.22 @ 10:12) >  MPRESSION:    No pulmonary embolism.    Posterior right upper lobe and right basilar consolidation and additional   right middle lobe and left basilar patchy opacities; findings compatible   with aspiration or infection.      < end of copied text >          ROS:  Patient comfortable without distress  No SOB or chest pain  No palpitation  No abdominal pain, diarrhaea or constipation  General weakness  No skin changes or swelling of legs  Rest of the comprehensive ROS was negative  Vital Signs Last 24 Hrs  T(C): 36.2 (03 Dec 2022 12:00), Max: 39.8 (03 Dec 2022 04:00)  T(F): 97.2 (03 Dec 2022 12:00), Max: 103.6 (03 Dec 2022 04:00)  HR: 75 (03 Dec 2022 13:00) (67 - 82)  BP: 102/61 (03 Dec 2022 08:00) (102/61 - 102/61)  BP(mean): 71 (03 Dec 2022 08:00) (71 - 71)  RR: 28 (03 Dec 2022 13:00) (14 - 28)  SpO2: 95% (03 Dec 2022 13:00) (95% - 100%)    Parameters below as of 03 Dec 2022 13:00  Patient On (Oxygen Delivery Method): face tent        Physical exam:  Patient alert talking on phone with help or her son  No distress  CVS: S1, S2   Chest: bilateral breath sound without rales  Abdomen: soft, not tender, no organomegaly or masses  CNS: No focal neuro deficit  Musculoskeletal:  Normal range of motion  Skin: No rash    Assessment and Plan:

## 2022-12-04 LAB
ALBUMIN SERPL ELPH-MCNC: 2.4 G/DL — LOW (ref 3.3–5)
ALP SERPL-CCNC: 125 U/L — HIGH (ref 40–120)
ALT FLD-CCNC: 61 U/L — HIGH (ref 4–33)
ANION GAP SERPL CALC-SCNC: 11 MMOL/L — SIGNIFICANT CHANGE UP (ref 7–14)
ANION GAP SERPL CALC-SCNC: 11 MMOL/L — SIGNIFICANT CHANGE UP (ref 7–14)
ANION GAP SERPL CALC-SCNC: 12 MMOL/L — SIGNIFICANT CHANGE UP (ref 7–14)
AST SERPL-CCNC: 123 U/L — HIGH (ref 4–32)
B BURGDOR DNA SPEC QL NAA+PROBE: NEGATIVE — SIGNIFICANT CHANGE UP
BASOPHILS # BLD AUTO: 0 K/UL — SIGNIFICANT CHANGE UP (ref 0–0.2)
BASOPHILS NFR BLD AUTO: 0 % — SIGNIFICANT CHANGE UP (ref 0–2)
BILIRUB SERPL-MCNC: 1 MG/DL — SIGNIFICANT CHANGE UP (ref 0.2–1.2)
BLOOD GAS ARTERIAL - LYTES,HGB,ICA,LACT RESULT: SIGNIFICANT CHANGE UP
BUN SERPL-MCNC: 43 MG/DL — HIGH (ref 7–23)
BUN SERPL-MCNC: 48 MG/DL — HIGH (ref 7–23)
BUN SERPL-MCNC: 54 MG/DL — HIGH (ref 7–23)
CALCIUM SERPL-MCNC: 10 MG/DL — SIGNIFICANT CHANGE UP (ref 8.4–10.5)
CALCIUM SERPL-MCNC: 9.7 MG/DL — SIGNIFICANT CHANGE UP (ref 8.4–10.5)
CALCIUM SERPL-MCNC: 9.7 MG/DL — SIGNIFICANT CHANGE UP (ref 8.4–10.5)
CHLORIDE SERPL-SCNC: 118 MMOL/L — HIGH (ref 98–107)
CHLORIDE SERPL-SCNC: 119 MMOL/L — HIGH (ref 98–107)
CHLORIDE SERPL-SCNC: 121 MMOL/L — HIGH (ref 98–107)
CK SERPL-CCNC: 1229 U/L — HIGH (ref 25–170)
CLOSURE TME COLL+EPINEP BLD: 24 K/UL — LOW (ref 150–400)
CO2 SERPL-SCNC: 20 MMOL/L — LOW (ref 22–31)
CO2 SERPL-SCNC: 20 MMOL/L — LOW (ref 22–31)
CO2 SERPL-SCNC: 23 MMOL/L — SIGNIFICANT CHANGE UP (ref 22–31)
CREAT SERPL-MCNC: 1.01 MG/DL — SIGNIFICANT CHANGE UP (ref 0.5–1.3)
CREAT SERPL-MCNC: 1.02 MG/DL — SIGNIFICANT CHANGE UP (ref 0.5–1.3)
CREAT SERPL-MCNC: 1.08 MG/DL — SIGNIFICANT CHANGE UP (ref 0.5–1.3)
CULTURE RESULTS: SIGNIFICANT CHANGE UP
D DIMER BLD IA.RAPID-MCNC: 3484 NG/ML DDU — HIGH
EGFR: 58 ML/MIN/1.73M2 — LOW
EGFR: 62 ML/MIN/1.73M2 — SIGNIFICANT CHANGE UP
EGFR: 63 ML/MIN/1.73M2 — SIGNIFICANT CHANGE UP
EOSINOPHIL # BLD AUTO: 0.01 K/UL — SIGNIFICANT CHANGE UP (ref 0–0.5)
EOSINOPHIL NFR BLD AUTO: 0.5 % — SIGNIFICANT CHANGE UP (ref 0–6)
FERRITIN SERPL-MCNC: >8000 NG/ML — HIGH (ref 15–150)
GLUCOSE BLDC GLUCOMTR-MCNC: 115 MG/DL — HIGH (ref 70–99)
GLUCOSE BLDC GLUCOMTR-MCNC: 144 MG/DL — HIGH (ref 70–99)
GLUCOSE BLDC GLUCOMTR-MCNC: 190 MG/DL — HIGH (ref 70–99)
GLUCOSE BLDC GLUCOMTR-MCNC: 208 MG/DL — HIGH (ref 70–99)
GLUCOSE SERPL-MCNC: 145 MG/DL — HIGH (ref 70–99)
GLUCOSE SERPL-MCNC: 153 MG/DL — HIGH (ref 70–99)
GLUCOSE SERPL-MCNC: 186 MG/DL — HIGH (ref 70–99)
HAPTOGLOB SERPL-MCNC: 169 MG/DL — SIGNIFICANT CHANGE UP (ref 34–200)
HAV IGM SER-ACNC: SIGNIFICANT CHANGE UP
HBV CORE IGM SER-ACNC: SIGNIFICANT CHANGE UP
HBV SURFACE AG SER-ACNC: REACTIVE
HCT VFR BLD CALC: 22.1 % — LOW (ref 34.5–45)
HCV AB S/CO SERPL IA: 0.04 S/CO — SIGNIFICANT CHANGE UP (ref 0–0.99)
HCV AB SERPL-IMP: SIGNIFICANT CHANGE UP
HGB BLD-MCNC: 7.3 G/DL — LOW (ref 11.5–15.5)
IANC: 0.37 K/UL — LOW (ref 1.8–7.4)
IMM GRANULOCYTES NFR BLD AUTO: 48.1 % — HIGH (ref 0–0.9)
INR BLD: 1.15 RATIO — SIGNIFICANT CHANGE UP (ref 0.88–1.16)
LDH SERPL L TO P-CCNC: 1245 U/L — HIGH (ref 135–225)
LYMPHOCYTES # BLD AUTO: 0.36 K/UL — LOW (ref 1–3.3)
LYMPHOCYTES # BLD AUTO: 19.3 % — SIGNIFICANT CHANGE UP (ref 13–44)
MAGNESIUM SERPL-MCNC: 1.9 MG/DL — SIGNIFICANT CHANGE UP (ref 1.6–2.6)
MAGNESIUM SERPL-MCNC: 1.9 MG/DL — SIGNIFICANT CHANGE UP (ref 1.6–2.6)
MAGNESIUM SERPL-MCNC: 2.2 MG/DL — SIGNIFICANT CHANGE UP (ref 1.6–2.6)
MCHC RBC-ENTMCNC: 33 GM/DL — SIGNIFICANT CHANGE UP (ref 32–36)
MCHC RBC-ENTMCNC: 34 PG — SIGNIFICANT CHANGE UP (ref 27–34)
MCV RBC AUTO: 102.8 FL — HIGH (ref 80–100)
MONOCYTES # BLD AUTO: 0.23 K/UL — SIGNIFICANT CHANGE UP (ref 0–0.9)
MONOCYTES NFR BLD AUTO: 12.3 % — SIGNIFICANT CHANGE UP (ref 2–14)
NEUTROPHILS # BLD AUTO: 0.37 K/UL — LOW (ref 1.8–7.4)
NEUTROPHILS NFR BLD AUTO: 19.8 % — LOW (ref 43–77)
NRBC # BLD: 3 /100 WBCS — HIGH (ref 0–0)
NRBC # FLD: 0.05 K/UL — HIGH (ref 0–0)
NT-PROBNP SERPL-SCNC: HIGH PG/ML
PHOSPHATE SERPL-MCNC: 2.4 MG/DL — LOW (ref 2.5–4.5)
PHOSPHATE SERPL-MCNC: 2.7 MG/DL — SIGNIFICANT CHANGE UP (ref 2.5–4.5)
PHOSPHATE SERPL-MCNC: 4.4 MG/DL — SIGNIFICANT CHANGE UP (ref 2.5–4.5)
PLATELET # BLD AUTO: 39 K/UL — LOW (ref 150–400)
POTASSIUM SERPL-MCNC: 3.4 MMOL/L — LOW (ref 3.5–5.3)
POTASSIUM SERPL-MCNC: 3.5 MMOL/L — SIGNIFICANT CHANGE UP (ref 3.5–5.3)
POTASSIUM SERPL-MCNC: 3.9 MMOL/L — SIGNIFICANT CHANGE UP (ref 3.5–5.3)
POTASSIUM SERPL-SCNC: 3.4 MMOL/L — LOW (ref 3.5–5.3)
POTASSIUM SERPL-SCNC: 3.5 MMOL/L — SIGNIFICANT CHANGE UP (ref 3.5–5.3)
POTASSIUM SERPL-SCNC: 3.9 MMOL/L — SIGNIFICANT CHANGE UP (ref 3.5–5.3)
PROT SERPL-MCNC: 5.4 G/DL — LOW (ref 6–8.3)
PROTHROM AB SERPL-ACNC: 13.4 SEC — SIGNIFICANT CHANGE UP (ref 10.5–13.4)
RBC # BLD: 2.15 M/UL — LOW (ref 3.8–5.2)
RBC # BLD: 2.15 M/UL — LOW (ref 3.8–5.2)
RBC # FLD: 15 % — HIGH (ref 10.3–14.5)
RETICS #: 11.4 K/UL — LOW (ref 25–125)
RETICS/RBC NFR: 0.5 % — SIGNIFICANT CHANGE UP (ref 0.5–2.5)
SODIUM SERPL-SCNC: 150 MMOL/L — HIGH (ref 135–145)
SODIUM SERPL-SCNC: 152 MMOL/L — HIGH (ref 135–145)
SODIUM SERPL-SCNC: 153 MMOL/L — HIGH (ref 135–145)
SPECIMEN SOURCE: SIGNIFICANT CHANGE UP
WBC # BLD: 1.87 K/UL — LOW (ref 3.8–10.5)
WBC # FLD AUTO: 1.87 K/UL — LOW (ref 3.8–10.5)

## 2022-12-04 PROCEDURE — 99291 CRITICAL CARE FIRST HOUR: CPT

## 2022-12-04 PROCEDURE — 71045 X-RAY EXAM CHEST 1 VIEW: CPT | Mod: 26

## 2022-12-04 PROCEDURE — 93010 ELECTROCARDIOGRAM REPORT: CPT

## 2022-12-04 PROCEDURE — 93308 TTE F-UP OR LMTD: CPT | Mod: 26

## 2022-12-04 PROCEDURE — 76604 US EXAM CHEST: CPT | Mod: 26

## 2022-12-04 RX ORDER — MAGNESIUM SULFATE 500 MG/ML
1 VIAL (ML) INJECTION ONCE
Refills: 0 | Status: COMPLETED | OUTPATIENT
Start: 2022-12-04 | End: 2022-12-04

## 2022-12-04 RX ORDER — FUROSEMIDE 40 MG
40 TABLET ORAL ONCE
Refills: 0 | Status: COMPLETED | OUTPATIENT
Start: 2022-12-04 | End: 2022-12-04

## 2022-12-04 RX ORDER — POTASSIUM CHLORIDE 20 MEQ
20 PACKET (EA) ORAL ONCE
Refills: 0 | Status: COMPLETED | OUTPATIENT
Start: 2022-12-04 | End: 2022-12-04

## 2022-12-04 RX ORDER — POTASSIUM PHOSPHATE, MONOBASIC POTASSIUM PHOSPHATE, DIBASIC 236; 224 MG/ML; MG/ML
30 INJECTION, SOLUTION INTRAVENOUS ONCE
Refills: 0 | Status: COMPLETED | OUTPATIENT
Start: 2022-12-04 | End: 2022-12-04

## 2022-12-04 RX ORDER — SODIUM CHLORIDE 9 MG/ML
1000 INJECTION, SOLUTION INTRAVENOUS
Refills: 0 | Status: DISCONTINUED | OUTPATIENT
Start: 2022-12-04 | End: 2022-12-04

## 2022-12-04 RX ORDER — POTASSIUM CHLORIDE 20 MEQ
10 PACKET (EA) ORAL
Refills: 0 | Status: COMPLETED | OUTPATIENT
Start: 2022-12-04 | End: 2022-12-05

## 2022-12-04 RX ORDER — METOPROLOL TARTRATE 50 MG
12.5 TABLET ORAL
Refills: 0 | Status: DISCONTINUED | OUTPATIENT
Start: 2022-12-04 | End: 2022-12-13

## 2022-12-04 RX ORDER — METOPROLOL TARTRATE 50 MG
5 TABLET ORAL ONCE
Refills: 0 | Status: COMPLETED | OUTPATIENT
Start: 2022-12-04 | End: 2022-12-04

## 2022-12-04 RX ORDER — FUROSEMIDE 40 MG
40 TABLET ORAL ONCE
Refills: 0 | Status: DISCONTINUED | OUTPATIENT
Start: 2022-12-04 | End: 2022-12-04

## 2022-12-04 RX ORDER — SODIUM CHLORIDE 9 MG/ML
1000 INJECTION, SOLUTION INTRAVENOUS
Refills: 0 | Status: DISCONTINUED | OUTPATIENT
Start: 2022-12-04 | End: 2022-12-05

## 2022-12-04 RX ORDER — ACETAMINOPHEN 500 MG
1000 TABLET ORAL ONCE
Refills: 0 | Status: COMPLETED | OUTPATIENT
Start: 2022-12-04 | End: 2022-12-04

## 2022-12-04 RX ORDER — SODIUM CHLORIDE 9 MG/ML
500 INJECTION, SOLUTION INTRAVENOUS ONCE
Refills: 0 | Status: COMPLETED | OUTPATIENT
Start: 2022-12-04 | End: 2022-12-04

## 2022-12-04 RX ADMIN — MIDODRINE HYDROCHLORIDE 10 MILLIGRAM(S): 2.5 TABLET ORAL at 05:23

## 2022-12-04 RX ADMIN — MIDODRINE HYDROCHLORIDE 10 MILLIGRAM(S): 2.5 TABLET ORAL at 22:10

## 2022-12-04 RX ADMIN — Medication 40 MILLIGRAM(S): at 16:00

## 2022-12-04 RX ADMIN — Medication 1 DROP(S): at 05:23

## 2022-12-04 RX ADMIN — Medication 12.5 MILLIGRAM(S): at 16:00

## 2022-12-04 RX ADMIN — Medication 20 MILLIEQUIVALENT(S): at 13:18

## 2022-12-04 RX ADMIN — SODIUM CHLORIDE 50 MILLILITER(S): 9 INJECTION, SOLUTION INTRAVENOUS at 23:17

## 2022-12-04 RX ADMIN — Medication 1: at 23:27

## 2022-12-04 RX ADMIN — Medication 5 MILLIGRAM(S): at 14:53

## 2022-12-04 RX ADMIN — Medication 480 MICROGRAM(S): at 11:44

## 2022-12-04 RX ADMIN — Medication 400 MILLIGRAM(S): at 00:57

## 2022-12-04 RX ADMIN — POTASSIUM PHOSPHATE, MONOBASIC POTASSIUM PHOSPHATE, DIBASIC 83.33 MILLIMOLE(S): 236; 224 INJECTION, SOLUTION INTRAVENOUS at 02:46

## 2022-12-04 RX ADMIN — Medication 100 GRAM(S): at 13:16

## 2022-12-04 RX ADMIN — SODIUM CHLORIDE 500 MILLILITER(S): 9 INJECTION, SOLUTION INTRAVENOUS at 02:16

## 2022-12-04 RX ADMIN — CEFEPIME 100 MILLIGRAM(S): 1 INJECTION, POWDER, FOR SOLUTION INTRAMUSCULAR; INTRAVENOUS at 05:22

## 2022-12-04 RX ADMIN — Medication 1 DROP(S): at 18:17

## 2022-12-04 RX ADMIN — Medication 100 MILLIEQUIVALENT(S): at 23:18

## 2022-12-04 RX ADMIN — CEFEPIME 100 MILLIGRAM(S): 1 INJECTION, POWDER, FOR SOLUTION INTRAMUSCULAR; INTRAVENOUS at 18:26

## 2022-12-04 RX ADMIN — SODIUM CHLORIDE 100 MILLILITER(S): 9 INJECTION, SOLUTION INTRAVENOUS at 12:00

## 2022-12-04 RX ADMIN — Medication 1000 MILLIGRAM(S): at 01:27

## 2022-12-04 RX ADMIN — MIDODRINE HYDROCHLORIDE 10 MILLIGRAM(S): 2.5 TABLET ORAL at 13:59

## 2022-12-04 RX ADMIN — Medication 2: at 18:16

## 2022-12-04 RX ADMIN — Medication 8.16 MICROGRAM(S)/KG/MIN: at 07:20

## 2022-12-04 NOTE — CHART NOTE - NSCHARTNOTEFT_GEN_A_CORE
Pt seen for Consult/ Tube-feeding    Medical Course: 61 y/o female w/ pmhx of MM (diagnosed >10 years ago HTN, neuropathic R hip pain, recent R corneal tear, presened to [facility] ED for change in mental status 11/30/22.  In the ED, patient with persisting word finding difficulties and generalized weakness, but leaning to the R side. Patient reported some difficulty remembering what happened but reported she felt generally weak and very tired. Code stroke called, but TPA not given because of improvement in pt's symptoms. In the ED pt was febrile 105.5, , /84, RR 24 and saturating well on RA. Pt given 2L IVF bolus and 1x vanc/ceftriaxone/acyclovir/ampicillin. Had E Coli sepsis. Was intubated but extubated 12/3/22      Nutrition Course: Nutrition interview conducted Pt and RN today (present at bedside): No recent episodes of nausea, vomiting, diarrhea or constipation, BM noted on 12/4 per RN flowsheets. Denies any chewing/swallowing difficulties. Pt on soft and bite sized, no swallow eval conducted as of yet- consult in place. No food allergies per pt. Food preferences explored and noted. Intake is 50% per RN flowsheets and per RN today. Feeding skills: total assistance required from staff for eating.     RD was consulted for tube-feeding however Pt is no longer on tube-feeding, with no plans to resume and TF per RN. Continue current nutrition POC, reconsult as needed.     Diet Prescription:   Diet, Soft and Bite Sized:   Consistent Carbohydrate {Evening Snack} (CSTCHOSN) (12-03-22 @ 17:55) [Active]      Pertinent Medications: MEDICATIONS  (STANDING):  artificial tears (preservative free) Ophthalmic Solution 1 Drop(s) Both EYES two times a day  cefepime   IVPB 2000 milliGRAM(s) IV Intermittent every 12 hours  dextrose 5%. 1000 milliLiter(s) (50 mL/Hr) IV Continuous <Continuous>  dextrose 5%. 1000 milliLiter(s) (100 mL/Hr) IV Continuous <Continuous>  dextrose 5%. 1000 milliLiter(s) (100 mL/Hr) IV Continuous <Continuous>  dextrose 50% Injectable 25 Gram(s) IV Push once  dextrose 50% Injectable 12.5 Gram(s) IV Push once  dextrose 50% Injectable 25 Gram(s) IV Push once  filgrastim-sndz (ZARXIO) Injectable 480 MICROGram(s) SubCutaneous daily  glucagon  Injectable 1 milliGRAM(s) IntraMuscular once  insulin lispro (ADMELOG) corrective regimen sliding scale   SubCutaneous every 6 hours  midodrine 10 milliGRAM(s) Oral every 8 hours  norepinephrine Infusion 0.05 MICROgram(s)/kG/Min (8.16 mL/Hr) IV Continuous <Continuous>  polyethylene glycol 3350 17 Gram(s) Oral daily  senna Syrup 10 milliLiter(s) Oral daily      Pertinent Labs: 12-04 Na152 mmol/L<H> Glu 153 mg/dL<H> K+ 3.9 mmol/L Cr  1.01 mg/dL BUN 48 mg/dL<H> 12-04 Phos 4.4 mg/dL 12-04 Alb 2.4 g/dL<L> 12-03 Chol --    LDL --    HDL --    Trig 519 mg/dL<H>  POCT Blood Glucose.: 144 mg/dL (04 Dec 2022 11:15)  POCT Blood Glucose.: 115 mg/dL (04 Dec 2022 05:30)  POCT Blood Glucose.: 166 mg/dL (03 Dec 2022 22:59)  POCT Blood Glucose.: 158 mg/dL (03 Dec 2022 18:03)      Weight: Height (cm): 180.3 (11-30 @ 13:00)  Weight (kg): 84.7 (12-4), 87 (11-30 @ 13:00)  BMI (kg/m2): 26.8 (11-30 @ 13:00)  Weight Assessment: Pt with significant wt loss x2 1 weeks (-2.5%) likely r/t fluid shift as pt noted with edema.      Physical Assessment, per flowsheets:  Edema: generalized  Pressure Injury: RT buttocks, RT outer ear (both DTI)- pending wound care consult    Estimated Needs:   [ x] recalculated, with consideration for, based on weight (IBW) Height: 5'11" / 166# / 75.4 kg  3301-0828 kcal (25-30 kcal/kg),  gm (1.2-1.5 gm/kg)     Previous Nutrition Diagnosis:  [ x] Increased Nutrient Needs   Nutrition Diagnosis is [ x] ongoing  [ ] resolved [ ] not applicable   New Nutrition Diagnosis: [ x] not applicable     Interventions:   1) Continue on current diet rx: CSTCH), soft and bite sized, Pt pending swallow eval to eval texture  2) Recommend Ensure Plus High Protein 1 PO once daily (350 kcal, 20 gm protein) to promote optimal PO intake  3) Recommend multivitamin once daily for micronutrient provisions   4) Encourage PO intake and honor food preferences as able.   5) Reconsult RD as needed     Monitor & Evaluate:  PO intake, tolerance to diet/supplement, nutrition related lab values, weight trends, BMs/GI distress, hydration status, skin integrity.    Amie Talley MS, RDN (Pager #[phone]) | Also available on TEAMS

## 2022-12-04 NOTE — PROGRESS NOTE ADULT - CRITICAL CARE ATTENDING COMMENT
62 F with MM on promacta here with AMS, found to have setpic shock due to e coli bacteremia, GARCIA as a result, acute hypoxemic respiratory failure due to likely aspiration pneumonia requiring intubation, now extubated    - weaned off vasopressors this AM, on midodrine  - encourage po intake, has some acute metabolic encephalopathy duet o hypernatremia and sepsis, slowly improving  - c/w d5w for hypernatremia  - c/w broad abx for neutropenic fever for now, avoid de-escalating just yet given elevated temps and that she is still neutropenic  - c/w zarxio for neutropenia    Critically ill patient requiring frequent bedside visits with therapy changes.

## 2022-12-04 NOTE — CHART NOTE - NSCHARTNOTEFT_GEN_A_CORE
: Grady Patricia    INDICATION: Shock state, Respiratory failure    PROCEDURE:    [ x] LIMITED ECHO    [x ] LIMITED CHEST    FINDINGS:  IVC 1.5cm  Seen at 2pm: Bilateral B lines to the apicies    INTERPRETATION:  IVC intermediate  Seen at 2pm: Bilateral B lines in clinical context suggestive of pulmonary edema    Images stored on [employer] : Grady Patricia    INDICATION: Shock state, Respiratory failure    PROCEDURE:    [ x] LIMITED ECHO    [x ] LIMITED CHEST    FINDINGS:  IVC 1.5cm  Seen at 2pm: Bilateral B lines to the apices    INTERPRETATION:  IVC intermediate  Seen at 2pm: Bilateral B lines in clinical context suggestive of pulmonary edema    Images stored on [employer]    Attending Attestation:  I was present during the key portions of the procedure and immediately available during the entire procedure.  Geovanny Rasmussen MD  Attending  Pulmonary & Critical Care Medicine

## 2022-12-04 NOTE — PROGRESS NOTE ADULT - ASSESSMENT
Patient is a 63 yo F w/ PMHx of MM (diagnosed ~10 years ago, currently on Promacta treatments since June 2022 - most recently last Wednesday; followed by oncologist, Dr. Lopez at [facility]), ?ITP, Hep B, HTN, neuropathic R hip pain, recent R corneal tear admitted for AMS, code stroke called no acute infarct or hemorrhage, s/p LP findings not consistent with meningitis, course complicated by hypotension and hypoxia, placed on BiPAP, requiring pressors, admitted to MICU with sepsis likely secondary to pneumonia- intubated 11/30.     PLAN  Neuro  - s/p code stroke --> no ischemia or hemorrhage on CT H+N  - s/p LP results not consistent with encephalitis/meningitis   - baseline is AOx3  - Neurology following  - awake alert, following commands     Cardiovascular  #Shock  - suspect sepsis from E. Coli bacteremia with GI or urinary source?  - s/p 4L IVF in ED  - patient tachycardic on levophed-> transitioned to phenylephrine  - will transition back to levophed due to decreased LV function/ sepsis if HR tolerates   - POCUS 12/2 showed mild to moderate decreased contractility of the LV VTI 14.4   - ECHO 11/30 EF 55 normal LV/RV   - TSH wnl       Pulmonary  #AHRF- likely secondary to PNA infection vs Aspiration   - placed on BiPAP, 10/5 on 50%, then intubated 11/30 for increased work of breathing  - Extubated 12/3 to face tent  - suspect secondary to infection with procalcitonin of 8.73  - continue on cefepime for now for E. Coli bacteremia and neutropenia   - MRSA/MSSA neg   - ID following recs appreciated   - CTA 11/30: neg for PE.  RUL R basilar consolidation RML basilar opacity    GI  #Diet  - OGT removed when patient extubated will attempt S/S eval and then order diet       #Elevated liver enzymes  - patient has reported hx of Hep B 2017 as per hemonc   - LFTs trending down   - bili also elevated 1.3-> 1.5-> 1.4 continue to monitor   - F/U hepatitis panel     #R/o E. coli O157  - patient with e. coli bactermeia and evidence of hemolysis   - Shiga toxin/ GI PCR ordered if patient has BM     Renal  #GARCIA  - SCr 1.5 up from 1.03  - CPK elevated trending down 1800-> 2800-> 1007- no longer trending   - hold off on fluid resuscitation for now give POCUS findings above  - suspect secondary to sepsis  - Is and Os: pos 1.1 L yesterday, +608 ml for LOS   - Ucx 11/29 > 3 organisms likely contaminate  - Repeat UA 12/2- neg Ucx in lab     ID  #E. Coli bacteremia- pan sensitive 11/29   -Tmax overnight 103.6 WBC 1.81  on neupogen daily   - s/p vanc/CTX/ampicillin/acyclovir in ER for suspected meningitis workup   - continue with cefepime 2g q8h (11/30- )   - azithromycin 500 mg started (12/2- ) , pending urinary legionella results   -Repeat Bcx sent 12/2 NGTD   - Fungitel sent F/U   - MRSA neg  - ID following   - as per nurse, pt has sacral decub. 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 Coli bacteremia? vs HLH?  - new thrombocytopenia, anemia, and +hemolysis labs (bili uptrending)  -? reported history as per hemonc of ITP? should clarify with [facility]   - direct dede negative  - some schistocytes on peripheral smear, confirmed with hematology/oncology, however higher suspicion for lab abnormalities secondary to sepsis  - monitor CBC q12 and hemolysis labs daily- improving   - Platelet transfusion protocol: <10, <20 with fever, <50 procedures/active bleeding/etc   - Transfuse PRBC < 7  -ferritin 49284, trig 519, pancytopenia - 3 cell lines, persistent fever  - F/U hepatitis panel, IL2 receptor  -F/U Abdominal US to eval for speenomegaly  - Patient received 1u plat 12/3  - Recs as per hemonc- non concerned of HLH or HUS as hapto is improving- believe all sepsis induced     #DVT ppx  hold off on DVT ppx for now  - SCDS     Skin  #Suspected DTI on sacrum and ear as per nurse  - Wound care recs placed- F/u     Ethics  - updated son, he is pediatric resident and patient's HCP  - currently full code    Dispo:  -PT consult placed    Patient is a 61 yo F w/ PMHx of MM (diagnosed ~10 years ago, currently on Promacta treatments since June 2022 - most recently last Wednesday; followed by oncologist, Dr. Lopez at [facility]), ?ITP, Hep B, HTN, neuropathic R hip pain, recent R corneal tear admitted for AMS, code stroke called no acute infarct or hemorrhage, s/p LP findings not consistent with meningitis, course complicated by hypotension and hypoxia, placed on BiPAP, requiring pressors, admitted to MICU with sepsis likely secondary to pneumonia- intubated 11/30.     PLAN  Neuro  - s/p code stroke --> no ischemia or hemorrhage on CT H+N  - s/p LP results not consistent with encephalitis/meningitis, ammonia level 30   - baseline is AOx3  - Neurology following  - awake, alert, following commands, possibly still with some encephalopathy       --Cardiovascular  #Shock  - suspect sepsis from E. Coli bacteremia with GI or urinary source?  -- 12/4 -- pt remains off levophed since 8 am today   - s/p 4L IVF in ED, s/p intubation   -- - 12/04 pt with diffuse B lines but intravascularly depleted on POCUS-- s/p Lasix 40 IVP with 150 cc of UOP after Lasix, __ started on D 5 at 50 cc /hr for hypernatremia and hypovolemia   - patient tachycardic on levophed-> transitioned to phenylephrine  - was transition back to levophed due to decreased LV function/ sepsis if HR tolerates   - POCUS 12/2 showed mild to moderate decreased contractility of the LV VTI 14.4   - ECHO 11/30 EF 55 normal LV/RV   - TSH wnl    #new onset MAR  -- MAR AF in 130's-- 100's, with recurrent 130's  - s/p Lopressor 5 mg IVP, __ started on Lopressor tartrate 12.5 mg BID,   -- [id number]-Nirh score is 3   -- unable to anticoagulate in the setting of thrombocytopenia   -- SCD's are in place        Pulmonary  #AHRF- likely secondary to PNA infection vs Aspiration   - s/p BiPAP, 10/5 on 50%, then intubated 11/30 for increased work of breathing  - Extubated 12/3 to face tent  - suspect secondary to infection with procalcitonin of 8.73  -- pt became hypoxic to 80's on 1.5 LPM NC, possibly in the setting of aspiration vs flushed in the setting of tachycardia (pt was noted to cough on a water at the time she was taking Potassium pill  - continue on cefepime for now for E. Coli bacteremia and neutropenia, and possible aspiration PNA   - MRSA/MSSA neg   - ID following recs appreciated   - CTA 11/30: neg for PE.  RUL R basilar consolidation RML basilar opacity  -- repeat CXR on 12/4-- worsening multilobar consolidations     GI  #Diet  - pt is on bite sized diet,   - -discontinued and placed NPO in the setting of posssible aspiration  - NPO except medications with apple sauce or mildly thickened fluids   -- SLP in place __ follow up recs       #Elevated liver enzymes  - patient has reported hx of Hep B 2017 as per hemonc   - LFTs trending down AST/ALT-- 123/61   - bili also elevated 1.3-> 1.5-> 1.4 >1.0 continue to monitor   - hepatitis panel -- Hep B surface AG +, and the rest is negative   -- as per pt, she has h/o Hepatitis B vaccination, but not prior h/o infection   -- confirmatory test ordered     #R/o E. coli O157  - patient with e. coli bacteremia and evidence of hemolysis   - Shiga toxin/ GI PCR ordered if patient has BM     Renal  #GARCIA  - SCr 1.5 up from 1.03, improving and is 1.01 today,   - CPK elevated trending down 1800-> 2800-> 1007>787 - no longer trending   - 12/04 pt with diffuse B lines but intravascularly depleted on POCUS-- s/p Lasix 40 IVP with 150 cc of UOP after Lasix, __ started on D 5 at 50 cc /hr for hypernatremia and hypovolemia   -- - repeat BMP at 20:00   - suspect secondary to sepsis  - Is and Os: pos 1.1 L yesterday, +608 ml for LOS   - Ucx 11/29 > 3 organisms likely contaminate  - Repeat UA 12/2- neg Ucx in lab     #hypernatremia   - NA- 147>150, s/p LR bolus __ repeat Na 152   - 12/04 pt with diffuse B lines but intravascularly depleted on POCUS-- s/p Lasix 40 IVP with 150 cc of UOP after Lasix, __ started on D 5 at 50 cc /hr for hypernatremia and hypovolemia  - repeat BMP at 20:00       ID  #E. Coli bacteremia- pan sensitive 11/29   -Tmax overnight 101.2 WBC 1.81  on Neupogen daily, WBC trending down -- 2.3>1.87   - s/p vanc/CTX/ampicillin/acyclovir in ER for suspected meningitis workup   - continue with cefepime 2g q8h (11/30- )   - s/p Azithromycin 500 mg started (12/2- 12/3) , legionella neg  -Repeat Bcx sent 12/2 NGTD   - Fungitel 39   - MRSA neg  - ID following   - as per nurse, pt has sacral decub. f/u with wound care nurse     Endo  - no history of diabetes, A1C 6.0  - TSH wnl  - FS stable -- 166>115     Heme  #MM  - as per Hemonc note MM in remission 5/22 has been on Promacta since 6/22 has received multiple treatments for MM in the last also PBSCT x 2 and haplo allo stem cell transplant- last in 2020 sept 2022-was given belantamab-last treatment was on 11/23/22  - holding anti-myeloma therapy  - started filgrastim per heme/onc  - Hemonc following recs appreciated     #Panycotopenia  #concern for MAHA, possible HUS with E. Coli bacteremia? vs HLH?  - new thrombocytopenia, anemia, and +hemolysis labs (bili uptrending and now normalized)  -? reported history as per hemonc of ITP? should clarify with [facility]   - direct dede negative  - some schistocytes on peripheral smear, confirmed with hematology/oncology, however higher suspicion for lab abnormalities are secondary to sepsis  - monitor CBC q12 and hemolysis labs daily- improving   - Platelet transfusion protocol: <10, <20 with fever, <50 procedures/active bleeding/etc   -- Patient received 1u plat 12/3,   -- 12/4-- platelets 39 and blue top -- 24   - Transfuse PRBC < 7  - ferritin 07375, trig 519, pancytopenia - 3 cell lines, persistent fever  - hepatitis panel --  hepatitis panel -- Hep B surface AG +, and the rest is negative __see GI , IL2 receptor-- in lab   -F/U Abdominal US to eval for splenomegaly  - Recs as per hem/onc- non concerned of HLH or HUS as hapto is improving- believe all sepsis induced     #DVT ppx  -- hold off on DVT ppx for now  - SCDS in place     Skin  #Suspected DTI on sacrum and ear as per nurse  - Wound care recs placed- F/u     Ethics  - updated son, he is pediatric resident and patient's HCP  - Pt is currently full code    Dispo: MICU 6   -PT consult placed

## 2022-12-04 NOTE — PROGRESS NOTE ADULT - SUBJECTIVE AND OBJECTIVE BOX
Significant recent/past 24 hr events:    Subjective:    Review of Systems         [ ] A ten-point review of systems was otherwise negative except as noted.  [ ] Due to altered mental status/intubation, subjective information were not able to be obtained from the patient. History was obtained, to the extent possible, from review of the chart and collateral sources of information.      Patient is a 62y old  Female who presents with a chief complaint of slurred speech (03 Dec 2022 13:53)    HPI:  Patient is a 62 yoF w/ pmhx of MM (diagnosed ~10 years ago, currently on Promacta treatments since 2022 - most recently last Wednesday; followed by oncologist, Dr. Lopez at [facility]), HTN, neuropathic R hip pain, recent R corneal tear, presents to [facility] ED for change in mental status. LKW approximately 14:30. Patient was in normal state of health this morning as per son, Nacho. Patient went to a follow up ophtho appointment for a recently diagnosed corneal abrasion this past Monday. Patient was picked up by a friend around 10:00 for an optho appt with normal mental status, pt was just complaining of being sleepy. On the way back from appointment around 14:30, patient stopped responding to friend appropriately in the car and she appeared more tired & weaker than usual. She started having slurred speech when they arrived home and then son called EMS. In the ED, patient with persisting word finding difficulties and generalized weakness, but leaning to the R side. Patient reports some difficulty remembering what happened today, but reports she feels generally weak and very tired. Code stroke called, but TPA not given because of imporvement in pt's symptoms. Son reports patient's speech is close to baseline now. Patient then denied chest pain, SOB, HA, changes in vision, N/V, imbalance, numbness or tingling. Denies recent infection or sick contacts. Pt states that she had a similar episode a few years ago with fever which she was told was likely from a MM, she improved after getting steroids.     In the ED pt was febrile 105.5, , /84, RR 24 and saturating well on RA. Pt given 2L IVF bolus and 1x vanc/ceftriaxone/acyclovir/ampicillin (2022 02:42)    PAST MEDICAL & SURGICAL HISTORY:  Multiple myeloma      Type 2 diabetes mellitus        FAMILY HISTORY:  No pertinent family history in first degree relatives        Vitals   ICU Vital Signs Last 24 Hrs  T(C): 37.3 (04 Dec 2022 04:00), Max: 38.4 (04 Dec 2022 00:00)  T(F): 99.1 (04 Dec 2022 04:00), Max: 101.2 (04 Dec 2022 00:00)  HR: 85 (04 Dec 2022 06:00) (67 - 102)  BP: 102/61 (03 Dec 2022 08:00) (102/61 - 102/61)  BP(mean): 71 (03 Dec 2022 08:00) (71 - 71)  ABP: 102/49 (04 Dec 2022 06:00) (97/49 - 125/70)  ABP(mean): 68 (04 Dec 2022 06:00) (65 - 91)  RR: 20 (04 Dec 2022 06:00) (13 - 30)  SpO2: 100% (04 Dec 2022 06:00) (95% - 100%)    O2 Parameters below as of 04 Dec 2022 06:00  Patient On (Oxygen Delivery Method): nasal cannula w/ humidification  O2 Flow (L/min): 4          Physical Exam:   Constitutional: NAD, well-groomed, well-developed  HEENT: PERRLA, EOMI, no drainage or redness  Neck: supple,  No JVD, Trachea midline  Back: Normal spine flexure, No CVA tenderness, No deformity or limitation of movement  Respiratory: Breath Sounds equal & clear bilaterally to auscultation, no accessory muscle use noted  Cardiovascular: Regular rate, regular rhythm, normal S1, S2; no murmurs or rub  Gastrointestinal: Soft, non-tender, non distended, no hepatosplenomegaly, normal bowel sounds  Extremities: [place] x 4, no peripheral edema, no cyanosis, no clubbing   Vascular: Equal and normal pulses: 2+ peripheral pulses throughout  Neurological: A+O x 3; speech clear and intact; no sensory, motor  deficits, normal reflexes  Psychiatric: calm, normal mood, normal affect  Musculoskeletal: No joint swelling or deformity; no limitation of movement  Skin: warm, dry, well perfused, no rashes    VENT SETTINGS     ABG - ( 04 Dec 2022 00:40 )  pH, Arterial: 7.42  pH, Blood: x     /  pCO2: 32    /  pO2: 111   / HCO3: 21    / Base Excess: -3.3  /  SaO2: 98.3                I&O's Detail    03 Dec 2022 07:01  -  04 Dec 2022 07:00  --------------------------------------------------------  IN:    Dexmedetomidine: 26.1 mL    Enteral Tube Flush: 30 mL    IV PiggyBack: 866.5 mL    Jevity: 30 mL    Lactated Ringers Bolus: 500 mL    Norepinephrine: 146.5 mL    Phenylephrine: 26.2 mL  Total IN: 1625.3 mL    OUT:    Indwelling Catheter - Urethral (mL): 1595 mL  Total OUT: 1595 mL    Total NET: 30.3 mL          LABS                        7.3    1.87  )-----------( 39       ( 04 Dec 2022 00:40 )             22.1     12-04    150<H>  |  118<H>  |  54<H>  ----------------------------<  145<H>  3.5   |  20<L>  |  1.08    Ca    10.0      04 Dec 2022 00:40  Phos  2.4     12-04  Mg     2.20     12-04    TPro  5.4<L>  /  Alb  2.4<L>  /  TBili  1.0  /  DBili  x   /  AST  123<H>  /  ALT  61<H>  /  AlkPhos  125<H>  12-04    LIVER FUNCTIONS - ( 04 Dec 2022 00:40 )  Alb: 2.4 g/dL / Pro: 5.4 g/dL / ALK PHOS: 125 U/L / ALT: 61 U/L / AST: 123 U/L / GGT: x           PT/INR - ( 04 Dec 2022 00:40 )   PT: 13.4 sec;   INR: 1.15 ratio           CARDIAC MARKERS ( 04 Dec 2022 00:40 )  [id number] U/L / CKMBx     / Troponin Tx     /        Urinalysis Basic - ( 02 Dec 2022 15:55 )    Color: Yellow / Appearance: Slightly Turbid / S.028 / pH: x  Gluc: x / Ketone: Trace  / Bili: Negative / Urobili: <2 mg/dL   Blood: x / Protein: 300 mg/dL / Nitrite: Negative   Leuk Esterase: Negative / RBC: 5 /HPF / WBC 16 /HPF   Sq Epi: x / Non Sq Epi: 5 /HPF / Bacteria: Negative      POCT Blood Glucose.: 115 mg/dL *H* (22 @ 05:30)  POCT Blood Glucose.: 166 mg/dL *H* (22 @ 22:59)  POCT Blood Glucose.: 158 mg/dL *H* (22 @ 18:03)  POCT Blood Glucose.: 214 mg/dL *H* (22 @ 11:58)        MEDICATIONS  (STANDING):  artificial tears (preservative free) Ophthalmic Solution 1 Drop(s) Both EYES two times a day  cefepime   IVPB 2000 milliGRAM(s) IV Intermittent every 12 hours  dextrose 5%. 1000 milliLiter(s) (50 mL/Hr) IV Continuous <Continuous>  dextrose 5%. 1000 milliLiter(s) (100 mL/Hr) IV Continuous <Continuous>  dextrose 50% Injectable 25 Gram(s) IV Push once  dextrose 50% Injectable 12.5 Gram(s) IV Push once  dextrose 50% Injectable 25 Gram(s) IV Push once  filgrastim-sndz (ZARXIO) Injectable 480 MICROGram(s) SubCutaneous daily  glucagon  Injectable 1 milliGRAM(s) IntraMuscular once  insulin lispro (ADMELOG) corrective regimen sliding scale   SubCutaneous every 6 hours  midodrine 10 milliGRAM(s) Oral every 8 hours  norepinephrine Infusion 0.05 MICROgram(s)/kG/Min (8.16 mL/Hr) IV Continuous <Continuous>  polyethylene glycol 3350 17 Gram(s) Oral daily  senna Syrup 10 milliLiter(s) Oral daily    MEDICATIONS  (PRN):  dextrose Oral Gel 15 Gram(s) Oral once PRN Blood Glucose LESS THAN 70 milliGRAM(s)/deciliter      Allergies:  Bactrim (Other)  fluconazole (Vomiting; Nausea)  levofloxacin (Vomiting; Nausea)  penicillin (Other)        CRITICAL CARE TIME SPENT:  minutes of critical care time spent providing medical care for patient's acute illness/conditions that impairs at least one vital organ system and/or poses a high risk of imminent or life threatening deterioration in the patient's condition. It includes time spent evaluating and treating the patient's acute illness as well as time spent reviewing labs, radiology, discussing goals of care with patient and/or patient's family, and discussing the case with a multidisciplinary team, in an effort to prevent further life threatening deterioration or end organ damage. This time is independent of any procedures performed.         Significant recent/past 24 hr events:     Subjective:    Review of Systems         [ ] A ten-point review of systems was otherwise negative except as noted.  [ ] Due to altered mental status/intubation, subjective information were not able to be obtained from the patient. History was obtained, to the extent possible, from review of the chart and collateral sources of information.      Patient is a 62y old  Female who presents with a chief complaint of slurred speech (03 Dec 2022 13:53)    HPI:  Patient is a 62 yoF w/ pmhx of MM (diagnosed ~10 years ago, currently on Promacta treatments since 2022 - most recently last Wednesday; followed by oncologist, Dr. Lopez at [facility]), HTN, neuropathic R hip pain, recent R corneal tear, presents to [facility] ED for change in mental status. LKW approximately 14:30. Patient was in normal state of health this morning as per son, Nacho. Patient went to a follow up ophtho appointment for a recently diagnosed corneal abrasion this past Monday. Patient was picked up by a friend around 10:00 for an optho appt with normal mental status, pt was just complaining of being sleepy. On the way back from appointment around 14:30, patient stopped responding to friend appropriately in the car and she appeared more tired & weaker than usual. She started having slurred speech when they arrived home and then son called EMS. In the ED, patient with persisting word finding difficulties and generalized weakness, but leaning to the R side. Patient reports some difficulty remembering what happened today, but reports she feels generally weak and very tired. Code stroke called, but TPA not given because of imporvement in pt's symptoms. Son reports patient's speech is close to baseline now. Patient then denied chest pain, SOB, HA, changes in vision, N/V, imbalance, numbness or tingling. Denies recent infection or sick contacts. Pt states that she had a similar episode a few years ago with fever which she was told was likely from a MM, she improved after getting steroids.     In the ED pt was febrile 105.5, , /84, RR 24 and saturating well on RA. Pt given 2L IVF bolus and 1x vanc/ceftriaxone/acyclovir/ampicillin (2022 02:42)    PAST MEDICAL & SURGICAL HISTORY:  Multiple myeloma      Type 2 diabetes mellitus        FAMILY HISTORY:  No pertinent family history in first degree relatives        Vitals   ICU Vital Signs Last 24 Hrs  T(C): 37.3 (04 Dec 2022 04:00), Max: 38.4 (04 Dec 2022 00:00)  T(F): 99.1 (04 Dec 2022 04:00), Max: 101.2 (04 Dec 2022 00:00)  HR: 85 (04 Dec 2022 06:00) (67 - 102)  BP: 102/61 (03 Dec 2022 08:00) (102/61 - 102/61)  BP(mean): 71 (03 Dec 2022 08:00) (71 - 71)  ABP: 102/49 (04 Dec 2022 06:00) (97/49 - 125/70)  ABP(mean): 68 (04 Dec 2022 06:00) (65 - 91)  RR: 20 (04 Dec 2022 06:00) (13 - 30)  SpO2: 100% (04 Dec 2022 06:00) (95% - 100%)    O2 Parameters below as of 04 Dec 2022 06:00  Patient On (Oxygen Delivery Method): nasal cannula w/ humidification  O2 Flow (L/min): 4          Physical Exam:   Constitutional: NAD, well-groomed, well-developed  HEENT: PERRLA, EOMI, no drainage or redness  Neck: supple,  No JVD, Trachea midline  Back: Normal spine flexure, No CVA tenderness, No deformity or limitation of movement  Respiratory: Breath Sounds equal & clear bilaterally to auscultation, no accessory muscle use noted  Cardiovascular: Regular rate, regular rhythm, normal S1, S2; no murmurs or rub  Gastrointestinal: Soft, non-tender, non distended, no hepatosplenomegaly, normal bowel sounds  Extremities: [place] x 4, no peripheral edema, no cyanosis, no clubbing   Vascular: Equal and normal pulses: 2+ peripheral pulses throughout  Neurological: A+O x 3; speech clear and intact; no sensory, motor  deficits, normal reflexes  Psychiatric: calm, normal mood, normal affect  Musculoskeletal: No joint swelling or deformity; no limitation of movement  Skin: warm, dry, well perfused, no rashes    VENT SETTINGS     ABG - ( 04 Dec 2022 00:40 )  pH, Arterial: 7.42  pH, Blood: x     /  pCO2: 32    /  pO2: 111   / HCO3: 21    / Base Excess: -3.3  /  SaO2: 98.3                I&O's Detail    03 Dec 2022 07:01  -  04 Dec 2022 07:00  --------------------------------------------------------  IN:    Dexmedetomidine: 26.1 mL    Enteral Tube Flush: 30 mL    IV PiggyBack: 866.5 mL    Jevity: 30 mL    Lactated Ringers Bolus: 500 mL    Norepinephrine: 146.5 mL    Phenylephrine: 26.2 mL  Total IN: 1625.3 mL    OUT:    Indwelling Catheter - Urethral (mL): 1595 mL  Total OUT: 1595 mL    Total NET: 30.3 mL          LABS                        7.3    1.87  )-----------( 39       ( 04 Dec 2022 00:40 )             22.1     12-04    150<H>  |  118<H>  |  54<H>  ----------------------------<  145<H>  3.5   |  20<L>  |  1.08    Ca    10.0      04 Dec 2022 00:40  Phos  2.4     12-04  Mg     2.20     12-04    TPro  5.4<L>  /  Alb  2.4<L>  /  TBili  1.0  /  DBili  x   /  AST  123<H>  /  ALT  61<H>  /  AlkPhos  125<H>  12-04    LIVER FUNCTIONS - ( 04 Dec 2022 00:40 )  Alb: 2.4 g/dL / Pro: 5.4 g/dL / ALK PHOS: 125 U/L / ALT: 61 U/L / AST: 123 U/L / GGT: x           PT/INR - ( 04 Dec 2022 00:40 )   PT: 13.4 sec;   INR: 1.15 ratio           CARDIAC MARKERS ( 04 Dec 2022 00:40 )  [id number] U/L / CKMBx     / Troponin Tx     /        Urinalysis Basic - ( 02 Dec 2022 15:55 )    Color: Yellow / Appearance: Slightly Turbid / S.028 / pH: x  Gluc: x / Ketone: Trace  / Bili: Negative / Urobili: <2 mg/dL   Blood: x / Protein: 300 mg/dL / Nitrite: Negative   Leuk Esterase: Negative / RBC: 5 /HPF / WBC 16 /HPF   Sq Epi: x / Non Sq Epi: 5 /HPF / Bacteria: Negative      POCT Blood Glucose.: 115 mg/dL *H* (22 @ 05:30)  POCT Blood Glucose.: 166 mg/dL *H* (22 @ 22:59)  POCT Blood Glucose.: 158 mg/dL *H* (22 @ 18:03)  POCT Blood Glucose.: 214 mg/dL *H* (22 @ 11:58)        MEDICATIONS  (STANDING):  artificial tears (preservative free) Ophthalmic Solution 1 Drop(s) Both EYES two times a day  cefepime   IVPB 2000 milliGRAM(s) IV Intermittent every 12 hours  dextrose 5%. 1000 milliLiter(s) (50 mL/Hr) IV Continuous <Continuous>  dextrose 5%. 1000 milliLiter(s) (100 mL/Hr) IV Continuous <Continuous>  dextrose 50% Injectable 25 Gram(s) IV Push once  dextrose 50% Injectable 12.5 Gram(s) IV Push once  dextrose 50% Injectable 25 Gram(s) IV Push once  filgrastim-sndz (ZARXIO) Injectable 480 MICROGram(s) SubCutaneous daily  glucagon  Injectable 1 milliGRAM(s) IntraMuscular once  insulin lispro (ADMELOG) corrective regimen sliding scale   SubCutaneous every 6 hours  midodrine 10 milliGRAM(s) Oral every 8 hours  norepinephrine Infusion 0.05 MICROgram(s)/kG/Min (8.16 mL/Hr) IV Continuous <Continuous>  polyethylene glycol 3350 17 Gram(s) Oral daily  senna Syrup 10 milliLiter(s) Oral daily    MEDICATIONS  (PRN):  dextrose Oral Gel 15 Gram(s) Oral once PRN Blood Glucose LESS THAN 70 milliGRAM(s)/deciliter      Allergies:  Bactrim (Other)  fluconazole (Vomiting; Nausea)  levofloxacin (Vomiting; Nausea)  penicillin (Other)        CRITICAL CARE TIME SPENT:  minutes of critical care time spent providing medical care for patient's acute illness/conditions that impairs at least one vital organ system and/or poses a high risk of imminent or life threatening deterioration in the patient's condition. It includes time spent evaluating and treating the patient's acute illness as well as time spent reviewing labs, radiology, discussing goals of care with patient and/or patient's family, and discussing the case with a multidisciplinary team, in an effort to prevent further life threatening deterioration or end organ damage. This time is independent of any procedures performed.         Significant recent/past 24 hr events: T max 101.2, pt developed new onset irregular HR to 130's, on ECG showed ECG, pt was noticed to cough on water at the time was taking potassium pill, and pt became hypoxic to 80's on 1.5 LPM NC, and was placed on NRB mask,   -- pt remains off levophed since 8 am today     Subjective: pt denies any pain/ SOB/other complaints     Review of Systems         [ ] A ten-point review of systems was otherwise negative except as noted.  [ ] Due to altered mental status/intubation, subjective information were not able to be obtained from the patient. History was obtained, to the extent possible, from review of the chart and collateral sources of information.      Patient is a 62y old  Female who presents with a chief complaint of slurred speech (03 Dec 2022 13:53)    HPI:  Patient is a 62 yoF w/ pmhx of MM (diagnosed ~10 years ago, currently on Promacta treatments since 2022 - most recently last Wednesday; followed by oncologist, Dr. Lopez at [facility]), HTN, neuropathic R hip pain, recent R corneal tear, presents to [facility] ED for change in mental status. LKW approximately 14:30. Patient was in normal state of health this morning as per son, Nacho. Patient went to a follow up ophtho appointment for a recently diagnosed corneal abrasion this past Monday. Patient was picked up by a friend around 10:00 for an optho appt with normal mental status, pt was just complaining of being sleepy. On the way back from appointment around 14:30, patient stopped responding to friend appropriately in the car and she appeared more tired & weaker than usual. She started having slurred speech when they arrived home and then son called EMS. In the ED, patient with persisting word finding difficulties and generalized weakness, but leaning to the R side. Patient reports some difficulty remembering what happened today, but reports she feels generally weak and very tired. Code stroke called, but TPA not given because of imporvement in pt's symptoms. Son reports patient's speech is close to baseline now. Patient then denied chest pain, SOB, HA, changes in vision, N/V, imbalance, numbness or tingling. Denies recent infection or sick contacts. Pt states that she had a similar episode a few years ago with fever which she was told was likely from a MM, she improved after getting steroids.     In the ED pt was febrile 105.5, , /84, RR 24 and saturating well on RA. Pt given 2L IVF bolus and 1x vanc/ceftriaxone/acyclovir/ampicillin (2022 02:42)    PAST MEDICAL & SURGICAL HISTORY:  Multiple myeloma      Type 2 diabetes mellitus        FAMILY HISTORY:  No pertinent family history in first degree relatives        Vitals   ICU Vital Signs Last 24 Hrs  T(C): 37.3 (04 Dec 2022 04:00), Max: 38.4 (04 Dec 2022 00:00)  T(F): 99.1 (04 Dec 2022 04:00), Max: 101.2 (04 Dec 2022 00:00)  HR: 85 (04 Dec 2022 06:00) (67 - 102)  BP: 102/61 (03 Dec 2022 08:00) (102/61 - 102/61)  BP(mean): 71 (03 Dec 2022 08:00) (71 - 71)  ABP: 102/49 (04 Dec 2022 06:00) (97/49 - 125/70)  ABP(mean): 68 (04 Dec 2022 06:00) (65 - 91)  RR: 20 (04 Dec 2022 06:00) (13 - 30)  SpO2: 100% (04 Dec 2022 06:00) (95% - 100%)    O2 Parameters below as of 04 Dec 2022 06:00  Patient On (Oxygen Delivery Method): nasal cannula w/ humidification  O2 Flow (L/min): 4          Physical Exam:   Constitutional: NAD, well-groomed, well-developed  HEENT: PERRLA, EOMI, no drainage or redness  Neck: supple,  No JVD, Trachea midline  Back: Normal spine flexure, No CVA tenderness, No deformity or limitation of movement  Respiratory: Breath Sounds -- very distant - R lung field and CTA-- L lung field, no accessory muscle use noted, no nasal flaring,   Cardiovascular: Regular rate, regular rhythm, normal S1, S2; no murmurs or rub  Gastrointestinal: Soft, non-tender, non distended, no hepatosplenomegaly, normal bowel sounds  Extremities: WAKEFIELD x 4, no peripheral edema, no cyanosis, no clubbing   Vascular: Equal and normal pulses: 2+ peripheral pulses throughout  Neurological: A+O x 3; speech clear and intact; no sensory, motor  deficits, normal reflexes  Psychiatric: calm, normal mood, normal affect  Musculoskeletal: No joint swelling or deformity; no limitation of movement  Skin: warm, dry, well perfused, no rashes    VENT SETTINGS     ABG - ( 04 Dec 2022 00:40 )  pH, Arterial: 7.42  pH, Blood: x     /  pCO2: 32    /  pO2: 111   / HCO3: 21    / Base Excess: -3.3  /  SaO2: 98.3                I&O's Detail    03 Dec 2022 07:01  -  04 Dec 2022 07:00  --------------------------------------------------------  IN:    Dexmedetomidine: 26.1 mL    Enteral Tube Flush: 30 mL    IV PiggyBack: 866.5 mL    Jevity: 30 mL    Lactated Ringers Bolus: 500 mL    Norepinephrine: 146.5 mL    Phenylephrine: 26.2 mL  Total IN: 1625.3 mL    OUT:    Indwelling Catheter - Urethral (mL): 1595 mL  Total OUT: 1595 mL    Total NET: 30.3 mL          LABS                        7.3    1.87  )-----------( 39       ( 04 Dec 2022 00:40 )             22.1     12-04    150<H>  |  118<H>  |  54<H>  ----------------------------<  145<H>  3.5   |  20<L>  |  1.08    Ca    10.0      04 Dec 2022 00:40  Phos  2.4     12-  Mg     2.20     12-    TPro  5.4<L>  /  Alb  2.4<L>  /  TBili  1.0  /  DBili  x   /  AST  123<H>  /  ALT  61<H>  /  AlkPhos  125<H>  12-04    LIVER FUNCTIONS - ( 04 Dec 2022 00:40 )  Alb: 2.4 g/dL / Pro: 5.4 g/dL / ALK PHOS: 125 U/L / ALT: 61 U/L / AST: 123 U/L / GGT: x           PT/INR - ( 04 Dec 2022 00:40 )   PT: 13.4 sec;   INR: 1.15 ratio           CARDIAC MARKERS ( 04 Dec 2022 00:40 )  [id number] U/L / CKMBx     / Troponin Tx     /        Urinalysis Basic - ( 02 Dec 2022 15:55 )    Color: Yellow / Appearance: Slightly Turbid / S.028 / pH: x  Gluc: x / Ketone: Trace  / Bili: Negative / Urobili: <2 mg/dL   Blood: x / Protein: 300 mg/dL / Nitrite: Negative   Leuk Esterase: Negative / RBC: 5 /HPF / WBC 16 /HPF   Sq Epi: x / Non Sq Epi: 5 /HPF / Bacteria: Negative      POCT Blood Glucose.: 115 mg/dL *H* (22 @ 05:30)  POCT Blood Glucose.: 166 mg/dL *H* (22 @ 22:59)  POCT Blood Glucose.: 158 mg/dL *H* (22 @ 18:03)  POCT Blood Glucose.: 214 mg/dL *H* (22 @ 11:58)        MEDICATIONS  (STANDING):  artificial tears (preservative free) Ophthalmic Solution 1 Drop(s) Both EYES two times a day  cefepime   IVPB 2000 milliGRAM(s) IV Intermittent every 12 hours  dextrose 5%. 1000 milliLiter(s) (50 mL/Hr) IV Continuous <Continuous>  dextrose 5%. 1000 milliLiter(s) (100 mL/Hr) IV Continuous <Continuous>  dextrose 50% Injectable 25 Gram(s) IV Push once  dextrose 50% Injectable 12.5 Gram(s) IV Push once  dextrose 50% Injectable 25 Gram(s) IV Push once  filgrastim-sndz (ZARXIO) Injectable 480 MICROGram(s) SubCutaneous daily  glucagon  Injectable 1 milliGRAM(s) IntraMuscular once  insulin lispro (ADMELOG) corrective regimen sliding scale   SubCutaneous every 6 hours  midodrine 10 milliGRAM(s) Oral every 8 hours  norepinephrine Infusion 0.05 MICROgram(s)/kG/Min (8.16 mL/Hr) IV Continuous <Continuous>  polyethylene glycol 3350 17 Gram(s) Oral daily  senna Syrup 10 milliLiter(s) Oral daily    MEDICATIONS  (PRN):  dextrose Oral Gel 15 Gram(s) Oral once PRN Blood Glucose LESS THAN 70 milliGRAM(s)/deciliter      Allergies:  Bactrim (Other)  fluconazole (Vomiting; Nausea)  levofloxacin (Vomiting; Nausea)  penicillin (Other)        CRITICAL CARE TIME SPENT: 35 minutes of critical care time spent providing medical care for patient's acute illness/conditions that impairs at least one vital organ system and/or poses a high risk of imminent or life threatening deterioration in the patient's condition. It includes time spent evaluating and treating the patient's acute illness as well as time spent reviewing labs, radiology, discussing goals of care with patient and/or patient's family, and discussing the case with a multidisciplinary team, in an effort to prevent further life threatening deterioration or end organ damage. This time is independent of any procedures performed.

## 2022-12-05 DIAGNOSIS — D61.818 OTHER PANCYTOPENIA: ICD-10-CM

## 2022-12-05 LAB
ALBUMIN SERPL ELPH-MCNC: 2.4 G/DL — LOW (ref 3.3–5)
ALBUMIN SERPL ELPH-MCNC: 2.6 G/DL — LOW (ref 3.3–5)
ALP SERPL-CCNC: 156 U/L — HIGH (ref 40–120)
ALP SERPL-CCNC: 173 U/L — HIGH (ref 40–120)
ALT FLD-CCNC: 74 U/L — HIGH (ref 4–33)
ALT FLD-CCNC: 77 U/L — HIGH (ref 4–33)
ANION GAP SERPL CALC-SCNC: 10 MMOL/L — SIGNIFICANT CHANGE UP (ref 7–14)
ANION GAP SERPL CALC-SCNC: 13 MMOL/L — SIGNIFICANT CHANGE UP (ref 7–14)
AST SERPL-CCNC: 139 U/L — HIGH (ref 4–32)
AST SERPL-CCNC: 143 U/L — HIGH (ref 4–32)
BASOPHILS # BLD AUTO: 0 K/UL — SIGNIFICANT CHANGE UP (ref 0–0.2)
BASOPHILS # BLD AUTO: 0.03 K/UL — SIGNIFICANT CHANGE UP (ref 0–0.2)
BASOPHILS NFR BLD AUTO: 0 % — SIGNIFICANT CHANGE UP (ref 0–2)
BASOPHILS NFR BLD AUTO: 0.7 % — SIGNIFICANT CHANGE UP (ref 0–2)
BILIRUB SERPL-MCNC: 0.9 MG/DL — SIGNIFICANT CHANGE UP (ref 0.2–1.2)
BILIRUB SERPL-MCNC: 0.9 MG/DL — SIGNIFICANT CHANGE UP (ref 0.2–1.2)
BLOOD GAS ARTERIAL - LYTES,HGB,ICA,LACT RESULT: SIGNIFICANT CHANGE UP
BUN SERPL-MCNC: 41 MG/DL — HIGH (ref 7–23)
BUN SERPL-MCNC: 44 MG/DL — HIGH (ref 7–23)
CALCIUM SERPL-MCNC: 9 MG/DL — SIGNIFICANT CHANGE UP (ref 8.4–10.5)
CALCIUM SERPL-MCNC: 9.5 MG/DL — SIGNIFICANT CHANGE UP (ref 8.4–10.5)
CHLORIDE SERPL-SCNC: 119 MMOL/L — HIGH (ref 98–107)
CHLORIDE SERPL-SCNC: 120 MMOL/L — HIGH (ref 98–107)
CK SERPL-CCNC: 549 U/L — HIGH (ref 25–170)
CO2 SERPL-SCNC: 21 MMOL/L — LOW (ref 22–31)
CO2 SERPL-SCNC: 23 MMOL/L — SIGNIFICANT CHANGE UP (ref 22–31)
CREAT SERPL-MCNC: 1 MG/DL — SIGNIFICANT CHANGE UP (ref 0.5–1.3)
CREAT SERPL-MCNC: 1 MG/DL — SIGNIFICANT CHANGE UP (ref 0.5–1.3)
D DIMER BLD IA.RAPID-MCNC: 3006 NG/ML DDU — HIGH
EGFR: 64 ML/MIN/1.73M2 — SIGNIFICANT CHANGE UP
EGFR: 64 ML/MIN/1.73M2 — SIGNIFICANT CHANGE UP
EOSINOPHIL # BLD AUTO: 0 K/UL — SIGNIFICANT CHANGE UP (ref 0–0.5)
EOSINOPHIL # BLD AUTO: 0 K/UL — SIGNIFICANT CHANGE UP (ref 0–0.5)
EOSINOPHIL NFR BLD AUTO: 0 % — SIGNIFICANT CHANGE UP (ref 0–6)
EOSINOPHIL NFR BLD AUTO: 0 % — SIGNIFICANT CHANGE UP (ref 0–6)
FERRITIN SERPL-MCNC: HIGH NG/ML (ref 15–150)
GLUCOSE BLDC GLUCOMTR-MCNC: 197 MG/DL — HIGH (ref 70–99)
GLUCOSE BLDC GLUCOMTR-MCNC: 201 MG/DL — HIGH (ref 70–99)
GLUCOSE BLDC GLUCOMTR-MCNC: 211 MG/DL — HIGH (ref 70–99)
GLUCOSE BLDC GLUCOMTR-MCNC: 247 MG/DL — HIGH (ref 70–99)
GLUCOSE SERPL-MCNC: 179 MG/DL — HIGH (ref 70–99)
GLUCOSE SERPL-MCNC: 205 MG/DL — HIGH (ref 70–99)
HAPTOGLOB SERPL-MCNC: 179 MG/DL — SIGNIFICANT CHANGE UP (ref 34–200)
HCT VFR BLD CALC: 21.2 % — LOW (ref 34.5–45)
HCT VFR BLD CALC: 22.2 % — LOW (ref 34.5–45)
HGB BLD-MCNC: 7.1 G/DL — LOW (ref 11.5–15.5)
HGB BLD-MCNC: 7.2 G/DL — LOW (ref 11.5–15.5)
IANC: 0.64 K/UL — LOW (ref 1.8–7.4)
IANC: 2.6 K/UL — SIGNIFICANT CHANGE UP (ref 1.8–7.4)
IMM GRANULOCYTES NFR BLD AUTO: 12.6 % — HIGH (ref 0–0.9)
INR BLD: 1.21 RATIO — HIGH (ref 0.88–1.16)
LDH SERPL L TO P-CCNC: 1576 U/L — HIGH (ref 135–225)
LYMPHOCYTES # BLD AUTO: 0.67 K/UL — LOW (ref 1–3.3)
LYMPHOCYTES # BLD AUTO: 1.05 K/UL — SIGNIFICANT CHANGE UP (ref 1–3.3)
LYMPHOCYTES # BLD AUTO: 16.3 % — SIGNIFICANT CHANGE UP (ref 13–44)
LYMPHOCYTES # BLD AUTO: 26.4 % — SIGNIFICANT CHANGE UP (ref 13–44)
MAGNESIUM SERPL-MCNC: 1.8 MG/DL — SIGNIFICANT CHANGE UP (ref 1.6–2.6)
MAGNESIUM SERPL-MCNC: 2 MG/DL — SIGNIFICANT CHANGE UP (ref 1.6–2.6)
MCHC RBC-ENTMCNC: 32.4 GM/DL — SIGNIFICANT CHANGE UP (ref 32–36)
MCHC RBC-ENTMCNC: 33.8 GM/DL — SIGNIFICANT CHANGE UP (ref 32–36)
MCHC RBC-ENTMCNC: 34 PG — SIGNIFICANT CHANGE UP (ref 27–34)
MCHC RBC-ENTMCNC: 34.8 PG — HIGH (ref 27–34)
MCV RBC AUTO: 102.9 FL — HIGH (ref 80–100)
MCV RBC AUTO: 104.7 FL — HIGH (ref 80–100)
MONOCYTES # BLD AUTO: 0.3 K/UL — SIGNIFICANT CHANGE UP (ref 0–0.9)
MONOCYTES # BLD AUTO: 0.75 K/UL — SIGNIFICANT CHANGE UP (ref 0–0.9)
MONOCYTES NFR BLD AUTO: 18.9 % — HIGH (ref 2–14)
MONOCYTES NFR BLD AUTO: 7.3 % — SIGNIFICANT CHANGE UP (ref 2–14)
NEUTROPHILS # BLD AUTO: 1.43 K/UL — LOW (ref 1.8–7.4)
NEUTROPHILS # BLD AUTO: 2.6 K/UL — SIGNIFICANT CHANGE UP (ref 1.8–7.4)
NEUTROPHILS NFR BLD AUTO: 32.1 % — LOW (ref 43–77)
NEUTROPHILS NFR BLD AUTO: 63.1 % — SIGNIFICANT CHANGE UP (ref 43–77)
NRBC # BLD: 2 /100 WBCS — HIGH (ref 0–0)
NRBC # FLD: 0.06 K/UL — HIGH (ref 0–0)
PHOSPHATE SERPL-MCNC: 2.7 MG/DL — SIGNIFICANT CHANGE UP (ref 2.5–4.5)
PHOSPHATE SERPL-MCNC: 4.5 MG/DL — SIGNIFICANT CHANGE UP (ref 2.5–4.5)
PLATELET # BLD AUTO: 21 K/UL — LOW (ref 150–400)
PLATELET # BLD AUTO: 21 K/UL — LOW (ref 150–400)
POTASSIUM SERPL-MCNC: 3.5 MMOL/L — SIGNIFICANT CHANGE UP (ref 3.5–5.3)
POTASSIUM SERPL-MCNC: 3.8 MMOL/L — SIGNIFICANT CHANGE UP (ref 3.5–5.3)
POTASSIUM SERPL-SCNC: 3.5 MMOL/L — SIGNIFICANT CHANGE UP (ref 3.5–5.3)
POTASSIUM SERPL-SCNC: 3.8 MMOL/L — SIGNIFICANT CHANGE UP (ref 3.5–5.3)
PROT SERPL-MCNC: 5.3 G/DL — LOW (ref 6–8.3)
PROT SERPL-MCNC: 5.6 G/DL — LOW (ref 6–8.3)
PROTHROM AB SERPL-ACNC: 14.1 SEC — HIGH (ref 10.5–13.4)
RBC # BLD: 2.04 M/UL — LOW (ref 3.8–5.2)
RBC # BLD: 2.04 M/UL — LOW (ref 3.8–5.2)
RBC # BLD: 2.12 M/UL — LOW (ref 3.8–5.2)
RBC # FLD: 15.3 % — HIGH (ref 10.3–14.5)
RBC # FLD: 15.6 % — HIGH (ref 10.3–14.5)
RETICS #: 6.7 K/UL — LOW (ref 25–125)
RETICS/RBC NFR: 0.3 % — LOW (ref 0.5–2.5)
SODIUM SERPL-SCNC: 151 MMOL/L — HIGH (ref 135–145)
SODIUM SERPL-SCNC: 155 MMOL/L — HIGH (ref 135–145)
WBC # BLD: 3.99 K/UL — SIGNIFICANT CHANGE UP (ref 3.8–10.5)
WBC # BLD: 4.12 K/UL — SIGNIFICANT CHANGE UP (ref 3.8–10.5)
WBC # FLD AUTO: 3.99 K/UL — SIGNIFICANT CHANGE UP (ref 3.8–10.5)
WBC # FLD AUTO: 4.12 K/UL — SIGNIFICANT CHANGE UP (ref 3.8–10.5)

## 2022-12-05 PROCEDURE — 76705 ECHO EXAM OF ABDOMEN: CPT | Mod: 26

## 2022-12-05 PROCEDURE — 93308 TTE F-UP OR LMTD: CPT | Mod: 26

## 2022-12-05 PROCEDURE — 76604 US EXAM CHEST: CPT | Mod: 26

## 2022-12-05 PROCEDURE — 99232 SBSQ HOSP IP/OBS MODERATE 35: CPT

## 2022-12-05 PROCEDURE — 99291 CRITICAL CARE FIRST HOUR: CPT

## 2022-12-05 RX ORDER — CEFTRIAXONE 500 MG/1
2000 INJECTION, POWDER, FOR SOLUTION INTRAMUSCULAR; INTRAVENOUS EVERY 24 HOURS
Refills: 0 | Status: DISCONTINUED | OUTPATIENT
Start: 2022-12-05 | End: 2022-12-07

## 2022-12-05 RX ORDER — LANOLIN ALCOHOL/MO/W.PET/CERES
6 CREAM (GRAM) TOPICAL AT BEDTIME
Refills: 0 | Status: DISCONTINUED | OUTPATIENT
Start: 2022-12-05 | End: 2022-12-13

## 2022-12-05 RX ORDER — FUROSEMIDE 40 MG
40 TABLET ORAL ONCE
Refills: 0 | Status: DISCONTINUED | OUTPATIENT
Start: 2022-12-05 | End: 2022-12-05

## 2022-12-05 RX ORDER — INSULIN LISPRO 100/ML
VIAL (ML) SUBCUTANEOUS
Refills: 0 | Status: DISCONTINUED | OUTPATIENT
Start: 2022-12-05 | End: 2022-12-07

## 2022-12-05 RX ORDER — FUROSEMIDE 40 MG
40 TABLET ORAL ONCE
Refills: 0 | Status: COMPLETED | OUTPATIENT
Start: 2022-12-05 | End: 2022-12-05

## 2022-12-05 RX ORDER — SODIUM CHLORIDE 9 MG/ML
1000 INJECTION, SOLUTION INTRAVENOUS
Refills: 0 | Status: DISCONTINUED | OUTPATIENT
Start: 2022-12-05 | End: 2022-12-06

## 2022-12-05 RX ORDER — LANOLIN ALCOHOL/MO/W.PET/CERES
3 CREAM (GRAM) TOPICAL ONCE
Refills: 0 | Status: COMPLETED | OUTPATIENT
Start: 2022-12-05 | End: 2022-12-05

## 2022-12-05 RX ORDER — INSULIN LISPRO 100/ML
VIAL (ML) SUBCUTANEOUS AT BEDTIME
Refills: 0 | Status: DISCONTINUED | OUTPATIENT
Start: 2022-12-05 | End: 2022-12-07

## 2022-12-05 RX ORDER — SODIUM,POTASSIUM PHOSPHATES 278-250MG
1 POWDER IN PACKET (EA) ORAL EVERY 4 HOURS
Refills: 0 | Status: DISCONTINUED | OUTPATIENT
Start: 2022-12-05 | End: 2022-12-05

## 2022-12-05 RX ORDER — SODIUM CHLORIDE 9 MG/ML
1000 INJECTION, SOLUTION INTRAVENOUS
Refills: 0 | Status: DISCONTINUED | OUTPATIENT
Start: 2022-12-05 | End: 2022-12-05

## 2022-12-05 RX ORDER — TENOFOVIR DISOPROXIL FUMARATE 300 MG/1
25 TABLET, FILM COATED ORAL DAILY
Refills: 0 | Status: DISCONTINUED | OUTPATIENT
Start: 2022-12-05 | End: 2022-12-13

## 2022-12-05 RX ORDER — POTASSIUM PHOSPHATE, MONOBASIC POTASSIUM PHOSPHATE, DIBASIC 236; 224 MG/ML; MG/ML
30 INJECTION, SOLUTION INTRAVENOUS ONCE
Refills: 0 | Status: COMPLETED | OUTPATIENT
Start: 2022-12-05 | End: 2022-12-05

## 2022-12-05 RX ORDER — POTASSIUM CHLORIDE 20 MEQ
40 PACKET (EA) ORAL ONCE
Refills: 0 | Status: DISCONTINUED | OUTPATIENT
Start: 2022-12-05 | End: 2022-12-05

## 2022-12-05 RX ORDER — MAGNESIUM SULFATE 500 MG/ML
2 VIAL (ML) INJECTION ONCE
Refills: 0 | Status: COMPLETED | OUTPATIENT
Start: 2022-12-05 | End: 2022-12-05

## 2022-12-05 RX ORDER — POTASSIUM CHLORIDE 20 MEQ
20 PACKET (EA) ORAL ONCE
Refills: 0 | Status: COMPLETED | OUTPATIENT
Start: 2022-12-05 | End: 2022-12-05

## 2022-12-05 RX ADMIN — Medication 2: at 11:31

## 2022-12-05 RX ADMIN — CEFEPIME 100 MILLIGRAM(S): 1 INJECTION, POWDER, FOR SOLUTION INTRAMUSCULAR; INTRAVENOUS at 05:39

## 2022-12-05 RX ADMIN — Medication 1 TABLET(S): at 11:32

## 2022-12-05 RX ADMIN — Medication 12.5 MILLIGRAM(S): at 05:39

## 2022-12-05 RX ADMIN — MIDODRINE HYDROCHLORIDE 10 MILLIGRAM(S): 2.5 TABLET ORAL at 05:39

## 2022-12-05 RX ADMIN — MIDODRINE HYDROCHLORIDE 10 MILLIGRAM(S): 2.5 TABLET ORAL at 14:11

## 2022-12-05 RX ADMIN — Medication 3 MILLIGRAM(S): at 01:49

## 2022-12-05 RX ADMIN — Medication 25 GRAM(S): at 08:05

## 2022-12-05 RX ADMIN — Medication 1: at 17:40

## 2022-12-05 RX ADMIN — Medication 20 MILLIEQUIVALENT(S): at 21:53

## 2022-12-05 RX ADMIN — Medication 480 MICROGRAM(S): at 11:32

## 2022-12-05 RX ADMIN — SODIUM CHLORIDE 100 MILLILITER(S): 9 INJECTION, SOLUTION INTRAVENOUS at 18:29

## 2022-12-05 RX ADMIN — Medication 1 DROP(S): at 17:40

## 2022-12-05 RX ADMIN — Medication 40 MILLIGRAM(S): at 03:23

## 2022-12-05 RX ADMIN — MIDODRINE HYDROCHLORIDE 10 MILLIGRAM(S): 2.5 TABLET ORAL at 21:53

## 2022-12-05 RX ADMIN — Medication 2: at 05:44

## 2022-12-05 RX ADMIN — CEFTRIAXONE 100 MILLIGRAM(S): 500 INJECTION, POWDER, FOR SOLUTION INTRAMUSCULAR; INTRAVENOUS at 17:41

## 2022-12-05 RX ADMIN — Medication 6 MILLIGRAM(S): at 21:53

## 2022-12-05 RX ADMIN — SODIUM CHLORIDE 80 MILLILITER(S): 9 INJECTION, SOLUTION INTRAVENOUS at 10:20

## 2022-12-05 RX ADMIN — POTASSIUM PHOSPHATE, MONOBASIC POTASSIUM PHOSPHATE, DIBASIC 83.33 MILLIMOLE(S): 236; 224 INJECTION, SOLUTION INTRAVENOUS at 09:23

## 2022-12-05 RX ADMIN — Medication 1 DROP(S): at 05:40

## 2022-12-05 RX ADMIN — Medication 100 MILLIEQUIVALENT(S): at 00:19

## 2022-12-05 RX ADMIN — Medication 100 MILLIEQUIVALENT(S): at 01:21

## 2022-12-05 NOTE — PROGRESS NOTE ADULT - SUBJECTIVE AND OBJECTIVE BOX
Significant recent/past 24 hr events:    Subjective:    Review of Systems         [ ] A ten-point review of systems was otherwise negative except as noted.  [ ] Due to altered mental status/intubation, subjective information were not able to be obtained from the patient. History was obtained, to the extent possible, from review of the chart and collateral sources of information.      Patient is a 62y old  Female who presents with a chief complaint of slurred speech (04 Dec 2022 07:06)    HPI:  Patient is a 62 yoF w/ pmhx of MM (diagnosed ~10 years ago, currently on Promacta treatments since June 2022 - most recently last Wednesday; followed by oncologist, Dr. Lopez at [facility]), HTN, neuropathic R hip pain, recent R corneal tear, presents to [facility] ED for change in mental status. LKW approximately 14:30. Patient was in normal state of health this morning as per son, Nacho. Patient went to a follow up ophtho appointment for a recently diagnosed corneal abrasion this past Monday. Patient was picked up by a friend around 10:00 for an optho appt with normal mental status, pt was just complaining of being sleepy. On the way back from appointment around 14:30, patient stopped responding to friend appropriately in the car and she appeared more tired & weaker than usual. She started having slurred speech when they arrived home and then son called EMS. In the ED, patient with persisting word finding difficulties and generalized weakness, but leaning to the R side. Patient reports some difficulty remembering what happened today, but reports she feels generally weak and very tired. Code stroke called, but TPA not given because of imporvement in pt's symptoms. Son reports patient's speech is close to baseline now. Patient then denied chest pain, SOB, HA, changes in vision, N/V, imbalance, numbness or tingling. Denies recent infection or sick contacts. Pt states that she had a similar episode a few years ago with fever which she was told was likely from a MM, she improved after getting steroids.     In the ED pt was febrile 105.5, , /84, RR 24 and saturating well on RA. Pt given 2L IVF bolus and 1x vanc/ceftriaxone/acyclovir/ampicillin (30 Nov 2022 02:42)    PAST MEDICAL & SURGICAL HISTORY:  Multiple myeloma      Type 2 diabetes mellitus        FAMILY HISTORY:  No pertinent family history in first degree relatives        Vitals   ICU Vital Signs Last 24 Hrs  T(C): 37.4 (05 Dec 2022 04:00), Max: 37.9 (04 Dec 2022 16:00)  T(F): 99.3 (05 Dec 2022 04:00), Max: 100.2 (04 Dec 2022 16:00)  HR: 91 (05 Dec 2022 07:00) (90 - 126)  BP: --  BP(mean): --  ABP: 117/60 (05 Dec 2022 07:00) (103/60 - 124/59)  ABP(mean): 81 (05 Dec 2022 07:00) (74 - 86)  RR: 35 (05 Dec 2022 07:00) (21 - 43)  SpO2: 97% (05 Dec 2022 07:00) (86% - 100%)    O2 Parameters below as of 05 Dec 2022 07:00  Patient On (Oxygen Delivery Method): nasal cannula w/ humidification  O2 Flow (L/min): 4          Physical Exam:   Constitutional: NAD, well-groomed, well-developed  HEENT: PERRLA, EOMI, no drainage or redness  Neck: supple,  No JVD, Trachea midline  Back: Normal spine flexure, No CVA tenderness, No deformity or limitation of movement  Respiratory: Breath Sounds equal & clear bilaterally to auscultation, no accessory muscle use noted  Cardiovascular: Regular rate, regular rhythm, normal S1, S2; no murmurs or rub  Gastrointestinal: Soft, non-tender, non distended, no hepatosplenomegaly, normal bowel sounds  Extremities: WAKE[place] x 4, no peripheral edema, no cyanosis, no clubbing   Vascular: Equal and normal pulses: 2+ peripheral pulses throughout  Neurological: A+O x 3; speech clear and intact; no sensory, motor  deficits, normal reflexes  Psychiatric: calm, normal mood, normal affect  Musculoskeletal: No joint swelling or deformity; no limitation of movement  Skin: warm, dry, well perfused, no rashes    VENT SETTINGS     ABG - ( 05 Dec 2022 01:04 )  pH, Arterial: 7.50  pH, Blood: x     /  pCO2: 27    /  pO2: 136   / HCO3: 21    / Base Excess: -1.4  /  SaO2: 98.5                I&O's Detail    04 Dec 2022 07:01  -  05 Dec 2022 07:00  --------------------------------------------------------  IN:    dextrose 5%: 450 mL    dextrose 5%: 400 mL    IV PiggyBack: 500 mL  Total IN: 1350 mL    OUT:    Indwelling Catheter - Urethral (mL): 4410 mL  Total OUT: 4410 mL    Total NET: -3060 mL          LABS                        7.1    3.99  )-----------( 21       ( 05 Dec 2022 01:04 )             21.2     12-05    151<H>  |  120<H>  |  44<H>  ----------------------------<  179<H>  3.5   |  21<L>  |  1.00    Ca    9.5      05 Dec 2022 01:04  Phos  2.7     12-05  Mg     1.80     12-05    TPro  5.3<L>  /  Alb  2.4<L>  /  TBili  0.9  /  DBili  x   /  AST  139<H>  /  ALT  74<H>  /  AlkPhos  156<H>  12-05    LIVER FUNCTIONS - ( 05 Dec 2022 01:04 )  Alb: 2.4 g/dL / Pro: 5.3 g/dL / ALK PHOS: 156 U/L / ALT: 74 U/L / AST: 139 U/L / GGT: x           PT/INR - ( 05 Dec 2022 01:04 )   PT: 14.1 sec;   INR: 1.21 ratio           CARDIAC MARKERS ( 05 Dec 2022 01:04 )   U/L / CKMBx     / Troponin Tx     /          POCT Blood Glucose.: 201 mg/dL *H* (12-05-22 @ 05:43)  POCT Blood Glucose.: 190 mg/dL *H* (12-04-22 @ 23:26)  POCT Blood Glucose.: 208 mg/dL *H* (12-04-22 @ 17:47)  POCT Blood Glucose.: 144 mg/dL *H* (12-04-22 @ 11:15)        MEDICATIONS  (STANDING):  artificial tears (preservative free) Ophthalmic Solution 1 Drop(s) Both EYES two times a day  cefepime   IVPB 2000 milliGRAM(s) IV Intermittent every 12 hours  dextrose 5%. 1000 milliLiter(s) (50 mL/Hr) IV Continuous <Continuous>  dextrose 5%. 1000 milliLiter(s) (100 mL/Hr) IV Continuous <Continuous>  dextrose 50% Injectable 25 Gram(s) IV Push once  dextrose 50% Injectable 12.5 Gram(s) IV Push once  dextrose 50% Injectable 25 Gram(s) IV Push once  filgrastim-sndz (ZARXIO) Injectable 480 MICROGram(s) SubCutaneous daily  glucagon  Injectable 1 milliGRAM(s) IntraMuscular once  insulin lispro (ADMELOG) corrective regimen sliding scale   SubCutaneous every 6 hours  magnesium sulfate  IVPB 2 Gram(s) IV Intermittent once  metoprolol tartrate 12.5 milliGRAM(s) Oral two times a day  midodrine 10 milliGRAM(s) Oral every 8 hours  multivitamin 1 Tablet(s) Oral daily  polyethylene glycol 3350 17 Gram(s) Oral daily  potassium chloride   Powder 40 milliEquivalent(s) Oral once  potassium phosphate / sodium phosphate Powder (PHOS-NaK) 1 Packet(s) Oral every 4 hours  senna Syrup 10 milliLiter(s) Oral daily    MEDICATIONS  (PRN):  dextrose Oral Gel 15 Gram(s) Oral once PRN Blood Glucose LESS THAN 70 milliGRAM(s)/deciliter      Allergies:  Bactrim (Other)  fluconazole (Vomiting; Nausea)  levofloxacin (Vomiting; Nausea)  penicillin (Other)        CRITICAL CARE TIME SPENT:  minutes of critical care time spent providing medical care for patient's acute illness/conditions that impairs at least one vital organ system and/or poses a high risk of imminent or life threatening deterioration in the patient's condition. It includes time spent evaluating and treating the patient's acute illness as well as time spent reviewing labs, radiology, discussing goals of care with patient and/or patient's family, and discussing the case with a multidisciplinary team, in an effort to prevent further life threatening deterioration or end organ damage. This time is independent of any procedures performed.         Significant recent/past 24 hr events: pt remains in NSR in 80's, O2 has been titrated down and is on 2 LPM NC, Na level is 155 __ restarted D 5 at 100 cc/hr     Subjective: pt denies any complaints     Review of Systems         [ ] A ten-point review of systems was otherwise negative except as noted.  [ ] Due to altered mental status/intubation, subjective information were not able to be obtained from the patient. History was obtained, to the extent possible, from review of the chart and collateral sources of information.      Patient is a 62y old  Female who presents with a chief complaint of slurred speech (04 Dec 2022 07:06)    HPI:  Patient is a 62 yoF w/ pmhx of MM (diagnosed ~10 years ago, currently on Promacta treatments since June 2022 - most recently last Wednesday; followed by oncologist, Dr. Lopez at [facility]), HTN, neuropathic R hip pain, recent R corneal tear, presents to [facility] ED for change in mental status. LKW approximately 14:30. Patient was in normal state of health this morning as per son, Nacho. Patient went to a follow up ophtho appointment for a recently diagnosed corneal abrasion this past Monday. Patient was picked up by a friend around 10:00 for an optho appt with normal mental status, pt was just complaining of being sleepy. On the way back from appointment around 14:30, patient stopped responding to friend appropriately in the car and she appeared more tired & weaker than usual. She started having slurred speech when they arrived home and then son called EMS. In the ED, patient with persisting word finding difficulties and generalized weakness, but leaning to the R side. Patient reports some difficulty remembering what happened today, but reports she feels generally weak and very tired. Code stroke called, but TPA not given because of imporvement in pt's symptoms. Son reports patient's speech is close to baseline now. Patient then denied chest pain, SOB, HA, changes in vision, N/V, imbalance, numbness or tingling. Denies recent infection or sick contacts. Pt states that she had a similar episode a few years ago with fever which she was told was likely from a MM, she improved after getting steroids.     In the ED pt was febrile 105.5, , /84, RR 24 and saturating well on RA. Pt given 2L IVF bolus and 1x vanc/ceftriaxone/acyclovir/ampicillin (30 Nov 2022 02:42)    PAST MEDICAL & SURGICAL HISTORY:  Multiple myeloma      Type 2 diabetes mellitus        FAMILY HISTORY:  No pertinent family history in first degree relatives        Vitals   ICU Vital Signs Last 24 Hrs  T(C): 37.4 (05 Dec 2022 04:00), Max: 37.9 (04 Dec 2022 16:00)  T(F): 99.3 (05 Dec 2022 04:00), Max: 100.2 (04 Dec 2022 16:00)  HR: 91 (05 Dec 2022 07:00) (90 - 126)  BP: --  BP(mean): --  ABP: 117/60 (05 Dec 2022 07:00) (103/60 - 124/59)  ABP(mean): 81 (05 Dec 2022 07:00) (74 - 86)  RR: 35 (05 Dec 2022 07:00) (21 - 43)  SpO2: 97% (05 Dec 2022 07:00) (86% - 100%)    O2 Parameters below as of 05 Dec 2022 07:00  Patient On (Oxygen Delivery Method): nasal cannula w/ humidification  O2 Flow (L/min): 4          Physical Exam:   Constitutional: NAD, well-groomed, well-developed  HEENT: PERRLA, EOMI, no drainage or redness  Neck: supple,  No JVD, Trachea midline  Back: Normal spine flexure, No CVA tenderness, No deformity or limitation of movement  Respiratory: Breath Sounds equal & clear bilaterally to auscultation, no accessory muscle use noted  Cardiovascular: Regular rate, regular rhythm, normal S1, S2; no murmurs or rub  Gastrointestinal: Soft, non-tender, non distended, no hepatosplenomegaly, normal bowel sounds  Extremities: [place] x 4, no peripheral edema, no cyanosis, no clubbing   Vascular: Equal and normal pulses: 2+ peripheral pulses throughout  Neurological: A+O x 3; speech clear and intact; decreased motor strength -- LUE, no sensory, motor  deficits, normal reflexes  Psychiatric: calm, normal mood, normal affect  Musculoskeletal: No joint swelling or deformity; no limitation of movement  Skin: warm, dry, well perfused, no rashes    VENT SETTINGS     ABG - ( 05 Dec 2022 01:04 )  pH, Arterial: 7.50  pH, Blood: x     /  pCO2: 27    /  pO2: 136   / HCO3: 21    / Base Excess: -1.4  /  SaO2: 98.5                I&O's Detail    04 Dec 2022 07:01  -  05 Dec 2022 07:00  --------------------------------------------------------  IN:    dextrose 5%: 450 mL    dextrose 5%: 400 mL    IV PiggyBack: 500 mL  Total IN: 1350 mL    OUT:    Indwelling Catheter - Urethral (mL): 4410 mL  Total OUT: 4410 mL    Total NET: -3060 mL          LABS                        7.1    3.99  )-----------( 21       ( 05 Dec 2022 01:04 )             21.2     12-05    151<H>  |  120<H>  |  44<H>  ----------------------------<  179<H>  3.5   |  21<L>  |  1.00    Ca    9.5      05 Dec 2022 01:04  Phos  2.7     12-05  Mg     1.80     12-05    TPro  5.3<L>  /  Alb  2.4<L>  /  TBili  0.9  /  DBili  x   /  AST  139<H>  /  ALT  74<H>  /  AlkPhos  156<H>  12-05    LIVER FUNCTIONS - ( 05 Dec 2022 01:04 )  Alb: 2.4 g/dL / Pro: 5.3 g/dL / ALK PHOS: 156 U/L / ALT: 74 U/L / AST: 139 U/L / GGT: x           PT/INR - ( 05 Dec 2022 01:04 )   PT: 14.1 sec;   INR: 1.21 ratio           CARDIAC MARKERS ( 05 Dec 2022 01:04 )   U/L / CKMBx     / Troponin Tx     /          POCT Blood Glucose.: 201 mg/dL *H* (12-05-22 @ 05:43)  POCT Blood Glucose.: 190 mg/dL *H* (12-04-22 @ 23:26)  POCT Blood Glucose.: 208 mg/dL *H* (12-04-22 @ 17:47)  POCT Blood Glucose.: 144 mg/dL *H* (12-04-22 @ 11:15)        MEDICATIONS  (STANDING):  artificial tears (preservative free) Ophthalmic Solution 1 Drop(s) Both EYES two times a day  cefepime   IVPB 2000 milliGRAM(s) IV Intermittent every 12 hours  dextrose 5%. 1000 milliLiter(s) (50 mL/Hr) IV Continuous <Continuous>  dextrose 5%. 1000 milliLiter(s) (100 mL/Hr) IV Continuous <Continuous>  dextrose 50% Injectable 25 Gram(s) IV Push once  dextrose 50% Injectable 12.5 Gram(s) IV Push once  dextrose 50% Injectable 25 Gram(s) IV Push once  filgrastim-sndz (ZARXIO) Injectable 480 MICROGram(s) SubCutaneous daily  glucagon  Injectable 1 milliGRAM(s) IntraMuscular once  insulin lispro (ADMELOG) corrective regimen sliding scale   SubCutaneous every 6 hours  magnesium sulfate  IVPB 2 Gram(s) IV Intermittent once  metoprolol tartrate 12.5 milliGRAM(s) Oral two times a day  midodrine 10 milliGRAM(s) Oral every 8 hours  multivitamin 1 Tablet(s) Oral daily  polyethylene glycol 3350 17 Gram(s) Oral daily  potassium chloride   Powder 40 milliEquivalent(s) Oral once  potassium phosphate / sodium phosphate Powder (PHOS-NaK) 1 Packet(s) Oral every 4 hours  senna Syrup 10 milliLiter(s) Oral daily    MEDICATIONS  (PRN):  dextrose Oral Gel 15 Gram(s) Oral once PRN Blood Glucose LESS THAN 70 milliGRAM(s)/deciliter      Allergies:  Bactrim (Other)  fluconazole (Vomiting; Nausea)  levofloxacin (Vomiting; Nausea)  penicillin (Other)        CRITICAL CARE TIME SPENT: 40 minutes of critical care time spent providing medical care for patient's acute illness/conditions that impairs at least one vital organ system and/or poses a high risk of imminent or life threatening deterioration in the patient's condition. It includes time spent evaluating and treating the patient's acute illness as well as time spent reviewing labs, radiology, discussing goals of care with patient and/or patient's family, and discussing the case with a multidisciplinary team, in an effort to prevent further life threatening deterioration or end organ damage. This time is independent of any procedures performed.

## 2022-12-05 NOTE — CHART NOTE - NSCHARTNOTEFT_GEN_A_CORE
: Artur    INDICATION: Respiratory failure, Shock    PROCEDURE:  [X] LIMITED ECHO  [X] LIMITED CHEST  [ ] LIMITED RETROPERITONEAL  [ ] LIMITED ABDOMINAL  [ ] LIMITED DVT  [ ] NEEDLE GUIDANCE VASCULAR  [ ] NEEDLE GUIDANCE THORACENTESIS  [ ] NEEDLE GUIDANCE PARACENTESIS  [ ] NEEDLE GUIDANCE PERICARDIOCENTESIS  [ ] OTHER    FINDINGS: Focal B lines anterior R chest. Bibasilar consolidations with small simple pleural effusions. Normal LV systolic function. LVOT VTI 19.4 cm. RV smaller than LV in size. IVC 1.5 cm.      INTERPRETATION: Multifocal pneumonia. Bibasilar small pleural effusions. Normal LV systolic function.     Images stored on [employer].    Tray Siddiqui MD  Pulmonary & Critical Care

## 2022-12-05 NOTE — ADVANCED PRACTICE NURSE CONSULT - ASSESSMENT
A&Ox4, hypophonic speech, able to turn with 2x assistance, bedbound, incontinent of urine and stool. Indwelling ortiz catheter in place. Incontinence care of small semiformed bowel movement provided during assessment. Skin warm, dry with increased moisture in intertriginous folds, scattered areas of hyperpigmentation and hypopigmentation, scattered areas of ecchymosis without hematoma on bilateral upper/lower extremities, chest, and abdomen. Blanchable erythema on bilateral heels. Dry, flakey skin. Zflow pillows and CAIR boots noted to be on patient during assessment.     B/l breasts/abdominal pannus/BL inner groin: Risk for moisture associated dermatitis due to increased moisture in intertriginous folds.     Right helix: Difficult differential diagnosis: Ecchymosis vs deep tissue pressure injury. Presenting as deep, purple maroon discoloration. Area measuring [id number]. Periwound hyperpigmentation. No palpable skin changes noted. No increased warmth, no drainage, no erythema, no induration, no edema, no overt signs of infection noted at this time. Goals of care: monitor for tissue type changes and continue measures to decrease friction/shear/pressure. *during assessment, patient's head on a z-flow fluidized pillow.     Right buttock towards sacrum: Patient turned to left side during assessment. Difficult differential diagnosis ecchymosis vs deep tissue pressure injury. Presenting as deep purple discoloration measuring [id number]. Able to be partially visualized in natural anatomical position. Periwound hyperpigmentation. No palpable skin changes noted. No increased warmth, no drainage, no erythema, no induration, no edema, no overt signs of infection noted at this time. Goals of care: monitor for tissue type changes and continue measures to decrease friction/shear/pressure. *of note, as per primary RN Deepthi she admitted the patient and noted that compared to today, the skin changes were improving and it was lighter in color than on the 2nd (admit date to MICU). Difficult differential diagnosis due to dark-skinned patient, favoring ecchymosis due to no palpable skin changes. Patient's platelet count is 21.

## 2022-12-05 NOTE — CHART NOTE - NSCHARTNOTEFT_GEN_A_CORE
Significant recent/past 24 hr events:    Subjective:    Review of Systems         [ ] A ten-point review of systems was otherwise negative except as noted.  [ ] Due to altered mental status/intubation, subjective information were not able to be obtained from the patient. History was obtained, to the extent possible, from review of the chart and collateral sources of information.      Patient is a 62y old  Female who presents with a chief complaint of slurred speech (05 Dec 2022 12:55)    HPI:  Patient is a 62 yoF w/ pmhx of MM (diagnosed ~10 years ago, currently on Promacta treatments since June 2022 - most recently last Wednesday; followed by oncologist, Dr. Lopez at [facility]), HTN, neuropathic R hip pain, recent R corneal tear, presents to [facility] ED for change in mental status. LKW approximately 14:30. Patient was in normal state of health this morning as per son, Nacho. Patient went to a follow up ophtho appointment for a recently diagnosed corneal abrasion this past Monday. Patient was picked up by a friend around 10:00 for an optho appt with normal mental status, pt was just complaining of being sleepy. On the way back from appointment around 14:30, patient stopped responding to friend appropriately in the car and she appeared more tired & weaker than usual. She started having slurred speech when they arrived home and then son called EMS. In the ED, patient with persisting word finding difficulties and generalized weakness, but leaning to the R side. Patient reports some difficulty remembering what happened today, but reports she feels generally weak and very tired. Code stroke called, but TPA not given because of improvement in pt's symptoms. Son reports patient's speech is close to baseline now. Patient then denied chest pain, SOB, HA, changes in vision, N/V, imbalance, numbness or tingling. Denies recent infection or sick contacts. Pt states that she had a similar episode a few years ago with fever which she was told was likely from a MM, she improved after getting steroids.     In the ED pt was febrile 105.5, , /84, RR 24 and saturating well on RA. Pt given 2L IVF bolus and 1x vanc/ceftriaxone/acyclovir/ampicillin (30 Nov 2022 02:42)  PCP--    hepatology -- Dr Florina Jones, [facility] [phone]__ Gastroenterology and Hepatology     -- Code stroke was called on a pt for AMS and dysphasia, CT head -- negative for stroke.     MICU Course:   -Pt was intubated for acute hypoxic respiratory failure and transferred to MICU for further management. Pt was admitted with septic shock and encephalopathy in the setting of E Coli bacteremia and was started on Cefepime and Levophed. AMS-- CTH with no acute findings and s/p LP with negative CSF.  Pt was successfully extubated on 12/3, and is on nasal cannula. Pt remains hemodynamically stable and is off pressors since 8 am on 12/4. Blood cxs cleared on 12/2, and pt is not neutropenic anymore. As per ID recs to complete 10 days of Abx from the negative blood cxs__ continue Ceftriaxone until 12/12. pt with hypernatremia, s/p administration of D 5. On 12/4 pt with an episode of MAR to 130's, new onset, possibly in the setting of fever. Pt was treated with Lopressor 5 mg IVP and started on Metoprolol Tartrate 12.5 mg BID. pt converted back to NSR and remains in sinus rhythm. Pt was diuresed for pulmonary edema with Lasix 40 IVP x 2 on 12/4. Pt was evaluated by PT, which recommended VINCENT.         PAST MEDICAL & SURGICAL HISTORY:  Multiple myeloma      Type 2 diabetes mellitus        FAMILY HISTORY:  No pertinent family history in first degree relatives        Vitals   ICU Vital Signs Last 24 Hrs  T(C): 37.6 (05 Dec 2022 12:00), Max: 37.9 (04 Dec 2022 16:00)  T(F): 99.7 (05 Dec 2022 12:00), Max: 100.2 (04 Dec 2022 16:00)  HR: 95 (05 Dec 2022 14:00) (82 - 115)  BP: --  BP(mean): --  ABP: 112/52 (05 Dec 2022 14:00) (105/49 - 122/61)  ABP(mean): 75 (05 Dec 2022 14:00) (70 - 85)  RR: 32 (05 Dec 2022 14:00) (24 - 37)  SpO2: 95% (05 Dec 2022 14:00) (95% - 100%)    O2 Parameters below as of 05 Dec 2022 14:00  Patient On (Oxygen Delivery Method): nasal cannula w/ humidification  O2 Flow (L/min): 3          Physical Exam:   Constitutional: NAD, well-groomed, well-developed  HEENT: PERRLA, EOMI, no drainage or redness  Neck: supple,  No JVD, Trachea midline  Back: Normal spine flexure, No CVA tenderness, No deformity or limitation of movement  Respiratory: Breath Sounds equal & clear bilaterally to auscultation, no accessory muscle use noted  Cardiovascular: Regular rate, regular rhythm, normal S1, S2; no murmurs or rub  Gastrointestinal: Soft, non-tender, non distended, no hepatosplenomegaly, normal bowel sounds  Extremities: [place] x 4, no peripheral edema, no cyanosis, no clubbing   Vascular: Equal and normal pulses: 2+ peripheral pulses throughout  Neurological: A+O x 3; speech clear and intact; no sensory, motor  deficits, normal reflexes  Psychiatric: calm, normal mood, normal affect  Musculoskeletal: No joint swelling or deformity; no limitation of movement  Skin: warm, dry, well perfused, no rashes    VENT SETTINGS     ABG - ( 05 Dec 2022 01:04 )  pH, Arterial: 7.50  pH, Blood: x     /  pCO2: 27    /  pO2: 136   / HCO3: 21    / Base Excess: -1.4  /  SaO2: 98.5                I&O's Detail    04 Dec 2022 07:01  -  05 Dec 2022 07:00  --------------------------------------------------------  IN:    dextrose 5%: 450 mL    dextrose 5%: 400 mL    IV PiggyBack: 500 mL  Total IN: 1350 mL    OUT:    Indwelling Catheter - Urethral (mL): 4410 mL  Total OUT: 4410 mL    Total NET: -3060 mL      05 Dec 2022 07:01  -  05 Dec 2022 15:04  --------------------------------------------------------  IN:    dextrose 5%: 240 mL    IV PiggyBack: 550 mL  Total IN: 790 mL    OUT:    Indwelling Catheter - Urethral (mL): 730 mL  Total OUT: 730 mL    Total NET: 60 mL          LABS                        7.1    3.99  )-----------( 21       ( 05 Dec 2022 01:04 )             21.2     12-05    151<H>  |  120<H>  |  44<H>  ----------------------------<  179<H>  3.5   |  21<L>  |  1.00    Ca    9.5      05 Dec 2022 01:04  Phos  2.7     12-05  Mg     1.80     12-05    TPro  5.3<L>  /  Alb  2.4<L>  /  TBili  0.9  /  DBili  x   /  AST  139<H>  /  ALT  74<H>  /  AlkPhos  156<H>  12-05    LIVER FUNCTIONS - ( 05 Dec 2022 01:04 )  Alb: 2.4 g/dL / Pro: 5.3 g/dL / ALK PHOS: 156 U/L / ALT: 74 U/L / AST: 139 U/L / GGT: x           PT/INR - ( 05 Dec 2022 01:04 )   PT: 14.1 sec;   INR: 1.21 ratio           CARDIAC MARKERS ( 05 Dec 2022 01:04 )   U/L / CKMBx     / Troponin Tx     /          POCT Blood Glucose.: 211 mg/dL *H* (12-05-22 @ 11:28)  POCT Blood Glucose.: 201 mg/dL *H* (12-05-22 @ 05:43)  POCT Blood Glucose.: 190 mg/dL *H* (12-04-22 @ 23:26)  POCT Blood Glucose.: 208 mg/dL *H* (12-04-22 @ 17:47)        MEDICATIONS  (STANDING):  artificial tears (preservative free) Ophthalmic Solution 1 Drop(s) Both EYES two times a day  cefepime   IVPB 2000 milliGRAM(s) IV Intermittent every 12 hours  dextrose 5%. 1000 milliLiter(s) (50 mL/Hr) IV Continuous <Continuous>  dextrose 5%. 1000 milliLiter(s) (100 mL/Hr) IV Continuous <Continuous>  dextrose 5%. 1000 milliLiter(s) (80 mL/Hr) IV Continuous <Continuous>  dextrose 50% Injectable 25 Gram(s) IV Push once  dextrose 50% Injectable 12.5 Gram(s) IV Push once  dextrose 50% Injectable 25 Gram(s) IV Push once  filgrastim-sndz (ZARXIO) Injectable 480 MICROGram(s) SubCutaneous daily  glucagon  Injectable 1 milliGRAM(s) IntraMuscular once  insulin lispro (ADMELOG) corrective regimen sliding scale   SubCutaneous every 6 hours  metoprolol tartrate 12.5 milliGRAM(s) Oral two times a day  midodrine 10 milliGRAM(s) Oral every 8 hours  multivitamin 1 Tablet(s) Oral daily  polyethylene glycol 3350 17 Gram(s) Oral daily  senna Syrup 10 milliLiter(s) Oral daily    MEDICATIONS  (PRN):  dextrose Oral Gel 15 Gram(s) Oral once PRN Blood Glucose LESS THAN 70 milliGRAM(s)/deciliter      Allergies:  Bactrim (Other)  fluconazole (Vomiting; Nausea)  levofloxacin (Vomiting; Nausea)  penicillin (Other)        CRITICAL CARE TIME SPENT: 40 minutes of critical care time spent providing medical care for patient's acute illness/conditions that impairs at least one vital organ system and/or poses a high risk of imminent or life threatening deterioration in the patient's condition. It includes time spent evaluating and treating the patient's acute illness as well as time spent reviewing labs, radiology, discussing goals of care with patient and/or patient's family, and discussing the case with a multidisciplinary team, in an effort to prevent further life threatening deterioration or end organ damage. This time is independent of any procedures performed. MICU Transfer Note    Transfer from: MICU    Transfer to: (  ) Medicine    ( x ) Telemetry     (   ) RCU        (    ) Palliative         (   ) Stroke Unit       (  ) MICU     Accepting Physician: Dr Lemus   Signout given to: Dr Lemus      HPI: Patient is a 62 yoF w/ pmhx of MM (diagnosed ~10 years ago, currently on Promacta treatments since June 2022 - most recently last Wednesday; followed by oncologist, Dr. Lopez at [facility]), HTN, neuropathic R hip pain, recent R corneal tear, presents to [facility] ED for change in mental status. LKW approximately 14:30. Patient was in normal state of health this morning as per son, Nacho. Patient went to a follow up ophtho appointment for a recently diagnosed corneal abrasion this past Monday. Patient was picked up by a friend around 10:00 for an optho appt with normal mental status, pt was just complaining of being sleepy. On the way back from appointment around 14:30, patient stopped responding to friend appropriately in the car and she appeared more tired & weaker than usual. She started having slurred speech when they arrived home and then son called EMS. In the ED, patient with persisting word finding difficulties and generalized weakness, but leaning to the R side. Patient reports some difficulty remembering what happened today, but reports she feels generally weak and very tired. Code stroke called, but TPA not given because of improvement in pt's symptoms. Son reports patient's speech is close to baseline now. Patient then denied chest pain, SOB, HA, changes in vision, N/V, imbalance, numbness or tingling. Denies recent infection or sick contacts. Pt states that she had a similar episode a few years ago with fever which she was told was likely from a MM, she improved after getting steroids.     In the ED pt was febrile 105.5, , /84, RR 24 and saturating well on RA. Pt given 2L IVF bolus and 1x vanc/ceftriaxone/acyclovir/ampicillin (30 Nov 2022 02:42)  PCP--    hepatology -- Dr Florina Jones, [facility] [phone]__ Gastroenterology and Hepatology     -- Code stroke was called on a pt for AMS and dysphasia, CT head -- negative for stroke.     MICU Course:   -Pt was intubated for acute hypoxic respiratory failure and transferred to MICU for further management. Pt was admitted with septic shock and encephalopathy in the setting of E Coli bacteremia and was started on Cefepime and Levophed. AMS-- CTH with no acute findings and s/p LP with negative CSF.  Pt was successfully extubated on 12/3, and is on nasal cannula. Pt remains hemodynamically stable and is off pressors since 8 am on 12/4. Blood cxs cleared on 12/2, and pt is not neutropenic anymore. As per ID recs to complete 10 days of Abx from the negative blood cxs__ continue Ceftriaxone until 12/12. pt with hypernatremia, possibly in the setting of intravascular depletion/ free water deficit, s/p administration of D 5. On 12/4 pt with an episode of MAR to 130's, new onset, possibly in the setting of fever, and flushed edema. Pt was treated with Lopressor 5 mg IVP, Lasix 40 IVP x 2, and started on Metoprolol Tartrate 12.5 mg BID. pt converted back to NSR and remains in sinus rhythm. Pt was diuresed for pulmonary edema with Lasix 40 IVP x 2 on 12/4. Thrombocytopenia_  pt was evaluated by Hem-onc for concern for possible ITP and HLH, s/p 1 U transfused on 12/3. Thrombocytopenia possibly in the setting of sepsis, .Pt was evaluated by PT, which recommended VINCENT.  -- positive Hepatitis B surface Ag -- as per son, pt takes tenofovir at home. Family does not know the frequency and for how long pt has to  take the medication, __ called and left a message with front desc. Awaiting call back from hepatology, Dr Florina Jones Re: continuation of Tenofovir.       PAST MEDICAL & SURGICAL HISTORY:  Multiple myeloma      Type 2 diabetes mellitus          Vital Signs Last 24 Hrs  T(C): 37.6 (05 Dec 2022 12:00), Max: 37.9 (04 Dec 2022 16:00)  T(F): 99.7 (05 Dec 2022 12:00), Max: 100.2 (04 Dec 2022 16:00)  HR: 95 (05 Dec 2022 14:00) (82 - 115)  BP: --  BP(mean): --  RR: 32 (05 Dec 2022 14:00) (24 - 37)  SpO2: 95% (05 Dec 2022 14:00) (95% - 100%)    Parameters below as of 05 Dec 2022 14:00  Patient On (Oxygen Delivery Method): nasal cannula w/ humidification  O2 Flow (L/min): 3    I&O's Summary    04 Dec 2022 07:01  -  05 Dec 2022 07:00  --------------------------------------------------------  IN: 1350 mL / OUT: 4410 mL / NET: -3060 mL    05 Dec 2022 07:01  -  05 Dec 2022 15:41  --------------------------------------------------------  IN: 790 mL / OUT: 730 mL / NET: 60 mL      Allergies    Bactrim (Other)  fluconazole (Vomiting; Nausea)  levofloxacin (Vomiting; Nausea)  penicillin (Other)    Intolerances      MEDICATIONS  (STANDING):  artificial tears (preservative free) Ophthalmic Solution 1 Drop(s) Both EYES two times a day  cefTRIAXone   IVPB 2000 milliGRAM(s) IV Intermittent every 24 hours  dextrose 5%. 1000 milliLiter(s) (50 mL/Hr) IV Continuous <Continuous>  dextrose 5%. 1000 milliLiter(s) (100 mL/Hr) IV Continuous <Continuous>  dextrose 50% Injectable 25 Gram(s) IV Push once  dextrose 50% Injectable 12.5 Gram(s) IV Push once  dextrose 50% Injectable 25 Gram(s) IV Push once  filgrastim-sndz (ZARXIO) Injectable 480 MICROGram(s) SubCutaneous daily  glucagon  Injectable 1 milliGRAM(s) IntraMuscular once  insulin lispro (ADMELOG) corrective regimen sliding scale   SubCutaneous every 6 hours  metoprolol tartrate 12.5 milliGRAM(s) Oral two times a day  midodrine 10 milliGRAM(s) Oral every 8 hours  multivitamin 1 Tablet(s) Oral daily  polyethylene glycol 3350 17 Gram(s) Oral daily  senna Syrup 10 milliLiter(s) Oral daily    MEDICATIONS  (PRN):  dextrose Oral Gel 15 Gram(s) Oral once PRN Blood Glucose LESS THAN 70 milliGRAM(s)/deciliter      CARDIAC MARKERS ( 05 Dec 2022 01:04 )  x     / x     / 549 U/L / x     / x      CARDIAC MARKERS ( 04 Dec 2022 00:40 )  x     / x     / 1229 U/L / x     / x                                7.1    3.99  )-----------( 21       ( 05 Dec 2022 01:04 )             21.2     12-05    151<H>  |  120<H>  |  44<H>  ----------------------------<  179<H>  3.5   |  21<L>  |  1.00    Ca    9.5      05 Dec 2022 01:04  Phos  2.7     12-05  Mg     1.80     12-05    TPro  5.3<L>  /  Alb  2.4<L>  /  TBili  0.9  /  DBili  x   /  AST  139<H>  /  ALT  74<H>  /  AlkPhos  156<H>  12-05    PT/INR - ( 05 Dec 2022 01:04 )   PT: 14.1 sec;   INR: 1.21 ratio           ABG - ( 05 Dec 2022 01:04 )  pH, Arterial: 7.50  pH, Blood: x     /  pCO2: 27    /  pO2: 136   / HCO3: 21    / Base Excess: -1.4  /  SaO2: 98.5              Lactate: 1.8     Ekg: NSR 80's . ;s ( 12/4)   Telemetry: NSR 80-90's     FOR FOLLOW UP:  [] Continue Ceftriaxone until 12/12, or if discharged, change to oral Abx to complete 10 days course until 12/12, follow up final blood cxs' report,   [] wean off Oxygen as able,   [] wean off Midodrine as able  [] tele monitoring for new onset A fib, in NSR at the present time,   [] no chemical prophylaxis in the setting of thrombocytopenia,__ monitor plts -- transfuse for plts < 20 if fever and /or bleeding , and for plts < 10    [] monitor NA level, s/p D5,  __ repeat level send on 12/5  [] strict intake and output   [] monitor fever curve  [ ] as per son, pt takes tenofovir at home. Family does not know the frequency and for how long pt has to  take the medication, __ called and left a message with the front desc for MD or PA. Awaiting call back from hepatology, Dr Florina Jones Re: continuation of Tenofovir. [facility] [phone]  [] monitor H/H, Hgb had been 89>7.5>7.3>7.1   [] PT recommended VINCENT Ramirez PA-C MICU Transfer Note    Transfer from: MICU    Transfer to: (  ) Medicine    ( x ) Telemetry     (   ) RCU        (    ) Palliative         (   ) Stroke Unit       (  ) MICU     Accepting Physician: Dr Lemus   Signout given to: Dr Lemus      HPI: Patient is a 62 yoF w/ pmhx of MM (diagnosed ~10 years ago, currently on Promacta treatments since June 2022 - most recently last Wednesday; followed by oncologist, Dr. Lopez at [facility]), HTN, neuropathic R hip pain, recent R corneal tear, presents to [facility] ED for change in mental status. LKW approximately 14:30. Patient was in normal state of health this morning as per son, Nacho. Patient went to a follow up ophtho appointment for a recently diagnosed corneal abrasion this past Monday. Patient was picked up by a friend around 10:00 for an optho appt with normal mental status, pt was just complaining of being sleepy. On the way back from appointment around 14:30, patient stopped responding to friend appropriately in the car and she appeared more tired & weaker than usual. She started having slurred speech when they arrived home and then son called EMS. In the ED, patient with persisting word finding difficulties and generalized weakness, but leaning to the R side. Patient reports some difficulty remembering what happened today, but reports she feels generally weak and very tired. Code stroke called, but TPA not given because of improvement in pt's symptoms. Son reports patient's speech is close to baseline now. Patient then denied chest pain, SOB, HA, changes in vision, N/V, imbalance, numbness or tingling. Denies recent infection or sick contacts. Pt states that she had a similar episode a few years ago with fever which she was told was likely from a MM, she improved after getting steroids.     In the ED pt was febrile 105.5, , /84, RR 24 and saturating well on RA. Pt given 2L IVF bolus and 1x vanc/ceftriaxone/acyclovir/ampicillin (30 Nov 2022 02:42)  PCP--    hepatology -- Dr Florina Jones, [facility] [phone]__ Gastroenterology and Hepatology     -- Code stroke was called on a pt for AMS and dysphasia, CT head -- negative for stroke.     MICU Course:   -Pt was intubated for acute hypoxic respiratory failure and transferred to MICU for further management. Pt was admitted with septic shock and encephalopathy in the setting of E Coli bacteremia and was started on Cefepime and Levophed. AMS-- CTH with no acute findings and s/p LP with negative CSF.  Pt was successfully extubated on 12/3, and is on nasal cannula. Pt remains hemodynamically stable and is off pressors since 8 am on 12/4. Blood cxs cleared on 12/2, and pt is not neutropenic anymore. As per ID recs to complete 10 days of Abx from the negative blood cxs__ continue Ceftriaxone until 12/12. pt with hypernatremia, possibly in the setting of intravascular depletion/ free water deficit, s/p administration of D 5. On 12/4 pt with an episode of MAR to 130's, new onset, possibly in the setting of fever, and flushed edema. Pt was treated with Lopressor 5 mg IVP, Lasix 40 IVP x 2, and started on Metoprolol Tartrate 12.5 mg BID. pt converted back to NSR and remains in sinus rhythm. Pt was diuresed for pulmonary edema with Lasix 40 IVP x 2 on 12/4. Thrombocytopenia_  pt was evaluated by Hem-onc for concern for possible ITP and HLH, s/p 1 U transfused on 12/3. Thrombocytopenia possibly in the setting of sepsis, .Pt was evaluated by PT, which recommended VINCENT.  -- positive Hepatitis B surface Ag -- as per son, pt takes tenofovir at home. Family does not know the frequency and for how long pt has to  take the medication, __ called and left a message with front desc. Awaiting call back from hepatology, Dr Florina Jones Re: continuation of Tenofovir.       PAST MEDICAL & SURGICAL HISTORY:  Multiple myeloma      Type 2 diabetes mellitus          Vital Signs Last 24 Hrs  T(C): 37.6 (05 Dec 2022 12:00), Max: 37.9 (04 Dec 2022 16:00)  T(F): 99.7 (05 Dec 2022 12:00), Max: 100.2 (04 Dec 2022 16:00)  HR: 95 (05 Dec 2022 14:00) (82 - 115)  BP: --  BP(mean): --  RR: 32 (05 Dec 2022 14:00) (24 - 37)  SpO2: 95% (05 Dec 2022 14:00) (95% - 100%)    Parameters below as of 05 Dec 2022 14:00  Patient On (Oxygen Delivery Method): nasal cannula w/ humidification  O2 Flow (L/min): 3    I&O's Summary    04 Dec 2022 07:01  -  05 Dec 2022 07:00  --------------------------------------------------------  IN: 1350 mL / OUT: 4410 mL / NET: -3060 mL    05 Dec 2022 07:01  -  05 Dec 2022 15:41  --------------------------------------------------------  IN: 790 mL / OUT: 730 mL / NET: 60 mL      Allergies    Bactrim (Other)  fluconazole (Vomiting; Nausea)  levofloxacin (Vomiting; Nausea)  penicillin (Other)    Intolerances      MEDICATIONS  (STANDING):  artificial tears (preservative free) Ophthalmic Solution 1 Drop(s) Both EYES two times a day  cefTRIAXone   IVPB 2000 milliGRAM(s) IV Intermittent every 24 hours  dextrose 5%. 1000 milliLiter(s) (50 mL/Hr) IV Continuous <Continuous>  dextrose 5%. 1000 milliLiter(s) (100 mL/Hr) IV Continuous <Continuous>  dextrose 50% Injectable 25 Gram(s) IV Push once  dextrose 50% Injectable 12.5 Gram(s) IV Push once  dextrose 50% Injectable 25 Gram(s) IV Push once  filgrastim-sndz (ZARXIO) Injectable 480 MICROGram(s) SubCutaneous daily  glucagon  Injectable 1 milliGRAM(s) IntraMuscular once  insulin lispro (ADMELOG) corrective regimen sliding scale   SubCutaneous every 6 hours  metoprolol tartrate 12.5 milliGRAM(s) Oral two times a day  midodrine 10 milliGRAM(s) Oral every 8 hours  multivitamin 1 Tablet(s) Oral daily  polyethylene glycol 3350 17 Gram(s) Oral daily  senna Syrup 10 milliLiter(s) Oral daily    MEDICATIONS  (PRN):  dextrose Oral Gel 15 Gram(s) Oral once PRN Blood Glucose LESS THAN 70 milliGRAM(s)/deciliter      CARDIAC MARKERS ( 05 Dec 2022 01:04 )  x     / x     / 549 U/L / x     / x      CARDIAC MARKERS ( 04 Dec 2022 00:40 )  x     / x     / 1229 U/L / x     / x                                7.1    3.99  )-----------( 21       ( 05 Dec 2022 01:04 )             21.2     12-05    151<H>  |  120<H>  |  44<H>  ----------------------------<  179<H>  3.5   |  21<L>  |  1.00    Ca    9.5      05 Dec 2022 01:04  Phos  2.7     12-05  Mg     1.80     12-05    TPro  5.3<L>  /  Alb  2.4<L>  /  TBili  0.9  /  DBili  x   /  AST  139<H>  /  ALT  74<H>  /  AlkPhos  156<H>  12-05    PT/INR - ( 05 Dec 2022 01:04 )   PT: 14.1 sec;   INR: 1.21 ratio           ABG - ( 05 Dec 2022 01:04 )  pH, Arterial: 7.50  pH, Blood: x     /  pCO2: 27    /  pO2: 136   / HCO3: 21    / Base Excess: -1.4  /  SaO2: 98.5              Lactate: 1.8     Ekg: NSR 80's . ;s ( 12/4)   Telemetry: NSR 80-90's     FOR FOLLOW UP:  [] Continue Ceftriaxone until 12/12, or if discharged, change to oral Abx to complete 10 days course until 12/12, follow up final blood cxs' report,   [] wean off Oxygen as able,   [] wean off Midodrine as able  [] tele monitoring for new onset A fib, in NSR at the present time,   [] no chemical prophylaxis in the setting of thrombocytopenia,__ monitor plts -- transfuse for plts < 20 if fever and /or bleeding , and for plts < 10    [] monitor NA level, s/p D5,  __ repeat level send on 12/5  [] strict intake and output   [] monitor fever curve  [ ] as per son, pt takes tenofovir at home. Family does not know the frequency and for how long pt has to  take the medication, __ called and left a message with the front desc for MD or PA. Awaiting call back from hepatology, Dr Florina Jones Re: continuation of Tenofovir. [facility] [phone]  [] monitor H/H, Hgb had been 89>7.5>7.3>7.1   [] SLP -- puree diet with mod thick liquids, MBS is ordered as per speech pathology recs,   [] PT recommended VNICENT Ramirez PA-C MICU Transfer Note    Transfer from: MICU    Transfer to: (  ) Medicine    ( x ) Telemetry     (   ) RCU        (    ) Palliative         (   ) Stroke Unit       (  ) MICU     Accepting Physician: Dr Lemus   Signout given to: Dr Lemus      HPI: Patient is a 62 yoF w/ pmhx of MM (diagnosed ~10 years ago, currently on Promacta treatments since June 2022 - most recently last Wednesday; followed by oncologist, Dr. Lopez at [facility]), HTN, neuropathic R hip pain, recent R corneal tear, presents to [facility] ED for change in mental status. LKW approximately 14:30. Patient was in normal state of health this morning as per son, Nacho. Patient went to a follow up ophtho appointment for a recently diagnosed corneal abrasion this past Monday. Patient was picked up by a friend around 10:00 for an optho appt with normal mental status, pt was just complaining of being sleepy. On the way back from appointment around 14:30, patient stopped responding to friend appropriately in the car and she appeared more tired & weaker than usual. She started having slurred speech when they arrived home and then son called EMS. In the ED, patient with persisting word finding difficulties and generalized weakness, but leaning to the R side. Patient reports some difficulty remembering what happened today, but reports she feels generally weak and very tired. Code stroke called, but TPA not given because of improvement in pt's symptoms. Son reports patient's speech is close to baseline now. Patient then denied chest pain, SOB, HA, changes in vision, N/V, imbalance, numbness or tingling. Denies recent infection or sick contacts. Pt states that she had a similar episode a few years ago with fever which she was told was likely from a MM, she improved after getting steroids.     In the ED pt was febrile 105.5, , /84, RR 24 and saturating well on RA. Pt given 2L IVF bolus and 1x vanc/ceftriaxone/acyclovir/ampicillin (30 Nov 2022 02:42)  PCP--    hepatology -- Dr Florina Jones, [facility] [phone]__ Gastroenterology and Hepatology     -- Code stroke was called on a pt for AMS and dysphasia, CT head -- negative for stroke.     MICU Course:   -Pt was intubated for acute hypoxic respiratory failure and transferred to MICU for further management. Pt was admitted with septic shock and encephalopathy in the setting of E Coli bacteremia and was started on Cefepime and Levophed. AMS-- CTH with no acute findings and s/p LP with negative CSF.  Pt was successfully extubated on 12/3, and is on nasal cannula. Pt remains hemodynamically stable and is off pressors since 8 am on 12/4. Blood cxs cleared on 12/2, and pt is not neutropenic anymore. As per ID recs to complete 10 days of Abx from the negative blood cxs__ continue Ceftriaxone until 12/12. pt with hypernatremia, possibly in the setting of intravascular depletion/ free water deficit, s/p administration of D 5. On 12/4 pt with an episode of MAR to 130's, new onset, possibly in the setting of fever, and flushed edema. Pt was treated with Lopressor 5 mg IVP, Lasix 40 IVP x 2, and started on Metoprolol Tartrate 12.5 mg BID. pt converted back to NSR and remains in sinus rhythm. Pt was diuresed for pulmonary edema with Lasix 40 IVP x 2 on 12/4. Thrombocytopenia_  pt was evaluated by Hem-onc for concern for possible ITP and HLH, s/p 1 U transfused on 12/3. Thrombocytopenia possibly in the setting of sepsis, .Pt was evaluated by PT, which recommended VINCENT.  -- positive Hepatitis B surface Ag -- as per son, pt takes tenofovir at home. Family does not know the frequency and for how long pt has to  take the medication, __ called and left a message with front desc. Awaiting call back from hepatology, Dr Florina Jones Re: continuation of Tenofovir.       PAST MEDICAL & SURGICAL HISTORY:  Multiple myeloma      Type 2 diabetes mellitus          Vital Signs Last 24 Hrs  T(C): 37.6 (05 Dec 2022 12:00), Max: 37.9 (04 Dec 2022 16:00)  T(F): 99.7 (05 Dec 2022 12:00), Max: 100.2 (04 Dec 2022 16:00)  HR: 95 (05 Dec 2022 14:00) (82 - 115)  BP: --  BP(mean): --  RR: 32 (05 Dec 2022 14:00) (24 - 37)  SpO2: 95% (05 Dec 2022 14:00) (95% - 100%)    Parameters below as of 05 Dec 2022 14:00  Patient On (Oxygen Delivery Method): nasal cannula w/ humidification  O2 Flow (L/min): 3    I&O's Summary    04 Dec 2022 07:01  -  05 Dec 2022 07:00  --------------------------------------------------------  IN: 1350 mL / OUT: 4410 mL / NET: -3060 mL    05 Dec 2022 07:01  -  05 Dec 2022 15:41  --------------------------------------------------------  IN: 790 mL / OUT: 730 mL / NET: 60 mL      Allergies    Bactrim (Other)  fluconazole (Vomiting; Nausea)  levofloxacin (Vomiting; Nausea)  penicillin (Other)    Intolerances      MEDICATIONS  (STANDING):  artificial tears (preservative free) Ophthalmic Solution 1 Drop(s) Both EYES two times a day  cefTRIAXone   IVPB 2000 milliGRAM(s) IV Intermittent every 24 hours  dextrose 5%. 1000 milliLiter(s) (50 mL/Hr) IV Continuous <Continuous>  dextrose 5%. 1000 milliLiter(s) (100 mL/Hr) IV Continuous <Continuous>  dextrose 50% Injectable 25 Gram(s) IV Push once  dextrose 50% Injectable 12.5 Gram(s) IV Push once  dextrose 50% Injectable 25 Gram(s) IV Push once  filgrastim-sndz (ZARXIO) Injectable 480 MICROGram(s) SubCutaneous daily  glucagon  Injectable 1 milliGRAM(s) IntraMuscular once  insulin lispro (ADMELOG) corrective regimen sliding scale   SubCutaneous every 6 hours  metoprolol tartrate 12.5 milliGRAM(s) Oral two times a day  midodrine 10 milliGRAM(s) Oral every 8 hours  multivitamin 1 Tablet(s) Oral daily  polyethylene glycol 3350 17 Gram(s) Oral daily  senna Syrup 10 milliLiter(s) Oral daily    MEDICATIONS  (PRN):  dextrose Oral Gel 15 Gram(s) Oral once PRN Blood Glucose LESS THAN 70 milliGRAM(s)/deciliter      CARDIAC MARKERS ( 05 Dec 2022 01:04 )  x     / x     / 549 U/L / x     / x      CARDIAC MARKERS ( 04 Dec 2022 00:40 )  x     / x     / 1229 U/L / x     / x                                7.1    3.99  )-----------( 21       ( 05 Dec 2022 01:04 )             21.2     12-05    151<H>  |  120<H>  |  44<H>  ----------------------------<  179<H>  3.5   |  21<L>  |  1.00    Ca    9.5      05 Dec 2022 01:04  Phos  2.7     12-05  Mg     1.80     12-05    TPro  5.3<L>  /  Alb  2.4<L>  /  TBili  0.9  /  DBili  x   /  AST  139<H>  /  ALT  74<H>  /  AlkPhos  156<H>  12-05    PT/INR - ( 05 Dec 2022 01:04 )   PT: 14.1 sec;   INR: 1.21 ratio           ABG - ( 05 Dec 2022 01:04 )  pH, Arterial: 7.50  pH, Blood: x     /  pCO2: 27    /  pO2: 136   / HCO3: 21    / Base Excess: -1.4  /  SaO2: 98.5              Lactate: 1.8     Ekg: NSR 80's . ;s ( 12/4)   Telemetry: NSR 80-90's     FOR FOLLOW UP:  [] Continue Ceftriaxone until 12/12, or if discharged, change to oral Abx to complete 10 days course until 12/12, follow up final blood cxs' report,   [] wean off Oxygen as able,   [] wean off Midodrine as able  [] tele monitoring for new onset A fib, in NSR at the present time,   [] no chemical prophylaxis in the setting of thrombocytopenia,__ monitor plts -- transfuse for plts < 20 if fever and /or bleeding , and for plts < 10    [] monitor NA level, s/p D5,  __ repeat level send on 12/5  [] strict intake and output   [] monitor fever curve  [ ] + Hep B surface Ag. Quantitative hep B DMA PCR send for cofirmation, As per son, pt takes tenofovir at home. Spoke with ROSA Montejo from hepatology office, Dr Florina Jones Re: continuation of Tenofovir. ONDINA Medeiros [phone]. Pt has been taking Vemlidy 25 mg daily since few mos ago for reactivation of Hepatitis B.__ outpt follow up after discharge.   [] monitor H/H, Hgb had been 89>7.5>7.3>7.1   [] SLP -- puree diet with mod thick liquids, MBS is ordered as per speech pathology recs,   [] PT recommended VINCENT Ramirez PA-C

## 2022-12-05 NOTE — SWALLOW BEDSIDE ASSESSMENT ADULT - ASR SWALLOW RECOMMEND DIAG
cine-esophogram recommended to objectively assess swallow mechanism and r/o aspiration/VFSS/MBS cinesophogram recommended to objectively assess swallow mechanism and r/o aspiration/VFSS/MBS

## 2022-12-05 NOTE — ADVANCED PRACTICE NURSE CONSULT - REASON FOR CONSULT
Patient seen on skin care rounds after wound care referral received for assessment of skin impairment and recommendations of topical management. As per H&P, patient is a Patient is a 61 yo F w/ PMHx of MM (diagnosed ~10 years ago, currently on Promacta treatments since June 2022 - most recently last Wednesday; followed by oncologist, Dr. Lopez at [facility]), ?ITP, Hep B, HTN, neuropathic R hip pain, recent R corneal tear admitted for AMS, code stroke called no acute infarct or hemorrhage, s/p LP findings not consistent with meningitis, course complicated by hypotension and hypoxia, placed on BiPAP, requiring pressors, admitted to MICU with sepsis likely secondary to pneumonia- intubated 11/30. Extubated 12/3. Chart reviewed: WBC 4.12, H/H 7.2/22.2, Platelets 21, INR 1.21, Serum albumin 2.4, A1C 6.0, BMI 26.8, Vishnu 11.

## 2022-12-05 NOTE — PROGRESS NOTE ADULT - ASSESSMENT
62 f with HTN, MM (diagnosed ~10 years ago, currently on Promacta treatments since June 2022m brought in for AMS, lethargy, ?slurred speech here febrile to 105.5, hypotensive, tachycardic, tachypneic and hypoxic   WBC: 1.8, ANC: 970  lactate: 6  s/p code stroke but CT negative, s/p LP WBC: 1, neutrophil: 0, glucose: 93, protein 33, negative PCR and gram stain  blood cx: E-coli  chest CT: Posterior right upper lobe and right basilar consolidation and additional right middle lobe and left basilar patchy opacities; findings compatible with aspiration or infection.    fever, hypotension, septic shock with elevated lactate and worsening renal function, hypoxic resp failure due to E-coli bacteremia (pan sensitive), RUL, RML and basilar pneumonia  MM on belantama and promacta with thrombocytopenia, leukopenia and then neutropenic s/p zarxio now not neutropenic anymore  AMS due to septic encephalopathy, LP negative no meningoencephalitis  transaminitis and HBS Ag positive with negative HBS Ab and HBC IgM  * f/u the repeat blood cx cleared 12/2 and sputum cx negative  * switch to ceftriaxone as pt is not neutropenic anymore, will complete a 10 day course post negative blood cx but when pt is ready for discharge, will switch to PO  * hep B DNA, HBC total HBE Ag, Ab   * monitor CBC/diff and renal function    The above assessment and plan was discussed with ALISON Lovell MD  contact on teams  After 5pm and on weekends call [phone].

## 2022-12-05 NOTE — PROGRESS NOTE ADULT - SUBJECTIVE AND OBJECTIVE BOX
Follow Up:  septic shock, E-coli bacteremia, pneumonia    Interval History/ROS: pt was extubated and doing well, not neutropenic anymore, repeat blood cx negative, no fever, SOB, diarrhea          Allergies  Bactrim (Other)  fluconazole (Vomiting; Nausea)  levofloxacin (Vomiting; Nausea)  penicillin (Other)        ANTIMICROBIALS:  cefepime   IVPB 2000 every 12 hours      OTHER MEDS:  artificial tears (preservative free) Ophthalmic Solution 1 Drop(s) Both EYES two times a day  dextrose 5%. 1000 milliLiter(s) IV Continuous <Continuous>  dextrose 5%. 1000 milliLiter(s) IV Continuous <Continuous>  dextrose 5%. 1000 milliLiter(s) IV Continuous <Continuous>  dextrose 50% Injectable 25 Gram(s) IV Push once  dextrose 50% Injectable 12.5 Gram(s) IV Push once  dextrose 50% Injectable 25 Gram(s) IV Push once  dextrose Oral Gel 15 Gram(s) Oral once PRN  filgrastim-sndz (ZARXIO) Injectable 480 MICROGram(s) SubCutaneous daily  glucagon  Injectable 1 milliGRAM(s) IntraMuscular once  insulin lispro (ADMELOG) corrective regimen sliding scale   SubCutaneous every 6 hours  metoprolol tartrate 12.5 milliGRAM(s) Oral two times a day  midodrine 10 milliGRAM(s) Oral every 8 hours  multivitamin 1 Tablet(s) Oral daily  polyethylene glycol 3350 17 Gram(s) Oral daily  senna Syrup 10 milliLiter(s) Oral daily      Vital Signs Last 24 Hrs  T(C): 37.6 (05 Dec 2022 12:00), Max: 37.9 (04 Dec 2022 16:00)  T(F): 99.7 (05 Dec 2022 12:00), Max: 100.2 (04 Dec 2022 16:00)  HR: 94 (05 Dec 2022 12:00) (82 - 126)  BP: --  BP(mean): --  RR: 31 (05 Dec 2022 12:00) (22 - 43)  SpO2: 97% (05 Dec 2022 12:00) (86% - 100%)    Parameters below as of 05 Dec 2022 12:00  Patient On (Oxygen Delivery Method): nasal cannula w/ humidification  O2 Flow (L/min): 3      Physical Exam:  General:   NAD, non toxic  Cardio:   regular S1, S2  Respiratory:    comfortable on NC, no tachypnea  abd:     soft,   BS +  :     ortiz  Musculoskeletal:   no joint swelling  vascular: no phlebitis  Skin:    no rash                          7.1    3.99  )-----------( 21       ( 05 Dec 2022 01:04 )             21.2       12-05    151<H>  |  120<H>  |  44<H>  ----------------------------<  179<H>  3.5   |  21<L>  |  1.00    Ca    9.5      05 Dec 2022 01:04  Phos  2.7     12-05  Mg     1.80     12-05    TPro  5.3<L>  /  Alb  2.4<L>  /  TBili  0.9  /  DBili  x   /  AST  139<H>  /  ALT  74<H>  /  AlkPhos  156<H>  12-05          MICROBIOLOGY:  v  Catheterized Catheterized  12-02-22   No growth  --  --      .Blood Blood  12-02-22   No growth to date.  --  --      .Blood Blood  12-02-22   No growth to date.  --  --      ET Tube ET Tube  12-01-22   No growth at 48 hours  --    Moderate polymorphonuclear leukocytes per low power field  No Squamous epithelial cells per low power field  No organisms seen per oil power field      .CSF CSF  11-29-22   Culture is being performed.  --    No polymorphonuclear leukocytes seen  No organisms seen  by cytocentrifuge      .Blood Blood-Peripheral  11-29-22   Growth in aerobic and anaerobic bottles: Escherichia coli  ***Blood Panel PCR results on this specimen are available  approximately 3 hours after the Gram stain result.***  Gram stain, PCR, and/or culture results may not always  correspond due to difference in methodologies.  ************************************************************  This PCR assay was performed by multiplex PCR. This  Assay tests for 66 bacterial and resistance gene targets.  Please refer to the [facility] test directory  at [email or web address] for details.  --  Blood Culture PCR  Escherichia coli      Clean Catch Clean Catch (Midstream)  11-29-22   >=3 organisms. Probable collection contamination.  --  --          Rapid RVP Result: NotDetec (11-30 @ 09:12)  EBV PCR: NotDetec IU/mL (11-29 @ 23:00)  HSV 1/2 PCR: NotDetec (11-29 @ 23:00)        RADIOLOGY:  Images independently visualized and reviewed personally, findings as below  < from: Xray Chest 1 View- PORTABLE-Urgent (Xray Chest 1 View- PORTABLE-Urgent .) (12.04.22 @ 15:31) >  Impression:  No significant interval change in patchy right lung opacities.  New small bilateral pleural effusions.  Right lung findings likely due to multifocal pneumonia.  Right-sided unilateral pulmonary edema is also considered.    < end of copied text >

## 2022-12-05 NOTE — ADVANCED PRACTICE NURSE CONSULT - RECOMMEDATIONS
Topical recommendations:     Right helix/buttock: Cleanse with NS, pat dry. Paint with Liquid barrier film (allow to dry). Apply silicone foam with border. Change daily and PRN if soiled. Monitor for tissue type changes.     B/l breasts/abdominal pannus/BL inner groin: Cleanse with skin cleanser, pat dry. Place Interdry textile sheeting, under intertriginous folds leaving 2 inches exposed at ends to wick, remove to wash & dry affected area, then replace. Individual sheeting may be used for up to 5 days unless soiled. Inspect skin daily.     Apply Sween 24 Moisturizer to b/l UE and LE daily, avoiding in between the toes.      Continue low air loss bed therapy, continue heel elevation, continue to turn & reposition as per protocol, soft pillow between bony prominences, continue moisture management with barrier creams & single breathable pad, continue measures to decrease friction/shear/pressure. Continue with nutritional support as per dietary/orders.     Plan of care discussed with Primary RN Deepthi at bedside.    Please contact Wound/Ostomy Care Service Line if we can be of further assistance (ext [phone]).

## 2022-12-05 NOTE — SWALLOW BEDSIDE ASSESSMENT ADULT - COMMENTS
Consult received and chart reviewed. Patient seen at bedside this AM for re-assessment of swallow function, at which time she was alert, oriented x2, cooperative, and denied pain. Patient attempted to be seen for initial assessment 11/30, at which time swallow assessment was deferred to respiratory status/transfer to MICU (see report for details). Patient on supplemental O2 via NC, 3L with humidified air and SpO2 trending 96-97 at baseline and throughout assessment. Patient demonstrates ability to follow low level commands with prompting. Patient noted with hypophonic speech; speech production WFL. Per discussion with RN, the patient was on a soft & bite-sized and thin liquid diet level; however aspiration was suspected and patient made NPO with swallow assessment requested.     Per charting, the patient is a "63 yo F w/ PMHx of MM (diagnosed ~10 years ago, currently on Promacta treatments since June 2022 - most recently last Wednesday; followed by oncologist, Dr. Lopez at [facility]), ?ITP, Hep B, HTN, neuropathic R hip pain, recent R corneal tear admitted for AMS, code stroke called no acute infarct or hemorrhage, s/p LP findings not consistent with meningitis, course complicated by hypotension and hypoxia, placed on BiPAP, requiring pressors, admitted to MICU with sepsis likely secondary to pneumonia- intubated 11/30. " Per charting, the patient was extubated on 12/3/22.     WBC WFL.  CXR 12/4/22 revealed "No significant interval change in patchy right lung opacities. New small bilateral pleural effusions. Right lung findings likely due to multifocal pneumonia. Right-sided unilateral pulmonary edema is also considered."    Discussed results and recommendations with the patient, RN, and MICU Team/MD.

## 2022-12-05 NOTE — PROGRESS NOTE ADULT - CRITICAL CARE ATTENDING COMMENT
Patient is a 61 yo F w/ MM, ?ITP, HTN, recent R corneal tear who was initially admitted on 11/30 after p/w AMS found to have neutropenic septic shock with E coli bacteremia with course c/b acute respiratory failure and new Afib with RVR.     #Shock - Septic shock in setting of neutropenia and E coli bacteremia. Improving. Blood cultures cleared with antibiotics. Multifocal pneumonia likely source, benign abdominal exam.   - c/w PO vasopressors to maintain MAP > 65, wean as tolerated  - c/w Cefepime for now, given neutropenia although improving    #Acute hypoxemic respiratory failure - 2/2 multifocal/aspiration PNA. Successfully extubated  - Wean down supplemental O2 as tolerated    #Transaminitis - Likely 2/2 septic shock vs acute HBV?. Hb surface antigen positive  #HBV  - Will check HBV DNA  - Will follow up ID re any role for treatment given relative immunocompromised state/neutropenia  - f/u RUQ US    #Pancytopenia - Likely acute on chronic process 2/2 underlying MM/?ITP with current sepsis vs HLH? Patient does have cytopenias (1), hypertrig (2), elevated ferritin (3), fevers (4). Soluble IL 2 is pending  - Treat sepsis as above  - f/u Soluble IL 2  - Trend CBC  - c/w GCSF    #Afib - new onset with RVR, now back in NSR  - c/w metoprolol  - Hold off AC given thrombocytopenia    #Hypernatremia   - D5 250cc/hr x3 hour then repeat BMP    #Dysphagia  - f/u S+S    #Weakness  - PT    Tray Siddiqui MD  Pulmonary & Critical Care

## 2022-12-05 NOTE — PROGRESS NOTE ADULT - ASSESSMENT
Patient is a 63 yo F w/ PMHx of MM (diagnosed ~10 years ago, currently on Promacta treatments since June 2022 - most recently last Wednesday; followed by oncologist, Dr. Lopez at [facility]), ?ITP, Hep B, HTN, neuropathic R hip pain, recent R corneal tear admitted for AMS, code stroke called no acute infarct or hemorrhage, s/p LP findings not consistent with meningitis, course complicated by hypotension and hypoxia, placed on BiPAP, requiring pressors, admitted to MICU with sepsis likely secondary to pneumonia- intubated 11/30.     PLAN  Neuro  - s/p code stroke --> no ischemia or hemorrhage on CT H+N  - s/p LP results not consistent with encephalitis/meningitis, ammonia level 30   - baseline is AOx3  - Neurology following  - awake, alert, following commands, possibly still with some encephalopathy       --Cardiovascular  #Shock  - suspect sepsis from E. Coli bacteremia with GI or urinary source?  -- 12/4 -- pt remains off levophed since 8 am today   - s/p 4L IVF in ED, s/p intubation   -- - 12/04 pt with diffuse B lines but intravascularly depleted on POCUS-- s/p Lasix 40 IVP with 150 cc of UOP after Lasix, __ started on D 5 at 50 cc /hr for hypernatremia and hypovolemia   - patient tachycardic on levophed-> transitioned to phenylephrine  - was transition back to levophed due to decreased LV function/ sepsis if HR tolerates   - POCUS 12/2 showed mild to moderate decreased contractility of the LV VTI 14.4   - ECHO 11/30 EF 55 normal LV/RV   - TSH wnl    #new onset MAR  -- MAR AF in 130's-- 100's, with recurrent 130's  - s/p Lopressor 5 mg IVP, __ started on Lopressor tartrate 12.5 mg BID,   -- [id number] score is 3   -- unable to anticoagulate in the setting of thrombocytopenia   -- SCD's are in place        Pulmonary  #AHRF- likely secondary to PNA infection vs Aspiration   - s/p BiPAP, 10/5 on 50%, then intubated 11/30 for increased work of breathing  - Extubated 12/3 to face tent  - suspect secondary to infection with procalcitonin of 8.73  -- pt became hypoxic to 80's on 1.5 LPM NC, possibly in the setting of aspiration vs flushed in the setting of tachycardia (pt was noted to cough on a water at the time she was taking Potassium pill  - continue on cefepime for now for E. Coli bacteremia and neutropenia, and possible aspiration PNA   - MRSA/MSSA neg   - ID following recs appreciated   - CTA 11/30: neg for PE.  RUL R basilar consolidation RML basilar opacity  -- repeat CXR on 12/4-- worsening multilobar consolidations     GI  #Diet  - pt is on bite sized diet,   - -discontinued and placed NPO in the setting of posssible aspiration  - NPO except medications with apple sauce or mildly thickened fluids   -- SLP in place __ follow up recs       #Elevated liver enzymes  - patient has reported hx of Hep B 2017 as per hemonc   - LFTs trending down AST/ALT-- 123/61   - bili also elevated 1.3-> 1.5-> 1.4 >1.0 continue to monitor   - hepatitis panel -- Hep B surface AG +, and the rest is negative   -- as per pt, she has h/o Hepatitis B vaccination, but not prior h/o infection   -- confirmatory test ordered     #R/o E. coli O157  - patient with e. coli bacteremia and evidence of hemolysis   - Shiga toxin/ GI PCR ordered if patient has BM     Renal  #GARCIA  - SCr 1.5 up from 1.03, improving and is 1.01 today,   - CPK elevated trending down 1800-> 2800-> 1007>787 - no longer trending   - 12/04 pt with diffuse B lines but intravascularly depleted on POCUS-- s/p Lasix 40 IVP with 150 cc of UOP after Lasix, __ started on D 5 at 50 cc /hr for hypernatremia and hypovolemia   -- - repeat BMP at 20:00   - suspect secondary to sepsis  - Is and Os: pos 1.1 L yesterday, +608 ml for LOS   - Ucx 11/29 > 3 organisms likely contaminate  - Repeat UA 12/2- neg Ucx in lab     #hypernatremia   - NA- 147>150, s/p LR bolus __ repeat Na 152   - 12/04 pt with diffuse B lines but intravascularly depleted on POCUS-- s/p Lasix 40 IVP with 150 cc of UOP after Lasix, __ started on D 5 at 50 cc /hr for hypernatremia and hypovolemia  - repeat BMP at 20:00       ID  #E. Coli bacteremia- pan sensitive 11/29   -Tmax overnight 101.2 WBC 1.81  on Neupogen daily, WBC trending down -- 2.3>1.87   - s/p vanc/CTX/ampicillin/acyclovir in ER for suspected meningitis workup   - continue with cefepime 2g q8h (11/30- )   - s/p Azithromycin 500 mg started (12/2- 12/3) , legionella neg  -Repeat Bcx sent 12/2 NGTD   - Fungitel 39   - MRSA neg  - ID following   - as per nurse, pt has sacral decub. f/u with wound care nurse     Endo  - no history of diabetes, A1C 6.0  - TSH wnl  - FS stable -- 166>115     Heme  #MM  - as per Hemonc note MM in remission 5/22 has been on Promacta since 6/22 has received multiple treatments for MM in the last also PBSCT x 2 and haplo allo stem cell transplant- last in 2020 sept 2022-was given belantamab-last treatment was on 11/23/22  - holding anti-myeloma therapy  - started filgrastim per heme/onc  - Hemonc following recs appreciated     #Panycotopenia  #concern for MAHA, possible HUS with E. Coli bacteremia? vs HLH?  - new thrombocytopenia, anemia, and +hemolysis labs (bili uptrending and now normalized)  -? reported history as per hemonc of ITP? should clarify with [facility]   - direct dede negative  - some schistocytes on peripheral smear, confirmed with hematology/oncology, however higher suspicion for lab abnormalities are secondary to sepsis  - monitor CBC q12 and hemolysis labs daily- improving   - Platelet transfusion protocol: <10, <20 with fever, <50 procedures/active bleeding/etc   -- Patient received 1u plat 12/3,   -- 12/4-- platelets 39 and blue top -- 24   - Transfuse PRBC < 7  - ferritin 75545, trig 519, pancytopenia - 3 cell lines, persistent fever  - hepatitis panel --  hepatitis panel -- Hep B surface AG +, and the rest is negative __see GI , IL2 receptor-- in lab   -F/U Abdominal US to eval for splenomegaly  - Recs as per hem/onc- non concerned of HLH or HUS as hapto is improving- believe all sepsis induced     #DVT ppx  -- hold off on DVT ppx for now  - SCDS in place     Skin  #Suspected DTI on sacrum and ear as per nurse  - Wound care recs placed- F/u     Ethics  - updated son, he is pediatric resident and patient's HCP  - Pt is currently full code    Dispo: MICU 6   -PT consult placed Patient is a 61 yo F w/ PMHx of MM (diagnosed ~10 years ago, currently on Promacta treatments since June 2022 - most recently last Wednesday; followed by oncologist, Dr. Lopez at [facility]), ?ITP, Hep B, HTN, neuropathic R hip pain, recent R corneal tear admitted for AMS, code stroke called no acute infarct or hemorrhage, s/p LP findings not consistent with meningitis, course complicated by hypotension and hypoxia, placed on BiPAP, requiring pressors, admitted to MICU with sepsis likely secondary to pneumonia- intubated 11/30.     PLAN  Neuro  - s/p code stroke --> no ischemia or hemorrhage on CT H+N  - s/p LP results not consistent with encephalitis/meningitis, ammonia level 30   - baseline is AOx3  - Neurology following  - awake, alert, following commands, possibly still with some metabolic encephalopathy, improving        --Cardiovascular  #Shock  - suspect sepsis from E. Coli bacteremia with GI or urinary source?  -- 12/4 -- pt remains off levophed since 8 am   - s/p 4L IVF in ED,   -- - 12/05 POCUS _-  pt with diffuse B lines but intravascularly depleted on POCUS-- s/p Lasix 40 IVP on 12/4 x 2,   12/4 s/p D 5 at 50 cc /hr x 10 hrs,  for hypernatremia and hypovolemia   -- 12/5 -- pt is on D 5 at 100/hr x 5 hrs and s/p 250 D5 over 3 hrs for hypernatremia   - ECHO 11/30 EF 55 normal LV/RV   12/5-- normal LV fx on POCUS   - TSH wnl    #new onset MAR  -- MAR AF in 130's on 12/4, resolved, pt remains in NSR,   - s/p Lopressor 5 mg IVP, __ started on Lopressor tartrate 12.5 mg BID,   -- [id number] score is 3   -- unable to anticoagulate in the setting of thrombocytopenia   -- SCD's are in place        Pulmonary  #AHRF- likely secondary to PNA infection vs Aspiration   - s/p BiPAP, 10/5 on 50%, then intubated 11/30 for increased work of breathing  - Extubated 12/3 to face tent  - suspect secondary to infection with procalcitonin of 8.73  -- pt became hypoxic to 80's on 1.5 LPM NC, possibly in the setting of aspiration vs flushed in the setting of tachycardia (pt was noted to cough on a water at the time she was taking Potassium pill  - continue on cefepime for now for E. Coli bacteremia and neutropenia, and possible aspiration PNA   - MRSA/MSSA neg   - ID following recs appreciated   - CTA 11/30: neg for PE.  RUL R basilar consolidation RML basilar opacity  -- repeat CXR on 12/4-- worsening multilobar consolidations     GI  #Diet  - pt is on bite sized diet,   - -discontinued and placed NPO in the setting of posssible aspiration  - NPO except medications with apple sauce or mildly thickened fluids   -- SLP in place __ follow up recs       #Elevated liver enzymes  - patient has reported hx of Hep B 2017 as per hemonc   - LFTs trending down AST/ALT-- 123/61   - bili also elevated 1.3-> 1.5-> 1.4 >1.0 continue to monitor   - hepatitis panel -- Hep B surface AG +, and the rest is negative   -- as per pt, she has h/o Hepatitis B vaccination, but not prior h/o infection   -- confirmatory test ordered     #R/o E. coli O157  - patient with e. coli bacteremia and evidence of hemolysis   - Shiga toxin/ GI PCR ordered if patient has BM     Renal  #GARCIA  - SCr 1.5 up from 1.03, improving and is 1.01 today,   - CPK elevated trending down 1800-> 2800-> 1007>787 - no longer trending   - 12/04 pt with diffuse B lines but intravascularly depleted on POCUS-- s/p Lasix 40 IVP with 150 cc of UOP after Lasix, __ started on D 5 at 50 cc /hr for hypernatremia and hypovolemia   -- - repeat BMP at 20:00   - suspect secondary to sepsis  - Is and Os: pos 1.1 L yesterday, +608 ml for LOS   - Ucx 11/29 > 3 organisms likely contaminate  - Repeat UA 12/2- neg Ucx in lab     #hypernatremia   - NA- 147>150, s/p LR bolus __ repeat Na 152   - 12/04 pt with diffuse B lines but intravascularly depleted on POCUS-- s/p Lasix 40 IVP with 150 cc of UOP after Lasix, __ started on D 5 at 50 cc /hr for hypernatremia and hypovolemia  - repeat BMP at 20:00       ID  #E. Coli bacteremia- pan sensitive 11/29   -Tmax overnight 101.2 WBC 1.81  on Neupogen daily, WBC trending down -- 2.3>1.87   - s/p vanc/CTX/ampicillin/acyclovir in ER for suspected meningitis workup   - continue with cefepime 2g q8h (11/30- )   - s/p Azithromycin 500 mg started (12/2- 12/3) , legionella neg  -Repeat Bcx sent 12/2 NGTD   - Fungitel 39   - MRSA neg  - ID following   - as per nurse, pt has sacral decub. f/u with wound care nurse     Endo  - no history of diabetes, A1C 6.0  - TSH wnl  - FS stable -- 166>115     Heme  #MM  - as per Hemonc note MM in remission 5/22 has been on Promacta since 6/22 has received multiple treatments for MM in the last also PBSCT x 2 and haplo allo stem cell transplant- last in 2020 sept 2022-was given belantamab-last treatment was on 11/23/22  - holding anti-myeloma therapy  - started filgrastim per heme/onc  - Hemonc following recs appreciated     #Panycotopenia  #concern for MAHA, possible HUS with E. Coli bacteremia? vs HLH?  - new thrombocytopenia, anemia, and +hemolysis labs (bili uptrending and now normalized)  -? reported history as per hemonc of ITP? should clarify with [facility]   - direct dede negative  - some schistocytes on peripheral smear, confirmed with hematology/oncology, however higher suspicion for lab abnormalities are secondary to sepsis  - monitor CBC q12 and hemolysis labs daily- improving   - Platelet transfusion protocol: <10, <20 with fever, <50 procedures/active bleeding/etc   -- Patient received 1u plat 12/3,   -- 12/4-- platelets 39 and blue top -- 24   - Transfuse PRBC < 7  - ferritin 69856, trig 519, pancytopenia - 3 cell lines, persistent fever  - hepatitis panel --  hepatitis panel -- Hep B surface AG +, and the rest is negative __see GI , IL2 receptor-- in lab   -F/U Abdominal US to eval for splenomegaly  - Recs as per hem/onc- non concerned of HLH or HUS as hapto is improving- believe all sepsis induced     #DVT ppx  -- hold off on DVT ppx for now  - SCDS in place     Skin  #Suspected DTI on sacrum and ear as per nurse  - Wound care recs placed- F/u     Ethics  - updated son, he is pediatric resident and patient's HCP  - Pt is currently full code    Dispo: MICU 6   -PT consult placed Patient is a 63 yo F w/ PMHx of MM (diagnosed ~10 years ago, currently on Promacta treatments since June 2022 - most recently last Wednesday; followed by oncologist, Dr. Lopez at [facility]), ?ITP, Hep B, HTN, neuropathic R hip pain, recent R corneal tear admitted for AMS, code stroke called no acute infarct or hemorrhage, s/p LP findings not consistent with meningitis, course complicated by hypotension and hypoxia, placed on BiPAP, requiring pressors, admitted to MICU with sepsis likely secondary to pneumonia- intubated 11/30.     PLAN  Neuro  - s/p code stroke --> no ischemia or hemorrhage on CT H+N  - s/p LP results not consistent with encephalitis/meningitis, ammonia level 30   - baseline is AOx3  - Neurology following  - awake, alert, following commands, possibly still with some metabolic encephalopathy, improving        --Cardiovascular  #Shock  - suspect sepsis from E. Coli bacteremia with GI or urinary source?  -- 12/4 -- pt remains off levophed since 8 am   - s/p 4L IVF in ED,   -- - 12/05 POCUS _-  pt with diffuse B lines but intravascularly depleted on POCUS-- s/p Lasix 40 IVP on 12/4 x 2,   12/4 s/p D 5 at 50 cc /hr x 10 hrs,  for hypernatremia and hypovolemia   -- 12/5 -- pt is on D 5 at 100/hr x 5 hrs and s/p 250 D5 over 3 hrs for hypernatremia   - ECHO 11/30 EF 55 normal LV/RV   12/5-- normal LV fx on POCUS   - TSH wnl    #new onset MAR  -- MAR AF in 130's on 12/4, resolved, pt remains in NSR,   - s/p Lopressor 5 mg IVP, __ started on Lopressor tartrate 12.5 mg BID,   -- [id number] score is 3   -- unable to anticoagulate in the setting of thrombocytopenia   -- SCD's are in place        Pulmonary  #AHRF- likely secondary to PNA infection vs Aspiration   - s/p BiPAP, 10/5 on 50%, then intubated 11/30 for increased work of breathing  - Extubated 12/3 to face tent, remains on NC, titrate off as tolerated, in 2 LPM   - suspect secondary to infection with procalcitonin of 8.73  -- pt became hypoxic to 80's on 1.5 LPM NC, possibly in the setting of aspiration vs flushed in the setting of tachycardia (pt was noted to cough on a water at the time she was taking Potassium pill  - continue on cefepime for now for E. Coli bacteremia and neutropenia, and possible aspiration PNA   - MRSA/MSSA neg   - ID following recs appreciated   - CTA 11/30: neg for PE.  RUL R basilar consolidation RML basilar opacity  -- repeat CXR on 12/4-- worsening multilobar consolidations     GI  #Diet  - concern for aspiration on bite sized diet,   -- s/p SLP-- as per recs-- puree with moderately thick fluids   --- MBS ordered as per speech pathology recs       #Elevated liver enzymes  - patient has reported hx of Hep B 2017 as per hemonc   - LFTs trending down AST/ALT-- 123/61   - bili also elevated 1.3-> 1.5-> 1.4 >1.0 continue to monitor   - hepatitis panel -- Hep B surface AG +, and the rest is negative,    -- as per pt, she has h/o Hepatitis B vaccination, but not prior h/o infection   -- confirmatory test -- hepatitis B quantitative ordered,   -- [ ] + Hep B surface Ag. Quantitative hep B DNA PCR send for cofirmation, As per son, pt takes tenofovir at home. Spoke with ROSA Montejo from hepatology office, Dr Florina Jones Re: continuation of Tenofovir. [facility] [phone]. 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 coli O157  - patient with E. coli bacteremia and evidence of hemolysis   - Shiga toxin/ GI PCR ordered if patient has BM     Renal  #GARCIA  - SCr 1.5 up from 1.03, improving and is 1.0 today,   - CPK elevated trending down 1800-> 2800-> 1007>787 - no longer trending   - 12/05 pt with diffuse B lines, a line predominant, but intravascularly depleted on POCUS-- s/p Lasix 40 IVP x 2 on 12/4  - suspect secondary to sepsis  - Is and Os: neg 3 L yesterday, neg 2.5 L for LOS   - Ucx 11/29 > 3 organisms likely contaminate  - Repeat UA 12/2- neg Ucx in lab     #hypernatremia   - NA- 147>150>152>153>151  __ s/p D 5 at 80 cc /hr x 3 hrs for hypernatremia and hypovolemia  - 12/05 pt with diffuse B lines, a line predominant, but intravascularly depleted on POCUS--   /p Lasix 40 IVP x 2 on 12/4 __   __ worsening hypernatremia, __ restarted on D 5 at 100 cc /hr x 5 hrs for hypernatremia and water deficit   - repeat BMP after D 5 completed       ID  #E. Coli bacteremia- pan sensitive 11/29   # febrile neutropenia, resolved   -Tmax overnight 101.2 WBC 1.81  on Neupogen daily, WBC trending down -- 2.3>1.87   - s/p vanc/CTX/ampicillin/acyclovir in ER for suspected meningitis workup   -- T max 100.2 LA1.8, WBC-- 1.8>3.99   - s/p Cefepime 2g q8h (11/30- 12.5)   - s/p Azithromycin 500 mg started (12/2- 12/3) , legionella neg  -- as per ID recs -- changed to Ceftriaxone as per sensitivities, for a total of 10 days, until 12/12   -Repeat Bcx sent 12/2 NGTD   - Fungitel 39   - MRSA neg  - ID following     SKIN  # sacral decub-- DTI., Wound care nurse recs in place     Endo  - no history of diabetes, A1C 6.0  - TSH wnl  - FS stable -- 190>201  -- continue ISS qac and qhs     Heme  #MM  - as per Hemonc note MM in remission 5/22 has been on Promacta since 6/22 has received multiple treatments for MM in the last also PBSCT x 2 and haplo allo stem cell transplant- last in 2020 sept 2022-was given belantamab-last treatment was on 11/23/22  - holding anti-myeloma therapy  - started filgrastim per heme/onc  - Hemonc following recs appreciated     #Panycotopenia  #concern for MAHA, possible HUS with E. Coli bacteremia? vs HLH?  - new thrombocytopenia, anemia, and +hemolysis labs (bili uptrending and now normalized)  -? reported history as per hemonc of ITP? should clarify with [facility]   - direct dede negative  - some schistocytes on peripheral smear, confirmed with hematology/oncology, however higher suspicion for lab abnormalities are secondary to sepsis  - monitor CBC q12 and hemolysis labs daily- improving   - Platelet transfusion protocol: <10, <20 with fever, <50 procedures/active bleeding/etc   -- Patient received 1u plat 12/3,   -- 12/4-- platelets 39, today 21__ stable    - Transfuse PRBC < 7  - ferritin 81211, trig 519, pancytopenia - 3 cell lines, persistent fever  - hepatitis panel --  hepatitis panel -- Hep B surface AG +, and the rest is negative __see GI , IL2 receptor-- in lab   --  Abdominal US to eval for splenomegaly- -negative   - Recs as per hem/onc- non concerned of HLH or HUS as hapto is improving- believe all sepsis induced     #DVT ppx  -- hold off on DVT ppx for now  - SCDS in place     Skin  #Suspected DTI on sacrum and ear as per nurse  - Wound care recs appreciated      Ethics  - updated son, he is pediatric resident and patient's HCP  - Pt is currently full code    Dispo: TELE  -PT consult VINCENT  - 12/4, 12/5-- kerri Griffith is at the bedside, Updated. All questions answered

## 2022-12-06 LAB
ALBUMIN SERPL ELPH-MCNC: 2.4 G/DL — LOW (ref 3.3–5)
ALBUMIN SERPL ELPH-MCNC: 2.6 G/DL — LOW (ref 3.3–5)
ALP SERPL-CCNC: 167 U/L — HIGH (ref 40–120)
ALP SERPL-CCNC: 168 U/L — HIGH (ref 40–120)
ALT FLD-CCNC: 59 U/L — HIGH (ref 4–33)
ALT FLD-CCNC: 73 U/L — HIGH (ref 4–33)
ANION GAP SERPL CALC-SCNC: 11 MMOL/L — SIGNIFICANT CHANGE UP (ref 7–14)
ANION GAP SERPL CALC-SCNC: 9 MMOL/L — SIGNIFICANT CHANGE UP (ref 7–14)
AST SERPL-CCNC: 104 U/L — HIGH (ref 4–32)
AST SERPL-CCNC: 125 U/L — HIGH (ref 4–32)
BASOPHILS # BLD AUTO: 0 K/UL — SIGNIFICANT CHANGE UP (ref 0–0.2)
BASOPHILS # BLD AUTO: 0.01 K/UL — SIGNIFICANT CHANGE UP (ref 0–0.2)
BASOPHILS # BLD AUTO: 0.02 K/UL — SIGNIFICANT CHANGE UP (ref 0–0.2)
BASOPHILS NFR BLD AUTO: 0 % — SIGNIFICANT CHANGE UP (ref 0–2)
BASOPHILS NFR BLD AUTO: 0.3 % — SIGNIFICANT CHANGE UP (ref 0–2)
BASOPHILS NFR BLD AUTO: 0.5 % — SIGNIFICANT CHANGE UP (ref 0–2)
BILIRUB SERPL-MCNC: 0.7 MG/DL — SIGNIFICANT CHANGE UP (ref 0.2–1.2)
BILIRUB SERPL-MCNC: 0.7 MG/DL — SIGNIFICANT CHANGE UP (ref 0.2–1.2)
BLD GP AB SCN SERPL QL: NEGATIVE — SIGNIFICANT CHANGE UP
BLOOD GAS ARTERIAL - LYTES,HGB,ICA,LACT RESULT: SIGNIFICANT CHANGE UP
BUN SERPL-MCNC: 36 MG/DL — HIGH (ref 7–23)
BUN SERPL-MCNC: 38 MG/DL — HIGH (ref 7–23)
CALCIUM SERPL-MCNC: 9.3 MG/DL — SIGNIFICANT CHANGE UP (ref 8.4–10.5)
CALCIUM SERPL-MCNC: 9.5 MG/DL — SIGNIFICANT CHANGE UP (ref 8.4–10.5)
CHLORIDE SERPL-SCNC: 117 MMOL/L — HIGH (ref 98–107)
CHLORIDE SERPL-SCNC: 121 MMOL/L — HIGH (ref 98–107)
CK SERPL-CCNC: 324 U/L — HIGH (ref 25–170)
CO2 SERPL-SCNC: 21 MMOL/L — LOW (ref 22–31)
CO2 SERPL-SCNC: 24 MMOL/L — SIGNIFICANT CHANGE UP (ref 22–31)
CREAT SERPL-MCNC: 0.85 MG/DL — SIGNIFICANT CHANGE UP (ref 0.5–1.3)
CREAT SERPL-MCNC: 0.91 MG/DL — SIGNIFICANT CHANGE UP (ref 0.5–1.3)
EGFR: 71 ML/MIN/1.73M2 — SIGNIFICANT CHANGE UP
EGFR: 77 ML/MIN/1.73M2 — SIGNIFICANT CHANGE UP
EOSINOPHIL # BLD AUTO: 0 K/UL — SIGNIFICANT CHANGE UP (ref 0–0.5)
EOSINOPHIL # BLD AUTO: 0.01 K/UL — SIGNIFICANT CHANGE UP (ref 0–0.5)
EOSINOPHIL # BLD AUTO: 0.01 K/UL — SIGNIFICANT CHANGE UP (ref 0–0.5)
EOSINOPHIL NFR BLD AUTO: 0 % — SIGNIFICANT CHANGE UP (ref 0–6)
EOSINOPHIL NFR BLD AUTO: 0.2 % — SIGNIFICANT CHANGE UP (ref 0–6)
EOSINOPHIL NFR BLD AUTO: 0.3 % — SIGNIFICANT CHANGE UP (ref 0–6)
GLUCOSE BLDC GLUCOMTR-MCNC: 180 MG/DL — HIGH (ref 70–99)
GLUCOSE BLDC GLUCOMTR-MCNC: 190 MG/DL — HIGH (ref 70–99)
GLUCOSE BLDC GLUCOMTR-MCNC: 192 MG/DL — HIGH (ref 70–99)
GLUCOSE BLDC GLUCOMTR-MCNC: 214 MG/DL — HIGH (ref 70–99)
GLUCOSE SERPL-MCNC: 205 MG/DL — HIGH (ref 70–99)
GLUCOSE SERPL-MCNC: 213 MG/DL — HIGH (ref 70–99)
HBV DNA # SERPL NAA+PROBE: SIGNIFICANT CHANGE UP
HBV DNA SERPL NAA+PROBE-LOG#: SIGNIFICANT CHANGE UP LOGIU/ML
HCT VFR BLD CALC: 21.4 % — LOW (ref 34.5–45)
HCT VFR BLD CALC: 21.7 % — LOW (ref 34.5–45)
HCT VFR BLD CALC: 21.9 % — LOW (ref 34.5–45)
HGB BLD-MCNC: 6.8 G/DL — CRITICAL LOW (ref 11.5–15.5)
HGB BLD-MCNC: 6.8 G/DL — CRITICAL LOW (ref 11.5–15.5)
HGB BLD-MCNC: 7 G/DL — CRITICAL LOW (ref 11.5–15.5)
IANC: 1.98 K/UL — SIGNIFICANT CHANGE UP (ref 1.8–7.4)
IANC: 2.14 K/UL — SIGNIFICANT CHANGE UP (ref 1.8–7.4)
IANC: 2.41 K/UL — SIGNIFICANT CHANGE UP (ref 1.8–7.4)
IL2 SERPL-MCNC: 8324.8 PG/ML — HIGH (ref 175.3–858.2)
IMM GRANULOCYTES NFR BLD AUTO: 20.3 % — HIGH (ref 0–0.9)
IMM GRANULOCYTES NFR BLD AUTO: 24.3 % — HIGH (ref 0–0.9)
INR BLD: 1.22 RATIO — HIGH (ref 0.88–1.16)
LYMPHOCYTES # BLD AUTO: 0.54 K/UL — LOW (ref 1–3.3)
LYMPHOCYTES # BLD AUTO: 0.68 K/UL — LOW (ref 1–3.3)
LYMPHOCYTES # BLD AUTO: 0.9 K/UL — LOW (ref 1–3.3)
LYMPHOCYTES # BLD AUTO: 13.7 % — SIGNIFICANT CHANGE UP (ref 13–44)
LYMPHOCYTES # BLD AUTO: 15.7 % — SIGNIFICANT CHANGE UP (ref 13–44)
LYMPHOCYTES # BLD AUTO: 21.2 % — SIGNIFICANT CHANGE UP (ref 13–44)
MAGNESIUM SERPL-MCNC: 1.9 MG/DL — SIGNIFICANT CHANGE UP (ref 1.6–2.6)
MCHC RBC-ENTMCNC: 31.1 GM/DL — LOW (ref 32–36)
MCHC RBC-ENTMCNC: 31.8 GM/DL — LOW (ref 32–36)
MCHC RBC-ENTMCNC: 32.3 GM/DL — SIGNIFICANT CHANGE UP (ref 32–36)
MCHC RBC-ENTMCNC: 33.7 PG — SIGNIFICANT CHANGE UP (ref 27–34)
MCHC RBC-ENTMCNC: 33.7 PG — SIGNIFICANT CHANGE UP (ref 27–34)
MCHC RBC-ENTMCNC: 34.2 PG — HIGH (ref 27–34)
MCV RBC AUTO: 104.3 FL — HIGH (ref 80–100)
MCV RBC AUTO: 107.5 FL — HIGH (ref 80–100)
MCV RBC AUTO: 108.4 FL — HIGH (ref 80–100)
MONOCYTES # BLD AUTO: 0.44 K/UL — SIGNIFICANT CHANGE UP (ref 0–0.9)
MONOCYTES # BLD AUTO: 0.58 K/UL — SIGNIFICANT CHANGE UP (ref 0–0.9)
MONOCYTES # BLD AUTO: 1.02 K/UL — HIGH (ref 0–0.9)
MONOCYTES NFR BLD AUTO: 11.2 % — SIGNIFICANT CHANGE UP (ref 2–14)
MONOCYTES NFR BLD AUTO: 13.4 % — SIGNIFICANT CHANGE UP (ref 2–14)
MONOCYTES NFR BLD AUTO: 23.9 % — HIGH (ref 2–14)
NEUTROPHILS # BLD AUTO: 1.89 K/UL — SIGNIFICANT CHANGE UP (ref 1.8–7.4)
NEUTROPHILS # BLD AUTO: 1.98 K/UL — SIGNIFICANT CHANGE UP (ref 1.8–7.4)
NEUTROPHILS # BLD AUTO: 2.14 K/UL — SIGNIFICANT CHANGE UP (ref 1.8–7.4)
NEUTROPHILS NFR BLD AUTO: 40.7 % — LOW (ref 43–77)
NEUTROPHILS NFR BLD AUTO: 45.9 % — SIGNIFICANT CHANGE UP (ref 43–77)
NEUTROPHILS NFR BLD AUTO: 54.2 % — SIGNIFICANT CHANGE UP (ref 43–77)
NRBC # BLD: 2 /100 WBCS — HIGH (ref 0–0)
NRBC # BLD: 3 /100 WBCS — HIGH (ref 0–0)
NRBC # FLD: 0.09 K/UL — HIGH (ref 0–0)
NRBC # FLD: 0.12 K/UL — HIGH (ref 0–0)
PHOSPHATE SERPL-MCNC: 2.7 MG/DL — SIGNIFICANT CHANGE UP (ref 2.5–4.5)
PLATELET # BLD AUTO: 22 K/UL — LOW (ref 150–400)
PLATELET # BLD AUTO: 26 K/UL — LOW (ref 150–400)
PLATELET # BLD AUTO: 27 K/UL — LOW (ref 150–400)
POTASSIUM SERPL-MCNC: 3.5 MMOL/L — SIGNIFICANT CHANGE UP (ref 3.5–5.3)
POTASSIUM SERPL-MCNC: 3.6 MMOL/L — SIGNIFICANT CHANGE UP (ref 3.5–5.3)
POTASSIUM SERPL-SCNC: 3.5 MMOL/L — SIGNIFICANT CHANGE UP (ref 3.5–5.3)
POTASSIUM SERPL-SCNC: 3.6 MMOL/L — SIGNIFICANT CHANGE UP (ref 3.5–5.3)
PROT SERPL-MCNC: 5.3 G/DL — LOW (ref 6–8.3)
PROT SERPL-MCNC: 5.5 G/DL — LOW (ref 6–8.3)
PROTHROM AB SERPL-ACNC: 14.2 SEC — HIGH (ref 10.5–13.4)
RBC # BLD: 1.99 M/UL — LOW (ref 3.8–5.2)
RBC # BLD: 2.02 M/UL — LOW (ref 3.8–5.2)
RBC # BLD: 2.08 M/UL — LOW (ref 3.8–5.2)
RBC # FLD: 15.7 % — HIGH (ref 10.3–14.5)
RBC # FLD: 15.9 % — HIGH (ref 10.3–14.5)
RBC # FLD: 15.9 % — HIGH (ref 10.3–14.5)
RH IG SCN BLD-IMP: POSITIVE — SIGNIFICANT CHANGE UP
SODIUM SERPL-SCNC: 149 MMOL/L — HIGH (ref 135–145)
SODIUM SERPL-SCNC: 154 MMOL/L — HIGH (ref 135–145)
WBC # BLD: 3.94 K/UL — SIGNIFICANT CHANGE UP (ref 3.8–10.5)
WBC # BLD: 4.26 K/UL — SIGNIFICANT CHANGE UP (ref 3.8–10.5)
WBC # BLD: 4.32 K/UL — SIGNIFICANT CHANGE UP (ref 3.8–10.5)
WBC # FLD AUTO: 3.94 K/UL — SIGNIFICANT CHANGE UP (ref 3.8–10.5)
WBC # FLD AUTO: 4.26 K/UL — SIGNIFICANT CHANGE UP (ref 3.8–10.5)
WBC # FLD AUTO: 4.32 K/UL — SIGNIFICANT CHANGE UP (ref 3.8–10.5)

## 2022-12-06 PROCEDURE — 99232 SBSQ HOSP IP/OBS MODERATE 35: CPT

## 2022-12-06 PROCEDURE — 74230 X-RAY XM SWLNG FUNCJ C+: CPT | Mod: 26

## 2022-12-06 PROCEDURE — 76937 US GUIDE VASCULAR ACCESS: CPT | Mod: 26

## 2022-12-06 PROCEDURE — 99233 SBSQ HOSP IP/OBS HIGH 50: CPT

## 2022-12-06 PROCEDURE — 36000 PLACE NEEDLE IN VEIN: CPT

## 2022-12-06 RX ORDER — SODIUM CHLORIDE 9 MG/ML
1000 INJECTION, SOLUTION INTRAVENOUS
Refills: 0 | Status: DISCONTINUED | OUTPATIENT
Start: 2022-12-06 | End: 2022-12-07

## 2022-12-06 RX ORDER — INSULIN GLARGINE 100 [IU]/ML
6 INJECTION, SOLUTION SUBCUTANEOUS EVERY MORNING
Refills: 0 | Status: DISCONTINUED | OUTPATIENT
Start: 2022-12-06 | End: 2022-12-07

## 2022-12-06 RX ORDER — SODIUM CHLORIDE 9 MG/ML
1000 INJECTION, SOLUTION INTRAVENOUS
Refills: 0 | Status: DISCONTINUED | OUTPATIENT
Start: 2022-12-06 | End: 2022-12-06

## 2022-12-06 RX ORDER — POTASSIUM CHLORIDE 20 MEQ
40 PACKET (EA) ORAL ONCE
Refills: 0 | Status: COMPLETED | OUTPATIENT
Start: 2022-12-06 | End: 2022-12-06

## 2022-12-06 RX ORDER — ELTROMBOPAG OLAMINE 50 MG/1
50 TABLET, FILM COATED ORAL DAILY
Refills: 0 | Status: DISCONTINUED | OUTPATIENT
Start: 2022-12-06 | End: 2022-12-13

## 2022-12-06 RX ORDER — POTASSIUM CHLORIDE 20 MEQ
10 PACKET (EA) ORAL
Refills: 0 | Status: COMPLETED | OUTPATIENT
Start: 2022-12-06 | End: 2022-12-06

## 2022-12-06 RX ORDER — ACETAMINOPHEN 500 MG
650 TABLET ORAL ONCE
Refills: 0 | Status: COMPLETED | OUTPATIENT
Start: 2022-12-06 | End: 2022-12-06

## 2022-12-06 RX ORDER — MAGNESIUM SULFATE 500 MG/ML
1 VIAL (ML) INJECTION ONCE
Refills: 0 | Status: COMPLETED | OUTPATIENT
Start: 2022-12-06 | End: 2022-12-06

## 2022-12-06 RX ORDER — POTASSIUM CHLORIDE 20 MEQ
20 PACKET (EA) ORAL ONCE
Refills: 0 | Status: COMPLETED | OUTPATIENT
Start: 2022-12-06 | End: 2022-12-06

## 2022-12-06 RX ORDER — CHLORHEXIDINE GLUCONATE 213 G/1000ML
1 SOLUTION TOPICAL DAILY
Refills: 0 | Status: DISCONTINUED | OUTPATIENT
Start: 2022-12-06 | End: 2022-12-20

## 2022-12-06 RX ORDER — SODIUM,POTASSIUM PHOSPHATES 278-250MG
1 POWDER IN PACKET (EA) ORAL EVERY 4 HOURS
Refills: 0 | Status: COMPLETED | OUTPATIENT
Start: 2022-12-06 | End: 2022-12-06

## 2022-12-06 RX ADMIN — Medication 20 MILLIEQUIVALENT(S): at 05:45

## 2022-12-06 RX ADMIN — SODIUM CHLORIDE 100 MILLILITER(S): 9 INJECTION, SOLUTION INTRAVENOUS at 20:58

## 2022-12-06 RX ADMIN — INSULIN GLARGINE 6 UNIT(S): 100 INJECTION, SOLUTION SUBCUTANEOUS at 07:45

## 2022-12-06 RX ADMIN — TENOFOVIR DISOPROXIL FUMARATE 25 MILLIGRAM(S): 300 TABLET, FILM COATED ORAL at 11:34

## 2022-12-06 RX ADMIN — Medication 100 MILLIEQUIVALENT(S): at 05:51

## 2022-12-06 RX ADMIN — Medication 480 MICROGRAM(S): at 14:33

## 2022-12-06 RX ADMIN — CHLORHEXIDINE GLUCONATE 1 APPLICATION(S): 213 SOLUTION TOPICAL at 11:55

## 2022-12-06 RX ADMIN — Medication 2: at 11:35

## 2022-12-06 RX ADMIN — Medication 12.5 MILLIGRAM(S): at 17:31

## 2022-12-06 RX ADMIN — Medication 650 MILLIGRAM(S): at 18:00

## 2022-12-06 RX ADMIN — Medication 1 TABLET(S): at 11:34

## 2022-12-06 RX ADMIN — MIDODRINE HYDROCHLORIDE 10 MILLIGRAM(S): 2.5 TABLET ORAL at 14:34

## 2022-12-06 RX ADMIN — Medication 100 GRAM(S): at 08:01

## 2022-12-06 RX ADMIN — Medication 1 DROP(S): at 05:45

## 2022-12-06 RX ADMIN — Medication 12.5 MILLIGRAM(S): at 05:12

## 2022-12-06 RX ADMIN — MIDODRINE HYDROCHLORIDE 10 MILLIGRAM(S): 2.5 TABLET ORAL at 05:12

## 2022-12-06 RX ADMIN — Medication 1 DROP(S): at 17:31

## 2022-12-06 RX ADMIN — Medication 100 MILLIEQUIVALENT(S): at 06:50

## 2022-12-06 RX ADMIN — CEFTRIAXONE 100 MILLIGRAM(S): 500 INJECTION, POWDER, FOR SOLUTION INTRAMUSCULAR; INTRAVENOUS at 17:31

## 2022-12-06 RX ADMIN — Medication 1: at 17:36

## 2022-12-06 RX ADMIN — Medication 1: at 08:24

## 2022-12-06 RX ADMIN — SODIUM CHLORIDE 100 MILLILITER(S): 9 INJECTION, SOLUTION INTRAVENOUS at 10:01

## 2022-12-06 RX ADMIN — MIDODRINE HYDROCHLORIDE 10 MILLIGRAM(S): 2.5 TABLET ORAL at 22:11

## 2022-12-06 NOTE — SWALLOW VFSS/MBS ASSESSMENT ADULT - ORAL PHASE
Incomplete tongue to palate contact/Uncontrolled bolus / spillover in hypopharynx Delayed oral transit time

## 2022-12-06 NOTE — PROGRESS NOTE ADULT - ASSESSMENT
Patient is a 63 yo F w/ PMHx of MM (diagnosed ~10 years ago, currently on Promacta treatments since June 2022 - most recently last Wednesday; followed by oncologist, Dr. Lopez at [facility]), ?ITP, Hep B, HTN, neuropathic R hip pain, recent R corneal tear admitted for AMS, code stroke called no acute infarct or hemorrhage, s/p LP findings not consistent with meningitis, course complicated by hypotension and hypoxia, placed on BiPAP, requiring pressors, admitted to MICU with sepsis likely secondary to pneumonia- intubated 11/30.     PLAN  Neuro  - s/p code stroke --> no ischemia or hemorrhage on CT H+N  - s/p LP results not consistent with encephalitis/meningitis, ammonia level 30   - baseline is AOx3  - Neurology following  - awake, alert, following commands, possibly still with some metabolic encephalopathy, improving        --Cardiovascular  #Shock  - suspect sepsis from E. Coli bacteremia with GI or urinary source?  -- 12/4 -- pt remains off levophed since 8 am   - s/p 4L IVF in ED,   -- - 12/05 POCUS _-  pt with diffuse B lines but intravascularly depleted on POCUS-- s/p Lasix 40 IVP on 12/4 x 2,   12/4 s/p D 5 at 50 cc /hr x 10 hrs,  for hypernatremia and hypovolemia   -- 12/5 -- pt is on D 5 at 100/hr x 5 hrs and s/p 250 D5 over 3 hrs for hypernatremia   - ECHO 11/30 EF 55 normal LV/RV   12/5-- normal LV fx on POCUS   - TSH wnl    #new onset MAR  -- MAR AF in 130's on 12/4, resolved, pt remains in NSR,   - s/p Lopressor 5 mg IVP, __ started on Lopressor tartrate 12.5 mg BID,   -- [id number] score is 3   -- unable to anticoagulate in the setting of thrombocytopenia   -- SCD's are in place        Pulmonary  #AHRF- likely secondary to PNA infection vs Aspiration   - s/p BiPAP, 10/5 on 50%, then intubated 11/30 for increased work of breathing  - Extubated 12/3 to face tent, remains on NC, titrate off as tolerated, in 2 LPM   - suspect secondary to infection with procalcitonin of 8.73  -- pt became hypoxic to 80's on 1.5 LPM NC, possibly in the setting of aspiration vs flushed in the setting of tachycardia (pt was noted to cough on a water at the time she was taking Potassium pill  - continue on cefepime for now for E. Coli bacteremia and neutropenia, and possible aspiration PNA   - MRSA/MSSA neg   - ID following recs appreciated   - CTA 11/30: neg for PE.  RUL R basilar consolidation RML basilar opacity  -- repeat CXR on 12/4-- worsening multilobar consolidations     GI  #Diet  - concern for aspiration on bite sized diet,   -- s/p SLP-- as per recs-- puree with moderately thick fluids   --- MBS ordered as per speech pathology recs       #Elevated liver enzymes  - patient has reported hx of Hep B 2017 as per hemonc   - LFTs trending down AST/ALT-- 123/61   - bili also elevated 1.3-> 1.5-> 1.4 >1.0 continue to monitor   - hepatitis panel -- Hep B surface AG +, and the rest is negative,    -- as per pt, she has h/o Hepatitis B vaccination, but not prior h/o infection   -- confirmatory test -- hepatitis B quantitative ordered,   -- [ ] + Hep B surface Ag. Quantitative hep B DNA PCR send for cofirmation, As per son, pt takes tenofovir at home. Spoke with ROSA Montejo from hepatology office, Dr Florina Jones Re: continuation of Tenofovir. [facility] [phone]. 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 coli O157  - patient with E. coli bacteremia and evidence of hemolysis   - Shiga toxin/ GI PCR ordered if patient has BM     Renal  #GARCIA  - SCr 1.5 up from 1.03, improving and is 1.0 today,   - CPK elevated trending down 1800-> 2800-> 1007>787 - no longer trending   - 12/05 pt with diffuse B lines, a line predominant, but intravascularly depleted on POCUS-- s/p Lasix 40 IVP x 2 on 12/4  - suspect secondary to sepsis  - Is and Os: neg 3 L yesterday, neg 2.5 L for LOS   - Ucx 11/29 > 3 organisms likely contaminate  - Repeat UA 12/2- neg Ucx in lab     #hypernatremia   - NA- 147>150>152>153>151  __ s/p D 5 at 80 cc /hr x 3 hrs for hypernatremia and hypovolemia  - 12/05 pt with diffuse B lines, a line predominant, but intravascularly depleted on POCUS--   /p Lasix 40 IVP x 2 on 12/4 __   __ worsening hypernatremia, __ restarted on D 5 at 100 cc /hr x 5 hrs for hypernatremia and water deficit   - repeat BMP after D 5 completed       ID  #E. Coli bacteremia- pan sensitive 11/29   # febrile neutropenia, resolved   -Tmax overnight 101.2 WBC 1.81  on Neupogen daily, WBC trending down -- 2.3>1.87   - s/p vanc/CTX/ampicillin/acyclovir in ER for suspected meningitis workup   -- T max 100.2 LA1.8, WBC-- 1.8>3.99   - s/p Cefepime 2g q8h (11/30- 12.5)   - s/p Azithromycin 500 mg started (12/2- 12/3) , legionella neg  -- as per ID recs -- changed to Ceftriaxone as per sensitivities, for a total of 10 days, until 12/12   -Repeat Bcx sent 12/2 NGTD   - Fungitel 39   - MRSA neg  - ID following     SKIN  # sacral decub-- DTI., Wound care nurse recs in place     Endo  - no history of diabetes, A1C 6.0  - TSH wnl  - FS stable -- 190>201  -- continue ISS qac and qhs     Heme  #MM  - as per Hemonc note MM in remission 5/22 has been on Promacta since 6/22 has received multiple treatments for MM in the last also PBSCT x 2 and haplo allo stem cell transplant- last in 2020 sept 2022-was given belantamab-last treatment was on 11/23/22  - holding anti-myeloma therapy  - started filgrastim per heme/onc  - Hemonc following recs appreciated     #Panycotopenia  #concern for MAHA, possible HUS with E. Coli bacteremia? vs HLH?  - new thrombocytopenia, anemia, and +hemolysis labs (bili uptrending and now normalized)  -? reported history as per hemonc of ITP? should clarify with [facility]   - direct dede negative  - some schistocytes on peripheral smear, confirmed with hematology/oncology, however higher suspicion for lab abnormalities are secondary to sepsis  - monitor CBC q12 and hemolysis labs daily- improving   - Platelet transfusion protocol: <10, <20 with fever, <50 procedures/active bleeding/etc   -- Patient received 1u plat 12/3,   -- 12/4-- platelets 39, today 21__ stable    - Transfuse PRBC < 7  - ferritin 38490, trig 519, pancytopenia - 3 cell lines, persistent fever  - hepatitis panel --  hepatitis panel -- Hep B surface AG +, and the rest is negative __see GI , IL2 receptor-- in lab   --  Abdominal US to eval for splenomegaly- -negative   - Recs as per hem/onc- non concerned of HLH or HUS as hapto is improving- believe all sepsis induced     #DVT ppx  -- hold off on DVT ppx for now  - SCDS in place     Skin  #Suspected DTI on sacrum and ear as per nurse  - Wound care recs appreciated      Ethics  - updated son, he is pediatric resident and patient's HCP  - Pt is currently full code    Dispo: TELE  -PT consult VINCENT  - 12/4, 12/5-- kerri Griffith is at the bedside, Updated. All questions answered    Patient is a 61 yo F w/ PMHx of MM (diagnosed ~10 years ago, currently on Promacta treatments since June 2022 - most recently last Wednesday; followed by oncologist, Dr. Lopez at [facility]), ?ITP, Hep B, HTN, neuropathic R hip pain, recent R corneal tear admitted for AMS, code stroke called no acute infarct or hemorrhage, s/p LP findings not consistent with meningitis, course complicated by hypotension and hypoxia, placed on BiPAP, requiring pressors, admitted to MICU with sepsis likely secondary to pneumonia- intubated 11/30.     PLAN  Neuro  - s/p code stroke --> no ischemia or hemorrhage on CT H+N  - s/p LP results not consistent with encephalitis/meningitis, ammonia level 30   - baseline is AOx3  - Neurology following  - awake, alert, following commands, possibly still with some metabolic encephalopathy, improving        --Cardiovascular  #Shock  - suspect sepsis from E. Coli bacteremia with GI or urinary source?  -- pt remains off levophed since 8 am pn 12/4   - s/p 4L IVF in ED,   -- flushed on 12/4 and was medicated with s/p Lasix 40 IVP on 12/4 x 2,   -- - 12/05 POCUS _-  pt with diffuse B lines but intravascularly depleted on POCUS-- 12/4 s/p D 5 at 50 cc /hr x 10 hrs,  for hypernatremia and hypovolemia   -- 12/5 -- pt is on D 5 at 100/hr x 5 hrs and s/p 250 D5 over 3 hrs for hypernatremia  12/6-- reordered additional 600 cc of IVF for hypernatremia__ continue monitoring    - ECHO 11/30 EF 55 normal LV/RV   12/5-- normal LV fx on POCUS   - TSH wnl    #new onset MAR  -- MAR AF in 130's on 12/4, resolved, pt remains in NSR,   - s/p Lopressor 5 mg IVP, __ continue Lopressor tartrate 12.5 mg BID,   -- [id number] score is 3   -- unable to anticoagulate in the setting of thrombocytopenia   -- SCD's are in place        Pulmonary  #AHRF- likely secondary to PNA infection vs Aspiration   - s/p BiPAP, 10/5 on 50%, then intubated 11/30 for increased work of breathing  - Extubated 12/3 to face tent, remains on NC, titrate off as tolerated, in 2 LPM   - suspect secondary to infection with procalcitonin of 8.73  -- pt became hypoxic to 80's on 1.5 LPM NC, possibly in the setting of aspiration vs flushed in the setting of tachycardia (pt was noted to cough on a water at the time she was taking Potassium pill  - continue on cefepime for now for E. Coli bacteremia and neutropenia, and possible aspiration PNA   - MRSA/MSSA neg   - ID following recs appreciated   - CTA 11/30: neg for PE.  RUL R basilar consolidation RML basilar opacity  -- repeat CXR on 12/4-- worsening multilobar consolidations   -- continue Abx and titrate off O2 as able     GI  #Diet  - concern for aspiration on bite sized diet,   -- s/p SLP-- as per recs-- puree with moderately thick fluids   --- 12/6 -- s/p MBS, as per speech pathology recs __ continue puree diet with thin liquids       #Elevated liver enzymes  - patient has reported hx of Hep B 2017 as per hemonc   - LFTs trending down AST/ALT-- 123/61   - bili also elevated 1.3-> 1.5-> 1.4 >1.0 continue to monitor   - hepatitis panel -- Hep B surface AG +, and the rest is negative,    -- as per pt, she has h/o Hepatitis B vaccination, but not prior h/o infection   -- confirmatory test -- hepatitis B quantitative ordered,   -- [ ] + Hep B surface Ag. Quantitative hep B DNA PCR send for cofirmation, As per son, pt takes tenofovir at home. Spoke with ROSA Montejo from hepatology office, Dr Florina Jones Re: continuation of Tenofovir. [facility] [phone]. 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 coli O157  - patient with E. coli bacteremia and evidence of hemolysis   - Shiga toxin/ GI PCR ordered if patient has BM     Renal  #GARCIA  - SCr 1.5 up from 1.03, improving and is 1.0 today,   - CPK elevated trending down 1800-> 2800-> 1007>787 - no longer trending   - 12/05 pt with diffuse B lines, a line predominant, but intravascularly depleted on POCUS-- s/p Lasix 40 IVP x 2 on 12/4  - suspect secondary to sepsis  - Is and Os: neg 3 L yesterday, neg 2.5 L for LOS   - Ucx 11/29 > 3 organisms likely contaminate  - Repeat UA 12/2- neg Ucx in lab     #hypernatremia   - NA- 147>150>152>153>151  __ s/p D 5 at 80 cc /hr x 3 hrs for hypernatremia and hypovolemia  - 12/05 pt with diffuse B lines, a line predominant, but intravascularly depleted on POCUS--   /p Lasix 40 IVP x 2 on 12/4 __   __ worsening hypernatremia, __ restarted on D 5 at 100 cc /hr x 5 hrs for hypernatremia and water deficit   - repeat BMP after D 5 completed       ID  #E. Coli bacteremia- pan sensitive 11/29   # febrile neutropenia, resolved   -Tmax overnight 101.2 WBC 1.81  on Neupogen daily, WBC trending down -- 2.3>1.87   - s/p vanc/CTX/ampicillin/acyclovir in ER for suspected meningitis workup   -- T max 100.2 LA1.8, WBC-- 1.8>3.99   - s/p Cefepime 2g q8h (11/30- 12.5)   - s/p Azithromycin 500 mg started (12/2- 12/3) , legionella neg  -- as per ID recs -- changed to Ceftriaxone as per sensitivities, for a total of 10 days, until 12/12   -Repeat Bcx sent 12/2 NGTD   - Fungitel 39   - MRSA neg  - ID following     SKIN  # sacral decub-- DTI., Wound care nurse recs in place     Endo  - no history of diabetes, A1C 6.0  - TSH wnl  - FS stable -- 190>201  -- continue ISS qac and qhs     Heme  #MM  - as per Hemonc note MM in remission 5/22 has been on Promacta since 6/22 has received multiple treatments for MM in the last also PBSCT x 2 and haplo allo stem cell transplant- last in 2020 sept 2022-was given belantamab-last treatment was on 11/23/22  - holding anti-myeloma therapy  - started filgrastim per heme/onc  - Hemonc following recs appreciated     #Panycotopenia  #concern for MAHA, possible HUS with E. Coli bacteremia? vs HLH?  - new thrombocytopenia, anemia, and +hemolysis labs (bili uptrending and now normalized)  -? reported history as per hemonc of ITP? should clarify with [facility]   - direct dede negative  - some schistocytes on peripheral smear, confirmed with hematology/oncology, however higher suspicion for lab abnormalities are secondary to sepsis  - monitor CBC q12 and hemolysis labs daily- improving   - Platelet transfusion protocol: <10, <20 with fever, <50 procedures/active bleeding/etc   -- Patient received 1u plat 12/3,   -- 12/4-- platelets 39, today 21__ stable    - Transfuse PRBC < 7  - ferritin 53477, trig 519, pancytopenia - 3 cell lines, persistent fever  - hepatitis panel --  hepatitis panel -- Hep B surface AG +, and the rest is negative __see GI , IL2 receptor-- in lab   --  Abdominal US to eval for splenomegaly- -negative   - Recs as per hem/onc- non concerned of HLH or HUS as hapto is improving- believe all sepsis induced     #DVT ppx  -- hold off on DVT ppx for now  - SCDS in place     Skin  #Suspected DTI on sacrum and ear as per nurse  - Wound care recs appreciated      Ethics  - updated son, he is pediatric resident and patient's HCP  - Pt is currently full code    Dispo: TELE  -PT consult VINCENT  - 12/4, 12/5-- kerri Griffith is at the bedside, Updated. All questions answered    Patient is a 63 yo F w/ PMHx of MM (diagnosed ~10 years ago, currently on Promacta treatments since June 2022 - most recently last Wednesday; followed by oncologist, Dr. Lopez at [facility]), ?ITP, Hep B, HTN, neuropathic R hip pain, recent R corneal tear admitted for AMS, code stroke called no acute infarct or hemorrhage, s/p LP findings not consistent with meningitis, course complicated by hypotension and hypoxia, placed on BiPAP, requiring pressors, admitted to MICU with sepsis likely secondary to pneumonia- intubated 11/30.     PLAN  Neuro  - s/p code stroke --> no ischemia or hemorrhage on CT H+N  - s/p LP results not consistent with encephalitis/meningitis, ammonia level 30   - baseline is AOx3  - Neurology following  - awake, alert, following commands, possibly still with some metabolic encephalopathy, improving    -- 12/6- -OOB to chair _- PT and OT is working with pt today     --Cardiovascular  #Shock  - suspect sepsis from E. Coli bacteremia with GI or urinary source?  -- pt remains off levophed since 8 am pn 12/4   - s/p 4L IVF in ED,   -- flushed on 12/4 and was medicated with s/p Lasix 40 IVP on 12/4 x 2,   -- - 12/05 POCUS _-  pt with diffuse B lines but intravascularly depleted on POCUS-- 12/4 s/p D 5 at 50 cc /hr x 10 hrs,  for hypernatremia and hypovolemia   -- 12/5 -- pt is on D 5 at 100/hr x 5 hrs and s/p 250 D5 over 3 hrs for hypernatremia  12/6-- reordered additional 600 cc of D 5 at 100 /hr for hypernatremia__ continue monitoring    - ECHO 11/30 EF 55 normal LV/RV   12/5-- normal LV fx on POCUS   - TSH wnl    #new onset MAR  -- MAR AF in 130's on 12/4, resolved, pt remains in NSR,-- 90s today    - s/p Lopressor 5 mg IVP, __ continue Lopressor tartrate 12.5 mg BID,   -- [id number] score is 3   -- unable to anticoagulate in the setting of thrombocytopenia   -- SCD's are in place        Pulmonary  #AHRF- likely secondary to PNA infection vs Aspiration   - s/p BiPAP, 10/5 on 50%, then intubated 11/30 for increased work of breathing  - Extubated 12/3 to face tent, remains on NC, titrate off as tolerated, in 3 LPM   - suspect secondary to infection with procalcitonin of 8.73  --12/4 pt became hypoxic to 80's on 1.5 LPM NC, possibly in the setting of aspiration vs flushed in the setting of tachycardia (pt was noted to cough on a water at the time she was taking Potassium pill  - continue on cefepime for now for E. Coli bacteremia, and neutropenic fever, and possible aspiration PNA _ transitioned to Ceftriaxone   - MRSA/MSSA neg   - ID following recs appreciated   - CTA 11/30: neg for PE.  RUL R basilar consolidation RML basilar opacity  -- repeat CXR on 12/4-- worsening multilobar consolidations   -- continue Abx and titrate off O2 as able     GI  #Diet  - concern for aspiration on bite sized diet,   -- s/p SLP-- as per recs-- puree with moderately thick fluids   --- 12/6 -- s/p MBS, as per speech pathology recs __ continue puree diet with thin liquids   + BM 12/6      #Elevated liver enzymes, trending down   - patient has reported hx of Hep B 2017 as per hemonc   - LFTs trending down AST/ALT-- 123/61 >143/77>123/73  - bili also elevated 1.3-> 1.5-> 1.4 >1.0 continue to monitor   - hepatitis panel -- Hep B surface AG +, and the rest is negative,    -- confirmatory test -- hepatitis B quantitative ordered,   -- [ ] + Hep B surface Ag. Quantitative hep B DNA PCR send for confirmation As per son, pt takes tenofovir at home. Spoke with ROSA Montejo from hepatology office, Dr Florina Jones Re: continuation of Tenofovir. [facility] [phone]. 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__ outpt follow up after discharge__ restarted Tenofovir 25 mg daily       Renal  #GARCIA  - SCr 1.5 up from 1.03, improving and is 0.9 today,   - CPK elevated, - suspect secondary to sepsis, trending down 1800-> 2800-> 1007>787>324 - no longer trending   - 12/05 pt with diffuse B lines, a line predominant, but intravascularly depleted on POCUS-- s/p Lasix 40 IVP x 2 on 12/4  - Is and Os: neg 650 for 24 hrs, neg 3 L for LOS   - Ucx 11/29 > 3 organisms likely contaminate  - Repeat UA 12/2- neg Ucx in lab     #hypernatremia   - NA- 147>150>152>153>151> 155>149  - -pt with very poor PO intake   __ s/p D 5  on 12/4 and 12/5.  12/6 --  ordered additional D 5 at 100 cc /hr x 6 hrs for hypernatremia and free water deficit   - repeat BMP after D 5 nIVF completed     ID  #E. Coli bacteremia- pan sensitive 11/29   # febrile neutropenia, resolved   -Tmax overnight 100.4, WBC 1.81  on Neupogen daily, WBC improving -- 2.3>1.87 >4.26,  LA 1.8,   - s/p vanc/CTX/ampicillin/acyclovir in ER for suspected meningitis workup   -- s/p Cefepime 2g q8h (11/30- 12.5)   - s/p Azithromycin 500 mg started (12/2- 12/3) , legionella neg  -Repeat Bcx sent 12/2 NGTD   -- as per ID recs -- 12/5 -- changed to Ceftriaxone as per sensitivities, for a total of 10 days, until 12/12   - Fungitel 39   - MRSA neg  - ID following     SKIN  # sacral decub-- DTI., Wound care nurse recs in place     Endo  - no history of diabetes, A1C 6.0  - TSH wnl  - FS stable -- 247>213  -- started Lantus 6 U (12/6), continue ISS, qac and qhs   -- dietitian consultation appreciated     Heme  #MM  - as per Hemonc note MM in remission 5/22 has been on Promacta since 6/22 has received multiple treatments for MM in the last also PBSCT x 2 and 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 Coli bacteremia? vs HLH?  -- low concern at the present time, pt is improving  - new thrombocytopenia, anemia, and +hemolysis labs (bili uptrending and now normalized)  -? reported history as per hemonc of ITP? should clarify with [facility]   - direct dede negative  - some schistocytes on peripheral smear, confirmed with hematology/oncology, however higher suspicion for lab abnormalities are secondary to sepsis  - monitor CBC daily and hemolysis labs daily- improving   - Platelet transfusion protocol: <10, <20 with fever, <50 procedures/active bleeding/etc   -- Patient received 1u plat 12/3,   -- 12/4-- platelets 39, today 21__ stable    - Transfuse PRBC < 7  - ferritin 99638, trig 519, pancytopenia - 3 cell lines, persistent fever, which has improved  -- as per hem onc recs, Zarxio till ANC improvement. Promacta and use platelets a needed._ No evidence clinically of microangiopathic hemolysis as questioned by staff. Ferritin is high as acute phase reactant.  -- Promacta __ called pharmacy to confirm that it is available, __ awaiting call back   - hepatitis panel --  hepatitis panel -- Hep B surface AG +, and the rest is negative __see GI , IL2 receptor-- in lab   --  Abdominal US to eval for splenomegaly- -negative   - Recs as per hem/onc- non concerned of HLH or HUS as hapto is improving- believe all sepsis induced     #DVT ppx  -- hold off on DVT ppx for now  - SCD's in place     Skin  #Suspected DTI on sacrum and ear as per nurse  - Wound care recs appreciated      Ethics  - updated son, he is pediatric resident and patient's HCP  - Pt is currently full code    Dispo: TELE  -PT consult VINCENT  - 12/4, 12/5-- kerri Griffith is at the bedside, Updated. All questions answered      Patient is a 61 yo F w/ PMHx of MM (diagnosed ~10 years ago, currently on Promacta treatments since June 2022 - most recently last Wednesday; followed by oncologist, Dr. Lopez at [facility]), ?ITP, Hep B, HTN, neuropathic R hip pain, recent R corneal tear admitted for AMS, code stroke called no acute infarct or hemorrhage, s/p LP findings not consistent with meningitis, course complicated by hypotension and hypoxia, placed on BiPAP, requiring pressors, admitted to MICU with sepsis likely secondary to pneumonia- intubated 11/30.     PLAN  Neuro  - s/p code stroke --> no ischemia or hemorrhage on CT H+N  - s/p LP results not consistent with encephalitis/meningitis, ammonia level 30   - baseline is AOx3  - Neurology following  - awake, alert, following commands, possibly still with some metabolic encephalopathy, improving    -- 12/6- -OOB to chair _- PT and OT is working with pt today     --Cardiovascular  #Shock  - suspect sepsis from E. Coli bacteremia with GI or urinary source?  -- pt remains off levophed since 8 am pn 12/4   - s/p 4L IVF in ED,   -- flushed on 12/4 and was medicated with s/p Lasix 40 IVP on 12/4 x 2,   -- - 12/05 POCUS _-  pt with diffuse B lines but intravascularly depleted on POCUS-- 12/4 s/p D 5 at 50 cc /hr x 10 hrs,  for hypernatremia and hypovolemia   -- 12/5 -- pt is on D 5 at 100/hr x 5 hrs and s/p 250 D5 over 3 hrs for hypernatremia  12/6-- reordered additional 600 cc of D 5 at 100 /hr for hypernatremia__ continue monitoring    - ECHO 11/30 EF 55 normal LV/RV   12/5-- normal LV fx on POCUS   - TSH wnl    #new onset MAR  -- MAR AF in 130's on 12/4, resolved, pt remains in NSR,-- 90s today    - s/p Lopressor 5 mg IVP, __ continue Lopressor tartrate 12.5 mg BID,   -- [id number] score is 3   -- unable to anticoagulate in the setting of thrombocytopenia   -- SCD's are in place        Pulmonary  #AHRF- likely secondary to PNA infection vs Aspiration   - s/p BiPAP, 10/5 on 50%, then intubated 11/30 for increased work of breathing  - Extubated 12/3 to face tent, remains on NC, titrate off as tolerated, in 3 LPM   - suspect secondary to infection with procalcitonin of 8.73  --12/4 pt became hypoxic to 80's on 1.5 LPM NC, possibly in the setting of aspiration vs flushed in the setting of tachycardia (pt was noted to cough on a water at the time she was taking Potassium pill  - continue on cefepime for now for E. Coli bacteremia, and neutropenic fever, and possible aspiration PNA _ transitioned to Ceftriaxone   - MRSA/MSSA neg   - ID following recs appreciated   - CTA 11/30: neg for PE.  RUL R basilar consolidation RML basilar opacity  -- repeat CXR on 12/4-- worsening multilobar consolidations   -- continue Abx and titrate off O2 as able     GI  #Diet  - concern for aspiration on bite sized diet,   -- s/p SLP-- as per recs-- puree with moderately thick fluids   --- 12/6 -- s/p MBS, as per speech pathology recs __ continue puree diet with thin liquids   + BM 12/6      #Elevated liver enzymes, trending down   - patient has reported hx of Hep B 2017 as per hemonc   - LFTs trending down AST/ALT-- 123/61 >143/77>123/73  - bili also elevated 1.3-> 1.5-> 1.4 >1.0 continue to monitor   - hepatitis panel -- Hep B surface AG +, and the rest is negative,    -- confirmatory test -- hepatitis B quantitative ordered,   -- [ ] + Hep B surface Ag. Quantitative hep B DNA PCR send for confirmation As per son, pt takes tenofovir at home. Spoke with ROSA Montejo from hepatology office, Dr Florina Jones Re: continuation of Tenofovir. [facility] [phone]. 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__ outpt follow up after discharge__ restarted Tenofovir 25 mg daily       Renal  #GARCIA  - SCr 1.5 up from 1.03, improving and is 0.9 today,   - CPK elevated, - suspect secondary to sepsis, trending down 1800-> 2800-> 1007>787>324 - no longer trending   - 12/05 pt with diffuse B lines, a line predominant, but intravascularly depleted on POCUS-- s/p Lasix 40 IVP x 2 on 12/4  - Is and Os: neg 650 for 24 hrs, neg 3 L for LOS   - Ucx 11/29 > 3 organisms likely contaminate  - Repeat UA 12/2- neg Ucx in lab     #hypernatremia   - NA- 147>150>152>153>151> 155>149  - -pt with very poor PO intake   __ s/p D 5  on 12/4 and 12/5.  12/6 --  ordered additional D 5 at 100 cc /hr x 6 hrs for hypernatremia and free water deficit   - repeat BMP after D 5 nIVF completed     ID  #E. Coli bacteremia- pan sensitive 11/29   # febrile neutropenia, resolved   -Tmax overnight 100.4, WBC 1.81  on Neupogen daily, WBC improving -- 2.3>1.87 >4.26,  LA 1.8,   - s/p vanc/CTX/ampicillin/acyclovir in ER for suspected meningitis workup   -- s/p Cefepime 2g q8h (11/30- 12.5)   - s/p Azithromycin 500 mg started (12/2- 12/3) , legionella neg  -Repeat Bcx sent 12/2 NGTD   -- as per ID recs -- 12/5 -- changed to Ceftriaxone as per sensitivities, for a total of 10 days, until 12/12   - Fungitel 39   - MRSA neg  - ID following     SKIN  # sacral decub-- DTI., Wound care nurse recs in place     Endo  - no history of diabetes, A1C 6.0  - TSH wnl  - FS stable -- 247>213  -- started Lantus 6 U (12/6), continue ISS, qac and qhs   -- dietitian consultation appreciated     Heme  #MM  - as per Hemonc note MM in remission 5/22 has been on Promacta since 6/22 has received multiple treatments for MM in the last also PBSCT x 2 and 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 Coli bacteremia? vs HLH?  -- low concern at the present time, pt is improving  - new thrombocytopenia, anemia, and +hemolysis labs (bili uptrending and now normalized)  -? reported history as per hemonc of ITP? should clarify with [facility]   - direct dede negative  - some schistocytes on peripheral smear, confirmed with hematology/oncology, however higher suspicion for lab abnormalities are secondary to sepsis  - monitor CBC daily and hemolysis labs daily- improving   - Platelet transfusion protocol: <10, <20 with fever, <50 procedures/active bleeding/etc   -- Patient received 1u plat 12/3,   -- 12/4-- platelets 39, today 21__ stable    - Transfuse PRBC < 7  - ferritin 38639, trig 519, pancytopenia - 3 cell lines, persistent fever, which has improved  -- as per hem onc recs, Zarxio till ANC improvement. Promacta and use platelets a needed._ No evidence clinically of microangiopathic hemolysis as questioned by staff. Ferritin is high as acute phase reactant.  -- restarted home dose of Promacta, 50 mg daily   - hepatitis panel --  hepatitis panel -- Hep B surface AG +, and the rest is negative __see GI , IL2 receptor-- in lab   --  Abdominal US to eval for splenomegaly- -negative   - Recs as per hem/onc- non concerned of HLH or HUS as hapto is improving- believe all sepsis induced     #DVT ppx  -- hold off on DVT ppx for now  - SCD's in place     Skin  #Suspected DTI on sacrum and ear as per nurse  - Wound care recs appreciated      Ethics  - updated son, he is pediatric resident and patient's HCP  - Pt is full code    Dispo: TELE  -PT consult VINCENT  - 12/4, 12/5, 12/6-- kerri Griffith is at the bedside, Updated. All questions answered

## 2022-12-06 NOTE — CHART NOTE - NSCHARTNOTEFT_GEN_A_CORE
Left sided arterial femoral line removed. Pressure held for approximately 10 minutes with cessation of bleeding and no hematoma formation. Pressure dressing applied.

## 2022-12-06 NOTE — SWALLOW VFSS/MBS ASSESSMENT ADULT - DIAGNOSTIC IMPRESSIONS
1. Mild-moderate oral dysphagia for puree, soft and bite sized solids, mildly thick liquids and thin liquids marked by adequate oral acceptance, significantly slow/prolonged manipulation and chewing for puree/soft and bite sized solids with slow/delayed tongue motion for anterior to posterior transport. There was premature spillage to the hypopharynx due to reduced tongue to palate seal for thin liquids. There was mild oral residue located on the tongue surface across trials puree/solids>mildly thick and thin liquids that clears with a spontaneous secondary swallow.  2. Functional pharyngeal phase for puree, soft and bite sized solids, and mildly thick liquids marked by an adequate pharyngeal swallow trigger with adequate base of tongue retraction, adequate hyolaryngeal elevation, adequate epiglottic deflection, adequate pharyngeal contractility and adequate closure of the laryngeal vestibule. There was adequate pharyngeal clearance post primary swallow. There was No Aspiration viewed before, during or after the swallow.   3. Mild pharyngeal dysphagia for thin liquids marked by a delayed pharyngeal swallow trigger (bolus head at the valleculae) with reduced base of tongue retraction, reduced hyolaryngeal elevation, reduced epiglottic deflection, adequate pharyngeal contractility and incomplete closure of the laryngeal vestibule. There was one episode of Trace Laryngeal Penetration above the level of the vocal folds with retrieval during the swallow for thin liquids. No Aspiration viewed before, during or after the swallow.

## 2022-12-06 NOTE — OCCUPATIONAL THERAPY INITIAL EVALUATION ADULT - PERTINENT HX OF CURRENT PROBLEM, REHAB EVAL
Patient is 62 year old female PMH of MM (diagnosed ~10 years ago, Hep B, HTN, neuropathic R hip pain, recent R corneal tear admitted for AMS, code stroke called no acute infarct or hemorrhage, s/p LP findings not consistent with meningitis, course complicated by hypotension and hypoxia, placed on BiPAP, requiring pressors, admitted to MICU with sepsis likely secondary to pneumonia- intubated 11/30.

## 2022-12-06 NOTE — PROCEDURE NOTE - ADDITIONAL PROCEDURE DETAILS
20G 6CM long, Arrow extended dwell IV catheter placed under dynamic US guidance in right forearm with dark nonpulsatile blood return.  Catheter was flushed afterwards without any resistance or resulting extravasation.  IV catheter confirmed in compressible vein after insertion.

## 2022-12-06 NOTE — PROGRESS NOTE ADULT - ASSESSMENT
62 f with HTN, MM (diagnosed ~10 years ago, currently on Promacta treatments since June 2022m brought in for AMS, lethargy, ?slurred speech here febrile to 105.5, hypotensive, tachycardic, tachypneic and hypoxic   WBC: 1.8, ANC: 970  lactate: 6  s/p code stroke but CT negative, s/p LP WBC: 1, neutrophil: 0, glucose: 93, protein 33, negative PCR and gram stain  blood cx: E-coli  chest CT: Posterior right upper lobe and right basilar consolidation and additional right middle lobe and left basilar patchy opacities; findings compatible with aspiration or infection.    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  * f/u the repeat blood cx cleared 12/2 and sputum cx negative  * switched to ceftriaxone as pt is not neutropenic anymore, will complete a 10 day course post negative blood cx on 12/11but when pt is ready for discharge, will switch to PO  * monitor CBC/diff and renal function    The above assessment and plan was discussed with ALISON Lovell MD  contact on teams  After 5pm and on weekends call [phone].

## 2022-12-06 NOTE — PROGRESS NOTE ADULT - SUBJECTIVE AND OBJECTIVE BOX
Significant recent/past 24 hr events:    Subjective:    Review of Systems         [ ] A ten-point review of systems was otherwise negative except as noted.  [ ] Due to altered mental status/intubation, subjective information were not able to be obtained from the patient. History was obtained, to the extent possible, from review of the chart and collateral sources of information.      Patient is a 62y old  Female who presents with a chief complaint of slurred speech (05 Dec 2022 12:55)    HPI:  Patient is a 62 yoF w/ pmhx of MM (diagnosed ~10 years ago, currently on Promacta treatments since June 2022 - most recently last Wednesday; followed by oncologist, Dr. Lopez at [facility]), HTN, neuropathic R hip pain, recent R corneal tear, presents to [facility] ED for change in mental status. LKW approximately 14:30. Patient was in normal state of health this morning as per son, Nacho. Patient went to a follow up ophtho appointment for a recently diagnosed corneal abrasion this past Monday. Patient was picked up by a friend around 10:00 for an optho appt with normal mental status, pt was just complaining of being sleepy. On the way back from appointment around 14:30, patient stopped responding to friend appropriately in the car and she appeared more tired & weaker than usual. She started having slurred speech when they arrived home and then son called EMS. In the ED, patient with persisting word finding difficulties and generalized weakness, but leaning to the R side. Patient reports some difficulty remembering what happened today, but reports she feels generally weak and very tired. Code stroke called, but TPA not given because of imporvement in pt's symptoms. Son reports patient's speech is close to baseline now. Patient then denied chest pain, SOB, HA, changes in vision, N/V, imbalance, numbness or tingling. Denies recent infection or sick contacts. Pt states that she had a similar episode a few years ago with fever which she was told was likely from a MM, she improved after getting steroids.     In the ED pt was febrile 105.5, , /84, RR 24 and saturating well on RA. Pt given 2L IVF bolus and 1x vanc/ceftriaxone/acyclovir/ampicillin (30 Nov 2022 02:42)    PAST MEDICAL & SURGICAL HISTORY:  Multiple myeloma      Type 2 diabetes mellitus        FAMILY HISTORY:  No pertinent family history in first degree relatives        Vitals   ICU Vital Signs Last 24 Hrs  T(C): 37.1 (06 Dec 2022 04:00), Max: 38 (06 Dec 2022 00:00)  T(F): 98.8 (06 Dec 2022 04:00), Max: 100.4 (06 Dec 2022 00:00)  HR: 88 (06 Dec 2022 06:00) (82 - 96)  BP: --  BP(mean): --  ABP: 135/64 (06 Dec 2022 06:00) (105/49 - 137/67)  ABP(mean): 93 (06 Dec 2022 06:00) (70 - 93)  RR: 27 (06 Dec 2022 06:00) (18 - 35)  SpO2: 93% (06 Dec 2022 06:00) (93% - 97%)    O2 Parameters below as of 06 Dec 2022 06:00  Patient On (Oxygen Delivery Method): nasal cannula w/ humidification  O2 Flow (L/min): 3          Physical Exam:   Constitutional: NAD, well-groomed, well-developed  HEENT: PERRLA, EOMI, no drainage or redness  Neck: supple,  No JVD, Trachea midline  Back: Normal spine flexure, No CVA tenderness, No deformity or limitation of movement  Respiratory: Breath Sounds equal & clear bilaterally to auscultation, no accessory muscle use noted  Cardiovascular: Regular rate, regular rhythm, normal S1, S2; no murmurs or rub  Gastrointestinal: Soft, non-tender, non distended, no hepatosplenomegaly, normal bowel sounds  Extremities: WAKEFIELD x 4, no peripheral edema, no cyanosis, no clubbing   Vascular: Equal and normal pulses: 2+ peripheral pulses throughout  Neurological: A+O x 3; speech clear and intact; no sensory, motor  deficits, normal reflexes  Psychiatric: calm, normal mood, normal affect  Musculoskeletal: No joint swelling or deformity; no limitation of movement  Skin: warm, dry, well perfused, no rashes    VENT SETTINGS     ABG - ( 06 Dec 2022 00:32 )  pH, Arterial: 7.46  pH, Blood: x     /  pCO2: 33    /  pO2: 86    / HCO3: 24    / Base Excess: -0.2  /  SaO2: 97.3                I&O's Detail    04 Dec 2022 07:01  -  05 Dec 2022 07:00  --------------------------------------------------------  IN:    dextrose 5%: 450 mL    dextrose 5%: 400 mL    IV PiggyBack: 500 mL  Total IN: 1350 mL    OUT:    Indwelling Catheter - Urethral (mL): 4410 mL  Total OUT: 4410 mL    Total NET: -3060 mL      05 Dec 2022 07:01  -  06 Dec 2022 06:26  --------------------------------------------------------  IN:    dextrose 5%: 240 mL    dextrose 5%: 1100 mL    IV PiggyBack: 600 mL  Total IN: 1940 mL    OUT:    Indwelling Catheter - Urethral (mL): 1715 mL  Total OUT: 1715 mL    Total NET: 225 mL          LABS                        7.0    4.26  )-----------( 22       ( 06 Dec 2022 00:32 )             21.7     12-06    149<H>  |  117<H>  |  38<H>  ----------------------------<  213<H>  3.5   |  21<L>  |  0.91    Ca    9.3      06 Dec 2022 00:32  Phos  2.7     12-06  Mg     1.90     12-06    TPro  5.5<L>  /  Alb  2.4<L>  /  TBili  0.7  /  DBili  x   /  AST  125<H>  /  ALT  73<H>  /  AlkPhos  168<H>  12-06    LIVER FUNCTIONS - ( 06 Dec 2022 00:32 )  Alb: 2.4 g/dL / Pro: 5.5 g/dL / ALK PHOS: 168 U/L / ALT: 73 U/L / AST: 125 U/L / GGT: x           PT/INR - ( 06 Dec 2022 00:32 )   PT: 14.2 sec;   INR: 1.22 ratio           CARDIAC MARKERS ( 06 Dec 2022 00:32 )   U/L / CKMBx     / Troponin Tx     /          POCT Blood Glucose.: 247 mg/dL *H* (12-05-22 @ 22:00)  POCT Blood Glucose.: 197 mg/dL *H* (12-05-22 @ 17:39)  POCT Blood Glucose.: 211 mg/dL *H* (12-05-22 @ 11:28)        MEDICATIONS  (STANDING):  artificial tears (preservative free) Ophthalmic Solution 1 Drop(s) Both EYES two times a day  cefTRIAXone   IVPB 2000 milliGRAM(s) IV Intermittent every 24 hours  dextrose 5%. 1000 milliLiter(s) (50 mL/Hr) IV Continuous <Continuous>  dextrose 5%. 1000 milliLiter(s) (100 mL/Hr) IV Continuous <Continuous>  dextrose 5%. 1000 milliLiter(s) (100 mL/Hr) IV Continuous <Continuous>  dextrose 50% Injectable 25 Gram(s) IV Push once  dextrose 50% Injectable 12.5 Gram(s) IV Push once  dextrose 50% Injectable 25 Gram(s) IV Push once  filgrastim-sndz (ZARXIO) Injectable 480 MICROGram(s) SubCutaneous daily  glucagon  Injectable 1 milliGRAM(s) IntraMuscular once  insulin lispro (ADMELOG) corrective regimen sliding scale   SubCutaneous three times a day before meals  insulin lispro (ADMELOG) corrective regimen sliding scale   SubCutaneous at bedtime  metoprolol tartrate 12.5 milliGRAM(s) Oral two times a day  midodrine 10 milliGRAM(s) Oral every 8 hours  multivitamin 1 Tablet(s) Oral daily  polyethylene glycol 3350 17 Gram(s) Oral daily  potassium chloride  10 mEq/100 mL IVPB 10 milliEquivalent(s) IV Intermittent every 1 hour  senna Syrup 10 milliLiter(s) Oral daily  tenofovir alafenamide (VEMLIDY) 25 milliGRAM(s) Oral daily    MEDICATIONS  (PRN):  dextrose Oral Gel 15 Gram(s) Oral once PRN Blood Glucose LESS THAN 70 milliGRAM(s)/deciliter  melatonin 6 milliGRAM(s) Oral at bedtime PRN Insomnia      Allergies:  Bactrim (Other)  fluconazole (Vomiting; Nausea)  levofloxacin (Vomiting; Nausea)  penicillin (Other)        CRITICAL CARE TIME SPENT:  minutes of critical care time spent providing medical care for patient's acute illness/conditions that impairs at least one vital organ system and/or poses a high risk of imminent or life threatening deterioration in the patient's condition. It includes time spent evaluating and treating the patient's acute illness as well as time spent reviewing labs, radiology, discussing goals of care with patient and/or patient's family, and discussing the case with a multidisciplinary team, in an effort to prevent further life threatening deterioration or end organ damage. This time is independent of any procedures performed.         Significant recent/past 24 hr events:     Subjective:    Review of Systems         [ ] A ten-point review of systems was otherwise negative except as noted.  [ ] Due to altered mental status/intubation, subjective information were not able to be obtained from the patient. History was obtained, to the extent possible, from review of the chart and collateral sources of information.      Patient is a 62y old  Female who presents with a chief complaint of slurred speech (05 Dec 2022 12:55)    HPI:  Patient is a 62 yoF w/ pmhx of MM (diagnosed ~10 years ago, currently on Promacta treatments since June 2022 - most recently last Wednesday; followed by oncologist, Dr. Lopez at [facility]), HTN, neuropathic R hip pain, recent R corneal tear, presents to [facility] ED for change in mental status. LKW approximately 14:30. Patient was in normal state of health this morning as per son, Nacho. Patient went to a follow up ophtho appointment for a recently diagnosed corneal abrasion this past Monday. Patient was picked up by a friend around 10:00 for an optho appt with normal mental status, pt was just complaining of being sleepy. On the way back from appointment around 14:30, patient stopped responding to friend appropriately in the car and she appeared more tired & weaker than usual. She started having slurred speech when they arrived home and then son called EMS. In the ED, patient with persisting word finding difficulties and generalized weakness, but leaning to the R side. Patient reports some difficulty remembering what happened today, but reports she feels generally weak and very tired. Code stroke called, but TPA not given because of imporvement in pt's symptoms. Son reports patient's speech is close to baseline now. Patient then denied chest pain, SOB, HA, changes in vision, N/V, imbalance, numbness or tingling. Denies recent infection or sick contacts. Pt states that she had a similar episode a few years ago with fever which she was told was likely from a MM, she improved after getting steroids.     In the ED pt was febrile 105.5, , /84, RR 24 and saturating well on RA. Pt given 2L IVF bolus and 1x vanc/ceftriaxone/acyclovir/ampicillin (30 Nov 2022 02:42)    PAST MEDICAL & SURGICAL HISTORY:  Multiple myeloma      Type 2 diabetes mellitus        FAMILY HISTORY:  No pertinent family history in first degree relatives        Vitals   ICU Vital Signs Last 24 Hrs  T(C): 37.1 (06 Dec 2022 04:00), Max: 38 (06 Dec 2022 00:00)  T(F): 98.8 (06 Dec 2022 04:00), Max: 100.4 (06 Dec 2022 00:00)  HR: 88 (06 Dec 2022 06:00) (82 - 96)  BP: --  BP(mean): --  ABP: 135/64 (06 Dec 2022 06:00) (105/49 - 137/67)  ABP(mean): 93 (06 Dec 2022 06:00) (70 - 93)  RR: 27 (06 Dec 2022 06:00) (18 - 35)  SpO2: 93% (06 Dec 2022 06:00) (93% - 97%)    O2 Parameters below as of 06 Dec 2022 06:00  Patient On (Oxygen Delivery Method): nasal cannula w/ humidification  O2 Flow (L/min): 3          Physical Exam:   Constitutional: NAD, well-groomed, well-developed  HEENT: PERRLA, EOMI, no drainage or redness  Neck: supple,  No JVD, Trachea midline  Back: Normal spine flexure, No CVA tenderness, No deformity or limitation of movement  Respiratory: Breath Sounds equal & clear bilaterally to auscultation, no accessory muscle use noted  Cardiovascular: Regular rate, regular rhythm, normal S1, S2; no murmurs or rub  Gastrointestinal: Soft, non-tender, non distended, no hepatosplenomegaly, normal bowel sounds  Extremities: WAKEFIELD x 4, no peripheral edema, no cyanosis, no clubbing   Vascular: Equal and normal pulses: 2+ peripheral pulses throughout  Neurological: A+O x 3; speech clear and intact; no sensory, motor  deficits, normal reflexes  Psychiatric: calm, normal mood, normal affect  Musculoskeletal: No joint swelling or deformity; no limitation of movement  Skin: warm, dry, well perfused, no rashes    VENT SETTINGS     ABG - ( 06 Dec 2022 00:32 )  pH, Arterial: 7.46  pH, Blood: x     /  pCO2: 33    /  pO2: 86    / HCO3: 24    / Base Excess: -0.2  /  SaO2: 97.3                I&O's Detail    04 Dec 2022 07:01  -  05 Dec 2022 07:00  --------------------------------------------------------  IN:    dextrose 5%: 450 mL    dextrose 5%: 400 mL    IV PiggyBack: 500 mL  Total IN: 1350 mL    OUT:    Indwelling Catheter - Urethral (mL): 4410 mL  Total OUT: 4410 mL    Total NET: -3060 mL      05 Dec 2022 07:01  -  06 Dec 2022 06:26  --------------------------------------------------------  IN:    dextrose 5%: 240 mL    dextrose 5%: 1100 mL    IV PiggyBack: 600 mL  Total IN: 1940 mL    OUT:    Indwelling Catheter - Urethral (mL): 1715 mL  Total OUT: 1715 mL    Total NET: 225 mL          LABS                        7.0    4.26  )-----------( 22       ( 06 Dec 2022 00:32 )             21.7     12-06    149<H>  |  117<H>  |  38<H>  ----------------------------<  213<H>  3.5   |  21<L>  |  0.91    Ca    9.3      06 Dec 2022 00:32  Phos  2.7     12-06  Mg     1.90     12-06    TPro  5.5<L>  /  Alb  2.4<L>  /  TBili  0.7  /  DBili  x   /  AST  125<H>  /  ALT  73<H>  /  AlkPhos  168<H>  12-06    LIVER FUNCTIONS - ( 06 Dec 2022 00:32 )  Alb: 2.4 g/dL / Pro: 5.5 g/dL / ALK PHOS: 168 U/L / ALT: 73 U/L / AST: 125 U/L / GGT: x           PT/INR - ( 06 Dec 2022 00:32 )   PT: 14.2 sec;   INR: 1.22 ratio           CARDIAC MARKERS ( 06 Dec 2022 00:32 )   U/L / CKMBx     / Troponin Tx     /          POCT Blood Glucose.: 247 mg/dL *H* (12-05-22 @ 22:00)  POCT Blood Glucose.: 197 mg/dL *H* (12-05-22 @ 17:39)  POCT Blood Glucose.: 211 mg/dL *H* (12-05-22 @ 11:28)        MEDICATIONS  (STANDING):  artificial tears (preservative free) Ophthalmic Solution 1 Drop(s) Both EYES two times a day  cefTRIAXone   IVPB 2000 milliGRAM(s) IV Intermittent every 24 hours  dextrose 5%. 1000 milliLiter(s) (50 mL/Hr) IV Continuous <Continuous>  dextrose 5%. 1000 milliLiter(s) (100 mL/Hr) IV Continuous <Continuous>  dextrose 5%. 1000 milliLiter(s) (100 mL/Hr) IV Continuous <Continuous>  dextrose 50% Injectable 25 Gram(s) IV Push once  dextrose 50% Injectable 12.5 Gram(s) IV Push once  dextrose 50% Injectable 25 Gram(s) IV Push once  filgrastim-sndz (ZARXIO) Injectable 480 MICROGram(s) SubCutaneous daily  glucagon  Injectable 1 milliGRAM(s) IntraMuscular once  insulin lispro (ADMELOG) corrective regimen sliding scale   SubCutaneous three times a day before meals  insulin lispro (ADMELOG) corrective regimen sliding scale   SubCutaneous at bedtime  metoprolol tartrate 12.5 milliGRAM(s) Oral two times a day  midodrine 10 milliGRAM(s) Oral every 8 hours  multivitamin 1 Tablet(s) Oral daily  polyethylene glycol 3350 17 Gram(s) Oral daily  potassium chloride  10 mEq/100 mL IVPB 10 milliEquivalent(s) IV Intermittent every 1 hour  senna Syrup 10 milliLiter(s) Oral daily  tenofovir alafenamide (VEMLIDY) 25 milliGRAM(s) Oral daily    MEDICATIONS  (PRN):  dextrose Oral Gel 15 Gram(s) Oral once PRN Blood Glucose LESS THAN 70 milliGRAM(s)/deciliter  melatonin 6 milliGRAM(s) Oral at bedtime PRN Insomnia      Allergies:  Bactrim (Other)  fluconazole (Vomiting; Nausea)  levofloxacin (Vomiting; Nausea)  penicillin (Other)        CRITICAL CARE TIME SPENT:  minutes of critical care time spent providing medical care for patient's acute illness/conditions that impairs at least one vital organ system and/or poses a high risk of imminent or life threatening deterioration in the patient's condition. It includes time spent evaluating and treating the patient's acute illness as well as time spent reviewing labs, radiology, discussing goals of care with patient and/or patient's family, and discussing the case with a multidisciplinary team, in an effort to prevent further life threatening deterioration or end organ damage. This time is independent of any procedures performed.         Significant recent/past 24 hr events: T max 100.4, FS in 200's, hypernatremia improving     Subjective: pt c/o pain in the arms with movements     Review of Systems         [ ] A ten-point review of systems was otherwise negative except as noted.  [ ] Due to altered mental status/intubation, subjective information were not able to be obtained from the patient. History was obtained, to the extent possible, from review of the chart and collateral sources of information.      Patient is a 62y old  Female who presents with a chief complaint of slurred speech (05 Dec 2022 12:55)    HPI:  Patient is a 62 yoF w/ pmhx of MM (diagnosed ~10 years ago, currently on Promacta treatments since June 2022 - most recently last Wednesday; followed by oncologist, Dr. Lopez at [facility]), HTN, neuropathic R hip pain, recent R corneal tear, presents [facility] ED for change in mental status. LKW approximately 14:30. Patient was in normal state of health this morning as per son, Nacho. Patient went to a follow up ophtho appointment for a recently diagnosed corneal abrasion this past Monday. Patient was picked up by a friend around 10:00 for an optho appt with normal mental status, pt was just complaining of being sleepy. On the way back from appointment around 14:30, patient stopped responding to friend appropriately in the car and she appeared more tired & weaker than usual. She started having slurred speech when they arrived home and then son called EMS. In the ED, patient with persisting word finding difficulties and generalized weakness, but leaning to the R side. Patient reports some difficulty remembering what happened today, but reports she feels generally weak and very tired. Code stroke called, but TPA not given because of imporvement in pt's symptoms. Son reports patient's speech is close to baseline now. Patient then denied chest pain, SOB, HA, changes in vision, N/V, imbalance, numbness or tingling. Denies recent infection or sick contacts. Pt states that she had a similar episode a few years ago with fever which she was told was likely from a MM, she improved after getting steroids.     In the ED pt was febrile 105.5, , /84, RR 24 and saturating well on RA. Pt given 2L IVF bolus and 1x vanc/ceftriaxone/acyclovir/ampicillin (30 Nov 2022 02:42)    PAST MEDICAL & SURGICAL HISTORY:  Multiple myeloma      Type 2 diabetes mellitus        FAMILY HISTORY:  No pertinent family history in first degree relatives        Vitals   ICU Vital Signs Last 24 Hrs  T(C): 37.1 (06 Dec 2022 04:00), Max: 38 (06 Dec 2022 00:00)  T(F): 98.8 (06 Dec 2022 04:00), Max: 100.4 (06 Dec 2022 00:00)  HR: 88 (06 Dec 2022 06:00) (82 - 96)  BP: --  BP(mean): --  ABP: 135/64 (06 Dec 2022 06:00) (105/49 - 137/67)  ABP(mean): 93 (06 Dec 2022 06:00) (70 - 93)  RR: 27 (06 Dec 2022 06:00) (18 - 35)  SpO2: 93% (06 Dec 2022 06:00) (93% - 97%)    O2 Parameters below as of 06 Dec 2022 06:00  Patient On (Oxygen Delivery Method): nasal cannula w/ humidification  O2 Flow (L/min): 3          Physical Exam:   Constitutional: NAD, well-groomed, well-developed  HEENT: PERRLA, EOMI, no drainage or redness  Neck: supple,  No JVD, Trachea midline  Back: Normal spine flexure, No CVA tenderness, No deformity or limitation of movement  Respiratory: Breath Sounds diminished bilaterally to auscultation, no accessory muscle use noted  Cardiovascular: Regular rate, regular rhythm, normal S1, S2; no murmurs or rub  Gastrointestinal: Soft, non-tender, non distended, no hepatosplenomegaly, normal bowel sounds  Extremities: pain with ROM in the UE's, WAKEFIELD x 4, no peripheral edema, no cyanosis, no clubbing   Vascular: Equal and normal pulses: 2+ peripheral pulses throughout  Neurological:  A+O x 3; speech clear and intact; no sensory, motor  deficits, normal reflexes  -- motor strength -- LUE-- 3/5, and RUE-- 4/5   Psychiatric: calm, normal mood, normal affect  Musculoskeletal: No joint swelling or deformity; no limitation of movement  Skin: warm, dry, well perfused, no rashes    VENT SETTINGS     ABG - ( 06 Dec 2022 00:32 )  pH, Arterial: 7.46  pH, Blood: x     /  pCO2: 33    /  pO2: 86    / HCO3: 24    / Base Excess: -0.2  /  SaO2: 97.3                I&O's Detail    04 Dec 2022 07:01  -  05 Dec 2022 07:00  --------------------------------------------------------  IN:    dextrose 5%: 450 mL    dextrose 5%: 400 mL    IV PiggyBack: 500 mL  Total IN: 1350 mL    OUT:    Indwelling Catheter - Urethral (mL): 4410 mL  Total OUT: 4410 mL    Total NET: -3060 mL      05 Dec 2022 07:01  -  06 Dec 2022 06:26  --------------------------------------------------------  IN:    dextrose 5%: 240 mL    dextrose 5%: 1100 mL    IV PiggyBack: 600 mL  Total IN: 1940 mL    OUT:    Indwelling Catheter - Urethral (mL): 1715 mL  Total OUT: 1715 mL    Total NET: 225 mL          LABS                        7.0    4.26  )-----------( 22       ( 06 Dec 2022 00:32 )             21.7     12-06    149<H>  |  117<H>  |  38<H>  ----------------------------<  213<H>  3.5   |  21<L>  |  0.91    Ca    9.3      06 Dec 2022 00:32  Phos  2.7     12-06  Mg     1.90     12-06    TPro  5.5<L>  /  Alb  2.4<L>  /  TBili  0.7  /  DBili  x   /  AST  125<H>  /  ALT  73<H>  /  AlkPhos  168<H>  12-06    LIVER FUNCTIONS - ( 06 Dec 2022 00:32 )  Alb: 2.4 g/dL / Pro: 5.5 g/dL / ALK PHOS: 168 U/L / ALT: 73 U/L / AST: 125 U/L / GGT: x           PT/INR - ( 06 Dec 2022 00:32 )   PT: 14.2 sec;   INR: 1.22 ratio           CARDIAC MARKERS ( 06 Dec 2022 00:32 )   U/L / CKMBx     / Troponin Tx     /          POCT Blood Glucose.: 247 mg/dL *H* (12-05-22 @ 22:00)  POCT Blood Glucose.: 197 mg/dL *H* (12-05-22 @ 17:39)  POCT Blood Glucose.: 211 mg/dL *H* (12-05-22 @ 11:28)        MEDICATIONS  (STANDING):  artificial tears (preservative free) Ophthalmic Solution 1 Drop(s) Both EYES two times a day  cefTRIAXone   IVPB 2000 milliGRAM(s) IV Intermittent every 24 hours  dextrose 5%. 1000 milliLiter(s) (50 mL/Hr) IV Continuous <Continuous>  dextrose 5%. 1000 milliLiter(s) (100 mL/Hr) IV Continuous <Continuous>  dextrose 5%. 1000 milliLiter(s) (100 mL/Hr) IV Continuous <Continuous>  dextrose 50% Injectable 25 Gram(s) IV Push once  dextrose 50% Injectable 12.5 Gram(s) IV Push once  dextrose 50% Injectable 25 Gram(s) IV Push once  filgrastim-sndz (ZARXIO) Injectable 480 MICROGram(s) SubCutaneous daily  glucagon  Injectable 1 milliGRAM(s) IntraMuscular once  insulin lispro (ADMELOG) corrective regimen sliding scale   SubCutaneous three times a day before meals  insulin lispro (ADMELOG) corrective regimen sliding scale   SubCutaneous at bedtime  metoprolol tartrate 12.5 milliGRAM(s) Oral two times a day  midodrine 10 milliGRAM(s) Oral every 8 hours  multivitamin 1 Tablet(s) Oral daily  polyethylene glycol 3350 17 Gram(s) Oral daily  potassium chloride  10 mEq/100 mL IVPB 10 milliEquivalent(s) IV Intermittent every 1 hour  senna Syrup 10 milliLiter(s) Oral daily  tenofovir alafenamide (VEMLIDY) 25 milliGRAM(s) Oral daily    MEDICATIONS  (PRN):  dextrose Oral Gel 15 Gram(s) Oral once PRN Blood Glucose LESS THAN 70 milliGRAM(s)/deciliter  melatonin 6 milliGRAM(s) Oral at bedtime PRN Insomnia      Allergies:  Bactrim (Other)  fluconazole (Vomiting; Nausea)  levofloxacin (Vomiting; Nausea)  penicillin (Other)        CRITICAL CARE TIME SPENT: 40 minutes of critical care time spent providing medical care for patient's acute illness/conditions that impairs at least one vital organ system and/or poses a high risk of imminent or life threatening deterioration in the patient's condition. It includes time spent evaluating and treating the patient's acute illness as well as time spent reviewing labs, radiology, discussing goals of care with patient and/or patient's family, and discussing the case with a multidisciplinary team, in an effort to prevent further life threatening deterioration or end organ damage. This time is independent of any procedures performed.

## 2022-12-06 NOTE — OCCUPATIONAL THERAPY INITIAL EVALUATION ADULT - RANGE OF MOTION EXAMINATION, UPPER EXTREMITY
limited active ROM throughout BUE./bilateral UE Active Assistive ROM was WFL  (within functional limits)

## 2022-12-06 NOTE — PROGRESS NOTE ADULT - SUBJECTIVE AND OBJECTIVE BOX
Follow Up:  septic shock, E-coli bacteremia, pneumonia    Interval History/ROS: pt had low grade fever but  not neutropenic anymore, repeat blood cx negative, no , SOB, diarrhea        Allergies  Bactrim (Other)  fluconazole (Vomiting; Nausea)  levofloxacin (Vomiting; Nausea)  penicillin (Other)        ANTIMICROBIALS:  cefTRIAXone   IVPB 2000 every 24 hours  tenofovir alafenamide (VEMLIDY) 25 daily      OTHER MEDS:  artificial tears (preservative free) Ophthalmic Solution 1 Drop(s) Both EYES two times a day  chlorhexidine 2% Cloths 1 Application(s) Topical daily  dextrose 5%. 1000 milliLiter(s) IV Continuous <Continuous>  dextrose 5%. 1000 milliLiter(s) IV Continuous <Continuous>  dextrose 5%. 1000 milliLiter(s) IV Continuous <Continuous>  dextrose 50% Injectable 25 Gram(s) IV Push once  dextrose 50% Injectable 12.5 Gram(s) IV Push once  dextrose 50% Injectable 25 Gram(s) IV Push once  dextrose Oral Gel 15 Gram(s) Oral once PRN  eltrombopag 50 milliGRAM(s) Oral daily  filgrastim-sndz (ZARXIO) Injectable 480 MICROGram(s) SubCutaneous daily  glucagon  Injectable 1 milliGRAM(s) IntraMuscular once  insulin glargine Injectable (LANTUS) 6 Unit(s) SubCutaneous every morning  insulin lispro (ADMELOG) corrective regimen sliding scale   SubCutaneous three times a day before meals  insulin lispro (ADMELOG) corrective regimen sliding scale   SubCutaneous at bedtime  melatonin 6 milliGRAM(s) Oral at bedtime PRN  metoprolol tartrate 12.5 milliGRAM(s) Oral two times a day  midodrine 10 milliGRAM(s) Oral every 8 hours  multivitamin 1 Tablet(s) Oral daily  polyethylene glycol 3350 17 Gram(s) Oral daily  senna Syrup 10 milliLiter(s) Oral daily      Vital Signs Last 24 Hrs  T(C): 37.1 (06 Dec 2022 12:00), Max: 38 (06 Dec 2022 00:00)  T(F): 98.7 (06 Dec 2022 12:00), Max: 100.4 (06 Dec 2022 00:00)  HR: 93 (06 Dec 2022 14:00) (82 - 105)  BP: 109/53 (06 Dec 2022 14:00) (109/53 - 109/61)  BP(mean): 67 (06 Dec 2022 14:00) (67 - 71)  RR: 30 (06 Dec 2022 14:00) (18 - 35)  SpO2: 96% (06 Dec 2022 14:00) (93% - 96%)    Parameters below as of 06 Dec 2022 15:00  Patient On (Oxygen Delivery Method): nasal cannula w/ humidification  O2 Flow (L/min): 2      Physical Exam:  General:   NAD, non toxic  Cardio:   regular S1, S2  Respiratory:    comfortable on NC, no tachypnea  abd:     soft,   BS +  :     ortiz  Musculoskeletal:   no joint swelling  vascular: no phlebitis  Skin:    no rash                          7.0    4.26  )-----------( 22       ( 06 Dec 2022 00:32 )             21.7       12-06    149<H>  |  117<H>  |  38<H>  ----------------------------<  213<H>  3.5   |  21<L>  |  0.91    Ca    9.3      06 Dec 2022 00:32  Phos  2.7     12-06  Mg     1.90     12-06    TPro  5.5<L>  /  Alb  2.4<L>  /  TBili  0.7  /  DBili  x   /  AST  125<H>  /  ALT  73<H>  /  AlkPhos  168<H>  12-06          MICROBIOLOGY:  v  Catheterized Catheterized  12-02-22   No growth  --  --      .Blood Blood  12-02-22   No growth to date.  --  --      .Blood Blood  12-02-22   No growth to date.  --  --      ET Tube ET Tube  12-01-22   No growth at 48 hours  --    Moderate polymorphonuclear leukocytes per low power field  No Squamous epithelial cells per low power field  No organisms seen per oil power field      .CSF CSF  11-29-22   Culture is being performed.  --    No polymorphonuclear leukocytes seen  No organisms seen  by cytocentrifuge      .Blood Blood-Peripheral  11-29-22   Growth in aerobic and anaerobic bottles: Escherichia coli  ***Blood Panel PCR results on this specimen are available  approximately 3 hours after the Gram stain result.***  Gram stain, PCR, and/or culture results may not always  correspond due to difference in methodologies.  ************************************************************  This PCR assay was performed by multiplex PCR. This  Assay tests for 66 bacterial and resistance gene targets.  Please refer to the [facility] test directory  at [email or web address] for details.  --  Blood Culture PCR  Escherichia coli      Clean Catch Clean Catch (Midstream)  11-29-22   >=3 organisms. Probable collection contamination.  --  --          Rapid RVP Result: NotDetec (11-30 @ 09:12)  EBV PCR: NotDetec IU/mL (11-29 @ 23:00)  HSV 1/2 PCR: NotDetec (11-29 @ 23:00)        RADIOLOGY:  Images independently visualized and reviewed personally, findings as below  < from: Xray Cinesophagram Swallow Function w/ Contrast (12.06.22 @ 09:24) >  IMPRESSION:    There is evidence of penetration. No aspiration with the tested   consistencies.    For further information and recommendations, please refer to the speech   pathologist final report which is available for review in the electronic   medical record.    < end of copied text >  < from: US Abdomen Limited (12.05.22 @ 15:31) >  IMPRESSION:  Normal size spleen. A left pleural effusion.      < end of copied text >  < from: CT Angio Chest PE Protocol w/ IV Cont (11.30.22 @ 10:12) >  IMPRESSION:    No pulmonary embolism.    Posterior right upper lobe and right basilar consolidation and additional   right middle lobe and left basilar patchy opacities; findings compatible   with aspiration or infection.    < end of copied text >

## 2022-12-07 LAB
ALBUMIN SERPL ELPH-MCNC: 2.7 G/DL — LOW (ref 3.3–5)
ALBUMIN SERPL ELPH-MCNC: 2.8 G/DL — LOW (ref 3.3–5)
ALP SERPL-CCNC: 168 U/L — HIGH (ref 40–120)
ALP SERPL-CCNC: 189 U/L — HIGH (ref 40–120)
ALT FLD-CCNC: 56 U/L — HIGH (ref 4–33)
ALT FLD-CCNC: 59 U/L — HIGH (ref 4–33)
ANION GAP SERPL CALC-SCNC: 11 MMOL/L — SIGNIFICANT CHANGE UP (ref 7–14)
ANION GAP SERPL CALC-SCNC: 15 MMOL/L — HIGH (ref 7–14)
ANION GAP SERPL CALC-SCNC: 9 MMOL/L — SIGNIFICANT CHANGE UP (ref 7–14)
APPEARANCE UR: SIGNIFICANT CHANGE UP
AST SERPL-CCNC: 100 U/L — HIGH (ref 4–32)
AST SERPL-CCNC: 100 U/L — HIGH (ref 4–32)
BASE EXCESS BLDV CALC-SCNC: -1.4 MMOL/L — SIGNIFICANT CHANGE UP (ref -2–3)
BASE EXCESS BLDV CALC-SCNC: 0.2 MMOL/L — SIGNIFICANT CHANGE UP (ref -2–3)
BASOPHILS # BLD AUTO: 0.01 K/UL — SIGNIFICANT CHANGE UP (ref 0–0.2)
BASOPHILS # BLD AUTO: 0.03 K/UL — SIGNIFICANT CHANGE UP (ref 0–0.2)
BASOPHILS NFR BLD AUTO: 0.2 % — SIGNIFICANT CHANGE UP (ref 0–2)
BASOPHILS NFR BLD AUTO: 0.6 % — SIGNIFICANT CHANGE UP (ref 0–2)
BILIRUB SERPL-MCNC: 0.8 MG/DL — SIGNIFICANT CHANGE UP (ref 0.2–1.2)
BILIRUB SERPL-MCNC: 0.8 MG/DL — SIGNIFICANT CHANGE UP (ref 0.2–1.2)
BILIRUB UR-MCNC: NEGATIVE — SIGNIFICANT CHANGE UP
BLOOD GAS ARTERIAL COMPREHENSIVE RESULT: SIGNIFICANT CHANGE UP
BLOOD GAS ARTERIAL COMPREHENSIVE RESULT: SIGNIFICANT CHANGE UP
BLOOD GAS VENOUS COMPREHENSIVE RESULT: SIGNIFICANT CHANGE UP
BUN SERPL-MCNC: 31 MG/DL — HIGH (ref 7–23)
BUN SERPL-MCNC: 33 MG/DL — HIGH (ref 7–23)
BUN SERPL-MCNC: 35 MG/DL — HIGH (ref 7–23)
CALCIUM SERPL-MCNC: 9.5 MG/DL — SIGNIFICANT CHANGE UP (ref 8.4–10.5)
CALCIUM SERPL-MCNC: 9.5 MG/DL — SIGNIFICANT CHANGE UP (ref 8.4–10.5)
CALCIUM SERPL-MCNC: 9.8 MG/DL — SIGNIFICANT CHANGE UP (ref 8.4–10.5)
CHLORIDE BLDV-SCNC: 121 MMOL/L — HIGH (ref 96–108)
CHLORIDE BLDV-SCNC: 122 MMOL/L — HIGH (ref 96–108)
CHLORIDE SERPL-SCNC: 116 MMOL/L — HIGH (ref 98–107)
CHLORIDE SERPL-SCNC: 119 MMOL/L — HIGH (ref 98–107)
CHLORIDE SERPL-SCNC: 120 MMOL/L — HIGH (ref 98–107)
CK MB BLD-MCNC: 0.8 % — SIGNIFICANT CHANGE UP (ref 0–2.5)
CK MB BLD-MCNC: 1 % — SIGNIFICANT CHANGE UP (ref 0–2.5)
CK MB CFR SERPL CALC: 1.6 NG/ML — SIGNIFICANT CHANGE UP
CK MB CFR SERPL CALC: 2 NG/ML — SIGNIFICANT CHANGE UP
CK SERPL-CCNC: 199 U/L — HIGH (ref 25–170)
CK SERPL-CCNC: 203 U/L — HIGH (ref 25–170)
CK SERPL-CCNC: 217 U/L — HIGH (ref 25–170)
CO2 BLDV-SCNC: 26.2 MMOL/L — HIGH (ref 22–26)
CO2 BLDV-SCNC: 26.7 MMOL/L — HIGH (ref 22–26)
CO2 SERPL-SCNC: 22 MMOL/L — SIGNIFICANT CHANGE UP (ref 22–31)
CO2 SERPL-SCNC: 24 MMOL/L — SIGNIFICANT CHANGE UP (ref 22–31)
CO2 SERPL-SCNC: 25 MMOL/L — SIGNIFICANT CHANGE UP (ref 22–31)
COLOR SPEC: ABNORMAL
CREAT SERPL-MCNC: 0.77 MG/DL — SIGNIFICANT CHANGE UP (ref 0.5–1.3)
CREAT SERPL-MCNC: 0.82 MG/DL — SIGNIFICANT CHANGE UP (ref 0.5–1.3)
CREAT SERPL-MCNC: 0.94 MG/DL — SIGNIFICANT CHANGE UP (ref 0.5–1.3)
CULTURE RESULTS: SIGNIFICANT CHANGE UP
CULTURE RESULTS: SIGNIFICANT CHANGE UP
DIFF PNL FLD: ABNORMAL
EGFR: 69 ML/MIN/1.73M2 — SIGNIFICANT CHANGE UP
EGFR: 81 ML/MIN/1.73M2 — SIGNIFICANT CHANGE UP
EGFR: 87 ML/MIN/1.73M2 — SIGNIFICANT CHANGE UP
EOSINOPHIL # BLD AUTO: 0.01 K/UL — SIGNIFICANT CHANGE UP (ref 0–0.5)
EOSINOPHIL # BLD AUTO: 0.02 K/UL — SIGNIFICANT CHANGE UP (ref 0–0.5)
EOSINOPHIL NFR BLD AUTO: 0.2 % — SIGNIFICANT CHANGE UP (ref 0–6)
EOSINOPHIL NFR BLD AUTO: 0.4 % — SIGNIFICANT CHANGE UP (ref 0–6)
FERRITIN SERPL-MCNC: HIGH NG/ML (ref 15–150)
GAS PNL BLDV: 151 MMOL/L — HIGH (ref 136–145)
GAS PNL BLDV: 153 MMOL/L — HIGH (ref 136–145)
GLUCOSE BLDC GLUCOMTR-MCNC: 123 MG/DL — HIGH (ref 70–99)
GLUCOSE BLDC GLUCOMTR-MCNC: 157 MG/DL — HIGH (ref 70–99)
GLUCOSE BLDC GLUCOMTR-MCNC: 216 MG/DL — HIGH (ref 70–99)
GLUCOSE BLDC GLUCOMTR-MCNC: 254 MG/DL — HIGH (ref 70–99)
GLUCOSE BLDV-MCNC: 177 MG/DL — HIGH (ref 70–99)
GLUCOSE BLDV-MCNC: 184 MG/DL — HIGH (ref 70–99)
GLUCOSE SERPL-MCNC: 128 MG/DL — HIGH (ref 70–99)
GLUCOSE SERPL-MCNC: 194 MG/DL — HIGH (ref 70–99)
GLUCOSE SERPL-MCNC: 221 MG/DL — HIGH (ref 70–99)
GLUCOSE UR QL: NEGATIVE — SIGNIFICANT CHANGE UP
HAPTOGLOB SERPL-MCNC: 88 MG/DL — SIGNIFICANT CHANGE UP (ref 34–200)
HCO3 BLDV-SCNC: 25 MMOL/L — SIGNIFICANT CHANGE UP (ref 22–29)
HCO3 BLDV-SCNC: 25 MMOL/L — SIGNIFICANT CHANGE UP (ref 22–29)
HCT VFR BLD CALC: 23.8 % — LOW (ref 34.5–45)
HCT VFR BLD CALC: 26.7 % — LOW (ref 34.5–45)
HCT VFR BLDA CALC: 23 % — LOW (ref 34.5–46.5)
HCT VFR BLDA CALC: 23 % — LOW (ref 34.5–46.5)
HGB BLD CALC-MCNC: 7.5 G/DL — LOW (ref 11.5–15.5)
HGB BLD CALC-MCNC: 7.8 G/DL — LOW (ref 11.5–15.5)
HGB BLD-MCNC: 7.8 G/DL — LOW (ref 11.5–15.5)
HGB BLD-MCNC: 8.2 G/DL — LOW (ref 11.5–15.5)
IANC: 2.29 K/UL — SIGNIFICANT CHANGE UP (ref 1.8–7.4)
IANC: 2.43 K/UL — SIGNIFICANT CHANGE UP (ref 1.8–7.4)
IMM GRANULOCYTES NFR BLD AUTO: 17.8 % — HIGH (ref 0–0.9)
IMM GRANULOCYTES NFR BLD AUTO: 18.8 % — HIGH (ref 0–0.9)
INR BLD: 1.22 RATIO — HIGH (ref 0.88–1.16)
KETONES UR-MCNC: NEGATIVE — SIGNIFICANT CHANGE UP
LACTATE BLDV-MCNC: 1.3 MMOL/L — SIGNIFICANT CHANGE UP (ref 0.5–2)
LACTATE BLDV-MCNC: 1.4 MMOL/L — SIGNIFICANT CHANGE UP (ref 0.5–2)
LDH SERPL L TO P-CCNC: 2569 U/L — HIGH (ref 135–225)
LEUKOCYTE ESTERASE UR-ACNC: ABNORMAL
LYMPHOCYTES # BLD AUTO: 0.63 K/UL — LOW (ref 1–3.3)
LYMPHOCYTES # BLD AUTO: 1.47 K/UL — SIGNIFICANT CHANGE UP (ref 1–3.3)
LYMPHOCYTES # BLD AUTO: 15.4 % — SIGNIFICANT CHANGE UP (ref 13–44)
LYMPHOCYTES # BLD AUTO: 27 % — SIGNIFICANT CHANGE UP (ref 13–44)
MAGNESIUM SERPL-MCNC: 1.9 MG/DL — SIGNIFICANT CHANGE UP (ref 1.6–2.6)
MAGNESIUM SERPL-MCNC: 2.1 MG/DL — SIGNIFICANT CHANGE UP (ref 1.6–2.6)
MAGNESIUM SERPL-MCNC: 2.3 MG/DL — SIGNIFICANT CHANGE UP (ref 1.6–2.6)
MCHC RBC-ENTMCNC: 30.7 GM/DL — LOW (ref 32–36)
MCHC RBC-ENTMCNC: 32.8 GM/DL — SIGNIFICANT CHANGE UP (ref 32–36)
MCHC RBC-ENTMCNC: 33.3 PG — SIGNIFICANT CHANGE UP (ref 27–34)
MCHC RBC-ENTMCNC: 34.4 PG — HIGH (ref 27–34)
MCV RBC AUTO: 104.8 FL — HIGH (ref 80–100)
MCV RBC AUTO: 108.5 FL — HIGH (ref 80–100)
MONOCYTES # BLD AUTO: 0.42 K/UL — SIGNIFICANT CHANGE UP (ref 0–0.9)
MONOCYTES # BLD AUTO: 0.47 K/UL — SIGNIFICANT CHANGE UP (ref 0–0.9)
MONOCYTES NFR BLD AUTO: 10.3 % — SIGNIFICANT CHANGE UP (ref 2–14)
MONOCYTES NFR BLD AUTO: 8.6 % — SIGNIFICANT CHANGE UP (ref 2–14)
NEUTROPHILS # BLD AUTO: 2.29 K/UL — SIGNIFICANT CHANGE UP (ref 1.8–7.4)
NEUTROPHILS # BLD AUTO: 2.43 K/UL — SIGNIFICANT CHANGE UP (ref 1.8–7.4)
NEUTROPHILS NFR BLD AUTO: 44.6 % — SIGNIFICANT CHANGE UP (ref 43–77)
NEUTROPHILS NFR BLD AUTO: 56.1 % — SIGNIFICANT CHANGE UP (ref 43–77)
NITRITE UR-MCNC: NEGATIVE — SIGNIFICANT CHANGE UP
NRBC # BLD: 2 /100 WBCS — HIGH (ref 0–0)
NRBC # BLD: 3 /100 WBCS — HIGH (ref 0–0)
NRBC # FLD: 0.12 K/UL — HIGH (ref 0–0)
NRBC # FLD: 0.12 K/UL — HIGH (ref 0–0)
PCO2 BLDV: 43 MMHG — HIGH (ref 39–42)
PCO2 BLDV: 48 MMHG — HIGH (ref 39–42)
PH BLDV: 7.32 — SIGNIFICANT CHANGE UP (ref 7.32–7.43)
PH BLDV: 7.38 — SIGNIFICANT CHANGE UP (ref 7.32–7.43)
PH UR: 6 — SIGNIFICANT CHANGE UP (ref 5–8)
PHOSPHATE SERPL-MCNC: 1.9 MG/DL — LOW (ref 2.5–4.5)
PHOSPHATE SERPL-MCNC: 5.3 MG/DL — HIGH (ref 2.5–4.5)
PHOSPHATE SERPL-MCNC: 5.6 MG/DL — HIGH (ref 2.5–4.5)
PLATELET # BLD AUTO: 22 K/UL — LOW (ref 150–400)
PLATELET # BLD AUTO: 34 K/UL — LOW (ref 150–400)
PO2 BLDV: 64 MMHG — SIGNIFICANT CHANGE UP
PO2 BLDV: 72 MMHG — SIGNIFICANT CHANGE UP
POTASSIUM BLDV-SCNC: 3.5 MMOL/L — SIGNIFICANT CHANGE UP (ref 3.5–5.1)
POTASSIUM BLDV-SCNC: 3.6 MMOL/L — SIGNIFICANT CHANGE UP (ref 3.5–5.1)
POTASSIUM SERPL-MCNC: 3.5 MMOL/L — SIGNIFICANT CHANGE UP (ref 3.5–5.3)
POTASSIUM SERPL-MCNC: 4.2 MMOL/L — SIGNIFICANT CHANGE UP (ref 3.5–5.3)
POTASSIUM SERPL-MCNC: 4.4 MMOL/L — SIGNIFICANT CHANGE UP (ref 3.5–5.3)
POTASSIUM SERPL-SCNC: 3.5 MMOL/L — SIGNIFICANT CHANGE UP (ref 3.5–5.3)
POTASSIUM SERPL-SCNC: 4.2 MMOL/L — SIGNIFICANT CHANGE UP (ref 3.5–5.3)
POTASSIUM SERPL-SCNC: 4.4 MMOL/L — SIGNIFICANT CHANGE UP (ref 3.5–5.3)
PROT SERPL-MCNC: 5.7 G/DL — LOW (ref 6–8.3)
PROT SERPL-MCNC: 5.8 G/DL — LOW (ref 6–8.3)
PROT UR-MCNC: ABNORMAL
PROTHROM AB SERPL-ACNC: 14.2 SEC — HIGH (ref 10.5–13.4)
RBC # BLD: 2.27 M/UL — LOW (ref 3.8–5.2)
RBC # BLD: 2.46 M/UL — LOW (ref 3.8–5.2)
RBC # FLD: 16.7 % — HIGH (ref 10.3–14.5)
RBC # FLD: 17.4 % — HIGH (ref 10.3–14.5)
SAO2 % BLDV: 87.1 % — SIGNIFICANT CHANGE UP
SAO2 % BLDV: 93 % — SIGNIFICANT CHANGE UP
SODIUM SERPL-SCNC: 153 MMOL/L — HIGH (ref 135–145)
SODIUM SERPL-SCNC: 153 MMOL/L — HIGH (ref 135–145)
SODIUM SERPL-SCNC: 155 MMOL/L — HIGH (ref 135–145)
SP GR SPEC: 1.01 — SIGNIFICANT CHANGE UP (ref 1.01–1.05)
SPECIMEN SOURCE: SIGNIFICANT CHANGE UP
SPECIMEN SOURCE: SIGNIFICANT CHANGE UP
TRIGL SERPL-MCNC: 191 MG/DL — HIGH
TROPONIN T, HIGH SENSITIVITY RESULT: 100 NG/L — CRITICAL HIGH
TROPONIN T, HIGH SENSITIVITY RESULT: 102 NG/L — CRITICAL HIGH
UROBILINOGEN FLD QL: SIGNIFICANT CHANGE UP
WBC # BLD: 4.09 K/UL — SIGNIFICANT CHANGE UP (ref 3.8–10.5)
WBC # BLD: 5.44 K/UL — SIGNIFICANT CHANGE UP (ref 3.8–10.5)
WBC # FLD AUTO: 4.09 K/UL — SIGNIFICANT CHANGE UP (ref 3.8–10.5)
WBC # FLD AUTO: 5.44 K/UL — SIGNIFICANT CHANGE UP (ref 3.8–10.5)

## 2022-12-07 PROCEDURE — 36000 PLACE NEEDLE IN VEIN: CPT | Mod: 59

## 2022-12-07 PROCEDURE — 93010 ELECTROCARDIOGRAM REPORT: CPT

## 2022-12-07 PROCEDURE — 99291 CRITICAL CARE FIRST HOUR: CPT | Mod: 25

## 2022-12-07 PROCEDURE — 31500 INSERT EMERGENCY AIRWAY: CPT

## 2022-12-07 PROCEDURE — 93308 TTE F-UP OR LMTD: CPT | Mod: 26

## 2022-12-07 PROCEDURE — 99232 SBSQ HOSP IP/OBS MODERATE 35: CPT

## 2022-12-07 PROCEDURE — 76604 US EXAM CHEST: CPT | Mod: 26

## 2022-12-07 PROCEDURE — 93970 EXTREMITY STUDY: CPT | Mod: 26

## 2022-12-07 PROCEDURE — 76937 US GUIDE VASCULAR ACCESS: CPT | Mod: 26,59

## 2022-12-07 PROCEDURE — 71045 X-RAY EXAM CHEST 1 VIEW: CPT | Mod: 26

## 2022-12-07 PROCEDURE — 93010 ELECTROCARDIOGRAM REPORT: CPT | Mod: 77

## 2022-12-07 RX ORDER — ALBUTEROL 90 UG/1
2.5 AEROSOL, METERED ORAL ONCE
Refills: 0 | Status: COMPLETED | OUTPATIENT
Start: 2022-12-07 | End: 2022-12-07

## 2022-12-07 RX ORDER — MEROPENEM 1 G/30ML
1000 INJECTION INTRAVENOUS ONCE
Refills: 0 | Status: COMPLETED | OUTPATIENT
Start: 2022-12-07 | End: 2022-12-07

## 2022-12-07 RX ORDER — FUROSEMIDE 40 MG
20 TABLET ORAL ONCE
Refills: 0 | Status: COMPLETED | OUTPATIENT
Start: 2022-12-07 | End: 2022-12-08

## 2022-12-07 RX ORDER — PROPOFOL 10 MG/ML
20 INJECTION, EMULSION INTRAVENOUS
Qty: 1000 | Refills: 0 | Status: DISCONTINUED | OUTPATIENT
Start: 2022-12-07 | End: 2022-12-10

## 2022-12-07 RX ORDER — MIDAZOLAM HYDROCHLORIDE 1 MG/ML
4 INJECTION, SOLUTION INTRAMUSCULAR; INTRAVENOUS ONCE
Refills: 0 | Status: DISCONTINUED | OUTPATIENT
Start: 2022-12-07 | End: 2022-12-07

## 2022-12-07 RX ORDER — CHLORHEXIDINE GLUCONATE 213 G/1000ML
15 SOLUTION TOPICAL EVERY 12 HOURS
Refills: 0 | Status: DISCONTINUED | OUTPATIENT
Start: 2022-12-07 | End: 2022-12-10

## 2022-12-07 RX ORDER — VANCOMYCIN HCL 1 G
1250 VIAL (EA) INTRAVENOUS ONCE
Refills: 0 | Status: COMPLETED | OUTPATIENT
Start: 2022-12-07 | End: 2022-12-07

## 2022-12-07 RX ORDER — MEROPENEM 1 G/30ML
1000 INJECTION INTRAVENOUS EVERY 8 HOURS
Refills: 0 | Status: COMPLETED | OUTPATIENT
Start: 2022-12-07 | End: 2022-12-11

## 2022-12-07 RX ORDER — FUROSEMIDE 40 MG
20 TABLET ORAL ONCE
Refills: 0 | Status: COMPLETED | OUTPATIENT
Start: 2022-12-07 | End: 2022-12-07

## 2022-12-07 RX ORDER — VANCOMYCIN HCL 1 G
VIAL (EA) INTRAVENOUS
Refills: 0 | Status: DISCONTINUED | OUTPATIENT
Start: 2022-12-07 | End: 2022-12-09

## 2022-12-07 RX ORDER — IPRATROPIUM/ALBUTEROL SULFATE 18-103MCG
3 AEROSOL WITH ADAPTER (GRAM) INHALATION EVERY 6 HOURS
Refills: 0 | Status: DISCONTINUED | OUTPATIENT
Start: 2022-12-07 | End: 2022-12-13

## 2022-12-07 RX ORDER — POTASSIUM PHOSPHATE, MONOBASIC POTASSIUM PHOSPHATE, DIBASIC 236; 224 MG/ML; MG/ML
30 INJECTION, SOLUTION INTRAVENOUS ONCE
Refills: 0 | Status: COMPLETED | OUTPATIENT
Start: 2022-12-07 | End: 2022-12-07

## 2022-12-07 RX ORDER — ALBUTEROL 90 UG/1
2.5 AEROSOL, METERED ORAL EVERY 6 HOURS
Refills: 0 | Status: DISCONTINUED | OUTPATIENT
Start: 2022-12-07 | End: 2022-12-07

## 2022-12-07 RX ORDER — NOREPINEPHRINE BITARTRATE/D5W 8 MG/250ML
0.05 PLASTIC BAG, INJECTION (ML) INTRAVENOUS
Qty: 8 | Refills: 0 | Status: DISCONTINUED | OUTPATIENT
Start: 2022-12-07 | End: 2022-12-07

## 2022-12-07 RX ORDER — INSULIN LISPRO 100/ML
VIAL (ML) SUBCUTANEOUS EVERY 6 HOURS
Refills: 0 | Status: DISCONTINUED | OUTPATIENT
Start: 2022-12-07 | End: 2022-12-10

## 2022-12-07 RX ORDER — METOPROLOL TARTRATE 50 MG
5 TABLET ORAL ONCE
Refills: 0 | Status: COMPLETED | OUTPATIENT
Start: 2022-12-07 | End: 2022-12-07

## 2022-12-07 RX ORDER — MEROPENEM 1 G/30ML
INJECTION INTRAVENOUS
Refills: 0 | Status: COMPLETED | OUTPATIENT
Start: 2022-12-07 | End: 2022-12-11

## 2022-12-07 RX ORDER — DEXMEDETOMIDINE HYDROCHLORIDE IN 0.9% SODIUM CHLORIDE 4 UG/ML
0.2 INJECTION INTRAVENOUS
Qty: 400 | Refills: 0 | Status: DISCONTINUED | OUTPATIENT
Start: 2022-12-07 | End: 2022-12-07

## 2022-12-07 RX ORDER — NOREPINEPHRINE BITARTRATE/D5W 8 MG/250ML
0.05 PLASTIC BAG, INJECTION (ML) INTRAVENOUS
Qty: 8 | Refills: 0 | Status: DISCONTINUED | OUTPATIENT
Start: 2022-12-07 | End: 2022-12-10

## 2022-12-07 RX ORDER — VANCOMYCIN HCL 1 G
1250 VIAL (EA) INTRAVENOUS EVERY 12 HOURS
Refills: 0 | Status: DISCONTINUED | OUTPATIENT
Start: 2022-12-08 | End: 2022-12-09

## 2022-12-07 RX ORDER — MAGNESIUM SULFATE 500 MG/ML
2 VIAL (ML) INJECTION ONCE
Refills: 0 | Status: COMPLETED | OUTPATIENT
Start: 2022-12-07 | End: 2022-12-07

## 2022-12-07 RX ORDER — SODIUM CHLORIDE 9 MG/ML
1000 INJECTION, SOLUTION INTRAVENOUS
Refills: 0 | Status: DISCONTINUED | OUTPATIENT
Start: 2022-12-07 | End: 2022-12-07

## 2022-12-07 RX ADMIN — MIDAZOLAM HYDROCHLORIDE 4 MILLIGRAM(S): 1 INJECTION, SOLUTION INTRAMUSCULAR; INTRAVENOUS at 17:01

## 2022-12-07 RX ADMIN — CHLORHEXIDINE GLUCONATE 15 MILLILITER(S): 213 SOLUTION TOPICAL at 19:08

## 2022-12-07 RX ADMIN — Medication 8.16 MICROGRAM(S)/KG/MIN: at 17:02

## 2022-12-07 RX ADMIN — MIDODRINE HYDROCHLORIDE 10 MILLIGRAM(S): 2.5 TABLET ORAL at 22:44

## 2022-12-07 RX ADMIN — MEROPENEM 100 MILLIGRAM(S): 1 INJECTION INTRAVENOUS at 20:26

## 2022-12-07 RX ADMIN — Medication 20 MILLIGRAM(S): at 03:45

## 2022-12-07 RX ADMIN — PROPOFOL 10.4 MICROGRAM(S)/KG/MIN: 10 INJECTION, EMULSION INTRAVENOUS at 17:02

## 2022-12-07 RX ADMIN — PROPOFOL 10.4 MICROGRAM(S)/KG/MIN: 10 INJECTION, EMULSION INTRAVENOUS at 20:00

## 2022-12-07 RX ADMIN — Medication 40 MILLIGRAM(S): at 13:49

## 2022-12-07 RX ADMIN — Medication 166.67 MILLIGRAM(S): at 21:15

## 2022-12-07 RX ADMIN — Medication 3: at 23:19

## 2022-12-07 RX ADMIN — Medication 3 MILLILITER(S): at 13:47

## 2022-12-07 RX ADMIN — Medication 25 GRAM(S): at 09:02

## 2022-12-07 RX ADMIN — Medication 5 MILLIGRAM(S): at 15:15

## 2022-12-07 RX ADMIN — ALBUTEROL 2.5 MILLIGRAM(S): 90 AEROSOL, METERED ORAL at 03:10

## 2022-12-07 RX ADMIN — MEROPENEM 100 MILLIGRAM(S): 1 INJECTION INTRAVENOUS at 23:17

## 2022-12-07 RX ADMIN — Medication 3 MILLILITER(S): at 20:23

## 2022-12-07 RX ADMIN — Medication 2: at 19:07

## 2022-12-07 RX ADMIN — Medication 1 DROP(S): at 18:55

## 2022-12-07 RX ADMIN — POTASSIUM PHOSPHATE, MONOBASIC POTASSIUM PHOSPHATE, DIBASIC 83.33 MILLIMOLE(S): 236; 224 INJECTION, SOLUTION INTRAVENOUS at 06:49

## 2022-12-07 RX ADMIN — Medication 8.16 MICROGRAM(S)/KG/MIN: at 18:00

## 2022-12-07 RX ADMIN — Medication 6 MILLIGRAM(S): at 01:47

## 2022-12-07 RX ADMIN — ALBUTEROL 2.5 MILLIGRAM(S): 90 AEROSOL, METERED ORAL at 01:28

## 2022-12-07 RX ADMIN — DEXMEDETOMIDINE HYDROCHLORIDE IN 0.9% SODIUM CHLORIDE 4.35 MICROGRAM(S)/KG/HR: 4 INJECTION INTRAVENOUS at 04:22

## 2022-12-07 RX ADMIN — SODIUM CHLORIDE 100 MILLILITER(S): 9 INJECTION, SOLUTION INTRAVENOUS at 13:49

## 2022-12-07 RX ADMIN — CHLORHEXIDINE GLUCONATE 1 APPLICATION(S): 213 SOLUTION TOPICAL at 12:55

## 2022-12-07 RX ADMIN — Medication 1 DROP(S): at 06:49

## 2022-12-07 RX ADMIN — Medication 20 MILLIGRAM(S): at 13:49

## 2022-12-07 NOTE — PROGRESS NOTE ADULT - ASSESSMENT
62 f with HTN, MM (diagnosed ~10 years ago, currently on Promacta treatments since June 2022m brought in for AMS, lethargy, ?slurred speech here febrile to 105.5, hypotensive, tachycardic, tachypneic and hypoxic   WBC: 1.8, ANC: 970  lactate: 6  s/p code stroke but CT negative, s/p LP WBC: 1, neutrophil: 0, glucose: 93, protein 33, negative PCR and gram stain  blood cx: E-coli  chest CT: Posterior right upper lobe and right basilar consolidation and additional right middle lobe and left basilar patchy opacities; findings compatible with aspiration or infection.    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

## 2022-12-07 NOTE — PROGRESS NOTE ADULT - SUBJECTIVE AND OBJECTIVE BOX
Follow Up:  septic shock, E-coli bacteremia, pneumonia    Interval History/ROS: no more fevers but today after blood transfusion pt went to hypoxic resp failure with increased WOB and hypoxia, also has elevated troponin and EKG changes, labs s/o HLH as well, no diarrhea or dysuria      Allergies  Bactrim (Other)  fluconazole (Vomiting; Nausea)  levofloxacin (Vomiting; Nausea)  penicillin (Other)        ANTIMICROBIALS:  cefTRIAXone   IVPB 2000 every 24 hours  tenofovir alafenamide (VEMLIDY) 25 daily      OTHER MEDS:  albuterol/ipratropium for Nebulization 3 milliLiter(s) Nebulizer every 6 hours  artificial tears (preservative free) Ophthalmic Solution 1 Drop(s) Both EYES two times a day  chlorhexidine 2% Cloths 1 Application(s) Topical daily  dextrose 5%. 1000 milliLiter(s) IV Continuous <Continuous>  dextrose 5%. 1000 milliLiter(s) IV Continuous <Continuous>  dextrose 5%. 1000 milliLiter(s) IV Continuous <Continuous>  dextrose 5%. 1000 milliLiter(s) IV Continuous <Continuous>  dextrose 50% Injectable 25 Gram(s) IV Push once  dextrose 50% Injectable 12.5 Gram(s) IV Push once  dextrose 50% Injectable 25 Gram(s) IV Push once  dextrose Oral Gel 15 Gram(s) Oral once PRN  eltrombopag 50 milliGRAM(s) Oral daily  glucagon  Injectable 1 milliGRAM(s) IntraMuscular once  insulin lispro (ADMELOG) corrective regimen sliding scale   SubCutaneous every 6 hours  melatonin 6 milliGRAM(s) Oral at bedtime PRN  metoprolol tartrate 12.5 milliGRAM(s) Oral two times a day  midodrine 10 milliGRAM(s) Oral every 8 hours  multivitamin 1 Tablet(s) Oral daily  polyethylene glycol 3350 17 Gram(s) Oral daily  senna Syrup 10 milliLiter(s) Oral daily      Vital Signs Last 24 Hrs  T(C): 37.2 (07 Dec 2022 12:00), Max: 37.2 (07 Dec 2022 12:00)  T(F): 98.9 (07 Dec 2022 12:00), Max: 98.9 (07 Dec 2022 12:00)  HR: 120 (07 Dec 2022 14:00) (78 - 120)  BP: 142/68 (07 Dec 2022 14:00) (104/60 - 144/75)  BP(mean): 75 (07 Dec 2022 14:00) (62 - 96)  RR: 35 (07 Dec 2022 14:00) (18 - 35)  SpO2: 99% (07 Dec 2022 14:00) (93% - 100%)    Parameters below as of 07 Dec 2022 14:00  Patient On (Oxygen Delivery Method): nasal cannula, high flow    O2 Concentration (%): 40    Physical Exam:  General:   on BiPAP   Cardio:   tachy regular S1, S2  Respiratory: tachypneic on BiPAP  abd:     soft,   BS +, no tenderness  :     no suprapubic or CVA tenderness  Musculoskeletal:   no joint swelling  vascular: no phlebitis  Skin:    no rash                          8.2    5.44  )-----------( 34       ( 07 Dec 2022 12:20 )             26.7       12-07    155<H>  |  119<H>  |  31<H>  ----------------------------<  128<H>  4.2   |  25  |  0.82    Ca    9.5      07 Dec 2022 12:20  Phos  5.3     12-07  Mg     2.30     12-07    TPro  5.8<L>  /  Alb  2.7<L>  /  TBili  0.8  /  DBili  x   /  AST  100<H>  /  ALT  56<H>  /  AlkPhos  189<H>  12-07          MICROBIOLOGY:  v  Catheterized Catheterized  12-02-22   No growth  --  --      .Blood Blood  12-02-22   No Growth Final  --  --      .Blood Blood  12-02-22   No Growth Final  --  --      ET Tube ET Tube  12-01-22   No growth at 48 hours  --    Moderate polymorphonuclear leukocytes per low power field  No Squamous epithelial cells per low power field  No organisms seen per oil power field      .CSF CSF  11-29-22   Culture is being performed.  --    No polymorphonuclear leukocytes seen  No organisms seen  by cytocentrifuge      .Blood Blood-Peripheral  11-29-22   Growth in aerobic and anaerobic bottles: Escherichia coli  ***Blood Panel PCR results on this specimen are available  approximately 3 hours after the Gram stain result.***  Gram stain, PCR, and/or culture results may not always  correspond due to difference in methodologies.  ************************************************************  This PCR assay was performed by multiplex PCR. This  Assay tests for 66 bacterial and resistance gene targets.  Please refer to the [facility] test directory  at [email or web address] for details.  --  Blood Culture PCR  Escherichia coli      Clean Catch Clean Catch (Midstream)  11-29-22   >=3 organisms. Probable collection contamination.  --  --                RADIOLOGY:  Images independently visualized and reviewed personally, findings as below  < from: Xray Chest 1 View- PORTABLE-Urgent (Xray Chest 1 View- PORTABLE-Urgent .) (12.07.22 @ 03:16) >    IMPRESSION:    *  Interval improvement of right upper lobe opacity as compared to   12/4/2022.  *  Bilateral small pleural effusions.      < end of copied text >  < from: CT Angio Chest PE Protocol w/ IV Cont (11.30.22 @ 10:12) >    IMPRESSION:    No pulmonary embolism.    Posterior right upper lobe and right basilar consolidation and additional   right middle lobe and left basilar patchy opacities; findings compatible   with aspiration or infection.    < end of copied text >

## 2022-12-07 NOTE — CHART NOTE - NSCHARTNOTEFT_GEN_A_CORE
: Artur    INDICATION: Respiratory failure, Shock    PROCEDURE:  [X] LIMITED ECHO  [X] LIMITED CHEST  [ ] LIMITED RETROPERITONEAL  [ ] LIMITED ABDOMINAL  [ ] LIMITED DVT  [ ] NEEDLE GUIDANCE VASCULAR  [ ] NEEDLE GUIDANCE THORACENTESIS  [ ] NEEDLE GUIDANCE PARACENTESIS  [ ] NEEDLE GUIDANCE PERICARDIOCENTESIS  [ ] OTHER    FINDINGS: Focal B lines anterior R chest. Some increased scattered B lines anteriorly overall. Bibasilar consolidations with small simple pleural effusions. Normal LV systolic function with no wall motion abnormalities. LVOT VTI 18.8 cm. RV smaller than LV in size. Mitral E 0.95m/s, E' 0.13m/s, E/E' 7.55. IVC 1.5 cm.      INTERPRETATION: Multifocal pneumonia. Bibasilar small pleural effusions. Normal LV systolic function. Likely normal diastolic function.    Images stored on [employer].    Tray Siddiqui MD  Pulmonary & Critical Care.

## 2022-12-07 NOTE — PROCEDURE NOTE - NSTRACHPOSTINTU_RESP_A_CORE
Appropriate capnography/Breath sounds bilateral/Breath sounds equal/Chest X-Ray/Positive end tidal Co2 noted
CXR ordered/Breath sounds bilateral/Breath sounds equal/Chest excursion noted/Positive end tidal Co2 noted

## 2022-12-07 NOTE — PROGRESS NOTE ADULT - ASSESSMENT
Patient is a 61 yo F w/ PMHx of MM (diagnosed ~10 years ago, currently on Promacta treatments since June 2022 - most recently last Wednesday; followed by oncologist, Dr. Lopez at [facility]), ?ITP, Hep B, HTN, neuropathic R hip pain, recent R corneal tear admitted for AMS, code stroke called no acute infarct or hemorrhage, s/p LP findings not consistent with meningitis, course complicated by hypotension and hypoxia, placed on BiPAP, requiring pressors, admitted to MICU with sepsis likely secondary to pneumonia- intubated 11/30.     PLAN  Neuro  - s/p code stroke --> no ischemia or hemorrhage on CT H+N  - s/p LP results not consistent with encephalitis/meningitis, ammonia level 30   - baseline is AOx3  - Neurology following  - awake, alert, following commands, possibly still with some metabolic encephalopathy, improving    -- 12/6- -OOB to chair _- PT and OT is working with pt today     --Cardiovascular  #Shock  - suspect sepsis from E. Coli bacteremia with GI or urinary source?  -- pt remains off levophed since 8 am pn 12/4   - s/p 4L IVF in ED,   -- flushed on 12/4 and was medicated with s/p Lasix 40 IVP on 12/4 x 2,   -- - 12/05 POCUS _-  pt with diffuse B lines but intravascularly depleted on POCUS-- 12/4 s/p D 5 at 50 cc /hr x 10 hrs,  for hypernatremia and hypovolemia   -- 12/5 -- pt is on D 5 at 100/hr x 5 hrs and s/p 250 D5 over 3 hrs for hypernatremia  12/6-- reordered additional 600 cc of D 5 at 100 /hr for hypernatremia__ continue monitoring    - ECHO 11/30 EF 55 normal LV/RV   12/5-- normal LV fx on POCUS   - TSH wnl    #new onset MAR  -- MAR AF in 130's on 12/4, resolved, pt remains in NSR,-- 90s today    - s/p Lopressor 5 mg IVP, __ continue Lopressor tartrate 12.5 mg BID,   -- [id number] score is 3   -- unable to anticoagulate in the setting of thrombocytopenia   -- SCD's are in place        Pulmonary  #AHRF- likely secondary to PNA infection vs Aspiration   - s/p BiPAP, 10/5 on 50%, then intubated 11/30 for increased work of breathing  - Extubated 12/3 to face tent, remains on NC, titrate off as tolerated, in 3 LPM   - suspect secondary to infection with procalcitonin of 8.73  --12/4 pt became hypoxic to 80's on 1.5 LPM NC, possibly in the setting of aspiration vs flushed in the setting of tachycardia (pt was noted to cough on a water at the time she was taking Potassium pill  - continue on cefepime for now for E. Coli bacteremia, and neutropenic fever, and possible aspiration PNA _ transitioned to Ceftriaxone   - MRSA/MSSA neg   - ID following recs appreciated   - CTA 11/30: neg for PE.  RUL R basilar consolidation RML basilar opacity  -- repeat CXR on 12/4-- worsening multilobar consolidations   -- continue Abx and titrate off O2 as able     GI  #Diet  - concern for aspiration on bite sized diet,   -- s/p SLP-- as per recs-- puree with moderately thick fluids   --- 12/6 -- s/p MBS, as per speech pathology recs __ continue puree diet with thin liquids   + BM 12/6      #Elevated liver enzymes, trending down   - patient has reported hx of Hep B 2017 as per hemonc   - LFTs trending down AST/ALT-- 123/61 >143/77>123/73  - bili also elevated 1.3-> 1.5-> 1.4 >1.0 continue to monitor   - hepatitis panel -- Hep B surface AG +, and the rest is negative,    -- confirmatory test -- hepatitis B quantitative ordered,   -- [ ] + Hep B surface Ag. Quantitative hep B DNA PCR send for confirmation As per son, pt takes tenofovir at home. Spoke with ROSA Montejo from hepatology office, Dr Florina Jones Re: continuation of Tenofovir. [facility] [phone]. 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__ outpt follow up after discharge__ restarted Tenofovir 25 mg daily       Renal  #GARCIA  - SCr 1.5 up from 1.03, improving and is 0.9 today,   - CPK elevated, - suspect secondary to sepsis, trending down 1800-> 2800-> 1007>787>324 - no longer trending   - 12/05 pt with diffuse B lines, a line predominant, but intravascularly depleted on POCUS-- s/p Lasix 40 IVP x 2 on 12/4  - Is and Os: neg 650 for 24 hrs, neg 3 L for LOS   - Ucx 11/29 > 3 organisms likely contaminate  - Repeat UA 12/2- neg Ucx in lab     #hypernatremia   - NA- 147>150>152>153>151> 155>149  - -pt with very poor PO intake   __ s/p D 5  on 12/4 and 12/5.  12/6 --  ordered additional D 5 at 100 cc /hr x 6 hrs for hypernatremia and free water deficit   - repeat BMP after D 5 nIVF completed     ID  #E. Coli bacteremia- pan sensitive 11/29   # febrile neutropenia, resolved   -Tmax overnight 100.4, WBC 1.81  on Neupogen daily, WBC improving -- 2.3>1.87 >4.26,  LA 1.8,   - s/p vanc/CTX/ampicillin/acyclovir in ER for suspected meningitis workup   -- s/p Cefepime 2g q8h (11/30- 12.5)   - s/p Azithromycin 500 mg started (12/2- 12/3) , legionella neg  -Repeat Bcx sent 12/2 NGTD   -- as per ID recs -- 12/5 -- changed to Ceftriaxone as per sensitivities, for a total of 10 days, until 12/12   - Fungitel 39   - MRSA neg  - ID following     SKIN  # sacral decub-- DTI., Wound care nurse recs in place     Endo  - no history of diabetes, A1C 6.0  - TSH wnl  - FS stable -- 247>213  -- started Lantus 6 U (12/6), continue ISS, qac and qhs   -- dietitian consultation appreciated     Heme  #MM  - as per Hemonc note MM in remission 5/22 has been on Promacta since 6/22 has received multiple treatments for MM in the last also PBSCT x 2 and 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 Coli bacteremia? vs HLH?  -- low concern at the present time, pt is improving  - new thrombocytopenia, anemia, and +hemolysis labs (bili uptrending and now normalized)  -? reported history as per hemonc of ITP? should clarify with [facility]   - direct dede negative  - some schistocytes on peripheral smear, confirmed with hematology/oncology, however higher suspicion for lab abnormalities are secondary to sepsis  - monitor CBC daily and hemolysis labs daily- improving   - Platelet transfusion protocol: <10, <20 with fever, <50 procedures/active bleeding/etc   -- Patient received 1u plat 12/3,   -- 12/4-- platelets 39, today 21__ stable    - Transfuse PRBC < 7  - ferritin 66362, trig 519, pancytopenia - 3 cell lines, persistent fever, which has improved  -- as per hem onc recs, Zarxio till ANC improvement. Promacta and use platelets a needed._ No evidence clinically of microangiopathic hemolysis as questioned by staff. Ferritin is high as acute phase reactant.  -- restarted home dose of Promacta, 50 mg daily   - hepatitis panel --  hepatitis panel -- Hep B surface AG +, and the rest is negative __see GI , IL2 receptor-- in lab   --  Abdominal US to eval for splenomegaly- -negative   - Recs as per hem/onc- non concerned of HLH or HUS as hapto is improving- believe all sepsis induced     #DVT ppx  -- hold off on DVT ppx for now  - SCD's in place     Skin  #Suspected DTI on sacrum and ear as per nurse  - Wound care recs appreciated      Ethics  - updated son, he is pediatric resident and patient's HCP  - Pt is full code    Dispo: TELE  -PT consult VINCENT  - 12/4, 12/5, 12/6-- kerri Griffith is at the bedside, Updated. All questions answered      Patient is a 61 yo F w/ PMHx of MM (diagnosed ~10 years ago, currently on Promacta treatments since June 2022 - most recently last Wednesday; followed by oncologist, Dr. Lopez at [facility]), ?ITP, Hep B, HTN, neuropathic R hip pain, recent R corneal tear admitted for AMS, code stroke called no acute infarct or hemorrhage, s/p LP findings not consistent with meningitis, course complicated by hypotension and hypoxia, placed on BiPAP, requiring pressors, admitted to MICU with sepsis likely secondary to pneumonia- intubated 11/30.     PLAN  Neuro  - s/p code stroke --> no ischemia or hemorrhage on CT H+N  - s/p LP results not consistent with encephalitis/meningitis, ammonia level 30   - baseline is AOx3  - Neurology following  - awake, alert, following commands, possibly still with some metabolic encephalopathy, improving        --Cardiovascular  #Shock  # EKG changes- possible HLH induced cardiac complications?  - suspect sepsis from E. Coli bacteremia with GI or urinary source?  - pt remains off levophed since 12/4   - midodrine 10 q 8- held this am in setting of BIPAP  - ECHO 11/30 EF 55 normal LV/RV   - TSH wnl  - has been intermittently being diuresed with lasix- last dose this am 12/7 20mg IV also has been receiving IV dextrose for hyponatremia held this am in setting of ?pulmonary edema vs new cardiac event   - EKG 12/7- new Twav inversions V2-V6, II, III, AVF  - Trop 100-> trending q 6   - POCUS 12/7 nrml function no segmental changes VTI 18     #new onset MAR  - MAR AF in 130's on 12/4, resolved, pt remains in NSR,- 90s today    - s/p Lopressor 5 mg IVP, continue Lopressor tartrate 12.5 mg BID   - [id number] score is 3   - unable to anticoagulate in the setting of thrombocytopenia   - SCD's are in place   - continue telemetry monitoring        Pulmonary  #AHRF- likely secondary to PNA infection vs Aspiration   - s/p BiPAP, 10/5 on 50%, then intubated 11/30 for increased work of breathing  - Extubated 12/3 to face tent  - suspect secondary to infection with procalcitonin of 8.73  - patient now requiring BIPAP 10/5 50% for WOB  - antibiotics as below   - MRSA/MSSA neg   - ID following recs appreciated   - CTA 11/30: neg for PE.  RUL R basilar consolidation RML basilar opacity  - repeat CXR on 12/4-worsening multilobar consolidations improved 12/7     GI  #Diet  -S/P S/S eval 12/5 recommended pureed with thin liquids  - NPO for now in setting of BIPAP 12/7  + BM 12/6      #Elevated liver enzymes, trending down   - patient has reported hx of Hep B 2017 as per hemonc   - LFTs trending down AST/ALT- 123/61 >143/77>123/73  - bili also elevated 1.3-> 1.5-> 1.4 >1.0 continue to monitor   - hepatitis panel - Hep B surface AG +, and the rest is negative    - confirmatory test - hepatitis B quantitative - negative    -Spoke with ROSA Montejo from hepatology office, Dr Florina Jones Re: continuation of Tenofovir. [facility] [phone]. 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- outpt follow up after discharge- restarted Tenofovir 25 mg daily       Renal  #GARCIA  - SCr 1.5 up from 1.03, improving and is 0.77 today   - CPK elevated, - suspect secondary to sepsis, trending down 1800-> 2800-> 1007>787>324 - no longer trending   - Is and Os: pos 818/ neg 2.2 L for LOS  - Ucx 11/29 > 3 organisms likely contaminate  - Repeat UA 12/2- neg Ucx neg   - intermittently diuresing with lasix- last dose this am 12/7 20mg  - Patient retained urine 12/7 straight cath x 1 - continue bladder scan q 8 hours     #hypernatremia   - NA- 147>150>152>153>151> 155>149> 152  - pt NPO now on BIPAP   - patient receiving intermittent doses of D5 gtt- held this am in setting of ?pulm edema  - Trend BMP q 8    ID  #E. Coli bacteremia- pan sensitive 11/29   # febrile neutropenia, resolved   -Tmax overnight 100.4, WBC 1.81  on Neupogen daily, WBC improving -- 2.3>1.87 >4.26,  LA 1.8,   - s/p vanc/CTX/ampicillin/acyclovir in ER for suspected meningitis workup   -- s/p Cefepime 2g q8h (11/30- 12.5)   - s/p Azithromycin 500 mg started (12/2- 12/3) , legionella neg  -Repeat Bcx sent 12/2 NGTD   -- as per ID recs -- 12/5 -- changed to Ceftriaxone as per sensitivities, for a total of 10 days, until 12/12   - Fungitel 39   - MRSA neg  - ID following     SKIN  # sacral decub-- DTI., Wound care nurse recs in place     Endo  - no history of diabetes, A1C 6.0  - TSH wnl  - FS stable -- 247>213  -- started Lantus 6 U (12/6), continue ISS, qac and qhs   -- dietitian consultation appreciated     Heme  #MM  - as per Hemonc note MM in remission 5/22 has been on Promacta since 6/22 has received multiple treatments for MM in the last also PBSCT x 2 and 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 Coli bacteremia? vs HLH?  -- low concern at the present time, pt is improving  - new thrombocytopenia, anemia, and +hemolysis labs (bili uptrending and now normalized)  -? reported history as per hemonc of ITP? should clarify with [facility]   - direct dede negative  - some schistocytes on peripheral smear, confirmed with hematology/oncology, however higher suspicion for lab abnormalities are secondary to sepsis  - monitor CBC daily and hemolysis labs daily- improving   - Platelet transfusion protocol: <10, <20 with fever, <50 procedures/active bleeding/etc   -- Patient received 1u plat 12/3,   -- 12/4-- platelets 39, today 21__ stable    - Transfuse PRBC < 7  - ferritin 24718, trig 519, pancytopenia - 3 cell lines, persistent fever, which has improved  -- as per hem onc recs, Zarxio till ANC improvement. Promacta and use platelets a needed._ No evidence clinically of microangiopathic hemolysis as questioned by staff. Ferritin is high as acute phase reactant.  -- restarted home dose of Promacta, 50 mg daily   - hepatitis panel --  hepatitis panel -- Hep B surface AG +, and the rest is negative __see GI , IL2 receptor-- in lab   --  Abdominal US to eval for splenomegaly- -negative   - Recs as per hem/onc- non concerned of HLH or HUS as hapto is improving- believe all sepsis induced     #DVT ppx  -- hold off on DVT ppx for now  - SCD's in place     Skin  #Suspected DTI on sacrum and ear as per nurse  - Wound care recs appreciated      Ethics  - updated son, he is pediatric resident and patient's HCP  - Pt is full code    Dispo: TELE  -PT consult VINCENT  - 12/4, 12/5, 12/6-- kerri Griffith is at the bedside, Updated. All questions answered      Patient is a 61 yo F w/ PMHx of MM (diagnosed ~10 years ago, currently on Promacta treatments since June 2022 - most recently last Wednesday; followed by oncologist, Dr. Lopez at [facility]), ?ITP, Hep B, HTN, neuropathic R hip pain, recent R corneal tear admitted for AMS, code stroke called no acute infarct or hemorrhage, s/p LP findings not consistent with meningitis, course complicated by hypotension and hypoxia, placed on BiPAP, requiring pressors, admitted to MICU with sepsis likely secondary to pneumonia- intubated 11/30.     PLAN  Neuro  - s/p code stroke --> no ischemia or hemorrhage on CT H+N  - s/p LP results not consistent with encephalitis/meningitis, ammonia level 30   - baseline is AOx3  - Neurology following  - awake, alert, following commands, possibly still with some metabolic encephalopathy, improving        --Cardiovascular  #Shock  # EKG changes- possible HLH induced cardiac complications?  - suspect sepsis from E. Coli bacteremia with GI or urinary source?  - pt remains off levophed since 12/4   - midodrine 10 q 8- held this am in setting of BIPAP  - ECHO 11/30 EF 55 normal LV/RV   - TSH wnl  - has been intermittently being diuresed with lasix- last dose this am 12/7 20mg IV also has been receiving IV dextrose for hyponatremia held this am in setting of ?pulmonary edema vs new cardiac event   - EKG 12/7- new Twav inversions V2-V6, II, III, AVF- EKG 6 hrs later slightly improved   - Trop 100->102 trend q 6 (patient not a candidate for Asp/ hep given pancytopenia)   - POCUS 12/7 nrml function no segmental changes VTI 18     #new onset MAR  - MAR AF in 130's on 12/4, resolved, pt remains in NSR,- 90s today    - s/p Lopressor 5 mg IVP, continue Lopressor tartrate 12.5 mg BID   - [id number] score is 3   - unable to anticoagulate in the setting of thrombocytopenia   - SCD's are in place   - continue telemetry monitoring        Pulmonary  #AHRF- likely secondary to PNA infection vs Aspiration   - s/p BiPAP, 10/5 on 50%, then intubated 11/30 for increased work of breathing  - Extubated 12/3 to face tent  - suspect secondary to infection with procalcitonin of 8.73  - patient with increased WOB/ tachypnea overnight after blood transfusion patient now requiring BIPAP 10/5 50% for WOB  - antibiotics as below   - MRSA/MSSA neg   - ID following recs appreciated   - CTA 11/30: neg for PE.  RUL R basilar consolidation RML basilar opacity  - repeat CXR on 12/4-worsening multilobar consolidations improved 12/7     GI  #Diet  -S/P S/S eval 12/5 recommended pureed with thin liquids  - NPO for now in setting of BIPAP 12/7  + BM 12/6      #Elevated liver enzymes, trending down   - patient has reported hx of Hep B 2017 as per hemonc   - LFTs trending down AST/ALT- 123/61 >143/77>123/73  - bili also elevated 1.3-> 1.5-> 1.4 >1.0 continue to monitor   - hepatitis panel - Hep B surface AG +, and the rest is negative    - confirmatory test - hepatitis B quantitative - negative    -Spoke with ROSA Montejo from hepatology office, Dr Florina Jones Re: continuation of Tenofovir. [facility] [phone]. 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- outpt follow up after discharge- restarted Tenofovir 25 mg daily       Renal  #GARCIA  - SCr 1.5 up from 1.03, improving and is 0.77 today   - CPK elevated, - suspect secondary to sepsis, trending down 1800-> 2800-> 1007>787>324 - no longer trending   - Is and Os: pos 818/ neg 2.2 L for LOS  - Ucx 11/29 > 3 organisms likely contaminate  - Repeat UA 12/2- neg Ucx neg   - intermittently diuresing with lasix- last dose this am 12/7 20mg  - Patient retained urine 12/7 straight cath x 1 - continue bladder scan q 8 hours     #hypernatremia   - NA- 147>150>152>153>151> 155>149> 152  - pt NPO now on BIPAP   - patient receiving intermittent doses of D5 gtt- held this am in setting of ?pulm edema  - Trend BMP q 8    ID  #E. Coli bacteremia- pan sensitive 11/29   # febrile neutropenia, resolved   -Tmax overnight 100.4, WBC 1.81  on Neupogen daily, WBC improving -- 2.3>1.87 >4.26,  LA 1.8,   - s/p vanc/CTX/ampicillin/acyclovir in ER for suspected meningitis workup   -- s/p Cefepime 2g q8h (11/30- 12.5)   - s/p Azithromycin 500 mg started (12/2- 12/3) , legionella neg  -Repeat Bcx sent 12/2 NGTD   -- as per ID recs -- 12/5 -- changed to Ceftriaxone as per sensitivities, for a total of 10 days, until 12/12   - Fungitel 39   - MRSA neg  - ID following     SKIN  # sacral decub-- DTI., Wound care nurse recs in place     Endo  - no history of diabetes, A1C 6.0  - TSH wnl  - FS stable -- 247>213  -- started Lantus 6 U (12/6), continue ISS, qac and qhs   -- dietitian consultation appreciated     Heme  #MM  - as per Hemonc note MM in remission 5/22 has been on Promacta since 6/22 has received multiple treatments for MM in the last also PBSCT x 2 and 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 Coli bacteremia? vs HLH?  -- low concern at the present time, pt is improving  - new thrombocytopenia, anemia, and +hemolysis labs (bili uptrending and now normalized)  -? reported history as per hemonc of ITP? should clarify with [facility]   - direct dede negative  - some schistocytes on peripheral smear, confirmed with hematology/oncology, however higher suspicion for lab abnormalities are secondary to sepsis  - monitor CBC daily and hemolysis labs daily- improving   - Platelet transfusion protocol: <10, <20 with fever, <50 procedures/active bleeding/etc   -- Patient received 1u plat 12/3,   -- 12/4-- platelets 39, today 21__ stable    - Transfuse PRBC < 7  - ferritin 17175, trig 519, pancytopenia - 3 cell lines, persistent fever, which has improved  -- as per hem onc recs, Zarxio till ANC improvement. Promacta and use platelets a needed._ No evidence clinically of microangiopathic hemolysis as questioned by staff. Ferritin is high as acute phase reactant.  -- restarted home dose of Promacta, 50 mg daily   - hepatitis panel --  hepatitis panel -- Hep B surface AG +, and the rest is negative __see GI , IL2 receptor-- in lab   --  Abdominal US to eval for splenomegaly- -negative   - Recs as per hem/onc- non concerned of HLH or HUS as hapto is improving- believe all sepsis induced     #DVT ppx  -- hold off on DVT ppx for now  - SCD's in place     Skin  #Suspected DTI on sacrum and ear as per nurse  - Wound care recs appreciated      Ethics  - updated son, he is pediatric resident and patient's HCP  - Pt is full code    Dispo: TELE  -PT consult VINCENT  - 12/4, 12/5, 12/6-- kerri Griffith is at the bedside, Updated. All questions answered      Patient is a 63 yo F w/ PMHx of MM (diagnosed ~10 years ago, currently on Promacta treatments since June 2022 - most recently last Wednesday; followed by oncologist, Dr. Lopez at [facility]), ?ITP, Hep B, HTN, neuropathic R hip pain, recent R corneal tear admitted for AMS, code stroke called no acute infarct or hemorrhage, s/p LP findings not consistent with meningitis, course complicated by hypotension and hypoxia, placed on BiPAP, requiring pressors, admitted to MICU with sepsis likely secondary to pneumonia- intubated 11/30.     PLAN  Neuro  - s/p code stroke --> no ischemia or hemorrhage on CT H+N  - s/p LP results not consistent with encephalitis/meningitis, ammonia level 30   - baseline is AOx3  - Neurology following  - awake, alert, following commands, possibly still with some metabolic encephalopathy, improving        --Cardiovascular  #Shock  # EKG changes- possible HLH induced cardiac complications?  - suspect sepsis from E. Coli bacteremia with GI or urinary source?  - pt remains off levophed since 12/4   - midodrine 10 q 8- held this am in setting of BIPAP  - ECHO 11/30 EF 55 normal LV/RV   - TSH wnl  - has been intermittently being diuresed with lasix- last dose this am 12/7 20mg IV also has been receiving IV dextrose for hyponatremia held this am in setting of ?pulmonary edema vs new cardiac event   - EKG 12/7- new Twav inversions V2-V6, II, III, AVF- EKG 6 hrs later slightly improved   - Trop 100->102 trend q 6 (patient not a candidate for Asp/ hep given pancytopenia)   - POCUS 12/7 nrml function no segmental changes VTI 18     #new onset MAR  - MAR AF in 130's on 12/4, resolved, pt remains in NSR,- 90s today    - s/p Lopressor 5 mg IVP, continue Lopressor tartrate 12.5 mg BID - held this am in netting of bipap  - [id number] score is 3   - unable to anticoagulate in the setting of thrombocytopenia   - SCD's are in place   - continue telemetry monitoring        Pulmonary  #AHRF- likely secondary to PNA infection vs Aspiration   - s/p BiPAP, 10/5 on 50%, then intubated 11/30 for increased work of breathing  - Extubated 12/3 to face tent  - suspect secondary to infection with procalcitonin of 8.73  - patient with increased WOB/ tachypnea overnight after blood transfusion possible TACO? patient now requiring BIPAP 10/5 50% for WOB- trial of HFNC with tachypnea and increased WOB- transitioned back to BIPAP 10/5 can give breaks with HFNC   - antibiotics as below   - MRSA/MSSA neg   - ID following recs appreciated   - CTA 11/30: neg for PE.  RUL R basilar consolidation RML basilar opacity  - repeat CXR on 12/4-worsening multilobar consolidations improved 12/7   - diureses with lasix for goal of net net 1-2 L  - solumedrol 40 started daily 12/7   - duonebs q 6     GI  #Diet  -S/P S/S eval 12/5 recommended pureed with thin liquids  - NPO for now in setting of BIPAP 12/7  + BM 12/6      #Elevated liver enzymes, trending down   - patient has reported hx of Hep B 2017 as per hemonc   - LFTs trending down AST/ALT- 123/61 >143/77>123/73  - bili also elevated 1.3-> 1.5-> 1.4 >1.0 continue to monitor   - hepatitis panel - Hep B surface AG +, and the rest is negative    - confirmatory test - hepatitis B quantitative - negative    -Spoke with ROSA Montejo from hepatology office, Dr Florina Jones Re: continuation of Tenofovir. [facility] [phone]. 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- outpt follow up after discharge- restarted Tenofovir 25 mg daily       Renal  #GARCIA  - SCr 1.5 up from 1.03, improving and is 0.77 today   - CPK elevated, - suspect secondary to sepsis, trending down 1800-> 2800-> 1007>787>324 - no longer trending   - Is and Os: pos 818/ neg 2.2 L for LOS  - Ucx 11/29 > 3 organisms likely contaminate  - Repeat UA 12/2- neg Ucx neg   - intermittently diuresing with lasix- was given 20mg this am will give additional 20mg iv lasix now for goal of net neg 1-2L   -patient passes TOV- had purwick in place     #hypernatremia   - NA- 147>150>152>153>151> 155>149> 152> 155  - pt NPO now on BIPAP   - patient receiving intermittent doses of D5 gtt will give 100cc x 5 hrs today and reassess   - Trend BMP q 8    ID  #E. Coli bacteremia- pan sensitive 11/29   # febrile neutropenia, resolved   -Tmax overnight 98, WBC 4.09 ANC 2290   - as per ID recs continue Ceftriaxone as per sensitivities, for a total of 10 days (12/5-12/12)    - s/p vanc/CTX/ampicillin/acyclovir in ER for suspected meningitis workup   - s/p Cefepime 2g q8h (11/30- 12/5) deescalated to ctx  - s/p Azithromycin 500 mg started (12/2- 12/3) , legionella neg  -Repeat Bcx sent 12/2 neg   - Fungitel 39   - MRSA neg  - ID following     SKIN  # sacral decub-DTI., Wound care nurse recs in place     Endo  - no history of diabetes, A1C 6.0  - TSH wnl  - FS stable - 247>213  - ISS q 6 as patient NPO  - Lantus held 12/7 in setting of NPO on BIPAP    Heme  #MM  - as per Hemonc note MM in remission 5/22 has been on Promacta since 6/22 has received multiple treatments for MM in the last also PBSCT x 2 and haplo allo stem cell transplant in 2020- last spike noted sept 2022-was given belantamab- last treatment was on 11/23/22  - holding anti-myeloma therapy  -  s/p filgrastim held now as patient no longer neutropenic 12/7  - private Hemonc following [facility] recs appreciated     Pancytopenia  #concern for MAHA, possible HUS with E. Coli bacteremia? vs HLH?improving  - new thrombocytopenia, anemia, and +hemolysis labs (bili uptrending and now normalized)  -? reported history as per hemonc of ITP  - direct dede negative  - some schistocytes on peripheral smear, confirmed with hematology/oncology, however higher suspicion for lab abnormalities are secondary to sepsis  - Platelet transfusion protocol: <10, <20 with fever, <50 procedures/active bleeding/etc   - Patient received 1u plat 12/3, recieved 1/2 u PRBC 12/7   - Transfuse PRBC < 7  - Patient meets criteria for HLH (ferritin worsening 28583> 03090, trig 519->191, pancytopenia - 3 cell lines, persistent fever, IL2 8324, hepatitis) Us neg for splenomegaly   -continue home Promacta, 50 mg daily   - Spoke with hemonc at [facility] about HLH- states labs could also be elevated in setting of sepsis and at this point treatment for HLH would be to treat underlying cause of infection and patient would not be a candidate for high dose immunosuppressant (Risks outweigh benefits)   - trend HLH labs daily     #DVT ppx  - hold off on DVT ppx for now in setting of pancytopenia   - SCD's in place   - Duplex LE to eval for possible DVT vs PE    Skin  #Suspected DTI on sacrum and ear as per nurse  - Wound care recs appreciated      Ethics  - updated son, he is pediatric resident and patient's HCP  - Pt is full code    Dispo: TELE  -PT consult VINCENT  - kerri Griffith is at the bedside, Updated. All questions answered

## 2022-12-07 NOTE — PROGRESS NOTE ADULT - SUBJECTIVE AND OBJECTIVE BOX
INTERVAL HPI/OVERNIGHT EVENTS:    HPI:    SUBJECTIVE: Patient seen and examined at bedside.     CONSTITUTIONAL: No weakness, fevers or chills  EYES/ENT: No visual changes;  No vertigo or throat pain   NECK: No pain or stiffness  RESPIRATORY: No cough, wheezing, hemoptysis; No shortness of breath  CARDIOVASCULAR: No chest pain or palpitations  GASTROINTESTINAL: No abdominal or epigastric pain. No nausea, vomiting, or hematemesis; No diarrhea or constipation. No melena or hematochezia.  GENITOURINARY: No dysuria, frequency or hematuria  NEUROLOGICAL: No numbness or weakness  SKIN: No itching, rashes    OBJECTIVE:    VITAL SIGNS:  ICU Vital Signs Last 24 Hrs  T(C): 36.9 (07 Dec 2022 04:00), Max: 37.1 (06 Dec 2022 08:00)  T(F): 98.4 (07 Dec 2022 04:00), Max: 98.8 (06 Dec 2022 08:00)  HR: 84 (07 Dec 2022 04:00) (80 - 108)  BP: 104/60 (07 Dec 2022 04:00) (104/60 - 144/75)  BP(mean): 70 (07 Dec 2022 04:00) (64 - 89)  ABP: 130/63 (06 Dec 2022 12:00) (122/57 - 140/64)  ABP(mean): 84 (06 Dec 2022 12:00) (82 - 94)  RR: 30 (07 Dec 2022 04:00) (23 - 35)  SpO2: 95% (07 Dec 2022 04:00) (93% - 99%)    O2 Parameters below as of 07 Dec 2022 04:00  Patient On (Oxygen Delivery Method): BiPAP/CPAP,10/5    O2 Concentration (%): 40          12-06 @ 07:01  -  12-07 @ 07:00  --------------------------------------------------------  IN: 1978.4 mL / OUT: 1860 mL / NET: 118.4 mL      CAPILLARY BLOOD GLUCOSE      POCT Blood Glucose.: 190 mg/dL (06 Dec 2022 22:09)      PHYSICAL EXAM:    General: NAD  HEENT: NC/AT; PERRL, clear conjunctiva  Neck: supple  Respiratory: CTA b/l  Cardiovascular: +S1/S2; RRR  Abdomen: soft, NT/ND; +BS x4  Extremities: WWP, 2+ peripheral pulses b/l; no LE edema  Skin: normal color and turgor; no rash  Neurological:    MEDICATIONS:  MEDICATIONS  (STANDING):  artificial tears (preservative free) Ophthalmic Solution 1 Drop(s) Both EYES two times a day  cefTRIAXone   IVPB 2000 milliGRAM(s) IV Intermittent every 24 hours  chlorhexidine 2% Cloths 1 Application(s) Topical daily  dextrose 5%. 1000 milliLiter(s) (50 mL/Hr) IV Continuous <Continuous>  dextrose 5%. 1000 milliLiter(s) (100 mL/Hr) IV Continuous <Continuous>  dextrose 5%. 1000 milliLiter(s) (100 mL/Hr) IV Continuous <Continuous>  dextrose 50% Injectable 25 Gram(s) IV Push once  dextrose 50% Injectable 12.5 Gram(s) IV Push once  dextrose 50% Injectable 25 Gram(s) IV Push once  eltrombopag 50 milliGRAM(s) Oral daily  filgrastim-sndz (ZARXIO) Injectable 480 MICROGram(s) SubCutaneous daily  glucagon  Injectable 1 milliGRAM(s) IntraMuscular once  insulin glargine Injectable (LANTUS) 6 Unit(s) SubCutaneous every morning  insulin lispro (ADMELOG) corrective regimen sliding scale   SubCutaneous three times a day before meals  insulin lispro (ADMELOG) corrective regimen sliding scale   SubCutaneous at bedtime  magnesium sulfate  IVPB 2 Gram(s) IV Intermittent once  metoprolol tartrate 12.5 milliGRAM(s) Oral two times a day  midodrine 10 milliGRAM(s) Oral every 8 hours  multivitamin 1 Tablet(s) Oral daily  norepinephrine Infusion 0.05 MICROgram(s)/kG/Min (8.16 mL/Hr) IV Continuous <Continuous>  polyethylene glycol 3350 17 Gram(s) Oral daily  senna Syrup 10 milliLiter(s) Oral daily  tenofovir alafenamide (VEMLIDY) 25 milliGRAM(s) Oral daily    MEDICATIONS  (PRN):  albuterol    0.083% 2.5 milliGRAM(s) Nebulizer every 6 hours PRN Shortness of Breath  dextrose Oral Gel 15 Gram(s) Oral once PRN Blood Glucose LESS THAN 70 milliGRAM(s)/deciliter  melatonin 6 milliGRAM(s) Oral at bedtime PRN Insomnia      ALLERGIES:  Allergies    Bactrim (Other)  fluconazole (Vomiting; Nausea)  levofloxacin (Vomiting; Nausea)  penicillin (Other)    Intolerances        LABS:                        7.8    4.09  )-----------( 22       ( 07 Dec 2022 01:40 )             23.8     12-07    153<H>  |  120<H>  |  33<H>  ----------------------------<  194<H>  3.5   |  24  |  0.77    Ca    9.8      07 Dec 2022 01:40  Phos  1.9     12-07  Mg     1.90     12-07    TPro  5.7<L>  /  Alb  2.8<L>  /  TBili  0.8  /  DBili  x   /  AST  100<H>  /  ALT  59<H>  /  AlkPhos  168<H>  12-07    PT/INR - ( 07 Dec 2022 01:40 )   PT: 14.2 sec;   INR: 1.22 ratio               RADIOLOGY & ADDITIONAL TESTS: Reviewed. INTERVAL HPI/OVERNIGHT EVENTS: H/H 7.0/21.7 -> 6.8/ 21.9.  Patient recieved 1/2 PRBC.  Hour after transfusion patient with increased WOB, tachypnea - desaturations to the 70s- placed on BIPAP 10/5.  POCUS with diffuse b-lines with b/l pleffs- patient given 20 lasix and d5 (for hyponatremia) was held.  EKG showed T-wave inversions V2- V6 and II, III, and AVF.      HPI: 62 yoF w/ pmhx of MM (diagnosed ~10 years ago, currently on Promacta treatments since June 2022 - most recently last Wednesday; followed by oncologist, Dr. Lopez at [facility]), HTN, neuropathic R hip pain, recent R corneal tear, presents to [facility] ED for change in mental status. LKW approximately 14:30. Patient was in normal state of health this morning as per son, Nacho. Patient went to a follow up ophtho appointment for a recently diagnosed corneal abrasion this past Monday. Patient was picked up by a friend around 10:00 for an optho appt with normal mental status, pt was just complaining of being sleepy. On the way back from appointment around 14:30, patient stopped responding to friend appropriately in the car and she appeared more tired & weaker than usual. She started having slurred speech when they arrived home and then son called EMS. In the ED, patient with persisting word finding difficulties and generalized weakness, but leaning to the R side. Patient reports some difficulty remembering what happened today, but reports she feels generally weak and very tired. Code stroke called, but TPA not given because of improvement in pt's symptoms.  Pt states that she had a similar episode a few years ago with fever which she was told was likely from a MM, she improved after getting steroids.     In the ED pt was febrile 105.5, , /84, RR 24 and saturating well on RA. Pt given 2L IVF bolus and 1x vanc/ceftriaxone/acyclovir/ampicillin s/p LP in ER negative results.  Patient was RR later that day for hypotension/ hypoxia requiring intubation and pressor support, brought to MICU in shock.       SUBJECTIVE: Patient seen and examined at bedside.     CONSTITUTIONAL: No weakness, fevers or chills  EYES/ENT: No visual changes;  No vertigo or throat pain   NECK: No pain or stiffness  RESPIRATORY: No cough, wheezing, hemoptysis; +SOB   CARDIOVASCULAR: No chest pain or palpitations  GASTROINTESTINAL: No abdominal or epigastric pain. No nausea, vomiting, or hematemesis; No diarrhea or constipation. No melena or hematochezia.  GENITOURINARY: No dysuria, frequency or hematuria  NEUROLOGICAL: No numbness or weakness  SKIN: No itching, rashes    OBJECTIVE:    VITAL SIGNS:  ICU Vital Signs Last 24 Hrs  T(C): 36.9 (07 Dec 2022 04:00), Max: 37.1 (06 Dec 2022 08:00)  T(F): 98.4 (07 Dec 2022 04:00), Max: 98.8 (06 Dec 2022 08:00)  HR: 84 (07 Dec 2022 04:00) (80 - 108)  BP: 104/60 (07 Dec 2022 04:00) (104/60 - 144/75)  BP(mean): 70 (07 Dec 2022 04:00) (64 - 89)  ABP: 130/63 (06 Dec 2022 12:00) (122/57 - 140/64)  ABP(mean): 84 (06 Dec 2022 12:00) (82 - 94)  RR: 30 (07 Dec 2022 04:00) (23 - 35)  SpO2: 95% (07 Dec 2022 04:00) (93% - 99%)    O2 Parameters below as of 07 Dec 2022 04:00  Patient On (Oxygen Delivery Method): BiPAP/CPAP,10/5    O2 Concentration (%): 40          12-06 @ 07:01  -  12-07 @ 07:00  --------------------------------------------------------  IN: 1978.4 mL / OUT: 1860 mL / NET: 118.4 mL      CAPILLARY BLOOD GLUCOSE      POCT Blood Glucose.: 190 mg/dL (06 Dec 2022 22:09)      PHYSICAL EXAM:    General: appears comfortable on BIPAP- some accessory muscle use   HEENT: NC/AT; PERRL, clear conjunctiva  Neck: supple  Respiratory: ronchorous breath sounds throughout, decreased on L  Cardiovascular: +S1/S2; RRR  Abdomen: soft, NT/ND; +BS x4  Extremities: WWP, 2+ peripheral pulses b/l; no LE edema  Skin: normal color and turgor; no rash  Neurological: Awake alert following commands- weak 3+ all 4 extremeties     MEDICATIONS:  MEDICATIONS  (STANDING):  artificial tears (preservative free) Ophthalmic Solution 1 Drop(s) Both EYES two times a day  cefTRIAXone   IVPB 2000 milliGRAM(s) IV Intermittent every 24 hours  chlorhexidine 2% Cloths 1 Application(s) Topical daily  dextrose 5%. 1000 milliLiter(s) (50 mL/Hr) IV Continuous <Continuous>  dextrose 5%. 1000 milliLiter(s) (100 mL/Hr) IV Continuous <Continuous>  dextrose 5%. 1000 milliLiter(s) (100 mL/Hr) IV Continuous <Continuous>  dextrose 50% Injectable 25 Gram(s) IV Push once  dextrose 50% Injectable 12.5 Gram(s) IV Push once  dextrose 50% Injectable 25 Gram(s) IV Push once  eltrombopag 50 milliGRAM(s) Oral daily  filgrastim-sndz (ZARXIO) Injectable 480 MICROGram(s) SubCutaneous daily  glucagon  Injectable 1 milliGRAM(s) IntraMuscular once  insulin glargine Injectable (LANTUS) 6 Unit(s) SubCutaneous every morning  insulin lispro (ADMELOG) corrective regimen sliding scale   SubCutaneous three times a day before meals  insulin lispro (ADMELOG) corrective regimen sliding scale   SubCutaneous at bedtime  magnesium sulfate  IVPB 2 Gram(s) IV Intermittent once  metoprolol tartrate 12.5 milliGRAM(s) Oral two times a day  midodrine 10 milliGRAM(s) Oral every 8 hours  multivitamin 1 Tablet(s) Oral daily  norepinephrine Infusion 0.05 MICROgram(s)/kG/Min (8.16 mL/Hr) IV Continuous <Continuous>  polyethylene glycol 3350 17 Gram(s) Oral daily  senna Syrup 10 milliLiter(s) Oral daily  tenofovir alafenamide (VEMLIDY) 25 milliGRAM(s) Oral daily    MEDICATIONS  (PRN):  albuterol    0.083% 2.5 milliGRAM(s) Nebulizer every 6 hours PRN Shortness of Breath  dextrose Oral Gel 15 Gram(s) Oral once PRN Blood Glucose LESS THAN 70 milliGRAM(s)/deciliter  melatonin 6 milliGRAM(s) Oral at bedtime PRN Insomnia      ALLERGIES:  Allergies    Bactrim (Other)  fluconazole (Vomiting; Nausea)  levofloxacin (Vomiting; Nausea)  penicillin (Other)    Intolerances        LABS:                        7.8    4.09  )-----------( 22       ( 07 Dec 2022 01:40 )             23.8     12-07    153<H>  |  120<H>  |  33<H>  ----------------------------<  194<H>  3.5   |  24  |  0.77    Ca    9.8      07 Dec 2022 01:40  Phos  1.9     12-07  Mg     1.90     12-07    TPro  5.7<L>  /  Alb  2.8<L>  /  TBili  0.8  /  DBili  x   /  AST  100<H>  /  ALT  59<H>  /  AlkPhos  168<H>  12-07    PT/INR - ( 07 Dec 2022 01:40 )   PT: 14.2 sec;   INR: 1.22 ratio               RADIOLOGY & ADDITIONAL TESTS: Reviewed. INTERVAL HPI/OVERNIGHT EVENTS: H/H 7.0/21.7 -> 6.8/ 21.9.  Patient recieved 1/2 PRBC.  Hour after transfusion patient with increased WOB, tachypnea - desaturations to the 70s- placed on BIPAP 10/5.  POCUS with diffuse b-lines with b/l pleffs- patient given 20 lasix and d5 (for hyponatremia) was held.  EKG showed T-wave inversions V2- V6 and II, III, and AVF.      HPI: 62 yoF w/ pmhx of MM (diagnosed ~10 years ago, currently on Promacta treatments since June 2022 - most recently last Wednesday; followed by oncologist, Dr. Lopez at [facility]), HTN, neuropathic R hip pain, recent R corneal tear, presents to [facility] ED for change in mental status. LKW approximately 14:30. Patient was in normal state of health this morning as per son, Nacho. Patient went to a follow up ophtho appointment for a recently diagnosed corneal abrasion this past Monday. Patient was picked up by a friend around 10:00 for an optho appt with normal mental status, pt was just complaining of being sleepy. On the way back from appointment around 14:30, patient stopped responding to friend appropriately in the car and she appeared more tired & weaker than usual. She started having slurred speech when they arrived home and then son called EMS. In the ED, patient with persisting word finding difficulties and generalized weakness, but leaning to the R side. Patient reports some difficulty remembering what happened today, but reports she feels generally weak and very tired. Code stroke called, but TPA not given because of improvement in pt's symptoms.  Pt states that she had a similar episode a few years ago with fever which she was told was likely from a MM, she improved after getting steroids.     In the ED pt was febrile 105.5, , /84, RR 24 and saturating well on RA. Pt given 2L IVF bolus and 1x vanc/ceftriaxone/acyclovir/ampicillin s/p LP in ER negative results.  Patient was RR later that day for hypotension/ hypoxia requiring intubation and pressor support, brought to MICU in shock.       SUBJECTIVE: Patient seen and examined at bedside.     CONSTITUTIONAL: No weakness, fevers or chills  EYES/ENT: No visual changes;  No vertigo or throat pain   NECK: No pain or stiffness  RESPIRATORY: No cough, wheezing, hemoptysis; +SOB   CARDIOVASCULAR: No chest pain or palpitations  GASTROINTESTINAL: No abdominal or epigastric pain. No nausea, vomiting, or hematemesis; No diarrhea or constipation. No melena or hematochezia.  GENITOURINARY: No dysuria, frequency or hematuria  NEUROLOGICAL: No numbness or weakness  SKIN: No itching, rashes    OBJECTIVE:    VITAL SIGNS:  ICU Vital Signs Last 24 Hrs  T(C): 36.9 (07 Dec 2022 04:00), Max: 37.1 (06 Dec 2022 08:00)  T(F): 98.4 (07 Dec 2022 04:00), Max: 98.8 (06 Dec 2022 08:00)  HR: 84 (07 Dec 2022 04:00) (80 - 108)  BP: 104/60 (07 Dec 2022 04:00) (104/60 - 144/75)  BP(mean): 70 (07 Dec 2022 04:00) (64 - 89)  ABP: 130/63 (06 Dec 2022 12:00) (122/57 - 140/64)  ABP(mean): 84 (06 Dec 2022 12:00) (82 - 94)  RR: 30 (07 Dec 2022 04:00) (23 - 35)  SpO2: 95% (07 Dec 2022 04:00) (93% - 99%)    O2 Parameters below as of 07 Dec 2022 04:00  Patient On (Oxygen Delivery Method): BiPAP/CPAP,10/5    O2 Concentration (%): 40          12-06 @ 07:01  -  12-07 @ 07:00  --------------------------------------------------------  IN: 1978.4 mL / OUT: 1860 mL / NET: 118.4 mL      CAPILLARY BLOOD GLUCOSE      POCT Blood Glucose.: 190 mg/dL (06 Dec 2022 22:09)      PHYSICAL EXAM:    General: appears comfortable on BIPAP- some accessory muscle use   HEENT: NC/AT; PERRL, clear conjunctiva  Neck: supple  Respiratory: ronchorous breath sounds throughout, decreased on L with expiratory wheeze no stridor  Cardiovascular: +S1/S2; RRR  Abdomen: soft, NT/ND; +BS x4  Extremities: WWP, 2+ peripheral pulses b/l; no LE edema  Skin: normal color and turgor; no rash  Neurological: Awake alert following commands- weak 3+ all 4 extremeties     MEDICATIONS:  MEDICATIONS  (STANDING):  artificial tears (preservative free) Ophthalmic Solution 1 Drop(s) Both EYES two times a day  cefTRIAXone   IVPB 2000 milliGRAM(s) IV Intermittent every 24 hours  chlorhexidine 2% Cloths 1 Application(s) Topical daily  dextrose 5%. 1000 milliLiter(s) (50 mL/Hr) IV Continuous <Continuous>  dextrose 5%. 1000 milliLiter(s) (100 mL/Hr) IV Continuous <Continuous>  dextrose 5%. 1000 milliLiter(s) (100 mL/Hr) IV Continuous <Continuous>  dextrose 50% Injectable 25 Gram(s) IV Push once  dextrose 50% Injectable 12.5 Gram(s) IV Push once  dextrose 50% Injectable 25 Gram(s) IV Push once  eltrombopag 50 milliGRAM(s) Oral daily  filgrastim-sndz (ZARXIO) Injectable 480 MICROGram(s) SubCutaneous daily  glucagon  Injectable 1 milliGRAM(s) IntraMuscular once  insulin glargine Injectable (LANTUS) 6 Unit(s) SubCutaneous every morning  insulin lispro (ADMELOG) corrective regimen sliding scale   SubCutaneous three times a day before meals  insulin lispro (ADMELOG) corrective regimen sliding scale   SubCutaneous at bedtime  magnesium sulfate  IVPB 2 Gram(s) IV Intermittent once  metoprolol tartrate 12.5 milliGRAM(s) Oral two times a day  midodrine 10 milliGRAM(s) Oral every 8 hours  multivitamin 1 Tablet(s) Oral daily  norepinephrine Infusion 0.05 MICROgram(s)/kG/Min (8.16 mL/Hr) IV Continuous <Continuous>  polyethylene glycol 3350 17 Gram(s) Oral daily  senna Syrup 10 milliLiter(s) Oral daily  tenofovir alafenamide (VEMLIDY) 25 milliGRAM(s) Oral daily    MEDICATIONS  (PRN):  albuterol    0.083% 2.5 milliGRAM(s) Nebulizer every 6 hours PRN Shortness of Breath  dextrose Oral Gel 15 Gram(s) Oral once PRN Blood Glucose LESS THAN 70 milliGRAM(s)/deciliter  melatonin 6 milliGRAM(s) Oral at bedtime PRN Insomnia      ALLERGIES:  Allergies    Bactrim (Other)  fluconazole (Vomiting; Nausea)  levofloxacin (Vomiting; Nausea)  penicillin (Other)    Intolerances        LABS:                        7.8    4.09  )-----------( 22       ( 07 Dec 2022 01:40 )             23.8     12-07    153<H>  |  120<H>  |  33<H>  ----------------------------<  194<H>  3.5   |  24  |  0.77    Ca    9.8      07 Dec 2022 01:40  Phos  1.9     12-07  Mg     1.90     12-07    TPro  5.7<L>  /  Alb  2.8<L>  /  TBili  0.8  /  DBili  x   /  AST  100<H>  /  ALT  59<H>  /  AlkPhos  168<H>  12-07    PT/INR - ( 07 Dec 2022 01:40 )   PT: 14.2 sec;   INR: 1.22 ratio               RADIOLOGY & ADDITIONAL TESTS: Reviewed.

## 2022-12-07 NOTE — PROCEDURE NOTE - ADDITIONAL PROCEDURE DETAILS
20G 6CM long, Arrow extended dwell IV catheter placed under dynamic US guidance in right cephalic vein with dark nonpulsatile blood return.  Catheter was flushed afterwards without any resistance or resulting extravasation.  IV catheter confirmed in compressible vein after insertion. 20G 6CM long, Arrow extended dwell IV catheter placed under dynamic US guidance in right brachial vein with dark nonpulsatile blood return.  Catheter was flushed afterwards without any resistance or resulting extravasation.  IV catheter confirmed in compressible vein after insertion.

## 2022-12-07 NOTE — PROCEDURE NOTE - NSPOSTPRCRAD_GEN_A_CORE
CXR ordered/post-procedure radiography performed
chest radiograph/feeding tube in correct gastric position/feeding tube in correct intestinal position/post-procedure radiography performed

## 2022-12-07 NOTE — PROGRESS NOTE ADULT - CRITICAL CARE ATTENDING COMMENT
Patient is a 61 yo F w/ MM, ?ITP, HTN, recent R corneal tear who was initially admitted on 11/30 after p/w AMS found to have neutropenic septic shock with E coli bacteremia with course c/b acute respiratory failure and new Afib with RVR.     #Shock - Septic shock in setting of neutropenia and E coli bacteremia. Improving. Blood cultures cleared with antibiotics. Multifocal pneumonia likely source, benign abdominal exam.   - c/w PO vasopressors to maintain MAP > 65, wean as tolerated  - Complete Ceftriaxone 12/13    #Acute hypoxemic respiratory failure - 2/2 multifocal/aspiration PNA. Successfully extubated. Now with increased WOB overnight shortly after 1/2 PRBC transfusion. ?TACO vs TRALI less likely as no fever vs HLH?  - Diurese with goal net negative 1 L  - Duonebs q6h  - Solumedrol 40mg daily fow now  - BIPAP/HFNC for respiratory support    #Transaminitis - Likely 2/2 septic shock vs acute/chronic HBV?. Hb surface antigen positive. On Tenofovir at home. HBV DNA PCR negative  #HBV  - c/w home Tenofovir    #Pancytopenia - Likely acute on chronic process 2/2 underlying MM/?ITP with current sepsis vs HLH? Patient does have cytopenias (1), hypertrig (2), elevated ferritin (3), fevers (4). Soluble IL 2 is elevated. US negative for splenomegaly  - Trend CBC  - Trend HLH labs  - Discussed with heme, risk outweigh benefits of immunosuppression at this point for ?HLH     #Cardiac - TWI overnight in anterolateral/inferior leads with increased Troponin. POCUS with no wall motion abnormalities  - Trend EKG and enzymes  - Unable to initiate heparin gtt/DAPT for possible NSTEMI due to anemia and thrombocytopenia    #Afib - new onset with RVR, now back in NSR  - c/w metoprolol  - Hold off AC given thrombocytopenia    #Hypernatremia   - D5 100cc/hr x5 hour then repeat BMP    #Dysphagia  - NPO for now given tenuous respiratory status    #Weakness  - PT + OT    #GOC - Prognosis guarded, discuss GOC with patient and son    Tray Siddiqui MD  Pulmonary & Critical Care

## 2022-12-08 LAB
ALBUMIN SERPL ELPH-MCNC: 2.7 G/DL — LOW (ref 3.3–5)
ALBUMIN SERPL ELPH-MCNC: 2.9 G/DL — LOW (ref 3.3–5)
ALBUMIN SERPL ELPH-MCNC: 3 G/DL — LOW (ref 3.3–5)
ALP SERPL-CCNC: 158 U/L — HIGH (ref 40–120)
ALP SERPL-CCNC: 160 U/L — HIGH (ref 40–120)
ALP SERPL-CCNC: 187 U/L — HIGH (ref 40–120)
ALT FLD-CCNC: 38 U/L — HIGH (ref 4–33)
ALT FLD-CCNC: 43 U/L — HIGH (ref 4–33)
ALT FLD-CCNC: 50 U/L — HIGH (ref 4–33)
ANION GAP SERPL CALC-SCNC: 11 MMOL/L — SIGNIFICANT CHANGE UP (ref 7–14)
ANION GAP SERPL CALC-SCNC: 11 MMOL/L — SIGNIFICANT CHANGE UP (ref 7–14)
ANION GAP SERPL CALC-SCNC: 15 MMOL/L — HIGH (ref 7–14)
AST SERPL-CCNC: 100 U/L — HIGH (ref 4–32)
AST SERPL-CCNC: 68 U/L — HIGH (ref 4–32)
AST SERPL-CCNC: 90 U/L — HIGH (ref 4–32)
BASE EXCESS BLDV CALC-SCNC: 2.3 MMOL/L — SIGNIFICANT CHANGE UP (ref -2–3)
BASE EXCESS BLDV CALC-SCNC: 3.8 MMOL/L — HIGH (ref -2–3)
BASOPHILS # BLD AUTO: 0.03 K/UL — SIGNIFICANT CHANGE UP (ref 0–0.2)
BASOPHILS # BLD AUTO: 0.03 K/UL — SIGNIFICANT CHANGE UP (ref 0–0.2)
BASOPHILS NFR BLD AUTO: 0.5 % — SIGNIFICANT CHANGE UP (ref 0–2)
BASOPHILS NFR BLD AUTO: 0.5 % — SIGNIFICANT CHANGE UP (ref 0–2)
BILIRUB SERPL-MCNC: 0.8 MG/DL — SIGNIFICANT CHANGE UP (ref 0.2–1.2)
BILIRUB SERPL-MCNC: 0.8 MG/DL — SIGNIFICANT CHANGE UP (ref 0.2–1.2)
BILIRUB SERPL-MCNC: 1.1 MG/DL — SIGNIFICANT CHANGE UP (ref 0.2–1.2)
BLOOD GAS ARTERIAL COMPREHENSIVE RESULT: SIGNIFICANT CHANGE UP
BLOOD GAS VENOUS COMPREHENSIVE RESULT: SIGNIFICANT CHANGE UP
BUN SERPL-MCNC: 40 MG/DL — HIGH (ref 7–23)
BUN SERPL-MCNC: 40 MG/DL — HIGH (ref 7–23)
BUN SERPL-MCNC: 41 MG/DL — HIGH (ref 7–23)
CALCIUM SERPL-MCNC: 9.1 MG/DL — SIGNIFICANT CHANGE UP (ref 8.4–10.5)
CALCIUM SERPL-MCNC: 9.4 MG/DL — SIGNIFICANT CHANGE UP (ref 8.4–10.5)
CALCIUM SERPL-MCNC: 9.5 MG/DL — SIGNIFICANT CHANGE UP (ref 8.4–10.5)
CHLORIDE BLDV-SCNC: 115 MMOL/L — HIGH (ref 96–108)
CHLORIDE BLDV-SCNC: 115 MMOL/L — HIGH (ref 96–108)
CHLORIDE SERPL-SCNC: 111 MMOL/L — HIGH (ref 98–107)
CHLORIDE SERPL-SCNC: 113 MMOL/L — HIGH (ref 98–107)
CHLORIDE SERPL-SCNC: 116 MMOL/L — HIGH (ref 98–107)
CK MB BLD-MCNC: 1 % — SIGNIFICANT CHANGE UP (ref 0–2.5)
CK MB CFR SERPL CALC: 1.6 NG/ML — SIGNIFICANT CHANGE UP
CK SERPL-CCNC: 160 U/L — SIGNIFICANT CHANGE UP (ref 25–170)
CO2 BLDV-SCNC: 27.6 MMOL/L — HIGH (ref 22–26)
CO2 BLDV-SCNC: 28.5 MMOL/L — HIGH (ref 22–26)
CO2 SERPL-SCNC: 24 MMOL/L — SIGNIFICANT CHANGE UP (ref 22–31)
CO2 SERPL-SCNC: 24 MMOL/L — SIGNIFICANT CHANGE UP (ref 22–31)
CO2 SERPL-SCNC: 26 MMOL/L — SIGNIFICANT CHANGE UP (ref 22–31)
CREAT SERPL-MCNC: 0.83 MG/DL — SIGNIFICANT CHANGE UP (ref 0.5–1.3)
CREAT SERPL-MCNC: 0.84 MG/DL — SIGNIFICANT CHANGE UP (ref 0.5–1.3)
CREAT SERPL-MCNC: 0.86 MG/DL — SIGNIFICANT CHANGE UP (ref 0.5–1.3)
EGFR: 76 ML/MIN/1.73M2 — SIGNIFICANT CHANGE UP
EGFR: 79 ML/MIN/1.73M2 — SIGNIFICANT CHANGE UP
EGFR: 80 ML/MIN/1.73M2 — SIGNIFICANT CHANGE UP
EOSINOPHIL # BLD AUTO: 0 K/UL — SIGNIFICANT CHANGE UP (ref 0–0.5)
EOSINOPHIL # BLD AUTO: 0.01 K/UL — SIGNIFICANT CHANGE UP (ref 0–0.5)
EOSINOPHIL NFR BLD AUTO: 0 % — SIGNIFICANT CHANGE UP (ref 0–6)
EOSINOPHIL NFR BLD AUTO: 0.2 % — SIGNIFICANT CHANGE UP (ref 0–6)
FERRITIN SERPL-MCNC: HIGH NG/ML (ref 15–150)
GAS PNL BLDV: 149 MMOL/L — HIGH (ref 136–145)
GAS PNL BLDV: 150 MMOL/L — HIGH (ref 136–145)
GAS PNL BLDV: SIGNIFICANT CHANGE UP
GLUCOSE BLDC GLUCOMTR-MCNC: 227 MG/DL — HIGH (ref 70–99)
GLUCOSE BLDC GLUCOMTR-MCNC: 241 MG/DL — HIGH (ref 70–99)
GLUCOSE BLDC GLUCOMTR-MCNC: 318 MG/DL — HIGH (ref 70–99)
GLUCOSE BLDC GLUCOMTR-MCNC: 325 MG/DL — HIGH (ref 70–99)
GLUCOSE BLDV-MCNC: 224 MG/DL — HIGH (ref 70–99)
GLUCOSE BLDV-MCNC: 247 MG/DL — HIGH (ref 70–99)
GLUCOSE SERPL-MCNC: 245 MG/DL — HIGH (ref 70–99)
GLUCOSE SERPL-MCNC: 263 MG/DL — HIGH (ref 70–99)
GLUCOSE SERPL-MCNC: 274 MG/DL — HIGH (ref 70–99)
GRAM STN FLD: SIGNIFICANT CHANGE UP
HAPTOGLOB SERPL-MCNC: 72 MG/DL — SIGNIFICANT CHANGE UP (ref 34–200)
HCO3 BLDV-SCNC: 26 MMOL/L — SIGNIFICANT CHANGE UP (ref 22–29)
HCO3 BLDV-SCNC: 27 MMOL/L — SIGNIFICANT CHANGE UP (ref 22–29)
HCT VFR BLD CALC: 23.1 % — LOW (ref 34.5–45)
HCT VFR BLD CALC: 24.3 % — LOW (ref 34.5–45)
HCT VFR BLDA CALC: 22 % — LOW (ref 34.5–46.5)
HCT VFR BLDA CALC: 23 % — LOW (ref 34.5–46.5)
HGB BLD CALC-MCNC: 7.3 G/DL — LOW (ref 11.5–15.5)
HGB BLD CALC-MCNC: 7.8 G/DL — LOW (ref 11.5–15.5)
HGB BLD-MCNC: 7.2 G/DL — LOW (ref 11.5–15.5)
HGB BLD-MCNC: 8 G/DL — LOW (ref 11.5–15.5)
IANC: 3.14 K/UL — SIGNIFICANT CHANGE UP (ref 1.8–7.4)
IANC: 3.24 K/UL — SIGNIFICANT CHANGE UP (ref 1.8–7.4)
IMM GRANULOCYTES NFR BLD AUTO: 17.8 % — HIGH (ref 0–0.9)
IMM GRANULOCYTES NFR BLD AUTO: 22.9 % — HIGH (ref 0–0.9)
INR BLD: 1.24 RATIO — HIGH (ref 0.88–1.16)
LACTATE BLDV-MCNC: 1.3 MMOL/L — SIGNIFICANT CHANGE UP (ref 0.5–2)
LACTATE BLDV-MCNC: 1.9 MMOL/L — SIGNIFICANT CHANGE UP (ref 0.5–2)
LDH SERPL L TO P-CCNC: 121 U/L — LOW (ref 135–225)
LYMPHOCYTES # BLD AUTO: 0.85 K/UL — LOW (ref 1–3.3)
LYMPHOCYTES # BLD AUTO: 0.99 K/UL — LOW (ref 1–3.3)
LYMPHOCYTES # BLD AUTO: 14.3 % — SIGNIFICANT CHANGE UP (ref 13–44)
LYMPHOCYTES # BLD AUTO: 15.6 % — SIGNIFICANT CHANGE UP (ref 13–44)
MAGNESIUM SERPL-MCNC: 1.9 MG/DL — SIGNIFICANT CHANGE UP (ref 1.6–2.6)
MAGNESIUM SERPL-MCNC: 2 MG/DL — SIGNIFICANT CHANGE UP (ref 1.6–2.6)
MAGNESIUM SERPL-MCNC: 2 MG/DL — SIGNIFICANT CHANGE UP (ref 1.6–2.6)
MCHC RBC-ENTMCNC: 31.2 GM/DL — LOW (ref 32–36)
MCHC RBC-ENTMCNC: 32.9 GM/DL — SIGNIFICANT CHANGE UP (ref 32–36)
MCHC RBC-ENTMCNC: 33 PG — SIGNIFICANT CHANGE UP (ref 27–34)
MCHC RBC-ENTMCNC: 33.9 PG — SIGNIFICANT CHANGE UP (ref 27–34)
MCV RBC AUTO: 103 FL — HIGH (ref 80–100)
MCV RBC AUTO: 106 FL — HIGH (ref 80–100)
MONOCYTES # BLD AUTO: 0.73 K/UL — SIGNIFICANT CHANGE UP (ref 0–0.9)
MONOCYTES # BLD AUTO: 0.77 K/UL — SIGNIFICANT CHANGE UP (ref 0–0.9)
MONOCYTES NFR BLD AUTO: 11.5 % — SIGNIFICANT CHANGE UP (ref 2–14)
MONOCYTES NFR BLD AUTO: 12.9 % — SIGNIFICANT CHANGE UP (ref 2–14)
NEUTROPHILS # BLD AUTO: 3.14 K/UL — SIGNIFICANT CHANGE UP (ref 1.8–7.4)
NEUTROPHILS # BLD AUTO: 3.24 K/UL — SIGNIFICANT CHANGE UP (ref 1.8–7.4)
NEUTROPHILS NFR BLD AUTO: 49.5 % — SIGNIFICANT CHANGE UP (ref 43–77)
NEUTROPHILS NFR BLD AUTO: 54.3 % — SIGNIFICANT CHANGE UP (ref 43–77)
NRBC # BLD: 3 /100 WBCS — HIGH (ref 0–0)
NRBC # BLD: 3 /100 WBCS — HIGH (ref 0–0)
NRBC # FLD: 0.16 K/UL — HIGH (ref 0–0)
NRBC # FLD: 0.17 K/UL — HIGH (ref 0–0)
PCO2 BLDV: 36 MMHG — LOW (ref 39–42)
PCO2 BLDV: 38 MMHG — LOW (ref 39–42)
PH BLDV: 7.45 — HIGH (ref 7.32–7.43)
PH BLDV: 7.49 — HIGH (ref 7.32–7.43)
PHOSPHATE SERPL-MCNC: 1.9 MG/DL — LOW (ref 2.5–4.5)
PHOSPHATE SERPL-MCNC: 3.3 MG/DL — SIGNIFICANT CHANGE UP (ref 2.5–4.5)
PHOSPHATE SERPL-MCNC: 3.7 MG/DL — SIGNIFICANT CHANGE UP (ref 2.5–4.5)
PLATELET # BLD AUTO: 37 K/UL — LOW (ref 150–400)
PLATELET # BLD AUTO: 44 K/UL — LOW (ref 150–400)
PO2 BLDV: 81 MMHG — SIGNIFICANT CHANGE UP
PO2 BLDV: 87 MMHG — SIGNIFICANT CHANGE UP
POTASSIUM BLDV-SCNC: 3.2 MMOL/L — LOW (ref 3.5–5.1)
POTASSIUM BLDV-SCNC: 3.2 MMOL/L — LOW (ref 3.5–5.1)
POTASSIUM SERPL-MCNC: 3.4 MMOL/L — LOW (ref 3.5–5.3)
POTASSIUM SERPL-MCNC: 4.1 MMOL/L — SIGNIFICANT CHANGE UP (ref 3.5–5.3)
POTASSIUM SERPL-MCNC: 4.7 MMOL/L — SIGNIFICANT CHANGE UP (ref 3.5–5.3)
POTASSIUM SERPL-SCNC: 3.4 MMOL/L — LOW (ref 3.5–5.3)
POTASSIUM SERPL-SCNC: 4.1 MMOL/L — SIGNIFICANT CHANGE UP (ref 3.5–5.3)
POTASSIUM SERPL-SCNC: 4.7 MMOL/L — SIGNIFICANT CHANGE UP (ref 3.5–5.3)
PROT SERPL-MCNC: 5.5 G/DL — LOW (ref 6–8.3)
PROT SERPL-MCNC: 5.7 G/DL — LOW (ref 6–8.3)
PROT SERPL-MCNC: 6 G/DL — SIGNIFICANT CHANGE UP (ref 6–8.3)
PROTHROM AB SERPL-ACNC: 14.4 SEC — HIGH (ref 10.5–13.4)
RBC # BLD: 2.18 M/UL — LOW (ref 3.8–5.2)
RBC # BLD: 2.36 M/UL — LOW (ref 3.8–5.2)
RBC # FLD: 17.2 % — HIGH (ref 10.3–14.5)
RBC # FLD: 17.4 % — HIGH (ref 10.3–14.5)
SAO2 % BLDV: 97.3 % — SIGNIFICANT CHANGE UP
SAO2 % BLDV: 97.7 % — SIGNIFICANT CHANGE UP
SODIUM SERPL-SCNC: 148 MMOL/L — HIGH (ref 135–145)
SODIUM SERPL-SCNC: 151 MMOL/L — HIGH (ref 135–145)
SODIUM SERPL-SCNC: 152 MMOL/L — HIGH (ref 135–145)
SPECIMEN SOURCE: SIGNIFICANT CHANGE UP
TRIGL SERPL-MCNC: 292 MG/DL — HIGH
TROPONIN T, HIGH SENSITIVITY RESULT: 76 NG/L — CRITICAL HIGH
WBC # BLD: 5.96 K/UL — SIGNIFICANT CHANGE UP (ref 3.8–10.5)
WBC # BLD: 6.34 K/UL — SIGNIFICANT CHANGE UP (ref 3.8–10.5)
WBC # FLD AUTO: 5.96 K/UL — SIGNIFICANT CHANGE UP (ref 3.8–10.5)
WBC # FLD AUTO: 6.34 K/UL — SIGNIFICANT CHANGE UP (ref 3.8–10.5)

## 2022-12-08 PROCEDURE — 93308 TTE F-UP OR LMTD: CPT | Mod: 26

## 2022-12-08 PROCEDURE — 93010 ELECTROCARDIOGRAM REPORT: CPT

## 2022-12-08 PROCEDURE — 99233 SBSQ HOSP IP/OBS HIGH 50: CPT | Mod: GC

## 2022-12-08 PROCEDURE — 99291 CRITICAL CARE FIRST HOUR: CPT

## 2022-12-08 PROCEDURE — 71275 CT ANGIOGRAPHY CHEST: CPT | Mod: 26

## 2022-12-08 PROCEDURE — 70450 CT HEAD/BRAIN W/O DYE: CPT | Mod: 26

## 2022-12-08 PROCEDURE — 76604 US EXAM CHEST: CPT | Mod: 26

## 2022-12-08 RX ORDER — HUMAN INSULIN 100 [IU]/ML
3 INJECTION, SUSPENSION SUBCUTANEOUS EVERY 6 HOURS
Refills: 0 | Status: DISCONTINUED | OUTPATIENT
Start: 2022-12-08 | End: 2022-12-09

## 2022-12-08 RX ORDER — POTASSIUM PHOSPHATE, MONOBASIC POTASSIUM PHOSPHATE, DIBASIC 236; 224 MG/ML; MG/ML
30 INJECTION, SOLUTION INTRAVENOUS ONCE
Refills: 0 | Status: COMPLETED | OUTPATIENT
Start: 2022-12-08 | End: 2022-12-08

## 2022-12-08 RX ORDER — POTASSIUM CHLORIDE 20 MEQ
40 PACKET (EA) ORAL ONCE
Refills: 0 | Status: COMPLETED | OUTPATIENT
Start: 2022-12-08 | End: 2022-12-08

## 2022-12-08 RX ADMIN — ELTROMBOPAG OLAMINE 50 MILLIGRAM(S): 50 TABLET, FILM COATED ORAL at 13:55

## 2022-12-08 RX ADMIN — CHLORHEXIDINE GLUCONATE 15 MILLILITER(S): 213 SOLUTION TOPICAL at 17:13

## 2022-12-08 RX ADMIN — Medication 12.5 MILLIGRAM(S): at 06:52

## 2022-12-08 RX ADMIN — Medication 3 MILLILITER(S): at 10:39

## 2022-12-08 RX ADMIN — Medication 8.16 MICROGRAM(S)/KG/MIN: at 20:02

## 2022-12-08 RX ADMIN — CHLORHEXIDINE GLUCONATE 1 APPLICATION(S): 213 SOLUTION TOPICAL at 11:03

## 2022-12-08 RX ADMIN — Medication 166.67 MILLIGRAM(S): at 08:17

## 2022-12-08 RX ADMIN — Medication 4: at 23:53

## 2022-12-08 RX ADMIN — MEROPENEM 100 MILLIGRAM(S): 1 INJECTION INTRAVENOUS at 07:03

## 2022-12-08 RX ADMIN — Medication 20 MILLIGRAM(S): at 00:58

## 2022-12-08 RX ADMIN — SENNA PLUS 10 MILLILITER(S): 8.6 TABLET ORAL at 11:03

## 2022-12-08 RX ADMIN — Medication 2: at 06:50

## 2022-12-08 RX ADMIN — Medication 3 MILLILITER(S): at 15:22

## 2022-12-08 RX ADMIN — Medication 1 DROP(S): at 06:49

## 2022-12-08 RX ADMIN — TENOFOVIR DISOPROXIL FUMARATE 25 MILLIGRAM(S): 300 TABLET, FILM COATED ORAL at 11:04

## 2022-12-08 RX ADMIN — Medication 40 MILLIEQUIVALENT(S): at 06:49

## 2022-12-08 RX ADMIN — MEROPENEM 100 MILLIGRAM(S): 1 INJECTION INTRAVENOUS at 22:55

## 2022-12-08 RX ADMIN — PROPOFOL 10.4 MICROGRAM(S)/KG/MIN: 10 INJECTION, EMULSION INTRAVENOUS at 13:33

## 2022-12-08 RX ADMIN — CHLORHEXIDINE GLUCONATE 15 MILLILITER(S): 213 SOLUTION TOPICAL at 06:49

## 2022-12-08 RX ADMIN — MIDODRINE HYDROCHLORIDE 10 MILLIGRAM(S): 2.5 TABLET ORAL at 22:00

## 2022-12-08 RX ADMIN — Medication 3 MILLILITER(S): at 03:47

## 2022-12-08 RX ADMIN — Medication 1 TABLET(S): at 11:04

## 2022-12-08 RX ADMIN — Medication 166.67 MILLIGRAM(S): at 20:30

## 2022-12-08 RX ADMIN — POTASSIUM PHOSPHATE, MONOBASIC POTASSIUM PHOSPHATE, DIBASIC 83.33 MILLIMOLE(S): 236; 224 INJECTION, SOLUTION INTRAVENOUS at 11:07

## 2022-12-08 RX ADMIN — Medication 40 MILLIGRAM(S): at 06:51

## 2022-12-08 RX ADMIN — Medication 1 DROP(S): at 17:16

## 2022-12-08 RX ADMIN — MIDODRINE HYDROCHLORIDE 10 MILLIGRAM(S): 2.5 TABLET ORAL at 08:19

## 2022-12-08 RX ADMIN — POLYETHYLENE GLYCOL 3350 17 GRAM(S): 17 POWDER, FOR SOLUTION ORAL at 11:02

## 2022-12-08 RX ADMIN — Medication 3 MILLILITER(S): at 19:52

## 2022-12-08 RX ADMIN — PROPOFOL 10.4 MICROGRAM(S)/KG/MIN: 10 INJECTION, EMULSION INTRAVENOUS at 20:02

## 2022-12-08 RX ADMIN — MIDODRINE HYDROCHLORIDE 10 MILLIGRAM(S): 2.5 TABLET ORAL at 15:33

## 2022-12-08 RX ADMIN — Medication 4: at 17:12

## 2022-12-08 RX ADMIN — Medication 2: at 11:18

## 2022-12-08 RX ADMIN — MEROPENEM 100 MILLIGRAM(S): 1 INJECTION INTRAVENOUS at 15:33

## 2022-12-08 NOTE — CHART NOTE - NSCHARTNOTEFT_GEN_A_CORE
: Artur    INDICATION: Respiratory failure, Shock    PROCEDURE:  [X] LIMITED ECHO  [X] LIMITED CHEST  [ ] LIMITED RETROPERITONEAL  [ ] LIMITED ABDOMINAL  [ ] LIMITED DVT  [ ] NEEDLE GUIDANCE VASCULAR  [ ] NEEDLE GUIDANCE THORACENTESIS  [ ] NEEDLE GUIDANCE PARACENTESIS  [ ] NEEDLE GUIDANCE PERICARDIOCENTESIS  [ ] OTHER    FINDINGS: A line predominant pattern today.  Bibasilar consolidations with trace simple pleural effusions. Normal LV systolic function with no wall motion abnormalities. LVOT VTI 26.7 cm. RV smaller than LV in size. PA  ms. IVC 1.8 cm.       INTERPRETATION: Improved lung aeration. Normal LV systolic function.     Images stored on [employer].    Tray Siddiqui MD  Pulmonary & Critical Care.

## 2022-12-08 NOTE — DIETITIAN NUTRITION RISK NOTIFICATION - ADDITIONAL COMMENTS/DIETITIAN RECOMMENDATIONS
Nutrition follow up completed. Please refer to chart note via documents section in EMR for further recommendations.  Last updated: 12/8/2022

## 2022-12-08 NOTE — PROGRESS NOTE ADULT - SUBJECTIVE AND OBJECTIVE BOX
Follow Up:  septic shock, E-coli bacteremia, pneumonia    Interval History: pt was intubated yesterday and switched to vanco max, still no fever or WBC   ROS:    Unobtainable because: intubated      Allergies  Bactrim (Other)  fluconazole (Vomiting; Nausea)  levofloxacin (Vomiting; Nausea)  penicillin (Other)        ANTIMICROBIALS:  meropenem  IVPB    meropenem  IVPB 1000 every 8 hours  tenofovir alafenamide (VEMLIDY) 25 daily  vancomycin  IVPB    vancomycin  IVPB 1250 every 12 hours      OTHER MEDS:  albuterol/ipratropium for Nebulization 3 milliLiter(s) Nebulizer every 6 hours  artificial tears (preservative free) Ophthalmic Solution 1 Drop(s) Both EYES two times a day  chlorhexidine 0.12% Liquid 15 milliLiter(s) Oral Mucosa every 12 hours  chlorhexidine 2% Cloths 1 Application(s) Topical daily  dextrose 5%. 1000 milliLiter(s) IV Continuous <Continuous>  dextrose 5%. 1000 milliLiter(s) IV Continuous <Continuous>  dextrose 50% Injectable 25 Gram(s) IV Push once  dextrose 50% Injectable 12.5 Gram(s) IV Push once  dextrose 50% Injectable 25 Gram(s) IV Push once  dextrose Oral Gel 15 Gram(s) Oral once PRN  eltrombopag 50 milliGRAM(s) Oral daily  glucagon  Injectable 1 milliGRAM(s) IntraMuscular once  insulin lispro (ADMELOG) corrective regimen sliding scale   SubCutaneous every 6 hours  melatonin 6 milliGRAM(s) Oral at bedtime PRN  methylPREDNISolone sodium succinate Injectable 40 milliGRAM(s) IV Push daily  metoprolol tartrate 12.5 milliGRAM(s) Oral two times a day  midodrine 10 milliGRAM(s) Oral every 8 hours  multivitamin 1 Tablet(s) Oral daily  norepinephrine Infusion 0.05 MICROgram(s)/kG/Min IV Continuous <Continuous>  polyethylene glycol 3350 17 Gram(s) Oral daily  propofol Infusion 20 MICROgram(s)/kG/Min IV Continuous <Continuous>  senna Syrup 10 milliLiter(s) Oral daily      Vital Signs Last 24 Hrs  T(C): 36.4 (08 Dec 2022 12:00), Max: 37.6 (08 Dec 2022 04:00)  T(F): 97.6 (08 Dec 2022 12:00), Max: 99.6 (08 Dec 2022 04:00)  HR: 62 (08 Dec 2022 12:00) (62 - 135)  BP: 111/48 (08 Dec 2022 12:00) (98/43 - 167/85)  BP(mean): 64 (08 Dec 2022 12:00) (56 - 105)  RR: 20 (08 Dec 2022 12:00) (17 - 35)  SpO2: 100% (08 Dec 2022 12:00) (96% - 100%)    Parameters below as of 08 Dec 2022 08:00  Patient On (Oxygen Delivery Method): ventilator        Physical Exam:  General:   intubated but awake  Cardio:   regular S1, S2  Respiratory: ET on vent, b/l breat sounds  abd:     soft,   BS +, no tenderness  :    +ortiz  Musculoskeletal:   no joint swelling  vascular: no phlebitis  Skin:    no rash                          7.2    5.96  )-----------( 37       ( 08 Dec 2022 11:45 )             23.1       1208    151<H>  |  116<H>  |  41<H>  ----------------------------<  263<H>  4.7   |  24  |  0.83    Ca    9.1      08 Dec 2022 11:45  Phos  3.3     12-  Mg     1.90         TPro  5.7<L>  /  Alb  2.7<L>  /  TBili  0.8  /  DBili  x   /  AST  90<H>  /  ALT  43<H>  /  AlkPhos  160<H>  1208      Urinalysis Basic - ( 07 Dec 2022 18:18 )    Color: Red / Appearance: bloody / S.013 / pH: x  Gluc: x / Ketone: Negative  / Bili: Negative / Urobili: <2 mg/dL   Blood: x / Protein: 100 mg/dL / Nitrite: Negative   Leuk Esterase: Small / RBC: >10 /HPF / WBC 4 /HPF   Sq Epi: x / Non Sq Epi: 2 /HPF / Bacteria: Moderate        MICROBIOLOGY:  v  ET Tube ET Tube  22 --  --    No polymorphonuclear leukocytes per low power field  Few Squamous epithelial cells per low power field  Moderate Gram Positive Rods seen per oil power field  Rare Gram positive cocci in pairs seen per oil power field      Catheterized Catheterized  22   No growth  --  --      .Blood Blood  22   No Growth Final  --  --      .Blood Blood  22   No Growth Final  --  --      ET Tube ET Tube  22   No growth at 48 hours  --    Moderate polymorphonuclear leukocytes per low power field  No Squamous epithelial cells per low power field  No organisms seen per oil power field      .CSF CSF  22   No growth at 1 week.  --    No polymorphonuclear leukocytes seen  No organisms seen  by cytocentrifuge      .Blood Blood-Peripheral  22   Growth in aerobic and anaerobic bottles: Escherichia coli  ***Blood Panel PCR results on this specimen are available  approximately 3 hours after the Gram stain result.***  Gram stain, PCR, and/or culture results may not always  correspond due to difference in methodologies.  ************************************************************  This PCR assay was performed by multiplex PCR. This  Assay tests for 66 bacterial and resistance gene targets.  Please refer to the [facility] test directory  at [email or web address] for details.  --  Blood Culture PCR  Escherichia coli      Clean Catch Clean Catch (Midstream)  22   >=3 organisms. Probable collection contamination.  --  --                RADIOLOGY:  Images independently visualized and reviewed personally, findings as below  < from: US Duplex Venous Lower Ext Complete, Bilateral (22 @ 18:15) >  IMPRESSION:  No evidence of deep venous thrombosis in either lower extremity from the   common femoral vein to popliteal veins inclusive.    < end of copied text >  < from: Xray Chest 1 View- PORTABLE-Urgent (Xray Chest 1 View- PORTABLE-Urgent .) (22 @ 17:27) >  IMPRESSION:    *  Status post intubation and enteric tube placement.  *  Decreasing effusions with improving right upper lobe consolidation.      < end of copied text >  < from: CT Angio Chest PE Protocol w/ IV Cont (22 @ 10:12) >    No pulmonary embolism.    Posterior right upper lobe and right basilar consolidation and additional   right middle lobe and left basilar patchy opacities; findings compatible   with aspiration or infection.    --- End of Report ---    < end of copied text >

## 2022-12-08 NOTE — CHART NOTE - NSCHARTNOTEFT_GEN_A_CORE
NUTRITION FOLLOW-UP    61yo F w Hx of MM, HTN, presenting for AMS& intubated 11/30 for increased work of breathing, then extubated 12/3.  Currently w GARCIA & hypernatremia, receiving intermittent diuresis throughout admission, E Coli bacteremia.  Reintubated 12/7 & sedated on Propofol @ current rate of 16.7mL/hr.  If this rate is maintained over 24hrs, will provide ~441 non-nutritive lipid calories.    Spoke with RN and PA.     BM 12/6.  No vomiting or abdominal distention noted/reported @ this time.     Overall, Pt. with inadequate energy intake throughout hospitalization (x8d).    Pt. initially NPO (x4d), then enteral (x <1d).  Eventually advanced to PO diet (x ~3d) however, per flow sheets and previous RDN documentation, Pt. had ~50% or less PO intake during that time.  Plan is to initiate TF with Glucerna 1.5  Advise changing to Pivot 1.5 to more appropriately meet energy/nutrient needs with addition of NoCarb Prosource to help meet increased protein requirements.         Weight loss perhaps somewhat fluid related considering diuresis, however edema and moderate fluid accumulation possibly masking even further weight decrease.  Of note, also getting 500mL free water flushes q6hrs.      Glucose values persistently elevated (>200mg/dL), would consider modifying insulin regimen.  Corticosteroid likely contributory.     Pt. with overtly visible findings of muscle wasting & subcutaneous fat loss:   Moderate loss: temples, orbitals   Mild loss: shoulders     Deemed severely acutely malnourished @ this time.        _________________Diet___________________  Diet, NPO with Tube Feed:   Tube Feeding Modality: Orogastric  Glucerna 1.5 Earl (GLUCERNA1.5RTH)  Total Volume for 24 Hours (mL): 720  Continuous  Starting Tube Feed Rate {mL per Hour}: 10  Increase Tube Feed Rate by (mL): 10  Until Goal Tube Feed Rate (mL per Hour): 30  Tube Feed Duration (in Hours): 24  Tube Feed Start Time: 00:00 (12-08-22 @ 07:14)    would provide:  720mL total volume, 1080 kcals, 59g protein, 546mL free water          Weight:                                Height: 71"                   Ideal Body Weight: 155lbs / 70.5kg (+/- 10%)  82.0kg (12/5)  83.6kg (12/1)  80.9kg (11/29)      _____Re-Estimated Energy Needs (based on upper 10% Ideal Body Weight 77.5kg)____  20-25 kcals/kg = 9160-3010 kcals/d  1.6-1.8 g protein/kg = 124-139 g protein/d         Edema:  2+ generalized, 3+ b/l hands/wrists/arms & legs.      Skin:  Right upper buttock suspected deep tissue injury         ________________________Pertinent Medications____________   MEDICATIONS  (STANDING):  albuterol/ipratropium for Nebulization 3 milliLiter(s) Nebulizer every 6 hours  artificial tears (preservative free) Ophthalmic Solution 1 Drop(s) Both EYES two times a day  chlorhexidine 0.12% Liquid 15 milliLiter(s) Oral Mucosa every 12 hours  chlorhexidine 2% Cloths 1 Application(s) Topical daily  dextrose 5%. 1000 milliLiter(s) (50 mL/Hr) IV Continuous <Continuous>  dextrose 5%. 1000 milliLiter(s) (100 mL/Hr) IV Continuous <Continuous>  dextrose 50% Injectable 25 Gram(s) IV Push once  dextrose 50% Injectable 12.5 Gram(s) IV Push once  dextrose 50% Injectable 25 Gram(s) IV Push once  eltrombopag 50 milliGRAM(s) Oral daily  glucagon  Injectable 1 milliGRAM(s) IntraMuscular once  insulin lispro (ADMELOG) corrective regimen sliding scale   SubCutaneous every 6 hours  meropenem  IVPB      meropenem  IVPB 1000 milliGRAM(s) IV Intermittent every 8 hours  methylPREDNISolone sodium succinate Injectable 40 milliGRAM(s) IV Push daily  metoprolol tartrate 12.5 milliGRAM(s) Oral two times a day  midodrine 10 milliGRAM(s) Oral every 8 hours  multivitamin 1 Tablet(s) Oral daily  norepinephrine Infusion 0.05 MICROgram(s)/kG/Min (8.16 mL/Hr) IV Continuous <Continuous>  polyethylene glycol 3350 17 Gram(s) Oral daily  potassium phosphate IVPB 30 milliMole(s) IV Intermittent once  propofol Infusion 20 MICROgram(s)/kG/Min (10.4 mL/Hr) IV Continuous <Continuous>  senna Syrup 10 milliLiter(s) Oral daily  tenofovir alafenamide (VEMLIDY) 25 milliGRAM(s) Oral daily  vancomycin  IVPB      vancomycin  IVPB 1250 milliGRAM(s) IV Intermittent every 12 hours    MEDICATIONS  (PRN):  dextrose Oral Gel 15 Gram(s) Oral once PRN Blood Glucose LESS THAN 70 milliGRAM(s)/deciliter  melatonin 6 milliGRAM(s) Oral at bedtime PRN Insomnia          _________________________Pertinent Labs____________________     12-08    152<H>  |  113<H>  |  40<H>  ----------------------------<  245<H>  3.4<L>   |  24  |  0.86    Ca    9.5      08 Dec 2022 01:50  Phos  1.9     12-08  Mg     2.00     12-08    TPro  6.0  /  Alb  2.9<L>  /  TBili  1.1  /  DBili  x   /  AST  100<H>  /  ALT  50<H>  /  AlkPhos  187<H>  12-08                                                                   8.0    6.34  )-----------( 44       ( 08 Dec 2022 01:50 )             24.3         CAPILLARY BLOOD GLUCOSE      POCT Blood Glucose.: 227 mg/dL (08 Dec 2022 06:48)    POCT Blood Glucose.: 227 mg/dL (12-08-22 @ 06:48)  POCT Blood Glucose.: 254 mg/dL (12-07-22 @ 23:16)  POCT Blood Glucose.: 216 mg/dL (12-07-22 @ 18:23)  POCT Blood Glucose.: 123 mg/dL (12-07-22 @ 13:45)  POCT Blood Glucose.: 157 mg/dL (12-07-22 @ 08:36)        __________NEW NUTRITION Dx__________  Pt. meets criteria for severe malnutrition in the context of acute [critical] illness as evidenced by <50% energy intake x at least 5, 2+-3+ edema, & moderate & mild muscle wasting & subcutaneous fat loss.       PLAN/RECOMMENDATIONS:    1) D/C Glucerna 1.5.  Initiate Pivot 1.5 @ 15mL/hr then increase by 10mL q4hrs, as tolerated, to goal of 45mL/hr +2 packets NoCarb Prosource per day    will provide:  1080mL total volume  1620 kcals  101g protein (+ 30g additional protein via NoCarb Prosource)= 131g total protein   756mL free water       2) Obtain daily weights  3) Amount of additional free water flushes deferred to providers  4) Suggest insulin regimen modification  5) Monitor tolerance to TF (GI status, electrolytes, glucose)     RDN remains available and will f/u PRN.          Stacey Santoyo RDN, CDN pager [phone]

## 2022-12-08 NOTE — PROGRESS NOTE ADULT - ASSESSMENT
Patient is a 63 yo F w/ PMHx of MM (diagnosed ~10 years ago, currently on Promacta treatments since June 2022 - most recently last Wednesday; followed by oncologist, Dr. Lopez at [facility]), ?ITP, Hep B, HTN, neuropathic R hip pain, recent R corneal tear admitted for AMS, code stroke called no acute infarct or hemorrhage, s/p LP findings not consistent with meningitis, course complicated by hypotension and hypoxia, placed on BiPAP, requiring pressors, admitted to MICU with sepsis likely secondary to pneumonia- intubated 11/30.     PLAN  Neuro  - s/p code stroke --> no ischemia or hemorrhage on CT H+N  - s/p LP results not consistent with encephalitis/meningitis, ammonia level 30   - baseline is AOx3  - Neurology following  - awake, alert, following commands, possibly still with some metabolic encephalopathy, improving        --Cardiovascular  #Shock  # EKG changes- possible HLH induced cardiac complications?  - suspect sepsis from E. Coli bacteremia with GI or urinary source?  - pt remains off levophed since 12/4   - midodrine 10 q 8- held this am in setting of BIPAP  - ECHO 11/30 EF 55 normal LV/RV   - TSH wnl  - has been intermittently being diuresed with lasix- last dose this am 12/7 20mg IV also has been receiving IV dextrose for hyponatremia held this am in setting of ?pulmonary edema vs new cardiac event   - EKG 12/7- new Twav inversions V2-V6, II, III, AVF- EKG 6 hrs later slightly improved   - Trop 100->102 trend q 6 (patient not a candidate for Asp/ hep given pancytopenia)   - POCUS 12/7 nrml function no segmental changes VTI 18     #new onset MAR  - MAR AF in 130's on 12/4, resolved, pt remains in NSR,- 90s today    - s/p Lopressor 5 mg IVP, continue Lopressor tartrate 12.5 mg BID - held this am in netting of bipap  - [id number] score is 3   - unable to anticoagulate in the setting of thrombocytopenia   - SCD's are in place   - continue telemetry monitoring        Pulmonary  #AHRF- likely secondary to PNA infection vs Aspiration   - s/p BiPAP, 10/5 on 50%, then intubated 11/30 for increased work of breathing  - Extubated 12/3 to face tent  - suspect secondary to infection with procalcitonin of 8.73  - patient with increased WOB/ tachypnea overnight after blood transfusion possible TACO? patient now requiring BIPAP 10/5 50% for WOB- trial of HFNC with tachypnea and increased WOB- transitioned back to BIPAP 10/5 can give breaks with HFNC   - antibiotics as below   - MRSA/MSSA neg   - ID following recs appreciated   - CTA 11/30: neg for PE.  RUL R basilar consolidation RML basilar opacity  - repeat CXR on 12/4-worsening multilobar consolidations improved 12/7   - diureses with lasix for goal of net net 1-2 L  - solumedrol 40 started daily 12/7   - duonebs q 6     GI  #Diet  -S/P S/S eval 12/5 recommended pureed with thin liquids  - NPO for now in setting of BIPAP 12/7  + BM 12/6      #Elevated liver enzymes, trending down   - patient has reported hx of Hep B 2017 as per hemonc   - LFTs trending down AST/ALT- 123/61 >143/77>123/73  - bili also elevated 1.3-> 1.5-> 1.4 >1.0 continue to monitor   - hepatitis panel - Hep B surface AG +, and the rest is negative    - confirmatory test - hepatitis B quantitative - negative    -Spoke with ROSA Montejo from hepatology office, Dr Florina Jones Re: continuation of Tenofovir. [facility] [phone]. 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- outpt follow up after discharge- restarted Tenofovir 25 mg daily       Renal  #GARCIA  - SCr 1.5 up from 1.03, improving and is 0.77 today   - CPK elevated, - suspect secondary to sepsis, trending down 1800-> 2800-> 1007>787>324 - no longer trending   - Is and Os: pos 818/ neg 2.2 L for LOS  - Ucx 11/29 > 3 organisms likely contaminate  - Repeat UA 12/2- neg Ucx neg   - intermittently diuresing with lasix- was given 20mg this am will give additional 20mg iv lasix now for goal of net neg 1-2L   -patient passes TOV- had purwick in place     #hypernatremia   - NA- 147>150>152>153>151> 155>149> 152> 155  - pt NPO now on BIPAP   - patient receiving intermittent doses of D5 gtt will give 100cc x 5 hrs today and reassess   - Trend BMP q 8    ID  #E. Coli bacteremia- pan sensitive 11/29   # febrile neutropenia, resolved   -Tmax overnight 98, WBC 4.09 ANC 2290   - as per ID recs continue Ceftriaxone as per sensitivities, for a total of 10 days (12/5-12/12)    - s/p vanc/CTX/ampicillin/acyclovir in ER for suspected meningitis workup   - s/p Cefepime 2g q8h (11/30- 12/5) deescalated to ctx  - s/p Azithromycin 500 mg started (12/2- 12/3) , legionella neg  -Repeat Bcx sent 12/2 neg   - Fungitel 39   - MRSA neg  - ID following     SKIN  # sacral decub-DTI., Wound care nurse recs in place     Endo  - no history of diabetes, A1C 6.0  - TSH wnl  - FS stable - 247>213  - ISS q 6 as patient NPO  - Lantus held 12/7 in setting of NPO on BIPAP    Heme  #MM  - as per Hemonc note MM in remission 5/22 has been on Promacta since 6/22 has received multiple treatments for MM in the last also PBSCT x 2 and haplo allo stem cell transplant in 2020- last spike noted sept 2022-was given belantamab- last treatment was on 11/23/22  - holding anti-myeloma therapy  -  s/p filgrastim held now as patient no longer neutropenic 12/7  - private Hemonc following [facility] recs appreciated     Pancytopenia  #concern for MAHA, possible HUS with E. Coli bacteremia? vs HLH?improving  - new thrombocytopenia, anemia, and +hemolysis labs (bili uptrending and now normalized)  -? reported history as per hemonc of ITP  - direct dede negative  - some schistocytes on peripheral smear, confirmed with hematology/oncology, however higher suspicion for lab abnormalities are secondary to sepsis  - Platelet transfusion protocol: <10, <20 with fever, <50 procedures/active bleeding/etc   - Patient received 1u plat 12/3, recieved 1/2 u PRBC 12/7   - Transfuse PRBC < 7  - Patient meets criteria for HLH (ferritin worsening 83392> 90523, trig 519->191, pancytopenia - 3 cell lines, persistent fever, IL2 8324, hepatitis) Us neg for splenomegaly   -continue home Promacta, 50 mg daily   - Spoke with hemonc at [facility] about HLH- states labs could also be elevated in setting of sepsis and at this point treatment for HLH would be to treat underlying cause of infection and patient would not be a candidate for high dose immunosuppressant (Risks outweigh benefits)   - trend HLH labs daily     #DVT ppx  - hold off on DVT ppx for now in setting of pancytopenia   - SCD's in place   - Duplex LE to eval for possible DVT vs PE    Skin  #Suspected DTI on sacrum and ear as per nurse  - Wound care recs appreciated      Ethics  - updated son, he is pediatric resident and patient's HCP  - Pt is full code    Dispo: TELE  -PT consult VINCENT  - kerri Griffith is at the bedside, Updated. All questions answered      Patient is a 63 yo F w/ PMHx of MM (diagnosed ~10 years ago, currently on Promacta treatments since June 2022 - most recently last Wednesday; followed by oncologist, Dr. Lopez at [facility]), ?ITP, Hep B, HTN, neuropathic R hip pain, recent R corneal tear admitted for AMS, code stroke called no acute infarct or hemorrhage, s/p LP findings not consistent with meningitis, course complicated by hypotension and hypoxia, placed on BiPAP, requiring pressors, admitted to MICU with sepsis likely secondary to pneumonia- intubated 11/30. Extubated 12/3 to face tent reintubated 12/7 in setting of increased WOB and hypercapneic respiratory failure unclear cause possibly HLH induced.     PLAN  Neuro  - s/p code stroke --> no ischemia or hemorrhage on CT H+N  - s/p LP results not consistent with encephalitis/meningitis, ammonia level 30   - baseline is AOx3  - sedated on propofol   - Repeat CTH today for ? neurological cause for respiratory distress causing reintubation- also unsymmetrical nasolabial folds seen on exam       --Cardiovascular  #Shock sepsis vs HLH vs vasoplegic   # EKG changes- possible HLH induced cardiac complications?  - suspect sepsis from E. Coli bacteremia with GI or urinary source?  - pt required levophed for intubation- titrating down currently on 0.2   - midodrine 10 q 8  - ECHO 11/30 EF 55 normal LV/RV   - TSH wnl  - has been intermittently being diuresed with lasix- last dose this am 12/8 20mg IV also has been receiving IV dextrose for hyponatremia held this am in setting of ?pulmonary edema vs new cardiac event   - EKG 12/7- new Twav inversions V2-V6, II, III, AVF- EKG 6 hrs later slightly improved will repeat today   - Trop 100->102-> no longer trending (patient not a candidate for Asp/ hep given pancytopenia)   - POCUS 12/8 nrml function no segmental changes VTI 22    #new onset MAR  - MAR AF in 130's on 12/4, resolved, additional event 12/7 in setting of missed PO metoprolol resolved with IV lopressor    - continue Lopressor tartrate 12.5 mg BID p  - [id number] score is 3   - unable to anticoagulate in the setting of thrombocytopenia   - SCD's are in place   - continue telemetry monitoring        Pulmonary  #AHRF- likely secondary to PNA infection vs Aspiration   - s/p BiPAP, 10/5 on 50%, then intubated 11/30 for increased work of breathing  - Extubated 12/3 to face tent  - patient with increased WOB/ tachypnea overnight 12/6 after blood transfusion possible TACO? patient requiring BIPAP 10/5 50%   - Reintubated 12/7 in setting of WOB/respiratory distress and hypercapneic respiratory failure.   - MV 12/420/5/40% doing well on SBT 10/5 50%- alkalotic 7.5/34/138/27/98 will decreased to 8/5 50%  - antibiotics as below   - MRSA/MSSA resent 12/8   - ID following recs appreciated   - CTA 11/30: neg for PE.  RUL R basilar consolidation RML basilar opacity  - repeat CXR on 12/4-worsening multilobar consolidations improved 12/7   - diureseses with lasix PRN for goal of net neg last dose this am 12/8  - solumedrol 40 started daily 12/7   - duonebs q 6     GI  #Diet  - NPO with tube feeds  -Nutrition consult placed on pivot titrate to goal   - on bowel regimen having BM       #Elevated liver enzymes, stable  - patient has reported hx of Hep B 2017 as per hemonc   - LFTs slightly elevated AST/ALT- 123/61 >143/77>123/73>100/50   - bili also elevated but stable 1.3-> 1.5-> 1.4 >1.0 continue to monitor   - hepatitis panel - Hep B surface AG +, and the rest is negative    - confirmatory test - hepatitis B quantitative - negative    -Spoke with ROSA Montejo from hepatology office, Dr Florina Jones Re: continuation of Tenofovir. [facility] [phone]. 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- outpt follow up after discharge- restarted Tenofovir 25 mg daily     Renal  #GARCIA  - SCr 0.86 today- slight bump yesterday from .77-> .94 now 0.86  - CPK elevated, - suspect secondary to sepsis, trending down 1800-> 2800-> 1007>787>324 - no longer trending   - Is and Os: neg 486, neg 3.4 L for LOS   - UA resent 12/7 slightly positive with mod bacteria and WBC   - intermittently diuresing with lasix- was given 20mg this am for goal of net neg fluid balance    - ortiz replaced 12/7     #hypernatremia   - NA- 147>150>152>153>151> 155>149> 152> 155  - pt NPO now on BIPAP   - patient receiving intermittent doses of D5 gtt will give 100cc x 5 hrs today and reassess   - Trend BMP q 8    ID  #E. Coli bacteremia- pan sensitive 11/29   # febrile neutropenia, resolved   -Tmax overnight 98, WBC 4.09 ANC 2290   - as per ID recs continue Ceftriaxone as per sensitivities, for a total of 10 days (12/5-12/12)    - s/p vanc/CTX/ampicillin/acyclovir in ER for suspected meningitis workup   - s/p Cefepime 2g q8h (11/30- 12/5) deescalated to ctx  - s/p Azithromycin 500 mg started (12/2- 12/3) , legionella neg  -Repeat Bcx sent 12/2 neg   - Fungitel 39   - MRSA neg  - ID following     SKIN  # sacral decub-DTI., Wound care nurse recs in place     Endo  - no history of diabetes, A1C 6.0  - TSH wnl  - FS stable - 247>213  - ISS q 6 as patient NPO  - Lantus held 12/7 in setting of NPO on BIPAP    Heme  #MM  - as per Hemonc note MM in remission 5/22 has been on Promacta since 6/22 has received multiple treatments for MM in the last also PBSCT x 2 and haplo allo stem cell transplant in 2020- last spike noted sept 2022-was given belantamab- last treatment was on 11/23/22  - holding anti-myeloma therapy  -  s/p filgrastim held now as patient no longer neutropenic 12/7  - private Hemonc following [facility] recs appreciated     Pancytopenia  #concern for MAHA, possible HUS with E. Coli bacteremia? vs HLH?improving  - new thrombocytopenia, anemia, and +hemolysis labs (bili uptrending and now normalized)  -? reported history as per hemonc of ITP  - direct dede negative  - some schistocytes on peripheral smear, confirmed with hematology/oncology, however higher suspicion for lab abnormalities are secondary to sepsis  - Platelet transfusion protocol: <10, <20 with fever, <50 procedures/active bleeding/etc   - Patient received 1u plat 12/3, recieved 1/2 u PRBC 12/7   - Transfuse PRBC < 7  - Patient meets criteria for HLH (ferritin worsening 12335> 96967, trig 519->191, pancytopenia - 3 cell lines, persistent fever, IL2 8324, hepatitis) Us neg for splenomegaly   -continue home Promacta, 50 mg daily   - Spoke with hemonc at [facility] about HLH- states labs could also be elevated in setting of sepsis and at this point treatment for HLH would be to treat underlying cause of infection and patient would not be a candidate for high dose immunosuppressant (Risks outweigh benefits)   - trend HLH labs daily     #DVT ppx  - hold off on DVT ppx for now in setting of pancytopenia   - SCD's in place   - Duplex LE to eval for possible DVT vs PE    Skin  #Suspected DTI on sacrum and ear as per nurse  - Wound care recs appreciated      Ethics  - updated son, he is pediatric resident and patient's HCP  - Pt is full code    Dispo: TELE  -PT consult VINCENT  - kerri Griffith is at the bedside, Updated. All questions answered      Patient is a 63 yo F w/ PMHx of MM (diagnosed ~10 years ago, currently on Promacta treatments since June 2022 - most recently last Wednesday; followed by oncologist, Dr. Lopez at [facility]), ?ITP, Hep B, HTN, neuropathic R hip pain, recent R corneal tear admitted for AMS, code stroke called no acute infarct or hemorrhage, s/p LP findings not consistent with meningitis, course complicated by hypotension and hypoxia, placed on BiPAP, requiring pressors, admitted to MICU with sepsis likely secondary to pneumonia- intubated 11/30. Extubated 12/3 to face tent reintubated 12/7 in setting of increased WOB and hypercapneic respiratory failure unclear cause possibly HLH induced.     PLAN  Neuro  - s/p code stroke --> no ischemia or hemorrhage on CT H+N  - s/p LP results not consistent with encephalitis/meningitis, ammonia level 30   - baseline is AOx3  - sedated on propofol   - Repeat CTH today for ? neurological cause for respiratory distress causing reintubation- also unsymmetrical nasolabial folds seen on exam       --Cardiovascular  #Shock sepsis vs HLH vs vasoplegic   # EKG changes- possible HLH induced cardiac complications?  - suspect sepsis from E. Coli bacteremia with GI or urinary source?  - pt required levophed for intubation- titrating down currently on 0.2   - midodrine 10 q 8  - ECHO 11/30 EF 55 normal LV/RV   - TSH wnl  - has been intermittently being diuresed with lasix- last dose this am 12/8 20mg IV also has been receiving IV dextrose for hyponatremia held this am in setting of ?pulmonary edema vs new cardiac event   - EKG 12/7- new Twav inversions V2-V6, II, III, AVF- EKG 6 hrs later slightly improved will repeat today   - Trop 100->102-> no longer trending (patient not a candidate for Asp/ hep given pancytopenia)   - POCUS 12/8 nrml function no segmental changes VTI 22    #new onset MAR  - MAR AF in 130's on 12/4, resolved, additional event 12/7 in setting of missed PO metoprolol resolved with IV lopressor    - continue Lopressor tartrate 12.5 mg BID p  - [id number] score is 3   - unable to anticoagulate in the setting of thrombocytopenia   - SCD's are in place   - continue telemetry monitoring        Pulmonary  #AHRF- likely secondary to PNA infection vs Aspiration   - s/p BiPAP, 10/5 on 50%, then intubated 11/30 for increased work of breathing  - Extubated 12/3 to face tent  - patient with increased WOB/ tachypnea overnight 12/6 after blood transfusion possible TACO? patient requiring BIPAP 10/5 50%   - Reintubated 12/7 in setting of WOB/respiratory distress and hypercapneic respiratory failure.   - MV 12/420/5/40% doing well on SBT 10/5 50%- alkalotic 7.5/34/138/27/98 will decreased to 8/5 50%  - CTA to r/o PE reordered 12/8  - antibiotics as below   - MRSA/MSSA resent 12/8   - ID following recs appreciated   - CTA 11/30: neg for PE.  RUL R basilar consolidation RML basilar opacity  - repeat CXR on 12/4-worsening multilobar consolidations improved 12/7   - diureseses with lasix PRN for goal of net neg last dose this am 12/8  - solumedrol 40 started daily 12/7   - duonebs q 6   - Duplex to r/o DVTs    GI  #Diet  - NPO with tube feeds  -Nutrition consult placed on pivot titrate to goal   - on bowel regimen having BM       #Elevated liver enzymes, stable  - patient has reported hx of Hep B 2017 as per hemonc   - LFTs slightly elevated AST/ALT- 123/61 >143/77>123/73>100/50   - bili also elevated but stable 1.3-> 1.5-> 1.4 >1.0 continue to monitor   - hepatitis panel - Hep B surface AG +, and the rest is negative    - confirmatory test - hepatitis B quantitative - negative    -Spoke with ROSA Montejo from hepatology office, Dr Florina Jones Re: continuation of Tenofovir. [facility] [phone]. 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- outpt follow up after discharge- restarted Tenofovir 25 mg daily     Renal  #GARCIA  - SCr 0.86 today- slight bump yesterday from .77-> .94 now 0.86  - CPK elevated, - suspect secondary to sepsis, trending down 1800-> 2800-> 1007>787>324 - no longer trending   - Is and Os: neg 486, neg 3.4 L for LOS   - UA resent 12/7 slightly positive with mod bacteria and WBC   - intermittently diuresing with lasix- was given 20mg this am for goal of net neg fluid balance    - ortiz replaced 12/7     #hypernatremia   - NA- 147>150>152>153>151> 155>149> 152> 155> 151  - on free water 300 q 6   - Trend BMP q 6    ID  #E. Coli bacteremia- pan sensitive 11/29   -Tmax overnight 99.1, WBC 4.09 -> 6.34 but patient with worsening respiratory status causing reintubation 12/7  - antibiotics broadened to vanco and meropenem (12/7- )  - Bcx, UA, Ucx, MRSA resent 12/7 F/U  - Sputum cx 12/7 GPR and GPC f/U final   - s/p ctx (12/5-12/7)    - s/p Cefepime 2g q8h (11/30- 12/5) deescalated to ctx  - s/p Azithromycin 500 mg started (12/2- 12/3) , legionella neg  - s/p vanc/CTX/ampicillin/acyclovir in ER for suspected meningitis workup   -Bcx 12/2, 11/29 neg   - Fungitel 39   - ID following     SKIN  # sacral decub-DTI., Wound care nurse recs in place     Endo  - no history of diabetes, A1C 6.0  - TSH wnl  - FS stable - in the 200s but adjusting tube feeds  - ISS q 6  - may need to start NPH     Heme  #MM  - as per Hemonc note MM in remission 5/22 has been on Promacta since 6/22 has received multiple treatments for MM in the last also PBSCT x 2 and haplo allo stem cell transplant in 2020- last spike noted sept 2022-was given belantamab- last treatment was on 11/23/22  - holding anti-myeloma therapy  -  s/p filgrastim held now as patient no longer neutropenic 12/7  - private Hemonc following [facility] recs appreciated     Pancytopenia  #concern for MAHA, possible HUS with E. Coli bacteremia? vs HLH?improving  - new thrombocytopenia, anemia, and +hemolysis labs (bili uptrending and now normalized)  -? reported history as per hemonc of ITP  - direct dede negative  - some schistocytes on peripheral smear, confirmed with hematology/oncology, however higher suspicion for lab abnormalities are secondary to sepsis  - Platelet transfusion protocol: <10, <20 with fever, <50 procedures/active bleeding/etc   - Patient received 1u plat 12/3, recieved 1/2 u PRBC 12/7   - Transfuse PRBC < 7  - Patient meets criteria for HLH (ferritin worsening 82932> 21359, trig 519->191, pancytopenia - 3 cell lines, persistent fever, IL2 8324, hepatitis) Us neg for splenomegaly   -continue home Promacta, 50 mg daily   - Spoke with hemonc at [facility] about HLH- states labs could also be elevated in setting of sepsis and at this point treatment for HLH would be to treat underlying cause of infection and patient would not be a candidate for high dose immunosuppressant (Risks outweigh benefits)   - trend HLH labs daily   - s/p neupogen D/C as patient no longer neutropenic     #DVT ppx  - hold off on DVT ppx for now in setting of pancytopenia   - SCD's in place   - Duplex LE to eval for possible DVT vs PE    Skin  #Suspected DTI on sacrum and ear as per nurse  - Wound care recs appreciated      Ethics  - updated son, he is pediatric resident and patient's HCP  - Pt is full code    Dispo: TELE  -PT consult VINCENT  - kerri Griffith is at the bedside, Updated. All questions answered

## 2022-12-08 NOTE — DIETITIAN NUTRITION RISK NOTIFICATION - TREATMENT: THE FOLLOWING DIET HAS BEEN RECOMMENDED
Diet, NPO with Tube Feed:   Tube Feeding Modality: Orogastric  Pivot 1.5 Earl (PIVOT1.5RTH)  Total Volume for 24 Hours (mL): 1080  Continuous  Starting Tube Feed Rate {mL per Hour}: 15  Increase Tube Feed Rate by (mL): 10     Every 4 hours  Until Goal Tube Feed Rate (mL per Hour): 45  Tube Feed Duration (in Hours): 24  Tube Feed Start Time: 09:00  No Carb Prosource (1pkg = 15gms Protein)     Qty per Day:  2 (12-08-22 @ 08:55) [Pending Verification By Attending]

## 2022-12-08 NOTE — PROGRESS NOTE ADULT - CRITICAL CARE ATTENDING COMMENT
Patient is a 61 yo F w/ MM, ?ITP, HTN, recent R corneal tear who was initially admitted on 11/30 after p/w AMS found to have neutropenic septic shock with E coli bacteremia with course c/b acute respiratory failure and new Afib with RVR.     #Acute hypoxemic respiratory failure - 2/2 multifocal/aspiration PNA. Successfully extubated. Now with increased WOB shortly after 1/2 PRBC transfusion. ?TACO vs TRALI less likely as no fever vs HLH? vs PE. Decompensated further requiring reintubation. LDH improved but ferritin still rising. LE duplex negative.  - c/w ventilatory support for now, wean as tolerated  - c/w Duonebs q6h  - c/w Solumedrol 40mg daily fow now  - CTA chest  - Keep euvolemic    #Shock - Septic shock in setting of neutropenia and E coli bacteremia which resolved as blood cultures cleared with antibiotics. Multifocal pneumonia likely source, benign abdominal exam. On pressors again since reintubation, likely vasoplegic 2/2 sedation vs sepsis vs inflammatory  - c/w PO vasopressors to maintain MAP > 65, wean as tolerated  - Previously on Ceftriaxone, escalated to Jani and Vanc after decompensation  - f/u cultures    #?Nasolabial flattening  - CTH    #Transaminitis - Likely 2/2 septic shock vs acute/chronic HBV?. Hb surface antigen positive. On Tenofovir at home. HBV DNA PCR negative  #HBV  - c/w home Tenofovir    #Pancytopenia - Likely acute on chronic process 2/2 underlying MM/?ITP with current sepsis vs HLH? Patient does have cytopenias (1), hypertrig (2), elevated ferritin (3), fevers (4). Soluble IL 2 is elevated. US negative for splenomegaly  - Trend CBC  - Trend HLH labs  - Discussed with heme, risk outweigh benefits of immunosuppression at this point for ?HLH    #Cardiac - New TWI in anterolateral/inferior leads with increased Troponin, now less pronounced and downtrending. POCUS with no wall motion abnormalities  - Unable to initiate heparin gtt/DAPT for possible NSTEMI due to anemia and thrombocytopenia    #Afib - new onset with RVR, now back in NSR  - c/w metoprolol  - Hold off AC given thrombocytopenia    #Hypernatremia   - c/w free water    #GOC - Prognosis guarded. c/w GOC discussions. Currently full code.    Tray Siddiqui MD  Pulmonary & Critical Care

## 2022-12-08 NOTE — PROGRESS NOTE ADULT - SUBJECTIVE AND OBJECTIVE BOX
INTERVAL HPI/OVERNIGHT EVENTS:    HPI:    SUBJECTIVE: Patient seen and examined at bedside.     CONSTITUTIONAL: No weakness, fevers or chills  EYES/ENT: No visual changes;  No vertigo or throat pain   NECK: No pain or stiffness  RESPIRATORY: No cough, wheezing, hemoptysis; No shortness of breath  CARDIOVASCULAR: No chest pain or palpitations  GASTROINTESTINAL: No abdominal or epigastric pain. No nausea, vomiting, or hematemesis; No diarrhea or constipation. No melena or hematochezia.  GENITOURINARY: No dysuria, frequency or hematuria  NEUROLOGICAL: No numbness or weakness  SKIN: No itching, rashes    OBJECTIVE:    VITAL SIGNS:  ICU Vital Signs Last 24 Hrs  T(C): 37.3 (08 Dec 2022 00:00), Max: 37.3 (08 Dec 2022 00:00)  T(F): 99.1 (08 Dec 2022 00:00), Max: 99.1 (08 Dec 2022 00:00)  HR: 79 (08 Dec 2022 05:00) (66 - 135)  BP: 114/54 (08 Dec 2022 05:00) (106/61 - 167/85)  BP(mean): 69 (08 Dec 2022 05:00) (67 - 105)  ABP: --  ABP(mean): --  RR: 17 (08 Dec 2022 05:00) (17 - 35)  SpO2: 100% (08 Dec 2022 05:00) (96% - 100%)    O2 Parameters below as of 08 Dec 2022 05:00  Patient On (Oxygen Delivery Method): ventilator    O2 Concentration (%): 40      Mode: AC/ CMV (Assist Control/ Continuous Mandatory Ventilation), RR (machine): 24, TV (machine): 450, FiO2: 30, PEEP: 5, MAP: 9, PIP: 23     @ 07:01  -  - @ 07:00  --------------------------------------------------------  IN: 1939 mL / OUT: 2425 mL / NET: -486 mL      CAPILLARY BLOOD GLUCOSE      POCT Blood Glucose.: 227 mg/dL (08 Dec 2022 06:48)      PHYSICAL EXAM:    General: NAD  HEENT: NC/AT; PERRL, clear conjunctiva  Neck: supple  Respiratory: CTA b/l  Cardiovascular: +S1/S2; RRR  Abdomen: soft, NT/ND; +BS x4  Extremities: WWP, 2+ peripheral pulses b/l; no LE edema  Skin: normal color and turgor; no rash  Neurological:    MEDICATIONS:  MEDICATIONS  (STANDING):  albuterol/ipratropium for Nebulization 3 milliLiter(s) Nebulizer every 6 hours  artificial tears (preservative free) Ophthalmic Solution 1 Drop(s) Both EYES two times a day  chlorhexidine 0.12% Liquid 15 milliLiter(s) Oral Mucosa every 12 hours  chlorhexidine 2% Cloths 1 Application(s) Topical daily  dextrose 5%. 1000 milliLiter(s) (50 mL/Hr) IV Continuous <Continuous>  dextrose 5%. 1000 milliLiter(s) (100 mL/Hr) IV Continuous <Continuous>  dextrose 50% Injectable 25 Gram(s) IV Push once  dextrose 50% Injectable 12.5 Gram(s) IV Push once  dextrose 50% Injectable 25 Gram(s) IV Push once  eltrombopag 50 milliGRAM(s) Oral daily  glucagon  Injectable 1 milliGRAM(s) IntraMuscular once  insulin lispro (ADMELOG) corrective regimen sliding scale   SubCutaneous every 6 hours  meropenem  IVPB      meropenem  IVPB 1000 milliGRAM(s) IV Intermittent every 8 hours  methylPREDNISolone sodium succinate Injectable 40 milliGRAM(s) IV Push daily  metoprolol tartrate 12.5 milliGRAM(s) Oral two times a day  midodrine 10 milliGRAM(s) Oral every 8 hours  multivitamin 1 Tablet(s) Oral daily  norepinephrine Infusion 0.05 MICROgram(s)/kG/Min (8.16 mL/Hr) IV Continuous <Continuous>  polyethylene glycol 3350 17 Gram(s) Oral daily  potassium phosphate IVPB 30 milliMole(s) IV Intermittent once  propofol Infusion 20 MICROgram(s)/kG/Min (10.4 mL/Hr) IV Continuous <Continuous>  senna Syrup 10 milliLiter(s) Oral daily  tenofovir alafenamide (VEMLIDY) 25 milliGRAM(s) Oral daily  vancomycin  IVPB      vancomycin  IVPB 1250 milliGRAM(s) IV Intermittent every 12 hours    MEDICATIONS  (PRN):  dextrose Oral Gel 15 Gram(s) Oral once PRN Blood Glucose LESS THAN 70 milliGRAM(s)/deciliter  melatonin 6 milliGRAM(s) Oral at bedtime PRN Insomnia      ALLERGIES:  Allergies    Bactrim (Other)  fluconazole (Vomiting; Nausea)  levofloxacin (Vomiting; Nausea)  penicillin (Other)    Intolerances        LABS:                        8.0    6.34  )-----------( 44       ( 08 Dec 2022 01:50 )             24.3     12-    152<H>  |  113<H>  |  40<H>  ----------------------------<  245<H>  3.4<L>   |  24  |  0.86    Ca    9.5      08 Dec 2022 01:50  Phos  1.9     12-  Mg     2.00     -    TPro  6.0  /  Alb  2.9<L>  /  TBili  1.1  /  DBili  x   /  AST  100<H>  /  ALT  50<H>  /  AlkPhos  187<H>  12-08    PT/INR - ( 08 Dec 2022 01:50 )   PT: 14.4 sec;   INR: 1.24 ratio           Urinalysis Basic - ( 07 Dec 2022 18:18 )    Color: Red / Appearance: bloody / S.013 / pH: x  Gluc: x / Ketone: Negative  / Bili: Negative / Urobili: <2 mg/dL   Blood: x / Protein: 100 mg/dL / Nitrite: Negative   Leuk Esterase: Small / RBC: >10 /HPF / WBC 4 /HPF   Sq Epi: x / Non Sq Epi: 2 /HPF / Bacteria: Moderate        RADIOLOGY & ADDITIONAL TESTS: Reviewed. INTERVAL HPI/OVERNIGHT EVENTS: Intubated last night for WOB and hypercapneic respiratory failure.  Started on free water flushes for hypernatremia.  Diuresing with lasix. Adjusting vent settings for alkalosis.     HPI: 62 yoF w/ pmhx of MM (diagnosed ~10 years ago, currently on Promacta treatments since 2022 - most recently last Wednesday; followed by oncologist, Dr. Lopez at [facility]), HTN, neuropathic R hip pain, recent R corneal tear, presents to [facility] ED for change in mental status. LKW approximately 14:30. Patient was in normal state of health this morning as per son, Nacho. Patient went to a follow up ophtho appointment for a recently diagnosed corneal abrasion this past Monday. Patient was picked up by a friend around 10:00 for an optho appt with normal mental status, pt was just complaining of being sleepy. On the way back from appointment around 14:30, patient stopped responding to friend appropriately in the car and she appeared more tired & weaker than usual. She started having slurred speech when they arrived home and then son called EMS. In the ED, patient with persisting word finding difficulties and generalized weakness, but leaning to the R side. Patient reports some difficulty remembering what happened today, but reports she feels generally weak and very tired. Code stroke called, but TPA not given because of improvement in pt's symptoms.  Pt states that she had a similar episode a few years ago with fever which she was told was likely from a MM, she improved after getting steroids.     In the ED pt was febrile 105.5, , /84, RR 24 and saturating well on RA. Pt given 2L IVF bolus and 1x vanc/ceftriaxone/acyclovir/ampicillin s/p LP in ER negative results.  Patient was RR later that day for hypotension/ hypoxia requiring intubation and pressor support, brought to MICU in shock. Extubated 12/3 to face tent reintubated  in setting of increased WOB and hypercapnic respiratory failure unclear cause possibly HLH induced.       SUBJECTIVE: Patient seen and examined at bedside.     ROS: Unable to assess as patient intubated/sedated.     OBJECTIVE:    VITAL SIGNS:  ICU Vital Signs Last 24 Hrs  T(C): 37.3 (08 Dec 2022 00:00), Max: 37.3 (08 Dec 2022 00:00)  T(F): 99.1 (08 Dec 2022 00:00), Max: 99.1 (08 Dec 2022 00:00)  HR: 79 (08 Dec 2022 05:00) (66 - 135)  BP: 114/54 (08 Dec 2022 05:00) (106/61 - 167/85)  BP(mean): 69 (08 Dec 2022 05:00) (67 - 105)  ABP: --  ABP(mean): --  RR: 17 (08 Dec 2022 05:00) (17 - 35)  SpO2: 100% (08 Dec 2022 05:00) (96% - 100%)    O2 Parameters below as of 08 Dec 2022 05:00  Patient On (Oxygen Delivery Method): ventilator    O2 Concentration (%): 40      Mode: AC/ CMV (Assist Control/ Continuous Mandatory Ventilation), RR (machine): 24, TV (machine): 450, FiO2: 30, PEEP: 5, MAP: 9, PIP: 23     @ 07:01  -   @ 07:00  --------------------------------------------------------  IN: 1939 mL / OUT: 2425 mL / NET: -486 mL      CAPILLARY BLOOD GLUCOSE      POCT Blood Glucose.: 227 mg/dL (08 Dec 2022 06:48)      PHYSICAL EXAM:    General: NAD  HEENT: NC/AT; pupils pinpoint clear conjunctiva ? difference in nasolabial fold on L side with weakness of L face  Neck: supple  Respiratory: CTA b/l  Cardiovascular: +S1/S2; RRR  Abdomen: soft, NT/ND; +BS x4  Extremities: WWP, 2+ peripheral pulses b/l; + non pitting edema in 4 extremities   Skin: normal color and turgor; no rash  Neurological: intubated, sedated, opening eyes following some intermittent commands. Doing well on SBT    MEDICATIONS:  MEDICATIONS  (STANDING):  albuterol/ipratropium for Nebulization 3 milliLiter(s) Nebulizer every 6 hours  artificial tears (preservative free) Ophthalmic Solution 1 Drop(s) Both EYES two times a day  chlorhexidine 0.12% Liquid 15 milliLiter(s) Oral Mucosa every 12 hours  chlorhexidine 2% Cloths 1 Application(s) Topical daily  dextrose 5%. 1000 milliLiter(s) (50 mL/Hr) IV Continuous <Continuous>  dextrose 5%. 1000 milliLiter(s) (100 mL/Hr) IV Continuous <Continuous>  dextrose 50% Injectable 25 Gram(s) IV Push once  dextrose 50% Injectable 12.5 Gram(s) IV Push once  dextrose 50% Injectable 25 Gram(s) IV Push once  eltrombopag 50 milliGRAM(s) Oral daily  glucagon  Injectable 1 milliGRAM(s) IntraMuscular once  insulin lispro (ADMELOG) corrective regimen sliding scale   SubCutaneous every 6 hours  meropenem  IVPB      meropenem  IVPB 1000 milliGRAM(s) IV Intermittent every 8 hours  methylPREDNISolone sodium succinate Injectable 40 milliGRAM(s) IV Push daily  metoprolol tartrate 12.5 milliGRAM(s) Oral two times a day  midodrine 10 milliGRAM(s) Oral every 8 hours  multivitamin 1 Tablet(s) Oral daily  norepinephrine Infusion 0.05 MICROgram(s)/kG/Min (8.16 mL/Hr) IV Continuous <Continuous>  polyethylene glycol 3350 17 Gram(s) Oral daily  potassium phosphate IVPB 30 milliMole(s) IV Intermittent once  propofol Infusion 20 MICROgram(s)/kG/Min (10.4 mL/Hr) IV Continuous <Continuous>  senna Syrup 10 milliLiter(s) Oral daily  tenofovir alafenamide (VEMLIDY) 25 milliGRAM(s) Oral daily  vancomycin  IVPB      vancomycin  IVPB 1250 milliGRAM(s) IV Intermittent every 12 hours    MEDICATIONS  (PRN):  dextrose Oral Gel 15 Gram(s) Oral once PRN Blood Glucose LESS THAN 70 milliGRAM(s)/deciliter  melatonin 6 milliGRAM(s) Oral at bedtime PRN Insomnia      ALLERGIES:  Allergies    Bactrim (Other)  fluconazole (Vomiting; Nausea)  levofloxacin (Vomiting; Nausea)  penicillin (Other)    Intolerances        LABS:                        8.0    6.34  )-----------( 44       ( 08 Dec 2022 01:50 )             24.3     12-08    152<H>  |  113<H>  |  40<H>  ----------------------------<  245<H>  3.4<L>   |  24  |  0.86    Ca    9.5      08 Dec 2022 01:50  Phos  1.9     12-  Mg     2.00         TPro  6.0  /  Alb  2.9<L>  /  TBili  1.1  /  DBili  x   /  AST  100<H>  /  ALT  50<H>  /  AlkPhos  187<H>  12    PT/INR - ( 08 Dec 2022 01:50 )   PT: 14.4 sec;   INR: 1.24 ratio           Urinalysis Basic - ( 07 Dec 2022 18:18 )    Color: Red / Appearance: bloody / S.013 / pH: x  Gluc: x / Ketone: Negative  / Bili: Negative / Urobili: <2 mg/dL   Blood: x / Protein: 100 mg/dL / Nitrite: Negative   Leuk Esterase: Small / RBC: >10 /HPF / WBC 4 /HPF   Sq Epi: x / Non Sq Epi: 2 /HPF / Bacteria: Moderate        RADIOLOGY & ADDITIONAL TESTS: Reviewed.

## 2022-12-08 NOTE — PROGRESS NOTE ADULT - ASSESSMENT
62 f with HTN, MM (diagnosed ~10 years ago, currently on Promacta treatments since June 2022m brought in for AMS, lethargy, ?slurred speech here febrile to 105.5, hypotensive, tachycardic, tachypneic and hypoxic   WBC: 1.8, ANC: 970  lactate: 6  s/p code stroke but CT negative, s/p LP WBC: 1, neutrophil: 0, glucose: 93, protein 33, negative PCR and gram stain  blood cx: E-coli  chest CT: Posterior right upper lobe and right basilar consolidation and additional right middle lobe and left basilar patchy opacities; findings compatible with aspiration or infection.    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  elevated ferritin and IL2 concerning for HLH with hypoxic and hypercapneic resp failure again after blood transfusion (CXR showed improving consolidations) reintubated 12/7  * blood cx cleared 12/2 and sputum cx negative   * f/u the repeat blood cx  * f/u the chest CTA and head CT  * pt was switched to vanco and max after she deteriorated, will continue for now  * check the trough before the 4th dose   * will need a 10 day course post negative blood cx on 12/11  * monitor CBC/diff and CMP  * f/u with hem/onc    The above assessment and plan was discussed with ALISON Lovell MD  contact on teams  After 5pm and on weekends call [phone].

## 2022-12-09 LAB
ALBUMIN SERPL ELPH-MCNC: 2.4 G/DL — LOW (ref 3.3–5)
ALBUMIN SERPL ELPH-MCNC: 2.6 G/DL — LOW (ref 3.3–5)
ALP SERPL-CCNC: 143 U/L — HIGH (ref 40–120)
ALP SERPL-CCNC: 151 U/L — HIGH (ref 40–120)
ALT FLD-CCNC: 35 U/L — HIGH (ref 4–33)
ALT FLD-CCNC: 36 U/L — HIGH (ref 4–33)
ANION GAP SERPL CALC-SCNC: 7 MMOL/L — SIGNIFICANT CHANGE UP (ref 7–14)
ANION GAP SERPL CALC-SCNC: 8 MMOL/L — SIGNIFICANT CHANGE UP (ref 7–14)
AST SERPL-CCNC: 58 U/L — HIGH (ref 4–32)
AST SERPL-CCNC: 59 U/L — HIGH (ref 4–32)
BASOPHILS # BLD AUTO: 0.03 K/UL — SIGNIFICANT CHANGE UP (ref 0–0.2)
BASOPHILS # BLD AUTO: 0.06 K/UL — SIGNIFICANT CHANGE UP (ref 0–0.2)
BASOPHILS NFR BLD AUTO: 0.5 % — SIGNIFICANT CHANGE UP (ref 0–2)
BASOPHILS NFR BLD AUTO: 1 % — SIGNIFICANT CHANGE UP (ref 0–2)
BILIRUB SERPL-MCNC: 0.7 MG/DL — SIGNIFICANT CHANGE UP (ref 0.2–1.2)
BILIRUB SERPL-MCNC: 0.8 MG/DL — SIGNIFICANT CHANGE UP (ref 0.2–1.2)
BLD GP AB SCN SERPL QL: NEGATIVE — SIGNIFICANT CHANGE UP
BLOOD GAS VENOUS COMPREHENSIVE RESULT: SIGNIFICANT CHANGE UP
BUN SERPL-MCNC: 39 MG/DL — HIGH (ref 7–23)
BUN SERPL-MCNC: 40 MG/DL — HIGH (ref 7–23)
CALCIUM SERPL-MCNC: 9.1 MG/DL — SIGNIFICANT CHANGE UP (ref 8.4–10.5)
CALCIUM SERPL-MCNC: 9.6 MG/DL — SIGNIFICANT CHANGE UP (ref 8.4–10.5)
CHLORIDE SERPL-SCNC: 109 MMOL/L — HIGH (ref 98–107)
CHLORIDE SERPL-SCNC: 110 MMOL/L — HIGH (ref 98–107)
CK MB BLD-MCNC: 1.3 % — SIGNIFICANT CHANGE UP (ref 0–2.5)
CK MB CFR SERPL CALC: 1 NG/ML — SIGNIFICANT CHANGE UP
CK SERPL-CCNC: 75 U/L — SIGNIFICANT CHANGE UP (ref 25–170)
CO2 SERPL-SCNC: 25 MMOL/L — SIGNIFICANT CHANGE UP (ref 22–31)
CO2 SERPL-SCNC: 26 MMOL/L — SIGNIFICANT CHANGE UP (ref 22–31)
CREAT SERPL-MCNC: 0.8 MG/DL — SIGNIFICANT CHANGE UP (ref 0.5–1.3)
CREAT SERPL-MCNC: 0.81 MG/DL — SIGNIFICANT CHANGE UP (ref 0.5–1.3)
CULTURE RESULTS: SIGNIFICANT CHANGE UP
EGFR: 82 ML/MIN/1.73M2 — SIGNIFICANT CHANGE UP
EGFR: 83 ML/MIN/1.73M2 — SIGNIFICANT CHANGE UP
EOSINOPHIL # BLD AUTO: 0 K/UL — SIGNIFICANT CHANGE UP (ref 0–0.5)
EOSINOPHIL # BLD AUTO: 0 K/UL — SIGNIFICANT CHANGE UP (ref 0–0.5)
EOSINOPHIL NFR BLD AUTO: 0 % — SIGNIFICANT CHANGE UP (ref 0–6)
EOSINOPHIL NFR BLD AUTO: 0 % — SIGNIFICANT CHANGE UP (ref 0–6)
FERRITIN SERPL-MCNC: HIGH NG/ML (ref 15–150)
GLUCOSE BLDC GLUCOMTR-MCNC: 240 MG/DL — HIGH (ref 70–99)
GLUCOSE BLDC GLUCOMTR-MCNC: 275 MG/DL — HIGH (ref 70–99)
GLUCOSE BLDC GLUCOMTR-MCNC: 292 MG/DL — HIGH (ref 70–99)
GLUCOSE BLDC GLUCOMTR-MCNC: 301 MG/DL — HIGH (ref 70–99)
GLUCOSE BLDC GLUCOMTR-MCNC: 349 MG/DL — HIGH (ref 70–99)
GLUCOSE SERPL-MCNC: 225 MG/DL — HIGH (ref 70–99)
GLUCOSE SERPL-MCNC: 291 MG/DL — HIGH (ref 70–99)
GRAM STN FLD: SIGNIFICANT CHANGE UP
HCT VFR BLD CALC: 22.4 % — LOW (ref 34.5–45)
HCT VFR BLD CALC: 27.5 % — LOW (ref 34.5–45)
HGB BLD-MCNC: 7 G/DL — CRITICAL LOW (ref 11.5–15.5)
HGB BLD-MCNC: 8.6 G/DL — LOW (ref 11.5–15.5)
IANC: 3.34 K/UL — SIGNIFICANT CHANGE UP (ref 1.8–7.4)
IANC: 3.59 K/UL — SIGNIFICANT CHANGE UP (ref 1.8–7.4)
IMM GRANULOCYTES NFR BLD AUTO: 19.1 % — HIGH (ref 0–0.9)
IMM GRANULOCYTES NFR BLD AUTO: 19.8 % — HIGH (ref 0–0.9)
INR BLD: 1.18 RATIO — HIGH (ref 0.88–1.16)
LYMPHOCYTES # BLD AUTO: 0.69 K/UL — LOW (ref 1–3.3)
LYMPHOCYTES # BLD AUTO: 0.69 K/UL — LOW (ref 1–3.3)
LYMPHOCYTES # BLD AUTO: 11.7 % — LOW (ref 13–44)
LYMPHOCYTES # BLD AUTO: 11.9 % — LOW (ref 13–44)
MAGNESIUM SERPL-MCNC: 1.9 MG/DL — SIGNIFICANT CHANGE UP (ref 1.6–2.6)
MCHC RBC-ENTMCNC: 31.3 GM/DL — LOW (ref 32–36)
MCHC RBC-ENTMCNC: 31.3 GM/DL — LOW (ref 32–36)
MCHC RBC-ENTMCNC: 31.9 PG — SIGNIFICANT CHANGE UP (ref 27–34)
MCHC RBC-ENTMCNC: 33.7 PG — SIGNIFICANT CHANGE UP (ref 27–34)
MCV RBC AUTO: 101.9 FL — HIGH (ref 80–100)
MCV RBC AUTO: 107.7 FL — HIGH (ref 80–100)
MONOCYTES # BLD AUTO: 0.39 K/UL — SIGNIFICANT CHANGE UP (ref 0–0.9)
MONOCYTES # BLD AUTO: 0.64 K/UL — SIGNIFICANT CHANGE UP (ref 0–0.9)
MONOCYTES NFR BLD AUTO: 11 % — SIGNIFICANT CHANGE UP (ref 2–14)
MONOCYTES NFR BLD AUTO: 6.6 % — SIGNIFICANT CHANGE UP (ref 2–14)
MRSA PCR RESULT.: SIGNIFICANT CHANGE UP
NEUTROPHILS # BLD AUTO: 3.34 K/UL — SIGNIFICANT CHANGE UP (ref 1.8–7.4)
NEUTROPHILS # BLD AUTO: 3.59 K/UL — SIGNIFICANT CHANGE UP (ref 1.8–7.4)
NEUTROPHILS NFR BLD AUTO: 57.5 % — SIGNIFICANT CHANGE UP (ref 43–77)
NEUTROPHILS NFR BLD AUTO: 60.9 % — SIGNIFICANT CHANGE UP (ref 43–77)
NRBC # BLD: 4 /100 WBCS — HIGH (ref 0–0)
NRBC # BLD: 7 /100 WBCS — HIGH (ref 0–0)
NRBC # FLD: 0.26 K/UL — HIGH (ref 0–0)
NRBC # FLD: 0.39 K/UL — HIGH (ref 0–0)
PHOSPHATE SERPL-MCNC: 3.4 MG/DL — SIGNIFICANT CHANGE UP (ref 2.5–4.5)
PLATELET # BLD AUTO: 38 K/UL — LOW (ref 150–400)
PLATELET # BLD AUTO: 39 K/UL — LOW (ref 150–400)
POTASSIUM SERPL-MCNC: 4 MMOL/L — SIGNIFICANT CHANGE UP (ref 3.5–5.3)
POTASSIUM SERPL-MCNC: 4.2 MMOL/L — SIGNIFICANT CHANGE UP (ref 3.5–5.3)
POTASSIUM SERPL-SCNC: 4 MMOL/L — SIGNIFICANT CHANGE UP (ref 3.5–5.3)
POTASSIUM SERPL-SCNC: 4.2 MMOL/L — SIGNIFICANT CHANGE UP (ref 3.5–5.3)
PROT SERPL-MCNC: 5.2 G/DL — LOW (ref 6–8.3)
PROT SERPL-MCNC: 5.4 G/DL — LOW (ref 6–8.3)
PROTHROM AB SERPL-ACNC: 13.7 SEC — HIGH (ref 10.5–13.4)
RBC # BLD: 2.08 M/UL — LOW (ref 3.8–5.2)
RBC # BLD: 2.7 M/UL — LOW (ref 3.8–5.2)
RBC # FLD: 17.1 % — HIGH (ref 10.3–14.5)
RBC # FLD: 19.7 % — HIGH (ref 10.3–14.5)
RH IG SCN BLD-IMP: POSITIVE — SIGNIFICANT CHANGE UP
S AUREUS DNA NOSE QL NAA+PROBE: SIGNIFICANT CHANGE UP
SODIUM SERPL-SCNC: 142 MMOL/L — SIGNIFICANT CHANGE UP (ref 135–145)
SODIUM SERPL-SCNC: 143 MMOL/L — SIGNIFICANT CHANGE UP (ref 135–145)
SPECIMEN SOURCE: SIGNIFICANT CHANGE UP
SPECIMEN SOURCE: SIGNIFICANT CHANGE UP
TRIGL SERPL-MCNC: 213 MG/DL — HIGH
VANCOMYCIN FLD-MCNC: 15.2 UG/ML — SIGNIFICANT CHANGE UP
VANCOMYCIN TROUGH SERPL-MCNC: 23.3 UG/ML — HIGH (ref 10–20)
WBC # BLD: 5.81 K/UL — SIGNIFICANT CHANGE UP (ref 3.8–10.5)
WBC # BLD: 5.9 K/UL — SIGNIFICANT CHANGE UP (ref 3.8–10.5)
WBC # FLD AUTO: 5.81 K/UL — SIGNIFICANT CHANGE UP (ref 3.8–10.5)
WBC # FLD AUTO: 5.9 K/UL — SIGNIFICANT CHANGE UP (ref 3.8–10.5)

## 2022-12-09 PROCEDURE — 99291 CRITICAL CARE FIRST HOUR: CPT | Mod: 25

## 2022-12-09 PROCEDURE — 99232 SBSQ HOSP IP/OBS MODERATE 35: CPT | Mod: GC

## 2022-12-09 PROCEDURE — 31645 BRNCHSC W/THER ASPIR 1ST: CPT

## 2022-12-09 PROCEDURE — 93010 ELECTROCARDIOGRAM REPORT: CPT

## 2022-12-09 RX ORDER — HUMAN INSULIN 100 [IU]/ML
6 INJECTION, SUSPENSION SUBCUTANEOUS EVERY 6 HOURS
Refills: 0 | Status: DISCONTINUED | OUTPATIENT
Start: 2022-12-09 | End: 2022-12-10

## 2022-12-09 RX ORDER — ACETAMINOPHEN 500 MG
1000 TABLET ORAL ONCE
Refills: 0 | Status: COMPLETED | OUTPATIENT
Start: 2022-12-09 | End: 2022-12-09

## 2022-12-09 RX ORDER — MAGNESIUM SULFATE 500 MG/ML
1 VIAL (ML) INJECTION ONCE
Refills: 0 | Status: COMPLETED | OUTPATIENT
Start: 2022-12-09 | End: 2022-12-09

## 2022-12-09 RX ORDER — VANCOMYCIN HCL 1 G
1000 VIAL (EA) INTRAVENOUS EVERY 12 HOURS
Refills: 0 | Status: DISCONTINUED | OUTPATIENT
Start: 2022-12-09 | End: 2022-12-10

## 2022-12-09 RX ORDER — FENTANYL CITRATE 50 UG/ML
100 INJECTION INTRAVENOUS ONCE
Refills: 0 | Status: DISCONTINUED | OUTPATIENT
Start: 2022-12-09 | End: 2022-12-09

## 2022-12-09 RX ORDER — MIDAZOLAM HYDROCHLORIDE 1 MG/ML
4 INJECTION, SOLUTION INTRAMUSCULAR; INTRAVENOUS ONCE
Refills: 0 | Status: DISCONTINUED | OUTPATIENT
Start: 2022-12-09 | End: 2022-12-09

## 2022-12-09 RX ADMIN — MIDAZOLAM HYDROCHLORIDE 4 MILLIGRAM(S): 1 INJECTION, SOLUTION INTRAMUSCULAR; INTRAVENOUS at 14:38

## 2022-12-09 RX ADMIN — MEROPENEM 100 MILLIGRAM(S): 1 INJECTION INTRAVENOUS at 06:07

## 2022-12-09 RX ADMIN — MEROPENEM 100 MILLIGRAM(S): 1 INJECTION INTRAVENOUS at 22:40

## 2022-12-09 RX ADMIN — CHLORHEXIDINE GLUCONATE 15 MILLILITER(S): 213 SOLUTION TOPICAL at 18:54

## 2022-12-09 RX ADMIN — POLYETHYLENE GLYCOL 3350 17 GRAM(S): 17 POWDER, FOR SOLUTION ORAL at 14:38

## 2022-12-09 RX ADMIN — HUMAN INSULIN 6 UNIT(S): 100 INJECTION, SUSPENSION SUBCUTANEOUS at 23:56

## 2022-12-09 RX ADMIN — Medication 6 MILLIGRAM(S): at 22:40

## 2022-12-09 RX ADMIN — HUMAN INSULIN 3 UNIT(S): 100 INJECTION, SUSPENSION SUBCUTANEOUS at 06:09

## 2022-12-09 RX ADMIN — MIDODRINE HYDROCHLORIDE 10 MILLIGRAM(S): 2.5 TABLET ORAL at 14:42

## 2022-12-09 RX ADMIN — MEROPENEM 100 MILLIGRAM(S): 1 INJECTION INTRAVENOUS at 14:31

## 2022-12-09 RX ADMIN — Medication 3: at 06:08

## 2022-12-09 RX ADMIN — Medication 4: at 23:57

## 2022-12-09 RX ADMIN — Medication 3 MILLILITER(S): at 02:26

## 2022-12-09 RX ADMIN — Medication 4: at 17:27

## 2022-12-09 RX ADMIN — Medication 1000 MILLIGRAM(S): at 21:15

## 2022-12-09 RX ADMIN — TENOFOVIR DISOPROXIL FUMARATE 25 MILLIGRAM(S): 300 TABLET, FILM COATED ORAL at 14:38

## 2022-12-09 RX ADMIN — HUMAN INSULIN 3 UNIT(S): 100 INJECTION, SUSPENSION SUBCUTANEOUS at 17:27

## 2022-12-09 RX ADMIN — Medication 400 MILLIGRAM(S): at 20:38

## 2022-12-09 RX ADMIN — Medication 1 DROP(S): at 06:07

## 2022-12-09 RX ADMIN — FENTANYL CITRATE 100 MICROGRAM(S): 50 INJECTION INTRAVENOUS at 14:38

## 2022-12-09 RX ADMIN — PROPOFOL 10.4 MICROGRAM(S)/KG/MIN: 10 INJECTION, EMULSION INTRAVENOUS at 19:57

## 2022-12-09 RX ADMIN — Medication 2: at 12:37

## 2022-12-09 RX ADMIN — Medication 3 MILLILITER(S): at 15:35

## 2022-12-09 RX ADMIN — HUMAN INSULIN 3 UNIT(S): 100 INJECTION, SUSPENSION SUBCUTANEOUS at 00:44

## 2022-12-09 RX ADMIN — Medication 40 MILLIGRAM(S): at 06:06

## 2022-12-09 RX ADMIN — Medication 1 DROP(S): at 18:54

## 2022-12-09 RX ADMIN — ELTROMBOPAG OLAMINE 50 MILLIGRAM(S): 50 TABLET, FILM COATED ORAL at 15:43

## 2022-12-09 RX ADMIN — Medication 12.5 MILLIGRAM(S): at 06:05

## 2022-12-09 RX ADMIN — MIDODRINE HYDROCHLORIDE 10 MILLIGRAM(S): 2.5 TABLET ORAL at 22:40

## 2022-12-09 RX ADMIN — PROPOFOL 10.4 MICROGRAM(S)/KG/MIN: 10 INJECTION, EMULSION INTRAVENOUS at 10:15

## 2022-12-09 RX ADMIN — SENNA PLUS 10 MILLILITER(S): 8.6 TABLET ORAL at 14:37

## 2022-12-09 RX ADMIN — Medication 3 MILLILITER(S): at 20:25

## 2022-12-09 RX ADMIN — Medication 1 TABLET(S): at 14:38

## 2022-12-09 RX ADMIN — Medication 3 MILLILITER(S): at 10:56

## 2022-12-09 RX ADMIN — CHLORHEXIDINE GLUCONATE 1 APPLICATION(S): 213 SOLUTION TOPICAL at 12:38

## 2022-12-09 RX ADMIN — CHLORHEXIDINE GLUCONATE 15 MILLILITER(S): 213 SOLUTION TOPICAL at 06:08

## 2022-12-09 RX ADMIN — MIDODRINE HYDROCHLORIDE 10 MILLIGRAM(S): 2.5 TABLET ORAL at 06:04

## 2022-12-09 RX ADMIN — Medication 100 GRAM(S): at 03:35

## 2022-12-09 RX ADMIN — Medication 12.5 MILLIGRAM(S): at 17:27

## 2022-12-09 RX ADMIN — HUMAN INSULIN 3 UNIT(S): 100 INJECTION, SUSPENSION SUBCUTANEOUS at 12:38

## 2022-12-09 NOTE — PROCEDURE NOTE - NSBRONCHFINDINGS_GEN_A_CORE_FT
ETT is in good position. Scant secretions were noted in distal ETT which were cleared. Airways erythematous however all segments were patent. A BAL was done at the [facility] and specimen was collected for BAL culture.      ETT is in good position. Scant secretions were noted in distal ETT which were cleared. Airways erythematous however all segments were patent. A BAL was done at the [facility] and specimen was collected for culture.      ETT is in good position. Scant secretions were noted in distal ETT which were cleared. Airways erythematous however all segments were patent. A BAL was done at the [facility] and specimen was collected for culture.

## 2022-12-09 NOTE — PROGRESS NOTE ADULT - ASSESSMENT
62 f with HTN, MM (diagnosed ~10 years ago, currently on Promacta treatments since June 2022m brought in for AMS, lethargy, ?slurred speech here febrile to 105.5, hypotensive, tachycardic, tachypneic and hypoxic   WBC: 1.8, ANC: 970  lactate: 6  s/p code stroke but CT negative, s/p LP WBC: 1, neutrophil: 0, glucose: 93, protein 33, negative PCR and gram stain  blood cx: E-coli  chest CT: Posterior right upper lobe and right basilar consolidation and additional right middle lobe and left basilar patchy opacities; findings compatible with aspiration or infection.    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  elevated ferritin and IL2 concerning for HLH with hypoxic and hypercapnic resp failure again after blood transfusion  reintubated 12/7, repeat blood and sputum cx negative chest CTA: no PE, improved RUL pneumonia but now with increased RLL consolidation and volume loss s/o atelectasis  * s/p bronc today f/u the BAL cx  * blood cx cleared 12/2 and repeat blood, sputum cx negative so no need for more vanco, trough was elevated today anyway  * c/w max for now, started 12/8, now day 2  * will need a 10 day course post negative blood cx on 12/11  * monitor CBC/diff and CMP  * f/u with hem/onc but they stated no interventions regarding possible HLH    The above assessment and plan was discussed with ALISON Lovell MD  contact on teams  After 5pm and on weekends call [phone].

## 2022-12-09 NOTE — PROGRESS NOTE ADULT - SUBJECTIVE AND OBJECTIVE BOX
INTERVAL HPI/OVERNIGHT EVENTS:     HPI: 62 yoF w/ pmhx of MM (diagnosed ~10 years ago, currently on Promacta treatments since 2022 - most recently last Wednesday; followed by oncologist, Dr. Lopez at [facility]), HTN, neuropathic R hip pain, recent R corneal tear, presents to [facility] ED for change in mental status. LKW approximately 14:30. Patient was in normal state of health this morning as per son, Nacho. Patient went to a follow up ophtho appointment for a recently diagnosed corneal abrasion this past Monday. Patient was picked up by a friend around 10:00 for an optho appt with normal mental status, pt was just complaining of being sleepy. On the way back from appointment around 14:30, patient stopped responding to friend appropriately in the car and she appeared more tired & weaker than usual. She started having slurred speech when they arrived home and then son called EMS. In the ED, patient with persisting word finding difficulties and generalized weakness, but leaning to the R side. Patient reports some difficulty remembering what happened today, but reports she feels generally weak and very tired. Code stroke called, but TPA not given because of improvement in pt's symptoms.  Pt states that she had a similar episode a few years ago with fever which she was told was likely from a MM, she improved after getting steroids.     In the ED pt was febrile 105.5, , /84, RR 24 and saturating well on RA. Pt given 2L IVF bolus and 1x vanc/ceftriaxone/acyclovir/ampicillin s/p LP in ER negative results.  Patient was RR later that day for hypotension/ hypoxia requiring intubation and pressor support, brought to MICU in shock. Extubated 12/3 to face tent reintubated  in setting of increased WOB and hypercapnic respiratory failure unclear cause possibly HLH induced.       SUBJECTIVE: Patient seen and examined at bedside.     ROS: Unable to assess as patient intubated/sedated.     OBJECTIVE:    VITAL SIGNS:  ICU Vital Signs Last 24 Hrs  T(C): 37.3 (08 Dec 2022 00:00), Max: 37.3 (08 Dec 2022 00:00)  T(F): 99.1 (08 Dec 2022 00:00), Max: 99.1 (08 Dec 2022 00:00)  HR: 79 (08 Dec 2022 05:00) (66 - 135)  BP: 114/54 (08 Dec 2022 05:00) (106/61 - 167/85)  BP(mean): 69 (08 Dec 2022 05:00) (67 - 105)  ABP: --  ABP(mean): --  RR: 17 (08 Dec 2022 05:00) (17 - 35)  SpO2: 100% (08 Dec 2022 05:00) (96% - 100%)    O2 Parameters below as of 08 Dec 2022 05:00  Patient On (Oxygen Delivery Method): ventilator    O2 Concentration (%): 40      Mode: AC/ CMV (Assist Control/ Continuous Mandatory Ventilation), RR (machine): 24, TV (machine): 450, FiO2: 30, PEEP: 5, MAP: 9, PIP: 23     @ 07:01  -   @ 07:00  --------------------------------------------------------  IN: 1939 mL / OUT: 2425 mL / NET: -486 mL      CAPILLARY BLOOD GLUCOSE      POCT Blood Glucose.: 227 mg/dL (08 Dec 2022 06:48)      PHYSICAL EXAM:    General: NAD  HEENT: NC/AT; pupils pinpoint clear conjunctiva ? difference in nasolabial fold on L side with weakness of L face  Neck: supple  Respiratory: CTA b/l  Cardiovascular: +S1/S2; RRR  Abdomen: soft, NT/ND; +BS x4  Extremities: WWP, 2+ peripheral pulses b/l; + non pitting edema in 4 extremities   Skin: normal color and turgor; no rash  Neurological: intubated, sedated, opening eyes following some intermittent commands. Doing well on SBT    MEDICATIONS:  MEDICATIONS  (STANDING):  albuterol/ipratropium for Nebulization 3 milliLiter(s) Nebulizer every 6 hours  artificial tears (preservative free) Ophthalmic Solution 1 Drop(s) Both EYES two times a day  chlorhexidine 0.12% Liquid 15 milliLiter(s) Oral Mucosa every 12 hours  chlorhexidine 2% Cloths 1 Application(s) Topical daily  dextrose 5%. 1000 milliLiter(s) (50 mL/Hr) IV Continuous <Continuous>  dextrose 5%. 1000 milliLiter(s) (100 mL/Hr) IV Continuous <Continuous>  dextrose 50% Injectable 25 Gram(s) IV Push once  dextrose 50% Injectable 12.5 Gram(s) IV Push once  dextrose 50% Injectable 25 Gram(s) IV Push once  eltrombopag 50 milliGRAM(s) Oral daily  glucagon  Injectable 1 milliGRAM(s) IntraMuscular once  insulin lispro (ADMELOG) corrective regimen sliding scale   SubCutaneous every 6 hours  meropenem  IVPB      meropenem  IVPB 1000 milliGRAM(s) IV Intermittent every 8 hours  methylPREDNISolone sodium succinate Injectable 40 milliGRAM(s) IV Push daily  metoprolol tartrate 12.5 milliGRAM(s) Oral two times a day  midodrine 10 milliGRAM(s) Oral every 8 hours  multivitamin 1 Tablet(s) Oral daily  norepinephrine Infusion 0.05 MICROgram(s)/kG/Min (8.16 mL/Hr) IV Continuous <Continuous>  polyethylene glycol 3350 17 Gram(s) Oral daily  potassium phosphate IVPB 30 milliMole(s) IV Intermittent once  propofol Infusion 20 MICROgram(s)/kG/Min (10.4 mL/Hr) IV Continuous <Continuous>  senna Syrup 10 milliLiter(s) Oral daily  tenofovir alafenamide (VEMLIDY) 25 milliGRAM(s) Oral daily  vancomycin  IVPB      vancomycin  IVPB 1250 milliGRAM(s) IV Intermittent every 12 hours    MEDICATIONS  (PRN):  dextrose Oral Gel 15 Gram(s) Oral once PRN Blood Glucose LESS THAN 70 milliGRAM(s)/deciliter  melatonin 6 milliGRAM(s) Oral at bedtime PRN Insomnia      ALLERGIES:  Allergies    Bactrim (Other)  fluconazole (Vomiting; Nausea)  levofloxacin (Vomiting; Nausea)  penicillin (Other)    Intolerances        LABS:                        8.0    6.34  )-----------( 44       ( 08 Dec 2022 01:50 )             24.3     12-08    152<H>  |  113<H>  |  40<H>  ----------------------------<  245<H>  3.4<L>   |  24  |  0.86    Ca    9.5      08 Dec 2022 01:50  Phos  1.9     12-08  Mg     2.00     12-08    TPro  6.0  /  Alb  2.9<L>  /  TBili  1.1  /  DBili  x   /  AST  100<H>  /  ALT  50<H>  /  AlkPhos  187<H>  12-    PT/INR - ( 08 Dec 2022 01:50 )   PT: 14.4 sec;   INR: 1.24 ratio           Urinalysis Basic - ( 07 Dec 2022 18:18 )    Color: Red / Appearance: bloody / S.013 / pH: x  Gluc: x / Ketone: Negative  / Bili: Negative / Urobili: <2 mg/dL   Blood: x / Protein: 100 mg/dL / Nitrite: Negative   Leuk Esterase: Small / RBC: >10 /HPF / WBC 4 /HPF   Sq Epi: x / Non Sq Epi: 2 /HPF / Bacteria: Moderate        RADIOLOGY & ADDITIONAL TESTS: Reviewed. INTERVAL HPI/OVERNIGHT EVENTS: Off levophed at 1am. CTH was negative for acute intracranial pathology. Hypernatremia resolved, Na 148>142. Free water was discontinued overnight.     HPI: 62 yoF w/ pmhx of MM (diagnosed ~10 years ago, currently on Promacta treatments since 2022 - most recently last Wednesday; followed by oncologist, Dr. Lopez at [facility]), HTN, neuropathic R hip pain, recent R corneal tear, presents to [facility] ED for change in mental status. LKW approximately 14:30. Patient was in normal state of health this morning as per son, Nacho. Patient went to a follow up ophtho appointment for a recently diagnosed corneal abrasion this past Monday. Patient was picked up by a friend around 10:00 for an optho appt with normal mental status, pt was just complaining of being sleepy. On the way back from appointment around 14:30, patient stopped responding to friend appropriately in the car and she appeared more tired & weaker than usual. She started having slurred speech when they arrived home and then son called EMS. In the ED, patient with persisting word finding difficulties and generalized weakness, but leaning to the R side. Patient reports some difficulty remembering what happened today, but reports she feels generally weak and very tired. Code stroke called, but TPA not given because of improvement in pt's symptoms.  Pt states that she had a similar episode a few years ago with fever which she was told was likely from a MM, she improved after getting steroids.     In the ED pt was febrile 105.5, , /84, RR 24 and saturating well on RA. Pt given 2L IVF bolus and 1x vanc/ceftriaxone/acyclovir/ampicillin s/p LP in ER negative results.  Patient was RR later that day for hypotension/ hypoxia requiring intubation and pressor support, brought to MICU in shock. Extubated 12/3 to face tent reintubated  in setting of increased WOB and hypercapnic respiratory failure unclear cause possibly HLH induced.       SUBJECTIVE: Patient seen and examined at bedside.     ROS: Unable to assess as patient intubated/sedated.     OBJECTIVE:    VITAL SIGNS:  ICU Vital Signs Last 24 Hrs  T(C): 37.3 (08 Dec 2022 00:00), Max: 37.3 (08 Dec 2022 00:00)  T(F): 99.1 (08 Dec 2022 00:00), Max: 99.1 (08 Dec 2022 00:00)  HR: 79 (08 Dec 2022 05:00) (66 - 135)  BP: 114/54 (08 Dec 2022 05:00) (106/61 - 167/85)  BP(mean): 69 (08 Dec 2022 05:00) (67 - 105)  ABP: --  ABP(mean): --  RR: 17 (08 Dec 2022 05:00) (17 - 35)  SpO2: 100% (08 Dec 2022 05:00) (96% - 100%)    O2 Parameters below as of 08 Dec 2022 05:00  Patient On (Oxygen Delivery Method): ventilator    O2 Concentration (%): 40      Mode: AC/ CMV (Assist Control/ Continuous Mandatory Ventilation), RR (machine): 24, TV (machine): 450, FiO2: 30, PEEP: 5, MAP: 9, PIP: 23     @ 07:01  -   @ 07:00  --------------------------------------------------------  IN: 1939 mL / OUT: 2425 mL / NET: -486 mL      CAPILLARY BLOOD GLUCOSE      POCT Blood Glucose.: 227 mg/dL (08 Dec 2022 06:48)      PHYSICAL EXAM:    General: NAD  HEENT: NC/AT; pupils pinpoint clear conjunctiva ? difference in nasolabial fold on L side with weakness of L face  Neck: supple  Respiratory: CTA b/l  Cardiovascular: +S1/S2; RRR  Abdomen: soft, NT/ND; +BS x4  Extremities: WWP, 2+ peripheral pulses b/l; + non pitting edema in 4 extremities   Skin: normal color and turgor; no rash  Neurological: intubated, sedated, opening eyes following some intermittent commands. Doing well on SBT    MEDICATIONS:  MEDICATIONS  (STANDING):  albuterol/ipratropium for Nebulization 3 milliLiter(s) Nebulizer every 6 hours  artificial tears (preservative free) Ophthalmic Solution 1 Drop(s) Both EYES two times a day  chlorhexidine 0.12% Liquid 15 milliLiter(s) Oral Mucosa every 12 hours  chlorhexidine 2% Cloths 1 Application(s) Topical daily  dextrose 5%. 1000 milliLiter(s) (50 mL/Hr) IV Continuous <Continuous>  dextrose 5%. 1000 milliLiter(s) (100 mL/Hr) IV Continuous <Continuous>  dextrose 50% Injectable 25 Gram(s) IV Push once  dextrose 50% Injectable 12.5 Gram(s) IV Push once  dextrose 50% Injectable 25 Gram(s) IV Push once  eltrombopag 50 milliGRAM(s) Oral daily  glucagon  Injectable 1 milliGRAM(s) IntraMuscular once  insulin lispro (ADMELOG) corrective regimen sliding scale   SubCutaneous every 6 hours  meropenem  IVPB      meropenem  IVPB 1000 milliGRAM(s) IV Intermittent every 8 hours  methylPREDNISolone sodium succinate Injectable 40 milliGRAM(s) IV Push daily  metoprolol tartrate 12.5 milliGRAM(s) Oral two times a day  midodrine 10 milliGRAM(s) Oral every 8 hours  multivitamin 1 Tablet(s) Oral daily  norepinephrine Infusion 0.05 MICROgram(s)/kG/Min (8.16 mL/Hr) IV Continuous <Continuous>  polyethylene glycol 3350 17 Gram(s) Oral daily  potassium phosphate IVPB 30 milliMole(s) IV Intermittent once  propofol Infusion 20 MICROgram(s)/kG/Min (10.4 mL/Hr) IV Continuous <Continuous>  senna Syrup 10 milliLiter(s) Oral daily  tenofovir alafenamide (VEMLIDY) 25 milliGRAM(s) Oral daily  vancomycin  IVPB      vancomycin  IVPB 1250 milliGRAM(s) IV Intermittent every 12 hours    MEDICATIONS  (PRN):  dextrose Oral Gel 15 Gram(s) Oral once PRN Blood Glucose LESS THAN 70 milliGRAM(s)/deciliter  melatonin 6 milliGRAM(s) Oral at bedtime PRN Insomnia      ALLERGIES:  Allergies    Bactrim (Other)  fluconazole (Vomiting; Nausea)  levofloxacin (Vomiting; Nausea)  penicillin (Other)    Intolerances        LABS:                        8.0    6.34  )-----------( 44       ( 08 Dec 2022 01:50 )             24.3     12-08    152<H>  |  113<H>  |  40<H>  ----------------------------<  245<H>  3.4<L>   |  24  |  0.86    Ca    9.5      08 Dec 2022 01:50  Phos  1.9     12-  Mg     2.00     -    TPro  6.0  /  Alb  2.9<L>  /  TBili  1.1  /  DBili  x   /  AST  100<H>  /  ALT  50<H>  /  AlkPhos  187<H>  12-    PT/INR - ( 08 Dec 2022 01:50 )   PT: 14.4 sec;   INR: 1.24 ratio           Urinalysis Basic - ( 07 Dec 2022 18:18 )    Color: Red / Appearance: bloody / S.013 / pH: x  Gluc: x / Ketone: Negative  / Bili: Negative / Urobili: <2 mg/dL   Blood: x / Protein: 100 mg/dL / Nitrite: Negative   Leuk Esterase: Small / RBC: >10 /HPF / WBC 4 /HPF   Sq Epi: x / Non Sq Epi: 2 /HPF / Bacteria: Moderate        RADIOLOGY & ADDITIONAL TESTS: Reviewed.

## 2022-12-09 NOTE — PROCEDURE NOTE - NSBRONCHPROCDETAILS_GEN_A_CORE_FT
Bronchoscope inserted through ETT. The trachea and main conrado was inspected. The right and left bronchial tree was then examined down to the subsegmental level.    Findings:  ETT is in good position. Scant secretions initially noted in distal ETT which was cleared. Airways erythematous however all segments were patent. A BAL was done at the RLL and specimen was collected for BAL culture.      Bronchoscope inserted through ETT. The trachea and main conrado was inspected. The right and left bronchial tree was then examined down to the subsegmental level.

## 2022-12-09 NOTE — PROGRESS NOTE ADULT - ASSESSMENT
Patient is a 63 yo F w/ PMHx of MM (diagnosed ~10 years ago, currently on Promacta treatments since June 2022 - most recently last Wednesday; followed by oncologist, Dr. Lopez at [facility]), ?ITP, Hep B, HTN, neuropathic R hip pain, recent R corneal tear admitted for AMS, code stroke called no acute infarct or hemorrhage, s/p LP findings not consistent with meningitis, course complicated by hypotension and hypoxia, placed on BiPAP, requiring pressors, admitted to MICU with sepsis likely secondary to pneumonia- intubated 11/30. Extubated 12/3 to face tent reintubated 12/7 in setting of increased WOB and hypercapneic respiratory failure unclear cause possibly HLH induced.     PLAN  Neuro  - s/p code stroke --> no ischemia or hemorrhage on CT H+N  - s/p LP results not consistent with encephalitis/meningitis, ammonia level 30   - baseline is AOx3  - sedated on propofol   - Repeat CTH today for ? neurological cause for respiratory distress causing reintubation- also unsymmetrical nasolabial folds seen on exam       --Cardiovascular  #Shock sepsis vs HLH vs vasoplegic   # EKG changes- possible HLH induced cardiac complications?  - suspect sepsis from E. Coli bacteremia with GI or urinary source?  - pt required levophed for intubation- titrating down currently on 0.2   - midodrine 10 q 8  - ECHO 11/30 EF 55 normal LV/RV   - TSH wnl  - has been intermittently being diuresed with lasix- last dose this am 12/8 20mg IV also has been receiving IV dextrose for hyponatremia held this am in setting of ?pulmonary edema vs new cardiac event   - EKG 12/7- new Twav inversions V2-V6, II, III, AVF- EKG 6 hrs later slightly improved will repeat today   - Trop 100->102-> no longer trending (patient not a candidate for Asp/ hep given pancytopenia)   - POCUS 12/8 nrml function no segmental changes VTI 22    #new onset MAR  - MAR AF in 130's on 12/4, resolved, additional event 12/7 in setting of missed PO metoprolol resolved with IV lopressor    - continue Lopressor tartrate 12.5 mg BID p  - [id number] score is 3   - unable to anticoagulate in the setting of thrombocytopenia   - SCD's are in place   - continue telemetry monitoring        Pulmonary  #AHRF- likely secondary to PNA infection vs Aspiration   - s/p BiPAP, 10/5 on 50%, then intubated 11/30 for increased work of breathing  - Extubated 12/3 to face tent  - patient with increased WOB/ tachypnea overnight 12/6 after blood transfusion possible TACO? patient requiring BIPAP 10/5 50%   - Reintubated 12/7 in setting of WOB/respiratory distress and hypercapneic respiratory failure.   - MV 12/420/5/40% doing well on SBT 10/5 50%- alkalotic 7.5/34/138/27/98 will decreased to 8/5 50%  - CTA to r/o PE reordered 12/8  - antibiotics as below   - MRSA/MSSA resent 12/8   - ID following recs appreciated   - CTA 11/30: neg for PE.  RUL R basilar consolidation RML basilar opacity  - repeat CXR on 12/4-worsening multilobar consolidations improved 12/7   - diureseses with lasix PRN for goal of net neg last dose this am 12/8  - solumedrol 40 started daily 12/7   - duonebs q 6   - Duplex to r/o DVTs    GI  #Diet  - NPO with tube feeds  -Nutrition consult placed on pivot titrate to goal   - on bowel regimen having BM       #Elevated liver enzymes, stable  - patient has reported hx of Hep B 2017 as per hemonc   - LFTs slightly elevated AST/ALT- 123/61 >143/77>123/73>100/50   - bili also elevated but stable 1.3-> 1.5-> 1.4 >1.0 continue to monitor   - hepatitis panel - Hep B surface AG +, and the rest is negative    - confirmatory test - hepatitis B quantitative - negative    -Spoke with ROSA Montejo from hepatology office, Dr Florina Jones Re: continuation of Tenofovir. [facility] [phone]. 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- outpt follow up after discharge- restarted Tenofovir 25 mg daily     Renal  #GARCIA  - SCr 0.86 today- slight bump yesterday from .77-> .94 now 0.86  - CPK elevated, - suspect secondary to sepsis, trending down 1800-> 2800-> 1007>787>324 - no longer trending   - Is and Os: neg 486, neg 3.4 L for LOS   - UA resent 12/7 slightly positive with mod bacteria and WBC   - intermittently diuresing with lasix- was given 20mg this am for goal of net neg fluid balance    - ortiz replaced 12/7     #hypernatremia   - NA- 147>150>152>153>151> 155>149> 152> 155> 151  - on free water 300 q 6   - Trend BMP q 6    ID  #E. Coli bacteremia- pan sensitive 11/29   -Tmax overnight 99.1, WBC 4.09 -> 6.34 but patient with worsening respiratory status causing reintubation 12/7  - antibiotics broadened to vanco and meropenem (12/7- )  - Bcx, UA, Ucx, MRSA resent 12/7 F/U  - Sputum cx 12/7 GPR and GPC f/U final   - s/p ctx (12/5-12/7)    - s/p Cefepime 2g q8h (11/30- 12/5) deescalated to ctx  - s/p Azithromycin 500 mg started (12/2- 12/3) , legionella neg  - s/p vanc/CTX/ampicillin/acyclovir in ER for suspected meningitis workup   -Bcx 12/2, 11/29 neg   - Fungitel 39   - ID following     SKIN  # sacral decub-DTI., Wound care nurse recs in place     Endo  - no history of diabetes, A1C 6.0  - TSH wnl  - FS stable - in the 200s but adjusting tube feeds  - ISS q 6  - may need to start NPH     Heme  #MM  - as per Hemonc note MM in remission 5/22 has been on Promacta since 6/22 has received multiple treatments for MM in the last also PBSCT x 2 and haplo allo stem cell transplant in 2020- last spike noted sept 2022-was given belantamab- last treatment was on 11/23/22  - holding anti-myeloma therapy  -  s/p filgrastim held now as patient no longer neutropenic 12/7  - private Hemonc following [facility] recs appreciated     Pancytopenia  #concern for MAHA, possible HUS with E. Coli bacteremia? vs HLH?improving  - new thrombocytopenia, anemia, and +hemolysis labs (bili uptrending and now normalized)  -? reported history as per hemonc of ITP  - direct dede negative  - some schistocytes on peripheral smear, confirmed with hematology/oncology, however higher suspicion for lab abnormalities are secondary to sepsis  - Platelet transfusion protocol: <10, <20 with fever, <50 procedures/active bleeding/etc   - Patient received 1u plat 12/3, recieved 1/2 u PRBC 12/7   - Transfuse PRBC < 7  - Patient meets criteria for HLH (ferritin worsening 44428> 30922, trig 519->191, pancytopenia - 3 cell lines, persistent fever, IL2 8324, hepatitis) Us neg for splenomegaly   -continue home Promacta, 50 mg daily   - Spoke with hemonc at [facility] about HLH- states labs could also be elevated in setting of sepsis and at this point treatment for HLH would be to treat underlying cause of infection and patient would not be a candidate for high dose immunosuppressant (Risks outweigh benefits)   - trend HLH labs daily   - s/p neupogen D/C as patient no longer neutropenic     #DVT ppx  - hold off on DVT ppx for now in setting of pancytopenia   - SCD's in place   - Duplex LE to eval for possible DVT vs PE    Skin  #Suspected DTI on sacrum and ear as per nurse  - Wound care recs appreciated      Ethics  - updated son, he is pediatric resident and patient's HCP  - Pt is full code    Dispo: TELE  -PT consult VINCENT  - kerri Griffith is at the bedside, Updated. All questions answered      Patient is a 61 yo F w/ PMHx of MM (diagnosed ~10 years ago, currently on Promacta treatments since June 2022 - most recently last Wednesday; followed by oncologist, Dr. Lopez at [facility]), ?ITP, Hep B, HTN, neuropathic R hip pain, recent R corneal tear admitted for AMS, code stroke called no acute infarct or hemorrhage, s/p LP findings not consistent with meningitis, course complicated by hypotension and hypoxia, placed on BiPAP, requiring pressors, admitted to MICU with sepsis likely secondary to pneumonia- intubated 11/30. Extubated 12/3 to face tent reintubated 12/7 in setting of increased WOB and hypercapneic respiratory failure unclear cause possibly HLH induced.     PLAN  Neuro  - s/p code stroke -> no ischemia or hemorrhage on CT H+N  - s/p LP results not consistent with encephalitis/meningitis, ammonia level 30   - baseline is AOx3  - sedated on propofol    - Repeat CTH today for ? neurological cause for respiratory distress causing reintubation- also unsymmetrical nasolabial folds seen on exam     Cardiovascular  #Shock sepsis vs HLH vs vasoplegic   # EKG changes- possible HLH induced cardiac complications?  - suspect sepsis from E. Coli bacteremia with GI or urinary source?  - pt required levophed for intubation- titrating down currently on 0.2   - midodrine 10 q 8  - ECHO 11/30 EF 55 normal LV/RV   - TSH wnl  - has been intermittently being diuresed with lasix- last dose this am 12/8 20mg IV also has been receiving IV dextrose for hyponatremia held this am in setting of ?pulmonary edema vs new cardiac event   - EKG 12/7- new Twave inversions V2-V6, II, III, AVF- EKG 6 hrs later slightly improved, check repeat today   - Trop 100->102-> no longer trending (patient not a candidate for Asp/ hep given pancytopenia)   - POCUS 12/8 normal function no segmental changes VTI 22    #new onset MAR  - MAR AF in 130's on 12/4, resolved, additional event 12/7 in setting of missed PO metoprolol resolved with IV lopressor    - continue Lopressor tartrate 12.5 mg BID   - [id number] score is 3   - unable to anticoagulate in the setting of thrombocytopenia   - SCD's are in place   - continue telemetry monitoring        Pulmonary  #AHRF- likely secondary to PNA infection vs Aspiration   - s/p BiPAP, 10/5 on 50%, then intubated 11/30 for increased work of breathing  - Extubated 12/3 to face tent  - patient with increased WOB/ tachypnea overnight 12/6 after blood transfusion possible TACO? patient requiring BIPAP 10/5 50%   - Reintubated 12/7 in setting of WOB/respiratory distress and hypercapnic respiratory failure.   - MV 12/420/5/40% doing well on SBT 10/5 50%- alkalotic 7.5/34/138/27/98 will decreased to 8/5 50%  - antibiotics as below   - MRSA/MSSA resent 12/8   - ID following recs appreciated   - CTA 11/30: neg for PE.  RUL R basilar consolidation RML basilar opacity  - repeat CXR on 12/4-worsening multilobar consolidations improved 12/7   - CTA to r/o PE 12/8- negative for PE ,Consolidation due to pneumonia involving the dependent portion of the RUL with interval improvement since November 30, 2022. RLL consolidation with associated volume loss increased since November 30, 2022 suggestive of partial compressive atelectasis adjacent to the increasing small right pleural effusion. Patchy consolidation within the dependent portion of the left lower lobe increased in size since November 30, 2022 suggestive of atelectasis given its homogeneous enhancement although superimposed pneumonia is not excluded. Small right and trace left pleural effusions.  - diureses with lasix PRN for goal of net neg last dose 12/7  - solumedrol 40 started daily 12/7 , plan for 5day course  - duonebs q 6   - Duplex to r/o DVTs - negative 12/7  - plan for bronch today for optimization prior to extubation.     GI  #Diet  - NPO with tube feeds  - Nutrition consult placed on pivot titrate to goal   - on bowel regimen having BM       #Elevated liver enzymes, - downtrending  - patient has reported hx of Hep B 2017 as per hemonc   - AST/ALT- 100/50 >68/38  - bili also elevated but stable 1.3-> 1.5-> 1.4 >1.0>0.7 continue to monitor   - hepatitis panel - Hep B surface AG +, and the rest is negative    - confirmatory test - hepatitis B quantitative - negative    -Spoke with ROSA Montejo from hepatology office, Dr Florina Jonse Re: continuation of Tenofovir. [facility] [phone]. 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 Coli bacteremia- pan sensitive 11/29   - Tmax overnight 98., WBC 5.8  - antibiotics broadened to vanco and meropenem (12/7- )  - vanco trough elevated 23 today. am dose held, check repeat random level this evening and restart at 1g BID if WNLs  - MRSA resent 12/9-  F/U  - Sputum cx 12/7 - NRF  - s/p ctx (12/5-12/7)    - s/p Cefepime 2g q8h (11/30- 12/5) deescalated to ctx  - s/p Azithromycin 500 mg started (12/2- 12/3) , legionella neg  - s/p vanc/CTX/ampicillin/acyclovir in ER for suspected meningitis workup   - Bcx 12/2, 11/29, 12/7- neg   - Fungitel 39   - ID following     SKIN  # sacral decub-DTI., Wound care nurse recs in place     Endo  - no history of diabetes, A1C 6.0  - TSH wnl  - FS stable   - ISS q 6  - may need to start NPH today if FS remains elevated 200-300s    Heme  #MM  - as per Hemonc note MM in remission 5/22 has been on Promacta since 6/22 has received multiple treatments for MM in the last also PBSCT x 2 and haplo allo stem cell transplant in 2020- last spike noted sept 2022-was given belantamab- last treatment was on 11/23/22  - holding anti-myeloma therapy  -  s/p filgrastim held now as patient no longer neutropenic 12/7  - private Hemonc following [facility] recs appreciated     Pancytopenia  #concern for MAHA, possible HUS with E. Coli bacteremia? vs HLH?improving  - new thrombocytopenia, anemia, and +hemolysis labs (bili uptrending and now normalized)  -? reported history as per hemonc of ITP  - direct dede negative  - some schistocytes on peripheral smear, confirmed with hematology/oncology, however higher suspicion for lab abnormalities are secondary to sepsis  - Platelet transfusion protocol: <10, <20 with fever, <50 procedures/active bleeding/etc   - Patient received 1u plat 12/3, recieved 1/2 u PRBC 12/7 , ordered for 1u prbc today 12/9 for Hb 7 for optimization prior to extubation.   - Transfuse PRBC < 7  - Patient meets criteria for HLH (ferritin worsening 91170> 84890, trig 519->191, pancytopenia - 3 cell lines, persistent fever, IL2 8324, hepatitis) Us neg for splenomegaly   -continue home Promacta, 50 mg daily   - Spoke with hemonc at [facility] about HLH- states labs could also be elevated in setting of sepsis and at this point treatment for HLH would be to treat underlying cause of infection and patient would not be a candidate for high dose immunosuppressant (Risks outweigh benefits)   - trend HLH labs daily   - s/p neupogen D/C as patient no longer neutropenic     #DVT ppx  - hold off on DVT ppx for now in setting of pancytopenia   - SCD's in place   - Duplex LE - negative 12/7    Skin  #Suspected DTI on sacrum and ear as per nurse  - Wound care recs appreciated      Ethics  - updated son, he is pediatric resident and patient's HCP  - Pt is full code    Dispo: TELE  -PT consult VINCENT     Patient is a 61 yo F w/ PMHx of MM (diagnosed ~10 years ago, currently on Promacta treatments since June 2022 - most recently last Wednesday; followed by oncologist, Dr. Lopez at [facility]), ?ITP, Hep B, HTN, neuropathic R hip pain, recent R corneal tear admitted for AMS, code stroke called no acute infarct or hemorrhage, s/p LP findings not consistent with meningitis, course complicated by hypotension and hypoxia, placed on BiPAP, requiring pressors, admitted to MICU with sepsis likely secondary to pneumonia- intubated 11/30. Extubated 12/3 to face tent reintubated 12/7 in setting of increased WOB and hypercapneic respiratory failure unclear cause possibly HLH induced.     PLAN  Neuro  - s/p code stroke -> no ischemia or hemorrhage on CT H+N  - s/p LP results not consistent with encephalitis/meningitis, ammonia level 30   - baseline is AOx3  - sedated on propofol    - Repeat CTH today for ? neurological cause for respiratory distress causing reintubation- also unsymmetrical nasolabial folds seen on exam     Cardiovascular  #Shock sepsis vs HLH vs vasoplegic   # EKG changes- possible HLH induced cardiac complications?  - suspect sepsis from E. Coli bacteremia with GI or urinary source?  - pt required levophed for intubation- titrating down currently on 0.2   - midodrine 10 q 8  - ECHO 11/30 EF 55 normal LV/RV   - TSH wnl  - has been intermittently being diuresed with lasix- last dose this am 12/8 20mg IV also has been receiving IV dextrose for hyponatremia held this am in setting of ?pulmonary edema vs new cardiac event   - EKG 12/7- new Twave inversions V2-V6, II, III, AVF- EKG 6 hrs later slightly improved, check repeat today   - Trop 100->102-> no longer trending (patient not a candidate for Asp/ hep given pancytopenia)   - POCUS 12/8 normal function no segmental changes VTI 22    #new onset MAR  - MAR AF in 130's on 12/4, resolved, additional event 12/7 in setting of missed PO metoprolol resolved with IV lopressor    - continue Lopressor tartrate 12.5 mg BID   - [id number] score is 3   - unable to anticoagulate in the setting of thrombocytopenia   - SCD's are in place   - continue telemetry monitoring        Pulmonary  #AHRF- likely secondary to PNA infection vs Aspiration   - s/p BiPAP, 10/5 on 50%, then intubated 11/30 for increased work of breathing  - Extubated 12/3 to face tent  - patient with increased WOB/ tachypnea overnight 12/6 after blood transfusion possible TACO? patient requiring BIPAP 10/5 50%   - Reintubated 12/7 in setting of WOB/respiratory distress and hypercapnic respiratory failure.   - MV 12/420/5/40% doing well on SBT 8/5 40%  - antibiotics as below   - MRSA/MSSA resent 12/8   - ID following recs appreciated   - CTA 11/30: neg for PE.  RUL R basilar consolidation RML basilar opacity  - repeat CXR on 12/4-worsening multilobar consolidations improved 12/7   - CTA to r/o PE 12/8- negative for PE ,Consolidation due to pneumonia involving the dependent portion of the RUL with interval improvement since November 30, 2022. RLL consolidation with associated volume loss increased since November 30, 2022 suggestive of partial compressive atelectasis adjacent to the increasing small right pleural effusion. Patchy consolidation within the dependent portion of the left lower lobe increased in size since November 30, 2022 suggestive of atelectasis given its homogeneous enhancement although superimposed pneumonia is not excluded. Small right and trace left pleural effusions.  - diureses with lasix PRN for goal of net neg last dose 12/7  - solumedrol 40 started daily 12/7 , plan for 5day course  - duonebs q 6   - Duplex to r/o DVTs - negative 12/7  - plan for bronch today for optimization prior to extubation.     GI  #Diet  - NPO with tube feeds  - Nutrition consult placed on pivot titrate to goal   - on bowel regimen having BM       #Elevated liver enzymes, - downtrending  - patient has reported hx of Hep B 2017 as per hemonc   - AST/ALT- 100/50 >68/38  - bili also elevated but stable 1.3-> 1.5-> 1.4 >1.0>0.7 continue to monitor   - hepatitis panel - Hep B surface AG +, and the rest is negative    - confirmatory test - hepatitis B quantitative - negative    -Spoke with ROSA Montejo from hepatology office, Dr Florina Jones Re: continuation of Tenofovir. [facility] [phone]. 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 Coli bacteremia- pan sensitive 11/29   - Tmax overnight 98., WBC 5.8  - antibiotics broadened to vanco and meropenem (12/7- )  - vanco trough elevated 23 today. am dose held, check repeat random level this evening and restart at 1g BID if WNLs  - MRSA resent 12/9-  F/U  - Sputum cx 12/7 - NRF  - s/p ctx (12/5-12/7)    - s/p Cefepime 2g q8h (11/30- 12/5) deescalated to ctx  - s/p Azithromycin 500 mg started (12/2- 12/3) , legionella neg  - s/p vanc/CTX/ampicillin/acyclovir in ER for suspected meningitis workup   - Bcx 12/2, 11/29, 12/7- neg   - Fungitel 39   - ID following     SKIN  # sacral decub-DTI., Wound care nurse recs in place     Endo  - no history of diabetes, A1C 6.0  - TSH wnl  - FS stable   - ISS q 6  - may need to start NPH today if FS remains elevated 200-300s    Heme  #MM  - as per Hemonc note MM in remission 5/22 has been on Promacta since 6/22 has received multiple treatments for MM in the last also PBSCT x 2 and haplo allo stem cell transplant in 2020- last spike noted sept 2022-was given belantamab- last treatment was on 11/23/22  - holding anti-myeloma therapy  -  s/p filgrastim held now as patient no longer neutropenic 12/7  - private Hemonc following [facility] recs appreciated     Pancytopenia  #concern for MAHA, possible HUS with E. Coli bacteremia? vs HLH?improving  - new thrombocytopenia, anemia, and +hemolysis labs (bili uptrending and now normalized)  -? reported history as per hemonc of ITP  - direct dede negative  - some schistocytes on peripheral smear, confirmed with hematology/oncology, however higher suspicion for lab abnormalities are secondary to sepsis  - Platelet transfusion protocol: <10, <20 with fever, <50 procedures/active bleeding/etc   - Patient received 1u plat 12/3, recieved 1/2 u PRBC 12/7 , ordered for 1u prbc today 12/9 for Hb 7 for optimization prior to extubation.   - Transfuse PRBC < 7  - Patient meets criteria for HLH (ferritin worsening 52008> 01525, trig 519->191, pancytopenia - 3 cell lines, persistent fever, IL2 8324, hepatitis) Us neg for splenomegaly   -continue home Promacta, 50 mg daily   - Spoke with hemonc at [facility] about HLH- states labs could also be elevated in setting of sepsis and at this point treatment for HLH would be to treat underlying cause of infection and patient would not be a candidate for high dose immunosuppressant (Risks outweigh benefits)   - trend HLH labs daily   - s/p neupogen D/C as patient no longer neutropenic     #DVT ppx  - hold off on DVT ppx for now in setting of pancytopenia   - SCD's in place   - Duplex LE - negative 12/7    Skin  #Suspected DTI on sacrum and ear as per nurse  - Wound care recs appreciated      Ethics  - updated son, he is pediatric resident and patient's HCP  - Pt is full code    Dispo: TELE  -PT consult VINCENT     Patient is a 61 yo F w/ PMHx of MM (diagnosed ~10 years ago, currently on Promacta treatments since June 2022 - most recently last Wednesday; followed by oncologist, Dr. Lopez at [facility]), ?ITP, Hep B, HTN, neuropathic R hip pain, recent R corneal tear admitted for AMS, code stroke called no acute infarct or hemorrhage, s/p LP findings not consistent with meningitis, course complicated by hypotension and hypoxia, placed on BiPAP, requiring pressors, admitted to MICU with sepsis likely secondary to pneumonia- intubated 11/30. Extubated 12/3 to face tent reintubated 12/7 in setting of increased WOB and hypercapneic respiratory failure unclear cause possibly HLH induced.     PLAN  Neuro  - s/p code stroke -> no ischemia or hemorrhage on CT H+N  - s/p LP results not consistent with encephalitis/meningitis, ammonia level 30   - baseline is AOx3  - sedated on propofol    - Repeat CTH today for ? neurological cause for respiratory distress causing reintubation- also unsymmetrical nasolabial folds seen on exam     Cardiovascular  #Shock sepsis vs HLH vs vasoplegic   # EKG changes- possible HLH induced cardiac complications?  - suspect sepsis from E. Coli bacteremia with GI or urinary source?  - pt required levophed for intubation- titrating down currently on 0.2   - midodrine 10 q 8  - ECHO 11/30 EF 55 normal LV/RV   - TSH wnl  - has been intermittently being diuresed with lasix- last dose this am 12/8 20mg IV also has been receiving IV dextrose for hyponatremia held this am in setting of ?pulmonary edema vs new cardiac event   - EKG 12/7- new Twave inversions V2-V6, II, III, AVF- EKG 6 hrs later slightly improved, check repeat today   - Trop 100->102-> no longer trending (patient not a candidate for Asp/ hep given pancytopenia)   - POCUS 12/8 normal function no segmental changes VTI 22    #new onset MAR  - MAR AF in 130's on 12/4, resolved, additional event 12/7 in setting of missed PO metoprolol resolved with IV lopressor    - continue Lopressor tartrate 12.5 mg BID   - [id number] score is 3   - unable to anticoagulate in the setting of thrombocytopenia   - SCD's are in place   - continue telemetry monitoring        Pulmonary  #AHRF- likely secondary to PNA infection vs Aspiration   - s/p BiPAP, 10/5 on 50%, then intubated 11/30 for increased work of breathing  - Extubated 12/3 to face tent  - patient with increased WOB/ tachypnea overnight 12/6 after blood transfusion possible TACO? patient requiring BIPAP 10/5 50%   - Reintubated 12/7 in setting of WOB/respiratory distress and hypercapnic respiratory failure.   - MV 12/420/5/40% doing well on SBT 8/5 40%  - antibiotics as below   - MRSA/MSSA resent 12/8   - ID following recs appreciated   - CTA 11/30: neg for PE.  RUL R basilar consolidation RML basilar opacity  - repeat CXR on 12/4-worsening multilobar consolidations improved 12/7   - CTA to r/o PE 12/8- negative for PE ,Consolidation due to pneumonia involving the dependent portion of the RUL with interval improvement since November 30, 2022. RLL consolidation with associated volume loss increased since November 30, 2022 suggestive of partial compressive atelectasis adjacent to the increasing small right pleural effusion. Patchy consolidation within the dependent portion of the left lower lobe increased in size since November 30, 2022 suggestive of atelectasis given its homogeneous enhancement although superimposed pneumonia is not excluded. Small right and trace left pleural effusions.  - diureses with lasix PRN for goal of net neg last dose 12/7  - solumedrol 40 started daily 12/7 , plan for 5day course  - duonebs q 6   - Duplex to r/o DVTs - negative 12/7  - plan for bronch today for optimization prior to extubation.     GI  #Diet  - NPO with tube feeds  - Nutrition consult placed on pivot titrate to goal   - on bowel regimen having BM       #Elevated liver enzymes, - downtrending  - patient has reported hx of Hep B 2017 as per hemonc   - AST/ALT- 100/50 >68/38  - bili also elevated but stable 1.3-> 1.5-> 1.4 >1.0>0.7 continue to monitor   - hepatitis panel - Hep B surface AG +, and the rest is negative    - confirmatory test - hepatitis B quantitative - negative    -Spoke with ROSA Montejo from hepatology office, Dr Florina Jones Re: continuation of Tenofovir. [facility] [phone]. 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 Coli bacteremia- pan sensitive 11/29   - Tmax overnight 98., WBC 5.8  - antibiotics broadened to vanco and meropenem (12/7- )  - vanco trough elevated 23 today. am dose held, check repeat random level this evening and restart at 1g BID if WNLs  - MRSA resent 12/9-  F/U  - Sputum cx 12/7 - NRF  - s/p ctx (12/5-12/7)    - s/p Cefepime 2g q8h (11/30- 12/5) deescalated to ctx  - s/p Azithromycin 500 mg started (12/2- 12/3) , legionella neg  - s/p vanc/CTX/ampicillin/acyclovir in ER for suspected meningitis workup   - Bcx 12/2, 11/29, 12/7- neg   - Fungitel 39   - ID following     SKIN  # sacral decub-DTI., Wound care nurse recs in place     Endo  - no history of diabetes, A1C 6.0  - TSH wnl  - FS stable   - ISS q 6  - started pn NPH 3u q6hrs 12/8, may need to uptitrate today if FS remain elevated     Heme  #MM  - as per Hemon[facility] note MM in remission 5/22 has been on Promacta since 6/22 has received multiple treatments for MM in the last also PBSCT x 2 and haplo allo stem cell transplant in 2020- last spike noted sept 2022-was given belantamab- last treatment was on 11/23/22  - holding anti-myeloma therapy  -  s/p filgrastim held now as patient no longer neutropenic 12/7  - private Hemonc following [facility] recs appreciated     Pancytopenia  #concern for MAHA, possible HUS with E. Coli bacteremia? vs HLH?improving  - new thrombocytopenia, anemia, and +hemolysis labs (bili uptrending and now normalized)  -? reported history as per hemonc of ITP  - direct dede negative  - some schistocytes on peripheral smear, confirmed with hematology/oncology, however higher suspicion for lab abnormalities are secondary to sepsis  - Platelet transfusion protocol: <10, <20 with fever, <50 procedures/active bleeding/etc   - Patient received 1u plat 12/3, recieved 1/2 u PRBC 12/7 , ordered for 1u prbc today 12/9 for Hb 7 for optimization prior to extubation.   - Transfuse PRBC < 7  - Patient meets criteria for HLH (ferritin worsening 89238> 51953, trig 519->191, pancytopenia - 3 cell lines, persistent fever, IL2 8324, hepatitis) Us neg for splenomegaly   -continue home Promacta, 50 mg daily   - Spoke with hemonc at [facility] about HLH- states labs could also be elevated in setting of sepsis and at this point treatment for HLH would be to treat underlying cause of infection and patient would not be a candidate for high dose immunosuppressant (Risks outweigh benefits)   - trend HLH labs daily   - s/p neupogen D/C as patient no longer neutropenic     #DVT ppx  - hold off on DVT ppx for now in setting of pancytopenia   - SCD's in place   - Duplex LE - negative 12/7    Skin  #Suspected DTI on sacrum and ear as per nurse  - Wound care recs appreciated      Ethics  - updated son, he is pediatric resident and patient's HCP  - Pt is full code    Dispo: TELE  -PT consult VINCENT

## 2022-12-09 NOTE — PROCEDURE NOTE - NSBRONCHPREOPMEDS_GEN_ALL_CORE_FT
eRx sent for meclizine 25mg TID prn.   See me if not better by next week Propofol gtt, fentanyl 100mcg IVP, versed 4mg IVP

## 2022-12-09 NOTE — PROGRESS NOTE ADULT - SUBJECTIVE AND OBJECTIVE BOX
Follow Up:  septic shock, E-coli bacteremia, pneumonia    Interval History: pt still intubated  no fever or WBC , s/p bronc today  ROS:    Unobtainable because: intubated      Allergies  Bactrim (Other)  fluconazole (Vomiting; Nausea)  levofloxacin (Vomiting; Nausea)  penicillin (Other)        ANTIMICROBIALS:  meropenem  IVPB    meropenem  IVPB 1000 every 8 hours  tenofovir alafenamide (VEMLIDY) 25 daily      OTHER MEDS:  albuterol/ipratropium for Nebulization 3 milliLiter(s) Nebulizer every 6 hours  artificial tears (preservative free) Ophthalmic Solution 1 Drop(s) Both EYES two times a day  chlorhexidine 0.12% Liquid 15 milliLiter(s) Oral Mucosa every 12 hours  chlorhexidine 2% Cloths 1 Application(s) Topical daily  dextrose 5%. 1000 milliLiter(s) IV Continuous <Continuous>  dextrose 5%. 1000 milliLiter(s) IV Continuous <Continuous>  dextrose 50% Injectable 25 Gram(s) IV Push once  dextrose 50% Injectable 12.5 Gram(s) IV Push once  dextrose 50% Injectable 25 Gram(s) IV Push once  dextrose Oral Gel 15 Gram(s) Oral once PRN  eltrombopag 50 milliGRAM(s) Oral daily  glucagon  Injectable 1 milliGRAM(s) IntraMuscular once  insulin lispro (ADMELOG) corrective regimen sliding scale   SubCutaneous every 6 hours  insulin NPH human recombinant 3 Unit(s) SubCutaneous every 6 hours  melatonin 6 milliGRAM(s) Oral at bedtime PRN  methylPREDNISolone sodium succinate Injectable 40 milliGRAM(s) IV Push daily  metoprolol tartrate 12.5 milliGRAM(s) Oral two times a day  midodrine 10 milliGRAM(s) Oral every 8 hours  multivitamin 1 Tablet(s) Oral daily  norepinephrine Infusion 0.05 MICROgram(s)/kG/Min IV Continuous <Continuous>  polyethylene glycol 3350 17 Gram(s) Oral daily  propofol Infusion 20 MICROgram(s)/kG/Min IV Continuous <Continuous>  senna Syrup 10 milliLiter(s) Oral daily      Vital Signs Last 24 Hrs  T(C): 36.7 (09 Dec 2022 12:00), Max: 37 (09 Dec 2022 00:00)  T(F): 98 (09 Dec 2022 12:00), Max: 98.6 (09 Dec 2022 00:00)  HR: 60 (09 Dec 2022 16:00) (56 - 73)  BP: 133/60 (09 Dec 2022 16:00) (112/50 - 138/49)  BP(mean): 77 (09 Dec 2022 16:00) (64 - 83)  RR: 12 (09 Dec 2022 16:00) (12 - 25)  SpO2: 100% (09 Dec 2022 16:00) (98% - 100%)    Parameters below as of 09 Dec 2022 16:00  Patient On (Oxygen Delivery Method): ventilator        Physical Exam:  General:   intubated but awake  Cardio:   regular S1, S2  Respiratory: ET on vent, b/l breat sounds  abd:     soft,   BS +, no tenderness  :    +ortiz  Musculoskeletal:   no joint swelling  vascular: no phlebitis  Skin:    no rash                          8.6    5.90  )-----------( 39       ( 09 Dec 2022 14:04 )             27.5           143  |  110<H>  |  39<H>  ----------------------------<  225<H>  4.2   |  26  |  0.80    Ca    9.6      09 Dec 2022 08:55  Phos  3.4       Mg     1.90         TPro  5.4<L>  /  Alb  2.6<L>  /  TBili  0.8  /  DBili  x   /  AST  59<H>  /  ALT  36<H>  /  AlkPhos  151<H>        Urinalysis Basic - ( 07 Dec 2022 18:18 )    Color: Red / Appearance: bloody / S.013 / pH: x  Gluc: x / Ketone: Negative  / Bili: Negative / Urobili: <2 mg/dL   Blood: x / Protein: 100 mg/dL / Nitrite: Negative   Leuk Esterase: Small / RBC: >10 /HPF / WBC 4 /HPF   Sq Epi: x / Non Sq Epi: 2 /HPF / Bacteria: Moderate        MICROBIOLOGY:  Vancomycin Level, Trough: 23.3 ug/mL (22 @ 08:55)  v  ET Tube ET Tube  22   Normal Respiratory Eleanor present  --    No polymorphonuclear leukocytes per low power field  Few Squamous epithelial cells per low power field  Moderate Gram Positive Rods seen per oil power field  Rare Gram positive cocci in pairs seen per oil power field      .Blood Blood-Peripheral  22   No growth to date.  --  --      Catheterized Catheterized  22   No growth  --  --      .Blood Blood  22   No Growth Final  --  --      .Blood Blood  22   No Growth Final  --  --      ET Tube ET Tube  22   No growth at 48 hours  --    Moderate polymorphonuclear leukocytes per low power field  No Squamous epithelial cells per low power field  No organisms seen per oil power field      .CSF CSF  22   No growth at 1 week.  --    No polymorphonuclear leukocytes seen  No organisms seen  by cytocentrifuge      .Blood Blood-Peripheral  22   Growth in aerobic and anaerobic bottles: Escherichia coli  ***Blood Panel PCR results on this specimen are available  approximately 3 hours after the Gram stain result.***  Gram stain, PCR, and/or culture results may not always  correspond due to difference in methodologies.  ************************************************************  This PCR assay was performed by multiplex PCR. This  Assay tests for 66 bacterial and resistance gene targets.  Please refer to the [facility] test directory  at [email or web address] for details.  --  Blood Culture PCR  Escherichia coli      Clean Catch Clean Catch (Midstream)  22   >=3 organisms. Probable collection contamination.  --  --                RADIOLOGY:  Images independently visualized and reviewed personally, findings as below  < from: CT Head No Cont (22 @ 18:33) >  IMPRESSION:  Stable exam. No mass effect, hemorrhage or evidence of acute intracranial   pathology.      < end of copied text >  < from: CT Angio Chest PE Protocol w/ IV Cont (22 @ 18:33) >  No pulmonary embolism.    Consolidation due to pneumonia involving the dependent portion of the   right upper lobe with interval improvement since 2022.    Right lower lobe consolidation with associated volume loss increased   since 2022 suggestive of partial compressive atelectasis   adjacent to the increasing small right pleural effusion.    Patchy consolidationwithin the dependent portion of the left lower lobe   increased in size since 2022 suggestive of atelectasis given   its homogeneous enhancement although superimposed pneumonia is not   excluded.    < end of copied text >

## 2022-12-09 NOTE — PROGRESS NOTE ADULT - CRITICAL CARE ATTENDING COMMENT
Patient is a 61 yo F w/ MM, ?ITP, HTN, recent R corneal tear who was initially admitted on 11/30 after p/w AMS found to have neutropenic septic shock with E coli bacteremia with course c/b acute respiratory failure and new Afib with RVR.     #Acute hypoxemic respiratory failure - 2/2 multifocal/aspiration PNA. Successfully extubated. Now with increased WOB shortly after 1/2 PRBC transfusion. ?TACO vs TRALI less likely as no fever vs HLH? vs PE. Decompensated further requiring reintubation. LDH improved, ferritin improving. LE duplex negative. CTA negative for PE, increased RLL atelectasis vs consolidation  - c/w ventilatory support for now, wean as tolerated  - c/w Duonebs q6h  - c/w Solumedrol 40mg daily for now  - Plan for bronchoscopy today and then possible extubation  - Keep euvolemic    #Shock - Septic shock in setting of neutropenia and E coli bacteremia which resolved as blood cultures cleared with antibiotics. Multifocal pneumonia likely source, benign abdominal exam. On pressors again since reintubation, likely vasoplegic 2/2 sedation vs sepsis vs inflammatory  - c/w PO vasopressors to maintain MAP > 65, wean as tolerated  - Previously on Ceftriaxone, escalated to Jani and Vanc after decompensation  - f/u cultures    #Transaminitis - Likely 2/2 septic shock vs acute/chronic HBV?. Hb surface antigen positive. On Tenofovir at home. HBV DNA PCR negative  #HBV  - c/w home Tenofovir    #Pancytopenia - Likely acute on chronic process 2/2 underlying MM/?ITP with current sepsis vs HLH? Patient does have cytopenias (1), hypertrig (2), elevated ferritin (3), fevers (4). Soluble IL 2 is elevated. US negative for splenomegaly  - Trend CBC, will transfuse 1 un PRBC today prior to possible extubation  - Trend HLH labs, improving  - Discussed with heme, risk outweigh benefits of immunosuppression at this point for ?HLH    #Cardiac - New TWI in anterolateral/inferior leads with increased Troponin, now less pronounced and downtrending. POCUS with no wall motion abnormalities  - Unable to initiate heparin gtt/DAPT for possible NSTEMI due to anemia and thrombocytopenia  - Monitor    #Afib - new onset with RVR, now back in NSR  - c/w metoprolol  - Hold off AC given thrombocytopenia    #Hypernatremia - Improved  - Monitor    #GOC - Prognosis guarded. c/w GOC discussions. Currently full code.    Tray Siddiqui MD  Pulmonary & Critical Care

## 2022-12-10 LAB
ALBUMIN SERPL ELPH-MCNC: 2.7 G/DL — LOW (ref 3.3–5)
ALP SERPL-CCNC: 172 U/L — HIGH (ref 40–120)
ALT FLD-CCNC: 35 U/L — HIGH (ref 4–33)
ANION GAP SERPL CALC-SCNC: 7 MMOL/L — SIGNIFICANT CHANGE UP (ref 7–14)
AST SERPL-CCNC: 56 U/L — HIGH (ref 4–32)
BASE EXCESS BLDV CALC-SCNC: 2.7 MMOL/L — SIGNIFICANT CHANGE UP (ref -2–3)
BASOPHILS # BLD AUTO: 0 K/UL — SIGNIFICANT CHANGE UP (ref 0–0.2)
BASOPHILS NFR BLD AUTO: 0 % — SIGNIFICANT CHANGE UP (ref 0–2)
BILIRUB SERPL-MCNC: 0.7 MG/DL — SIGNIFICANT CHANGE UP (ref 0.2–1.2)
BLOOD GAS VENOUS COMPREHENSIVE RESULT: SIGNIFICANT CHANGE UP
BLOOD GAS VENOUS COMPREHENSIVE RESULT: SIGNIFICANT CHANGE UP
BUN SERPL-MCNC: 46 MG/DL — HIGH (ref 7–23)
CALCIUM SERPL-MCNC: 9.7 MG/DL — SIGNIFICANT CHANGE UP (ref 8.4–10.5)
CHLORIDE BLDV-SCNC: 110 MMOL/L — HIGH (ref 96–108)
CHLORIDE SERPL-SCNC: 108 MMOL/L — HIGH (ref 98–107)
CK SERPL-CCNC: 57 U/L — SIGNIFICANT CHANGE UP (ref 25–170)
CO2 BLDV-SCNC: 29.3 MMOL/L — HIGH (ref 22–26)
CO2 SERPL-SCNC: 27 MMOL/L — SIGNIFICANT CHANGE UP (ref 22–31)
CREAT SERPL-MCNC: 0.78 MG/DL — SIGNIFICANT CHANGE UP (ref 0.5–1.3)
EGFR: 86 ML/MIN/1.73M2 — SIGNIFICANT CHANGE UP
EOSINOPHIL # BLD AUTO: 0 K/UL — SIGNIFICANT CHANGE UP (ref 0–0.5)
EOSINOPHIL NFR BLD AUTO: 0 % — SIGNIFICANT CHANGE UP (ref 0–6)
FERRITIN SERPL-MCNC: HIGH NG/ML (ref 15–150)
GAS PNL BLDV: 142 MMOL/L — SIGNIFICANT CHANGE UP (ref 136–145)
GLUCOSE BLDC GLUCOMTR-MCNC: 219 MG/DL — HIGH (ref 70–99)
GLUCOSE BLDC GLUCOMTR-MCNC: 232 MG/DL — HIGH (ref 70–99)
GLUCOSE BLDC GLUCOMTR-MCNC: 266 MG/DL — HIGH (ref 70–99)
GLUCOSE BLDC GLUCOMTR-MCNC: 334 MG/DL — HIGH (ref 70–99)
GLUCOSE BLDV-MCNC: 384 MG/DL — HIGH (ref 70–99)
GLUCOSE SERPL-MCNC: 358 MG/DL — HIGH (ref 70–99)
HAPTOGLOB SERPL-MCNC: <20 MG/DL — LOW (ref 34–200)
HCO3 BLDV-SCNC: 28 MMOL/L — SIGNIFICANT CHANGE UP (ref 22–29)
HCT VFR BLD CALC: 25.8 % — LOW (ref 34.5–45)
HCT VFR BLDA CALC: 46 % — SIGNIFICANT CHANGE UP (ref 34.5–46.5)
HGB BLD CALC-MCNC: 15.3 G/DL — SIGNIFICANT CHANGE UP (ref 11.5–15.5)
HGB BLD-MCNC: 8.1 G/DL — LOW (ref 11.5–15.5)
IANC: 3.24 K/UL — SIGNIFICANT CHANGE UP (ref 1.8–7.4)
INR BLD: 1.11 RATIO — SIGNIFICANT CHANGE UP (ref 0.88–1.16)
LACTATE BLDV-MCNC: 2.1 MMOL/L — HIGH (ref 0.5–2)
LDH SERPL L TO P-CCNC: 1555 U/L — HIGH (ref 135–225)
LYMPHOCYTES # BLD AUTO: 1.04 K/UL — SIGNIFICANT CHANGE UP (ref 1–3.3)
LYMPHOCYTES # BLD AUTO: 18.7 % — SIGNIFICANT CHANGE UP (ref 13–44)
MAGNESIUM SERPL-MCNC: 2.1 MG/DL — SIGNIFICANT CHANGE UP (ref 1.6–2.6)
MCHC RBC-ENTMCNC: 31.4 GM/DL — LOW (ref 32–36)
MCHC RBC-ENTMCNC: 31.9 PG — SIGNIFICANT CHANGE UP (ref 27–34)
MCV RBC AUTO: 101.6 FL — HIGH (ref 80–100)
MONOCYTES # BLD AUTO: 0.78 K/UL — SIGNIFICANT CHANGE UP (ref 0–0.9)
MONOCYTES NFR BLD AUTO: 14 % — SIGNIFICANT CHANGE UP (ref 2–14)
MRSA PCR RESULT.: SIGNIFICANT CHANGE UP
NEUTROPHILS # BLD AUTO: 3.23 K/UL — SIGNIFICANT CHANGE UP (ref 1.8–7.4)
NEUTROPHILS NFR BLD AUTO: 52.3 % — SIGNIFICANT CHANGE UP (ref 43–77)
PCO2 BLDV: 44 MMHG — HIGH (ref 39–42)
PH BLDV: 7.41 — SIGNIFICANT CHANGE UP (ref 7.32–7.43)
PHOSPHATE SERPL-MCNC: 3.3 MG/DL — SIGNIFICANT CHANGE UP (ref 2.5–4.5)
PLATELET # BLD AUTO: 39 K/UL — LOW (ref 150–400)
PO2 BLDV: 58 MMHG — SIGNIFICANT CHANGE UP
POTASSIUM BLDV-SCNC: 4.2 MMOL/L — SIGNIFICANT CHANGE UP (ref 3.5–5.1)
POTASSIUM SERPL-MCNC: 4.1 MMOL/L — SIGNIFICANT CHANGE UP (ref 3.5–5.3)
POTASSIUM SERPL-SCNC: 4.1 MMOL/L — SIGNIFICANT CHANGE UP (ref 3.5–5.3)
PROT SERPL-MCNC: 5 G/DL — LOW (ref 6–8.3)
PROTHROM AB SERPL-ACNC: 12.9 SEC — SIGNIFICANT CHANGE UP (ref 10.5–13.4)
RBC # BLD: 2.54 M/UL — LOW (ref 3.8–5.2)
RBC # FLD: 20.6 % — HIGH (ref 10.3–14.5)
S AUREUS DNA NOSE QL NAA+PROBE: SIGNIFICANT CHANGE UP
SAO2 % BLDV: 84.1 % — SIGNIFICANT CHANGE UP
SODIUM SERPL-SCNC: 142 MMOL/L — SIGNIFICANT CHANGE UP (ref 135–145)
TROPONIN T, HIGH SENSITIVITY RESULT: 65 NG/L — CRITICAL HIGH
WBC # BLD: 5.58 K/UL — SIGNIFICANT CHANGE UP (ref 3.8–10.5)
WBC # FLD AUTO: 5.58 K/UL — SIGNIFICANT CHANGE UP (ref 3.8–10.5)

## 2022-12-10 PROCEDURE — 99291 CRITICAL CARE FIRST HOUR: CPT

## 2022-12-10 RX ORDER — INSULIN LISPRO 100/ML
VIAL (ML) SUBCUTANEOUS AT BEDTIME
Refills: 0 | Status: DISCONTINUED | OUTPATIENT
Start: 2022-12-10 | End: 2022-12-11

## 2022-12-10 RX ORDER — ACETAMINOPHEN 500 MG
1000 TABLET ORAL ONCE
Refills: 0 | Status: COMPLETED | OUTPATIENT
Start: 2022-12-10 | End: 2022-12-10

## 2022-12-10 RX ORDER — DEXMEDETOMIDINE HYDROCHLORIDE IN 0.9% SODIUM CHLORIDE 4 UG/ML
0.2 INJECTION INTRAVENOUS
Qty: 400 | Refills: 0 | Status: DISCONTINUED | OUTPATIENT
Start: 2022-12-10 | End: 2022-12-10

## 2022-12-10 RX ORDER — FUROSEMIDE 40 MG
20 TABLET ORAL ONCE
Refills: 0 | Status: DISCONTINUED | OUTPATIENT
Start: 2022-12-10 | End: 2022-12-10

## 2022-12-10 RX ORDER — INSULIN LISPRO 100/ML
3 VIAL (ML) SUBCUTANEOUS
Refills: 0 | Status: DISCONTINUED | OUTPATIENT
Start: 2022-12-10 | End: 2022-12-13

## 2022-12-10 RX ORDER — INSULIN LISPRO 100/ML
VIAL (ML) SUBCUTANEOUS
Refills: 0 | Status: DISCONTINUED | OUTPATIENT
Start: 2022-12-10 | End: 2022-12-11

## 2022-12-10 RX ORDER — FUROSEMIDE 40 MG
20 TABLET ORAL ONCE
Refills: 0 | Status: COMPLETED | OUTPATIENT
Start: 2022-12-10 | End: 2022-12-10

## 2022-12-10 RX ADMIN — Medication 3 MILLILITER(S): at 02:57

## 2022-12-10 RX ADMIN — MIDODRINE HYDROCHLORIDE 10 MILLIGRAM(S): 2.5 TABLET ORAL at 21:30

## 2022-12-10 RX ADMIN — Medication 2: at 21:30

## 2022-12-10 RX ADMIN — Medication 4: at 06:02

## 2022-12-10 RX ADMIN — HUMAN INSULIN 6 UNIT(S): 100 INJECTION, SUSPENSION SUBCUTANEOUS at 11:55

## 2022-12-10 RX ADMIN — Medication 1000 MILLIGRAM(S): at 07:58

## 2022-12-10 RX ADMIN — MEROPENEM 100 MILLIGRAM(S): 1 INJECTION INTRAVENOUS at 13:45

## 2022-12-10 RX ADMIN — Medication 20 MILLIGRAM(S): at 11:47

## 2022-12-10 RX ADMIN — Medication 3 MILLILITER(S): at 15:09

## 2022-12-10 RX ADMIN — Medication 40 MILLIGRAM(S): at 05:52

## 2022-12-10 RX ADMIN — HUMAN INSULIN 6 UNIT(S): 100 INJECTION, SUSPENSION SUBCUTANEOUS at 06:03

## 2022-12-10 RX ADMIN — Medication 3 MILLILITER(S): at 21:47

## 2022-12-10 RX ADMIN — CHLORHEXIDINE GLUCONATE 1 APPLICATION(S): 213 SOLUTION TOPICAL at 12:22

## 2022-12-10 RX ADMIN — MIDODRINE HYDROCHLORIDE 10 MILLIGRAM(S): 2.5 TABLET ORAL at 17:29

## 2022-12-10 RX ADMIN — Medication 250 MILLIGRAM(S): at 00:01

## 2022-12-10 RX ADMIN — DEXMEDETOMIDINE HYDROCHLORIDE IN 0.9% SODIUM CHLORIDE 4.35 MICROGRAM(S)/KG/HR: 4 INJECTION INTRAVENOUS at 07:39

## 2022-12-10 RX ADMIN — MEROPENEM 100 MILLIGRAM(S): 1 INJECTION INTRAVENOUS at 05:53

## 2022-12-10 RX ADMIN — ELTROMBOPAG OLAMINE 50 MILLIGRAM(S): 50 TABLET, FILM COATED ORAL at 17:29

## 2022-12-10 RX ADMIN — Medication 12.5 MILLIGRAM(S): at 18:31

## 2022-12-10 RX ADMIN — Medication 12.5 MILLIGRAM(S): at 05:52

## 2022-12-10 RX ADMIN — MIDODRINE HYDROCHLORIDE 10 MILLIGRAM(S): 2.5 TABLET ORAL at 05:52

## 2022-12-10 RX ADMIN — Medication 400 MILLIGRAM(S): at 07:28

## 2022-12-10 RX ADMIN — Medication 2: at 11:55

## 2022-12-10 RX ADMIN — TENOFOVIR DISOPROXIL FUMARATE 25 MILLIGRAM(S): 300 TABLET, FILM COATED ORAL at 18:31

## 2022-12-10 RX ADMIN — Medication 1 DROP(S): at 18:21

## 2022-12-10 RX ADMIN — HUMAN INSULIN 6 UNIT(S): 100 INJECTION, SUSPENSION SUBCUTANEOUS at 17:30

## 2022-12-10 RX ADMIN — Medication 2: at 17:30

## 2022-12-10 RX ADMIN — Medication 3 MILLILITER(S): at 10:45

## 2022-12-10 RX ADMIN — CHLORHEXIDINE GLUCONATE 15 MILLILITER(S): 213 SOLUTION TOPICAL at 05:52

## 2022-12-10 RX ADMIN — Medication 1 DROP(S): at 05:53

## 2022-12-10 RX ADMIN — MEROPENEM 100 MILLIGRAM(S): 1 INJECTION INTRAVENOUS at 21:30

## 2022-12-10 NOTE — PROGRESS NOTE ADULT - SUBJECTIVE AND OBJECTIVE BOX
HPI:  extubated, alert and awake, breathing comfortably on n/c 02  denies pain  son, who is MD, at bedside    ROS:  Negative except for: fatigue, hoarseness    MEDICATIONS  (STANDING):  albuterol/ipratropium for Nebulization 3 milliLiter(s) Nebulizer every 6 hours  artificial tears (preservative free) Ophthalmic Solution 1 Drop(s) Both EYES two times a day  chlorhexidine 2% Cloths 1 Application(s) Topical daily  dextrose 5%. 1000 milliLiter(s) (50 mL/Hr) IV Continuous <Continuous>  dextrose 5%. 1000 milliLiter(s) (100 mL/Hr) IV Continuous <Continuous>  dextrose 50% Injectable 25 Gram(s) IV Push once  dextrose 50% Injectable 12.5 Gram(s) IV Push once  dextrose 50% Injectable 25 Gram(s) IV Push once  eltrombopag 50 milliGRAM(s) Oral daily  glucagon  Injectable 1 milliGRAM(s) IntraMuscular once  insulin lispro (ADMELOG) corrective regimen sliding scale   SubCutaneous every 6 hours  insulin NPH human recombinant 6 Unit(s) SubCutaneous every 6 hours  meropenem  IVPB 1000 milliGRAM(s) IV Intermittent every 8 hours  meropenem  IVPB      metoprolol tartrate 12.5 milliGRAM(s) Oral two times a day  midodrine 10 milliGRAM(s) Oral every 8 hours  multivitamin 1 Tablet(s) Oral daily  polyethylene glycol 3350 17 Gram(s) Oral daily  senna Syrup 10 milliLiter(s) Oral daily  tenofovir alafenamide (VEMLIDY) 25 milliGRAM(s) Oral daily    MEDICATIONS  (PRN):  dextrose Oral Gel 15 Gram(s) Oral once PRN Blood Glucose LESS THAN 70 milliGRAM(s)/deciliter  melatonin 6 milliGRAM(s) Oral at bedtime PRN Insomnia      Allergies    Bactrim (Other)  fluconazole (Vomiting; Nausea)  levofloxacin (Vomiting; Nausea)  penicillin (Other)    Intolerances        Vital Signs Last 24 Hrs  T(C): 36.7 (10 Dec 2022 16:00), Max: 36.9 (09 Dec 2022 20:00)  T(F): 98.1 (10 Dec 2022 16:00), Max: 98.4 (09 Dec 2022 20:00)  HR: 68 (10 Dec 2022 18:00) (53 - 88)  BP: 113/85 (10 Dec 2022 18:00) (106/47 - 155/64)  BP(mean): 91 (10 Dec 2022 18:00) (61 - 91)  RR: 22 (10 Dec 2022 18:00) (12 - 24)  SpO2: 100% (10 Dec 2022 18:00) (95% - 100%)    Parameters below as of 10 Dec 2022 18:00  Patient On (Oxygen Delivery Method): nasal cannula  O2 Flow (L/min): 2      PHYSICAL EXAM:      Constitutional: patient in NAD    Eyes: anicteric    ENMT:    Neck:    Lymph nodes:    Respiratory: clear    Cardiovascular: RRR    Gastrointestinal: soft, no organomegaly    Extremities:    Skin:                    LABS:                          8.1    5.58  )-----------( 39       ( 10 Dec 2022 02:50 )             25.8         Mean Cell Volume : 101.6 fL  Mean Cell Hemoglobin : 31.9 pg  Mean Cell Hemoglobin Concentration : 31.4 gm/dL  Auto Neutrophil # : 3.23 K/uL  Auto Lymphocyte # : 1.04 K/uL  Auto Monocyte # : 0.78 K/uL  Auto Eosinophil # : 0.00 K/uL  Auto Basophil # : 0.00 K/uL  Auto Neutrophil % : 52.3 %  Auto Lymphocyte % : 18.7 %  Auto Monocyte % : 14.0 %  Auto Eosinophil % : 0.0 %  Auto Basophil % : 0.0 %    Serial CBC  Hematocrit 25.8  Hemoglobin 8.1  Plat 39  RBC 2.54  WBC 5.58  Serial CBC  Hematocrit 27.5  Hemoglobin 8.6  Plat 39  RBC 2.70  WBC 5.90  Serial CBC  Hematocrit 22.4  Hemoglobin 7.0  Plat 38  RBC 2.08  WBC 5.81  Serial CBC  Hematocrit 23.1  Hemoglobin 7.2  Plat 37  RBC 2.18  WBC 5.96  Serial CBC  Hematocrit 24.3  Hemoglobin 8.0  Plat 44  RBC 2.36  WBC 6.34  Serial CBC  Hematocrit 26.7  Hemoglobin 8.2  Plat 34  RBC 2.46  WBC 5.44  Serial CBC  Hematocrit 23.8  Hemoglobin 7.8  Plat 22  RBC 2.27  WBC 4.09  Serial CBC  Hematocrit 21.9  Hemoglobin 6.8  Plat 27  RBC 2.02  WBC 4.32  Serial CBC  Hematocrit 21.4  Hemoglobin 6.8  Plat 26  RBC 1.99  WBC 3.94    12-10    142  |  108<H>  |  46<H>  ----------------------------<  358<H>  4.1   |  27  |  0.78    Ca    9.7      10 Dec 2022 02:50  Phos  3.3     12-10  Mg     2.10     12-10    TPro  5.0<L>  /  Alb  2.7<L>  /  TBili  0.7  /  DBili  x   /  AST  56<H>  /  ALT  35<H>  /  AlkPhos  172<H>  12-10

## 2022-12-10 NOTE — PROGRESS NOTE ADULT - CRITICAL CARE ATTENDING COMMENT
Patient is a 61 yo F w/ MM, ?ITP, HTN, recent R corneal tear who was initially admitted on 11/30 after p/w AMS found to have neutropenic septic shock with E coli bacteremia with course c/b acute respiratory failure and new Afib with RVR.     #Acute hypoxemic respiratory failure - 2/2 multifocal/aspiration PNA. Successfully extubated. Now with increased WOB shortly after 1/2 PRBC transfusion. ?TACO vs TRALI less likely as no fever vs HLH? vs PE. Decompensated further requiring reintubation. LDH improved, ferritin improving. LE duplex negative. CTA negative for PE, increased RLL atelectasis vs consolidation  - c/w ventilatory support for now, wean as tolerated. Plan to extubate today  - c/w Duonebs q6h, add IPV  - d/c steroids  - Keep euvolemic    #Shock - Septic shock in setting of neutropenia and E coli bacteremia which resolved as blood cultures cleared with antibiotics. Multifocal pneumonia likely source, benign abdominal exam. On pressors again since reintubation, likely vasoplegic 2/2 sedation vs sepsis vs inflammatory  - c/w PO vasopressors to maintain MAP > 65, wean as tolerated  - Previously on Ceftriaxone, escalated to Jani and Vanc after decompensation. WIll d/c Vanc and complete Jani x 5 days  - f/u cultures    #Transaminitis - Likely 2/2 septic shock vs acute/chronic HBV?. Hb surface antigen positive. On Tenofovir at home. HBV DNA PCR negative  #HBV  - c/w home Tenofovir    #Pancytopenia - Likely acute on chronic process 2/2 underlying MM/?ITP with current sepsis vs HLH? Patient does have cytopenias (1), hypertrig (2), elevated ferritin (3), fevers (4). Soluble IL 2 is elevated. US negative for splenomegaly  - Trend CBC  - Trend HLH labs, improving  - Discussed with heme, risk outweigh benefits of immunosuppression at this point for ?HLH    #Cardiac - Transient TWI in anterolateral/inferior leads with increased Troponin, now improved.   - Unable to initiate heparin gtt/DAPT for possible NSTEMI due to anemia and thrombocytopenia  - Monitor    #Afib - new onset with RVR, now back in NSR  - c/w metoprolol  - Hold off AC given thrombocytopenia    #Hypernatremia - Improved  - Monitor    #GOC - Prognosis guarded. c/w GOC discussions. Currently full code.    Tray Siddiqui MD  Pulmonary & Critical Care

## 2022-12-10 NOTE — PROGRESS NOTE ADULT - ASSESSMENT
Patient is a 61 yo F w/ PMHx of MM (diagnosed ~10 years ago, currently on Promacta treatments since June 2022 - most recently last Wednesday; followed by oncologist, Dr. Lopez at [facility]), ?ITP, Hep B, HTN, neuropathic R hip pain, recent R corneal tear admitted for AMS, code stroke called no acute infarct or hemorrhage, s/p LP findings not consistent with meningitis, course complicated by hypotension and hypoxia, placed on BiPAP, requiring pressors, admitted to MICU with sepsis likely secondary to pneumonia- intubated 11/30. Extubated 12/3 to face tent reintubated 12/7 in setting of increased WOB and hypercapneic respiratory failure unclear cause possibly HLH induced.     PLAN  Neuro  - s/p code stroke -> no ischemia or hemorrhage on CT H+N  - s/p LP results not consistent with encephalitis/meningitis, ammonia level 30   - baseline is AOx3  - sedated on propofol    - Repeat CTH today for ? neurological cause for respiratory distress causing reintubation- also unsymmetrical nasolabial folds seen on exam     Cardiovascular  #Shock sepsis vs HLH vs vasoplegic   # EKG changes- possible HLH induced cardiac complications?  - suspect sepsis from E. Coli bacteremia with GI or urinary source?  - pt required levophed for intubation- titrating down currently on 0.2   - midodrine 10 q 8  - ECHO 11/30 EF 55 normal LV/RV   - TSH wnl  - has been intermittently being diuresed with lasix- last dose this am 12/8 20mg IV also has been receiving IV dextrose for hyponatremia held this am in setting of ?pulmonary edema vs new cardiac event   - EKG 12/7- new Twave inversions V2-V6, II, III, AVF- EKG 6 hrs later slightly improved, check repeat today   - Trop 100->102-> no longer trending (patient not a candidate for Asp/ hep given pancytopenia)   - POCUS 12/8 normal function no segmental changes VTI 22    #new onset MAR  - MAR AF in 130's on 12/4, resolved, additional event 12/7 in setting of missed PO metoprolol resolved with IV lopressor    - continue Lopressor tartrate 12.5 mg BID   - [id number] score is 3   - unable to anticoagulate in the setting of thrombocytopenia   - SCD's are in place   - continue telemetry monitoring        Pulmonary  #AHRF- likely secondary to PNA infection vs Aspiration   - s/p BiPAP, 10/5 on 50%, then intubated 11/30 for increased work of breathing  - Extubated 12/3 to face tent  - patient with increased WOB/ tachypnea overnight 12/6 after blood transfusion possible TACO? patient requiring BIPAP 10/5 50%   - Reintubated 12/7 in setting of WOB/respiratory distress and hypercapnic respiratory failure.   - MV 12/420/5/40% doing well on SBT 8/5 40%  - antibiotics as below   - MRSA/MSSA resent 12/8   - ID following recs appreciated   - CTA 11/30: neg for PE.  RUL R basilar consolidation RML basilar opacity  - repeat CXR on 12/4-worsening multilobar consolidations improved 12/7   - CTA to r/o PE 12/8- negative for PE ,Consolidation due to pneumonia involving the dependent portion of the RUL with interval improvement since November 30, 2022. RLL consolidation with associated volume loss increased since November 30, 2022 suggestive of partial compressive atelectasis adjacent to the increasing small right pleural effusion. Patchy consolidation within the dependent portion of the left lower lobe increased in size since November 30, 2022 suggestive of atelectasis given its homogeneous enhancement although superimposed pneumonia is not excluded. Small right and trace left pleural effusions.  - diureses with lasix PRN for goal of net neg last dose 12/7  - solumedrol 40 started daily 12/7 , plan for 5day course  - duonebs q 6   - Duplex to r/o DVTs - negative 12/7  - plan for bronch today for optimization prior to extubation.     GI  #Diet  - NPO with tube feeds  - Nutrition consult placed on pivot titrate to goal   - on bowel regimen having BM       #Elevated liver enzymes, - downtrending  - patient has reported hx of Hep B 2017 as per hemonc   - AST/ALT- 100/50 >68/38  - bili also elevated but stable 1.3-> 1.5-> 1.4 >1.0>0.7 continue to monitor   - hepatitis panel - Hep B surface AG +, and the rest is negative    - confirmatory test - hepatitis B quantitative - negative    -Spoke with ROSA Montejo from hepatology office, Dr Florina Jones Re: continuation of Tenofovir. [facility] [phone]. 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 Coli bacteremia- pan sensitive 11/29   - Tmax overnight 98., WBC 5.8  - antibiotics broadened to vanco and meropenem (12/7- )  - vanco trough elevated 23 today. am dose held, check repeat random level this evening and restart at 1g BID if WNLs  - MRSA resent 12/9-  F/U  - Sputum cx 12/7 - NRF  - s/p ctx (12/5-12/7)    - s/p Cefepime 2g q8h (11/30- 12/5) deescalated to ctx  - s/p Azithromycin 500 mg started (12/2- 12/3) , legionella neg  - s/p vanc/CTX/ampicillin/acyclovir in ER for suspected meningitis workup   - Bcx 12/2, 11/29, 12/7- neg   - Fungitel 39   - ID following     SKIN  # sacral decub-DTI., Wound care nurse recs in place     Endo  - no history of diabetes, A1C 6.0  - TSH wnl  - FS stable   - ISS q 6  - started pn NPH 3u q6hrs 12/8, may need to uptitrate today if FS remain elevated     Heme  #MM  - as per Hemonc note MM in remission 5/22 has been on Promacta since 6/22 has received multiple treatments for MM in the last also PBSCT x 2 and haplo allo stem cell transplant in 2020- last spike noted sept 2022-was given belantamab- last treatment was on 11/23/22  - holding anti-myeloma therapy  -  s/p filgrastim held now as patient no longer neutropenic 12/7  - private Hemonc following [facility] recs appreciated     Pancytopenia  #concern for MAHA, possible HUS with E. Coli bacteremia? vs HLH?improving  - new thrombocytopenia, anemia, and +hemolysis labs (bili uptrending and now normalized)  -? reported history as per hemonc of ITP  - direct dede negative  - some schistocytes on peripheral smear, confirmed with hematology/oncology, however higher suspicion for lab abnormalities are secondary to sepsis  - Platelet transfusion protocol: <10, <20 with fever, <50 procedures/active bleeding/etc   - Patient received 1u plat 12/3, recieved 1/2 u PRBC 12/7 , ordered for 1u prbc today 12/9 for Hb 7 for optimization prior to extubation.   - Transfuse PRBC < 7  - Patient meets criteria for HLH (ferritin worsening 18639> 38233, trig 519->191, pancytopenia - 3 cell lines, persistent fever, IL2 8324, hepatitis) Us neg for splenomegaly   -continue home Promacta, 50 mg daily   - Spoke with hemonc at [facility] about HLH- states labs could also be elevated in setting of sepsis and at this point treatment for HLH would be to treat underlying cause of infection and patient would not be a candidate for high dose immunosuppressant (Risks outweigh benefits)   - trend HLH labs daily   - s/p neupogen D/C as patient no longer neutropenic     #DVT ppx  - hold off on DVT ppx for now in setting of pancytopenia   - SCD's in place   - Duplex LE - negative 12/7    Skin  #Suspected DTI on sacrum and ear as per nurse  - Wound care recs appreciated      Ethics  - updated son, he is pediatric resident and patient's HCP  - Pt is full code    Dispo: TELE  -PT consult VINCENT     Patient is a 61 yo F w/ PMHx of MM (diagnosed ~10 years ago, currently on Promacta treatments since June 2022 - most recently last Wednesday; followed by oncologist, Dr. Lopez at [facility]), ?ITP, Hep B, HTN, neuropathic R hip pain, recent R corneal tear admitted for AMS, code stroke called no acute infarct or hemorrhage, s/p LP findings not consistent with meningitis, course complicated by hypotension and hypoxia, placed on BiPAP, requiring pressors, admitted to MICU with sepsis likely secondary to pneumonia- intubated 11/30. Extubated 12/3 to face tent reintubated 12/7 in setting of increased WOB and hypercapneic respiratory failure unclear cause possibly HLH induced.     PLAN  Neuro  - s/p code stroke -> no ischemia or hemorrhage on CT H+N  - s/p LP results not consistent with encephalitis/meningitis, ammonia level 30   - baseline is AOx3  - s/p sedation w/ propofol , transitioned to precedex this am   - Repeat CTH 12/8 to r/o neurological cause for respiratory distress causing reintubation/also unsymmetrical nasolabial folds seen on exam was negative.     Cardiovascular  #Shock sepsis vs HLH vs vasoplegic   # EKG changes- possible HLH induced cardiac complications?  - suspect sepsis from E. Coli bacteremia with GI or urinary source?  - pt required levophed for intubation- titrating down currently on 0.2   - midodrine 10 q 8  - ECHO 11/30 EF 55 normal LV/RV   - TSH wnl  - has been intermittently being diuresed with lasix  - EKG 12/7- new Twave inversions V2-V6, II, III, AVF- EKG 6 hrs later slightly improved, repeat stable  - Trop 100->102-> no longer trending (patient not a candidate for Asp/ hep given pancytopenia)   - POCUS 12/8 normal function no segmental changes, VTI 22    #new onset MAR  - AMR AF in 130's on 12/4, resolved, additional event 12/7 in setting of missed PO metoprolol resolved with IV lopressor    - continue Lopressor tartrate 12.5 mg BID   - [id number] score is 3   - unable to anticoagulate in the setting of thrombocytopenia   - SCD's are in place   - continue telemetry monitoring        Pulmonary  #AHRF- likely secondary to PNA infection vs Aspiration   - s/p BiPAP, 10/5 on 50%, then intubated 11/30 for increased work of breathing  - Extubated 12/3 to face tent  - patient with increased WOB/ tachypnea overnight 12/6 after blood transfusion possible TACO? patient requiring BIPAP 10/5 50%   - Reintubated 12/7 in setting of WOB/respiratory distress and hypercapnic respiratory failure.   - MV 12/420/5/40% doing well on SBT 5/5 30% , plan for possibe extubation today  - antibiotics as below   - MRSA/MSSA resent 12/8   - ID following recs appreciated   - CTA 11/30: neg for PE.  RUL R basilar consolidation RML basilar opacity  - repeat CXR on 12/4-worsening multilobar consolidations improved 12/7   - CTA to r/o PE 12/8- negative for PE ,Consolidation due to pneumonia involving the dependent portion of the RUL with interval improvement since November 30, 2022. RLL consolidation with associated volume loss increased since November 30, 2022 suggestive of partial compressive atelectasis adjacent to the increasing small right pleural effusion. Patchy consolidation within the dependent portion of the left lower lobe increased in size since November 30, 2022 suggestive of atelectasis given its homogeneous enhancement although superimposed pneumonia is not excluded. Small right and trace left pleural effusions.  - diureses with lasix PRN for goal of net neg last dose 12/7  - solumedrol 40 started daily 12/7-12/11 , plan for 5day course  - duonebs q 6   - Duplex to r/o DVTs - negative 12/7  - s/p bronch 12/9 for optimization for extubation, airways noted to be erythematous however patent. BAL culture was negative.      GI  #Diet  - NPO with tube feeds  - Nutrition consult placed on pivot titrate to goal   - on bowel regimen, having BMs     #Elevated liver enzymes, - downtrending  - patient has reported hx of Hep B 2017 as per hemonc   - AST/ALT- 100/50 >68/38>56/35  - bili was elevatedm peaked at 1.5- however now normalized. continue to monitor   - hepatitis panel - Hep B surface AG +, and the rest is negative    - confirmatory test - hepatitis B quantitative - negative    -Spoke with ROSA Montejo from hepatology office, Dr Florina Jones Re: continuation of Tenofovir. [facility] [phone]. 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- outpt follow up after discharge- restarted Tenofovir 25 mg daily     Renal  #GARCIA  - SCr 0.7 today - stable  - CPK elevated, - suspect secondary to sepsis, trended down 1800-> 2800-> 1007>787>324 - no longer trending   - Is and Os: neg +250, neg 3 L for LOS   - UA resent 12/7 slightly positive with mod bacteria and WBC   - intermittently diuresing with lasix - goal of net neg fluid balance    - ortiz replaced 12/7     #hypernatremia - resolved  - NA- 142  - free water 300 q 6 - dc'd 12/9  - Trend BMP qd    ID  #E. Coli bacteremia- pan sensitive 11/29   - Tmax overnight 98, WBC 5.5  - antibiotics broadened to vanco and meropenem (12/7- )  - vanco trough elevated 23 yesterday, am dose held, evening level 15, Vanco re-ordered at 1g BID  - MRSA resent 12/8- negative  - Sputum cx 12/7 - NRF, BAL Culture 12/9 neg   - s/p ctx (12/5-12/7)    - s/p Cefepime 2g q8h (11/30- 12/5) deescalated to ctx  - s/p Azithromycin 500 mg started (12/2- 12/3) , legionella neg  - s/p vanc/CTX/ampicillin/acyclovir in ER for suspected meningitis workup   - Bcx 12/2, 11/29, 12/7- neg   - Fungitel 39   - ID following     SKIN  # sacral decub-DTI., Wound care nurse recs in place     Endo  - no history of diabetes, A1C 6.0  - TSH wnl  - FS stable   - ISS q 6  - started pn NPH 3u q6hrs 12/8, uptitrated to 6u  12/9    Heme  #MM  - as per Hemon[facility] note MM in remission 5/22 has been on Promacta since 6/22 has received multiple treatments for MM in the last also PBSCT x 2 and haplo allo stem cell transplant in 2020- last spike noted sept 2022-was given belantamab- last treatment was on 11/23/22  - holding anti-myeloma therapy  -  s/p filgrastim held now as patient no longer neutropenic 12/7  - private Hemonc following [facility] recs appreciated     Pancytopenia  #concern for MAHA, possible HUS with E. Coli bacteremia? vs HLH?improving  - new thrombocytopenia, anemia, and +hemolysis labs (bili uptrending and now normalized)  -? reported history as per hemonc of ITP  - direct dede negative  - some schistocytes on peripheral smear, confirmed with hematology/oncology, however higher suspicion for lab abnormalities are secondary to sepsis  - Platelet transfusion protocol: <10, <20 with fever, <50 procedures/active bleeding/etc   - Patient received 1u plat 12/3, recieved 1/2 u PRBC 12/7 , ordered for 1u prbc today 12/9 for Hb 7 for optimization prior to extubation.   - Transfuse PRBC < 7  - Patient meets criteria for HLH (ferritin worsening 97728> 56320, trig 519->191, pancytopenia - 3 cell lines, persistent fever, IL2 8324, hepatitis) Us neg for splenomegaly   - continue home Promacta, 50 mg daily   - Spoke with hemonc at [facility] about HLH- states labs could also be elevated in setting of sepsis and at this point treatment for HLH would be to treat underlying cause of infection and patient would not be a candidate for high dose immunosuppressant (Risks outweigh benefits)   - trend HLH labs daily   - s/p neupogen D/C'd as patient no longer neutropenic     #DVT ppx  - hold off on DVT ppx for now in setting of pancytopenia   - SCD's in place   - Duplex LE - negative 12/7    Skin  #Suspected DTI on sacrum and ear as per nurse  - Wound care recs appreciated      Ethics  - updated son, he is pediatric resident and patient's HCP  - Pt is full code    Dispo: TELE  -PT consult VINCENT     Patient is a 61 yo F w/ PMHx of MM (diagnosed ~10 years ago, currently on Promacta treatments since June 2022 - most recently last Wednesday; followed by oncologist, Dr. Lopez at [facility]), ?ITP, Hep B, HTN, neuropathic R hip pain, recent R corneal tear admitted for AMS, code stroke called no acute infarct or hemorrhage, s/p LP findings not consistent with meningitis, course complicated by hypotension and hypoxia, placed on BiPAP, requiring pressors, admitted to MICU with sepsis likely secondary to pneumonia- intubated 11/30. Extubated 12/3 to face tent reintubated 12/7 in setting of increased WOB and hypercapneic respiratory failure unclear cause possibly HLH induced.     PLAN  Neuro  - s/p code stroke -> no ischemia or hemorrhage on CT H+N  - s/p LP results not consistent with encephalitis/meningitis, ammonia level 30   - baseline is AOx3  - s/p sedation w/ propofol , transitioned to precedex this am   - Repeat CTH 12/8 to r/o neurological cause for respiratory distress causing reintubation/also unsymmetrical nasolabial folds seen on exam was negative.     Cardiovascular  #Shock sepsis vs HLH vs vasoplegic   # EKG changes- possible HLH induced cardiac complications?  - suspect sepsis from E. Coli bacteremia with GI or urinary source?  - pt required levophed for intubation- titrating down currently on 0.2   - midodrine 10 q 8  - ECHO 11/30 EF 55 normal LV/RV   - TSH wnl  - has been intermittently being diuresed with lasix  - EKG 12/7- new Twave inversions V2-V6, II, III, AVF- EKG 6 hrs later slightly improved, repeat stable  - Trop 100->102-> no longer trending (patient not a candidate for Asp/ hep given pancytopenia)   - POCUS 12/8 normal function no segmental changes, VTI 22    #new onset MAR  - MAR AF in 130's on 12/4, resolved, additional event 12/7 in setting of missed PO metoprolol resolved with IV lopressor    - continue Lopressor tartrate 12.5 mg BID   - [id number] score is 3   - unable to anticoagulate in the setting of thrombocytopenia   - SCD's are in place   - continue telemetry monitoring        Pulmonary  #AHRF- likely secondary to PNA infection vs Aspiration   - s/p BiPAP, 10/5 on 50%, then intubated 11/30 for increased work of breathing  - Extubated 12/3 to face tent  - patient with increased WOB/ tachypnea overnight 12/6 after blood transfusion possible TACO? patient requiring BIPAP 10/5 50%   - Reintubated 12/7 in setting of WOB/respiratory distress and hypercapnic respiratory failure.   - MV 12/420/5/40% was doing well on SBT 5/5 30% ; now s/p extubation to facetent ~noon today.  - antibiotics as below   - MRSA/MSSA resent 12/8   - ID following recs appreciated   - CTA 11/30: neg for PE.  RUL R basilar consolidation RML basilar opacity  - repeat CXR on 12/4-worsening multilobar consolidations improved 12/7   - CTA to r/o PE 12/8- negative for PE ,Consolidation due to pneumonia involving the dependent portion of the RUL with interval improvement since November 30, 2022. RLL consolidation with associated volume loss increased since November 30, 2022 suggestive of partial compressive atelectasis adjacent to the increasing small right pleural effusion. Patchy consolidation within the dependent portion of the left lower lobe increased in size since November 30, 2022 suggestive of atelectasis given its homogeneous enhancement although superimposed pneumonia is not excluded. Small right and trace left pleural effusions.  - diureses with lasix PRN for goal of net neg last dose 12/7  - solumedrol 40 started daily 12/7-12/10- Dc'd today  - duonebs q 6 , IPV ordered today  - Duplex to r/o DVTs - negative 12/7  - s/p bronch 12/9 for optimization for extubation, airways noted to be erythematous however patent. BAL culture was negative.      GI  #Diet  - NPO for now as she is post extubation, then can do swallow assessment  - on bowel regimen, having BMs     #Elevated liver enzymes, - downtrending  - patient has reported hx of Hep B 2017 as per hemonc   - AST/ALT- 100/50 >68/38>56/35  - bili was elevatedm peaked at 1.5- however now normalized. continue to monitor   - hepatitis panel - Hep B surface AG +, and the rest is negative    - confirmatory test - hepatitis B quantitative - negative    -Spoke with ROSA Montejo from hepatology office, Dr Florina Jones Re: continuation of Tenofovir. [facility] [phone]. 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 Coli bacteremia- pan sensitive 11/29   - Tmax overnight 98, WBC 5.5  - antibiotics broadened to vanco (12/7-12/10) and meropenem (12/7- 12/11) -cont for 5day course  - MRSA resent 12/8- negative  - Sputum cx 12/7 - NRF, BAL Culture 12/9 neg   - s/p ctx (12/5-12/7)    - s/p Cefepime 2g q8h (11/30- 12/5) deescalated to ctx  - s/p Azithromycin 500 mg started (12/2- 12/3) , legionella neg  - s/p vanc/CTX/ampicillin/acyclovir in ER for suspected meningitis workup   - Bcx 12/2, 11/29, 12/7- neg   - Fungitel 39   - ID following     SKIN  # sacral decub-DTI., Wound care nurse recs in place     Endo  - no history of diabetes, A1C 6.0  - TSH wnl  - FS stable   - ISS q 6  - started pn NPH 3u q6hrs 12/8, uptitrated to 6u  12/9    Heme  #MM  - as per Hemonc note MM in remission 5/22 has been on Promacta since 6/22 has received multiple treatments for MM in the last also PBSCT x 2 and haplo allo stem cell transplant in 2020- last spike noted sept 2022-was given belantamab- last treatment was on 11/23/22  - holding anti-myeloma therapy  -  s/p filgrastim held now as patient no longer neutropenic 12/7  - private Hemonc following [facility] recs appreciated     Pancytopenia  #concern for MAHA, possible HUS with E. Coli bacteremia? vs HLH?improving  - new thrombocytopenia, anemia, and +hemolysis labs (bili uptrending and now normalized)  -? reported history as per hemonc of ITP  - direct dede negative  - some schistocytes on peripheral smear, confirmed with hematology/oncology, however higher suspicion for lab abnormalities are secondary to sepsis  - Platelet transfusion protocol: <10, <20 with fever, <50 procedures/active bleeding/etc   - Patient received 1u plat 12/3, recieved 1/2 u PRBC 12/7 , ordered for 1u prbc today 12/9 for Hb 7 for optimization prior to extubation.   - Transfuse PRBC < 7  - Patient meets criteria for HLH (ferritin worsening 83152> 13233, trig 519->191, pancytopenia - 3 cell lines, persistent fever, IL2 8324, hepatitis) Us neg for splenomegaly   - continue home Promacta, 50 mg daily   - Spoke with hemonc at [facility] about HLH- states labs could also be elevated in setting of sepsis and at this point treatment for HLH would be to treat underlying cause of infection and patient would not be a candidate for high dose immunosuppressant (Risks outweigh benefits)   - trend HLH labs daily   - s/p neupogen D/C'd as patient no longer neutropenic     #DVT ppx  - hold off on DVT ppx for now in setting of pancytopenia   - SCD's in place   - Duplex LE - negative 12/7    Skin  #Suspected DTI on sacrum and ear as per nurse  - Wound care recs appreciated      Ethics  - updated son, he is pediatric resident and patient's HCP  - Pt is full code    Dispo: TELE  -PT consult VINCENT

## 2022-12-10 NOTE — PROGRESS NOTE ADULT - SUBJECTIVE AND OBJECTIVE BOX
INTERVAL HPI/OVERNIGHT EVENTS:       HPI: 62 yoF w/ pmhx of MM (diagnosed ~10 years ago, currently on Promacta treatments since 2022 - most recently last Wednesday; followed by oncologist, Dr. Lopez at [facility]), HTN, neuropathic R hip pain, recent R corneal tear, presents to [facility] ED for change in mental status. LKW approximately 14:30. Patient was in normal state of health this morning as per son, Nacho. Patient went to a follow up ophtho appointment for a recently diagnosed corneal abrasion this past Monday. Patient was picked up by a friend around 10:00 for an optho appt with normal mental status, pt was just complaining of being sleepy. On the way back from appointment around 14:30, patient stopped responding to friend appropriately in the car and she appeared more tired & weaker than usual. She started having slurred speech when they arrived home and then son called EMS. In the ED, patient with persisting word finding difficulties and generalized weakness, but leaning to the R side. Patient reports some difficulty remembering what happened today, but reports she feels generally weak and very tired. Code stroke called, but TPA not given because of improvement in pt's symptoms.  Pt states that she had a similar episode a few years ago with fever which she was told was likely from a MM, she improved after getting steroids.     In the ED pt was febrile 105.5, , /84, RR 24 and saturating well on RA. Pt given 2L IVF bolus and 1x vanc/ceftriaxone/acyclovir/ampicillin s/p LP in ER negative results.  Patient was RR later that day for hypotension/ hypoxia requiring intubation and pressor support, brought to MICU in shock. Extubated 12/3 to face tent reintubated  in setting of increased WOB and hypercapnic respiratory failure unclear cause possibly HLH induced.       SUBJECTIVE: Patient seen and examined at bedside.     ROS: Unable to assess as patient intubated/sedated.     OBJECTIVE:    VITAL SIGNS:  ICU Vital Signs Last 24 Hrs  T(C): 37.3 (08 Dec 2022 00:00), Max: 37.3 (08 Dec 2022 00:00)  T(F): 99.1 (08 Dec 2022 00:00), Max: 99.1 (08 Dec 2022 00:00)  HR: 79 (08 Dec 2022 05:00) (66 - 135)  BP: 114/54 (08 Dec 2022 05:00) (106/61 - 167/85)  BP(mean): 69 (08 Dec 2022 05:00) (67 - 105)  ABP: --  ABP(mean): --  RR: 17 (08 Dec 2022 05:00) (17 - 35)  SpO2: 100% (08 Dec 2022 05:00) (96% - 100%)    O2 Parameters below as of 08 Dec 2022 05:00  Patient On (Oxygen Delivery Method): ventilator    O2 Concentration (%): 40      Mode: AC/ CMV (Assist Control/ Continuous Mandatory Ventilation), RR (machine): 24, TV (machine): 450, FiO2: 30, PEEP: 5, MAP: 9, PIP: 23     @ 07:01  -   @ 07:00  --------------------------------------------------------  IN: 1939 mL / OUT: 2425 mL / NET: -486 mL      CAPILLARY BLOOD GLUCOSE      POCT Blood Glucose.: 227 mg/dL (08 Dec 2022 06:48)      PHYSICAL EXAM:    General: NAD  HEENT: NC/AT; pupils pinpoint clear conjunctiva ? difference in nasolabial fold on L side with weakness of L face  Neck: supple  Respiratory: CTA b/l  Cardiovascular: +S1/S2; RRR  Abdomen: soft, NT/ND; +BS x4  Extremities: WWP, 2+ peripheral pulses b/l; + non pitting edema in 4 extremities   Skin: normal color and turgor; no rash  Neurological: intubated, sedated, opening eyes following some intermittent commands. Doing well on SBT    MEDICATIONS:  MEDICATIONS  (STANDING):  albuterol/ipratropium for Nebulization 3 milliLiter(s) Nebulizer every 6 hours  artificial tears (preservative free) Ophthalmic Solution 1 Drop(s) Both EYES two times a day  chlorhexidine 0.12% Liquid 15 milliLiter(s) Oral Mucosa every 12 hours  chlorhexidine 2% Cloths 1 Application(s) Topical daily  dextrose 5%. 1000 milliLiter(s) (50 mL/Hr) IV Continuous <Continuous>  dextrose 5%. 1000 milliLiter(s) (100 mL/Hr) IV Continuous <Continuous>  dextrose 50% Injectable 25 Gram(s) IV Push once  dextrose 50% Injectable 12.5 Gram(s) IV Push once  dextrose 50% Injectable 25 Gram(s) IV Push once  eltrombopag 50 milliGRAM(s) Oral daily  glucagon  Injectable 1 milliGRAM(s) IntraMuscular once  insulin lispro (ADMELOG) corrective regimen sliding scale   SubCutaneous every 6 hours  meropenem  IVPB      meropenem  IVPB 1000 milliGRAM(s) IV Intermittent every 8 hours  methylPREDNISolone sodium succinate Injectable 40 milliGRAM(s) IV Push daily  metoprolol tartrate 12.5 milliGRAM(s) Oral two times a day  midodrine 10 milliGRAM(s) Oral every 8 hours  multivitamin 1 Tablet(s) Oral daily  norepinephrine Infusion 0.05 MICROgram(s)/kG/Min (8.16 mL/Hr) IV Continuous <Continuous>  polyethylene glycol 3350 17 Gram(s) Oral daily  potassium phosphate IVPB 30 milliMole(s) IV Intermittent once  propofol Infusion 20 MICROgram(s)/kG/Min (10.4 mL/Hr) IV Continuous <Continuous>  senna Syrup 10 milliLiter(s) Oral daily  tenofovir alafenamide (VEMLIDY) 25 milliGRAM(s) Oral daily  vancomycin  IVPB      vancomycin  IVPB 1250 milliGRAM(s) IV Intermittent every 12 hours    MEDICATIONS  (PRN):  dextrose Oral Gel 15 Gram(s) Oral once PRN Blood Glucose LESS THAN 70 milliGRAM(s)/deciliter  melatonin 6 milliGRAM(s) Oral at bedtime PRN Insomnia      ALLERGIES:  Allergies    Bactrim (Other)  fluconazole (Vomiting; Nausea)  levofloxacin (Vomiting; Nausea)  penicillin (Other)    Intolerances        LABS:                        8.0    6.34  )-----------( 44       ( 08 Dec 2022 01:50 )             24.3     12-08    152<H>  |  113<H>  |  40<H>  ----------------------------<  245<H>  3.4<L>   |  24  |  0.86    Ca    9.5      08 Dec 2022 01:50  Phos  1.9     12-08  Mg     2.00     12-08    TPro  6.0  /  Alb  2.9<L>  /  TBili  1.1  /  DBili  x   /  AST  100<H>  /  ALT  50<H>  /  AlkPhos  187<H>  12-    PT/INR - ( 08 Dec 2022 01:50 )   PT: 14.4 sec;   INR: 1.24 ratio           Urinalysis Basic - ( 07 Dec 2022 18:18 )    Color: Red / Appearance: bloody / S.013 / pH: x  Gluc: x / Ketone: Negative  / Bili: Negative / Urobili: <2 mg/dL   Blood: x / Protein: 100 mg/dL / Nitrite: Negative   Leuk Esterase: Small / RBC: >10 /HPF / WBC 4 /HPF   Sq Epi: x / Non Sq Epi: 2 /HPF / Bacteria: Moderate        RADIOLOGY & ADDITIONAL TESTS: Reviewed. INTERVAL HPI/OVERNIGHT EVENTS: Vanco level was 23->15, dose was restarted at 1000mg BID. BAL culture from  resulted as negative. NPH was increased from 3u to 6u for elevated FS in 300s.      HPI: 62 yoF w/ pmhx of MM (diagnosed ~10 years ago, currently on Promacta treatments since 2022 - most recently last Wednesday; followed by oncologist, Dr. Lopez at [facility]), HTN, neuropathic R hip pain, recent R corneal tear, presents to [facility] ED for change in mental status. LKW approximately 14:30. Patient was in normal state of health this morning as per son, Nacho. Patient went to a follow up ophtho appointment for a recently diagnosed corneal abrasion this past Monday. Patient was picked up by a friend around 10:00 for an optho appt with normal mental status, pt was just complaining of being sleepy. On the way back from appointment around 14:30, patient stopped responding to friend appropriately in the car and she appeared more tired & weaker than usual. She started having slurred speech when they arrived home and then son called EMS. In the ED, patient with persisting word finding difficulties and generalized weakness, but leaning to the R side. Patient reports some difficulty remembering what happened today, but reports she feels generally weak and very tired. Code stroke called, but TPA not given because of improvement in pt's symptoms.  Pt states that she had a similar episode a few years ago with fever which she was told was likely from a MM, she improved after getting steroids.     In the ED pt was febrile 105.5, , /84, RR 24 and saturating well on RA. Pt given 2L IVF bolus and 1x vanc/ceftriaxone/acyclovir/ampicillin s/p LP in ER negative results.  Patient was RR later that day for hypotension/ hypoxia requiring intubation and pressor support, brought to MICU in shock. Extubated 12/3 to face tent reintubated  in setting of increased WOB and hypercapnic respiratory failure unclear cause possibly HLH induced.       SUBJECTIVE: Patient seen and examined at bedside.     ROS: Unable to assess as patient intubated/sedated.     OBJECTIVE:    VITAL SIGNS:  ICU Vital Signs Last 24 Hrs  T(C): 37.3 (08 Dec 2022 00:00), Max: 37.3 (08 Dec 2022 00:00)  T(F): 99.1 (08 Dec 2022 00:00), Max: 99.1 (08 Dec 2022 00:00)  HR: 79 (08 Dec 2022 05:00) (66 - 135)  BP: 114/54 (08 Dec 2022 05:00) (106/61 - 167/85)  BP(mean): 69 (08 Dec 2022 05:00) (67 - 105)  ABP: --  ABP(mean): --  RR: 17 (08 Dec 2022 05:00) (17 - 35)  SpO2: 100% (08 Dec 2022 05:00) (96% - 100%)    O2 Parameters below as of 08 Dec 2022 05:00  Patient On (Oxygen Delivery Method): ventilator    O2 Concentration (%): 40      Mode: AC/ CMV (Assist Control/ Continuous Mandatory Ventilation), RR (machine): 24, TV (machine): 450, FiO2: 30, PEEP: 5, MAP: 9, PIP: 23     @ 07:01  -   @ 07:00  --------------------------------------------------------  IN: 1939 mL / OUT: 2425 mL / NET: -486 mL      CAPILLARY BLOOD GLUCOSE      POCT Blood Glucose.: 227 mg/dL (08 Dec 2022 06:48)      PHYSICAL EXAM:    General: NAD  HEENT: NC/AT; pupils pinpoint clear conjunctiva ? difference in nasolabial fold on L side with weakness of L face  Neck: supple  Respiratory: CTA b/l  Cardiovascular: +S1/S2; RRR  Abdomen: soft, NT/ND; +BS x4  Extremities: WWP, 2+ peripheral pulses b/l; + non pitting edema in 4 extremities   Skin: normal color and turgor; no rash  Neurological: intubated, sedated, opening eyes following some intermittent commands. Doing well on SBT    MEDICATIONS:  MEDICATIONS  (STANDING):  albuterol/ipratropium for Nebulization 3 milliLiter(s) Nebulizer every 6 hours  artificial tears (preservative free) Ophthalmic Solution 1 Drop(s) Both EYES two times a day  chlorhexidine 0.12% Liquid 15 milliLiter(s) Oral Mucosa every 12 hours  chlorhexidine 2% Cloths 1 Application(s) Topical daily  dextrose 5%. 1000 milliLiter(s) (50 mL/Hr) IV Continuous <Continuous>  dextrose 5%. 1000 milliLiter(s) (100 mL/Hr) IV Continuous <Continuous>  dextrose 50% Injectable 25 Gram(s) IV Push once  dextrose 50% Injectable 12.5 Gram(s) IV Push once  dextrose 50% Injectable 25 Gram(s) IV Push once  eltrombopag 50 milliGRAM(s) Oral daily  glucagon  Injectable 1 milliGRAM(s) IntraMuscular once  insulin lispro (ADMELOG) corrective regimen sliding scale   SubCutaneous every 6 hours  meropenem  IVPB      meropenem  IVPB 1000 milliGRAM(s) IV Intermittent every 8 hours  methylPREDNISolone sodium succinate Injectable 40 milliGRAM(s) IV Push daily  metoprolol tartrate 12.5 milliGRAM(s) Oral two times a day  midodrine 10 milliGRAM(s) Oral every 8 hours  multivitamin 1 Tablet(s) Oral daily  norepinephrine Infusion 0.05 MICROgram(s)/kG/Min (8.16 mL/Hr) IV Continuous <Continuous>  polyethylene glycol 3350 17 Gram(s) Oral daily  potassium phosphate IVPB 30 milliMole(s) IV Intermittent once  propofol Infusion 20 MICROgram(s)/kG/Min (10.4 mL/Hr) IV Continuous <Continuous>  senna Syrup 10 milliLiter(s) Oral daily  tenofovir alafenamide (VEMLIDY) 25 milliGRAM(s) Oral daily  vancomycin  IVPB      vancomycin  IVPB 1250 milliGRAM(s) IV Intermittent every 12 hours    MEDICATIONS  (PRN):  dextrose Oral Gel 15 Gram(s) Oral once PRN Blood Glucose LESS THAN 70 milliGRAM(s)/deciliter  melatonin 6 milliGRAM(s) Oral at bedtime PRN Insomnia      ALLERGIES:  Allergies    Bactrim (Other)  fluconazole (Vomiting; Nausea)  levofloxacin (Vomiting; Nausea)  penicillin (Other)    Intolerances        LABS:                        8.0    6.34  )-----------( 44       ( 08 Dec 2022 01:50 )             24.3     12-08    152<H>  |  113<H>  |  40<H>  ----------------------------<  245<H>  3.4<L>   |  24  |  0.86    Ca    9.5      08 Dec 2022 01:50  Phos  1.9     12-  Mg     2.00         TPro  6.0  /  Alb  2.9<L>  /  TBili  1.1  /  DBili  x   /  AST  100<H>  /  ALT  50<H>  /  AlkPhos  187<H>  12    PT/INR - ( 08 Dec 2022 01:50 )   PT: 14.4 sec;   INR: 1.24 ratio           Urinalysis Basic - ( 07 Dec 2022 18:18 )    Color: Red / Appearance: bloody / S.013 / pH: x  Gluc: x / Ketone: Negative  / Bili: Negative / Urobili: <2 mg/dL   Blood: x / Protein: 100 mg/dL / Nitrite: Negative   Leuk Esterase: Small / RBC: >10 /HPF / WBC 4 /HPF   Sq Epi: x / Non Sq Epi: 2 /HPF / Bacteria: Moderate        RADIOLOGY & ADDITIONAL TESTS: Reviewed. INTERVAL HPI/OVERNIGHT EVENTS: Vanco level was 23->15, dose was restarted at 1000mg BID. BAL culture from  resulted as negative. NPH was increased from 3u to 6u for elevated FS in 300s.      HPI: 62 yoF w/ pmhx of MM (diagnosed ~10 years ago, currently on Promacta treatments since 2022 - most recently last Wednesday; followed by oncologist, Dr. Lopez at [facility]), HTN, neuropathic R hip pain, recent R corneal tear, presents to [facility] ED for change in mental status. LKW approximately 14:30. Patient was in normal state of health this morning as per son, Nacho. Patient went to a follow up ophtho appointment for a recently diagnosed corneal abrasion this past Monday. Patient was picked up by a friend around 10:00 for an optho appt with normal mental status, pt was just complaining of being sleepy. On the way back from appointment around 14:30, patient stopped responding to friend appropriately in the car and she appeared more tired & weaker than usual. She started having slurred speech when they arrived home and then son called EMS. In the ED, patient with persisting word finding difficulties and generalized weakness, but leaning to the R side. Patient reports some difficulty remembering what happened today, but reports she feels generally weak and very tired. Code stroke called, but TPA not given because of improvement in pt's symptoms.  Pt states that she had a similar episode a few years ago with fever which she was told was likely from a MM, she improved after getting steroids.     In the ED pt was febrile 105.5, , /84, RR 24 and saturating well on RA. Pt given 2L IVF bolus and 1x vanc/ceftriaxone/acyclovir/ampicillin s/p LP in ER negative results.  Patient was RR later that day for hypotension/ hypoxia requiring intubation and pressor support, brought to MICU in shock. Extubated 12/3 to face tent reintubated  in setting of increased WOB and hypercapnic respiratory failure unclear cause possibly HLH induced. She underwent bronchoscopy on , airways were patent. BAL culture resulted as negative.       SUBJECTIVE: Patient seen and examined at bedside.     ROS: Unable to assess as patient intubated/sedated.     OBJECTIVE:    VITAL SIGNS:  ICU Vital Signs Last 24 Hrs  T(C): 37.3 (08 Dec 2022 00:00), Max: 37.3 (08 Dec 2022 00:00)  T(F): 99.1 (08 Dec 2022 00:00), Max: 99.1 (08 Dec 2022 00:00)  HR: 79 (08 Dec 2022 05:00) (66 - 135)  BP: 114/54 (08 Dec 2022 05:00) (106/61 - 167/85)  BP(mean): 69 (08 Dec 2022 05:00) (67 - 105)  ABP: --  ABP(mean): --  RR: 17 (08 Dec 2022 05:00) (17 - 35)  SpO2: 100% (08 Dec 2022 05:00) (96% - 100%)    O2 Parameters below as of 08 Dec 2022 05:00  Patient On (Oxygen Delivery Method): ventilator    O2 Concentration (%): 40      Mode: AC/ CMV (Assist Control/ Continuous Mandatory Ventilation), RR (machine): 24, TV (machine): 450, FiO2: 30, PEEP: 5, MAP: 9, PIP: 23    12-07 @ 07:01  -  12-08 @ 07:00  --------------------------------------------------------  IN: 1939 mL / OUT: 2425 mL / NET: -486 mL      CAPILLARY BLOOD GLUCOSE      POCT Blood Glucose.: 227 mg/dL (08 Dec 2022 06:48)      PHYSICAL EXAM:    General: NAD  HEENT: NC/AT; pupils pinpoint clear conjunctiva ? difference in nasolabial fold on L side with weakness of L face  Neck: supple  Respiratory: CTA b/l  Cardiovascular: +S1/S2; RRR  Abdomen: soft, NT/ND; +BS x4  Extremities: WWP, 2+ peripheral pulses b/l; + non pitting edema in 4 extremities   Skin: normal color and turgor; no rash  Neurological: intubated, sedated, opening eyes following some intermittent commands. Doing well on SBT    MEDICATIONS:  MEDICATIONS  (STANDING):  albuterol/ipratropium for Nebulization 3 milliLiter(s) Nebulizer every 6 hours  artificial tears (preservative free) Ophthalmic Solution 1 Drop(s) Both EYES two times a day  chlorhexidine 0.12% Liquid 15 milliLiter(s) Oral Mucosa every 12 hours  chlorhexidine 2% Cloths 1 Application(s) Topical daily  dextrose 5%. 1000 milliLiter(s) (50 mL/Hr) IV Continuous <Continuous>  dextrose 5%. 1000 milliLiter(s) (100 mL/Hr) IV Continuous <Continuous>  dextrose 50% Injectable 25 Gram(s) IV Push once  dextrose 50% Injectable 12.5 Gram(s) IV Push once  dextrose 50% Injectable 25 Gram(s) IV Push once  eltrombopag 50 milliGRAM(s) Oral daily  glucagon  Injectable 1 milliGRAM(s) IntraMuscular once  insulin lispro (ADMELOG) corrective regimen sliding scale   SubCutaneous every 6 hours  meropenem  IVPB      meropenem  IVPB 1000 milliGRAM(s) IV Intermittent every 8 hours  methylPREDNISolone sodium succinate Injectable 40 milliGRAM(s) IV Push daily  metoprolol tartrate 12.5 milliGRAM(s) Oral two times a day  midodrine 10 milliGRAM(s) Oral every 8 hours  multivitamin 1 Tablet(s) Oral daily  norepinephrine Infusion 0.05 MICROgram(s)/kG/Min (8.16 mL/Hr) IV Continuous <Continuous>  polyethylene glycol 3350 17 Gram(s) Oral daily  potassium phosphate IVPB 30 milliMole(s) IV Intermittent once  propofol Infusion 20 MICROgram(s)/kG/Min (10.4 mL/Hr) IV Continuous <Continuous>  senna Syrup 10 milliLiter(s) Oral daily  tenofovir alafenamide (VEMLIDY) 25 milliGRAM(s) Oral daily  vancomycin  IVPB      vancomycin  IVPB 1250 milliGRAM(s) IV Intermittent every 12 hours    MEDICATIONS  (PRN):  dextrose Oral Gel 15 Gram(s) Oral once PRN Blood Glucose LESS THAN 70 milliGRAM(s)/deciliter  melatonin 6 milliGRAM(s) Oral at bedtime PRN Insomnia      ALLERGIES:  Allergies    Bactrim (Other)  fluconazole (Vomiting; Nausea)  levofloxacin (Vomiting; Nausea)  penicillin (Other)    Intolerances        LABS:                        8.0    6.34  )-----------( 44       ( 08 Dec 2022 01:50 )             24.3     12-08    152<H>  |  113<H>  |  40<H>  ----------------------------<  245<H>  3.4<L>   |  24  |  0.86    Ca    9.5      08 Dec 2022 01:50  Phos  1.9       Mg     2.00         TPro  6.0  /  Alb  2.9<L>  /  TBili  1.1  /  DBili  x   /  AST  100<H>  /  ALT  50<H>  /  AlkPhos  187<H>      PT/INR - ( 08 Dec 2022 01:50 )   PT: 14.4 sec;   INR: 1.24 ratio           Urinalysis Basic - ( 07 Dec 2022 18:18 )    Color: Red / Appearance: bloody / S.013 / pH: x  Gluc: x / Ketone: Negative  / Bili: Negative / Urobili: <2 mg/dL   Blood: x / Protein: 100 mg/dL / Nitrite: Negative   Leuk Esterase: Small / RBC: >10 /HPF / WBC 4 /HPF   Sq Epi: x / Non Sq Epi: 2 /HPF / Bacteria: Moderate        RADIOLOGY & ADDITIONAL TESTS: Reviewed.

## 2022-12-10 NOTE — PROGRESS NOTE ADULT - ASSESSMENT
63 yo woman with heavily pretreated myeloma, significant immunocompromise  admitted with E Coli pneumonia, respiratory failure and sepsis  pancytopenia is chronic, predating illness, likely related to myeloma and treatment  following reintubation 12/7, patient improved on IV abx  I agree with concern for HLH here, with high ferritin,  TRG, high IL2 receptor, although no organomegaly, CSF negative, fibrinogen normal, and cytopenias and high LDH predated admission    SUGGEST  continue supportive care, IV abx per MICU  transfuse prn  agree that aggressive immunosuppression for HLH would be poorly tolerated in light of her overall condition, and if underlying infection improves hopefully inflammatory markers will improve.   d/w patient and son at length

## 2022-12-11 LAB
ALBUMIN SERPL ELPH-MCNC: 2.6 G/DL — LOW (ref 3.3–5)
ALBUMIN SERPL ELPH-MCNC: 2.7 G/DL — LOW (ref 3.3–5)
ALP SERPL-CCNC: 150 U/L — HIGH (ref 40–120)
ALP SERPL-CCNC: 158 U/L — HIGH (ref 40–120)
ALT FLD-CCNC: 27 U/L — SIGNIFICANT CHANGE UP (ref 4–33)
ALT FLD-CCNC: 32 U/L — SIGNIFICANT CHANGE UP (ref 4–33)
ANION GAP SERPL CALC-SCNC: 11 MMOL/L — SIGNIFICANT CHANGE UP (ref 7–14)
ANION GAP SERPL CALC-SCNC: 6 MMOL/L — LOW (ref 7–14)
AST SERPL-CCNC: 43 U/L — HIGH (ref 4–32)
AST SERPL-CCNC: 49 U/L — HIGH (ref 4–32)
BILIRUB SERPL-MCNC: 0.8 MG/DL — SIGNIFICANT CHANGE UP (ref 0.2–1.2)
BILIRUB SERPL-MCNC: 0.9 MG/DL — SIGNIFICANT CHANGE UP (ref 0.2–1.2)
BUN SERPL-MCNC: 33 MG/DL — HIGH (ref 7–23)
BUN SERPL-MCNC: 40 MG/DL — HIGH (ref 7–23)
CALCIUM SERPL-MCNC: 9.6 MG/DL — SIGNIFICANT CHANGE UP (ref 8.4–10.5)
CALCIUM SERPL-MCNC: 9.8 MG/DL — SIGNIFICANT CHANGE UP (ref 8.4–10.5)
CHLORIDE SERPL-SCNC: 111 MMOL/L — HIGH (ref 98–107)
CHLORIDE SERPL-SCNC: 112 MMOL/L — HIGH (ref 98–107)
CO2 SERPL-SCNC: 27 MMOL/L — SIGNIFICANT CHANGE UP (ref 22–31)
CO2 SERPL-SCNC: 31 MMOL/L — SIGNIFICANT CHANGE UP (ref 22–31)
CREAT SERPL-MCNC: 0.62 MG/DL — SIGNIFICANT CHANGE UP (ref 0.5–1.3)
CREAT SERPL-MCNC: 0.69 MG/DL — SIGNIFICANT CHANGE UP (ref 0.5–1.3)
CULTURE RESULTS: SIGNIFICANT CHANGE UP
EGFR: 101 ML/MIN/1.73M2 — SIGNIFICANT CHANGE UP
EGFR: 98 ML/MIN/1.73M2 — SIGNIFICANT CHANGE UP
GLUCOSE BLDC GLUCOMTR-MCNC: 187 MG/DL — HIGH (ref 70–99)
GLUCOSE BLDC GLUCOMTR-MCNC: 194 MG/DL — HIGH (ref 70–99)
GLUCOSE BLDC GLUCOMTR-MCNC: 205 MG/DL — HIGH (ref 70–99)
GLUCOSE BLDC GLUCOMTR-MCNC: 239 MG/DL — HIGH (ref 70–99)
GLUCOSE SERPL-MCNC: 175 MG/DL — HIGH (ref 70–99)
GLUCOSE SERPL-MCNC: 261 MG/DL — HIGH (ref 70–99)
HCT VFR BLD CALC: 28.3 % — LOW (ref 34.5–45)
HGB BLD-MCNC: 8.9 G/DL — LOW (ref 11.5–15.5)
MAGNESIUM SERPL-MCNC: 1.9 MG/DL — SIGNIFICANT CHANGE UP (ref 1.6–2.6)
MCHC RBC-ENTMCNC: 31.4 GM/DL — LOW (ref 32–36)
MCHC RBC-ENTMCNC: 32 PG — SIGNIFICANT CHANGE UP (ref 27–34)
MCV RBC AUTO: 101.8 FL — HIGH (ref 80–100)
NRBC # BLD: 10 /100 WBCS — HIGH (ref 0–0)
NRBC # FLD: 0.56 K/UL — HIGH (ref 0–0)
PHOSPHATE SERPL-MCNC: 2.4 MG/DL — LOW (ref 2.5–4.5)
PLATELET # BLD AUTO: 47 K/UL — LOW (ref 150–400)
POTASSIUM SERPL-MCNC: 3.8 MMOL/L — SIGNIFICANT CHANGE UP (ref 3.5–5.3)
POTASSIUM SERPL-MCNC: 4 MMOL/L — SIGNIFICANT CHANGE UP (ref 3.5–5.3)
POTASSIUM SERPL-SCNC: 3.8 MMOL/L — SIGNIFICANT CHANGE UP (ref 3.5–5.3)
POTASSIUM SERPL-SCNC: 4 MMOL/L — SIGNIFICANT CHANGE UP (ref 3.5–5.3)
PROT SERPL-MCNC: 5.1 G/DL — LOW (ref 6–8.3)
PROT SERPL-MCNC: 5.1 G/DL — LOW (ref 6–8.3)
RBC # BLD: 2.78 M/UL — LOW (ref 3.8–5.2)
RBC # FLD: 19.6 % — HIGH (ref 10.3–14.5)
SODIUM SERPL-SCNC: 149 MMOL/L — HIGH (ref 135–145)
SODIUM SERPL-SCNC: 149 MMOL/L — HIGH (ref 135–145)
SPECIMEN SOURCE: SIGNIFICANT CHANGE UP
WBC # BLD: 5.81 K/UL — SIGNIFICANT CHANGE UP (ref 3.8–10.5)
WBC # FLD AUTO: 5.81 K/UL — SIGNIFICANT CHANGE UP (ref 3.8–10.5)

## 2022-12-11 PROCEDURE — 99233 SBSQ HOSP IP/OBS HIGH 50: CPT

## 2022-12-11 RX ORDER — SODIUM CHLORIDE 9 MG/ML
1000 INJECTION, SOLUTION INTRAVENOUS
Refills: 0 | Status: DISCONTINUED | OUTPATIENT
Start: 2022-12-11 | End: 2022-12-11

## 2022-12-11 RX ORDER — SODIUM CHLORIDE 9 MG/ML
1000 INJECTION, SOLUTION INTRAVENOUS
Refills: 0 | Status: DISCONTINUED | OUTPATIENT
Start: 2022-12-11 | End: 2022-12-12

## 2022-12-11 RX ORDER — INSULIN GLARGINE 100 [IU]/ML
14 INJECTION, SOLUTION SUBCUTANEOUS EVERY MORNING
Refills: 0 | Status: DISCONTINUED | OUTPATIENT
Start: 2022-12-11 | End: 2022-12-14

## 2022-12-11 RX ORDER — POTASSIUM CHLORIDE 20 MEQ
20 PACKET (EA) ORAL ONCE
Refills: 0 | Status: COMPLETED | OUTPATIENT
Start: 2022-12-11 | End: 2022-12-11

## 2022-12-11 RX ORDER — SENNA PLUS 8.6 MG/1
2 TABLET ORAL AT BEDTIME
Refills: 0 | Status: DISCONTINUED | OUTPATIENT
Start: 2022-12-11 | End: 2022-12-13

## 2022-12-11 RX ORDER — SODIUM,POTASSIUM PHOSPHATES 278-250MG
1 POWDER IN PACKET (EA) ORAL ONCE
Refills: 0 | Status: COMPLETED | OUTPATIENT
Start: 2022-12-11 | End: 2022-12-11

## 2022-12-11 RX ORDER — MAGNESIUM SULFATE 500 MG/ML
2 VIAL (ML) INJECTION ONCE
Refills: 0 | Status: COMPLETED | OUTPATIENT
Start: 2022-12-11 | End: 2022-12-11

## 2022-12-11 RX ORDER — POLYETHYLENE GLYCOL 3350 17 G/17G
17 POWDER, FOR SOLUTION ORAL
Refills: 0 | Status: DISCONTINUED | OUTPATIENT
Start: 2022-12-11 | End: 2022-12-13

## 2022-12-11 RX ORDER — INSULIN LISPRO 100/ML
VIAL (ML) SUBCUTANEOUS
Refills: 0 | Status: DISCONTINUED | OUTPATIENT
Start: 2022-12-11 | End: 2022-12-13

## 2022-12-11 RX ORDER — INSULIN LISPRO 100/ML
VIAL (ML) SUBCUTANEOUS AT BEDTIME
Refills: 0 | Status: DISCONTINUED | OUTPATIENT
Start: 2022-12-11 | End: 2022-12-13

## 2022-12-11 RX ADMIN — POLYETHYLENE GLYCOL 3350 17 GRAM(S): 17 POWDER, FOR SOLUTION ORAL at 17:21

## 2022-12-11 RX ADMIN — TENOFOVIR DISOPROXIL FUMARATE 25 MILLIGRAM(S): 300 TABLET, FILM COATED ORAL at 16:57

## 2022-12-11 RX ADMIN — Medication 3 MILLILITER(S): at 21:06

## 2022-12-11 RX ADMIN — Medication 1 TABLET(S): at 12:17

## 2022-12-11 RX ADMIN — Medication 3 MILLILITER(S): at 16:05

## 2022-12-11 RX ADMIN — MIDODRINE HYDROCHLORIDE 10 MILLIGRAM(S): 2.5 TABLET ORAL at 05:09

## 2022-12-11 RX ADMIN — MEROPENEM 100 MILLIGRAM(S): 1 INJECTION INTRAVENOUS at 22:07

## 2022-12-11 RX ADMIN — Medication 12.5 MILLIGRAM(S): at 17:20

## 2022-12-11 RX ADMIN — Medication 1 PACKET(S): at 02:40

## 2022-12-11 RX ADMIN — Medication 1 DROP(S): at 17:20

## 2022-12-11 RX ADMIN — Medication 20 MILLIEQUIVALENT(S): at 16:57

## 2022-12-11 RX ADMIN — MIDODRINE HYDROCHLORIDE 10 MILLIGRAM(S): 2.5 TABLET ORAL at 16:57

## 2022-12-11 RX ADMIN — SODIUM CHLORIDE 75 MILLILITER(S): 9 INJECTION, SOLUTION INTRAVENOUS at 16:59

## 2022-12-11 RX ADMIN — Medication 3 MILLILITER(S): at 09:30

## 2022-12-11 RX ADMIN — Medication 2: at 12:15

## 2022-12-11 RX ADMIN — Medication 1 DROP(S): at 05:09

## 2022-12-11 RX ADMIN — MEROPENEM 100 MILLIGRAM(S): 1 INJECTION INTRAVENOUS at 16:57

## 2022-12-11 RX ADMIN — CHLORHEXIDINE GLUCONATE 1 APPLICATION(S): 213 SOLUTION TOPICAL at 12:16

## 2022-12-11 RX ADMIN — Medication 3 UNIT(S): at 12:16

## 2022-12-11 RX ADMIN — Medication 25 GRAM(S): at 02:42

## 2022-12-11 RX ADMIN — Medication 12.5 MILLIGRAM(S): at 05:08

## 2022-12-11 RX ADMIN — INSULIN GLARGINE 14 UNIT(S): 100 INJECTION, SOLUTION SUBCUTANEOUS at 10:09

## 2022-12-11 RX ADMIN — MEROPENEM 100 MILLIGRAM(S): 1 INJECTION INTRAVENOUS at 05:09

## 2022-12-11 RX ADMIN — SENNA PLUS 2 TABLET(S): 8.6 TABLET ORAL at 22:07

## 2022-12-11 RX ADMIN — Medication 3 UNIT(S): at 16:58

## 2022-12-11 RX ADMIN — Medication 5 MILLIGRAM(S): at 22:10

## 2022-12-11 RX ADMIN — MIDODRINE HYDROCHLORIDE 10 MILLIGRAM(S): 2.5 TABLET ORAL at 22:07

## 2022-12-11 RX ADMIN — Medication 3 MILLILITER(S): at 03:29

## 2022-12-11 RX ADMIN — Medication 1: at 16:58

## 2022-12-11 RX ADMIN — Medication 3 UNIT(S): at 09:49

## 2022-12-11 RX ADMIN — ELTROMBOPAG OLAMINE 50 MILLIGRAM(S): 50 TABLET, FILM COATED ORAL at 17:20

## 2022-12-11 NOTE — SWALLOW BEDSIDE ASSESSMENT ADULT - COMMENTS
As per Critical Care PA note "Patient is a 61 yo F w/ PMHx of MM (diagnosed ~10 years ago, currently on Promacta treatments since June 2022 - most recently last Wednesday; followed by oncologist, Dr. Lopez at [facility]), ?ITP, Hep B, HTN, neuropathic R hip pain, recent R corneal tear admitted for AMS, code stroke called no acute infarct or hemorrhage, s/p LP findings not consistent with meningitis, course complicated by hypotension and hypoxia, placed on BiPAP, requiring pressors, admitted to MICU with sepsis likely secondary to pneumonia- intubated 11/30. Extubated 12/3 to face tent reintubated 12/7 in setting of increased WOB and hypercapneic respiratory failure unclear cause possibly HLH induced."    WBC is within the normal range. CXR 12/7 revealed "Status post intubation and enteric tube placement. Decreasing effusions with improving right upper lobe consolidation." CTH 12/8 revealed "Stable exam. No mass effect, hemorrhage or evidence of acute intracranial   pathology."    Of note, patient is known to this service. Seen for bedside swallow assessments 11/30 and 12/5 and a cinesophagram 12/6. Most recent cinesophagram recommended puree/thin via small single cup sip. See report for details.     Patient seen for bedside swallow on this date s/p extubation. Patient received upright in bed, receiving supplemental O2 via nasal cannula. Patient alert and able to follow all 1-step directives. Hoarse and breathy vocal quality noted.

## 2022-12-11 NOTE — PHYSICAL THERAPY INITIAL EVALUATION ADULT - DIAGNOSIS, PT EVAL
Sepsis
Upon evaluation, pt presents with impairments in functional mobility, strength, balance, gait, and endurance. Pt would benefit from skilled PT services in the acute care setting to address impairments to facilitate return to prior level of function.

## 2022-12-11 NOTE — SWALLOW BEDSIDE ASSESSMENT ADULT - ASR SWALLOW ASPIRATION MONITOR
cough/throat clearing
change of breathing pattern/oral hygiene/position upright (90Y)/cough/gurgly voice/fever/pneumonia/throat clearing/upper respiratory infection

## 2022-12-11 NOTE — PROGRESS NOTE ADULT - SUBJECTIVE AND OBJECTIVE BOX
HPI:  refractory myeloma with chronic ITP, immunocompromised, admitted with AMS due to E Coli sepsis and PNA causing respiratory failure which required intubation. Was [id number] but extubated yesterday. Finishing up course of IV abx. Concern for HLH.     This am she feels slightly stronger. Denies pain or cough. Still hoarse       ROS:  Negative except for: hoarseness    MEDICATIONS  (STANDING):  albuterol/ipratropium for Nebulization 3 milliLiter(s) Nebulizer every 6 hours  artificial tears (preservative free) Ophthalmic Solution 1 Drop(s) Both EYES two times a day  chlorhexidine 2% Cloths 1 Application(s) Topical daily  dextrose 5%. 1000 milliLiter(s) (75 mL/Hr) IV Continuous <Continuous>  dextrose 5%. 1000 milliLiter(s) (50 mL/Hr) IV Continuous <Continuous>  dextrose 5%. 1000 milliLiter(s) (100 mL/Hr) IV Continuous <Continuous>  dextrose 50% Injectable 25 Gram(s) IV Push once  dextrose 50% Injectable 12.5 Gram(s) IV Push once  dextrose 50% Injectable 25 Gram(s) IV Push once  eltrombopag 50 milliGRAM(s) Oral daily  glucagon  Injectable 1 milliGRAM(s) IntraMuscular once  insulin glargine Injectable (LANTUS) 14 Unit(s) SubCutaneous every morning  insulin lispro (ADMELOG) corrective regimen sliding scale   SubCutaneous three times a day before meals  insulin lispro (ADMELOG) corrective regimen sliding scale   SubCutaneous at bedtime  insulin lispro Injectable (ADMELOG) 3 Unit(s) SubCutaneous three times a day with meals  meropenem  IVPB      meropenem  IVPB 1000 milliGRAM(s) IV Intermittent every 8 hours  metoprolol tartrate 12.5 milliGRAM(s) Oral two times a day  midodrine 10 milliGRAM(s) Oral every 8 hours  multivitamin 1 Tablet(s) Oral daily  polyethylene glycol 3350 17 Gram(s) Oral two times a day  predniSONE   Tablet 5 milliGRAM(s) Oral daily  senna 2 Tablet(s) Oral at bedtime  tenofovir alafenamide (VEMLIDY) 25 milliGRAM(s) Oral daily    MEDICATIONS  (PRN):  dextrose Oral Gel 15 Gram(s) Oral once PRN Blood Glucose LESS THAN 70 milliGRAM(s)/deciliter  melatonin 6 milliGRAM(s) Oral at bedtime PRN Insomnia      Allergies    Bactrim (Other)  fluconazole (Vomiting; Nausea)  levofloxacin (Vomiting; Nausea)  penicillin (Other)    Intolerances        Vital Signs Last 24 Hrs  T(C): 37.2 (11 Dec 2022 09:00), Max: 37.2 (11 Dec 2022 09:00)  T(F): 98.9 (11 Dec 2022 09:00), Max: 98.9 (11 Dec 2022 09:00)  HR: 74 (11 Dec 2022 11:00) (59 - 74)  BP: 120/56 (11 Dec 2022 11:00) (105/50 - 131/63)  BP(mean): 72 (11 Dec 2022 11:00) (62 - 91)  RR: 21 (11 Dec 2022 11:00) (12 - 23)  SpO2: 96% (11 Dec 2022 11:00) (95% - 100%)    Parameters below as of 11 Dec 2022 11:00  Patient On (Oxygen Delivery Method): nasal cannula w/ humidification  O2 Flow (L/min): 1      PHYSICAL EXAM:      Constitutional: awake and alert    Eyes: anicteric    ENMT:    Neck:    Lymph nodes:    Respiratory: clear    Cardiovascular: RRR    Gastrointestinal:    Extremities: no edema    Skin:                    LABS:                          8.9    5.81  )-----------( 47       ( 11 Dec 2022 00:30 )             28.3         Mean Cell Volume : 101.8 fL  Mean Cell Hemoglobin : 32.0 pg  Mean Cell Hemoglobin Concentration : 31.4 gm/dL  Auto Neutrophil # : x  Auto Lymphocyte # : x  Auto Monocyte # : x  Auto Eosinophil # : x  Auto Basophil # : x  Auto Neutrophil % : x  Auto Lymphocyte % : x  Auto Monocyte % : x  Auto Eosinophil % : x  Auto Basophil % : x    Serial CBC  Hematocrit 28.3  Hemoglobin 8.9  Plat 47  RBC 2.78  WBC 5.81  Serial CBC  Hematocrit 25.8  Hemoglobin 8.1  Plat 39  RBC 2.54  WBC 5.58  Serial CBC  Hematocrit 27.5  Hemoglobin 8.6  Plat 39  RBC 2.70  WBC 5.90  Serial CBC  Hematocrit 22.4  Hemoglobin 7.0  Plat 38  RBC 2.08  WBC 5.81  Serial CBC  Hematocrit 23.1  Hemoglobin 7.2  Plat 37  RBC 2.18  WBC 5.96  Serial CBC  Hematocrit 24.3  Hemoglobin 8.0  Plat 44  RBC 2.36  WBC 6.34    12-11    149<H>  |  112<H>  |  40<H>  ----------------------------<  175<H>  4.0   |  31  |  0.69    Ca    9.8      11 Dec 2022 00:30  Phos  2.4     12-11  Mg     1.90     12-11    TPro  5.1<L>  /  Alb  2.7<L>  /  TBili  0.9  /  DBili  x   /  AST  49<H>  /  ALT  32  /  AlkPhos  158<H>  12-11

## 2022-12-11 NOTE — PROGRESS NOTE ADULT - SUBJECTIVE AND OBJECTIVE BOX
Significant recent/past 24 hr events:    Subjective:    Review of Systems         [ ] A ten-point review of systems was otherwise negative except as noted.  [ ] Due to altered mental status/intubation, subjective information were not able to be obtained from the patient. History was obtained, to the extent possible, from review of the chart and collateral sources of information.      Patient is a 62y old  Female who presents with a chief complaint of slurred speech (10 Dec 2022 18:24)    HPI:  Patient is a 62 yoF w/ pmhx of MM (diagnosed ~10 years ago, currently on Promacta treatments since June 2022 - most recently last Wednesday; followed by oncologist, Dr. Lopez at [facility]), HTN, neuropathic R hip pain, recent R corneal tear, presents to [facility] ED for change in mental status. LKW approximately 14:30. Patient was in normal state of health this morning as per son, Nacho. Patient went to a follow up ophtho appointment for a recently diagnosed corneal abrasion this past Monday. Patient was picked up by a friend around 10:00 for an optho appt with normal mental status, pt was just complaining of being sleepy. On the way back from appointment around 14:30, patient stopped responding to friend appropriately in the car and she appeared more tired & weaker than usual. She started having slurred speech when they arrived home and then son called EMS. In the ED, patient with persisting word finding difficulties and generalized weakness, but leaning to the R side. Patient reports some difficulty remembering what happened today, but reports she feels generally weak and very tired. Code stroke called, but TPA not given because of imporvement in pt's symptoms. Son reports patient's speech is close to baseline now. Patient then denied chest pain, SOB, HA, changes in vision, N/V, imbalance, numbness or tingling. Denies recent infection or sick contacts. Pt states that she had a similar episode a few years ago with fever which she was told was likely from a MM, she improved after getting steroids.     In the ED pt was febrile 105.5, , /84, RR 24 and saturating well on RA. Pt given 2L IVF bolus and 1x vanc/ceftriaxone/acyclovir/ampicillin (30 Nov 2022 02:42)    PAST MEDICAL & SURGICAL HISTORY:  Multiple myeloma      Type 2 diabetes mellitus        FAMILY HISTORY:  No pertinent family history in first degree relatives        Vitals   ICU Vital Signs Last 24 Hrs  T(C): 36.1 (11 Dec 2022 04:00), Max: 36.8 (10 Dec 2022 08:00)  T(F): 97 (11 Dec 2022 04:00), Max: 98.3 (10 Dec 2022 08:00)  HR: 68 (11 Dec 2022 06:00) (53 - 82)  BP: 117/49 (11 Dec 2022 06:00) (105/50 - 155/64)  BP(mean): 66 (11 Dec 2022 06:00) (61 - 91)  ABP: --  ABP(mean): --  RR: 19 (11 Dec 2022 06:00) (12 - 24)  SpO2: 96% (11 Dec 2022 06:00) (95% - 100%)    O2 Parameters below as of 11 Dec 2022 06:00  Patient On (Oxygen Delivery Method): nasal cannula  O2 Flow (L/min): 2          Physical Exam:   Constitutional: NAD, well-groomed, well-developed  HEENT: PERRLA, EOMI, no drainage or redness  Neck: supple,  No JVD, Trachea midline  Back: Normal spine flexure, No CVA tenderness, No deformity or limitation of movement  Respiratory: Breath Sounds equal & clear bilaterally to auscultation, no accessory muscle use noted  Cardiovascular: Regular rate, regular rhythm, normal S1, S2; no murmurs or rub  Gastrointestinal: Soft, non-tender, non distended, no hepatosplenomegaly, normal bowel sounds  Extremities: [place] x 4, no peripheral edema, no cyanosis, no clubbing   Vascular: Equal and normal pulses: 2+ peripheral pulses throughout  Neurological: A+O x 3; speech clear and intact; no sensory, motor  deficits, normal reflexes  Psychiatric: calm, normal mood, normal affect  Musculoskeletal: No joint swelling or deformity; no limitation of movement  Skin: warm, dry, well perfused, no rashes    VENT SETTINGS   Mode: CPAP with PS  FiO2: 30  PEEP: 5  PS: 10  MAP: 9  PIP: 13        I&O's Detail    10 Dec 2022 07:01  -  11 Dec 2022 07:00  --------------------------------------------------------  IN:    Dexmedetomidine: 30.4 mL    IV PiggyBack: 200 mL    Oral Fluid: 340 mL  Total IN: 570.4 mL    OUT:    Indwelling Catheter - Urethral (mL): 2115 mL  Total OUT: 2115 mL    Total NET: -1544.6 mL          LABS                        8.9    5.81  )-----------( 47       ( 11 Dec 2022 00:30 )             28.3     12-11    149<H>  |  112<H>  |  40<H>  ----------------------------<  175<H>  4.0   |  31  |  0.69    Ca    9.8      11 Dec 2022 00:30  Phos  2.4     12-11  Mg     1.90     12-11    TPro  5.1<L>  /  Alb  2.7<L>  /  TBili  0.9  /  DBili  x   /  AST  49<H>  /  ALT  32  /  AlkPhos  158<H>  12-11    LIVER FUNCTIONS - ( 11 Dec 2022 00:30 )  Alb: 2.7 g/dL / Pro: 5.1 g/dL / ALK PHOS: 158 U/L / ALT: 32 U/L / AST: 49 U/L / GGT: x           PT/INR - ( 10 Dec 2022 02:50 )   PT: 12.9 sec;   INR: 1.11 ratio                   POCT Blood Glucose.: 266 mg/dL *H* (12-10-22 @ 21:25)  POCT Blood Glucose.: 219 mg/dL *H* (12-10-22 @ 17:27)  POCT Blood Glucose.: 232 mg/dL *H* (12-10-22 @ 11:53)        MEDICATIONS  (STANDING):  albuterol/ipratropium for Nebulization 3 milliLiter(s) Nebulizer every 6 hours  artificial tears (preservative free) Ophthalmic Solution 1 Drop(s) Both EYES two times a day  chlorhexidine 2% Cloths 1 Application(s) Topical daily  dextrose 5%. 1000 milliLiter(s) (50 mL/Hr) IV Continuous <Continuous>  dextrose 5%. 1000 milliLiter(s) (100 mL/Hr) IV Continuous <Continuous>  dextrose 5%. 1000 milliLiter(s) (75 mL/Hr) IV Continuous <Continuous>  dextrose 50% Injectable 25 Gram(s) IV Push once  dextrose 50% Injectable 12.5 Gram(s) IV Push once  dextrose 50% Injectable 25 Gram(s) IV Push once  eltrombopag 50 milliGRAM(s) Oral daily  glucagon  Injectable 1 milliGRAM(s) IntraMuscular once  insulin lispro (ADMELOG) corrective regimen sliding scale   SubCutaneous three times a day before meals  insulin lispro (ADMELOG) corrective regimen sliding scale   SubCutaneous at bedtime  insulin lispro Injectable (ADMELOG) 3 Unit(s) SubCutaneous three times a day with meals  meropenem  IVPB 1000 milliGRAM(s) IV Intermittent every 8 hours  meropenem  IVPB      metoprolol tartrate 12.5 milliGRAM(s) Oral two times a day  midodrine 10 milliGRAM(s) Oral every 8 hours  multivitamin 1 Tablet(s) Oral daily  polyethylene glycol 3350 17 Gram(s) Oral two times a day  senna 2 Tablet(s) Oral at bedtime  tenofovir alafenamide (VEMLIDY) 25 milliGRAM(s) Oral daily    MEDICATIONS  (PRN):  dextrose Oral Gel 15 Gram(s) Oral once PRN Blood Glucose LESS THAN 70 milliGRAM(s)/deciliter  melatonin 6 milliGRAM(s) Oral at bedtime PRN Insomnia      Allergies:  Bactrim (Other)  fluconazole (Vomiting; Nausea)  levofloxacin (Vomiting; Nausea)  penicillin (Other)        CRITICAL CARE TIME SPENT:  minutes of critical care time spent providing medical care for patient's acute illness/conditions that impairs at least one vital organ system and/or poses a high risk of imminent or life threatening deterioration in the patient's condition. It includes time spent evaluating and treating the patient's acute illness as well as time spent reviewing labs, radiology, discussing goals of care with patient and/or patient's family, and discussing the case with a multidisciplinary team, in an effort to prevent further life threatening deterioration or end organ damage. This time is independent of any procedures performed.         Significant recent/past 24 hr events: pt is out of bed to chair, pt remains hemodynamically stable, afebrile, Na level went up to 149     Subjective: pt denies any complaints     Review of Systems         [ ] A ten-point review of systems was otherwise negative except as noted.  [ ] Due to altered mental status/intubation, subjective information were not able to be obtained from the patient. History was obtained, to the extent possible, from review of the chart and collateral sources of information.      Patient is a 62y old  Female who presents with a chief complaint of slurred speech (10 Dec 2022 18:24)    HPI:  Patient is a 62 yoF w/ pmhx of MM (diagnosed ~10 years ago, currently on Promacta treatments since June 2022 - most recently last Wednesday; followed by oncologist, Dr. Lopez at [facility]), HTN, neuropathic R hip pain, recent R corneal tear, presents to [facility] ED for change in mental status. LKW approximately 14:30. Patient was in normal state of health this morning as per son, Nacho. Patient went to a follow up ophtho appointment for a recently diagnosed corneal abrasion this past Monday. Patient was picked up by a friend around 10:00 for an optho appt with normal mental status, pt was just complaining of being sleepy. On the way back from appointment around 14:30, patient stopped responding to friend appropriately in the car and she appeared more tired & weaker than usual. She started having slurred speech when they arrived home and then son called EMS. In the ED, patient with persisting word finding difficulties and generalized weakness, but leaning to the R side. Patient reports some difficulty remembering what happened today, but reports she feels generally weak and very tired. Code stroke called, but TPA not given because of imporvement in pt's symptoms. Son reports patient's speech is close to baseline now. Patient then denied chest pain, SOB, HA, changes in vision, N/V, imbalance, numbness or tingling. Denies recent infection or sick contacts. Pt states that she had a similar episode a few years ago with fever which she was told was likely from a MM, she improved after getting steroids.     In the ED pt was febrile 105.5, , /84, RR 24 and saturating well on RA. Pt given 2L IVF bolus and 1x vanc/ceftriaxone/acyclovir/ampicillin (30 Nov 2022 02:42)    PAST MEDICAL & SURGICAL HISTORY:  Multiple myeloma      Type 2 diabetes mellitus        FAMILY HISTORY:  No pertinent family history in first degree relatives        Vitals   ICU Vital Signs Last 24 Hrs  T(C): 36.1 (11 Dec 2022 04:00), Max: 36.8 (10 Dec 2022 08:00)  T(F): 97 (11 Dec 2022 04:00), Max: 98.3 (10 Dec 2022 08:00)  HR: 68 (11 Dec 2022 06:00) (53 - 82)  BP: 117/49 (11 Dec 2022 06:00) (105/50 - 155/64)  BP(mean): 66 (11 Dec 2022 06:00) (61 - 91)  ABP: --  ABP(mean): --  RR: 19 (11 Dec 2022 06:00) (12 - 24)  SpO2: 96% (11 Dec 2022 06:00) (95% - 100%)    O2 Parameters below as of 11 Dec 2022 06:00  Patient On (Oxygen Delivery Method): nasal cannula  O2 Flow (L/min): 2          Physical Exam:   Constitutional: NAD, well-groomed, well-developed  HEENT: PERRLA, EOMI, no drainage or redness  Neck: supple,  No JVD, Trachea midline  Back: Normal spine flexure, No CVA tenderness, No deformity or limitation of movement  Respiratory: Breath Sounds equal & clear bilaterally to auscultation, no accessory muscle use noted  Cardiovascular: Regular rate, regular rhythm, normal S1, S2; no murmurs or rub  Gastrointestinal: Soft, non-tender, non distended, no hepatosplenomegaly, normal bowel sounds  Extremities: [place] x 4, no peripheral edema, no cyanosis, no clubbing   Vascular: Equal and normal pulses: 2+ peripheral pulses throughout  Neurological: A+O x 3; speech clear and intact; no sensory, motor  deficits, normal reflexes  Psychiatric: calm, normal mood, normal affect  Musculoskeletal: No joint swelling or deformity; no limitation of movement  Skin: warm, dry, well perfused, no rashes    VENT SETTINGS   Mode: CPAP with PS  FiO2: 30  PEEP: 5  PS: 10  MAP: 9  PIP: 13        I&O's Detail    10 Dec 2022 07:01  -  11 Dec 2022 07:00  --------------------------------------------------------  IN:    Dexmedetomidine: 30.4 mL    IV PiggyBack: 200 mL    Oral Fluid: 340 mL  Total IN: 570.4 mL    OUT:    Indwelling Catheter - Urethral (mL): 2115 mL  Total OUT: 2115 mL    Total NET: -1544.6 mL          LABS                        8.9    5.81  )-----------( 47       ( 11 Dec 2022 00:30 )             28.3     12-11    149<H>  |  112<H>  |  40<H>  ----------------------------<  175<H>  4.0   |  31  |  0.69    Ca    9.8      11 Dec 2022 00:30  Phos  2.4     12-11  Mg     1.90     12-11    TPro  5.1<L>  /  Alb  2.7<L>  /  TBili  0.9  /  DBili  x   /  AST  49<H>  /  ALT  32  /  AlkPhos  158<H>  12-11    LIVER FUNCTIONS - ( 11 Dec 2022 00:30 )  Alb: 2.7 g/dL / Pro: 5.1 g/dL / ALK PHOS: 158 U/L / ALT: 32 U/L / AST: 49 U/L / GGT: x           PT/INR - ( 10 Dec 2022 02:50 )   PT: 12.9 sec;   INR: 1.11 ratio                   POCT Blood Glucose.: 266 mg/dL *H* (12-10-22 @ 21:25)  POCT Blood Glucose.: 219 mg/dL *H* (12-10-22 @ 17:27)  POCT Blood Glucose.: 232 mg/dL *H* (12-10-22 @ 11:53)        MEDICATIONS  (STANDING):  albuterol/ipratropium for Nebulization 3 milliLiter(s) Nebulizer every 6 hours  artificial tears (preservative free) Ophthalmic Solution 1 Drop(s) Both EYES two times a day  chlorhexidine 2% Cloths 1 Application(s) Topical daily  dextrose 5%. 1000 milliLiter(s) (50 mL/Hr) IV Continuous <Continuous>  dextrose 5%. 1000 milliLiter(s) (100 mL/Hr) IV Continuous <Continuous>  dextrose 5%. 1000 milliLiter(s) (75 mL/Hr) IV Continuous <Continuous>  dextrose 50% Injectable 25 Gram(s) IV Push once  dextrose 50% Injectable 12.5 Gram(s) IV Push once  dextrose 50% Injectable 25 Gram(s) IV Push once  eltrombopag 50 milliGRAM(s) Oral daily  glucagon  Injectable 1 milliGRAM(s) IntraMuscular once  insulin lispro (ADMELOG) corrective regimen sliding scale   SubCutaneous three times a day before meals  insulin lispro (ADMELOG) corrective regimen sliding scale   SubCutaneous at bedtime  insulin lispro Injectable (ADMELOG) 3 Unit(s) SubCutaneous three times a day with meals  meropenem  IVPB 1000 milliGRAM(s) IV Intermittent every 8 hours  meropenem  IVPB      metoprolol tartrate 12.5 milliGRAM(s) Oral two times a day  midodrine 10 milliGRAM(s) Oral every 8 hours  multivitamin 1 Tablet(s) Oral daily  polyethylene glycol 3350 17 Gram(s) Oral two times a day  senna 2 Tablet(s) Oral at bedtime  tenofovir alafenamide (VEMLIDY) 25 milliGRAM(s) Oral daily    MEDICATIONS  (PRN):  dextrose Oral Gel 15 Gram(s) Oral once PRN Blood Glucose LESS THAN 70 milliGRAM(s)/deciliter  melatonin 6 milliGRAM(s) Oral at bedtime PRN Insomnia      Allergies:  Bactrim (Other)  fluconazole (Vomiting; Nausea)  levofloxacin (Vomiting; Nausea)  penicillin (Other)

## 2022-12-11 NOTE — PROGRESS NOTE ADULT - ASSESSMENT
myeloma and ITP, heavily pretreated  admitted with neutropenic fever and sepsis from E Coli PNA  concern for HLH  Appears to be improving  PLTs and HGB slightly improved    SUGGEST:   abx per primary team  observation for low blood counts for now; transfuse if HGB <8  hold on treatment for HLH given degree of immunosuppression

## 2022-12-11 NOTE — PROGRESS NOTE ADULT - ASSESSMENT
Patient is a 61 yo F w/ PMHx of MM (diagnosed ~10 years ago, currently on Promacta treatments since June 2022 - most recently last Wednesday; followed by oncologist, Dr. Lopez at [facility]), ?ITP, Hep B, HTN, neuropathic R hip pain, recent R corneal tear admitted for AMS, code stroke called no acute infarct or hemorrhage, s/p LP findings not consistent with meningitis, course complicated by hypotension and hypoxia, placed on BiPAP, requiring pressors, admitted to MICU with sepsis likely secondary to pneumonia- intubated 11/30. Extubated 12/3 to face tent reintubated 12/7 in setting of increased WOB and hypercapneic respiratory failure unclear cause possibly HLH induced.     PLAN  Neuro  - s/p code stroke -> no ischemia or hemorrhage on CT H+N  - s/p LP results not consistent with encephalitis/meningitis, ammonia level 30   - baseline is AOx3  - s/p sedation w/ propofol , transitioned to precedex this am   - Repeat CTH 12/8 to r/o neurological cause for respiratory distress causing reintubation/also unsymmetrical nasolabial folds seen on exam was negative.     Cardiovascular  #Shock sepsis vs HLH vs vasoplegic   # EKG changes- possible HLH induced cardiac complications?  - suspect sepsis from E. Coli bacteremia with GI or urinary source?  - pt required levophed for intubation- titrating down currently on 0.2   - midodrine 10 q 8  - ECHO 11/30 EF 55 normal LV/RV   - TSH wnl  - has been intermittently being diuresed with lasix  - EKG 12/7- new Twave inversions V2-V6, II, III, AVF- EKG 6 hrs later slightly improved, repeat stable  - Trop 100->102-> no longer trending (patient not a candidate for Asp/ hep given pancytopenia)   - POCUS 12/8 normal function no segmental changes, VTI 22    #new onset MAR  - MAR AF in 130's on 12/4, resolved, additional event 12/7 in setting of missed PO metoprolol resolved with IV lopressor    - continue Lopressor tartrate 12.5 mg BID   - [id number] score is 3   - unable to anticoagulate in the setting of thrombocytopenia   - SCD's are in place   - continue telemetry monitoring        Pulmonary  #AHRF- likely secondary to PNA infection vs Aspiration   - s/p BiPAP, 10/5 on 50%, then intubated 11/30 for increased work of breathing  - Extubated 12/3 to face tent  - patient with increased WOB/ tachypnea overnight 12/6 after blood transfusion possible TACO? patient requiring BIPAP 10/5 50%   - Reintubated 12/7 in setting of WOB/respiratory distress and hypercapnic respiratory failure.   - MV 12/420/5/40% was doing well on SBT 5/5 30% ; now s/p extubation to facetent ~noon today.  - antibiotics as below   - MRSA/MSSA resent 12/8   - ID following recs appreciated   - CTA 11/30: neg for PE.  RUL R basilar consolidation RML basilar opacity  - repeat CXR on 12/4-worsening multilobar consolidations improved 12/7   - CTA to r/o PE 12/8- negative for PE ,Consolidation due to pneumonia involving the dependent portion of the RUL with interval improvement since November 30, 2022. RLL consolidation with associated volume loss increased since November 30, 2022 suggestive of partial compressive atelectasis adjacent to the increasing small right pleural effusion. Patchy consolidation within the dependent portion of the left lower lobe increased in size since November 30, 2022 suggestive of atelectasis given its homogeneous enhancement although superimposed pneumonia is not excluded. Small right and trace left pleural effusions.  - diureses with lasix PRN for goal of net neg last dose 12/7  - solumedrol 40 started daily 12/7-12/10- Dc'd today  - duonebs q 6 , IPV ordered today  - Duplex to r/o DVTs - negative 12/7  - s/p bronch 12/9 for optimization for extubation, airways noted to be erythematous however patent. BAL culture was negative.      GI  #Diet  - NPO for now as she is post extubation, then can do swallow assessment  - on bowel regimen, having BMs     #Elevated liver enzymes, - downtrending  - patient has reported hx of Hep B 2017 as per hemonc   - AST/ALT- 100/50 >68/38>56/35  - bili was elevatedm peaked at 1.5- however now normalized. continue to monitor   - hepatitis panel - Hep B surface AG +, and the rest is negative    - confirmatory test - hepatitis B quantitative - negative    -Spoke with ROSA Montejo from hepatology office, Dr Florina Jones Re: continuation of Tenofovir. ONDINA Medeiros [phone]. 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 Coli bacteremia- pan sensitive 11/29   - Tmax overnight 98, WBC 5.5  - antibiotics broadened to vanco (12/7-12/10) and meropenem (12/7- 12/11) -cont for 5day course  - MRSA resent 12/8- negative  - Sputum cx 12/7 - NRF, BAL Culture 12/9 neg   - s/p ctx (12/5-12/7)    - s/p Cefepime 2g q8h (11/30- 12/5) deescalated to ctx  - s/p Azithromycin 500 mg started (12/2- 12/3) , legionella neg  - s/p vanc/CTX/ampicillin/acyclovir in ER for suspected meningitis workup   - Bcx 12/2, 11/29, 12/7- neg   - Fungitel 39   - ID following     SKIN  # sacral decub-DTI., Wound care nurse recs in place     Endo  - no history of diabetes, A1C 6.0  - TSH wnl  - FS stable   - ISS q 6  - started pn NPH 3u q6hrs 12/8, uptitrated to 6u  12/9    Heme  #MM  - as per Hemonc note MM in remission 5/22 has been on Promacta since 6/22 has received multiple treatments for MM in the last also PBSCT x 2 and haplo allo stem cell transplant in 2020- last spike noted sept 2022-was given belantamab- last treatment was on 11/23/22  - holding anti-myeloma therapy  -  s/p filgrastim held now as patient no longer neutropenic 12/7  - private Hemonc following [facility] recs appreciated     Pancytopenia  #concern for MAHA, possible HUS with E. Coli bacteremia? vs HLH?improving  - new thrombocytopenia, anemia, and +hemolysis labs (bili uptrending and now normalized)  -? reported history as per hemonc of ITP  - direct dede negative  - some schistocytes on peripheral smear, confirmed with hematology/oncology, however higher suspicion for lab abnormalities are secondary to sepsis  - Platelet transfusion protocol: <10, <20 with fever, <50 procedures/active bleeding/etc   - Patient received 1u plat 12/3, recieved 1/2 u PRBC 12/7 , ordered for 1u prbc today 12/9 for Hb 7 for optimization prior to extubation.   - Transfuse PRBC < 7  - Patient meets criteria for HLH (ferritin worsening 77057> 46826, trig 519->191, pancytopenia - 3 cell lines, persistent fever, IL2 8324, hepatitis) Us neg for splenomegaly   - continue home Promacta, 50 mg daily   - Spoke with hemonc at [facility] about HLH- states labs could also be elevated in setting of sepsis and at this point treatment for HLH would be to treat underlying cause of infection and patient would not be a candidate for high dose immunosuppressant (Risks outweigh benefits)   - trend HLH labs daily   - s/p neupogen D/C'd as patient no longer neutropenic     #DVT ppx  - hold off on DVT ppx for now in setting of pancytopenia   - SCD's in place   - Duplex LE - negative 12/7    Skin  #Suspected DTI on sacrum and ear as per nurse  - Wound care recs appreciated      Ethics  - updated son, he is pediatric resident and patient's HCP  - Pt is full code    Dispo: TELE  -PT consult VINCENT   Patient is a 63 yo F w/ PMHx of MM (diagnosed ~10 years ago, currently on Promacta treatments since June 2022 - most recently last Wednesday; followed by oncologist, Dr. Lopez at [facility]), ?ITP, Hep B, HTN, neuropathic R hip pain, recent R corneal tear admitted for AMS, code stroke called no acute infarct or hemorrhage, s/p LP findings not consistent with meningitis, course complicated by hypotension and hypoxia, placed on BiPAP, requiring pressors, admitted to MICU with sepsis likely secondary to pneumonia- intubated 11/30. Extubated 12/3 to face tent reintubated 12/7 in setting of increased WOB and hypercapneic respiratory failure unclear cause possibly HLH induced.     PLAN  Neuro  - s/p code stroke -> no ischemia or hemorrhage on CT H+N  - s/p LP results not consistent with encephalitis/meningitis, ammonia level 30   - baseline is AOx3  - s/p sedation w/ propofol , transitioned to precedex this am   - Repeat CTH 12/8 to r/o neurological cause for respiratory distress causing reintubation/also unsymmetrical nasolabial folds seen on exam was negative.   --OOB to chair   -- Pt reeval on 12/11 -- recommending VINCENT    Cardiovascular  #Shock sepsis vs HLH vs vasoplegic   # EKG changes- possible HLH induced cardiac complications?  # new onset A fib with RVR, resolved, pt has been in NSR   - suspect sepsis from E. Coli bacteremia with GI or urinary source?  - pt required levophed for intubation- titrating down currently on 0.2   - midodrine 10 q 8, Metoprolol Tartrate 12.5 mg BID   - ECHO 11/30 EF 55 normal LV/RV   - TSH wnl  - has been intermittently being diuresed with lasix  - EKG 12/7- new Twave inversions V2-V6, II, III, AVF- EKG 6 hrs later slightly improved, repeat stable  - Trop 100->102-> no longer trending (patient not a candidate for Asp/ hep given pancytopenia)   - POCUS 12/8 normal function no segmental changes, VTI 22    #new onset MAR  - MAR AF in 130's on 12/4, resolved, additional event 12/7 in setting of missed PO metoprolol resolved with IV lopressor    - continue Metoprolol tartrate 12.5 mg BID   - [id number]-Lasb score is 3   - unable to anticoagulate in the setting of thrombocytopenia   - SCD's are in place   - continue telemetry monitoring        Pulmonary  #AHRF- likely secondary to PNA infection vs Aspiration   - s/p BiPAP, 10/5 on 50%, then intubated 11/30 for increased work of breathing  - Extubated 12/3 to face tent  - patient with increased WOB/ tachypnea overnight 12/6 after blood transfusion possible TACO? patient requiring BIPAP 10/5 50%   - Reintubated 12/7 in setting of WOB/respiratory distress and hypercapnic respiratory failure.   - MV 12/420/5/40% was doing well on SBT 5/5 30% ; now s/p extubation to facetent ~noon today.  - antibiotics as below   - MRSA/MSSA resent 12/8   - ID following recs appreciated   - CTA 11/30: neg for PE.  RUL R basilar consolidation RML basilar opacity  - repeat CXR on 12/4-worsening multilobar consolidations improved 12/7   - CTA to r/o PE 12/8- negative for PE ,Consolidation due to pneumonia involving the dependent portion of the RUL with interval improvement since November 30, 2022. RLL consolidation with associated volume loss increased since November 30, 2022 suggestive of partial compressive atelectasis adjacent to the increasing small right pleural effusion. Patchy consolidation within the dependent portion of the left lower lobe increased in size since November 30, 2022 suggestive of atelectasis given its homogeneous enhancement although superimposed pneumonia is not excluded. Small right and trace left pleural effusions.  - diureses with lasix PRN for goal of net neg last dose 12/7  - s/p solumedrol 40 started daily 12/7-12/10- __ continue home dose of Prednisone 5 mg daily (MM)   - duonebs q 6 , secretions improved, IPV discontinued   - Duplex to r/o DVTs - negative 12/7  - s/p bronch 12/9 for optimization for extubation, airways noted to be erythematous however patent. BAL culture negative.      GI  #Diet  - pt was evaluated by SLP, who recommended minced and moist diet as tolerated   -- diet was advanced, monitor for tolerance   - on bowel regimen, having BMs     #Elevated liver enzymes, - downtrending  - patient has reported hx of Hep B 2017 as per hemonc   - AST/ALT- 100/50 >68/38>56/35  - bili was elevatedm peaked at 1.5- however now normalized. continue to monitor   - hepatitis panel - Hep B surface AG +, and the rest is negative    - confirmatory test - hepatitis B quantitative - negative    -Spoke with ROSA Montejo from hepatology office, Dr Florina Jones Re: continuation of Tenofovir. ONDINA Medeiros [phone]. 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- outpt follow up after discharge- restarted Tenofovir 25 mg daily     #Constipation  - last BM on 12/07 and smear on 12/9   -- continue Senna and increased Miralax to BID  -- cont monitoring for BM     Renal  #GARCIA, improved   - SCr 0.7 today - stable  - CPK elevated, - suspect secondary to sepsis, trended down 1800-> 2800-> 1007>787>324 - no longer trending   -- s/p Lasix 20 mg IVP on 12/10  - Is and Os: neg 115, neg 4.6 L for LOS   - UA resent 12/7 slightly positive with mod bacteria and WBC   - intermittently diuresing with lasix - goal of net neg fluid balance   - ortiz replaced 12/7   -- 12/11-- indwelling ortiz removed, TOV protocol     #hypernatremia - recurrent  - NA- 149,   -- s/p D 5 at 75 x 4 hrs, and repeat NA is 149   -- additional D 5 at 75 c/hr x 6 hrs and repeat Na after completion,  -- encourage free water intake,   - free water 300 q 6 - dc'd 12/9  - Trend BMP qd    ID  #E. Coli bacteremia- pan sensitive 11/29   - Tmax overnight 98, WBC 5.5  - antibiotics broadened to vanco (12/7-12/10) and meropenem (12/7- 12/11) - 5day course  - MRSA resent 12/8- negative  - Sputum cx 12/1, 12/7 - NRF, BAL Culture 12/9 neg   - s/p ctx (12/5-12/7)    - s/p Cefepime 2g q8h (11/30- 12/5) deescalated to ctx  - s/p Azithromycin 500 mg started (12/2- 12/3) , legionella neg  - s/p vanc/CTX/ampicillin/acyclovir in ER for suspected meningitis workup   - Bcx 12/2, 11/29, 12/7- neg   - Fungitel 39   - ID following     SKIN  # sacral decub-DTI., Wound care nurse recs in place     Endo  - no history of diabetes, A1C 6.0  - TSH wnl  - 's , pt s/p NPH, on Admelog 3 U qac,   -- 12/11-- started on Lantus 14 U daily   - ISS qac a nd qhs  - cont monitoring     Heme  #MM  - as per Hemonc note MM in remission 5/22 has been on Promacta since 6/22 has received multiple treatments for MM in the last also PBSCT x 2 and haplo allo stem cell transplant in 2020- last spike noted sept 2022-was given belantamab- last treatment was on 11/23/22  - holding anti-myeloma therapy  -  s/p filgrastim held now as patient no longer neutropenic 12/7  - private Hemonc following [facility] recs appreciated     Pancytopenia  #concern for MAHA, possible HUS with E. Coli bacteremia? vs HLH?improving  - new thrombocytopenia, anemia, and +hemolysis labs (bili uptrending and now normalized)  -? reported history as per hemonc of ITP  - direct dede negative  - some schistocytes on peripheral smear, confirmed with hematology/oncology, however higher suspicion for lab abnormalities are secondary to sepsis  - Platelet transfusion protocol: <10, <20 with fever, <50 procedures/active bleeding/etc   - Patient received 1u plat 12/3, recieved 1/2 u PRBC 12/7 , ordered for 1u prbc today 12/9 for Hb 7 for optimization prior to extubation.   - Transfuse PRBC < 7  - Patient meets criteria for HLH (ferritin worsening 62928> 64296, trig 519->191, pancytopenia - 3 cell lines, persistent fever, IL2 8324, hepatitis) Us neg for splenomegaly   -- hem onc __ Concern for HLH__ Appears to be improving, PLTs and HGB slightly improved  - continue home Promacta, 50 mg daily   - Spoke with hemonc at [facility] about HLH- states labs could also be elevated in setting of sepsis and at this point treatment for HLH would be to treat underlying cause of infection and patient would not be a candidate for high dose immunosuppressant (Risks outweigh benefits)   - trend HLH labs daily   - s/p neupogen D/C'd as patient no longer neutropenic     #DVT ppx  - hold off on DVT ppx for now in setting of pancytopenia   - SCD's in place   - Duplex LE - negative 12/7    Skin  #Suspected DTI on sacrum and ear as per nurse  - Wound care recs appreciated      Ethics  - updated son, he is pediatric resident and patient's HCP  - Pt is full code    Dispo: Medicine   -PT consult VINCENT

## 2022-12-11 NOTE — CHART NOTE - NSCHARTNOTEFT_GEN_A_CORE
MICU Transfer Note    Transfer from: MICU    Transfer to: (  ) Medicine    (  ) Telemetry     (   ) RCU        (    ) Palliative         (   ) Stroke Unit       (  ) MICU     Accepting Physician:  Signout given to:     HPI: Patient is a 62 yoF w/ pmhx of MM on Prednisone (diagnosed ~10 years ago, currently on Promacta treatments since June 2022 - most recently last Wednesday; followed by oncologist, Dr. Lopez at [facility]), HTN, neuropathic R hip pain, recent R corneal tear, presents to [facility] ED for change in mental status. LKW approximately 14:30. Patient was in normal state of health this morning as per son, Nacho. Patient went to a follow up ophtho appointment for a recently diagnosed corneal abrasion this past Monday. Patient was picked up by a friend around 10:00 for an optho appt with normal mental status, pt was just complaining of being sleepy. On the way back from appointment around 14:30, patient stopped responding to friend appropriately in the car and she appeared more tired & weaker than usual. She started having slurred speech when they arrived home and then son called EMS. In the ED, patient with persisting word finding difficulties and generalized weakness, but leaning to the R side. Patient reports some difficulty remembering what happened today, but reports she feels generally weak and very tired. Code stroke called, but TPA not given because of improvement in pt's symptoms. Son reports patient's speech is close to baseline now. Patient then denied chest pain, SOB, HA, changes in vision, N/V, imbalance, numbness or tingling. Denies recent infection or sick contacts. Pt states that she had a similar episode a few years ago with fever which she was told was likely from a MM, she improved after getting steroids.     In the ED pt was febrile 105.5, , /84, RR 24 and saturating well on RA. Pt given 2L IVF bolus and 1x vanc/ceftriaxone/acyclovir/ampicillin (30 Nov 2022 02:42)  PCP--    hepatology -- Dr Florina Jones, [facility] [phone]__ Gastroenterology and Hepatology     -- Code stroke was called on a pt for AMS and dysphasia, CT head -- negative for stroke.     MICU Course:   -Pt was intubated for acute hypoxic respiratory failure and transferred to MICU for further management. Pt was admitted with septic shock and encephalopathy in the setting of E Coli bacteremia and was started on Cefepime and Levophed. AMS-- CTH with no acute findings and s/p LP with negative CSF.  Pt was successfully extubated on 12/3, and was on nasal cannula. Pt was reintubated on 12/6 for possible aspiration PNA and was successfully extubated to NC on 12/10. Pt is on 1 L PM NC today. Pt remains hemodynamically stable and is off pressors since 8 am on 12/4. Blood cxs cleared on 12/2, and pt is not neutropenic anymore. As per ID recs to complete 10 days of Abx from the negative blood cxs__ continue Ceftriaxone until 12/12. pt with hypernatremia, possibly in the setting of intravascular depletion/ free water deficit, s/p administration of D 5. On 12/4 pt with an episode of MAR to 130's, new onset, possibly in the setting of fever, and flushed edema. Pt was treated with Lopressor 5 mg IVP, Lasix 40 IVP x 2, and started on Metoprolol Tartrate 12.5 mg BID. pt converted back to NSR and remains in sinus rhythm. Pt was diuresed for pulmonary edema with Lasix 40 IVP x 2 on 12/4. Thrombocytopenia_  pt was evaluated by Hem-onc for concern for possible ITP and HLH, s/p 1 U transfused on 12/3. Thrombocytopenia possibly in the setting of sepsis, .Pt was evaluated by PT, which recommended VINCENT.  -- positive Hepatitis B surface Ag -- as per son, pt takes tenofovir at home. Family does not know the frequency and for how long pt has to  take the medication, __ called and left a message with front desc. Awaiting call back from hepatology, Dr Florina Jones Re: continuation of Tenofovir.         MICU COURSE:      PAST MEDICAL & SURGICAL HISTORY:  Multiple myeloma      Type 2 diabetes mellitus          Vital Signs Last 24 Hrs  T(C): 37.2 (11 Dec 2022 09:00), Max: 37.2 (11 Dec 2022 09:00)  T(F): 98.9 (11 Dec 2022 09:00), Max: 98.9 (11 Dec 2022 09:00)  HR: 74 (11 Dec 2022 11:00) (59 - 74)  BP: 120/56 (11 Dec 2022 11:00) (105/50 - 131/63)  BP(mean): 72 (11 Dec 2022 11:00) (61 - 91)  RR: 21 (11 Dec 2022 11:00) (12 - 23)  SpO2: 96% (11 Dec 2022 11:00) (95% - 100%)    Parameters below as of 11 Dec 2022 11:00  Patient On (Oxygen Delivery Method): nasal cannula w/ humidification  O2 Flow (L/min): 1    I&O's Summary    10 Dec 2022 07:01  -  11 Dec 2022 07:00  --------------------------------------------------------  IN: 645.4 mL / OUT: 2115 mL / NET: -1469.6 mL    11 Dec 2022 07:01  -  11 Dec 2022 12:50  --------------------------------------------------------  IN: 465 mL / OUT: 295 mL / NET: 170 mL      Allergies    Bactrim (Other)  fluconazole (Vomiting; Nausea)  levofloxacin (Vomiting; Nausea)  penicillin (Other)    Intolerances      MEDICATIONS  (STANDING):  albuterol/ipratropium for Nebulization 3 milliLiter(s) Nebulizer every 6 hours  artificial tears (preservative free) Ophthalmic Solution 1 Drop(s) Both EYES two times a day  chlorhexidine 2% Cloths 1 Application(s) Topical daily  dextrose 5%. 1000 milliLiter(s) (75 mL/Hr) IV Continuous <Continuous>  dextrose 5%. 1000 milliLiter(s) (50 mL/Hr) IV Continuous <Continuous>  dextrose 5%. 1000 milliLiter(s) (100 mL/Hr) IV Continuous <Continuous>  dextrose 50% Injectable 25 Gram(s) IV Push once  dextrose 50% Injectable 12.5 Gram(s) IV Push once  dextrose 50% Injectable 25 Gram(s) IV Push once  eltrombopag 50 milliGRAM(s) Oral daily  glucagon  Injectable 1 milliGRAM(s) IntraMuscular once  insulin glargine Injectable (LANTUS) 14 Unit(s) SubCutaneous every morning  insulin lispro (ADMELOG) corrective regimen sliding scale   SubCutaneous three times a day before meals  insulin lispro (ADMELOG) corrective regimen sliding scale   SubCutaneous at bedtime  insulin lispro Injectable (ADMELOG) 3 Unit(s) SubCutaneous three times a day with meals  meropenem  IVPB      meropenem  IVPB 1000 milliGRAM(s) IV Intermittent every 8 hours  metoprolol tartrate 12.5 milliGRAM(s) Oral two times a day  midodrine 10 milliGRAM(s) Oral every 8 hours  multivitamin 1 Tablet(s) Oral daily  polyethylene glycol 3350 17 Gram(s) Oral two times a day  predniSONE   Tablet 5 milliGRAM(s) Oral daily  senna 2 Tablet(s) Oral at bedtime  tenofovir alafenamide (VEMLIDY) 25 milliGRAM(s) Oral daily    MEDICATIONS  (PRN):  dextrose Oral Gel 15 Gram(s) Oral once PRN Blood Glucose LESS THAN 70 milliGRAM(s)/deciliter  melatonin 6 milliGRAM(s) Oral at bedtime PRN Insomnia      Adult Advanced Hemodynamics Last 24 Hrs  CVP(mm Hg): --  CVP(cm H2O): --  CO: --  CI: --  PA: --  PA(mean): --  PCWP: --  SVR: --  SVRI: --  PVR: --  PVRI: --    CARDIAC MARKERS ( 10 Dec 2022 02:50 )  x     / x     / 57 U/L / x     / x                                8.9    5.81  )-----------( 47       ( 11 Dec 2022 00:30 )             28.3     12-11    149<H>  |  112<H>  |  40<H>  ----------------------------<  175<H>  4.0   |  31  |  0.69    Ca    9.8      11 Dec 2022 00:30  Phos  2.4     12-11  Mg     1.90     12-11    TPro  5.1<L>  /  Alb  2.7<L>  /  TBili  0.9  /  DBili  x   /  AST  49<H>  /  ALT  32  /  AlkPhos  158<H>  12-11    PT/INR - ( 10 Dec 2022 02:50 )   PT: 12.9 sec;   INR: 1.11 ratio           PHYSICAL EXAM:      Constitutional:    Eyes:    ENMT:    Neck:    Breasts:    Back:    Respiratory:    Cardiovascular:    Gastrointestinal:    Genitourinary:    Rectal:    Extremities:    Vascular:    Neurological:    Skin:    Lymph Nodes:    Musculoskeletal:    Psychiatric:        Lactate:    Ekg:  Telemetry:    ASSESSMENT & PLAN:           FOR FOLLOW UP:    She Ramirez PA-C MICU Transfer Note    Transfer from: MICU    Transfer to: ( x ) Medicine    (  ) Telemetry     (   ) RCU        (    ) Palliative         (   ) Stroke Unit       (  ) MICU     Accepting Physician: Esther Brandon    Signout given to: Esther Brandon    HPI: Patient is a 62 yoF w/ pmhx of MM on Prednisone (diagnosed ~10 years ago, currently on Promacta treatments since June 2022 - most recently last Wednesday; followed by oncologist, Dr. Lopez at [facility]), HTN, neuropathic R hip pain, recent R corneal tear, presents to [facility] ED for change in mental status. LKW approximately 14:30. Patient was in normal state of health this morning as per son, Nacho. Patient went to a follow up ophtho appointment for a recently diagnosed corneal abrasion this past Monday. Patient was picked up by a friend around 10:00 for an optho appt with normal mental status, pt was just complaining of being sleepy. On the way back from appointment around 14:30, patient stopped responding to friend appropriately in the car and she appeared more tired & weaker than usual. She started having slurred speech when they arrived home and then son called EMS. In the ED, patient with persisting word finding difficulties and generalized weakness, but leaning to the R side. Patient reports some difficulty remembering what happened today, but reports she feels generally weak and very tired. Code stroke called, but TPA not given because of improvement in pt's symptoms. Son reports patient's speech is close to baseline now. Patient then denied chest pain, SOB, HA, changes in vision, N/V, imbalance, numbness or tingling. Denies recent infection or sick contacts. Pt states that she had a similar episode a few years ago with fever which she was told was likely from a MM, she improved after getting steroids.     In the ED pt was febrile 105.5, , /84, RR 24 and saturating well on RA. Pt given 2L IVF bolus and 1x vanc/ceftriaxone/acyclovir/ampicillin (30 Nov 2022 02:42)  PCP--    hepatology -- Dr Florina Jones, [facility] [phone]__ Gastroenterology and Hepatology     -- Code stroke was called on a pt for AMS and dysphasia, CT head -- negative for stroke.     MICU Course:   -Pt was intubated for acute hypoxic respiratory failure and transferred to MICU for further management. Pt was admitted with septic shock and encephalopathy in the setting of E Coli bacteremia and was started on Cefepime and Levophed. AMS-- CTH with no acute findings and s/p LP with negative CSF.  Pt was successfully extubated on 12/3, and was on nasal cannula. Pt was reintubated on 12/6 for possible aspiration PNA and was successfully extubated to NC on 12/10. Pt is on 1 L PM NC today. Pt remains hemodynamically stable and is off pressors since 1 am on 12/9. Echo with normal LV and RV fxs. Pt is on Midodrine 10 mg TID. Pt developed new onset A fib with RVR, and was started on Metoprolol tartrate 12.5 BID, Pt converted to NSR and no acute event  on the tele monitor for several days.   Blood cxs cleared on 12/2, and pt is not neutropenic anymore. As per ID recs to complete 10 days of Abx from the negative blood cxs__ s/p Cefepime 11/30- 12/5, s/p Ceftriaxone until 12/5- 12/7, on Meropenem 12/5- 12/11. Pt with recurrent hypernatremia, possibly in the setting of intravascular depletion/ free water deficit, s/p administration of D 5 on 12/11. On 12/11 diet was advanced to minced and bite sized as per speech pathology evaluation. Thrombocytopenia_  pt was evaluated by Hem-onc for concern for possible ITP and HLH, s/p 1 U transfused on 12/3 and 1 U PRBC on 12/9. Thrombocytopenia possibly in the setting of sepsis, stable .Pt was evaluated by PT, which recommended VINCENT. PT signed off, repeat consultation in place, spoke with PT on 12/11.   -- positive Hepatitis B surface Ag -- pt is on Tenofocir at home for reactivation of hepatitis B, Confirmed with Dr Florina Jones (outpt hepatology).   FS in 200's, s/p stress steroids, on 12/11 changed NPH to Lantus 14 U and pt is on Admelog 3 U qac, _ monitor accu-checks.         MICU COURSE:      PAST MEDICAL & SURGICAL HISTORY:  Multiple myeloma      Type 2 diabetes mellitus          Vital Signs Last 24 Hrs  T(C): 37.2 (11 Dec 2022 09:00), Max: 37.2 (11 Dec 2022 09:00)  T(F): 98.9 (11 Dec 2022 09:00), Max: 98.9 (11 Dec 2022 09:00)  HR: 74 (11 Dec 2022 11:00) (59 - 74)  BP: 120/56 (11 Dec 2022 11:00) (105/50 - 131/63)  BP(mean): 72 (11 Dec 2022 11:00) (61 - 91)  RR: 21 (11 Dec 2022 11:00) (12 - 23)  SpO2: 96% (11 Dec 2022 11:00) (95% - 100%)    Parameters below as of 11 Dec 2022 11:00  Patient On (Oxygen Delivery Method): nasal cannula w/ humidification  O2 Flow (L/min): 1    I&O's Summary    10 Dec 2022 07:01  -  11 Dec 2022 07:00  --------------------------------------------------------  IN: 645.4 mL / OUT: 2115 mL / NET: -1469.6 mL    11 Dec 2022 07:01  -  11 Dec 2022 12:50  --------------------------------------------------------  IN: 465 mL / OUT: 295 mL / NET: 170 mL      Allergies    Bactrim (Other)  fluconazole (Vomiting; Nausea)  levofloxacin (Vomiting; Nausea)  penicillin (Other)    Intolerances      MEDICATIONS  (STANDING):  albuterol/ipratropium for Nebulization 3 milliLiter(s) Nebulizer every 6 hours  artificial tears (preservative free) Ophthalmic Solution 1 Drop(s) Both EYES two times a day  chlorhexidine 2% Cloths 1 Application(s) Topical daily  dextrose 5%. 1000 milliLiter(s) (75 mL/Hr) IV Continuous <Continuous>  dextrose 5%. 1000 milliLiter(s) (50 mL/Hr) IV Continuous <Continuous>  dextrose 5%. 1000 milliLiter(s) (100 mL/Hr) IV Continuous <Continuous>  dextrose 50% Injectable 25 Gram(s) IV Push once  dextrose 50% Injectable 12.5 Gram(s) IV Push once  dextrose 50% Injectable 25 Gram(s) IV Push once  eltrombopag 50 milliGRAM(s) Oral daily  glucagon  Injectable 1 milliGRAM(s) IntraMuscular once  insulin glargine Injectable (LANTUS) 14 Unit(s) SubCutaneous every morning  insulin lispro (ADMELOG) corrective regimen sliding scale   SubCutaneous three times a day before meals  insulin lispro (ADMELOG) corrective regimen sliding scale   SubCutaneous at bedtime  insulin lispro Injectable (ADMELOG) 3 Unit(s) SubCutaneous three times a day with meals  meropenem  IVPB      meropenem  IVPB 1000 milliGRAM(s) IV Intermittent every 8 hours  metoprolol tartrate 12.5 milliGRAM(s) Oral two times a day  midodrine 10 milliGRAM(s) Oral every 8 hours  multivitamin 1 Tablet(s) Oral daily  polyethylene glycol 3350 17 Gram(s) Oral two times a day  predniSONE   Tablet 5 milliGRAM(s) Oral daily  senna 2 Tablet(s) Oral at bedtime  tenofovir alafenamide (VEMLIDY) 25 milliGRAM(s) Oral daily    MEDICATIONS  (PRN):  dextrose Oral Gel 15 Gram(s) Oral once PRN Blood Glucose LESS THAN 70 milliGRAM(s)/deciliter  melatonin 6 milliGRAM(s) Oral at bedtime PRN Insomnia    CARDIAC MARKERS ( 10 Dec 2022 02:50 )  x     / x     / 57 U/L / x     / x                                8.9    5.81  )-----------( 47       ( 11 Dec 2022 00:30 )             28.3     12-11    149<H>  |  112<H>  |  40<H>  ----------------------------<  175<H>  4.0   |  31  |  0.69    Ca    9.8      11 Dec 2022 00:30  Phos  2.4     12-11  Mg     1.90     12-11    TPro  5.1<L>  /  Alb  2.7<L>  /  TBili  0.9  /  DBili  x   /  AST  49<H>  /  ALT  32  /  AlkPhos  158<H>  12-11    PT/INR - ( 10 Dec 2022 02:50 )   PT: 12.9 sec;   INR: 1.11 ratio         Lactate: 1.4     Ekg: NSR 70  Telemetry: NSR 70's     FOR FOLLOW UP:  [] wean off O2 as able  [] wean off Midodrine as able  [] continue Meropenem until 12/11  [] follow up PT eval, originally recommended Rehab   [] monitor Na level, repeat level today at 4 pm,   [] 12/11-- discontinue indwelling ortiz and perform TOV   [] follow up final results from blood cxs sent on 12/7   [] FS in 200's, s/p IV steroids, on prednisone 5 mg daily, started on Lantus on 12/11_ monitor FS   [] monitor FS's     She Ramirez PA-C

## 2022-12-11 NOTE — PHYSICAL THERAPY INITIAL EVALUATION ADULT - GENERAL OBSERVATIONS, REHAB EVAL
Upon entry, pt semi-supine in bed in NAD; + ICU monitoring, + nasal cannula. Family present at bedside. Pt left seated in bedside chair with all tubes/lines intact, call bell in reach and in NAD. RIVER Bustamante updated.
Patient seen semi supine in bed, NAD.

## 2022-12-11 NOTE — PHYSICAL THERAPY INITIAL EVALUATION ADULT - ADDITIONAL COMMENTS
Pt lives in a house alone; there are 3 steps to enter.
Patient report lives alone in house, 3 steps to enter, ambulated with a cane.

## 2022-12-11 NOTE — PHYSICAL THERAPY INITIAL EVALUATION ADULT - PERTINENT HX OF CURRENT PROBLEM, REHAB EVAL
Pt is a 62 year old female presenting for change in mental status associated with lethargy, confusion, slurred speech, and reduced responsiveness. In ED, pt with persistent word finding difficulties and generalized weakness. Code Stroke called. CTH with no acute findings and lumbar puncture not consistent with encephalitis/meningitis. Pt intubated for AHRF and admitted to MICU for septic shock and encephalopathy in the setting of E Coli bacteremia; course c/b hypotension and hypoxia. Pt extubated 12/03; required re-intubation 12/07 in setting of increased WOB and hypercapneic respiratory failure.
Patient is 62 year old female PMHx of MM (diagnosed ~10 years ago, Hep B, HTN, neuropathic R hip pain, recent R corneal tear admitted for AMS, code stroke called no acute infarct or hemorrhage, s/p LP findings not consistent with meningitis, course complicated by hypotension and hypoxia, placed on BiPAP, requiring pressors, admitted to MICU with sepsis likely secondary to pneumonia- intubated 11/30.

## 2022-12-11 NOTE — PHYSICAL THERAPY INITIAL EVALUATION ADULT - IMPAIRED TRANSFERS: BED/CHAIR, REHAB EVAL
impaired endurance/impaired balance/impaired coordination/decreased flexibility/impaired motor control/impaired postural control/decreased strength

## 2022-12-11 NOTE — PHYSICAL THERAPY INITIAL EVALUATION ADULT - REFERRING PHYSICIAN, REHAB EVAL
Addendum  created 07/10/19 1945 by Aleida Valentine MD    Sign clinical note       She Ramirez; Consult- PT Evaluate and Treat

## 2022-12-11 NOTE — PHYSICAL THERAPY INITIAL EVALUATION ADULT - PLANNED THERAPY INTERVENTIONS, PT EVAL
balance training/bed mobility training/gait training/strengthening/transfer training
bed mobility training/gait training/neuromuscular re-education/postural re-education/strengthening/transfer training

## 2022-12-11 NOTE — SWALLOW BEDSIDE ASSESSMENT ADULT - ASR SWALLOW REFERRAL
1. Registered Dietitian as patient is at increased nutritional risk 2. Consider ENT consult at MD discretion given hoarse and breathy vocal quality
Consider ENT consult if concerned for abovementioned dysphonia/ENT

## 2022-12-11 NOTE — PROGRESS NOTE ADULT - NS ATTEND AMEND GEN_ALL_CORE FT
Patient is a 61 yo F w/ MM, ?ITP, HTN, recent R corneal tear who was initially admitted on 11/30 after p/w AMS found to have neutropenic septic shock with E coli bacteremia with course c/b acute respiratory failure and new Afib with RVR.     #Shock - Septic shock in setting of neutropenia and E coli bacteremia. Improving. Blood cultures cleared with antibiotics. Multifocal pneumonia likely source, benign abdominal exam.   - c/w PO vasopressors to maintain MAP > 65, wean as tolerated  - Complete Ceftriaxone    #Acute hypoxemic respiratory failure - 2/2 multifocal/aspiration PNA. Successfully extubated  - Wean down supplemental O2 as tolerated  - OOB to chair, encourage ambulation  - Incentive spirometer    #Transaminitis - Likely 2/2 septic shock vs acute/chronic HBV?. Hb surface antigen positive. On Tenofovir at home  #HBV  - Will check HBV DNA  - c/w home Tenofovir    #Pancytopenia - Likely acute on chronic process 2/2 underlying MM/?ITP with current sepsis vs HLH? Patient does have cytopenias (1), hypertrig (2), elevated ferritin (3), fevers (4). Soluble IL 2 is pending. US negative for splenomegaly  - Treat sepsis as above  - f/u Soluble IL 2  - Trend CBC  - c/w GCSF as per heme    #Afib - new onset with RVR, now back in NSR  - c/w metoprolol  - Hold off AC given thrombocytopenia    #Hypernatremia   - D5 100cc/hr x6 hour then repeat BMP    #Dysphagia  - Dysphagia diet  - MBS    #Weakness  - PT + OT    Tray Siddiqui MD  Pulmonary & Critical Care
Patient is a 61 yo F w/ MM, ?ITP, HTN, recent R corneal tear who was initially admitted on 11/30 after p/w AMS found to have neutropenic septic shock with E coli bacteremia with course c/b acute respiratory failure and new Afib with RVR.     #Shock - Septic shock in setting of neutropenia and E coli bacteremia. Improving. Blood cultures cleared with antibiotics. Multifocal pneumonia likely source, benign abdominal exam.   - c/w PO vasopressors to maintain MAP > 65, wean as tolerated  - Complete Ceftriaxone 12/13    #Acute hypoxemic respiratory failure - 2/2 multifocal/aspiration PNA. Successfully extubated. Now with increased WOB overnight shortly after 1/2 PRBC transfusion. ?TACO vs TRALI less likely as no fever vs HLH?  - Diurese with goal net negative 1 L  - Duonebs q6h  - Solumedrol 40mg daily fow now  - BIPAP/HFNC for respiratory support    #Transaminitis - Likely 2/2 septic shock vs acute/chronic HBV?. Hb surface antigen positive. On Tenofovir at home. HBV DNA PCR negative  #HBV  - c/w home Tenofovir    #Pancytopenia - Likely acute on chronic process 2/2 underlying MM/?ITP with current sepsis vs HLH? Patient does have cytopenias (1), hypertrig (2), elevated ferritin (3), fevers (4). Soluble IL 2 is elevated. US negative for splenomegaly  - Trend CBC  - Trend HLH labs  - Discussed with heme, risk outweigh benefits of immunosuppression at this point for ?HLH     #Cardiac - TWI overnight in anterolateral/inferior leads with increased Troponin. POCUS with no wall motion abnormalities  - Trend EKG and enzymes  - Unable to initiate heparin gtt/DAPT for possible NSTEMI due to anemia and thrombocytopenia    #Afib - new onset with RVR, now back in NSR  - c/w metoprolol  - Hold off AC given thrombocytopenia    #Hypernatremia   - D5 100cc/hr x5 hour then repeat BMP    #Dysphagia  - NPO for now given tenuous respiratory status    #Weakness  - PT + OT    #GOC - Prognosis guarded, discuss GOC with patient and son    Tray Siddiqui MD  Pulmonary & Critical Care
Patient is a 61 yo F w/ MM, ?ITP, HTN, recent R corneal tear who was initially admitted on 11/30 after p/w AMS found to have neutropenic septic shock with E coli bacteremia with course c/b acute respiratory failure and new Afib with RVR.     #Acute hypoxemic respiratory failure - 2/2 multifocal/aspiration PNA. Successfully extubated. Now with increased WOB shortly after 1/2 PRBC transfusion. ?TACO vs TRALI less likely as no fever vs HLH? vs PE. Decompensated further requiring reintubation. LDH improved, ferritin improving. LE duplex negative. CTA negative for PE, increased RLL atelectasis vs consolidation. Extubated 12/10  - c/w Duonebs q6h  - Keep euvolemic  - Wean supplemental O2 as tolerated    #Shock - Septic shock in setting of neutropenia and E coli bacteremia which resolved as blood cultures cleared with antibiotics. Multifocal pneumonia likely source, benign abdominal exam. On pressors again since reintubation, likely vasoplegic 2/2 sedation vs sepsis vs inflammatory  - c/w PO vasopressors to maintain MAP > 65, wean as tolerated  - Previously on Ceftriaxone, escalated to Jani and Vanc after decompensation. Complete Jani x 5 days  - f/u cultures    #Transaminitis - Likely 2/2 septic shock vs acute/chronic HBV?. Hb surface antigen positive. On Tenofovir at home. HBV DNA PCR negative  #HBV  - c/w home Tenofovir    #Pancytopenia - Likely acute on chronic process 2/2 underlying MM/?ITP with current sepsis vs HLH? Patient does have cytopenias (1), hypertrig (2), elevated ferritin (3), fevers (4). Soluble IL 2 is elevated. US negative for splenomegaly. Improving  - Trend CBC  - Trend HLH labs, improving  - Discussed with heme, risk outweigh benefits of immunosuppression at this point for ?HLH    #Cardiac - Transient TWI in anterolateral/inferior leads with increased Troponin, now improved.   - Unable to initiate heparin gtt/DAPT for possible NSTEMI due to anemia and thrombocytopenia  - Monitor    #Afib - new onset with RVR, now back in NSR. No further events on tele  - c/w metoprolol  - Hold off AC given thrombocytopenia    #Hypernatremia  - Monitor with free water supplementation      Tray Siddiqui MD  Pulmonary & Critical Care

## 2022-12-11 NOTE — SWALLOW BEDSIDE ASSESSMENT ADULT - ORAL PHASE
Within functional limits Decreased anterior-posterior movement of the bolus/Delayed oral transit time/Stasis in lateral sulci

## 2022-12-11 NOTE — PHYSICAL THERAPY INITIAL EVALUATION ADULT - IMPAIRMENTS FOUND, PT EVAL
aerobic capacity/endurance/gait, locomotion, and balance/muscle strength
aerobic capacity/endurance/decreased midline orientation/ergonomics and body mechanics/gait, locomotion, and balance/gross motor/muscle strength/posture

## 2022-12-11 NOTE — PHYSICAL THERAPY INITIAL EVALUATION ADULT - PATIENT PROFILE REVIEW, REHAB EVAL
yes
PT initial evaluation received and chart review completed. Pt agreeable to participate in PT evaluation. Pt cleared by RIVER Bustamante./yes

## 2022-12-12 LAB
ALBUMIN SERPL ELPH-MCNC: 2.6 G/DL — LOW (ref 3.3–5)
ALP SERPL-CCNC: 130 U/L — HIGH (ref 40–120)
ALT FLD-CCNC: 28 U/L — SIGNIFICANT CHANGE UP (ref 4–33)
ANION GAP SERPL CALC-SCNC: 8 MMOL/L — SIGNIFICANT CHANGE UP (ref 7–14)
AST SERPL-CCNC: 50 U/L — HIGH (ref 4–32)
BILIRUB SERPL-MCNC: 0.9 MG/DL — SIGNIFICANT CHANGE UP (ref 0.2–1.2)
BLD GP AB SCN SERPL QL: NEGATIVE — SIGNIFICANT CHANGE UP
BUN SERPL-MCNC: 27 MG/DL — HIGH (ref 7–23)
CALCIUM SERPL-MCNC: 9.3 MG/DL — SIGNIFICANT CHANGE UP (ref 8.4–10.5)
CHLORIDE SERPL-SCNC: 110 MMOL/L — HIGH (ref 98–107)
CO2 SERPL-SCNC: 29 MMOL/L — SIGNIFICANT CHANGE UP (ref 22–31)
CREAT SERPL-MCNC: 0.61 MG/DL — SIGNIFICANT CHANGE UP (ref 0.5–1.3)
EGFR: 101 ML/MIN/1.73M2 — SIGNIFICANT CHANGE UP
GLUCOSE BLDC GLUCOMTR-MCNC: 100 MG/DL — HIGH (ref 70–99)
GLUCOSE BLDC GLUCOMTR-MCNC: 169 MG/DL — HIGH (ref 70–99)
GLUCOSE BLDC GLUCOMTR-MCNC: 189 MG/DL — HIGH (ref 70–99)
GLUCOSE BLDC GLUCOMTR-MCNC: 195 MG/DL — HIGH (ref 70–99)
GLUCOSE SERPL-MCNC: 198 MG/DL — HIGH (ref 70–99)
HCT VFR BLD CALC: 28.5 % — LOW (ref 34.5–45)
HGB BLD-MCNC: 8.7 G/DL — LOW (ref 11.5–15.5)
MAGNESIUM SERPL-MCNC: 2 MG/DL — SIGNIFICANT CHANGE UP (ref 1.6–2.6)
MCHC RBC-ENTMCNC: 30.5 GM/DL — LOW (ref 32–36)
MCHC RBC-ENTMCNC: 32 PG — SIGNIFICANT CHANGE UP (ref 27–34)
MCV RBC AUTO: 104.8 FL — HIGH (ref 80–100)
NRBC # BLD: 9 /100 WBCS — HIGH (ref 0–0)
NRBC # FLD: 0.46 K/UL — HIGH (ref 0–0)
PHOSPHATE SERPL-MCNC: 2.3 MG/DL — LOW (ref 2.5–4.5)
PLATELET # BLD AUTO: 48 K/UL — LOW (ref 150–400)
POTASSIUM SERPL-MCNC: 4.6 MMOL/L — SIGNIFICANT CHANGE UP (ref 3.5–5.3)
POTASSIUM SERPL-SCNC: 4.6 MMOL/L — SIGNIFICANT CHANGE UP (ref 3.5–5.3)
PROT SERPL-MCNC: 5 G/DL — LOW (ref 6–8.3)
RBC # BLD: 2.72 M/UL — LOW (ref 3.8–5.2)
RBC # FLD: 19 % — HIGH (ref 10.3–14.5)
RH IG SCN BLD-IMP: POSITIVE — SIGNIFICANT CHANGE UP
SODIUM SERPL-SCNC: 147 MMOL/L — HIGH (ref 135–145)
WBC # BLD: 5.38 K/UL — SIGNIFICANT CHANGE UP (ref 3.8–10.5)
WBC # FLD AUTO: 5.38 K/UL — SIGNIFICANT CHANGE UP (ref 3.8–10.5)

## 2022-12-12 PROCEDURE — 99232 SBSQ HOSP IP/OBS MODERATE 35: CPT

## 2022-12-12 PROCEDURE — 76604 US EXAM CHEST: CPT | Mod: 26

## 2022-12-12 PROCEDURE — 99291 CRITICAL CARE FIRST HOUR: CPT

## 2022-12-12 PROCEDURE — 76705 ECHO EXAM OF ABDOMEN: CPT | Mod: 26

## 2022-12-12 PROCEDURE — 93308 TTE F-UP OR LMTD: CPT | Mod: 26

## 2022-12-12 RX ORDER — TAMSULOSIN HYDROCHLORIDE 0.4 MG/1
0.4 CAPSULE ORAL AT BEDTIME
Refills: 0 | Status: DISCONTINUED | OUTPATIENT
Start: 2022-12-12 | End: 2022-12-13

## 2022-12-12 RX ORDER — LACTULOSE 10 G/15ML
10 SOLUTION ORAL ONCE
Refills: 0 | Status: COMPLETED | OUTPATIENT
Start: 2022-12-12 | End: 2022-12-12

## 2022-12-12 RX ORDER — POTASSIUM PHOSPHATE, MONOBASIC POTASSIUM PHOSPHATE, DIBASIC 236; 224 MG/ML; MG/ML
15 INJECTION, SOLUTION INTRAVENOUS ONCE
Refills: 0 | Status: COMPLETED | OUTPATIENT
Start: 2022-12-12 | End: 2022-12-12

## 2022-12-12 RX ORDER — SODIUM,POTASSIUM PHOSPHATES 278-250MG
1 POWDER IN PACKET (EA) ORAL EVERY 4 HOURS
Refills: 0 | Status: DISCONTINUED | OUTPATIENT
Start: 2022-12-12 | End: 2022-12-12

## 2022-12-12 RX ADMIN — POLYETHYLENE GLYCOL 3350 17 GRAM(S): 17 POWDER, FOR SOLUTION ORAL at 05:28

## 2022-12-12 RX ADMIN — TAMSULOSIN HYDROCHLORIDE 0.4 MILLIGRAM(S): 0.4 CAPSULE ORAL at 22:03

## 2022-12-12 RX ADMIN — Medication 1: at 08:05

## 2022-12-12 RX ADMIN — Medication 3 UNIT(S): at 16:52

## 2022-12-12 RX ADMIN — Medication 3 UNIT(S): at 11:42

## 2022-12-12 RX ADMIN — POLYETHYLENE GLYCOL 3350 17 GRAM(S): 17 POWDER, FOR SOLUTION ORAL at 17:07

## 2022-12-12 RX ADMIN — ELTROMBOPAG OLAMINE 50 MILLIGRAM(S): 50 TABLET, FILM COATED ORAL at 12:03

## 2022-12-12 RX ADMIN — POTASSIUM PHOSPHATE, MONOBASIC POTASSIUM PHOSPHATE, DIBASIC 62.5 MILLIMOLE(S): 236; 224 INJECTION, SOLUTION INTRAVENOUS at 05:26

## 2022-12-12 RX ADMIN — MIDODRINE HYDROCHLORIDE 10 MILLIGRAM(S): 2.5 TABLET ORAL at 05:27

## 2022-12-12 RX ADMIN — Medication 12.5 MILLIGRAM(S): at 05:27

## 2022-12-12 RX ADMIN — INSULIN GLARGINE 14 UNIT(S): 100 INJECTION, SOLUTION SUBCUTANEOUS at 08:04

## 2022-12-12 RX ADMIN — Medication 3 MILLILITER(S): at 21:19

## 2022-12-12 RX ADMIN — Medication 3 MILLILITER(S): at 16:43

## 2022-12-12 RX ADMIN — CHLORHEXIDINE GLUCONATE 1 APPLICATION(S): 213 SOLUTION TOPICAL at 11:17

## 2022-12-12 RX ADMIN — TENOFOVIR DISOPROXIL FUMARATE 25 MILLIGRAM(S): 300 TABLET, FILM COATED ORAL at 16:17

## 2022-12-12 RX ADMIN — Medication 5 MILLIGRAM(S): at 05:27

## 2022-12-12 RX ADMIN — MIDODRINE HYDROCHLORIDE 10 MILLIGRAM(S): 2.5 TABLET ORAL at 13:20

## 2022-12-12 RX ADMIN — LACTULOSE 10 GRAM(S): 10 SOLUTION ORAL at 10:21

## 2022-12-12 RX ADMIN — Medication 1 TABLET(S): at 11:41

## 2022-12-12 RX ADMIN — Medication 1 DROP(S): at 05:27

## 2022-12-12 RX ADMIN — Medication 1 DROP(S): at 17:08

## 2022-12-12 RX ADMIN — Medication 1: at 16:18

## 2022-12-12 RX ADMIN — Medication 3 MILLILITER(S): at 03:39

## 2022-12-12 RX ADMIN — MIDODRINE HYDROCHLORIDE 10 MILLIGRAM(S): 2.5 TABLET ORAL at 22:02

## 2022-12-12 RX ADMIN — Medication 12.5 MILLIGRAM(S): at 17:06

## 2022-12-12 RX ADMIN — Medication 1: at 11:41

## 2022-12-12 RX ADMIN — Medication 3 MILLILITER(S): at 11:47

## 2022-12-12 RX ADMIN — Medication 3 UNIT(S): at 08:20

## 2022-12-12 NOTE — PROGRESS NOTE ADULT - ASSESSMENT
62 f with HTN, MM (diagnosed ~10 years ago, currently on Promacta treatments since June 2022m brought in for AMS, lethargy, ?slurred speech here febrile to 105.5, hypotensive, tachycardic, tachypneic and hypoxic   WBC: 1.8, ANC: 970  lactate: 6  s/p code stroke but CT negative, s/p LP WBC: 1, neutrophil: 0, glucose: 93, protein 33, negative PCR and gram stain  blood cx: E-coli  chest CT: Posterior right upper lobe and right basilar consolidation and additional right middle lobe and left basilar patchy opacities; findings compatible with aspiration or infection.    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  elevated ferritin and IL2 concerning for HLH with hypoxic and hypercapnic resp failure again after blood transfusion  reintubated 12/7, repeat blood and sputum cx negative chest CTA: no PE, improved RUL pneumonia but now with increased RLL consolidation and volume loss s/o atelectasis, BAL cx negative and repeat cx also negative  * pt was extubated and now stable afebrile  * blood cx cleared 12/2 and repeat blood, sputum cx negative   * completed a 10 day course for bacteremia 12/11 now off and afebrile, doing well  * f/u with hem/onc   * will sign off, please call with questions    The above assessment and plan was discussed with ALISON Lovell MD  contact on teams  After 5pm and on weekends call [phone]

## 2022-12-12 NOTE — PROGRESS NOTE ADULT - ASSESSMENT
Patient is a 63 yo F w/ PMHx of MM (diagnosed ~10 years ago, currently on Promacta treatments since June 2022 - most recently last Wednesday; followed by oncologist, Dr. Lopez at [facility]), ?ITP, Hep B, HTN, neuropathic R hip pain, recent R corneal tear admitted for AMS, code stroke called no acute infarct or hemorrhage, s/p LP findings not consistent with meningitis, course complicated by hypotension and hypoxia, placed on BiPAP, requiring pressors, admitted to MICU with sepsis likely secondary to pneumonia- intubated 11/30. Extubated 12/3 to face tent reintubated 12/7 in setting of increased WOB and hypercapneic respiratory failure unclear cause possibly HLH induced.     PLAN  Neuro  - s/p code stroke -> no ischemia or hemorrhage on CT H+N  - s/p LP results not consistent with encephalitis/meningitis, ammonia level 30   - baseline is AOx3  - s/p sedation w/ propofol , transitioned to precedex this am   - Repeat CTH 12/8 to r/o neurological cause for respiratory distress causing reintubation/also unsymmetrical nasolabial folds seen on exam was negative.   --OOB to chair   -- Pt reeval on 12/11 -- recommending VINCENT    Cardiovascular  #Shock sepsis vs HLH vs vasoplegic   # EKG changes- possible HLH induced cardiac complications?  # new onset A fib with RVR, resolved, pt has been in NSR   - suspect sepsis from E. Coli bacteremia with GI or urinary source?  - pt required levophed for intubation- titrating down currently on 0.2   - midodrine 10 q 8, Metoprolol Tartrate 12.5 mg BID   - ECHO 11/30 EF 55 normal LV/RV   - TSH wnl  - has been intermittently being diuresed with lasix  - EKG 12/7- new Twave inversions V2-V6, II, III, AVF- EKG 6 hrs later slightly improved, repeat stable  - Trop 100->102-> no longer trending (patient not a candidate for Asp/ hep given pancytopenia)   - POCUS 12/8 normal function no segmental changes, VTI 22    #new onset MAR  - MAR AF in 130's on 12/4, resolved, additional event 12/7 in setting of missed PO metoprolol resolved with IV lopressor    - continue Metoprolol tartrate 12.5 mg BID   - [id number] score is 3   - unable to anticoagulate in the setting of thrombocytopenia   - SCD's are in place   - continue telemetry monitoring        Pulmonary  #AHRF- likely secondary to PNA infection vs Aspiration   - s/p BiPAP, 10/5 on 50%, then intubated 11/30 for increased work of breathing  - Extubated 12/3 to face tent  - patient with increased WOB/ tachypnea overnight 12/6 after blood transfusion possible TACO? patient requiring BIPAP 10/5 50%   - Reintubated 12/7 in setting of WOB/respiratory distress and hypercapnic respiratory failure.   - MV 12/420/5/40% was doing well on SBT 5/5 30% ; now s/p extubation to facetent ~noon today.  - antibiotics as below   - MRSA/MSSA resent 12/8   - ID following recs appreciated   - CTA 11/30: neg for PE.  RUL R basilar consolidation RML basilar opacity  - repeat CXR on 12/4-worsening multilobar consolidations improved 12/7   - CTA to r/o PE 12/8- negative for PE ,Consolidation due to pneumonia involving the dependent portion of the RUL with interval improvement since November 30, 2022. RLL consolidation with associated volume loss increased since November 30, 2022 suggestive of partial compressive atelectasis adjacent to the increasing small right pleural effusion. Patchy consolidation within the dependent portion of the left lower lobe increased in size since November 30, 2022 suggestive of atelectasis given its homogeneous enhancement although superimposed pneumonia is not excluded. Small right and trace left pleural effusions.  - diureses with lasix PRN for goal of net neg last dose 12/7  - s/p solumedrol 40 started daily 12/7-12/10- __ continue home dose of Prednisone 5 mg daily (MM)   - duonebs q 6 , secretions improved, IPV discontinued   - Duplex to r/o DVTs - negative 12/7  - s/p bronch 12/9 for optimization for extubation, airways noted to be erythematous however patent. BAL culture negative.      GI  #Diet  - pt was evaluated by SLP, who recommended minced and moist diet as tolerated   -- diet was advanced, monitor for tolerance   - on bowel regimen, having BMs     #Elevated liver enzymes, - downtrending  - patient has reported hx of Hep B 2017 as per hemonc   - AST/ALT- 100/50 >68/38>56/35  - bili was elevatedm peaked at 1.5- however now normalized. continue to monitor   - hepatitis panel - Hep B surface AG +, and the rest is negative    - confirmatory test - hepatitis B quantitative - negative    -Spoke with ROSA Montejo from hepatology office, Dr Florina Jones Re: continuation of Tenofovir. ONDINA Medeiros [phone]. 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- outpt follow up after discharge- restarted Tenofovir 25 mg daily     #Constipation  - last BM on 12/07 and smear on 12/9   -- continue Senna and increased Miralax to BID  -- cont monitoring for BM     Renal  #GARCIA, improved   - SCr 0.7 today - stable  - CPK elevated, - suspect secondary to sepsis, trended down 1800-> 2800-> 1007>787>324 - no longer trending   -- s/p Lasix 20 mg IVP on 12/10  - Is and Os: neg 115, neg 4.6 L for LOS   - UA resent 12/7 slightly positive with mod bacteria and WBC   - intermittently diuresing with lasix - goal of net neg fluid balance   - ortiz replaced 12/7   -- 12/11-- indwelling ortiz removed, TOV protocol     #hypernatremia - recurrent  - NA- 149,   -- s/p D 5 at 75 x 4 hrs, and repeat NA is 149   -- additional D 5 at 75 c/hr x 6 hrs and repeat Na after completion,  -- encourage free water intake,   - free water 300 q 6 - dc'd 12/9  - Trend BMP qd    ID  #E. Coli bacteremia- pan sensitive 11/29   - Tmax overnight 98, WBC 5.5  - antibiotics broadened to vanco (12/7-12/10) and meropenem (12/7- 12/11) - 5day course  - MRSA resent 12/8- negative  - Sputum cx 12/1, 12/7 - NRF, BAL Culture 12/9 neg   - s/p ctx (12/5-12/7)    - s/p Cefepime 2g q8h (11/30- 12/5) deescalated to ctx  - s/p Azithromycin 500 mg started (12/2- 12/3) , legionella neg  - s/p vanc/CTX/ampicillin/acyclovir in ER for suspected meningitis workup   - Bcx 12/2, 11/29, 12/7- neg   - Fungitel 39   - ID following     SKIN  # sacral decub-DTI., Wound care nurse recs in place     Endo  - no history of diabetes, A1C 6.0  - TSH wnl  - 's , pt s/p NPH, on Admelog 3 U qac,   -- 12/11-- started on Lantus 14 U daily   - ISS qac a nd qhs  - cont monitoring     Heme  #MM  - as per Hemonc note MM in remission 5/22 has been on Promacta since 6/22 has received multiple treatments for MM in the last also PBSCT x 2 and haplo allo stem cell transplant in 2020- last spike noted sept 2022-was given belantamab- last treatment was on 11/23/22  - holding anti-myeloma therapy  -  s/p filgrastim held now as patient no longer neutropenic 12/7  - private Hemonc following [facility] recs appreciated     Pancytopenia  #concern for MAHA, possible HUS with E. Coli bacteremia? vs HLH?improving  - new thrombocytopenia, anemia, and +hemolysis labs (bili uptrending and now normalized)  -? reported history as per hemonc of ITP  - direct dede negative  - some schistocytes on peripheral smear, confirmed with hematology/oncology, however higher suspicion for lab abnormalities are secondary to sepsis  - Platelet transfusion protocol: <10, <20 with fever, <50 procedures/active bleeding/etc   - Patient received 1u plat 12/3, recieved 1/2 u PRBC 12/7 , ordered for 1u prbc today 12/9 for Hb 7 for optimization prior to extubation.   - Transfuse PRBC < 7  - Patient meets criteria for HLH (ferritin worsening 06701> 54744, trig 519->191, pancytopenia - 3 cell lines, persistent fever, IL2 8324, hepatitis) Us neg for splenomegaly   -- hem onc __ Concern for HLH__ Appears to be improving, PLTs and HGB slightly improved  - continue home Promacta, 50 mg daily   - Spoke with hemonc at [facility] about HLH- states labs could also be elevated in setting of sepsis and at this point treatment for HLH would be to treat underlying cause of infection and patient would not be a candidate for high dose immunosuppressant (Risks outweigh benefits)   - trend HLH labs daily   - s/p neupogen D/C'd as patient no longer neutropenic     #DVT ppx  - hold off on DVT ppx for now in setting of pancytopenia   - SCD's in place   - Duplex LE - negative 12/7    Skin  #Suspected DTI on sacrum and ear as per nurse  - Wound care recs appreciated      Ethics  - updated son, he is pediatric resident and patient's HCP  - Pt is full code    Dispo: Medicine   -PT consult VINCENT   Patient is a 63 yo F w/ PMHx of MM (diagnosed ~10 years ago, currently on Promacta treatments since June 2022 - most recently last Wednesday; followed by oncologist, Dr. Lopez at [facility]), ?ITP, Hep B, HTN, neuropathic R hip pain, recent R corneal tear admitted for AMS, code stroke called no acute infarct or hemorrhage, s/p LP findings not consistent with meningitis, course complicated by hypotension and hypoxia, placed on BiPAP, requiring pressors, admitted to MICU with sepsis likely secondary to pneumonia- intubated 11/30. Extubated 12/3 to face tent reintubated 12/7 in setting of increased WOB and hypercapneic respiratory failure unclear cause possibly HLH induced.     PLAN  Neuro  - s/p code stroke -> no ischemia or hemorrhage on CT H+N  - s/p LP results not consistent with encephalitis/meningitis, ammonia level 30   - baseline is AOx3, neuro status at baseline   - s/p sedation w/ propofol, off Precedex   - Repeat CTH 12/8 to r/o neurological cause for respiratory distress causing reintubation/also unsymmetrical nasolabial folds seen on exam was negative.   --OOB to chair   -- Pt reeval on 12/11 -- recommending VINCENT    Cardiovascular  #Shock sepsis vs HLH vs vasoplegic   # EKG changes- possible HLH induced cardiac complications?  # new onset A fib with RVR, resolved, pt has been in NSR   - suspect sepsis from E. Coli bacteremia with GI or urinary source?  - pt required levophed for intubation- titrating down currently on 0.2   - midodrine 10 q 8, Metoprolol Tartrate 12.5 mg BID   - ECHO 11/30 EF 55 normal LV/RV   - TSH wnl  - has been intermittently being diuresed with lasix  - EKG 12/7- new Twave inversions V2-V6, II, III, AVF- EKG 6 hrs later slightly improved, repeat stable  - Trop 100->102-> no longer trending (patient not a candidate for Asp/ hep given pancytopenia)   - POCUS 12/8 normal function no segmental changes, VTI 22    #new onset MAR  - MAR AF in 130's on 12/4, resolved, additional event 12/7 in setting of missed PO metoprolol resolved with IV lopressor    - continue Metoprolol tartrate 12.5 mg BID   - [id number] score is 3   - unable to anticoagulate in the setting of thrombocytopenia   - SCD's are in place   - continue telemetry monitoring        Pulmonary  #AHRF- likely secondary to PNA infection vs Aspiration   - s/p BiPAP, 10/5 on 50%, then intubated 11/30 for increased work of breathing  - Extubated 12/3 to face tent  - patient with increased WOB/ tachypnea overnight 12/6 after blood transfusion possible TACO? patient requiring BIPAP 10/5 50%   - Reintubated 12/7 in setting of WOB/respiratory distress and hypercapnic respiratory failure.   - MV 12/420/5/40% was doing well on SBT 5/5 30% ; now s/p extubation to facetent ~noon today.  - antibiotics as below   - MRSA/MSSA resent 12/8   - ID following recs appreciated   - CTA 11/30: neg for PE.  RUL R basilar consolidation RML basilar opacity  - repeat CXR on 12/4-worsening multilobar consolidations improved 12/7   - CTA to r/o PE 12/8- negative for PE ,Consolidation due to pneumonia involving the dependent portion of the RUL with interval improvement since November 30, 2022. RLL consolidation with associated volume loss increased since November 30, 2022 suggestive of partial compressive atelectasis adjacent to the increasing small right pleural effusion. Patchy consolidation within the dependent portion of the left lower lobe increased in size since November 30, 2022 suggestive of atelectasis given its homogeneous enhancement although superimposed pneumonia is not excluded. Small right and trace left pleural effusions.  - diureses with lasix PRN for goal of net neg last dose 12/7  - s/p solumedrol 40 started daily 12/7-12/10- __ continue home dose of Prednisone 5 mg daily (MM)   - duonebs q 6 , secretions improved, IPV discontinued   - Duplex to r/o DVTs - negative 12/7  - s/p bronch 12/9 for optimization for extubation, airways noted to be erythematous however patent. BAL culture negative.      GI  #Diet  - pt was evaluated by SLP, who recommended minced and moist diet as tolerated   -- diet was advanced, monitor for tolerance   - on bowel regimen, having BMs     #Elevated liver enzymes, - downtrending  - patient has reported hx of Hep B 2017 as per hemonc   - AST/ALT- 100/50 >68/38>56/35  - bili was elevatedm peaked at 1.5- however now normalized. continue to monitor   - hepatitis panel - Hep B surface AG +, and the rest is negative    - confirmatory test - hepatitis B quantitative - negative    -Spoke with ROSA Montejo from hepatology office, Dr Florina Jones Re: continuation of Tenofovir. NYLUDIN Medeiros [phone]. 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- outpt follow up after discharge- restarted Tenofovir 25 mg daily     #Constipation  - last BM on 12/07 and smear on 12/9   -- continue Senna and increased Miralax to BID  -- cont monitoring for BM     Renal  #GARCIA, improved   - SCr 0.7 today - stable  - CPK elevated, - suspect secondary to sepsis, trended down 1800-> 2800-> 1007>787>324 - no longer trending   -- s/p Lasix 20 mg IVP on 12/10  - Is and Os: neg 115, neg 4.6 L for LOS   - UA resent 12/7 slightly positive with mod bacteria and WBC   - intermittently diuresing with lasix - goal of net neg fluid balance   - ortiz replaced 12/7   -- 12/11-- indwelling ortiz removed, TOV protocol     #hypernatremia - recurrent  - NA- 149,   -- s/p D 5 at 75 x 4 hrs, and repeat NA is 149   -- additional D 5 at 75 c/hr x 6 hrs and repeat Na after completion,  -- encourage free water intake,   - free water 300 q 6 - dc'd 12/9  - Trend BMP qd    ID  #E. Coli bacteremia- pan sensitive 11/29   - Tmax overnight 98, WBC 5.5  - antibiotics broadened to vanco (12/7-12/10) and meropenem (12/7- 12/11) - 5day course  - MRSA resent 12/8- negative  - Sputum cx 12/1, 12/7 - NRF, BAL Culture 12/9 neg   - s/p ctx (12/5-12/7)    - s/p Cefepime 2g q8h (11/30- 12/5) deescalated to ctx  - s/p Azithromycin 500 mg started (12/2- 12/3) , legionella neg  - s/p vanc/CTX/ampicillin/acyclovir in ER for suspected meningitis workup   - Bcx 12/2, 11/29, 12/7- neg   - Fungitel 39   - ID following     SKIN  # sacral decub-DTI., Wound care nurse recs in place     Endo  - no history of diabetes, A1C 6.0  - TSH wnl  - 's , pt s/p NPH, on Admelog 3 U qac,   -- 12/11-- started on Lantus 14 U daily   - ISS qac a nd qhs  - cont monitoring     Heme  #MM  - as per Hemonc note MM in remission 5/22 has been on Promacta since 6/22 has received multiple treatments for MM in the last also PBSCT x 2 and haplo allo stem cell transplant in 2020- last spike noted sept 2022-was given belantamab- last treatment was on 11/23/22  - holding anti-myeloma therapy  -  s/p filgrastim held now as patient no longer neutropenic 12/7  - private Hemonc following [facility] recs appreciated     Pancytopenia  #concern for MAHA, possible HUS with E. Coli bacteremia? vs HLH?improving  - new thrombocytopenia, anemia, and +hemolysis labs (bili uptrending and now normalized)  -? reported history as per hemonc of ITP  - direct dede negative  - some schistocytes on peripheral smear, confirmed with hematology/oncology, however higher suspicion for lab abnormalities are secondary to sepsis  - Platelet transfusion protocol: <10, <20 with fever, <50 procedures/active bleeding/etc   - Patient received 1u plat 12/3, recieved 1/2 u PRBC 12/7 , ordered for 1u prbc today 12/9 for Hb 7 for optimization prior to extubation.   - Transfuse PRBC < 7  - Patient meets criteria for HLH (ferritin worsening 54113> 54530, trig 519->191, pancytopenia - 3 cell lines, persistent fever, IL2 8324, hepatitis) Us neg for splenomegaly   -- hem onc __ Concern for HLH__ Appears to be improving, PLTs and HGB slightly improved  - continue home Promacta, 50 mg daily   - Spoke with hemonc at [facility] about HLH- states labs could also be elevated in setting of sepsis and at this point treatment for HLH would be to treat underlying cause of infection and patient would not be a candidate for high dose immunosuppressant (Risks outweigh benefits)   - trend HLH labs daily   - s/p neupogen D/C'd as patient no longer neutropenic     #DVT ppx  - hold off on DVT ppx for now in setting of pancytopenia   - SCD's in place   - Duplex LE - negative 12/7    Skin  #Suspected DTI on sacrum and ear as per nurse  - Wound care recs appreciated      Ethics  - updated son, he is pediatric resident and patient's HCP  - Pt is full code    Dispo: Medicine   -PT consult VINCENT   Patient is a 63 yo F w/ PMHx of MM (diagnosed ~10 years ago, currently on Promacta treatments since June 2022 - most recently last Wednesday; followed by oncologist, Dr. Lopez at [facility]), ?ITP, Hep B, HTN, neuropathic R hip pain, recent R corneal tear admitted for AMS, code stroke called no acute infarct or hemorrhage, s/p LP findings not consistent with meningitis, course complicated by hypotension and hypoxia, placed on BiPAP, requiring pressors, admitted to MICU with sepsis likely secondary to pneumonia- intubated 11/30. Extubated 12/3 to face tent reintubated 12/7 in setting of increased WOB and hypercapneic respiratory failure unclear cause possibly HLH induced.     PLAN  Neuro  - s/p code stroke -> no ischemia or hemorrhage on CT H+N  - s/p LP results not consistent with encephalitis/meningitis, ammonia level 30   - baseline is AOx3, neuro status at baseline   - s/p sedation w/ propofol, off Precedex   - Repeat CTH 12/8 to r/o neurological cause for respiratory distress causing reintubation/also unsymmetrical nasolabial folds seen on exam was negative.   --OOB to chair   -- Pt reeval on 12/11 -- recommending VINCENT, spoke with SW for consultations     Cardiovascular  #Shock sepsis vs HLH vs vasoplegic   # EKG changes- possible HLH induced cardiac complications?  # new onset A fib with RVR, resolved, pt has been in NSR   - suspect sepsis from E. Coli bacteremia with GI or urinary source?  - pt required levophed for intubation- titrating down currently on 0.2   - midodrine 10 q 8, Metoprolol Tartrate 12.5 mg BID   - ECHO 11/30 EF 55 normal LV/RV   - TSH wnl  - has been intermittently being diuresed with lasix  - EKG 12/7- new Twave inversions V2-V6, II, III, AVF- EKG 6 hrs later slightly improved, repeat stable  - Trop 100->102-> no longer trending (patient not a candidate for Asp/ hep given pancytopenia)   - POCUS 12/8 normal function no segmental changes, VTI 22  -- POCUS 12/12 - nl LV fx, mild diastolic dysfx, LLL consolidation vx atelectasis    #new onset MAR, has been in NSR with no ectomy on tele monitor  - MAR AF in 130's on 12/4, resolved, additional event 12/7 in setting of missed PO metoprolol resolved with IV lopressor    - continue Metoprolol tartrate 12.5 mg BID   - [id number] score is 3   - unable to anticoagulate in the setting of thrombocytopenia   - SCD's are in place   - continue telemetry monitoring        Pulmonary  #AHRF- likely secondary to PNA infection vs Aspiration   - s/p BiPAP, 10/5 on 50%, then intubated 11/30 for increased work of breathing  - Extubated 12/3 to face tent  - patient with increased WOB/ tachypnea overnight 12/6 after blood transfusion possible TACO? patient requiring BIPAP 10/5 50%   - Reintubated 12/7 in setting of WOB/respiratory distress and hypercapnic respiratory failure, and extubated on 12/10, __ on 1 LPM NC -- SpO2- 96- 97 %>> titrate off as able    - antibiotics as below   - MRSA/MSSA resent 12/8   - ID following recs appreciated   - CTA 11/30: neg for PE.  RUL R basilar consolidation RML basilar opacity  - repeat CXR on 12/4-worsening multilobar consolidations improved 12/7   - CTA to r/o PE 12/8- negative for PE ,Consolidation due to pneumonia involving the dependent portion of the RUL with interval improvement since November 30, 2022. RLL consolidation with associated volume loss increased since November 30, 2022 suggestive of partial compressive atelectasis adjacent to the increasing small right pleural effusion. Patchy consolidation within the dependent portion of the left lower lobe increased in size since November 30, 2022 suggestive of atelectasis given its homogeneous enhancement although superimposed pneumonia is not excluded. Small right and trace left pleural effusions.  - diureses with lasix PRN for goal of net neg last dose 12/7  - s/p solumedrol 40 started daily 12/7-12/10- __ continue home dose of Prednisone 5 mg daily (MM)   - duonebs q 6 , secretions improved, IPV discontinued __   - Duplex to r/o DVTs - negative 12/7  - s/p bronch 12/9 for optimization for extubation, airways noted to be erythematous however patent. BAL culture negative.      GI  #Diet  - pt was evaluated by SLP, who recommended minced and moist diet as tolerated   -- diet was advanced, pt has been tolerating well   -- last BM on 12/7, and smear on 12/9  - increased Miralax to BID, Lactulose x 1, __ pt with BM today -- 12/12    #Elevated liver enzymes, - downtrending  - patient has reported hx of Hep B 2017 as per hemonc   - AST/ALT- 100/50 >68/38>56/35>50/28  - bili was elevated, peaked at 1.5- however now normalized. continue to monitor   - hepatitis panel - Hep B surface AG +, and the rest is negative    - confirmatory test - hepatitis B quantitative - negative    -Spoke with ROSA Montejo from hepatology office, Dr Florina Jones Re: continuation of Tenofovir. [facility] [phone]. 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- outpt follow up after discharge- restarted Tenofovir 25 mg daily     #Constipation  - last BM on 12/07 and smear on 12/9   -- continue Senna and increased Miralax to BID, lactulose x 1 (12/12)   -- + BM on 12/12,     Renal  #GARCIA, improved   - SCr 0.62 today - stable  - CPK elevated, - suspect secondary to sepsis, trended down 1800-> 2800-> 1007>787>324 - no longer trending   -- s/p Lasix 20 mg IVP on 12/10, hold further diuresis in the setting of free water deficit   - Is and Os: + 635 for 24 hrs and neg 3.9 L for LOS   - UA resent 12/7 slightly positive with mod bacteria and WBC   - intermittently diuresing with lasix - goal of net neg fluid balance   - ortiz replaced 12/7   -- 12/11-- indwelling ortiz removed, TOV protocol __ passed     #hypernatremia - recurrent  - NA- 149,   -- s/p D 5 W on 12/11, and repeat Na today is 147   -- encourage free water intake,   - free water 300 q 6 - dc'd 12/9  - Trend BMP qd    ID  #E. Coli bacteremia- pan sensitive 11/29   - Tmax overnight 98, WBC 5.5  - antibiotics broadened to vanco (12/7-12/10) and meropenem (12/7- 12/11) - 5day course  - MRSA resent 12/8- negative  - Sputum cx 12/1, 12/7 - NRF, BAL Culture 12/9 neg , CSF Cx neg   - s/p ctx (12/5-12/7)    - s/p Cefepime 2g q8h (11/30- 12/5) deescalated to ctx  - s/p Azithromycin 500 mg started (12/2- 12/3) , legionella neg  - s/p vanc/CTX/ampicillin/acyclovir in ER for suspected meningitis workup   - Bcx 12/2, 11/29, 12/7- neg   - Fungitel 39   - ID following     SKIN  # sacral decub-DTI., Wound care nurse recs in place     Endo  - no history of diabetes, A1C 6.0  - TSH wnl  - 's , pt s/p NPH, on Admelog 3 U qac,   -- 12/11-- started on Lantus 14 U, __ FS-- 261>194>205>198>195  - ISS qac and qhs  - cont monitoring     Heme  #MM  - as per Hemonc note MM in remission 5/22 has been on Promacta since 6/22 has received multiple treatments for MM in the last also PBSCT x 2 and haplo allo stem cell transplant in 2020- last spike noted sept 2022-was given belantamab- last treatment was on 11/23/22  - holding anti-myeloma therapy  -  s/p filgrastim held now as patient no longer neutropenic 12/7  - private Hemonc following [facility] recs appreciated     Pancytopenia  #concern for MAHA, possible HUS with E. Coli bacteremia? vs HLH?improving  - new thrombocytopenia, anemia, and +hemolysis labs (bili uptrending and now normalized)  -? 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   - Transfuse PRBC < 7  - Patient meets criteria for HLH (ferritin worsening 49503> 15376, trig 519->191, pancytopenia - 3 cell lines, persistent fever, IL2 8324, hepatitis) Us neg for splenomegaly   -- hem onc __ Concern for HLH__ Appears to be improving, PLTs and HGB improved  - continue home Promacta, 50 mg daily   - Spoke with hemonc at [facility] about HLH- states labs could also be elevated in setting of sepsis and at this point treatment for HLH would be to treat underlying cause of infection and patient would not be a candidate for high dose immunosuppressant (Risks outweigh benefits)   - trend HLH labs daily   - s/p neupogen D/C'd as patient no longer neutropenic     #DVT ppx  - hold off on DVT ppx for now in setting of pancytopenia   - SCD's in place   - Duplex LE - negative 12/7    Skin  #Suspected DTI on sacrum and ear as per nurse  - Wound care recs appreciated      Ethics  - updated son, he is pediatric resident and patient's HCP  - Pt is full code    Dispo: Medicine   -PT consult VINCENT, HUNTER consulted

## 2022-12-12 NOTE — PROGRESS NOTE ADULT - SUBJECTIVE AND OBJECTIVE BOX
Follow Up:  septic shock, E-coli bacteremia, pneumonia    Interval History: pt was extubated, remains afebrile and all blood and BAL cultures negative completed the course of antibiotics now off  ROS:    Unobtainable because: intubated        Allergies  Bactrim (Other)  fluconazole (Vomiting; Nausea)  levofloxacin (Vomiting; Nausea)  penicillin (Other)        ANTIMICROBIALS:  tenofovir alafenamide (VEMLIDY) 25 daily      OTHER MEDS:  albuterol/ipratropium for Nebulization 3 milliLiter(s) Nebulizer every 6 hours  artificial tears (preservative free) Ophthalmic Solution 1 Drop(s) Both EYES two times a day  chlorhexidine 2% Cloths 1 Application(s) Topical daily  dextrose 5%. 1000 milliLiter(s) IV Continuous <Continuous>  dextrose 5%. 1000 milliLiter(s) IV Continuous <Continuous>  dextrose 50% Injectable 25 Gram(s) IV Push once  dextrose 50% Injectable 12.5 Gram(s) IV Push once  dextrose 50% Injectable 25 Gram(s) IV Push once  dextrose Oral Gel 15 Gram(s) Oral once PRN  eltrombopag 50 milliGRAM(s) Oral daily  glucagon  Injectable 1 milliGRAM(s) IntraMuscular once  insulin glargine Injectable (LANTUS) 14 Unit(s) SubCutaneous every morning  insulin lispro (ADMELOG) corrective regimen sliding scale   SubCutaneous three times a day before meals  insulin lispro (ADMELOG) corrective regimen sliding scale   SubCutaneous at bedtime  insulin lispro Injectable (ADMELOG) 3 Unit(s) SubCutaneous three times a day with meals  melatonin 6 milliGRAM(s) Oral at bedtime PRN  metoprolol tartrate 12.5 milliGRAM(s) Oral two times a day  midodrine 10 milliGRAM(s) Oral every 8 hours  multivitamin 1 Tablet(s) Oral daily  polyethylene glycol 3350 17 Gram(s) Oral two times a day  predniSONE   Tablet 5 milliGRAM(s) Oral daily  senna 2 Tablet(s) Oral at bedtime  tamsulosin 0.4 milliGRAM(s) Oral at bedtime      Vital Signs Last 24 Hrs  T(C): 36.2 (12 Dec 2022 12:00), Max: 37.8 (11 Dec 2022 20:00)  T(F): 97.1 (12 Dec 2022 12:00), Max: 100 (11 Dec 2022 20:00)  HR: 86 (12 Dec 2022 12:00) (65 - 86)  BP: 103/55 (12 Dec 2022 12:00) (103/52 - 143/65)  BP(mean): 67 (12 Dec 2022 12:00) (65 - 83)  RR: 20 (12 Dec 2022 12:00) (14 - 20)  SpO2: 98% (12 Dec 2022 12:00) (98% - 100%)    Parameters below as of 12 Dec 2022 12:00  Patient On (Oxygen Delivery Method): nasal cannula w/ humidification  O2 Flow (L/min): 1      Physical Exam:  General:  NAD, non toxic  Cardio:   regular S1, S2  Respiratory: comfortable on NC  abd:     soft,   BS +, no tenderness  :  no suprapubic or CVA tenderness  Musculoskeletal:   no joint swelling  vascular: no phlebitis  Skin:    no rash                          8.7    5.38  )-----------( 48       ( 12 Dec 2022 02:10 )             28.5       12-12    147<H>  |  110<H>  |  27<H>  ----------------------------<  198<H>  4.6   |  29  |  0.61    Ca    9.3      12 Dec 2022 02:10  Phos  2.3     12-12  Mg     2.00     12-12    TPro  5.0<L>  /  Alb  2.6<L>  /  TBili  0.9  /  DBili  x   /  AST  50<H>  /  ALT  28  /  AlkPhos  130<H>  12-12          MICROBIOLOGY:  v  .Bronchial Bronchial Lavage  12-09-22   No growth at 48 hours  --    Rare polymorphonuclear leukocytes per low power field  No squamous epithelial cells per low power field  No organisms seen per oil power field      ET Tube ET Tube  12-07-22   Normal Respiratory Eleanor present  --    No polymorphonuclear leukocytes per low power field  Few Squamous epithelial cells per low power field  Moderate Gram Positive Rods seen per oil power field  Rare Gram positive cocci in pairs seen per oil power field      .Blood Blood-Peripheral  12-07-22   No growth to date.  --  --      Catheterized Catheterized  12-02-22   No growth  --  --      .Blood Blood  12-02-22   No Growth Final  --  --      .Blood Blood  12-02-22   No Growth Final  --  --      ET Tube ET Tube  12-01-22   No growth at 48 hours  --    Moderate polymorphonuclear leukocytes per low power field  No Squamous epithelial cells per low power field  No organisms seen per oil power field      .CSF CSF  11-29-22   No growth at 1 week.  --    No polymorphonuclear leukocytes seen  No organisms seen  by cytocentrifuge      .Blood Blood-Peripheral  11-29-22   Growth in aerobic and anaerobic bottles: Escherichia coli  ***Blood Panel PCR results on this specimen are available  approximately 3 hours after the Gram stain result.***  Gram stain, PCR, and/or culture results may not always  correspond due to difference in methodologies.  ************************************************************  This PCR assay was performed by multiplex PCR. This  Assay tests for 66 bacterial and resistance gene targets.  Please refer to the [facility] test directory  at [email or web address] for details.  --  Blood Culture PCR  Escherichia coli      Clean Catch Clean Catch (Midstream)  11-29-22   >=3 organisms. Probable collection contamination.  --  --                RADIOLOGY:  Images independently visualized and reviewed personally, findings as below  < from: CT Head No Cont (12.08.22 @ 18:33) >  IMPRESSION:  Stable exam. No mass effect, hemorrhage or evidence of acute intracranial   pathology.    < end of copied text >  < from: CT Angio Chest PE Protocol w/ IV Cont (12.08.22 @ 18:33) >  IMPRESSION:  No pulmonary embolism.    Consolidation due to pneumonia involving the dependent portion of the   right upper lobe with interval improvement since November 30, 2022.    Right lower lobe consolidation with associated volume loss increased   since November 30, 2022 suggestive of partial compressive atelectasis   adjacent to the increasing small right pleural effusion.    Patchy consolidationwithin the dependent portion of the left lower lobe   increased in size since November 30, 2022 suggestive of atelectasis given   its homogeneous enhancement although superimposed pneumonia is not   excluded.    Small right and trace left pleural effusions.    < end of copied text >

## 2022-12-12 NOTE — PROGRESS NOTE ADULT - CRITICAL CARE ATTENDING COMMENT
Patient is a 63 yo F w/ MM, ?ITP, HTN, recent R corneal tear who was initially admitted on 11/30 after p/w AMS found to have neutropenic septic shock with E coli bacteremia with course c/b acute respiratory failure and new Afib with RVR.     Patient with initial neutropenic sepsis 2/2 to e.coli bacteremia. S/P abx, now completed treatment, intubated x2 and now extubated for pna but possible transfusion related overload (TACO). Doing better but still requiring O2 with NC. Urinary retention, afib with RVR setting of critical illness but now back to NSR, not on a/c 2/2 pancytopenia. Concerning features for possible HLH but not immunosuppression given risks. Now markers improving.     # E.Coli bacteremia  # Acute hypoxemic RF  # Pancytopenia with features concerning for HLH  # Afib  # Constipation  # Urinary retention  # Hoarse voice  - Neutropenic sepsis, with e.coli bacteremia, now completed abx  - Now extubated. continue to wean o2 as tolerated, some atelectasis on pocus, c/w chest PT, incentive sprio, OOB to chair  - Continue to monitor cell counts, was concern for HLH given pancytopenia and elevated markers but was not started on immunosuppression 2/2 to risks, shock status and bacteremia, markers now improved. Monitor CBC, transfuse PRN.   - Afib, now in sinus, c/w low dose bb, was in the setting of sepsis. No a/c 2/2 to thrombocytopenia  - C/W bowel regimen  - C/W flomax, staight cath for now  - Patient states her voice was hoarse even prior to her intubation, will confirm with son. If not may need ENT eval.   - DVT ppx- SCD  - GOC- full code    d/w MICU ACP, patient at bedside. Patient is a 61 yo F w/ MM, ?ITP, HTN, recent R corneal tear who was initially admitted on 11/30 after p/w AMS found to have neutropenic septic shock with E coli bacteremia with course c/b acute respiratory failure and new Afib with RVR.     Patient with initial neutropenic sepsis 2/2 to e.coli bacteremia. S/P abx, now completed treatment, intubated x2 and now extubated for pna but possible transfusion related overload (TACO). Doing better but still requiring O2 with NC. Urinary retention, afib with RVR setting of critical illness but now back to NSR, not on a/c 2/2 pancytopenia. Concerning features for possible HLH but not immunosuppression given risks. Now markers improving.     # E.Coli bacteremia  # Acute hypoxemic RF  # Pancytopenia with features concerning for HLH  # Afib  # Constipation  # Urinary retention  # Hoarse voice  # Hypotension  - Neutropenic sepsis, with e.coli bacteremia, now completed abx  - Now extubated. continue to wean o2 as tolerated, some atelectasis on pocus, c/w chest PT, incentive sprio, OOB to chair  - Hypotension improved, off IV pressors, c/w oral pressors and wean as tolerated.   - Continue to monitor cell counts, was concern for HLH given pancytopenia and elevated markers but was not started on immunosuppression 2/2 to risks, shock status and bacteremia, markers now improved. Monitor CBC, transfuse PRN.   - Afib, now in sinus, c/w low dose bb, was in the setting of sepsis. No a/c 2/2 to thrombocytopenia  - C/W bowel regimen  - C/W flomax, staight cath for now  - Patient states her voice was hoarse even prior to her intubation, will confirm with son. If not may need ENT eval.   - DVT ppx- SCD  - GOC- full code    d/w MICU ACP, patient at bedside.

## 2022-12-12 NOTE — CHART NOTE - NSCHARTNOTEFT_GEN_A_CORE
: Dae Hyeon Kim    INDICATION: Acute hypoxemic RF, atelectasis,     PROCEDURE:  [x ] LIMITED ECHO  [x ] LIMITED CHEST  [ ] LIMITED RETROPERITONEAL  [x ] LIMITED ABDOMINAL  [ ] LIMITED DVT  [ ] NEEDLE GUIDANCE VASCULAR  [ ] NEEDLE GUIDANCE THORACENTESIS  [ ] NEEDLE GUIDANCE PARACENTESIS  [ ] NEEDLE GUIDANCE PERICARDIOCENTESIS  [ ] OTHER    FINDINGS:  Grossly normal LVSF, thickened LV free wall concerning for possible diastolic dysfunction, No pericardial effusion. RV small vs LV  IVC intermediate in size  Few blines anterioly, b/l small atelectasis R>L, Trace effusions  Distended and enlarged bladder    INTERPRETATION:  Atelectasis, otherwise lung mostly clear  Normal LVSF with concern for diastolic dysfunction.   Concerning for urinary retension    Images uploaded on [employer] Path

## 2022-12-12 NOTE — PROGRESS NOTE ADULT - SUBJECTIVE AND OBJECTIVE BOX
Significant recent/past 24 hr events:    Subjective:    Review of Systems         [ ] A ten-point review of systems was otherwise negative except as noted.  [ ] Due to altered mental status/intubation, subjective information were not able to be obtained from the patient. History was obtained, to the extent possible, from review of the chart and collateral sources of information.      Patient is a 62y old  Female who presents with a chief complaint of slurred speech (11 Dec 2022 13:35)    HPI:  Patient is a 62 yoF w/ pmhx of MM (diagnosed ~10 years ago, currently on Promacta treatments since June 2022 - most recently last Wednesday; followed by oncologist, Dr. Lopez at [facility]), HTN, neuropathic R hip pain, recent R corneal tear, presents to [facility] ED for change in mental status. LKW approximately 14:30. Patient was in normal state of health this morning as per son, Nacho. Patient went to a follow up ophtho appointment for a recently diagnosed corneal abrasion this past Monday. Patient was picked up by a friend around 10:00 for an optho appt with normal mental status, pt was just complaining of being sleepy. On the way back from appointment around 14:30, patient stopped responding to friend appropriately in the car and she appeared more tired & weaker than usual. She started having slurred speech when they arrived home and then son called EMS. In the ED, patient with persisting word finding difficulties and generalized weakness, but leaning to the R side. Patient reports some difficulty remembering what happened today, but reports she feels generally weak and very tired. Code stroke called, but TPA not given because of imporvement in pt's symptoms. Son reports patient's speech is close to baseline now. Patient then denied chest pain, SOB, HA, changes in vision, N/V, imbalance, numbness or tingling. Denies recent infection or sick contacts. Pt states that she had a similar episode a few years ago with fever which she was told was likely from a MM, she improved after getting steroids.     In the ED pt was febrile 105.5, , /84, RR 24 and saturating well on RA. Pt given 2L IVF bolus and 1x vanc/ceftriaxone/acyclovir/ampicillin (30 Nov 2022 02:42)    PAST MEDICAL & SURGICAL HISTORY:  Multiple myeloma      Type 2 diabetes mellitus        FAMILY HISTORY:  No pertinent family history in first degree relatives        Vitals   ICU Vital Signs Last 24 Hrs  T(C): 37 (12 Dec 2022 04:00), Max: 37.8 (11 Dec 2022 20:00)  T(F): 98.6 (12 Dec 2022 04:00), Max: 100 (11 Dec 2022 20:00)  HR: 78 (12 Dec 2022 04:00) (65 - 84)  BP: 119/58 (12 Dec 2022 04:00) (106/52 - 143/65)  BP(mean): 72 (12 Dec 2022 04:00) (65 - 83)  ABP: --  ABP(mean): --  RR: 16 (12 Dec 2022 04:00) (14 - 21)  SpO2: 100% (12 Dec 2022 04:00) (94% - 100%)    O2 Parameters below as of 12 Dec 2022 04:00  Patient On (Oxygen Delivery Method): nasal cannula w/ humidification            Physical Exam:   Constitutional: NAD, well-groomed, well-developed  HEENT: PERRLA, EOMI, no drainage or redness  Neck: supple,  No JVD, Trachea midline  Back: Normal spine flexure, No CVA tenderness, No deformity or limitation of movement  Respiratory: Breath Sounds equal & clear bilaterally to auscultation, no accessory muscle use noted  Cardiovascular: Regular rate, regular rhythm, normal S1, S2; no murmurs or rub  Gastrointestinal: Soft, non-tender, non distended, no hepatosplenomegaly, normal bowel sounds  Extremities: WAKE[place] x 4, no peripheral edema, no cyanosis, no clubbing   Vascular: Equal and normal pulses: 2+ peripheral pulses throughout  Neurological: A+O x 3; speech clear and intact; no sensory, motor  deficits, normal reflexes  Psychiatric: calm, normal mood, normal affect  Musculoskeletal: No joint swelling or deformity; no limitation of movement  Skin: warm, dry, well perfused, no rashes    VENT SETTINGS         I&O's Detail    10 Dec 2022 07:01  -  11 Dec 2022 07:00  --------------------------------------------------------  IN:    Dexmedetomidine: 30.4 mL    dextrose 5%: 75 mL    IV PiggyBack: 200 mL    Oral Fluid: 340 mL  Total IN: 645.4 mL    OUT:    Indwelling Catheter - Urethral (mL): 2115 mL  Total OUT: 2115 mL    Total NET: -1469.6 mL      11 Dec 2022 07:01  -  12 Dec 2022 06:43  --------------------------------------------------------  IN:    dextrose 5%: 225 mL    dextrose 5%: 450 mL    IV PiggyBack: 350 mL    Oral Fluid: 600 mL  Total IN: 1625 mL    OUT:    Indwelling Catheter - Urethral (mL): 440 mL    Intermittent Catheterization - Urethral (mL): 550 mL  Total OUT: 990 mL    Total NET: 635 mL          LABS                        8.7    5.38  )-----------( 48       ( 12 Dec 2022 02:10 )             28.5     12-12    147<H>  |  110<H>  |  27<H>  ----------------------------<  198<H>  4.6   |  29  |  0.61    Ca    9.3      12 Dec 2022 02:10  Phos  2.3     12-12  Mg     2.00     12-12    TPro  5.0<L>  /  Alb  2.6<L>  /  TBili  0.9  /  DBili  x   /  AST  50<H>  /  ALT  28  /  AlkPhos  130<H>  12-12    LIVER FUNCTIONS - ( 12 Dec 2022 02:10 )  Alb: 2.6 g/dL / Pro: 5.0 g/dL / ALK PHOS: 130 U/L / ALT: 28 U/L / AST: 50 U/L / GGT: x                     POCT Blood Glucose.: 205 mg/dL *H* (12-11-22 @ 22:36)  POCT Blood Glucose.: 194 mg/dL *H* (12-11-22 @ 16:43)  POCT Blood Glucose.: 239 mg/dL *H* (12-11-22 @ 12:14)  POCT Blood Glucose.: 187 mg/dL *H* (12-11-22 @ 09:34)        MEDICATIONS  (STANDING):  albuterol/ipratropium for Nebulization 3 milliLiter(s) Nebulizer every 6 hours  artificial tears (preservative free) Ophthalmic Solution 1 Drop(s) Both EYES two times a day  chlorhexidine 2% Cloths 1 Application(s) Topical daily  dextrose 5%. 1000 milliLiter(s) (100 mL/Hr) IV Continuous <Continuous>  dextrose 5%. 1000 milliLiter(s) (50 mL/Hr) IV Continuous <Continuous>  dextrose 50% Injectable 25 Gram(s) IV Push once  dextrose 50% Injectable 12.5 Gram(s) IV Push once  dextrose 50% Injectable 25 Gram(s) IV Push once  eltrombopag 50 milliGRAM(s) Oral daily  glucagon  Injectable 1 milliGRAM(s) IntraMuscular once  insulin glargine Injectable (LANTUS) 14 Unit(s) SubCutaneous every morning  insulin lispro (ADMELOG) corrective regimen sliding scale   SubCutaneous three times a day before meals  insulin lispro (ADMELOG) corrective regimen sliding scale   SubCutaneous at bedtime  insulin lispro Injectable (ADMELOG) 3 Unit(s) SubCutaneous three times a day with meals  metoprolol tartrate 12.5 milliGRAM(s) Oral two times a day  midodrine 10 milliGRAM(s) Oral every 8 hours  multivitamin 1 Tablet(s) Oral daily  polyethylene glycol 3350 17 Gram(s) Oral two times a day  predniSONE   Tablet 5 milliGRAM(s) Oral daily  senna 2 Tablet(s) Oral at bedtime  tamsulosin 0.4 milliGRAM(s) Oral at bedtime  tenofovir alafenamide (VEMLIDY) 25 milliGRAM(s) Oral daily    MEDICATIONS  (PRN):  dextrose Oral Gel 15 Gram(s) Oral once PRN Blood Glucose LESS THAN 70 milliGRAM(s)/deciliter  melatonin 6 milliGRAM(s) Oral at bedtime PRN Insomnia      Allergies:  Bactrim (Other)  fluconazole (Vomiting; Nausea)  levofloxacin (Vomiting; Nausea)  penicillin (Other)        CRITICAL CARE TIME SPENT:  minutes of critical care time spent providing medical care for patient's acute illness/conditions that impairs at least one vital organ system and/or poses a high risk of imminent or life threatening deterioration in the patient's condition. It includes time spent evaluating and treating the patient's acute illness as well as time spent reviewing labs, radiology, discussing goals of care with patient and/or patient's family, and discussing the case with a multidisciplinary team, in an effort to prevent further life threatening deterioration or end organ damage. This time is independent of any procedures performed.         Significant recent/past 24 hr events: RT max-- 100, and repeat 98.6; pt was straight cath's once O/N for urinary retention and then passed TO after was transferred /assisted to [facility]     Subjective: pt denies any complaints     Review of Systems         [ ] A ten-point review of systems was otherwise negative except as noted.  [ ] Due to altered mental status/intubation, subjective information were not able to be obtained from the patient. History was obtained, to the extent possible, from review of the chart and collateral sources of information.      Patient is a 62y old  Female who presents with a chief complaint of slurred speech (11 Dec 2022 13:35)    HPI:  Patient is a 62 yoF w/ pmhx of MM (diagnosed ~10 years ago, currently on Promacta treatments since June 2022 - most recently last Wednesday; followed by oncologist, Dr. Lopez at [facility]), HTN, neuropathic R hip pain, recent R corneal tear, presents to [facility] ED for change in mental status. LKW approximately 14:30. Patient was in normal state of health this morning as per son, Nacho. Patient went to a follow up ophtho appointment for a recently diagnosed corneal abrasion this past Monday. Patient was picked up by a friend around 10:00 for an optho appt with normal mental status, pt was just complaining of being sleepy. On the way back from appointment around 14:30, patient stopped responding to friend appropriately in the car and she appeared more tired & weaker than usual. She started having slurred speech when they arrived home and then son called EMS. In the ED, patient with persisting word finding difficulties and generalized weakness, but leaning to the R side. Patient reports some difficulty remembering what happened today, but reports she feels generally weak and very tired. Code stroke called, but TPA not given because of imporvement in pt's symptoms. Son reports patient's speech is close to baseline now. Patient then denied chest pain, SOB, HA, changes in vision, N/V, imbalance, numbness or tingling. Denies recent infection or sick contacts. Pt states that she had a similar episode a few years ago with fever which she was told was likely from a MM, she improved after getting steroids.     In the ED pt was febrile 105.5, , /84, RR 24 and saturating well on RA. Pt given 2L IVF bolus and 1x vanc/ceftriaxone/acyclovir/ampicillin (30 Nov 2022 02:42)    PAST MEDICAL & SURGICAL HISTORY:  Multiple myeloma      Type 2 diabetes mellitus        FAMILY HISTORY:  No pertinent family history in first degree relatives        Vitals   ICU Vital Signs Last 24 Hrs  T(C): 37 (12 Dec 2022 04:00), Max: 37.8 (11 Dec 2022 20:00)  T(F): 98.6 (12 Dec 2022 04:00), Max: 100 (11 Dec 2022 20:00)  HR: 78 (12 Dec 2022 04:00) (65 - 84)  BP: 119/58 (12 Dec 2022 04:00) (106/52 - 143/65)  BP(mean): 72 (12 Dec 2022 04:00) (65 - 83)  ABP: --  ABP(mean): --  RR: 16 (12 Dec 2022 04:00) (14 - 21)  SpO2: 100% (12 Dec 2022 04:00) (94% - 100%)    O2 Parameters below as of 12 Dec 2022 04:00  Patient On (Oxygen Delivery Method): nasal cannula w/ humidification            Physical Exam:   Constitutional: NAD, well-groomed, well-developed  HEENT: PERRLA, EOMI, no drainage or redness  Neck: supple,  No JVD, Trachea midline  Back: Normal spine flexure, No CVA tenderness, No deformity or limitation of movement  Respiratory: Breath Sounds equal & clear bilaterally to auscultation, no accessory muscle use noted  Cardiovascular: Regular rate, regular rhythm, normal S1, S2; no murmurs or rub  Gastrointestinal: Soft, non-tender, non distended, no hepatosplenomegaly, normal bowel sounds  Extremities: [place] x 4, no peripheral edema, no cyanosis, no clubbing   Vascular: Equal and normal pulses: 2+ peripheral pulses throughout  Neurological: A+O x 3; speech clear and intact; no sensory, motor  deficits, normal reflexes  Psychiatric: calm, normal mood, normal affect  Musculoskeletal: No joint swelling or deformity; no limitation of movement  Skin: warm, dry, well perfused, no rashes    VENT SETTINGS         I&O's Detail    10 Dec 2022 07:01  -  11 Dec 2022 07:00  --------------------------------------------------------  IN:    Dexmedetomidine: 30.4 mL    dextrose 5%: 75 mL    IV PiggyBack: 200 mL    Oral Fluid: 340 mL  Total IN: 645.4 mL    OUT:    Indwelling Catheter - Urethral (mL): 2115 mL  Total OUT: 2115 mL    Total NET: -1469.6 mL      11 Dec 2022 07:01  -  12 Dec 2022 06:43  --------------------------------------------------------  IN:    dextrose 5%: 225 mL    dextrose 5%: 450 mL    IV PiggyBack: 350 mL    Oral Fluid: 600 mL  Total IN: 1625 mL    OUT:    Indwelling Catheter - Urethral (mL): 440 mL    Intermittent Catheterization - Urethral (mL): 550 mL  Total OUT: 990 mL    Total NET: 635 mL          LABS                        8.7    5.38  )-----------( 48       ( 12 Dec 2022 02:10 )             28.5     12-12    147<H>  |  110<H>  |  27<H>  ----------------------------<  198<H>  4.6   |  29  |  0.61    Ca    9.3      12 Dec 2022 02:10  Phos  2.3     12-12  Mg     2.00     12-12    TPro  5.0<L>  /  Alb  2.6<L>  /  TBili  0.9  /  DBili  x   /  AST  50<H>  /  ALT  28  /  AlkPhos  130<H>  12-12    LIVER FUNCTIONS - ( 12 Dec 2022 02:10 )  Alb: 2.6 g/dL / Pro: 5.0 g/dL / ALK PHOS: 130 U/L / ALT: 28 U/L / AST: 50 U/L / GGT: x                     POCT Blood Glucose.: 205 mg/dL *H* (12-11-22 @ 22:36)  POCT Blood Glucose.: 194 mg/dL *H* (12-11-22 @ 16:43)  POCT Blood Glucose.: 239 mg/dL *H* (12-11-22 @ 12:14)  POCT Blood Glucose.: 187 mg/dL *H* (12-11-22 @ 09:34)        MEDICATIONS  (STANDING):  albuterol/ipratropium for Nebulization 3 milliLiter(s) Nebulizer every 6 hours  artificial tears (preservative free) Ophthalmic Solution 1 Drop(s) Both EYES two times a day  chlorhexidine 2% Cloths 1 Application(s) Topical daily  dextrose 5%. 1000 milliLiter(s) (100 mL/Hr) IV Continuous <Continuous>  dextrose 5%. 1000 milliLiter(s) (50 mL/Hr) IV Continuous <Continuous>  dextrose 50% Injectable 25 Gram(s) IV Push once  dextrose 50% Injectable 12.5 Gram(s) IV Push once  dextrose 50% Injectable 25 Gram(s) IV Push once  eltrombopag 50 milliGRAM(s) Oral daily  glucagon  Injectable 1 milliGRAM(s) IntraMuscular once  insulin glargine Injectable (LANTUS) 14 Unit(s) SubCutaneous every morning  insulin lispro (ADMELOG) corrective regimen sliding scale   SubCutaneous three times a day before meals  insulin lispro (ADMELOG) corrective regimen sliding scale   SubCutaneous at bedtime  insulin lispro Injectable (ADMELOG) 3 Unit(s) SubCutaneous three times a day with meals  metoprolol tartrate 12.5 milliGRAM(s) Oral two times a day  midodrine 10 milliGRAM(s) Oral every 8 hours  multivitamin 1 Tablet(s) Oral daily  polyethylene glycol 3350 17 Gram(s) Oral two times a day  predniSONE   Tablet 5 milliGRAM(s) Oral daily  senna 2 Tablet(s) Oral at bedtime  tamsulosin 0.4 milliGRAM(s) Oral at bedtime  tenofovir alafenamide (VEMLIDY) 25 milliGRAM(s) Oral daily    MEDICATIONS  (PRN):  dextrose Oral Gel 15 Gram(s) Oral once PRN Blood Glucose LESS THAN 70 milliGRAM(s)/deciliter  melatonin 6 milliGRAM(s) Oral at bedtime PRN Insomnia      Allergies:  Bactrim (Other)  fluconazole (Vomiting; Nausea)  levofloxacin (Vomiting; Nausea)  penicillin (Other)

## 2022-12-12 NOTE — CHART NOTE - NSCHARTNOTEFT_GEN_A_CORE
MAR Accept Note    Transfer from: MICU ->[id number] [id number]    Accepting physician: Dr. Neil    Story:  See MICU transfer note for complete details. Briefly...61 yo F w/ pmhx of MM on Prednisone (diagnosed ~10 years ago, currently on Promacta treatments since June 2022 - followed by oncologist, Dr. Lopez at [facility]), HTN, neuropathic R hip pain, recent R corneal tear, presents to [facility] ED for change in mental status. LKW approximately 14:30. Patient was in normal state of health this morning as per sonNacho. S/p code stroke in ED, negative imaging, not given tpa due to improvement in symptoms. Pt was intubated for acute hypoxic respiratory failure and transferred to MICU for further management. Found to be in septic 2/2 E. Coli bacteremia and was started on Cefepime and Levophed. AMS-- CTH with no acute findings and s/p LP with negative CSF.  Pt was successfully extubated on 12/3, and was on nasal cannula. Pt was reintubated on 12/6 for possible aspiration PNA and was successfully extubated to NC on 12/10. She is now on room air and has completed course of antibiotics for bacteremia.        OBJECTIVE DATA:    Vital Signs Last 24 Hrs  T(C): 36.6 (12 Dec 2022 20:00), Max: 37.2 (12 Dec 2022 00:00)  T(F): 97.9 (12 Dec 2022 20:00), Max: 99 (12 Dec 2022 00:00)  HR: 83 (12 Dec 2022 21:19) (65 - 86)  BP: 121/49 (12 Dec 2022 20:00) (103/52 - 143/65)  BP(mean): 68 (12 Dec 2022 20:00) (65 - 83)  RR: 22 (12 Dec 2022 20:00) (15 - 22)  SpO2: 96% (12 Dec 2022 21:19) (96% - 100%)    Parameters below as of 12 Dec 2022 21:19  Patient On (Oxygen Delivery Method): nasal cannula      I&O's Summary    11 Dec 2022 07:01  -  12 Dec 2022 07:00  --------------------------------------------------------  IN: 1625 mL / OUT: 990 mL / NET: 635 mL    12 Dec 2022 07:01  -  12 Dec 2022 23:30  --------------------------------------------------------  IN: 780 mL / OUT: 500 mL / NET: 280 mL        Allergies:      MEDICATIONS  (STANDING):  albuterol/ipratropium for Nebulization 3 milliLiter(s) Nebulizer every 6 hours  artificial tears (preservative free) Ophthalmic Solution 1 Drop(s) Both EYES two times a day  chlorhexidine 2% Cloths 1 Application(s) Topical daily  dextrose 5%. 1000 milliLiter(s) (50 mL/Hr) IV Continuous <Continuous>  dextrose 5%. 1000 milliLiter(s) (100 mL/Hr) IV Continuous <Continuous>  dextrose 50% Injectable 25 Gram(s) IV Push once  dextrose 50% Injectable 12.5 Gram(s) IV Push once  dextrose 50% Injectable 25 Gram(s) IV Push once  eltrombopag 50 milliGRAM(s) Oral daily  glucagon  Injectable 1 milliGRAM(s) IntraMuscular once  insulin glargine Injectable (LANTUS) 14 Unit(s) SubCutaneous every morning  insulin lispro (ADMELOG) corrective regimen sliding scale   SubCutaneous three times a day before meals  insulin lispro (ADMELOG) corrective regimen sliding scale   SubCutaneous at bedtime  insulin lispro Injectable (ADMELOG) 3 Unit(s) SubCutaneous three times a day with meals  metoprolol tartrate 12.5 milliGRAM(s) Oral two times a day  midodrine 10 milliGRAM(s) Oral every 8 hours  multivitamin 1 Tablet(s) Oral daily  polyethylene glycol 3350 17 Gram(s) Oral two times a day  predniSONE   Tablet 5 milliGRAM(s) Oral daily  senna 2 Tablet(s) Oral at bedtime  tamsulosin 0.4 milliGRAM(s) Oral at bedtime  tenofovir alafenamide (VEMLIDY) 25 milliGRAM(s) Oral daily    MEDICATIONS  (PRN):  dextrose Oral Gel 15 Gram(s) Oral once PRN Blood Glucose LESS THAN 70 milliGRAM(s)/deciliter  melatonin 6 milliGRAM(s) Oral at bedtime PRN Insomnia      LABS                                            8.7                   Neurophils% (auto):   x      (12-12 @ 02:10):    5.38 )-----------(48           Lymphocytes% (auto):  x                                             28.5                   Eosinphils% (auto):   x        Manual%: Neutrophils x    ; Lymphocytes x    ; Eosinophils x    ; Bands%: x    ; Blasts x                                    147    |  110    |  27                  Calcium: 9.3   / iCa: x      (12-12 @ 02:10)    ----------------------------<  198       Magnesium: 2.00                             4.6     |  29     |  0.61             Phosphorous: 2.3      TPro  5.0    /  Alb  2.6    /  TBili  0.9    /  DBili  x      /  AST  50     /  ALT  28     /  AlkPhos  130    12 Dec 2022 02:10        For F/U:  [] wean off Midodrine as able  [] follow up PT benita, originally recommended Rehab   [] monitor Na level  [] FS in 200's, s/p IV steroids, on prednisone 5 mg daily, started on Lantus on 12/11  [] monitor FS's       Waylon Segal MD

## 2022-12-13 DIAGNOSIS — A41.9 SEPSIS, UNSPECIFIED ORGANISM: ICD-10-CM

## 2022-12-13 DIAGNOSIS — D61.818 OTHER PANCYTOPENIA: ICD-10-CM

## 2022-12-13 DIAGNOSIS — I48.91 UNSPECIFIED ATRIAL FIBRILLATION: ICD-10-CM

## 2022-12-13 DIAGNOSIS — I46.9 CARDIAC ARREST, CAUSE UNSPECIFIED: ICD-10-CM

## 2022-12-13 LAB
ALBUMIN SERPL ELPH-MCNC: 2.2 G/DL — LOW (ref 3.3–5)
ALBUMIN SERPL ELPH-MCNC: 2.5 G/DL — LOW (ref 3.3–5)
ALP SERPL-CCNC: 137 U/L — HIGH (ref 40–120)
ALP SERPL-CCNC: 143 U/L — HIGH (ref 40–120)
ALT FLD-CCNC: 27 U/L — SIGNIFICANT CHANGE UP (ref 4–33)
ALT FLD-CCNC: 38 U/L — HIGH (ref 4–33)
ANION GAP SERPL CALC-SCNC: 10 MMOL/L — SIGNIFICANT CHANGE UP (ref 7–14)
ANION GAP SERPL CALC-SCNC: 9 MMOL/L — SIGNIFICANT CHANGE UP (ref 7–14)
ANISOCYTOSIS BLD QL: SIGNIFICANT CHANGE UP
APPEARANCE UR: CLEAR — SIGNIFICANT CHANGE UP
APTT BLD: 26.8 SEC — LOW (ref 27–36.3)
AST SERPL-CCNC: 43 U/L — HIGH (ref 4–32)
AST SERPL-CCNC: 79 U/L — HIGH (ref 4–32)
BACTERIA # UR AUTO: ABNORMAL
BASOPHILS # BLD AUTO: 0 K/UL — SIGNIFICANT CHANGE UP (ref 0–0.2)
BASOPHILS NFR BLD AUTO: 0 % — SIGNIFICANT CHANGE UP (ref 0–2)
BILIRUB SERPL-MCNC: 0.9 MG/DL — SIGNIFICANT CHANGE UP (ref 0.2–1.2)
BILIRUB SERPL-MCNC: 1 MG/DL — SIGNIFICANT CHANGE UP (ref 0.2–1.2)
BILIRUB UR-MCNC: NEGATIVE — SIGNIFICANT CHANGE UP
BLOOD GAS ARTERIAL COMPREHENSIVE RESULT: SIGNIFICANT CHANGE UP
BUN SERPL-MCNC: 18 MG/DL — SIGNIFICANT CHANGE UP (ref 7–23)
BUN SERPL-MCNC: 20 MG/DL — SIGNIFICANT CHANGE UP (ref 7–23)
CALCIUM SERPL-MCNC: 8.4 MG/DL — SIGNIFICANT CHANGE UP (ref 8.4–10.5)
CALCIUM SERPL-MCNC: 9.2 MG/DL — SIGNIFICANT CHANGE UP (ref 8.4–10.5)
CHLORIDE SERPL-SCNC: 111 MMOL/L — HIGH (ref 98–107)
CHLORIDE SERPL-SCNC: 113 MMOL/L — HIGH (ref 98–107)
CO2 SERPL-SCNC: 24 MMOL/L — SIGNIFICANT CHANGE UP (ref 22–31)
CO2 SERPL-SCNC: 29 MMOL/L — SIGNIFICANT CHANGE UP (ref 22–31)
COLOR SPEC: YELLOW — SIGNIFICANT CHANGE UP
CREAT SERPL-MCNC: 0.41 MG/DL — LOW (ref 0.5–1.3)
CREAT SERPL-MCNC: 0.55 MG/DL — SIGNIFICANT CHANGE UP (ref 0.5–1.3)
CULTURE RESULTS: SIGNIFICANT CHANGE UP
DACRYOCYTES BLD QL SMEAR: SLIGHT — SIGNIFICANT CHANGE UP
DIFF PNL FLD: ABNORMAL
EGFR: 104 ML/MIN/1.73M2 — SIGNIFICANT CHANGE UP
EGFR: 111 ML/MIN/1.73M2 — SIGNIFICANT CHANGE UP
EOSINOPHIL # BLD AUTO: 0 K/UL — SIGNIFICANT CHANGE UP (ref 0–0.5)
EOSINOPHIL NFR BLD AUTO: 0 % — SIGNIFICANT CHANGE UP (ref 0–6)
EPI CELLS # UR: 3 /HPF — SIGNIFICANT CHANGE UP (ref 0–5)
GAS PNL BLDA: SIGNIFICANT CHANGE UP
GAS PNL BLDA: SIGNIFICANT CHANGE UP
GIANT PLATELETS BLD QL SMEAR: PRESENT — SIGNIFICANT CHANGE UP
GLUCOSE BLDC GLUCOMTR-MCNC: 114 MG/DL — HIGH (ref 70–99)
GLUCOSE BLDC GLUCOMTR-MCNC: 130 MG/DL — HIGH (ref 70–99)
GLUCOSE BLDC GLUCOMTR-MCNC: 132 MG/DL — HIGH (ref 70–99)
GLUCOSE SERPL-MCNC: 132 MG/DL — HIGH (ref 70–99)
GLUCOSE SERPL-MCNC: 143 MG/DL — HIGH (ref 70–99)
GLUCOSE UR QL: NEGATIVE — SIGNIFICANT CHANGE UP
HCT VFR BLD CALC: 25.3 % — LOW (ref 34.5–45)
HCT VFR BLD CALC: 27.7 % — LOW (ref 34.5–45)
HGB BLD-MCNC: 8.1 G/DL — LOW (ref 11.5–15.5)
HGB BLD-MCNC: 8.4 G/DL — LOW (ref 11.5–15.5)
HYALINE CASTS # UR AUTO: 0 /LPF — SIGNIFICANT CHANGE UP (ref 0–7)
IANC: 2.61 K/UL — SIGNIFICANT CHANGE UP (ref 1.8–7.4)
INR BLD: 1.18 RATIO — HIGH (ref 0.88–1.16)
KETONES UR-MCNC: NEGATIVE — SIGNIFICANT CHANGE UP
LEUKOCYTE ESTERASE UR-ACNC: NEGATIVE — SIGNIFICANT CHANGE UP
LYMPHOCYTES # BLD AUTO: 1.32 K/UL — SIGNIFICANT CHANGE UP (ref 1–3.3)
LYMPHOCYTES # BLD AUTO: 26.6 % — SIGNIFICANT CHANGE UP (ref 13–44)
MACROCYTES BLD QL: SLIGHT — SIGNIFICANT CHANGE UP
MAGNESIUM SERPL-MCNC: 1.8 MG/DL — SIGNIFICANT CHANGE UP (ref 1.6–2.6)
MCHC RBC-ENTMCNC: 30.3 GM/DL — LOW (ref 32–36)
MCHC RBC-ENTMCNC: 32 GM/DL — SIGNIFICANT CHANGE UP (ref 32–36)
MCHC RBC-ENTMCNC: 32.2 PG — SIGNIFICANT CHANGE UP (ref 27–34)
MCHC RBC-ENTMCNC: 32.9 PG — SIGNIFICANT CHANGE UP (ref 27–34)
MCV RBC AUTO: 102.8 FL — HIGH (ref 80–100)
MCV RBC AUTO: 106.1 FL — HIGH (ref 80–100)
MICROCYTES BLD QL: SLIGHT — SIGNIFICANT CHANGE UP
MONOCYTES # BLD AUTO: 0.66 K/UL — SIGNIFICANT CHANGE UP (ref 0–0.9)
MONOCYTES NFR BLD AUTO: 13.3 % — SIGNIFICANT CHANGE UP (ref 2–14)
NEUTROPHILS # BLD AUTO: 2.73 K/UL — SIGNIFICANT CHANGE UP (ref 1.8–7.4)
NEUTROPHILS NFR BLD AUTO: 51.3 % — SIGNIFICANT CHANGE UP (ref 43–77)
NEUTS BAND # BLD: 3.5 % — SIGNIFICANT CHANGE UP (ref 0–6)
NITRITE UR-MCNC: NEGATIVE — SIGNIFICANT CHANGE UP
NRBC # BLD: 3 /100 WBCS — HIGH (ref 0–0)
NRBC # BLD: 4 /100 — HIGH (ref 0–0)
NRBC # FLD: 0.14 K/UL — HIGH (ref 0–0)
OVALOCYTES BLD QL SMEAR: SLIGHT — SIGNIFICANT CHANGE UP
PH UR: 6 — SIGNIFICANT CHANGE UP (ref 5–8)
PHOSPHATE SERPL-MCNC: 2.5 MG/DL — SIGNIFICANT CHANGE UP (ref 2.5–4.5)
PLAT MORPH BLD: NORMAL — SIGNIFICANT CHANGE UP
PLATELET # BLD AUTO: 57 K/UL — LOW (ref 150–400)
PLATELET # BLD AUTO: 68 K/UL — LOW (ref 150–400)
PLATELET COUNT - ESTIMATE: ABNORMAL
POIKILOCYTOSIS BLD QL AUTO: SLIGHT — SIGNIFICANT CHANGE UP
POLYCHROMASIA BLD QL SMEAR: SLIGHT — SIGNIFICANT CHANGE UP
POTASSIUM SERPL-MCNC: 3.5 MMOL/L — SIGNIFICANT CHANGE UP (ref 3.5–5.3)
POTASSIUM SERPL-MCNC: 4 MMOL/L — SIGNIFICANT CHANGE UP (ref 3.5–5.3)
POTASSIUM SERPL-SCNC: 3.5 MMOL/L — SIGNIFICANT CHANGE UP (ref 3.5–5.3)
POTASSIUM SERPL-SCNC: 4 MMOL/L — SIGNIFICANT CHANGE UP (ref 3.5–5.3)
PROT SERPL-MCNC: 4.5 G/DL — LOW (ref 6–8.3)
PROT SERPL-MCNC: 4.8 G/DL — LOW (ref 6–8.3)
PROT UR-MCNC: ABNORMAL
PROTHROM AB SERPL-ACNC: 13.7 SEC — HIGH (ref 10.5–13.4)
RBC # BLD: 2.46 M/UL — LOW (ref 3.8–5.2)
RBC # BLD: 2.61 M/UL — LOW (ref 3.8–5.2)
RBC # FLD: 17.6 % — HIGH (ref 10.3–14.5)
RBC # FLD: 17.8 % — HIGH (ref 10.3–14.5)
RBC BLD AUTO: ABNORMAL
RBC CASTS # UR COMP ASSIST: 7 /HPF — HIGH (ref 0–4)
SMUDGE CELLS # BLD: PRESENT — SIGNIFICANT CHANGE UP
SODIUM SERPL-SCNC: 147 MMOL/L — HIGH (ref 135–145)
SODIUM SERPL-SCNC: 149 MMOL/L — HIGH (ref 135–145)
SP GR SPEC: 1.03 — SIGNIFICANT CHANGE UP (ref 1.01–1.05)
SPECIMEN SOURCE: SIGNIFICANT CHANGE UP
UROBILINOGEN FLD QL: SIGNIFICANT CHANGE UP
VARIANT LYMPHS # BLD: 5.3 % — SIGNIFICANT CHANGE UP (ref 0–6)
WBC # BLD: 4.11 K/UL — SIGNIFICANT CHANGE UP (ref 3.8–10.5)
WBC # BLD: 4.98 K/UL — SIGNIFICANT CHANGE UP (ref 3.8–10.5)
WBC # FLD AUTO: 4.11 K/UL — SIGNIFICANT CHANGE UP (ref 3.8–10.5)
WBC # FLD AUTO: 4.98 K/UL — SIGNIFICANT CHANGE UP (ref 3.8–10.5)
WBC UR QL: 4 /HPF — SIGNIFICANT CHANGE UP (ref 0–5)

## 2022-12-13 PROCEDURE — 99291 CRITICAL CARE FIRST HOUR: CPT

## 2022-12-13 PROCEDURE — 70450 CT HEAD/BRAIN W/O DYE: CPT | Mod: 26

## 2022-12-13 PROCEDURE — 71045 X-RAY EXAM CHEST 1 VIEW: CPT | Mod: 26

## 2022-12-13 RX ORDER — NOREPINEPHRINE BITARTRATE/D5W 8 MG/250ML
0.05 PLASTIC BAG, INJECTION (ML) INTRAVENOUS
Qty: 8 | Refills: 0 | Status: DISCONTINUED | OUTPATIENT
Start: 2022-12-13 | End: 2022-12-17

## 2022-12-13 RX ORDER — CEFEPIME 1 G/1
2000 INJECTION, POWDER, FOR SOLUTION INTRAMUSCULAR; INTRAVENOUS ONCE
Refills: 0 | Status: COMPLETED | OUTPATIENT
Start: 2022-12-13 | End: 2022-12-13

## 2022-12-13 RX ORDER — POLYETHYLENE GLYCOL 3350 17 G/17G
17 POWDER, FOR SOLUTION ORAL
Refills: 0 | Status: DISCONTINUED | OUTPATIENT
Start: 2022-12-13 | End: 2022-12-19

## 2022-12-13 RX ORDER — TAMSULOSIN HYDROCHLORIDE 0.4 MG/1
0.4 CAPSULE ORAL AT BEDTIME
Refills: 0 | Status: DISCONTINUED | OUTPATIENT
Start: 2022-12-13 | End: 2022-12-13

## 2022-12-13 RX ORDER — CHLORHEXIDINE GLUCONATE 213 G/1000ML
1 SOLUTION TOPICAL
Refills: 0 | Status: DISCONTINUED | OUTPATIENT
Start: 2022-12-13 | End: 2022-12-15

## 2022-12-13 RX ORDER — DOXAZOSIN MESYLATE 4 MG
1 TABLET ORAL AT BEDTIME
Refills: 0 | Status: DISCONTINUED | OUTPATIENT
Start: 2022-12-13 | End: 2022-12-19

## 2022-12-13 RX ORDER — CHLORHEXIDINE GLUCONATE 213 G/1000ML
15 SOLUTION TOPICAL EVERY 12 HOURS
Refills: 0 | Status: DISCONTINUED | OUTPATIENT
Start: 2022-12-13 | End: 2022-12-16

## 2022-12-13 RX ORDER — LANOLIN ALCOHOL/MO/W.PET/CERES
6 CREAM (GRAM) TOPICAL AT BEDTIME
Refills: 0 | Status: DISCONTINUED | OUTPATIENT
Start: 2022-12-13 | End: 2022-12-19

## 2022-12-13 RX ORDER — CEFEPIME 1 G/1
2000 INJECTION, POWDER, FOR SOLUTION INTRAMUSCULAR; INTRAVENOUS EVERY 8 HOURS
Refills: 0 | Status: DISCONTINUED | OUTPATIENT
Start: 2022-12-13 | End: 2022-12-16

## 2022-12-13 RX ORDER — SENNA PLUS 8.6 MG/1
2 TABLET ORAL AT BEDTIME
Refills: 0 | Status: DISCONTINUED | OUTPATIENT
Start: 2022-12-13 | End: 2022-12-19

## 2022-12-13 RX ORDER — METOPROLOL TARTRATE 50 MG
12.5 TABLET ORAL
Refills: 0 | Status: DISCONTINUED | OUTPATIENT
Start: 2022-12-13 | End: 2022-12-13

## 2022-12-13 RX ORDER — MULTIVIT-MIN/FERROUS GLUCONATE 9 MG/15 ML
15 LIQUID (ML) ORAL DAILY
Refills: 0 | Status: DISCONTINUED | OUTPATIENT
Start: 2022-12-13 | End: 2022-12-19

## 2022-12-13 RX ORDER — CEFEPIME 1 G/1
INJECTION, POWDER, FOR SOLUTION INTRAMUSCULAR; INTRAVENOUS
Refills: 0 | Status: DISCONTINUED | OUTPATIENT
Start: 2022-12-13 | End: 2022-12-16

## 2022-12-13 RX ORDER — PROPOFOL 10 MG/ML
50 INJECTION, EMULSION INTRAVENOUS
Qty: 1000 | Refills: 0 | Status: DISCONTINUED | OUTPATIENT
Start: 2022-12-13 | End: 2022-12-15

## 2022-12-13 RX ORDER — MIDODRINE HYDROCHLORIDE 2.5 MG/1
10 TABLET ORAL
Refills: 0 | Status: DISCONTINUED | OUTPATIENT
Start: 2022-12-13 | End: 2022-12-13

## 2022-12-13 RX ORDER — INSULIN LISPRO 100/ML
VIAL (ML) SUBCUTANEOUS EVERY 6 HOURS
Refills: 0 | Status: DISCONTINUED | OUTPATIENT
Start: 2022-12-13 | End: 2022-12-19

## 2022-12-13 RX ORDER — ALBUTEROL 90 UG/1
4 AEROSOL, METERED ORAL EVERY 6 HOURS
Refills: 0 | Status: DISCONTINUED | OUTPATIENT
Start: 2022-12-13 | End: 2022-12-20

## 2022-12-13 RX ORDER — PROPOFOL 10 MG/ML
10 INJECTION, EMULSION INTRAVENOUS
Qty: 500 | Refills: 0 | Status: DISCONTINUED | OUTPATIENT
Start: 2022-12-13 | End: 2022-12-13

## 2022-12-13 RX ORDER — TENOFOVIR DISOPROXIL FUMARATE 300 MG/1
25 TABLET, FILM COATED ORAL DAILY
Refills: 0 | Status: DISCONTINUED | OUTPATIENT
Start: 2022-12-13 | End: 2022-12-19

## 2022-12-13 RX ADMIN — SENNA PLUS 2 TABLET(S): 8.6 TABLET ORAL at 21:34

## 2022-12-13 RX ADMIN — PROPOFOL 26.1 MICROGRAM(S)/KG/MIN: 10 INJECTION, EMULSION INTRAVENOUS at 21:10

## 2022-12-13 RX ADMIN — Medication 8.16 MICROGRAM(S)/KG/MIN: at 20:00

## 2022-12-13 RX ADMIN — PROPOFOL 26.1 MICROGRAM(S)/KG/MIN: 10 INJECTION, EMULSION INTRAVENOUS at 13:59

## 2022-12-13 RX ADMIN — MIDODRINE HYDROCHLORIDE 10 MILLIGRAM(S): 2.5 TABLET ORAL at 05:34

## 2022-12-13 RX ADMIN — Medication 1 DROP(S): at 05:33

## 2022-12-13 RX ADMIN — CEFEPIME 100 MILLIGRAM(S): 1 INJECTION, POWDER, FOR SOLUTION INTRAMUSCULAR; INTRAVENOUS at 21:34

## 2022-12-13 RX ADMIN — CHLORHEXIDINE GLUCONATE 1 APPLICATION(S): 213 SOLUTION TOPICAL at 12:57

## 2022-12-13 RX ADMIN — Medication 5 MILLIGRAM(S): at 05:34

## 2022-12-13 RX ADMIN — Medication 1 DROP(S): at 17:40

## 2022-12-13 RX ADMIN — Medication 8.16 MICROGRAM(S)/KG/MIN: at 13:59

## 2022-12-13 RX ADMIN — CEFEPIME 100 MILLIGRAM(S): 1 INJECTION, POWDER, FOR SOLUTION INTRAMUSCULAR; INTRAVENOUS at 14:30

## 2022-12-13 RX ADMIN — Medication 3 MILLILITER(S): at 08:58

## 2022-12-13 RX ADMIN — Medication 3 UNIT(S): at 09:01

## 2022-12-13 RX ADMIN — CHLORHEXIDINE GLUCONATE 15 MILLILITER(S): 213 SOLUTION TOPICAL at 17:41

## 2022-12-13 RX ADMIN — Medication 12.5 MILLIGRAM(S): at 05:35

## 2022-12-13 RX ADMIN — INSULIN GLARGINE 14 UNIT(S): 100 INJECTION, SOLUTION SUBCUTANEOUS at 09:01

## 2022-12-13 RX ADMIN — Medication 3 MILLILITER(S): at 04:21

## 2022-12-13 NOTE — RAPID RESPONSE TEAM SUMMARY - NSSITUATIONBACKGROUNDRRT_GEN_ALL_CORE
61 yo F w/ PMHx of MM (diagnosed ~10 years ago, currently on Promacta treatments since June 2022 - most recently last Wednesday; followed by oncologist, Dr. Lopez at [facility]), ?ITP, Hep B, HTN, neuropathic R hip pain, recent R corneal tear admitted for AMS, code stroke called no acute infarct or hemorrhage, s/p LP findings not consistent with meningitis, course complicated by hypotension and hypoxia, placed on BiPAP, requiring pressors, admitted to MICU with sepsis likely secondary to pneumonia- intubated 11/30. Extubated 12/3 to face tent reintubated 12/7 in setting of increased WOB and hypercapnic respiratory failure. Extubated on 12/10 transferred to floors on 12/12.     RRT called for AMS hypotension, unable to obtain SpO2 when RRT at the bedside, c/f airway protection, anesthesia called for ETT placement, patient lost pulse, code blue called 1 round of CPR w/ ROSC ~3 mins, 1 round of epinephrine, intubated with 10 of etomidate and 100 of succinylcholine, ETT 22 at the lip, family notified. 
Patient is a 61 yo F w/ pmhx of MM (diagnosed ~10 years ago, currently on Promacta treatments since June 2022 - most recently last Wednesday; followed by oncologist, Dr. Lopez at [facility]), HTN, neuropathic R hip pain, recent R corneal tear admitted for AMS, code stroke called no acute infarct or hemorrhage, s/p LP findings not consistent with meningitis, course complicated by hypotension and hypoxia. Was also placed on BiPAP for increased WOB and hypoxia. Received IV Vanc, Cefepime, Acyclovir, Ampicillin. Given a total of 4L fluid and 10 milligrams of midodrine without significant improvement in BP. Vitals significant for 99.1 F temp axillary, BP 70/40s, , RR 38, SpO2 100% on BiPAP 12/6. Labs notable for pancytopenia, rising Cr, hapto <20, elevated LDH, normal fibrinogen. elevated D-Dimer. BCx showing GNR. Prior to RRT, FIO2 increased from 60% to 100% due to desaturation after CT. CTA negative for PE but showed large R lobe PNA.

## 2022-12-13 NOTE — PROGRESS NOTE ADULT - ASSESSMENT
Patient is a 61 yo F w/ PMHx of MM (diagnosed ~10 years ago, currently on Promacta treatments since June 2022 - most recently last Wednesday; followed by oncologist, Dr. Lopez at [facility]), ?ITP, Hep B, HTN, neuropathic R hip pain, recent R corneal tear admitted for AMS, code stroke called no acute infarct or hemorrhage, s/p LP findings not consistent with meningitis, course complicated by hypotension and hypoxia, placed on BiPAP, requiring pressors, admitted to MICU with sepsis likely secondary to pneumonia- intubated 11/30. Extubated 12/3 to face tent reintubated 12/7 in setting of increased WOB and hypercapneic respiratory failure unclear cause possibly HLH induced.     Neuro  - s/p code stroke -> no ischemia or hemorrhage on CT H+N  - s/p LP results not consistent with encephalitis/meningitis, ammonia level 30   - baseline is AOx3, neuro status at baseline   - s/p sedation w/ propofol, off Precedex   - Repeat CTH 12/8 to r/o neurological cause for respiratory distress causing reintubation/also unsymmetrical nasolabial folds seen on exam was negative.   --OOB to chair   -- Pt reeval on 12/11 -- recommending VINCENT, spoke with SW for consultations     Cardiovascular  #Shock sepsis vs HLH vs vasoplegic   # EKG changes- possible HLH induced cardiac complications?  # new onset A fib with RVR, resolved, pt has been in NSR   - suspect sepsis from E. Coli bacteremia with GI or urinary source?  - pt required levophed for intubation- titrating down currently on 0.2   - midodrine 10 q 8, Metoprolol Tartrate 12.5 mg BID   - ECHO 11/30 EF 55 normal LV/RV   - TSH wnl  - has been intermittently being diuresed with lasix  - EKG 12/7- new Twave inversions V2-V6, II, III, AVF- EKG 6 hrs later slightly improved, repeat stable  - Trop 100->102-> no longer trending (patient not a candidate for Asp/ hep given pancytopenia)   - POCUS 12/8 normal function no segmental changes, VTI 22  -- POCUS 12/12 - nl LV fx, mild diastolic dysfx, LLL consolidation vx atelectasis    #new onset MAR, has been in NSR with no ectomy on tele monitor  - MAR AF in 130's on 12/4, resolved, additional event 12/7 in setting of missed PO metoprolol resolved with IV lopressor    - continue Metoprolol tartrate 12.5 mg BID   - [id number] score is 3   - unable to anticoagulate in the setting of thrombocytopenia   - SCD's are in place   - continue telemetry monitoring        Pulmonary  #AHRF- likely secondary to PNA infection vs Aspiration   - s/p BiPAP, 10/5 on 50%, then intubated 11/30 for increased work of breathing  - Extubated 12/3 to face tent  - patient with increased WOB/ tachypnea overnight 12/6 after blood transfusion possible TACO? patient requiring BIPAP 10/5 50%   - Reintubated 12/7 in setting of WOB/respiratory distress and hypercapnic respiratory failure, and extubated on 12/10, __ on 1 LPM NC -- SpO2- 96- 97 %>> titrate off as able    - antibiotics as below   - MRSA/MSSA resent 12/8   - ID following recs appreciated   - CTA 11/30: neg for PE.  RUL R basilar consolidation RML basilar opacity  - repeat CXR on 12/4-worsening multilobar consolidations improved 12/7   - CTA to r/o PE 12/8- negative for PE ,Consolidation due to pneumonia involving the dependent portion of the RUL with interval improvement since November 30, 2022. RLL consolidation with associated volume loss increased since November 30, 2022 suggestive of partial compressive atelectasis adjacent to the increasing small right pleural effusion. Patchy consolidation within the dependent portion of the left lower lobe increased in size since November 30, 2022 suggestive of atelectasis given its homogeneous enhancement although superimposed pneumonia is not excluded. Small right and trace left pleural effusions.  - diureses with lasix PRN for goal of net neg last dose 12/7  - s/p solumedrol 40 started daily 12/7-12/10- __ continue home dose of Prednisone 5 mg daily (MM)   - duonebs q 6 , secretions improved, IPV discontinued __   - Duplex to r/o DVTs - negative 12/7  - s/p bronch 12/9 for optimization for extubation, airways noted to be erythematous however patent. BAL culture negative.      GI  #Diet  - pt was evaluated by SLP, who recommended minced and moist diet as tolerated   -- diet was advanced, pt has been tolerating well   -- last BM on 12/7, and smear on 12/9  - increased Miralax to BID, Lactulose x 1, __ pt with BM today -- 12/12    #Elevated liver enzymes, - downtrending  - patient has reported hx of Hep B 2017 as per hemonc   - AST/ALT- 100/50 >68/38>56/35>50/28  - bili was elevated, peaked at 1.5- however now normalized. continue to monitor   - hepatitis panel - Hep B surface AG +, and the rest is negative    - confirmatory test - hepatitis B quantitative - negative    -Spoke with ROSA Montejo from hepatology office, Dr Florina Jones Re: continuation of Tenofovir. [facility] [phone]. 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- outpt follow up after discharge- restarted Tenofovir 25 mg daily     #Constipation  - last BM on 12/07 and smear on 12/9   -- continue Senna and increased Miralax to BID, lactulose x 1 (12/12)   -- + BM on 12/12,     Renal  #GARCIA, improved   - SCr 0.62 today - stable  - CPK elevated, - suspect secondary to sepsis, trended down 1800-> 2800-> 1007>787>324 - no longer trending   -- s/p Lasix 20 mg IVP on 12/10, hold further diuresis in the setting of free water deficit   - Is and Os: + 635 for 24 hrs and neg 3.9 L for LOS   - UA resent 12/7 slightly positive with mod bacteria and WBC   - intermittently diuresing with lasix - goal of net neg fluid balance   - ortiz replaced 12/7   -- 12/11-- indwelling ortiz removed, TOV protocol __ passed     #hypernatremia - recurrent  - NA- 149,   -- s/p D 5 W on 12/11, and repeat Na today is 147   -- encourage free water intake,   - free water 300 q 6 - dc'd 12/9  - Trend BMP qd    ID  #E. Coli bacteremia- pan sensitive 11/29   - Tmax overnight 98, WBC 5.5  - antibiotics broadened to vanco (12/7-12/10) and meropenem (12/7- 12/11) - 5day course  - MRSA resent 12/8- negative  - Sputum cx 12/1, 12/7 - NRF, BAL Culture 12/9 neg , CSF Cx neg   - s/p ctx (12/5-12/7)    - s/p Cefepime 2g q8h (11/30- 12/5) deescalated to ctx  - s/p Azithromycin 500 mg started (12/2- 12/3) , legionella neg  - s/p vanc/CTX/ampicillin/acyclovir in ER for suspected meningitis workup   - Bcx 12/2, 11/29, 12/7- neg   - Fungitel 39   - ID following     SKIN  # sacral decub-DTI., Wound care nurse recs in place     Endo  - no history of diabetes, A1C 6.0  - TSH wnl  - 's , pt s/p NPH, on Admelog 3 U qac,   -- 12/11-- started on Lantus 14 U, __ FS-- 261>194>205>198>195  - ISS qac and qhs  - cont monitoring     Heme  #MM  - as per Hemonc note MM in remission 5/22 has been on Promacta since 6/22 has received multiple treatments for MM in the last also PBSCT x 2 and haplo allo stem cell transplant in 2020- last spike noted sept 2022-was given belantamab- last treatment was on 11/23/22  - holding anti-myeloma therapy  -  s/p filgrastim held now as patient no longer neutropenic 12/7  - private Hemonc following [facility] recs appreciated     Pancytopenia  #concern for MAHA, possible HUS with E. Coli bacteremia? vs HLH?improving  - new thrombocytopenia, anemia, and +hemolysis labs (bili uptrending and now normalized)  -? 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   - Transfuse PRBC < 7  - Patient meets criteria for HLH (ferritin worsening 19286> 47628, trig 519->191, pancytopenia - 3 cell lines, persistent fever, IL2 8324, hepatitis) Us neg for splenomegaly   -- hem onc __ Concern for HLH__ Appears to be improving, PLTs and HGB improved  - continue home Promacta, 50 mg daily   - Spoke with hemonc at [facility] about HLH- states labs could also be elevated in setting of sepsis and at this point treatment for HLH would be to treat underlying cause of infection and patient would not be a candidate for high dose immunosuppressant (Risks outweigh benefits)   - trend HLH labs daily   - s/p neupogen D/C'd as patient no longer neutropenic     #DVT ppx  - hold off on DVT ppx for now in setting of pancytopenia   - SCD's in place   - Duplex LE - negative 12/7    Skin  #Suspected DTI on sacrum and ear as per nurse  - Wound care recs appreciated      Ethics  - updated son, he is pediatric resident and patient's HCP  - Pt is full code    Dispo: Medicine   -PT consult VINCENT, HUNTER consulted    Patient is a 61 yo F w/ PMHx of MM (diagnosed ~10 years ago, currently on Promacta treatments since June 2022 - most recently last Wednesday; followed by oncologist, Dr. Lopez at [facility]), ?ITP, Hep B, HTN, neuropathic R hip pain, recent R corneal tear admitted for AMS, code stroke called no acute infarct or hemorrhage, s/p LP findings not consistent with meningitis, course complicated by hypotension and hypoxia, placed on BiPAP, requiring pressors, admitted to MICU with sepsis likely secondary to pneumonia- intubated 11/30. Extubated 12/3 to face tent reintubated 12/7 in setting of increased WOB and hypercapneic respiratory failure unclear cause possibly HLH induced. Transferred to MICU after cardiac arrest.    Patient is a 61 yo F w/ PMHx of MM (diagnosed ~10 years ago, currently on Promacta treatments since June 2022 - most recently last Wednesday; followed by oncologist, Dr. Lopez at [facility]), ?ITP, Hep B, HTN, neuropathic R hip pain, recent R corneal tear admitted for AMS, code stroke called no acute infarct or hemorrhage, s/p LP findings not consistent with meningitis, course complicated by hypotension and hypoxia, placed on BiPAP, requiring pressors, admitted to MICU with sepsis likely secondary to pneumonia- intubated 11/30. Extubated 12/3 to face tent reintubated 12/7 in setting of increased WOB and hypercapneic respiratory failure unclear cause possibly HLH induced. Course c/b hypoxia/hypotension/altered mental status/cardiac arrest 12/13, transferred to MICU after ROSC, intubated, on pressors.

## 2022-12-13 NOTE — PROGRESS NOTE ADULT - PROBLEM SELECTOR PLAN 6
#new onset MAR, has been in NSR with no ectomy on tele monitor  - MAR AF in 130's on 12/4, resolved, additional event 12/7 in setting of missed PO metoprolol resolved with IV lopressor    - continue Metoprolol tartrate 12.5 mg BID   - [id number] score is 3   - unable to anticoagulate in the setting of thrombocytopenia   - SCD's are in place   - continue telemetry monitoring #new onset MAR, has previously been in NSR with no ectomy on tele monitor  - MAR AF in 130's on 12/4, resolved, additional event 12/7 in setting of missed PO metoprolol resolved with IV lopressor    - [id number] score is 3   - unable to anticoagulate in the setting of thrombocytopenia   - SCD's are in place   - further management as per MICU

## 2022-12-13 NOTE — RAPID RESPONSE TEAM SUMMARY - NSMEDICATIONSRRT_GEN_ALL_CORE
Etomidate  Succinylcholine  Epinephrine  
Cefepime changed to renal dosing due to rising Cr. D/C Acyclovir, Ampicillin. Will need MRSA PCR if Vanc needed.

## 2022-12-13 NOTE — AIRWAY PLACEMENT NOTE ADULT - AIRWAY COMMENTS:
Anesthesia called for airway protection i/s/o patient respiratory failure escalating to code blue. Pulse regained shortly after ACLS protocol started. Induction with IV etomidate and succinylcholine, intubation with 7.0mm ETT via Mac 3 DL. ETT secured at 22cm at lip. No complications related to intubation/airway placement. Equal and bilateral breath sounds, positive end-tidal CO2.

## 2022-12-13 NOTE — CHART NOTE - NSCHARTNOTEFT_GEN_A_CORE
: Karen Hightower    INDICATION: critical illness    PROCEDURE:  [x] LIMITED ECHO  [x] LIMITED CHEST  [ ] LIMITED RETROPERITONEAL  [ ] LIMITED ABDOMINAL  [ ] LIMITED DVT  [ ] NEEDLE GUIDANCE VASCULAR  [ ] NEEDLE GUIDANCE THORACENTESIS  [ ] NEEDLE GUIDANCE PARACENTESIS  [ ] NEEDLE GUIDANCE PERICARDIOCENTESIS  [ ] OTHER    FINDINGS:  A line predominant pattern in bilateral anterior lung fields  R base with scattered B lines  L base with consolidation  LVSF appears normal  RV < LV  IVC indeterminate      INTERPRETATION:  B lines at R base and consolidation at L base  Grossly normal cardiac function    Images uploaded on [employer]

## 2022-12-13 NOTE — PROGRESS NOTE ADULT - PROBLEM SELECTOR PLAN 3
Likely secondary to PNA infection vs Aspiration   s/p BiPAP, 10/5 on 50%, then intubated 11/30 for increased work of breathing  Extubated 12/3 to face tent  Reintubated 12/7 in setting of WOB/respiratory distress and hypercapnic respiratory failure, and extubated on 12/10, __ on 1 LPM NC -- SpO2- 96- 97 %>> titrate off as able    CTA 11/30: neg for PE.  RUL R basilar consolidation RML basilar opacity  repeat CXR on 12/4-worsening multilobar consolidations improved 12/7   s/p bronch 12/9 for optimization for extubation, airways noted to be erythematous however patent. BAL culture negative.    - ID following recs appreciated   - s/p solumedrol 40 started daily 12/7-12/10- __ continue home dose of Prednisone 5 mg daily (MM)   - duonebs q 6 , secretions improved, IPV discontinued __  - Wean oxygen as tolerated - hypotensive this AM during RRT, previously on PO midodrine when transferred out of MICU  - currently on IV pressors as per MICU, also in the setting of sedation (intubated)  - weaning pressors as able as per MICU, continue antibiotics and infectious w/u as per MICU

## 2022-12-13 NOTE — PROGRESS NOTE ADULT - PROBLEM SELECTOR PLAN 2
- midodrine 10 q 8, Metoprolol Tartrate 12.5 mg BID  - POCUS 12/8 normal function no segmental changes, VTI 22  -- POCUS 12/12 - nl LV fx, mild diastolic dysfx, LLL consolidation vx atelectasis with multiple intubations previously during this admission,   initially intubated 11/30 for increased WOB, extubated 12/3 to face tent  Reintubated 12/7 in setting of WOB/respiratory distress and hypercapnic respiratory failure, and extubated on 12/10  s/p bronch 12/9 for optimization for extubation, airways noted to be erythematous however patent. BAL culture negative.    previously completed course of antibiotics for E.coli bacteremia and PNA as per ID  with concern for ?aspiration event per staff, now reintubated, transferred to MICU, c/w vent management and antibiotics as per MICU

## 2022-12-13 NOTE — CHART NOTE - NSCHARTNOTEFT_GEN_A_CORE
MICU Accept Note    CHIEF COMPLAINT: Respiratory Failure    HPI / INTERVAL HISTORY: Patient is a 62 yoF w/ pmhx of MM (diagnosed ~10 years ago, currently on Promacta treatments since June 2022 - most recently last Wednesday; followed by oncologist, Dr. Lopez at [facility]), HTN, neuropathic R hip pain, recent R corneal tear, presents to [facility] ED for change in mental status. LKW approximately 14:30. Patient was in normal state of health this morning as per son, Nacho. Patient went to a follow up ophtho appointment for a recently diagnosed corneal abrasion this past Monday. Patient was picked up by a friend around 10:00 for an optho appt with normal mental status, pt was just complaining of being sleepy. On the way back from appointment around 14:30, patient stopped responding to friend appropriately in the car and she appeared more tired & weaker than usual. She started having slurred speech when they arrived home and then son called EMS. In the ED, patient with persisting word finding difficulties and generalized weakness, but leaning to the R side. Patient reports some difficulty remembering what happened today, but reports she feels generally weak and very tired. Code stroke called, but TPA not given because of imporvement in pt's symptoms. Son reports patient's speech is close to baseline now. Patient then denied chest pain, SOB, HA, changes in vision, N/V, imbalance, numbness or tingling. Denies recent infection or sick contacts. Pt states that she had a similar episode a few years ago with fever which she was told was likely from a MM, she improved after getting steroids.     In the ED pt was febrile 105.5, , /84, RR 24 and saturating well on RA. Pt given 2L IVF bolus and 1x vanc/ceftriaxone/acyclovir/ampicillin (30 Nov 2022 02:42)    Pt was intubated for acute hypoxic respiratory failure and transferred to MICU for further management on 11/30. Found to be in septic 2/2 E. Coli bacteremia and was started on Cefepime and Levophed. AMS-- CTH with no acute findings and s/p LP with negative CSF.  Pt was successfully extubated on 12/3, and was on nasal cannula. Pt was reintubated on 12/6 for possible aspiration PNA and was successfully extubated to NC on 12/10. Patient was downgraded to the floors on 12/12 however, aspirated while eating, developed respiratory failure and a code blue was called. Patient received 1 round of CPR and 1 dose of Epi before reachieving ROSC. Patient was intubated and sedated and started on pressors; admitted to MICU for further monitoring of respiratory and mental status after cardiac arrest.     PAST MEDICAL & SURGICAL HISTORY:  Multiple myeloma      Type 2 diabetes mellitus          FAMILY HISTORY:  No pertinent family history in first degree relatives    HOME MEDICATIONS:      Allergies    Bactrim (Other)  fluconazole (Vomiting; Nausea)  levofloxacin (Vomiting; Nausea)  penicillin (Other)    Intolerances          REVIEW OF SYSTEMS:   Constitutional: No fevers, chills, weight loss, weight gain  HEENT: No vision problems, eye pain, nasal congestion, rhinorrhea, sore throat, dysphagia  CV: No chest pain, orthopnea, palpitations  Resp: No cough, dyspnea, wheezing, hemoptysis  GI: No nausea, vomiting, diarrhea, constipation, abdominal pain  : [ ] dysuria [ ] nocturia [ ] hematuria [ ] increased urinary frequency  Musculoskeletal: [ ] back pain [ ] myalgias [ ] arthralgias [ ] fracture  Skin: [ ] rash [ ] itch  Neurological: [ ] headache [ ] dizziness [ ] syncope [ ] weakness [ ] numbness  Psychiatric: [ ] anxiety [ ] depression  Endocrine: [ ] diabetes [ ] thyroid problem  Hematologic/Lymphatic: [ ] anemia [ ] bleeding problem  Allergic/Immunologic: [ ] itchy eyes [ ] nasal discharge [ ] hives [ ] angioedema  [ ] All other systems negative  [x] Unable to assess ROS due to patient mental status    OBJECTIVE:  ICU Vital Signs Last 24 Hrs  T(C): 36.7 (13 Dec 2022 10:50), Max: 37.1 (13 Dec 2022 01:15)  T(F): 98.1 (13 Dec 2022 10:50), Max: 98.8 (13 Dec 2022 01:15)  HR: 70 (13 Dec 2022 11:31) (64 - 114)  BP: 153/81 (13 Dec 2022 11:31) (103/55 - 176/91)  BP(mean): 100 (13 Dec 2022 11:31) (66 - 114)  ABP: --  ABP(mean): --  RR: 14 (13 Dec 2022 11:31) (14 - 35)  SpO2: 100% (13 Dec 2022 11:31) (95% - 100%)    O2 Parameters below as of 13 Dec 2022 11:31  Patient On (Oxygen Delivery Method): ventilator    O2 Concentration (%): 60      Mode: AC/ CMV (Assist Control/ Continuous Mandatory Ventilation), RR (machine): 14, TV (machine): 450, FiO2: 40, PEEP: 5, ITime: 0.72, MAP: 10, PIP: 28    12-12 @ 07:01  -  12-13 @ 07:00  --------------------------------------------------------  IN: 900 mL / OUT: 500 mL / NET: 400 mL      CAPILLARY BLOOD GLUCOSE      POCT Blood Glucose.: 130 mg/dL (13 Dec 2022 09:53)      PHYSICAL EXAM:  General:   HEENT:   Neck:   Chest/Lungs:  Heart:  Abdomen:   Extremities:   Skin:   Neuro:   Psych:     LINES:     HOSPITAL MEDICATIONS:  MEDICATIONS  (STANDING):  albuterol    90 MICROgram(s) HFA Inhaler 4 Puff(s) Inhalation every 6 hours  artificial tears (preservative free) Ophthalmic Solution 1 Drop(s) Both EYES two times a day  cefepime   IVPB      chlorhexidine 0.12% Liquid 15 milliLiter(s) Oral Mucosa every 12 hours  chlorhexidine 2% Cloths 1 Application(s) Topical daily  chlorhexidine 4% Liquid 1 Application(s) Topical <User Schedule>  dextrose 5%. 1000 milliLiter(s) (50 mL/Hr) IV Continuous <Continuous>  dextrose 5%. 1000 milliLiter(s) (100 mL/Hr) IV Continuous <Continuous>  dextrose 50% Injectable 25 Gram(s) IV Push once  dextrose 50% Injectable 12.5 Gram(s) IV Push once  dextrose 50% Injectable 25 Gram(s) IV Push once  eltrombopag 50 milliGRAM(s) Oral daily  glucagon  Injectable 1 milliGRAM(s) IntraMuscular once  insulin glargine Injectable (LANTUS) 14 Unit(s) SubCutaneous every morning  insulin lispro (ADMELOG) corrective regimen sliding scale   SubCutaneous three times a day before meals  insulin lispro (ADMELOG) corrective regimen sliding scale   SubCutaneous at bedtime  insulin lispro Injectable (ADMELOG) 3 Unit(s) SubCutaneous three times a day with meals  metoprolol tartrate 12.5 milliGRAM(s) Oral two times a day  midodrine 10 milliGRAM(s) Oral <User Schedule>  multivitamin 1 Tablet(s) Oral daily  polyethylene glycol 3350 17 Gram(s) Oral two times a day  predniSONE   Tablet 5 milliGRAM(s) Oral daily  senna 2 Tablet(s) Oral at bedtime  tamsulosin 0.4 milliGRAM(s) Oral at bedtime  tenofovir alafenamide (VEMLIDY) 25 milliGRAM(s) Oral daily    MEDICATIONS  (PRN):  dextrose Oral Gel 15 Gram(s) Oral once PRN Blood Glucose LESS THAN 70 milliGRAM(s)/deciliter  melatonin 6 milliGRAM(s) Oral at bedtime PRN Insomnia      LABS:                        8.4    4.11  )-----------( 57       ( 13 Dec 2022 06:30 )             27.7     Hgb Trend: 8.4<--, 8.7<--, 8.9<--, 8.1<--, 8.6<--  12-13    149<H>  |  111<H>  |  20  ----------------------------<  132<H>  4.0   |  29  |  0.55    Ca    9.2      13 Dec 2022 06:30  Phos  2.5     12-13  Mg     1.80     12-13    TPro  4.8<L>  /  Alb  2.5<L>  /  TBili  0.9  /  DBili  x   /  AST  43<H>  /  ALT  27  /  AlkPhos  137<H>  12-13    Creatinine Trend: 0.55<--, 0.61<--, 0.62<--, 0.69<--, 0.78<--, 0.80<--            MICROBIOLOGY:     RADIOLOGY & ADDITIONAL TESTS:      ASSESSMENT AND PLAN:  Patient is a 61 yo F w/ PMHx of MM (diagnosed ~10 years ago, currently on Promacta treatments since June 2022 - most recently last Wednesday; followed by oncologist, Dr. Lopez at [facility]), ?ITP, Hep B, HTN, neuropathic R hip pain, recent R corneal tear admitted for AMS, code stroke called no acute infarct or hemorrhage, s/p LP findings not consistent with meningitis, course complicated by hypotension and hypoxia, placed on BiPAP, requiring pressors, admitted to MICU with sepsis likely secondary to pneumonia- intubated 11/30. Extubated 12/3 to face tent reintubated 12/7 in setting of increased WOB and hypercapneic respiratory failure unclear cause possibly HLH induced. Patient was downgraded to floors 12/12, however readmitted to MICU after respiratory failure 2/2 aspiration and cardiac arrest.     Neuro  #Intubated and Sedated  -respiratory failure 2/2 aspiration and cardiac arrest requiring intubation     #Code Stroke  - s/p code stroke -> no ischemia or hemorrhage on CT H+N  - s/p LP results not consistent with encephalitis/meningitis, ammonia level 30   - baseline is AOx3, neuro status at baseline   - Repeat CTH 12/8 to r/o neurological cause for respiratory distress causing reintubation/also unsymmetrical nasolabial folds seen on exam was negative.     Cardiovascular  #Hypotension   -previously required midodrine upon downgrade to floors (10 BID) for HLH vs vasoplegia vs septic shock  -requiring pressors i/s/o sedation   -continue to wean as tolerated     # EKG changes- possible HLH induced cardiac complications?  # new onset A fib with RVR, resolved, pt has been in NSR   - suspect sepsis from E. Coli bacteremia with GI or urinary source?  - pt required levophed for intubation- titrating down currently on 0.2   - midodrine 10 q 8, Metoprolol Tartrate 12.5 mg BID   - ECHO 11/30 EF 55 normal LV/RV   - TSH wnl  - has been intermittently being diuresed with lasix  - EKG 12/7- new Twave inversions V2-V6, II, III, AVF- EKG 6 hrs later slightly improved, repeat stable  - Trop 100->102-> no longer trending (patient not a candidate for Asp/ hep given pancytopenia)   - POCUS 12/8 normal function no segmental changes, VTI 22  -- POCUS 12/12 - nl LV fx, mild diastolic dysfx, LLL consolidation vx atelectasis    #new onset MAR, has been in NSR with no ectomy on tele monitor  - MAR AF in 130's on 12/4, resolved, additional event 12/7 in setting of missed PO metoprolol resolved with IV lopressor    - continue Metoprolol tartrate 12.5 mg BID   - [id number]-Lvic score is 3   - unable to anticoagulate in the setting of thrombocytopenia   - SCD's are in place   - continue telemetry monitoring     Pulmonary  #AHRF- likely secondary to PNA infection vs Aspiration   - s/p BiPAP, 10/5 on 50%, then intubated 11/30 for increased work of breathing  - Extubated 12/3 to face tent  - patient with increased WOB/ tachypnea overnight 12/6 after blood transfusion possible TACO? patient requiring BIPAP 10/5 50%   - Reintubated 12/7 in setting of WOB/respiratory distress and hypercapnic respiratory failure, and extubated on 12/10  - Reintubated 12/13 i/s/o aspiration and cardiac arrest   - restarted cefepime for empiric pseudomonal coverage i/s/o aspiration   - MRSA previously negative on 12/10; consider adding vanc if febrile through cefepime  - CTA 11/30: neg for PE.  RUL R basilar consolidation RML basilar opacity  - repeat CXR on 12/4-worsening multilobar consolidations improved 12/7   - CTA to r/o PE 12/8- negative for PE ,Consolidation due to pneumonia involving the dependent portion of the RUL with interval improvement since November 30, 2022. RLL consolidation with associated volume loss increased since November 30, 2022 suggestive of partial compressive atelectasis adjacent to the increasing small right pleural effusion. Patchy consolidation within the dependent portion of the left lower lobe increased in size since November 30, 2022 suggestive of atelectasis given its homogeneous enhancement although superimposed pneumonia is not excluded. Small right and trace left pleural effusions.  - diureses with lasix PRN for goal of net neg last dose 12/7  - s/p solumedrol 40 started daily 12/7-12/10- __ continue home dose of Prednisone 5 mg daily (MM)   - duonebs q 6 , secretions improved, IPV discontinued __   - Duplex to r/o DVTs - negative 12/7  - s/p bronch 12/9 for optimization for extubation, airways noted to be erythematous however patent. BAL culture negative.      GI  #Diet  NPO for now     #Elevated liver enzymes, - downtrending  - patient has reported hx of Hep B 2017 as per hemonc   - AST/ALT- 100/50 >68/38>56/35>50/28  - bili was elevated, peaked at 1.5- however now normalized. continue to monitor   - hepatitis panel - Hep B surface AG +, and the rest is negative    - confirmatory test - hepatitis B quantitative - negative    -Spoke with ROSA Montejo from hepatology office, Dr Florina Jones Re: continuation of Tenofovir. ONDINA Medeiros [phone]. 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- outpt follow up after discharge- restarted Tenofovir 25 mg daily     #Constipation  - last BM on 12/07 and smear on 12/9   -- continue Senna and increased Miralax to BID, lactulose x 1 (12/12)   -- + BM on 12/12,     Renal  #GARCIA, improved   - SCr 0.62 today - stable  - CPK elevated, - suspect secondary to sepsis, trended down 1800-> 2800-> 1007>787>324 - no longer trending   -- s/p Lasix 20 mg IVP on 12/10, hold further diuresis in the setting of free water deficit   - Is and Os: + 635 for 24 hrs and neg 3.9 L for LOS   - UA resent 12/7 slightly positive with mod bacteria and WBC   - intermittently diuresing with lasix - goal of net neg fluid balance   - ortiz replaced 12/7   -- 12/11-- indwelling ortiz removed, TOV protocol __ passed     #hypernatremia - recurrent  - NA- 149,   -- s/p D 5 W on 12/11, and repeat Na today is 147   -- encourage free water intake,   - free water 300 q 6 - dc'd 12/9  - Trend BMP qd    ID  #E. Coli bacteremia- pan sensitive 11/29   - Tmax overnight 98, WBC 5.5  - antibiotics broadened to vanco (12/7-12/10) and meropenem (12/7- 12/11) - 5day course  - MRSA resent 12/8- negative  - Sputum cx 12/1, 12/7 - NRF, BAL Culture 12/9 neg , CSF Cx neg   - s/p ctx (12/5-12/7)    - s/p Cefepime 2g q8h (11/30- 12/5) deescalated to ctx  - s/p Azithromycin 500 mg started (12/2- 12/3) , legionella neg  - s/p vanc/CTX/ampicillin/acyclovir in ER for suspected meningitis workup   - Bcx 12/2, 11/29, 12/7- neg   - Fungitel 39   - ID following     SKIN  # sacral decub-DTI., Wound care nurse recs in place     Endo  - no history of diabetes, A1C 6.0  - TSH wnl  - 's , pt s/p NPH, on Admelog 3 U qac,   -- 12/11-- started on Lantus 14 U, __ FS-- 261>194>205>198>195  - ISS qac and qhs  - cont monitoring     Heme  #MM  - as per Hemonc note MM in remission 5/22 has been on Promacta since 6/22 has received multiple treatments for MM in the last also PBSCT x 2 and haplo allo stem cell transplant in 2020- last spike noted sept 2022-was given belantamab- last treatment was on 11/23/22  - holding anti-myeloma therapy  -  s/p filgrastim held now as patient no longer neutropenic 12/7  - private Hemonc following [facility] recs appreciated     Pancytopenia  #concern for MAHA, possible HUS with E. Coli bacteremia? vs HLH?improving  - new thrombocytopenia, anemia, and +hemolysis labs (bili uptrending and now normalized)  -? 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   - Transfuse PRBC < 7  - Patient meets criteria for HLH (ferritin worsening 47768> 98644, trig 519->191, pancytopenia - 3 cell lines, persistent fever, IL2 8324, hepatitis) Us neg for splenomegaly   -- hem onc __ Concern for HLH__ Appears to be improving, PLTs and HGB improved  - continue home Promacta, 50 mg daily   - Spoke with hemonc at [facility] about HLH- states labs could also be elevated in setting of sepsis and at this point treatment for HLH would be to treat underlying cause of infection and patient would not be a candidate for high dose immunosuppressant (Risks outweigh benefits)   - trend HLH labs daily   - s/p neupogen D/C'd as patient no longer neutropenic     #DVT ppx  - hold off on DVT ppx for now in setting of pancytopenia   - SCD's in place   - Duplex LE - negative 12/7    Skin  #Suspected DTI on sacrum and ear as per nurse  - Wound care recs appreciated      Ethics  - updated son, he is pediatric resident and patient's HCP  - Pt is full code      Sarah Son MD   PGY-1 MICU Accept Note    CHIEF COMPLAINT: Respiratory Failure    HPI / INTERVAL HISTORY: Patient is a 62 yoF w/ pmhx of MM (diagnosed ~10 years ago, currently on Promacta treatments since June 2022 - most recently last Wednesday; followed by oncologist, Dr. Lopez at [facility]), HTN, neuropathic R hip pain, recent R corneal tear, presents to [facility] ED for change in mental status. LKW approximately 14:30. Patient was in normal state of health this morning as per son, Nacho. Patient went to a follow up ophtho appointment for a recently diagnosed corneal abrasion this past Monday. Patient was picked up by a friend around 10:00 for an optho appt with normal mental status, pt was just complaining of being sleepy. On the way back from appointment around 14:30, patient stopped responding to friend appropriately in the car and she appeared more tired & weaker than usual. She started having slurred speech when they arrived home and then son called EMS. In the ED, patient with persisting word finding difficulties and generalized weakness, but leaning to the R side. Patient reports some difficulty remembering what happened today, but reports she feels generally weak and very tired. Code stroke called, but TPA not given because of imporvement in pt's symptoms. Son reports patient's speech is close to baseline now. Patient then denied chest pain, SOB, HA, changes in vision, N/V, imbalance, numbness or tingling. Denies recent infection or sick contacts. Pt states that she had a similar episode a few years ago with fever which she was told was likely from a MM, she improved after getting steroids.     In the ED pt was febrile 105.5, , /84, RR 24 and saturating well on RA. Pt given 2L IVF bolus and 1x vanc/ceftriaxone/acyclovir/ampicillin (30 Nov 2022 02:42)    Pt was intubated for acute hypoxic respiratory failure and transferred to MICU for further management on 11/30. Found to be in septic 2/2 E. Coli bacteremia and was started on Cefepime and Levophed. AMS-- CTH with no acute findings and s/p LP with negative CSF.  Pt was successfully extubated on 12/3, and was on nasal cannula. Pt was reintubated on 12/6 for possible aspiration PNA and was successfully extubated to NC on 12/10. Patient was downgraded to the floors on 12/12 however, aspirated while eating, developed respiratory failure and a code blue was called. Patient received 1 round of CPR and 1 dose of Epi before reachieving ROSC. Patient was intubated and sedated and started on pressors; admitted to MICU for further monitoring of respiratory and mental status after cardiac arrest.     PAST MEDICAL & SURGICAL HISTORY:  Multiple myeloma      Type 2 diabetes mellitus          FAMILY HISTORY:  No pertinent family history in first degree relatives    HOME MEDICATIONS:      Allergies    Bactrim (Other)  fluconazole (Vomiting; Nausea)  levofloxacin (Vomiting; Nausea)  penicillin (Other)    Intolerances          REVIEW OF SYSTEMS:   Constitutional: No fevers, chills, weight loss, weight gain  HEENT: No vision problems, eye pain, nasal congestion, rhinorrhea, sore throat, dysphagia  CV: No chest pain, orthopnea, palpitations  Resp: No cough, dyspnea, wheezing, hemoptysis  GI: No nausea, vomiting, diarrhea, constipation, abdominal pain  : [ ] dysuria [ ] nocturia [ ] hematuria [ ] increased urinary frequency  Musculoskeletal: [ ] back pain [ ] myalgias [ ] arthralgias [ ] fracture  Skin: [ ] rash [ ] itch  Neurological: [ ] headache [ ] dizziness [ ] syncope [ ] weakness [ ] numbness  Psychiatric: [ ] anxiety [ ] depression  Endocrine: [ ] diabetes [ ] thyroid problem  Hematologic/Lymphatic: [ ] anemia [ ] bleeding problem  Allergic/Immunologic: [ ] itchy eyes [ ] nasal discharge [ ] hives [ ] angioedema  [ ] All other systems negative  [x] Unable to assess ROS due to patient mental status    OBJECTIVE:  ICU Vital Signs Last 24 Hrs  T(C): 36.7 (13 Dec 2022 10:50), Max: 37.1 (13 Dec 2022 01:15)  T(F): 98.1 (13 Dec 2022 10:50), Max: 98.8 (13 Dec 2022 01:15)  HR: 70 (13 Dec 2022 11:31) (64 - 114)  BP: 153/81 (13 Dec 2022 11:31) (103/55 - 176/91)  BP(mean): 100 (13 Dec 2022 11:31) (66 - 114)  ABP: --  ABP(mean): --  RR: 14 (13 Dec 2022 11:31) (14 - 35)  SpO2: 100% (13 Dec 2022 11:31) (95% - 100%)    O2 Parameters below as of 13 Dec 2022 11:31  Patient On (Oxygen Delivery Method): ventilator    O2 Concentration (%): 60      Mode: AC/ CMV (Assist Control/ Continuous Mandatory Ventilation), RR (machine): 14, TV (machine): 450, FiO2: 40, PEEP: 5, ITime: 0.72, MAP: 10, PIP: 28    12-12 @ 07:01  -  12-13 @ 07:00  --------------------------------------------------------  IN: 900 mL / OUT: 500 mL / NET: 400 mL      CAPILLARY BLOOD GLUCOSE      POCT Blood Glucose.: 130 mg/dL (13 Dec 2022 09:53)      PHYSICAL EXAM:  General: Intubated and sedated  HEENT: Right pupil 4+ nonreactive to light, left pupil pinpoint nonreactive to light; anicteric  Neck: Supple, no JVD  Chest/Lungs: Ventilated breath sounds  Heart: RRR, S1, S2 no rubs, gallops or murmurs   Abdomen: Nondistended, non tender, +BS  Extremities: 2+ peripheral pulses, no assymetric swelling,  Skin: Ecchymoses noted at site of blood draws  Neuro: Intubated and Sedated, not awakening to voice; moves extremities to noxious stimuli    LINES: 4 peripheral lines in place     HOSPITAL MEDICATIONS:  MEDICATIONS  (STANDING):  albuterol    90 MICROgram(s) HFA Inhaler 4 Puff(s) Inhalation every 6 hours  artificial tears (preservative free) Ophthalmic Solution 1 Drop(s) Both EYES two times a day  cefepime   IVPB      chlorhexidine 0.12% Liquid 15 milliLiter(s) Oral Mucosa every 12 hours  chlorhexidine 2% Cloths 1 Application(s) Topical daily  chlorhexidine 4% Liquid 1 Application(s) Topical <User Schedule>  dextrose 5%. 1000 milliLiter(s) (50 mL/Hr) IV Continuous <Continuous>  dextrose 5%. 1000 milliLiter(s) (100 mL/Hr) IV Continuous <Continuous>  dextrose 50% Injectable 25 Gram(s) IV Push once  dextrose 50% Injectable 12.5 Gram(s) IV Push once  dextrose 50% Injectable 25 Gram(s) IV Push once  eltrombopag 50 milliGRAM(s) Oral daily  glucagon  Injectable 1 milliGRAM(s) IntraMuscular once  insulin glargine Injectable (LANTUS) 14 Unit(s) SubCutaneous every morning  insulin lispro (ADMELOG) corrective regimen sliding scale   SubCutaneous three times a day before meals  insulin lispro (ADMELOG) corrective regimen sliding scale   SubCutaneous at bedtime  insulin lispro Injectable (ADMELOG) 3 Unit(s) SubCutaneous three times a day with meals  metoprolol tartrate 12.5 milliGRAM(s) Oral two times a day  midodrine 10 milliGRAM(s) Oral <User Schedule>  multivitamin 1 Tablet(s) Oral daily  polyethylene glycol 3350 17 Gram(s) Oral two times a day  predniSONE   Tablet 5 milliGRAM(s) Oral daily  senna 2 Tablet(s) Oral at bedtime  tamsulosin 0.4 milliGRAM(s) Oral at bedtime  tenofovir alafenamide (VEMLIDY) 25 milliGRAM(s) Oral daily    MEDICATIONS  (PRN):  dextrose Oral Gel 15 Gram(s) Oral once PRN Blood Glucose LESS THAN 70 milliGRAM(s)/deciliter  melatonin 6 milliGRAM(s) Oral at bedtime PRN Insomnia      LABS:                        8.4    4.11  )-----------( 57       ( 13 Dec 2022 06:30 )             27.7     Hgb Trend: 8.4<--, 8.7<--, 8.9<--, 8.1<--, 8.6<--  12-13    149<H>  |  111<H>  |  20  ----------------------------<  132<H>  4.0   |  29  |  0.55    Ca    9.2      13 Dec 2022 06:30  Phos  2.5     12-13  Mg     1.80     12-13    TPro  4.8<L>  /  Alb  2.5<L>  /  TBili  0.9  /  DBili  x   /  AST  43<H>  /  ALT  27  /  AlkPhos  137<H>  12-13    Creatinine Trend: 0.55<--, 0.61<--, 0.62<--, 0.69<--, 0.78<--, 0.80<--            MICROBIOLOGY:     RADIOLOGY & ADDITIONAL TESTS:      ASSESSMENT AND PLAN:  Patient is a 61 yo F w/ PMHx of MM (diagnosed ~10 years ago, currently on Promacta treatments since June 2022 - most recently last Wednesday; followed by oncologist, Dr. Lopez at [facility]), ?ITP, Hep B, HTN, neuropathic R hip pain, recent R corneal tear admitted for AMS, code stroke called no acute infarct or hemorrhage, s/p LP findings not consistent with meningitis, course complicated by hypotension and hypoxia, placed on BiPAP, requiring pressors, admitted to MICU with sepsis likely secondary to pneumonia- intubated 11/30. Extubated 12/3 to face tent reintubated 12/7 in setting of increased WOB and hypercapneic respiratory failure unclear cause possibly HLH induced. Patient was downgraded to floors 12/12, however readmitted to MICU after respiratory failure 2/2 aspiration and cardiac arrest.     Neuro  #Intubated and Sedated  -respiratory failure 2/2 aspiration and cardiac arrest requiring intubation     #Code Stroke  - s/p code stroke -> no ischemia or hemorrhage on CT H+N  - s/p LP results not consistent with encephalitis/meningitis, ammonia level 30   - baseline is AOx3, neuro status at baseline   - Repeat CTH 12/8 to r/o neurological cause for respiratory distress causing reintubation/also unsymmetrical nasolabial folds seen on exam was negative.     Cardiovascular  #Hypotension   -previously required midodrine upon downgrade to floors (10 BID) for HLH vs vasoplegia vs septic shock  -requiring pressors i/s/o sedation   -continue to wean as tolerated     # EKG changes- possible HLH induced cardiac complications?  # new onset A fib with RVR, resolved, pt has been in NSR   - suspect sepsis from E. Coli bacteremia with GI or urinary source?  - pt required levophed for intubation- titrating down currently on 0.2   - midodrine 10 q 8, Metoprolol Tartrate 12.5 mg BID   - ECHO 11/30 EF 55 normal LV/RV   - TSH wnl  - has been intermittently being diuresed with lasix  - EKG 12/7- new Twave inversions V2-V6, II, III, AVF- EKG 6 hrs later slightly improved, repeat stable  - Trop 100->102-> no longer trending (patient not a candidate for Asp/ hep given pancytopenia)   - POCUS 12/8 normal function no segmental changes, VTI 22  -- POCUS 12/12 - nl LV fx, mild diastolic dysfx, LLL consolidation vx atelectasis    #new onset MAR, has been in NSR with no ectomy on tele monitor  - MAR AF in 130's on 12/4, resolved, additional event 12/7 in setting of missed PO metoprolol resolved with IV lopressor    - continue Metoprolol tartrate 12.5 mg BID   - [id number] score is 3   - unable to anticoagulate in the setting of thrombocytopenia   - SCD's are in place   - continue telemetry monitoring     Pulmonary  #AHRF- likely secondary to PNA infection vs Aspiration   - s/p BiPAP, 10/5 on 50%, then intubated 11/30 for increased work of breathing  - Extubated 12/3 to face tent  - patient with increased WOB/ tachypnea overnight 12/6 after blood transfusion possible TACO? patient requiring BIPAP 10/5 50%   - Reintubated 12/7 in setting of WOB/respiratory distress and hypercapnic respiratory failure, and extubated on 12/10  - Reintubated 12/13 i/s/o aspiration and cardiac arrest   - restarted cefepime for empiric pseudomonal coverage i/s/o aspiration   - MRSA previously negative on 12/10; consider adding vanc if febrile through cefepime  - continue home dose of Prednisone 5 mg daily (MM)   - Duplex to r/o DVTs - negative 12/7  - s/p bronch 12/9 for optimization for extubation, airways noted to be erythematous however patent. BAL culture negative.      GI  #Diet  OGT in place; Tube feeds    #Elevated liver enzymes, - downtrending  - patient has reported hx of Hep B 2017 as per hemonc   - bili was elevated, peaked at 1.5- however now normalized. continue to monitor   - hepatitis panel - Hep B surface AG +, and the rest is negative    - confirmatory test - hepatitis B quantitative - negative    -Spoke with ROSA Montejo from hepatology office, Dr Florina Jones Re: continuation of Tenofovir. [facility] [phone]. 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 Coli bacteremia- pan sensitive 11/29   - antibiotics broadened to vanco (12/7-12/10) and meropenem (12/7- 12/11) - 5day course  - MRSA resent 12/8- negative  - Sputum cx 12/1, 12/7 - NRF, BAL Culture 12/9 neg , CSF Cx neg   - s/p ctx (12/5-12/7)    - s/p Cefepime 2g q8h (11/30- 12/5)  - s/p Azithromycin 500 mg started (12/2- 12/3) , legionella neg  - Bcx 12/2, 11/29, 12/7- neg   - Fungitel 39   - ID following     SKIN  # sacral decub-DTI., Wound care nurse recs in place     Endo  - no history of diabetes, A1C 6.0  - TSH wnl  - Lantus 14 u given steroids   - ISS   - cont monitoring     Heme  #MM  - as per Hemonc note MM in remission 5/22 has been on Promacta since 6/22 has received multiple treatments for MM in the last also PBSCT x 2 and haplo allo stem cell transplant in 2020- last spike noted sept 2022-was given belantamab- last treatment was on 11/23/22  - holding anti-myeloma therapy  -  s/p filgrastim held now as patient no longer neutropenic 12/7  - private Hemonc following [facility] recs appreciated     Pancytopenia  #concern for MAHA, possible HUS with E. Coli bacteremia? vs HLH?improving  - new thrombocytopenia, anemia, and +hemolysis labs (bili uptrending and now normalized)  -? 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   - Transfuse PRBC < 7  - Patient meets criteria for HLH (ferritin worsening 17467> 82335, trig 519->191, pancytopenia - 3 cell lines, persistent fever, IL2 8324, hepatitis) Us neg for splenomegaly   -- hem onc __ Concern for HLH__ Appears to be improving, PLTs and HGB improved  - continue home Promacta, 50 mg daily   - Spoke with hemonc at [facility] about HLH- states labs could also be elevated in setting of sepsis and at this point treatment for HLH would be to treat underlying cause of infection and patient would not be a candidate for high dose immunosuppressant (Risks outweigh benefits)   - trend HLH labs daily   - s/p neupogen D/C'd as patient no longer neutropenic     #DVT ppx  - hold off on DVT ppx for now in setting of pancytopenia   - SCD's in place   - Duplex LE - negative 12/7    Skin  #Suspected DTI on sacrum and ear as per nurse  - Wound care recs appreciated      Ethics  - updated son, he is pediatric resident and patient's HCP  - Pt is full code      Sarah Son MD   PGY-1 MICU Accept Note    CHIEF COMPLAINT: Respiratory Failure    HPI / INTERVAL HISTORY: Patient is a 62 yoF w/ pmhx of MM (diagnosed ~10 years ago, currently on Promacta treatments since June 2022 - most recently last Wednesday; followed by oncologist, Dr. Lopez at [facility]), HTN, neuropathic R hip pain, recent R corneal tear, presents to [facility] ED for change in mental status. LKW approximately 14:30. Patient was in normal state of health this morning as per son, Nacho. Patient went to a follow up ophtho appointment for a recently diagnosed corneal abrasion this past Monday. Patient was picked up by a friend around 10:00 for an optho appt with normal mental status, pt was just complaining of being sleepy. On the way back from appointment around 14:30, patient stopped responding to friend appropriately in the car and she appeared more tired & weaker than usual. She started having slurred speech when they arrived home and then son called EMS. In the ED, patient with persisting word finding difficulties and generalized weakness, but leaning to the R side. Patient reports some difficulty remembering what happened today, but reports she feels generally weak and very tired. Code stroke called, but TPA not given because of imporvement in pt's symptoms. Son reports patient's speech is close to baseline now. Patient then denied chest pain, SOB, HA, changes in vision, N/V, imbalance, numbness or tingling. Denies recent infection or sick contacts. Pt states that she had a similar episode a few years ago with fever which she was told was likely from a MM, she improved after getting steroids.     In the ED pt was febrile 105.5, , /84, RR 24 and saturating well on RA. Pt given 2L IVF bolus and 1x vanc/ceftriaxone/acyclovir/ampicillin (30 Nov 2022 02:42)    Pt was intubated for acute hypoxic respiratory failure and transferred to MICU for further management on 11/30. Found to be in septic 2/2 E. Coli bacteremia and was started on Cefepime and Levophed. AMS-- CTH with no acute findings and s/p LP with negative CSF.  Pt was successfully extubated on 12/3, and was on nasal cannula. Pt was reintubated on 12/6 for possible aspiration PNA and was successfully extubated to NC on 12/10. Patient was downgraded to the floors on 12/12 however, aspirated while eating, developed respiratory failure and a code blue was called. Patient received 1 round of CPR and 1 dose of Epi before reachieving ROSC. Patient was intubated and sedated and started on pressors; admitted to MICU for further monitoring of respiratory and mental status after cardiac arrest.     PAST MEDICAL & SURGICAL HISTORY:  Multiple myeloma      Type 2 diabetes mellitus          FAMILY HISTORY:  No pertinent family history in first degree relatives    HOME MEDICATIONS:      Allergies    Bactrim (Other)  fluconazole (Vomiting; Nausea)  levofloxacin (Vomiting; Nausea)  penicillin (Other)    Intolerances          REVIEW OF SYSTEMS:   Constitutional: No fevers, chills, weight loss, weight gain  HEENT: No vision problems, eye pain, nasal congestion, rhinorrhea, sore throat, dysphagia  CV: No chest pain, orthopnea, palpitations  Resp: No cough, dyspnea, wheezing, hemoptysis  GI: No nausea, vomiting, diarrhea, constipation, abdominal pain  : [ ] dysuria [ ] nocturia [ ] hematuria [ ] increased urinary frequency  Musculoskeletal: [ ] back pain [ ] myalgias [ ] arthralgias [ ] fracture  Skin: [ ] rash [ ] itch  Neurological: [ ] headache [ ] dizziness [ ] syncope [ ] weakness [ ] numbness  Psychiatric: [ ] anxiety [ ] depression  Endocrine: [ ] diabetes [ ] thyroid problem  Hematologic/Lymphatic: [ ] anemia [ ] bleeding problem  Allergic/Immunologic: [ ] itchy eyes [ ] nasal discharge [ ] hives [ ] angioedema  [ ] All other systems negative  [x] Unable to assess ROS due to patient mental status    OBJECTIVE:  ICU Vital Signs Last 24 Hrs  T(C): 36.7 (13 Dec 2022 10:50), Max: 37.1 (13 Dec 2022 01:15)  T(F): 98.1 (13 Dec 2022 10:50), Max: 98.8 (13 Dec 2022 01:15)  HR: 70 (13 Dec 2022 11:31) (64 - 114)  BP: 153/81 (13 Dec 2022 11:31) (103/55 - 176/91)  BP(mean): 100 (13 Dec 2022 11:31) (66 - 114)  ABP: --  ABP(mean): --  RR: 14 (13 Dec 2022 11:31) (14 - 35)  SpO2: 100% (13 Dec 2022 11:31) (95% - 100%)    O2 Parameters below as of 13 Dec 2022 11:31  Patient On (Oxygen Delivery Method): ventilator    O2 Concentration (%): 60      Mode: AC/ CMV (Assist Control/ Continuous Mandatory Ventilation), RR (machine): 14, TV (machine): 450, FiO2: 40, PEEP: 5, ITime: 0.72, MAP: 10, PIP: 28    12-12 @ 07:01  -  12-13 @ 07:00  --------------------------------------------------------  IN: 900 mL / OUT: 500 mL / NET: 400 mL      CAPILLARY BLOOD GLUCOSE      POCT Blood Glucose.: 130 mg/dL (13 Dec 2022 09:53)      PHYSICAL EXAM:  General: Intubated and sedated  HEENT: Right pupil 4+ nonreactive to light, left pupil pinpoint nonreactive to light; anicteric  Neck: Supple, no JVD  Chest/Lungs: Ventilated breath sounds  Heart: RRR, S1, S2 no rubs, gallops or murmurs   Abdomen: Nondistended, non tender, +BS  Extremities: 2+ peripheral pulses, no assymetric swelling,  Skin: Ecchymoses noted at site of blood draws  Neuro: Intubated and Sedated, not awakening to voice; moves extremities to noxious stimuli    LINES: 4 peripheral lines in place     HOSPITAL MEDICATIONS:  MEDICATIONS  (STANDING):  albuterol    90 MICROgram(s) HFA Inhaler 4 Puff(s) Inhalation every 6 hours  artificial tears (preservative free) Ophthalmic Solution 1 Drop(s) Both EYES two times a day  cefepime   IVPB      chlorhexidine 0.12% Liquid 15 milliLiter(s) Oral Mucosa every 12 hours  chlorhexidine 2% Cloths 1 Application(s) Topical daily  chlorhexidine 4% Liquid 1 Application(s) Topical <User Schedule>  dextrose 5%. 1000 milliLiter(s) (50 mL/Hr) IV Continuous <Continuous>  dextrose 5%. 1000 milliLiter(s) (100 mL/Hr) IV Continuous <Continuous>  dextrose 50% Injectable 25 Gram(s) IV Push once  dextrose 50% Injectable 12.5 Gram(s) IV Push once  dextrose 50% Injectable 25 Gram(s) IV Push once  eltrombopag 50 milliGRAM(s) Oral daily  glucagon  Injectable 1 milliGRAM(s) IntraMuscular once  insulin glargine Injectable (LANTUS) 14 Unit(s) SubCutaneous every morning  insulin lispro (ADMELOG) corrective regimen sliding scale   SubCutaneous three times a day before meals  insulin lispro (ADMELOG) corrective regimen sliding scale   SubCutaneous at bedtime  insulin lispro Injectable (ADMELOG) 3 Unit(s) SubCutaneous three times a day with meals  metoprolol tartrate 12.5 milliGRAM(s) Oral two times a day  midodrine 10 milliGRAM(s) Oral <User Schedule>  multivitamin 1 Tablet(s) Oral daily  polyethylene glycol 3350 17 Gram(s) Oral two times a day  predniSONE   Tablet 5 milliGRAM(s) Oral daily  senna 2 Tablet(s) Oral at bedtime  tamsulosin 0.4 milliGRAM(s) Oral at bedtime  tenofovir alafenamide (VEMLIDY) 25 milliGRAM(s) Oral daily    MEDICATIONS  (PRN):  dextrose Oral Gel 15 Gram(s) Oral once PRN Blood Glucose LESS THAN 70 milliGRAM(s)/deciliter  melatonin 6 milliGRAM(s) Oral at bedtime PRN Insomnia      LABS:                        8.4    4.11  )-----------( 57       ( 13 Dec 2022 06:30 )             27.7     Hgb Trend: 8.4<--, 8.7<--, 8.9<--, 8.1<--, 8.6<--  12-13    149<H>  |  111<H>  |  20  ----------------------------<  132<H>  4.0   |  29  |  0.55    Ca    9.2      13 Dec 2022 06:30  Phos  2.5     12-13  Mg     1.80     12-13    TPro  4.8<L>  /  Alb  2.5<L>  /  TBili  0.9  /  DBili  x   /  AST  43<H>  /  ALT  27  /  AlkPhos  137<H>  12-13    Creatinine Trend: 0.55<--, 0.61<--, 0.62<--, 0.69<--, 0.78<--, 0.80<--            MICROBIOLOGY:     RADIOLOGY & ADDITIONAL TESTS:      ASSESSMENT AND PLAN:  Patient is a 63 yo F w/ PMHx of MM (diagnosed ~10 years ago, currently on Promacta treatments since June 2022 - most recently last Wednesday; followed by oncologist, Dr. Lopez at [facility]), ?ITP, Hep B, HTN, neuropathic R hip pain, recent R corneal tear admitted for AMS, code stroke called no acute infarct or hemorrhage, s/p LP findings not consistent with meningitis, course complicated by hypotension and hypoxia, placed on BiPAP, requiring pressors, admitted to MICU with sepsis likely secondary to pneumonia- intubated 11/30. Extubated 12/3 to face tent reintubated 12/7 in setting of increased WOB and hypercapneic respiratory failure unclear cause possibly HLH induced. Patient was downgraded to floors 12/12, however readmitted to MICU after respiratory failure 2/2 aspiration and cardiac arrest.     Neuro  #Intubated and Sedated  -respiratory failure 2/2 aspiration and cardiac arrest requiring intubation     #Code Stroke  - s/p code stroke -> no ischemia or hemorrhage on CT H+N  - s/p LP results not consistent with encephalitis/meningitis, ammonia level 30   - baseline is AOx3, neuro status at baseline   - Repeat CTH 12/8 to r/o neurological cause for respiratory distress causing reintubation/also unsymmetrical nasolabial folds seen on exam was negative.     Cardiovascular  #Hypotension   -previously required midodrine upon downgrade to floors (10 BID) for HLH vs vasoplegia vs septic shock  -requiring pressors i/s/o sedation   -continue to wean as tolerated     # EKG changes- possible HLH induced cardiac complications?  # new onset A fib with RVR, resolved, pt has been in NSR   - suspect sepsis from E. Coli bacteremia with GI or urinary source?  - pt required levophed for intubation- titrating down currently on 0.2   - midodrine 10 q 8, Metoprolol Tartrate 12.5 mg BID   - ECHO 11/30 EF 55 normal LV/RV   - TSH wnl  - has been intermittently being diuresed with lasix  - EKG 12/7- new Twave inversions V2-V6, II, III, AVF- EKG 6 hrs later slightly improved, repeat stable  - Trop 100->102-> no longer trending (patient not a candidate for Asp/ hep given pancytopenia)   - POCUS 12/8 normal function no segmental changes, VTI 22  -- POCUS 12/12 - nl LV fx, mild diastolic dysfx, LLL consolidation vx atelectasis    #new onset MAR, has been in NSR with no ectomy on tele monitor  - MAR AF in 130's on 12/4, resolved, additional event 12/7 in setting of missed PO metoprolol resolved with IV lopressor    - continue Metoprolol tartrate 12.5 mg BID   - [id number] score is 3   - unable to anticoagulate in the setting of thrombocytopenia   - SCD's are in place   - continue telemetry monitoring     Pulmonary  #AHRF- likely secondary to PNA infection vs Aspiration   - s/p BiPAP, 10/5 on 50%, then intubated 11/30 for increased work of breathing  - Extubated 12/3 to face tent  - patient with increased WOB/ tachypnea overnight 12/6 after blood transfusion possible TACO? patient requiring BIPAP 10/5 50%   - Reintubated 12/7 in setting of WOB/respiratory distress and hypercapnic respiratory failure, and extubated on 12/10  - Reintubated 12/13 i/s/o aspiration and cardiac arrest   - restarted cefepime for empiric pseudomonal coverage i/s/o aspiration   - MRSA previously negative on 12/10; consider adding vanc if febrile through cefepime  - continue home dose of Prednisone 5 mg daily (MM)   - Duplex to r/o DVTs - negative 12/7  - s/p bronch 12/9 for optimization for extubation, airways noted to be erythematous however patent. BAL culture negative.      GI  #Diet  OGT in place; Tube feeds    #Elevated liver enzymes, - downtrending  - patient has reported hx of Hep B 2017 as per hemonc   - bili was elevated, peaked at 1.5- however now normalized. continue to monitor   - hepatitis panel - Hep B surface AG +, and the rest is negative    - confirmatory test - hepatitis B quantitative - negative    -Spoke with ROSA Montejo from hepatology office, Dr Florina Jones Re: continuation of Tenofovir. [facility] [phone]. 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 Coli bacteremia- pan sensitive 11/29   - antibiotics broadened to vanco (12/7-12/10) and meropenem (12/7- 12/11) - 5day course  - MRSA resent 12/8- negative  - Sputum cx 12/1, 12/7 - NRF, BAL Culture 12/9 neg , CSF Cx neg   - s/p ctx (12/5-12/7)    - s/p Cefepime 2g q8h (11/30- 12/5)  - s/p Azithromycin 500 mg started (12/2- 12/3) , legionella neg  - Bcx 12/2, 11/29, 12/7- neg   - Fungitel 39   - ID following     SKIN  # sacral decub-DTI., Wound care nurse recs in place     Endo  - no history of diabetes, A1C 6.0  - TSH wnl  - Lantus 14 u given steroids   - ISS   - cont monitoring     Heme  #MM  - as per Hemonc note MM in remission 5/22 has been on Promacta since 6/22 has received multiple treatments for MM in the last also PBSCT x 2 and haplo allo stem cell transplant in 2020- last spike noted sept 2022-was given belantamab- last treatment was on 11/23/22  - holding anti-myeloma therapy  -  s/p filgrastim held now as patient no longer neutropenic 12/7  - private Hemonc following [facility] recs appreciated     Pancytopenia  #concern for MAHA, possible HUS with E. Coli bacteremia? vs HLH?improving  - new thrombocytopenia, anemia, and +hemolysis labs (bili uptrending and now normalized)  -? 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   - Transfuse PRBC < 7  - Patient meets criteria for HLH (ferritin worsening 84752> 29944, trig 519->191, pancytopenia - 3 cell lines, persistent fever, IL2 8324, hepatitis) Us neg for splenomegaly   -- hem onc __ Concern for HLH__ Appears to be improving, PLTs and HGB improved  - continue home Promacta, 50 mg daily   - Spoke with hemonc at [facility] about HLH- states labs could also be elevated in setting of sepsis and at this point treatment for HLH would be to treat underlying cause of infection and patient would not be a candidate for high dose immunosuppressant (Risks outweigh benefits)   - trend HLH labs daily   - s/p neupogen D/C'd as patient no longer neutropenic     #DVT ppx  - hold off on DVT ppx for now in setting of pancytopenia   - SCD's in place   - Duplex LE - negative 12/7    Skin  #Suspected DTI on sacrum and ear as per nurse  - Wound care recs appreciated      Ethics  - updated son, he is pediatric resident and patient's HCP  - Pt is full code      Sarah Son MD   PGY-1    Attending Note:   62 year old woman with MM admitted with neutropenic sepsis and respiratory downgraded from MICU last night after extubation on 12/10 s/p cardiac arrest this morning concern for aspiration event. PEA arrest     - sedated for comfort and vent coordination will decrease sedation to assess mental status  - will check cultures and start ABx  - check ABG and adjust vent as needed  - POCUS did not show enlarged RV  spoke with patients son at bedside    prognosis guarded

## 2022-12-13 NOTE — PROGRESS NOTE ADULT - PROBLEM SELECTOR PLAN 4
#concern for MAHA, possible HUS with E. Coli bacteremia? vs HLH?improving  - new thrombocytopenia, anemia, and +hemolysis labs (bili uptrending and now normalized)  -? 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   - Transfuse PRBC < 7  - Patient meets criteria for HLH (ferritin worsening 39769> 97126, trig 519->191, pancytopenia - 3 cell lines, persistent fever, IL2 8324, hepatitis) Us neg for splenomegaly   -- hem onc __ Concern for HLH__ Appears to be improving, PLTs and HGB improved  - continue home Promacta, 50 mg daily   - Spoke with hemonc at [facility] about HLH- states labs could also be elevated in setting of sepsis and at this point treatment for HLH would be to treat underlying cause of infection and patient would not be a candidate for high dose immunosuppressant (Risks outweigh benefits)   - trend HLH labs daily   - s/p neupogen D/C'd as patient no longer neutropenic #concern for MAHA, possible HUS with E. Coli bacteremia? vs HLH?improving  - new thrombocytopenia, anemia, and +hemolysis labs (bili uptrending and now normalized)  -? 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   - Transfuse PRBC < 7  - Patient meets criteria for HLH (ferritin worsening 22056> 47993, trig 519->191, pancytopenia - 3 cell lines, persistent fever, IL2 8324, hepatitis) Us neg for splenomegaly   -- hem onc __ Concern for HLH__ Appears to be improving, PLTs and HGB improved  - was on home Promacta, 50 mg daily, f/u further heme/onc recs  - Spoke with hemonc at [facility] about HLH- states labs could also be elevated in setting of sepsis and at this point treatment for HLH would be to treat underlying cause of infection and patient would not be a candidate for high dose immunosuppressant (Risks outweigh benefits)   - trend HLH labs daily   - s/p neupogen D/C'd as patient no longer neutropenic

## 2022-12-13 NOTE — PROGRESS NOTE ADULT - PROBLEM SELECTOR PLAN 5
- as per Hemonc note MM in remission 5/22 has been on Promacta since 6/22 has received multiple treatments for MM in the last also PBSCT x 2 and haplo allo stem cell transplant in 2020- last spike noted sept 2022-was given belantamab- last treatment was on 11/23/22  - holding anti-myeloma therapy  -  s/p filgrastim held now as patient no longer neutropenic 12/7  - private Heme-onc following [facility] recs appreciated

## 2022-12-13 NOTE — PROGRESS NOTE ADULT - PROBLEM SELECTOR PLAN 7
DVT: Held due to pancytopenia   Diet: Pureed diet   Dispo: pending clinical outcome   Full code DVT: no pharm ppx Held due to pancytopenia   Diet: Pureed diet previously recommended s/p VFSS/MBS, now NPO as intubated/sedated, to f/u nutrition recs  Dispo: prognosis guarded, returned to MICU  Full code

## 2022-12-13 NOTE — PROGRESS NOTE ADULT - SUBJECTIVE AND OBJECTIVE BOX
PROGRESS NOTE:     Patient is a 62y old  Female who presents with a chief complaint of slurred speech (12 Dec 2022 16:23)      SUBJECTIVE / OVERNIGHT EVENTS:    No acute events overnight. Patient examined at bedside with no acute complaints.     Pain:  Bowel Movements:  Urination:  OOB:  PT:    REVIEW OF SYSTEMS:    CONSTITUTIONAL: No weakness, fevers or chills  EYES/ENT: No visual changes;  No vertigo or throat pain   NECK: No pain or stiffness  RESPIRATORY: No cough, wheezing, hemoptysis; No shortness of breath  CARDIOVASCULAR: No chest pain or palpitations  GASTROINTESTINAL: No abdominal or epigastric pain. No nausea, vomiting, or hematemesis; No diarrhea or constipation. No melena or hematochezia.  GENITOURINARY: No dysuria, frequency or hematuria  NEUROLOGICAL: No numbness or weakness  SKIN: No itching, rashes      MEDICATIONS  (STANDING):  albuterol/ipratropium for Nebulization 3 milliLiter(s) Nebulizer every 6 hours  artificial tears (preservative free) Ophthalmic Solution 1 Drop(s) Both EYES two times a day  chlorhexidine 2% Cloths 1 Application(s) Topical daily  dextrose 5%. 1000 milliLiter(s) (100 mL/Hr) IV Continuous <Continuous>  dextrose 5%. 1000 milliLiter(s) (50 mL/Hr) IV Continuous <Continuous>  dextrose 50% Injectable 25 Gram(s) IV Push once  dextrose 50% Injectable 12.5 Gram(s) IV Push once  dextrose 50% Injectable 25 Gram(s) IV Push once  eltrombopag 50 milliGRAM(s) Oral daily  glucagon  Injectable 1 milliGRAM(s) IntraMuscular once  insulin glargine Injectable (LANTUS) 14 Unit(s) SubCutaneous every morning  insulin lispro (ADMELOG) corrective regimen sliding scale   SubCutaneous three times a day before meals  insulin lispro (ADMELOG) corrective regimen sliding scale   SubCutaneous at bedtime  insulin lispro Injectable (ADMELOG) 3 Unit(s) SubCutaneous three times a day with meals  metoprolol tartrate 12.5 milliGRAM(s) Oral two times a day  midodrine 10 milliGRAM(s) Oral every 8 hours  multivitamin 1 Tablet(s) Oral daily  polyethylene glycol 3350 17 Gram(s) Oral two times a day  predniSONE   Tablet 5 milliGRAM(s) Oral daily  senna 2 Tablet(s) Oral at bedtime  tamsulosin 0.4 milliGRAM(s) Oral at bedtime  tenofovir alafenamide (VEMLIDY) 25 milliGRAM(s) Oral daily    MEDICATIONS  (PRN):  dextrose Oral Gel 15 Gram(s) Oral once PRN Blood Glucose LESS THAN 70 milliGRAM(s)/deciliter  melatonin 6 milliGRAM(s) Oral at bedtime PRN Insomnia      CAPILLARY BLOOD GLUCOSE      POCT Blood Glucose.: 114 mg/dL (13 Dec 2022 00:56)  POCT Blood Glucose.: 100 mg/dL (12 Dec 2022 22:05)  POCT Blood Glucose.: 169 mg/dL (12 Dec 2022 16:12)  POCT Blood Glucose.: 189 mg/dL (12 Dec 2022 11:31)    I&O's Summary    12 Dec 2022 07:01  -  13 Dec 2022 07:00  --------------------------------------------------------  IN: 900 mL / OUT: 500 mL / NET: 400 mL        VITALS:   T(C): 36.8 (12-13-22 @ 05:29), Max: 37.1 (12-13-22 @ 01:15)  HR: 88 (12-13-22 @ 05:29) (68 - 88)  BP: 110/59 (12-13-22 @ 05:29) (103/52 - 121/60)  RR: 18 (12-13-22 @ 05:29) (15 - 22)  SpO2: 98% (12-13-22 @ 05:29) (95% - 100%)    GENERAL: NAD, lying in bed comfortably  HEAD:  Atraumatic, normocephalic  EYES: EOMI, PERRLA, conjunctiva and sclera clear  ENT: Moist mucous membranes  NECK: Supple, no JVD  HEART: Regular rate and rhythm, no murmurs, rubs, or gallops  LUNGS: Unlabored respirations.  Clear to auscultation bilaterally, no crackles, wheezing, or rhonchi  ABDOMEN: Soft, nontender, nondistended, +BS  EXTREMITIES: 2+ peripheral pulses bilaterally. No clubbing, cyanosis, or edema  NERVOUS SYSTEM:  A&Ox3, no focal deficits   SKIN: No rashes or lesions    LABS:                        8.7    5.38  )-----------( 48       ( 12 Dec 2022 02:10 )             28.5     12-12    147<H>  |  110<H>  |  27<H>  ----------------------------<  198<H>  4.6   |  29  |  0.61    Ca    9.3      12 Dec 2022 02:10  Phos  2.3     12-12  Mg     2.00     12-12    TPro  5.0<L>  /  Alb  2.6<L>  /  TBili  0.9  /  DBili  x   /  AST  50<H>  /  ALT  28  /  AlkPhos  130<H>  12-12                RADIOLOGY & ADDITIONAL TESTS:  Results Reviewed:   Imaging Personally Reviewed:  Electrocardiogram Personally Reviewed:    COORDINATION OF CARE:  Care Discussed with Consultants/Other Providers [Y/N]:  Prior or Outpatient Records Reviewed [Y/N]:   PROGRESS NOTE:     Patient is a 62y old  Female who presents with a chief complaint of slurred speech (12 Dec 2022 16:23)      SUBJECTIVE / OVERNIGHT EVENTS:    No acute events overnight. Patient examined at bedside and was appropriately conversant and breathing comfortably on room air. Two hours after patient was examined, RRT was called due to patient having altered mental status less than 10 minutes after choking while eating. Anesthesia stat was called to protect airway and patient was found to be in pulseless arrest, so code blue was called. CPR performed for 2.5 minutes (see RRT note) and ROSC was achieved. Patient subsequently transferred to MICU.     REVIEW OF SYSTEMS:    CONSTITUTIONAL: No weakness, fevers or chills  EYES/ENT: No visual changes;  No vertigo or throat pain   NECK: No pain or stiffness  RESPIRATORY: + cough, No wheezing, hemoptysis; No shortness of breath  CARDIOVASCULAR: No chest pain or palpitations  GASTROINTESTINAL: No abdominal or epigastric pain. No nausea, vomiting, or hematemesis; No diarrhea or constipation. No melena or hematochezia.  GENITOURINARY: No dysuria, frequency or hematuria  NEUROLOGICAL: No numbness or weakness  SKIN: No itching, rashes      MEDICATIONS  (STANDING):  albuterol/ipratropium for Nebulization 3 milliLiter(s) Nebulizer every 6 hours  artificial tears (preservative free) Ophthalmic Solution 1 Drop(s) Both EYES two times a day  chlorhexidine 2% Cloths 1 Application(s) Topical daily  dextrose 5%. 1000 milliLiter(s) (100 mL/Hr) IV Continuous <Continuous>  dextrose 5%. 1000 milliLiter(s) (50 mL/Hr) IV Continuous <Continuous>  dextrose 50% Injectable 25 Gram(s) IV Push once  dextrose 50% Injectable 12.5 Gram(s) IV Push once  dextrose 50% Injectable 25 Gram(s) IV Push once  eltrombopag 50 milliGRAM(s) Oral daily  glucagon  Injectable 1 milliGRAM(s) IntraMuscular once  insulin glargine Injectable (LANTUS) 14 Unit(s) SubCutaneous every morning  insulin lispro (ADMELOG) corrective regimen sliding scale   SubCutaneous three times a day before meals  insulin lispro (ADMELOG) corrective regimen sliding scale   SubCutaneous at bedtime  insulin lispro Injectable (ADMELOG) 3 Unit(s) SubCutaneous three times a day with meals  metoprolol tartrate 12.5 milliGRAM(s) Oral two times a day  midodrine 10 milliGRAM(s) Oral every 8 hours  multivitamin 1 Tablet(s) Oral daily  polyethylene glycol 3350 17 Gram(s) Oral two times a day  predniSONE   Tablet 5 milliGRAM(s) Oral daily  senna 2 Tablet(s) Oral at bedtime  tamsulosin 0.4 milliGRAM(s) Oral at bedtime  tenofovir alafenamide (VEMLIDY) 25 milliGRAM(s) Oral daily    MEDICATIONS  (PRN):  dextrose Oral Gel 15 Gram(s) Oral once PRN Blood Glucose LESS THAN 70 milliGRAM(s)/deciliter  melatonin 6 milliGRAM(s) Oral at bedtime PRN Insomnia      CAPILLARY BLOOD GLUCOSE      POCT Blood Glucose.: 114 mg/dL (13 Dec 2022 00:56)  POCT Blood Glucose.: 100 mg/dL (12 Dec 2022 22:05)  POCT Blood Glucose.: 169 mg/dL (12 Dec 2022 16:12)  POCT Blood Glucose.: 189 mg/dL (12 Dec 2022 11:31)    I&O's Summary    12 Dec 2022 07:01  -  13 Dec 2022 07:00  --------------------------------------------------------  IN: 900 mL / OUT: 500 mL / NET: 400 mL        VITALS:   T(C): 36.8 (12-13-22 @ 05:29), Max: 37.1 (12-13-22 @ 01:15)  HR: 88 (12-13-22 @ 05:29) (68 - 88)  BP: 110/59 (12-13-22 @ 05:29) (103/52 - 121/60)  RR: 18 (12-13-22 @ 05:29) (15 - 22)  SpO2: 98% (12-13-22 @ 05:29) (95% - 100%)    GENERAL: NAD, lying in bed comfortably  HEAD:  Atraumatic, normocephalic  EYES: EOMI, PERRLA, conjunctiva and sclera clear  ENT: Moist mucous membranes  NECK: Supple, no JVD  HEART: Regular rate and rhythm, no murmurs, rubs, or gallops  LUNGS: Unlabored respirations.  Clear to auscultation bilaterally, no crackles, wheezing, or rhonchi  ABDOMEN: Soft, nontender, nondistended, +BS  EXTREMITIES: 2+ peripheral pulses bilaterally. No clubbing, cyanosis, or edema  NERVOUS SYSTEM:  A&Ox3, no focal deficits   SKIN: No rashes or lesions    LABS:                        8.7    5.38  )-----------( 48       ( 12 Dec 2022 02:10 )             28.5     12-12    147<H>  |  110<H>  |  27<H>  ----------------------------<  198<H>  4.6   |  29  |  0.61    Ca    9.3      12 Dec 2022 02:10  Phos  2.3     12-12  Mg     2.00     12-12    TPro  5.0<L>  /  Alb  2.6<L>  /  TBili  0.9  /  DBili  x   /  AST  50<H>  /  ALT  28  /  AlkPhos  130<H>  12-12                RADIOLOGY & ADDITIONAL TESTS:  Results Reviewed:   Imaging Personally Reviewed:  Electrocardiogram Personally Reviewed:    COORDINATION OF CARE:  Care Discussed with Consultants/Other Providers [Y/N]:  Prior or Outpatient Records Reviewed [Y/N]:   PROGRESS NOTE:     Patient is a 62y old  Female who presents with a chief complaint of slurred speech (12 Dec 2022 16:23)      SUBJECTIVE / OVERNIGHT EVENTS:    No acute events overnight. Patient examined at bedside and was appropriately conversant and breathing comfortably on room air. Two hours after patient was examined, RRT was called due to patient having altered mental status less than 10 minutes after choking while eating. Anesthesia stat was called to protect airway and patient was found to be in pulseless arrest, so code blue was called. CPR performed for 2.5 minutes (see RRT note) and ROSC was achieved. Patient subsequently transferred to MICU.     REVIEW OF SYSTEMS:    CONSTITUTIONAL: No weakness, fevers or chills  EYES/ENT: No visual changes;  No vertigo or throat pain   NECK: No pain or stiffness  RESPIRATORY: + cough, No wheezing, hemoptysis; No shortness of breath  CARDIOVASCULAR: No chest pain or palpitations  GASTROINTESTINAL: No abdominal or epigastric pain. No nausea, vomiting, or hematemesis; No diarrhea or constipation. No melena or hematochezia.  GENITOURINARY: No dysuria, frequency or hematuria  NEUROLOGICAL: No numbness or weakness  SKIN: No itching, rashes      MEDICATIONS  (STANDING):  albuterol/ipratropium for Nebulization 3 milliLiter(s) Nebulizer every 6 hours  artificial tears (preservative free) Ophthalmic Solution 1 Drop(s) Both EYES two times a day  chlorhexidine 2% Cloths 1 Application(s) Topical daily  dextrose 5%. 1000 milliLiter(s) (100 mL/Hr) IV Continuous <Continuous>  dextrose 5%. 1000 milliLiter(s) (50 mL/Hr) IV Continuous <Continuous>  dextrose 50% Injectable 25 Gram(s) IV Push once  dextrose 50% Injectable 12.5 Gram(s) IV Push once  dextrose 50% Injectable 25 Gram(s) IV Push once  eltrombopag 50 milliGRAM(s) Oral daily  glucagon  Injectable 1 milliGRAM(s) IntraMuscular once  insulin glargine Injectable (LANTUS) 14 Unit(s) SubCutaneous every morning  insulin lispro (ADMELOG) corrective regimen sliding scale   SubCutaneous three times a day before meals  insulin lispro (ADMELOG) corrective regimen sliding scale   SubCutaneous at bedtime  insulin lispro Injectable (ADMELOG) 3 Unit(s) SubCutaneous three times a day with meals  metoprolol tartrate 12.5 milliGRAM(s) Oral two times a day  midodrine 10 milliGRAM(s) Oral every 8 hours  multivitamin 1 Tablet(s) Oral daily  polyethylene glycol 3350 17 Gram(s) Oral two times a day  predniSONE   Tablet 5 milliGRAM(s) Oral daily  senna 2 Tablet(s) Oral at bedtime  tamsulosin 0.4 milliGRAM(s) Oral at bedtime  tenofovir alafenamide (VEMLIDY) 25 milliGRAM(s) Oral daily    MEDICATIONS  (PRN):  dextrose Oral Gel 15 Gram(s) Oral once PRN Blood Glucose LESS THAN 70 milliGRAM(s)/deciliter  melatonin 6 milliGRAM(s) Oral at bedtime PRN Insomnia      CAPILLARY BLOOD GLUCOSE      POCT Blood Glucose.: 114 mg/dL (13 Dec 2022 00:56)  POCT Blood Glucose.: 100 mg/dL (12 Dec 2022 22:05)  POCT Blood Glucose.: 169 mg/dL (12 Dec 2022 16:12)  POCT Blood Glucose.: 189 mg/dL (12 Dec 2022 11:31)    I&O's Summary    12 Dec 2022 07:01  -  13 Dec 2022 07:00  --------------------------------------------------------  IN: 900 mL / OUT: 500 mL / NET: 400 mL        VITALS:   T(C): 36.8 (12-13-22 @ 05:29), Max: 37.1 (12-13-22 @ 01:15)  HR: 88 (12-13-22 @ 05:29) (68 - 88)  BP: 110/59 (12-13-22 @ 05:29) (103/52 - 121/60)  RR: 18 (12-13-22 @ 05:29) (15 - 22)  SpO2: 98% (12-13-22 @ 05:29) (95% - 100%)    GENERAL: NAD, lying in bed comfortably  HEAD:  Atraumatic, normocephalic  EYES: EOMI, PERRLA, conjunctiva and sclera clear  ENT: Moist mucous membranes  NECK: Supple, no JVD  HEART: Regular rate and rhythm, no murmurs, rubs, or gallops  LUNGS: Unlabored respirations.  Clear to auscultation bilaterally, no crackles, wheezing, or rhonchi  ABDOMEN: Soft, nontender, nondistended, +BS  EXTREMITIES: 2+ peripheral pulses bilaterally. No clubbing, cyanosis, or edema  NERVOUS SYSTEM:  A&Ox3, no focal deficits   SKIN: No rashes or lesions    LABS:  Complete Blood Count (12.13.22 @ 06:30)   WBC Count: 4.11 K/uL   Hemoglobin: 8.4 g/dL   Mean Cell Volume: 106.1 fL   Platelet Count - Automated: 57: Test Repeated K/uL   Comprehensive Metabolic Panel (12.13.22 @ 06:30)   Sodium, Serum: 149 mmol/L   Potassium, Serum: 4.0 mmol/L   Chloride, Serum: 111 mmol/L   Carbon Dioxide, Serum: 29 mmol/L   Anion Gap, Serum: 9 mmol/L   Blood Urea Nitrogen, Serum: 20 mg/dL   Creatinine, Serum: 0.55 mg/dL   Glucose, Serum: 132 mg/dL   Calcium, Total Serum: 9.2 mg/dL   Protein Total, Serum: 4.8 g/dL   Albumin, Serum: 2.5 g/dL   Bilirubin Total, Serum: 0.9 mg/dL   Alkaline Phosphatase, Serum: 137 U/L   Aspartate Aminotransferase (AST/SGOT): 43 U/L   Alanine Aminotransferase (ALT/SGPT): 27 U/L                8.7    5.38  )-----------( 48       ( 12 Dec 2022 02:10 )             28.5     12-12    147<H>  |  110<H>  |  27<H>  ----------------------------<  198<H>  4.6   |  29  |  0.61    Ca    9.3      12 Dec 2022 02:10  Phos  2.3     12-12  Mg     2.00     12-12    TPro  5.0<L>  /  Alb  2.6<L>  /  TBili  0.9  /  DBili  x   /  AST  50<H>  /  ALT  28  /  AlkPhos  130<H>  12-12      RADIOLOGY & ADDITIONAL TESTS:  Results Reviewed:   Imaging Personally Reviewed:  Electrocardiogram Personally Reviewed:    COORDINATION OF CARE:  Care Discussed with Consultants/Other Providers [Y]: BEATRIZ Mayo, ICU attending Dr. Pop  Prior or Outpatient Records Reviewed [Y]: MICU course

## 2022-12-14 LAB
ALBUMIN SERPL ELPH-MCNC: 2.5 G/DL — LOW (ref 3.3–5)
ALP SERPL-CCNC: 141 U/L — HIGH (ref 40–120)
ALT FLD-CCNC: 39 U/L — HIGH (ref 4–33)
ANION GAP SERPL CALC-SCNC: 8 MMOL/L — SIGNIFICANT CHANGE UP (ref 7–14)
AST SERPL-CCNC: 75 U/L — HIGH (ref 4–32)
BASOPHILS # BLD AUTO: 0.01 K/UL — SIGNIFICANT CHANGE UP (ref 0–0.2)
BASOPHILS NFR BLD AUTO: 0.2 % — SIGNIFICANT CHANGE UP (ref 0–2)
BILIRUB SERPL-MCNC: 0.9 MG/DL — SIGNIFICANT CHANGE UP (ref 0.2–1.2)
BUN SERPL-MCNC: 19 MG/DL — SIGNIFICANT CHANGE UP (ref 7–23)
CALCIUM SERPL-MCNC: 8.8 MG/DL — SIGNIFICANT CHANGE UP (ref 8.4–10.5)
CHLORIDE SERPL-SCNC: 107 MMOL/L — SIGNIFICANT CHANGE UP (ref 98–107)
CO2 SERPL-SCNC: 26 MMOL/L — SIGNIFICANT CHANGE UP (ref 22–31)
CREAT SERPL-MCNC: 0.55 MG/DL — SIGNIFICANT CHANGE UP (ref 0.5–1.3)
EGFR: 104 ML/MIN/1.73M2 — SIGNIFICANT CHANGE UP
EOSINOPHIL # BLD AUTO: 0.06 K/UL — SIGNIFICANT CHANGE UP (ref 0–0.5)
EOSINOPHIL NFR BLD AUTO: 1.1 % — SIGNIFICANT CHANGE UP (ref 0–6)
GAS PNL BLDA: SIGNIFICANT CHANGE UP
GAS PNL BLDA: SIGNIFICANT CHANGE UP
GLUCOSE SERPL-MCNC: 132 MG/DL — HIGH (ref 70–99)
GRAM STN FLD: SIGNIFICANT CHANGE UP
HCT VFR BLD CALC: 25.7 % — LOW (ref 34.5–45)
HGB BLD-MCNC: 8.2 G/DL — LOW (ref 11.5–15.5)
IANC: 2.44 K/UL — SIGNIFICANT CHANGE UP (ref 1.8–7.4)
IMM GRANULOCYTES NFR BLD AUTO: 12.1 % — HIGH (ref 0–0.9)
LYMPHOCYTES # BLD AUTO: 1.44 K/UL — SIGNIFICANT CHANGE UP (ref 1–3.3)
LYMPHOCYTES # BLD AUTO: 27.1 % — SIGNIFICANT CHANGE UP (ref 13–44)
MAGNESIUM SERPL-MCNC: 1.7 MG/DL — SIGNIFICANT CHANGE UP (ref 1.6–2.6)
MCHC RBC-ENTMCNC: 31.9 GM/DL — LOW (ref 32–36)
MCHC RBC-ENTMCNC: 32.7 PG — SIGNIFICANT CHANGE UP (ref 27–34)
MCV RBC AUTO: 102.4 FL — HIGH (ref 80–100)
MONOCYTES # BLD AUTO: 0.72 K/UL — SIGNIFICANT CHANGE UP (ref 0–0.9)
MONOCYTES NFR BLD AUTO: 13.6 % — SIGNIFICANT CHANGE UP (ref 2–14)
NEUTROPHILS # BLD AUTO: 2.44 K/UL — SIGNIFICANT CHANGE UP (ref 1.8–7.4)
NEUTROPHILS NFR BLD AUTO: 45.9 % — SIGNIFICANT CHANGE UP (ref 43–77)
NRBC # BLD: 4 /100 WBCS — HIGH (ref 0–0)
NRBC # FLD: 0.2 K/UL — HIGH (ref 0–0)
PHOSPHATE SERPL-MCNC: 2.4 MG/DL — LOW (ref 2.5–4.5)
PLATELET # BLD AUTO: 85 K/UL — LOW (ref 150–400)
POTASSIUM SERPL-MCNC: 3.7 MMOL/L — SIGNIFICANT CHANGE UP (ref 3.5–5.3)
POTASSIUM SERPL-SCNC: 3.7 MMOL/L — SIGNIFICANT CHANGE UP (ref 3.5–5.3)
PROT SERPL-MCNC: 4.9 G/DL — LOW (ref 6–8.3)
RBC # BLD: 2.51 M/UL — LOW (ref 3.8–5.2)
RBC # FLD: 17.6 % — HIGH (ref 10.3–14.5)
SODIUM SERPL-SCNC: 141 MMOL/L — SIGNIFICANT CHANGE UP (ref 135–145)
SPECIMEN SOURCE: SIGNIFICANT CHANGE UP
WBC # BLD: 5.31 K/UL — SIGNIFICANT CHANGE UP (ref 3.8–10.5)
WBC # FLD AUTO: 5.31 K/UL — SIGNIFICANT CHANGE UP (ref 3.8–10.5)

## 2022-12-14 RX ORDER — HUMAN INSULIN 100 [IU]/ML
2 INJECTION, SUSPENSION SUBCUTANEOUS EVERY 6 HOURS
Refills: 0 | Status: DISCONTINUED | OUTPATIENT
Start: 2022-12-14 | End: 2022-12-17

## 2022-12-14 RX ORDER — POTASSIUM PHOSPHATE, MONOBASIC POTASSIUM PHOSPHATE, DIBASIC 236; 224 MG/ML; MG/ML
15 INJECTION, SOLUTION INTRAVENOUS ONCE
Refills: 0 | Status: COMPLETED | OUTPATIENT
Start: 2022-12-14 | End: 2022-12-14

## 2022-12-14 RX ORDER — POTASSIUM CHLORIDE 20 MEQ
10 PACKET (EA) ORAL ONCE
Refills: 0 | Status: COMPLETED | OUTPATIENT
Start: 2022-12-14 | End: 2022-12-14

## 2022-12-14 RX ORDER — TENOFOVIR DISOPROXIL FUMARATE 300 MG/1
25 TABLET, FILM COATED ORAL ONCE
Refills: 0 | Status: COMPLETED | OUTPATIENT
Start: 2022-12-14 | End: 2022-12-14

## 2022-12-14 RX ORDER — MAGNESIUM SULFATE 500 MG/ML
2 VIAL (ML) INJECTION ONCE
Refills: 0 | Status: COMPLETED | OUTPATIENT
Start: 2022-12-14 | End: 2022-12-14

## 2022-12-14 RX ADMIN — CHLORHEXIDINE GLUCONATE 1 APPLICATION(S): 213 SOLUTION TOPICAL at 11:58

## 2022-12-14 RX ADMIN — SENNA PLUS 2 TABLET(S): 8.6 TABLET ORAL at 22:54

## 2022-12-14 RX ADMIN — CHLORHEXIDINE GLUCONATE 1 APPLICATION(S): 213 SOLUTION TOPICAL at 06:10

## 2022-12-14 RX ADMIN — CEFEPIME 100 MILLIGRAM(S): 1 INJECTION, POWDER, FOR SOLUTION INTRAMUSCULAR; INTRAVENOUS at 06:12

## 2022-12-14 RX ADMIN — Medication 5 MILLIGRAM(S): at 06:13

## 2022-12-14 RX ADMIN — Medication 25 GRAM(S): at 02:58

## 2022-12-14 RX ADMIN — CHLORHEXIDINE GLUCONATE 15 MILLILITER(S): 213 SOLUTION TOPICAL at 17:42

## 2022-12-14 RX ADMIN — Medication 1 MILLIGRAM(S): at 22:56

## 2022-12-14 RX ADMIN — POTASSIUM PHOSPHATE, MONOBASIC POTASSIUM PHOSPHATE, DIBASIC 62.5 MILLIMOLE(S): 236; 224 INJECTION, SOLUTION INTRAVENOUS at 11:57

## 2022-12-14 RX ADMIN — Medication 1 MILLIGRAM(S): at 00:34

## 2022-12-14 RX ADMIN — CEFEPIME 100 MILLIGRAM(S): 1 INJECTION, POWDER, FOR SOLUTION INTRAMUSCULAR; INTRAVENOUS at 22:47

## 2022-12-14 RX ADMIN — Medication 100 MILLIEQUIVALENT(S): at 11:56

## 2022-12-14 RX ADMIN — TENOFOVIR DISOPROXIL FUMARATE 25 MILLIGRAM(S): 300 TABLET, FILM COATED ORAL at 00:21

## 2022-12-14 RX ADMIN — Medication 8.16 MICROGRAM(S)/KG/MIN: at 19:47

## 2022-12-14 RX ADMIN — POLYETHYLENE GLYCOL 3350 17 GRAM(S): 17 POWDER, FOR SOLUTION ORAL at 17:44

## 2022-12-14 RX ADMIN — Medication 1 DROP(S): at 17:43

## 2022-12-14 RX ADMIN — CHLORHEXIDINE GLUCONATE 15 MILLILITER(S): 213 SOLUTION TOPICAL at 06:12

## 2022-12-14 RX ADMIN — CEFEPIME 100 MILLIGRAM(S): 1 INJECTION, POWDER, FOR SOLUTION INTRAMUSCULAR; INTRAVENOUS at 13:49

## 2022-12-14 RX ADMIN — Medication 15 MILLILITER(S): at 11:57

## 2022-12-14 RX ADMIN — PROPOFOL 26.1 MICROGRAM(S)/KG/MIN: 10 INJECTION, EMULSION INTRAVENOUS at 19:47

## 2022-12-14 RX ADMIN — Medication 5 MILLIGRAM(S): at 22:40

## 2022-12-14 RX ADMIN — Medication 1 DROP(S): at 06:23

## 2022-12-14 RX ADMIN — POLYETHYLENE GLYCOL 3350 17 GRAM(S): 17 POWDER, FOR SOLUTION ORAL at 06:12

## 2022-12-14 RX ADMIN — TENOFOVIR DISOPROXIL FUMARATE 25 MILLIGRAM(S): 300 TABLET, FILM COATED ORAL at 11:57

## 2022-12-14 NOTE — PROCEDURE NOTE - NSSITEPREP_SKIN_A_CORE
chlorhexidine
alcohol
chlorhexidine/Adherence to aseptic technique: hand hygiene prior to donning barriers (gown, gloves), don cap and mask, sterile drape over patient
chlorhexidine

## 2022-12-14 NOTE — PROCEDURE NOTE - NSPOSTCAREGUIDE_GEN_A_CORE
Care for catheter as per unit/ICU protocols
Keep the cast/splint/dressing clean and dry
Verbal/written post procedure instructions were given to patient/caregiver/Care for catheter as per unit/ICU protocols
Care for catheter as per unit/ICU protocols
Keep the cast/splint/dressing clean and dry/Care for catheter as per unit/ICU protocols
Verbal/written post procedure instructions were given to patient/caregiver/Care for catheter as per unit/ICU protocols
Keep the cast/splint/dressing clean and dry

## 2022-12-14 NOTE — PROGRESS NOTE ADULT - PROBLEM SELECTOR PLAN 5
- as per Hemonc note MM in remission 5/22 has been on Promacta since 6/22 has received multiple treatments for MM in the last also PBSCT x 2 and haplo allo stem cell transplant in 2020- last spike noted sept 2022-was given belantamab- last treatment was on 11/23/22  - holding anti-myeloma therapy  -  s/p filgrastim held now as patient no longer neutropenic 12/7  - private Heme-onc following [facility] recs appreciated

## 2022-12-14 NOTE — PROGRESS NOTE ADULT - PROBLEM SELECTOR PLAN 6
#new onset MAR, has previously been in NSR with no ectomy on tele monitor  - MAR AF in 130's on 12/4, resolved, additional event 12/7 in setting of missed PO metoprolol resolved with IV lopressor    - [id number] score is 3   - unable to anticoagulate in the setting of thrombocytopenia   - SCD's are in place   - further management as per MICU

## 2022-12-14 NOTE — PROCEDURE NOTE - NSINDICATIONS_GEN_A_CORE
respiratory distress
needs venous access/antibiotic therapy
emergency venous access
monitoring purposes
emergency venous access
airway protection
respiratory distress/respiratory failure
venous access
arterial puncture to obtain ABG's/blood sampling

## 2022-12-14 NOTE — PROGRESS NOTE ADULT - PROBLEM SELECTOR PLAN 1
- lost pulse, s/p code blue, 1 round epi, CPR, with ROSC   - intubated, on pressors, transferred to ICU for further care

## 2022-12-14 NOTE — PROCEDURE NOTE - PRACTITIONER PERFORMING THE TIME OUT
Erika Saenz PA-C
vanessa head pa-c
Kiesha
shayna kellogg
Kiesha
CARMEN
Shahid SOLIS
Shahid SOLIS
vanessa head pa-c
Myself with nursing staff

## 2022-12-14 NOTE — CHART NOTE - NSCHARTNOTEFT_GEN_A_CORE
: Karen Hightower    INDICATION: critical illness    PROCEDURE:  [x] LIMITED ECHO  [x] LIMITED CHEST  [ ] LIMITED RETROPERITONEAL  [ ] LIMITED ABDOMINAL  [ ] LIMITED DVT  [ ] NEEDLE GUIDANCE VASCULAR  [ ] NEEDLE GUIDANCE THORACENTESIS  [ ] NEEDLE GUIDANCE PARACENTESIS  [ ] NEEDLE GUIDANCE PERICARDIOCENTESIS  [ ] OTHER    FINDINGS:  A line predominant pattern in anterior lung fields  B lines at bases bilaterally R > L  LVSF appears normal  RV < LV  IVC indeterminate    INTERPRETATION:  Bilateral B lines at bases     Images uploaded on [facility]

## 2022-12-14 NOTE — PROGRESS NOTE ADULT - SUBJECTIVE AND OBJECTIVE BOX
INTERVAL HPI/OVERNIGHT EVENTS:    SUBJECTIVE: Patient seen and examined at bedside.       VITAL SIGNS:  ICU Vital Signs Last 24 Hrs  T(C): 36.2 (14 Dec 2022 12:00), Max: 37.1 (13 Dec 2022 20:00)  T(F): 97.1 (14 Dec 2022 12:00), Max: 98.8 (13 Dec 2022 20:00)  HR: 77 (14 Dec 2022 13:00) (69 - 94)  BP: 122/59 (14 Dec 2022 13:00) (61/49 - 172/85)  BP(mean): 77 (14 Dec 2022 13:00) (52 - 109)  ABP: --  ABP(mean): --  RR: 19 (14 Dec 2022 13:00) (10 - 32)  SpO2: 100% (14 Dec 2022 13:00) (97% - 100%)    O2 Parameters below as of 14 Dec 2022 08:00  Patient On (Oxygen Delivery Method): ventilator    O2 Concentration (%): 35      Mode: CPAP with PS, FiO2: 35, PEEP: 5, PS: 5, MAP: 7, PIP: 11  Plateau pressure:   P/F ratio:     1213 @ 07:  -  14 @ 07:00  --------------------------------------------------------  IN: 2611.5 mL / OUT: 775 mL / NET: 1836.5 mL     @ 07:  -   @ 14:02  --------------------------------------------------------  IN: 189.7 mL / OUT: 480 mL / NET: -290.3 mL      CAPILLARY BLOOD GLUCOSE      POCT Blood Glucose.: 123 mg/dL (14 Dec 2022 11:38)    ECG:    PHYSICAL EXAM:    General:   HEENT:   Neck:   Respiratory:   Cardiovascular:   Abdomen:   Extremities:  Neurological:    MEDICATIONS:  MEDICATIONS  (STANDING):  albuterol    90 MICROgram(s) HFA Inhaler 4 Puff(s) Inhalation every 6 hours  artificial tears (preservative free) Ophthalmic Solution 1 Drop(s) Both EYES two times a day  cefepime   IVPB      cefepime   IVPB 2000 milliGRAM(s) IV Intermittent every 8 hours  chlorhexidine 0.12% Liquid 15 milliLiter(s) Oral Mucosa every 12 hours  chlorhexidine 2% Cloths 1 Application(s) Topical daily  chlorhexidine 4% Liquid 1 Application(s) Topical <User Schedule>  dextrose 5%. 1000 milliLiter(s) (50 mL/Hr) IV Continuous <Continuous>  dextrose 5%. 1000 milliLiter(s) (100 mL/Hr) IV Continuous <Continuous>  dextrose 50% Injectable 25 Gram(s) IV Push once  dextrose 50% Injectable 12.5 Gram(s) IV Push once  dextrose 50% Injectable 25 Gram(s) IV Push once  doxazosin 1 milliGRAM(s) Oral at bedtime  glucagon  Injectable 1 milliGRAM(s) IntraMuscular once  insulin lispro (ADMELOG) corrective regimen sliding scale   SubCutaneous every 6 hours  insulin NPH human recombinant 2 Unit(s) SubCutaneous every 6 hours  multivitamin/minerals/iron Oral Solution (CENTRUM) 15 milliLiter(s) Enteral Tube daily  norepinephrine Infusion 0.05 MICROgram(s)/kG/Min (8.16 mL/Hr) IV Continuous <Continuous>  polyethylene glycol 3350 17 Gram(s) Oral two times a day  predniSONE   Tablet 5 milliGRAM(s) Oral daily  propofol Infusion 50 MICROgram(s)/kG/Min (26.1 mL/Hr) IV Continuous <Continuous>  senna 2 Tablet(s) Oral at bedtime  tenofovir alafenamide (VEMLIDY) 25 milliGRAM(s) Oral daily    MEDICATIONS  (PRN):  dextrose Oral Gel 15 Gram(s) Oral once PRN Blood Glucose LESS THAN 70 milliGRAM(s)/deciliter  melatonin 6 milliGRAM(s) Oral at bedtime PRN Insomnia      ALLERGIES:  Allergies    Bactrim (Other)  fluconazole (Vomiting; Nausea)  levofloxacin (Vomiting; Nausea)  penicillin (Other)    Intolerances        LABS:                        8.2    5.31  )-----------( 85       ( 14 Dec 2022 01:50 )             25.7     12-14    141  |  107  |  19  ----------------------------<  132<H>  3.7   |  26  |  0.55    Ca    8.8      14 Dec 2022 01:50  Phos  2.4       Mg     1.70         TPro  4.9<L>  /  Alb  2.5<L>  /  TBili  0.9  /  DBili  x   /  AST  75<H>  /  ALT  39<H>  /  AlkPhos  141<H>      PT/INR - ( 13 Dec 2022 16:30 )   PT: 13.7 sec;   INR: 1.18 ratio         PTT - ( 13 Dec 2022 16:30 )  PTT:26.8 sec  Urinalysis Basic - ( 13 Dec 2022 22:00 )    Color: Yellow / Appearance: Clear / S.028 / pH: x  Gluc: x / Ketone: Negative  / Bili: Negative / Urobili: <2 mg/dL   Blood: x / Protein: 100 mg/dL / Nitrite: Negative   Leuk Esterase: Negative / RBC: 7 /HPF / WBC 4 /HPF   Sq Epi: x / Non Sq Epi: 3 /HPF / Bacteria: Few        RADIOLOGY & ADDITIONAL TESTS: Reviewed.   INTERVAL HPI/OVERNIGHT EVENTS: [facility] OVN. [facility] wnl.     SUBJECTIVE: Patient seen and examined at bedside. This AM, patient responsive to voice. ROS limited by mental status. Patient intubated and sedated.       VITAL SIGNS:  ICU Vital Signs Last 24 Hrs  T(C): 36.2 (14 Dec 2022 12:00), Max: 37.1 (13 Dec 2022 20:00)  T(F): 97.1 (14 Dec 2022 12:00), Max: 98.8 (13 Dec 2022 20:00)  HR: 77 (14 Dec 2022 13:00) (69 - 94)  BP: 122/59 (14 Dec 2022 13:00) (61/49 - 172/85)  BP(mean): 77 (14 Dec 2022 13:00) (52 - 109)  ABP: --  ABP(mean): --  RR: 19 (14 Dec 2022 13:00) (10 - 32)  SpO2: 100% (14 Dec 2022 13:00) (97% - 100%)    O2 Parameters below as of 14 Dec 2022 08:00  Patient On (Oxygen Delivery Method): ventilator    O2 Concentration (%): 35      Mode: CPAP with PS, FiO2: 35, PEEP: 5, PS: 5, MAP: 7, PIP: 11  Plateau pressure:   P/F ratio:     13 @ 07:  -   @ 07:00  --------------------------------------------------------  IN: 2611.5 mL / OUT: 775 mL / NET: 1836.5 mL     @ 07:01  -  14 @ 14:02  --------------------------------------------------------  IN: 189.7 mL / OUT: 480 mL / NET: -290.3 mL      CAPILLARY BLOOD GLUCOSE      POCT Blood Glucose.: 123 mg/dL (14 Dec 2022 11:38)    ECG:    PHYSICAL EXAM:    General:   HEENT:   Neck:   Respiratory:   Cardiovascular:   Abdomen:   Extremities:  Neurological:    MEDICATIONS:  MEDICATIONS  (STANDING):  albuterol    90 MICROgram(s) HFA Inhaler 4 Puff(s) Inhalation every 6 hours  artificial tears (preservative free) Ophthalmic Solution 1 Drop(s) Both EYES two times a day  cefepime   IVPB      cefepime   IVPB 2000 milliGRAM(s) IV Intermittent every 8 hours  chlorhexidine 0.12% Liquid 15 milliLiter(s) Oral Mucosa every 12 hours  chlorhexidine 2% Cloths 1 Application(s) Topical daily  chlorhexidine 4% Liquid 1 Application(s) Topical <User Schedule>  dextrose 5%. 1000 milliLiter(s) (50 mL/Hr) IV Continuous <Continuous>  dextrose 5%. 1000 milliLiter(s) (100 mL/Hr) IV Continuous <Continuous>  dextrose 50% Injectable 25 Gram(s) IV Push once  dextrose 50% Injectable 12.5 Gram(s) IV Push once  dextrose 50% Injectable 25 Gram(s) IV Push once  doxazosin 1 milliGRAM(s) Oral at bedtime  glucagon  Injectable 1 milliGRAM(s) IntraMuscular once  insulin lispro (ADMELOG) corrective regimen sliding scale   SubCutaneous every 6 hours  insulin NPH human recombinant 2 Unit(s) SubCutaneous every 6 hours  multivitamin/minerals/iron Oral Solution (CENTRUM) 15 milliLiter(s) Enteral Tube daily  norepinephrine Infusion 0.05 MICROgram(s)/kG/Min (8.16 mL/Hr) IV Continuous <Continuous>  polyethylene glycol 3350 17 Gram(s) Oral two times a day  predniSONE   Tablet 5 milliGRAM(s) Oral daily  propofol Infusion 50 MICROgram(s)/kG/Min (26.1 mL/Hr) IV Continuous <Continuous>  senna 2 Tablet(s) Oral at bedtime  tenofovir alafenamide (VEMLIDY) 25 milliGRAM(s) Oral daily    MEDICATIONS  (PRN):  dextrose Oral Gel 15 Gram(s) Oral once PRN Blood Glucose LESS THAN 70 milliGRAM(s)/deciliter  melatonin 6 milliGRAM(s) Oral at bedtime PRN Insomnia      ALLERGIES:  Allergies    Bactrim (Other)  fluconazole (Vomiting; Nausea)  levofloxacin (Vomiting; Nausea)  penicillin (Other)    Intolerances        LABS:                        8.2    5.31  )-----------( 85       ( 14 Dec 2022 01:50 )             25.7     12-14    141  |  107  |  19  ----------------------------<  132<H>  3.7   |  26  |  0.55    Ca    8.8      14 Dec 2022 01:50  Phos  2.4       Mg     1.70         TPro  4.9<L>  /  Alb  2.5<L>  /  TBili  0.9  /  DBili  x   /  AST  75<H>  /  ALT  39<H>  /  AlkPhos  141<H>  -    PT/INR - ( 13 Dec 2022 16:30 )   PT: 13.7 sec;   INR: 1.18 ratio         PTT - ( 13 Dec 2022 16:30 )  PTT:26.8 sec  Urinalysis Basic - ( 13 Dec 2022 22:00 )    Color: Yellow / Appearance: Clear / S.028 / pH: x  Gluc: x / Ketone: Negative  / Bili: Negative / Urobili: <2 mg/dL   Blood: x / Protein: 100 mg/dL / Nitrite: Negative   Leuk Esterase: Negative / RBC: 7 /HPF / WBC 4 /HPF   Sq Epi: x / Non Sq Epi: 3 /HPF / Bacteria: Few        RADIOLOGY & ADDITIONAL TESTS: Reviewed.

## 2022-12-14 NOTE — PROGRESS NOTE ADULT - PROBLEM SELECTOR PLAN 3
- hypotensive this AM during RRT, previously on PO midodrine when transferred out of MICU  - currently on IV pressors as per MICU, also in the setting of sedation (intubated)  - weaning pressors as able as per MICU, continue antibiotics and infectious w/u as per MICU

## 2022-12-14 NOTE — PROGRESS NOTE ADULT - PROBLEM SELECTOR PLAN 4
#concern for MAHA, possible HUS with E. Coli bacteremia? vs HLH?improving  - new thrombocytopenia, anemia, and +hemolysis labs (bili uptrending and now normalized)  -? 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   - Transfuse PRBC < 7  - Patient meets criteria for HLH (ferritin worsening 71443> 07334, trig 519->191, pancytopenia - 3 cell lines, persistent fever, IL2 8324, hepatitis) Us neg for splenomegaly   -- hem onc __ Concern for HLH__ Appears to be improving, PLTs and HGB improved  - was on home Promacta, 50 mg daily, f/u further heme/onc recs  - Spoke with hemonc at [facility] about HLH- states labs could also be elevated in setting of sepsis and at this point treatment for HLH would be to treat underlying cause of infection and patient would not be a candidate for high dose immunosuppressant (Risks outweigh benefits)   - trend HLH labs daily   - s/p neupogen D/C'd as patient no longer neutropenic

## 2022-12-14 NOTE — CHART NOTE - NSCHARTNOTEFT_GEN_A_CORE
Notified by RN of possible levophed extravasation in RUE. Area of erythema noted. IV in place, contents aspirated and IV was removed. Phentolamine vial retrieved from pharmacy, reconstituted in normal saline for concentration of 0.5mg/mL. Contents were subcutaneously injected in clockwise fashion at border of area of possible extravasation in 5 areas in 2cc injections.

## 2022-12-14 NOTE — PROCEDURE NOTE - NSINFORMCONSENT_GEN_A_CORE
This was an emergent procedure.
Benefits, risks, and possible complications of procedure explained to patient/caregiver who verbalized understanding and gave verbal consent.
This was an emergent procedure.
Benefits, risks, and possible complications of procedure explained to patient/caregiver who verbalized understanding and gave verbal consent.
Benefits, risks, and possible complications of procedure explained to patient/caregiver who verbalized understanding and gave verbal consent.
This was an emergent procedure.
Benefits, risks, and possible complications of procedure explained to patient/caregiver who verbalized understanding and gave verbal consent.
This was an emergent procedure.
This was an emergent procedure.

## 2022-12-14 NOTE — PROCEDURE NOTE - NSANATOMICLLOCATION_GEN_A_CORE
right/forearm
right/radial artery
right/cephalic vein
brachial vein/right
right/upper arm
left/femoral artery

## 2022-12-14 NOTE — PROGRESS NOTE ADULT - PROBLEM SELECTOR PLAN 2
with multiple intubations previously during this admission,   initially intubated 11/30 for increased WOB, extubated 12/3 to face tent  Reintubated 12/7 in setting of WOB/respiratory distress and hypercapnic respiratory failure, and extubated on 12/10  s/p bronch 12/9 for optimization for extubation, airways noted to be erythematous however patent. BAL culture negative.    previously completed course of antibiotics for E.coli bacteremia and PNA as per ID  with concern for ?aspiration event per staff, now reintubated, transferred to MICU, c/w vent management and antibiotics as per MICU

## 2022-12-14 NOTE — PROGRESS NOTE ADULT - SUBJECTIVE AND OBJECTIVE BOX
INTERVAL HPI/OVERNIGHT EVENTS:    SUBJECTIVE: Patient seen and examined at bedside.    OBJECTIVE:    VITAL SIGNS:  ICU Vital Signs Last 24 Hrs  T(C): 36.8 (14 Dec 2022 04:00), Max: 37.1 (13 Dec 2022 20:00)  T(F): 98.3 (14 Dec 2022 04:00), Max: 98.8 (13 Dec 2022 20:00)  HR: 77 (14 Dec 2022 06:00) (64 - 114)  BP: 111/64 (14 Dec 2022 06:00) (61/49 - 176/91)  BP(mean): 78 (14 Dec 2022 06:00) (52 - 114)  ABP: --  ABP(mean): --  RR: 10 (14 Dec 2022 06:00) (10 - 35)  SpO2: 100% (14 Dec 2022 06:00) (95% - 100%)    O2 Parameters below as of 13 Dec 2022 20:00  Patient On (Oxygen Delivery Method): ventilator,AC /RR10/PEEP5    O2 Concentration (%): 35      Mode: AC/ CMV (Assist Control/ Continuous Mandatory Ventilation), RR (machine): 10, TV (machine): 380, FiO2: 35, PEEP: 5, ITime: 0.74, MAP: 8, PIP: 26    12-13 @ 07:01  -  12-14 @ 07:00  --------------------------------------------------------  IN: 2576.3 mL / OUT: 715 mL / NET: 1861.3 mL      CAPILLARY BLOOD GLUCOSE      POCT Blood Glucose.: 97 mg/dL (14 Dec 2022 06:13)      PHYSICAL EXAM:    General: NAD  HEENT: NC/AT; PERRL, clear conjunctiva  Neck: supple  Respiratory: CTA b/l  Cardiovascular: +S1/S2; RRR  Abdomen: soft, NT/ND; +BS x4  Extremities:  no LE edema  Vascular: WWP, 2+ peripheral pulses b/l;  Skin: normal color and turgor; no rash  Neurological: A&Ox3, move all extremities. CN II-XII intact    MEDICATIONS:  MEDICATIONS  (STANDING):  albuterol    90 MICROgram(s) HFA Inhaler 4 Puff(s) Inhalation every 6 hours  artificial tears (preservative free) Ophthalmic Solution 1 Drop(s) Both EYES two times a day  cefepime   IVPB      cefepime   IVPB 2000 milliGRAM(s) IV Intermittent every 8 hours  chlorhexidine 0.12% Liquid 15 milliLiter(s) Oral Mucosa every 12 hours  chlorhexidine 2% Cloths 1 Application(s) Topical daily  chlorhexidine 4% Liquid 1 Application(s) Topical <User Schedule>  dextrose 5%. 1000 milliLiter(s) (50 mL/Hr) IV Continuous <Continuous>  dextrose 5%. 1000 milliLiter(s) (100 mL/Hr) IV Continuous <Continuous>  dextrose 50% Injectable 25 Gram(s) IV Push once  dextrose 50% Injectable 12.5 Gram(s) IV Push once  dextrose 50% Injectable 25 Gram(s) IV Push once  doxazosin 1 milliGRAM(s) Oral at bedtime  glucagon  Injectable 1 milliGRAM(s) IntraMuscular once  insulin glargine Injectable (LANTUS) 14 Unit(s) SubCutaneous every morning  insulin lispro (ADMELOG) corrective regimen sliding scale   SubCutaneous every 6 hours  multivitamin/minerals/iron Oral Solution (CENTRUM) 15 milliLiter(s) Enteral Tube daily  norepinephrine Infusion 0.05 MICROgram(s)/kG/Min (8.16 mL/Hr) IV Continuous <Continuous>  polyethylene glycol 3350 17 Gram(s) Oral two times a day  predniSONE   Tablet 5 milliGRAM(s) Oral daily  propofol Infusion 50 MICROgram(s)/kG/Min (26.1 mL/Hr) IV Continuous <Continuous>  senna 2 Tablet(s) Oral at bedtime  tenofovir alafenamide (VEMLIDY) 25 milliGRAM(s) Oral daily    MEDICATIONS  (PRN):  dextrose Oral Gel 15 Gram(s) Oral once PRN Blood Glucose LESS THAN 70 milliGRAM(s)/deciliter  melatonin 6 milliGRAM(s) Oral at bedtime PRN Insomnia      ALLERGIES:  Allergies    Bactrim (Other)  fluconazole (Vomiting; Nausea)  levofloxacin (Vomiting; Nausea)  penicillin (Other)    Intolerances        LABS:                        8.2    5.31  )-----------( 85       ( 14 Dec 2022 01:50 )             25.7         141  |  107  |  19  ----------------------------<  132<H>  3.7   |  26  |  0.55    Ca    8.8      14 Dec 2022 01:50  Phos  2.4       Mg     1.70         TPro  4.9<L>  /  Alb  2.5<L>  /  TBili  0.9  /  DBili  x   /  AST  75<H>  /  ALT  39<H>  /  AlkPhos  141<H>      PT/INR - ( 13 Dec 2022 16:30 )   PT: 13.7 sec;   INR: 1.18 ratio         PTT - ( 13 Dec 2022 16:30 )  PTT:26.8 sec  Urinalysis Basic - ( 13 Dec 2022 22:00 )    Color: Yellow / Appearance: Clear / S.028 / pH: x  Gluc: x / Ketone: Negative  / Bili: Negative / Urobili: <2 mg/dL   Blood: x / Protein: 100 mg/dL / Nitrite: Negative   Leuk Esterase: Negative / RBC: 7 /HPF / WBC 4 /HPF   Sq Epi: x / Non Sq Epi: 3 /HPF / Bacteria: Few        RADIOLOGY & ADDITIONAL TESTS: Reviewed.

## 2022-12-14 NOTE — PROCEDURE NOTE - NSPROCDETAILS_GEN_ALL_CORE
location identified, draped/prepped, sterile technique used/blood seen on insertion/dressing applied/flushes easily/secured in place/sterile technique, catheter placed
orogastric
location identified, draped/prepped, sterile technique used/blood seen on insertion/dressing applied/flushes easily/secured in place/sterile technique, catheter placed
location identified, draped/prepped, sterile technique used/blood seen on insertion/dressing applied/flushes easily/secured in place/sterile technique, catheter placed
location identified, draped/prepped, sterile technique used/sterile dressing applied/sterile technique, catheter placed/supine position/Trendelenburg position/ultrasound guidance
patient pre-oxygenated, tube inserted, placement confirmed/difficult/crash intubation
location identified, draped/prepped, sterile technique used, needle inserted/introduced/hemostasis with direct pressure, dressing applied/all materials/supplies accounted for at end of procedure
patient pre-oxygenated, tube inserted, placement confirmed

## 2022-12-14 NOTE — PROGRESS NOTE ADULT - PROBLEM SELECTOR PLAN 7
DVT: no pharm ppx Held due to pancytopenia   Diet: Pureed diet previously recommended s/p VFSS/MBS, now NPO as intubated/sedated, to f/u nutrition recs  Dispo: prognosis guarded, returned to MICU  Full code

## 2022-12-14 NOTE — PROCEDURE NOTE - PROCEDURE DATE TIME, MLM
09-Dec-2022 14:00
30-Nov-2022 20:42
07-Dec-2022 17:56
06-Dec-2022 20:17
07-Dec-2022 20:12
30-Nov-2022 23:10
14-Dec-2022 02:03
01-Dec-2022 18:02
06-Dec-2022 08:47
07-Dec-2022 16:40
07-Dec-2022 17:47

## 2022-12-14 NOTE — PROGRESS NOTE ADULT - ASSESSMENT
ASSESSMENT AND PLAN:  Patient is a 61 yo F w/ PMHx of MM (diagnosed ~10 years ago, currently on Promacta treatments since June 2022 - most recently last Wednesday; followed by oncologist, Dr. Lopez at [facility]), ?ITP, Hep B, HTN, neuropathic R hip pain, recent R corneal tear admitted for AMS, code stroke called no acute infarct or hemorrhage, s/p LP findings not consistent with meningitis, course complicated by hypotension and hypoxia, placed on BiPAP, requiring pressors, admitted to MICU with sepsis likely secondary to pneumonia- intubated 11/30. Extubated 12/3 to face tent reintubated 12/7 in setting of increased WOB and hypercapneic respiratory failure unclear cause possibly HLH induced. Patient was downgraded to floors 12/12, however readmitted to MICU after respiratory failure 2/2 aspiration and cardiac arrest.     Neuro  #Intubated and Sedated  -respiratory failure 2/2 aspiration and cardiac arrest requiring intubation   - wean sedation as tolerated     #Code Stroke  - s/p code stroke -> no ischemia or hemorrhage on CT H+N  - s/p LP results not consistent with encephalitis/meningitis, ammonia level 30   - baseline is AOx3, neuro status at baseline   - Repeat CTH 12/8 to r/o neurological cause for respiratory distress causing reintubation/also unsymmetrical nasolabial folds seen on exam was negative.     #Post Cardiac Arrest  -initial CTH 12/13 wnl  >f/u repeat CTH on 12/15 to evaluate for anoxic brain injury     Cardiovascular  #Hypotension   -previously required midodrine upon downgrade to floors (10 BID) for HLH vs vasoplegia vs septic shock  -requiring pressors i/s/o sedation   -continue to wean as tolerated     # EKG changes- possible HLH induced cardiac complications?  # new onset A fib with RVR, resolved, pt has been in NSR   - suspect sepsis from E. Coli bacteremia with GI or urinary source?  - pt required levophed for intubation- titrating down currently on 0.2   - midodrine 10 q 8, Metoprolol Tartrate 12.5 mg BID   - ECHO 11/30 EF 55 normal LV/RV   - TSH wnl  - has been intermittently being diuresed with lasix  - EKG 12/7- new Twave inversions V2-V6, II, III, AVF- EKG 6 hrs later slightly improved, repeat stable  - Trop 100->102-> no longer trending (patient not a candidate for Asp/ hep given pancytopenia)   - POCUS 12/8 normal function no segmental changes, VTI 22  -- POCUS 12/12 - nl LV fx, mild diastolic dysfx, LLL consolidation vx atelectasis    #new onset MAR, has been in NSR with no ectomy on tele monitor  - MAR AF in 130's on 12/4, resolved, additional event 12/7 in setting of missed PO metoprolol resolved with IV lopressor    - continue Metoprolol tartrate 12.5 mg BID   - [id number] score is 3   - unable to anticoagulate in the setting of thrombocytopenia   - SCD's are in place   - continue telemetry monitoring     Pulmonary  #AHRF- likely secondary to PNA infection vs Aspiration   - s/p BiPAP, 10/5 on 50%, then intubated 11/30 for increased work of breathing  - Extubated 12/3 to face tent  - patient with increased WOB/ tachypnea overnight 12/6 after blood transfusion possible TACO? patient requiring BIPAP 10/5 50%   - Reintubated 12/7 in setting of WOB/respiratory distress and hypercapnic respiratory failure, and extubated on 12/10  - Reintubated 12/13 i/s/o aspiration and cardiac arrest   - restarted cefepime for empiric pseudomonal coverage i/s/o aspiration   - MRSA previously negative on 12/10; consider adding vanc if febrile through cefepime  - continue home dose of Prednisone 5 mg daily (MM)   - Duplex to r/o DVTs - negative 12/7  - s/p bronch 12/9 for optimization for extubation, airways noted to be erythematous however patent. BAL culture negative.      #Aspiration event  -c/f aspiration pneumonitis  -intubated 12/13 during code blue  -CPAP today  >f/u ABG    GI  #Diet  OGT in place; Tube feeds    #Elevated liver enzymes, - downtrending  - patient has reported hx of Hep B 2017 as per hemonc   - bili was elevated, peaked at 1.5- however now normalized. continue to monitor   - hepatitis panel - Hep B surface AG +, and the rest is negative    - confirmatory test - hepatitis B quantitative - negative    -Spoke with ROSA Montejo from hepatology office, Dr Florina Jones Re: continuation of Tenofovir. [facility] [phone]. 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 Coli bacteremia- pan sensitive 11/29   - antibiotics broadened to vanco (12/7-12/10) and meropenem (12/7- 12/11) - 5day course  - MRSA resent 12/8- negative  - Sputum cx 12/1, 12/7 - NRF, BAL Culture 12/9 neg , CSF Cx neg   - s/p ctx (12/5-12/7)    - s/p Cefepime 2g q8h (11/30- 12/5)  - s/p Azithromycin 500 mg started (12/2- 12/3) , legionella neg  - Bcx 12/2, 11/29, 12/7- neg   - Fungitel 39   - ID following     SKIN  # sacral decub-DTI., Wound care nurse recs in place     Endo  - no history of diabetes, A1C 6.0  - TSH wnl  - NPH 2 Q6h  - ISS   - cont monitoring     Heme  #MM  - as per Hemonc note MM in remission 5/22 has been on Promacta since 6/22 has received multiple treatments for MM in the last also PBSCT x 2 and haplo allo stem cell transplant in 2020- last spike noted sept 2022-was given belantamab- last treatment was on 11/23/22  - holding anti-myeloma therapy  -  s/p filgrastim held now as patient no longer neutropenic 12/7  - private Hemonc following [facility] recs appreciated     Pancytopenia  #concern for MAHA, possible HUS with E. Coli bacteremia? vs HLH?improving  - new thrombocytopenia, anemia, and +hemolysis labs (bili uptrending and now normalized)  -? 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   - Transfuse PRBC < 7  - Patient meets criteria for HLH (ferritin worsening 28890> 92042, trig 519->191, pancytopenia - 3 cell lines, persistent fever, IL2 8324, hepatitis) Us neg for splenomegaly   -- hem onc __ Concern for HLH__ Appears to be improving, PLTs and HGB improved  - continue home Promacta, 50 mg daily   - Spoke with hemonc at [facility] about HLH- states labs could also be elevated in setting of sepsis and at this point treatment for HLH would be to treat underlying cause of infection and patient would not be a candidate for high dose immunosuppressant (Risks outweigh benefits)   - trend HLH labs daily   - s/p neupogen D/C'd as patient no longer neutropenic     #DVT ppx  - hold off on DVT ppx for now in setting of pancytopenia   - SCD's in place   - Duplex LE - negative 12/7    Skin  #Suspected DTI on sacrum and ear as per nurse  - Wound care recs appreciated      Ethics  - updated son, he is pediatric resident and patient's HCP  - Pt is full code      Sarah Son MD   PGY-1

## 2022-12-14 NOTE — PROCEDURE NOTE - NSASSISTBY_GEN_A_CORE
Myself
Fellow
Attending/PA
Myself
Myself
Removal of hardware from knee with irrigation, debridement and insertion of antibiotic beads or antibiotic spacer

## 2022-12-14 NOTE — PROGRESS NOTE ADULT - ASSESSMENT
Patient is a 61 yo F w/ PMHx of MM (diagnosed ~10 years ago, currently on Promacta treatments since June 2022 - most recently last Wednesday; followed by oncologist, Dr. Lopez at [facility]), ?ITP, Hep B, HTN, neuropathic R hip pain, recent R corneal tear admitted for AMS, code stroke called no acute infarct or hemorrhage, s/p LP findings not consistent with meningitis, course complicated by hypotension and hypoxia, placed on BiPAP, requiring pressors, admitted to MICU with sepsis likely secondary to pneumonia- intubated 11/30. Extubated 12/3 to face tent reintubated 12/7 in setting of increased WOB and hypercapneic respiratory failure unclear cause possibly HLH induced. Course c/b hypoxia/hypotension/altered mental status/cardiac arrest 12/13, transferred to MICU after ROSC, intubated, on pressors.

## 2022-12-15 LAB
ALBUMIN SERPL ELPH-MCNC: 2.3 G/DL — LOW (ref 3.3–5)
ALP SERPL-CCNC: 121 U/L — HIGH (ref 40–120)
ALT FLD-CCNC: 31 U/L — SIGNIFICANT CHANGE UP (ref 4–33)
ANION GAP SERPL CALC-SCNC: 5 MMOL/L — LOW (ref 7–14)
AST SERPL-CCNC: 50 U/L — HIGH (ref 4–32)
BASE EXCESS BLDA CALC-SCNC: 3.5 MMOL/L — HIGH (ref -2–3)
BASOPHILS # BLD AUTO: 0.04 K/UL — SIGNIFICANT CHANGE UP (ref 0–0.2)
BASOPHILS NFR BLD AUTO: 0.9 % — SIGNIFICANT CHANGE UP (ref 0–2)
BILIRUB SERPL-MCNC: 0.8 MG/DL — SIGNIFICANT CHANGE UP (ref 0.2–1.2)
BLD GP AB SCN SERPL QL: NEGATIVE — SIGNIFICANT CHANGE UP
BUN SERPL-MCNC: 16 MG/DL — SIGNIFICANT CHANGE UP (ref 7–23)
CALCIUM SERPL-MCNC: 8.4 MG/DL — SIGNIFICANT CHANGE UP (ref 8.4–10.5)
CHLORIDE SERPL-SCNC: 108 MMOL/L — HIGH (ref 98–107)
CO2 BLDA-SCNC: 28 MMOL/L — HIGH (ref 19–24)
CO2 SERPL-SCNC: 26 MMOL/L — SIGNIFICANT CHANGE UP (ref 22–31)
CREAT SERPL-MCNC: 0.56 MG/DL — SIGNIFICANT CHANGE UP (ref 0.5–1.3)
EGFR: 103 ML/MIN/1.73M2 — SIGNIFICANT CHANGE UP
EOSINOPHIL # BLD AUTO: 0.04 K/UL — SIGNIFICANT CHANGE UP (ref 0–0.5)
EOSINOPHIL NFR BLD AUTO: 0.9 % — SIGNIFICANT CHANGE UP (ref 0–6)
GLUCOSE SERPL-MCNC: 120 MG/DL — HIGH (ref 70–99)
HCO3 BLDA-SCNC: 27 MMOL/L — SIGNIFICANT CHANGE UP (ref 21–28)
HCT VFR BLD CALC: 23 % — LOW (ref 34.5–45)
HGB BLD-MCNC: 7.1 G/DL — LOW (ref 11.5–15.5)
HOROWITZ INDEX BLDA+IHG-RTO: 35 — SIGNIFICANT CHANGE UP
IANC: 2.03 K/UL — SIGNIFICANT CHANGE UP (ref 1.8–7.4)
LYMPHOCYTES # BLD AUTO: 2.05 K/UL — SIGNIFICANT CHANGE UP (ref 1–3.3)
LYMPHOCYTES # BLD AUTO: 46.8 % — HIGH (ref 13–44)
MAGNESIUM SERPL-MCNC: 1.9 MG/DL — SIGNIFICANT CHANGE UP (ref 1.6–2.6)
MCHC RBC-ENTMCNC: 30.9 GM/DL — LOW (ref 32–36)
MCHC RBC-ENTMCNC: 31.7 PG — SIGNIFICANT CHANGE UP (ref 27–34)
MCV RBC AUTO: 102.7 FL — HIGH (ref 80–100)
MONOCYTES # BLD AUTO: 0.6 K/UL — SIGNIFICANT CHANGE UP (ref 0–0.9)
MONOCYTES NFR BLD AUTO: 13.8 % — SIGNIFICANT CHANGE UP (ref 2–14)
NEUTROPHILS # BLD AUTO: 1.53 K/UL — LOW (ref 1.8–7.4)
NEUTROPHILS NFR BLD AUTO: 33 % — LOW (ref 43–77)
PCO2 BLDA: 35 MMHG — SIGNIFICANT CHANGE UP (ref 32–35)
PH BLDA: 7.49 — HIGH (ref 7.35–7.45)
PHOSPHATE SERPL-MCNC: 2.4 MG/DL — LOW (ref 2.5–4.5)
PLATELET # BLD AUTO: 74 K/UL — LOW (ref 150–400)
PO2 BLDA: 174 MMHG — HIGH (ref 83–108)
POTASSIUM SERPL-MCNC: 3.8 MMOL/L — SIGNIFICANT CHANGE UP (ref 3.5–5.3)
POTASSIUM SERPL-SCNC: 3.8 MMOL/L — SIGNIFICANT CHANGE UP (ref 3.5–5.3)
PROT SERPL-MCNC: 4.4 G/DL — LOW (ref 6–8.3)
RBC # BLD: 2.24 M/UL — LOW (ref 3.8–5.2)
RBC # FLD: 17.4 % — HIGH (ref 10.3–14.5)
RH IG SCN BLD-IMP: POSITIVE — SIGNIFICANT CHANGE UP
SAO2 % BLDA: 98.8 % — HIGH (ref 94–98)
SODIUM SERPL-SCNC: 139 MMOL/L — SIGNIFICANT CHANGE UP (ref 135–145)
WBC # BLD: 4.37 K/UL — SIGNIFICANT CHANGE UP (ref 3.8–10.5)
WBC # FLD AUTO: 4.37 K/UL — SIGNIFICANT CHANGE UP (ref 3.8–10.5)

## 2022-12-15 RX ORDER — POTASSIUM PHOSPHATE, MONOBASIC POTASSIUM PHOSPHATE, DIBASIC 236; 224 MG/ML; MG/ML
15 INJECTION, SOLUTION INTRAVENOUS ONCE
Refills: 0 | Status: COMPLETED | OUTPATIENT
Start: 2022-12-15 | End: 2022-12-15

## 2022-12-15 RX ADMIN — CHLORHEXIDINE GLUCONATE 15 MILLILITER(S): 213 SOLUTION TOPICAL at 05:39

## 2022-12-15 RX ADMIN — Medication 5 MILLIGRAM(S): at 05:34

## 2022-12-15 RX ADMIN — CHLORHEXIDINE GLUCONATE 1 APPLICATION(S): 213 SOLUTION TOPICAL at 12:45

## 2022-12-15 RX ADMIN — CEFEPIME 100 MILLIGRAM(S): 1 INJECTION, POWDER, FOR SOLUTION INTRAMUSCULAR; INTRAVENOUS at 21:51

## 2022-12-15 RX ADMIN — Medication 8.16 MICROGRAM(S)/KG/MIN: at 12:47

## 2022-12-15 RX ADMIN — CEFEPIME 100 MILLIGRAM(S): 1 INJECTION, POWDER, FOR SOLUTION INTRAMUSCULAR; INTRAVENOUS at 05:24

## 2022-12-15 RX ADMIN — POLYETHYLENE GLYCOL 3350 17 GRAM(S): 17 POWDER, FOR SOLUTION ORAL at 05:35

## 2022-12-15 RX ADMIN — POTASSIUM PHOSPHATE, MONOBASIC POTASSIUM PHOSPHATE, DIBASIC 62.5 MILLIMOLE(S): 236; 224 INJECTION, SOLUTION INTRAVENOUS at 03:16

## 2022-12-15 RX ADMIN — PROPOFOL 26.1 MICROGRAM(S)/KG/MIN: 10 INJECTION, EMULSION INTRAVENOUS at 08:48

## 2022-12-15 RX ADMIN — Medication 1 DROP(S): at 05:34

## 2022-12-15 RX ADMIN — Medication 8.16 MICROGRAM(S)/KG/MIN: at 21:52

## 2022-12-15 RX ADMIN — Medication 1: at 12:46

## 2022-12-15 RX ADMIN — CEFEPIME 100 MILLIGRAM(S): 1 INJECTION, POWDER, FOR SOLUTION INTRAMUSCULAR; INTRAVENOUS at 14:49

## 2022-12-15 NOTE — CHART NOTE - NSCHARTNOTEFT_GEN_A_CORE
NUTRITION FOLLOW-UP  61yo F w Hx of MM, HTN, presenting for AMS& intubated 11/30 for increased work of breathing. Pt. reintubated/extubated multiple times throughout hospitalization.  Downgraded to floors 12/12, however aspirated while eating and re-intubated. Remains intubated/sedated @ this time. ~209 non-nutritive lipid calories provided by Propofol over past 24hrs.      Has been intermittently receiving enteral nutrition vs PO diet.  Continues to have inconsistent and likely inadequate energy intake.   On Glucerna 1.5 via OGT @ this time...    Would change enteral regimen to PIvot with addition of NoCarb Prosource to more appropriately meet estimated energy/nutrient needs.            _________________Diet___________________  Diet, NPO with Tube Feed:   Tube Feeding Modality: Orogastric  Glucerna 1.5 Earl (GLUCERNA1.5RTH)  Total Volume for 24 Hours (mL): 720  Continuous  Starting Tube Feed Rate {mL per Hour}: 10  Increase Tube Feed Rate by (mL): 10     Every 4 hours  Until Goal Tube Feed Rate (mL per Hour): 30  Tube Feed Duration (in Hours): 24  Tube Feed Start Time: 09:30 (12-14-22 @ 15:28)      TF provides:    1080 kcals, 59g protein & 546mL free water  +      250mL free water flushes  q 6hrs               Weight:                      Height: 71"                   Ideal Body Weight: 155lbs / 70.5kg (+/- 10%)  85.2kg (12/15)  80.4kg (12/14)  82.0kg (12/5)  83.6kg (12/1)  80.9kg (11/29)        _____Estimated Energy Needs (based on upper 10% Ideal Body Weight 77.5kg)____  20-25 kcals/kg = 0954-0626 kcals/d  1.6-1.8 g protein/kg = 124-139 g protein/d           Edema:      Skin:        ________________________Pertinent Medications____________   MEDICATIONS  (STANDING):  albuterol    90 MICROgram(s) HFA Inhaler 4 Puff(s) Inhalation every 6 hours  artificial tears (preservative free) Ophthalmic Solution 1 Drop(s) Both EYES two times a day  cefepime   IVPB      cefepime   IVPB 2000 milliGRAM(s) IV Intermittent every 8 hours  chlorhexidine 0.12% Liquid 15 milliLiter(s) Oral Mucosa every 12 hours  chlorhexidine 2% Cloths 1 Application(s) Topical daily  dextrose 5%. 1000 milliLiter(s) (50 mL/Hr) IV Continuous <Continuous>  dextrose 5%. 1000 milliLiter(s) (100 mL/Hr) IV Continuous <Continuous>  dextrose 50% Injectable 25 Gram(s) IV Push once  dextrose 50% Injectable 12.5 Gram(s) IV Push once  dextrose 50% Injectable 25 Gram(s) IV Push once  doxazosin 1 milliGRAM(s) Oral at bedtime  glucagon  Injectable 1 milliGRAM(s) IntraMuscular once  insulin lispro (ADMELOG) corrective regimen sliding scale   SubCutaneous every 6 hours  insulin NPH human recombinant 2 Unit(s) SubCutaneous every 6 hours  multivitamin/minerals/iron Oral Solution (CENTRUM) 15 milliLiter(s) Enteral Tube daily  norepinephrine Infusion 0.05 MICROgram(s)/kG/Min (8.16 mL/Hr) IV Continuous <Continuous>  polyethylene glycol 3350 17 Gram(s) Oral two times a day  predniSONE   Tablet 5 milliGRAM(s) Oral daily  propofol Infusion 50 MICROgram(s)/kG/Min (26.1 mL/Hr) IV Continuous <Continuous>  senna 2 Tablet(s) Oral at bedtime  tenofovir alafenamide (VEMLIDY) 25 milliGRAM(s) Oral daily    MEDICATIONS  (PRN):  dextrose Oral Gel 15 Gram(s) Oral once PRN Blood Glucose LESS THAN 70 milliGRAM(s)/deciliter  melatonin 6 milliGRAM(s) Oral at bedtime PRN Insomnia          _________________________Pertinent Labs____________________     12-15    139  |  108<H>  |  16  ----------------------------<  120<H>  3.8   |  26  |  0.56    Ca    8.4      15 Dec 2022 00:42  Phos  2.4     12-15  Mg     1.90     12-15    TPro  4.4<L>  /  Alb  2.3<L>  /  TBili  0.8  /  DBili  x   /  AST  50<H>  /  ALT  31  /  AlkPhos  121<H>  12-15                                                                   7.1    4.37  )-----------( 74       ( 15 Dec 2022 00:42 )             23.0         CAPILLARY BLOOD GLUCOSE      POCT Blood Glucose.: 126 mg/dL (15 Dec 2022 05:33)    POCT Blood Glucose.: 126 mg/dL (12-15-22 @ 05:33)  POCT Blood Glucose.: 107 mg/dL (12-15-22 @ 00:20)  POCT Blood Glucose.: 122 mg/dL (12-14-22 @ 17:43)  POCT Blood Glucose.: 123 mg/dL (12-14-22 @ 11:38)  POCT Blood Glucose.: 110 mg/dL (12-14-22 @ 09:06)              PLAN/RECOMMENDATIONS:    1)  2) Obtain daily weights  3)     RDN remains available and will f/u PRN.          Stacey Santoyo, CARRIE, CDN pager [phone] NUTRITION FOLLOW-UP  63yo F w Hx of MM, HTN, presenting for AMS& intubated 11/30 for increased work of breathing. Pt. reintubated/extubated multiple times throughout hospitalization.  Downgraded to floors 12/12, however aspirated while eating and re-intubated. Remains intubated @ this time. ~209 non-nutritive lipid calories provided by Propofol over past 24hrs.      Has been intermittently receiving enteral nutrition vs PO diet.  Continues to have inconsistent and likely inadequate energy/protein intake (<50-75%)  On Glucerna 1.5 via OGT @ this time @ 30mL/hr. This enteral regimen suboptimally meets estimated kcal/protein needs.    Pt. alert, RN @ bedside. Pt. pending possible extubation.  If TF remains warranted would increase Glucerna 1.5 to goal of 50mLhr + 1 packet NoCarb Prosource to more appropriately meet estimated energy/nutrient needs.  If plan is to re-initiate PO, obtain swallow evaluation prior to advancing to diet.           _________________Diet___________________  Diet, NPO with Tube Feed:   Tube Feeding Modality: Orogastric  Glucerna 1.5 Earl (GLUCERNA1.5RTH)  Total Volume for 24 Hours (mL): 720  Continuous  Starting Tube Feed Rate {mL per Hour}: 10  Increase Tube Feed Rate by (mL): 10     Every 4 hours  Until Goal Tube Feed Rate (mL per Hour): 30  Tube Feed Duration (in Hours): 24  Tube Feed Start Time: 09:30 (12-14-22 @ 15:28)      TF provides:    1080 kcals, 59g protein & 546mL free water  +      250mL free water flushes  q 6hrs               Weight:                      Height: 71"                   Ideal Body Weight: 155lbs / 70.5kg (+/- 10%)  85.2kg (12/15)  80.4kg (12/14)  82.0kg (12/5)  83.6kg (12/1)  80.9kg (11/29)        _____Estimated Energy Needs (based on upper 10% Ideal Body Weight 77.5kg)____  20-25 kcals/kg = 3954-9733 kcals/d  1.4-1.6 g protein/kg = 109-124 g protein/d           Edema: 1+ generalized, 2+ b/l hands and arms      Skin: R outer ear suspected deep tissue injury         ________________________Pertinent Medications____________   MEDICATIONS  (STANDING):  albuterol    90 MICROgram(s) HFA Inhaler 4 Puff(s) Inhalation every 6 hours  artificial tears (preservative free) Ophthalmic Solution 1 Drop(s) Both EYES two times a day  cefepime   IVPB      cefepime   IVPB 2000 milliGRAM(s) IV Intermittent every 8 hours  chlorhexidine 0.12% Liquid 15 milliLiter(s) Oral Mucosa every 12 hours  chlorhexidine 2% Cloths 1 Application(s) Topical daily  dextrose 5%. 1000 milliLiter(s) (50 mL/Hr) IV Continuous <Continuous>  dextrose 5%. 1000 milliLiter(s) (100 mL/Hr) IV Continuous <Continuous>  dextrose 50% Injectable 25 Gram(s) IV Push once  dextrose 50% Injectable 12.5 Gram(s) IV Push once  dextrose 50% Injectable 25 Gram(s) IV Push once  doxazosin 1 milliGRAM(s) Oral at bedtime  glucagon  Injectable 1 milliGRAM(s) IntraMuscular once  insulin lispro (ADMELOG) corrective regimen sliding scale   SubCutaneous every 6 hours  insulin NPH human recombinant 2 Unit(s) SubCutaneous every 6 hours  multivitamin/minerals/iron Oral Solution (CENTRUM) 15 milliLiter(s) Enteral Tube daily  norepinephrine Infusion 0.05 MICROgram(s)/kG/Min (8.16 mL/Hr) IV Continuous <Continuous>  polyethylene glycol 3350 17 Gram(s) Oral two times a day  predniSONE   Tablet 5 milliGRAM(s) Oral daily  propofol Infusion 50 MICROgram(s)/kG/Min (26.1 mL/Hr) IV Continuous <Continuous>  senna 2 Tablet(s) Oral at bedtime  tenofovir alafenamide (VEMLIDY) 25 milliGRAM(s) Oral daily    MEDICATIONS  (PRN):  dextrose Oral Gel 15 Gram(s) Oral once PRN Blood Glucose LESS THAN 70 milliGRAM(s)/deciliter  melatonin 6 milliGRAM(s) Oral at bedtime PRN Insomnia          _________________________Pertinent Labs____________________     12-15    139  |  108<H>  |  16  ----------------------------<  120<H>  3.8   |  26  |  0.56    Ca    8.4      15 Dec 2022 00:42  Phos  2.4     12-15  Mg     1.90     12-15    TPro  4.4<L>  /  Alb  2.3<L>  /  TBili  0.8  /  DBili  x   /  AST  50<H>  /  ALT  31  /  AlkPhos  121<H>  12-15                                                                   7.1    4.37  )-----------( 74       ( 15 Dec 2022 00:42 )             23.0         CAPILLARY BLOOD GLUCOSE      POCT Blood Glucose.: 126 mg/dL (15 Dec 2022 05:33)    POCT Blood Glucose.: 126 mg/dL (12-15-22 @ 05:33)  POCT Blood Glucose.: 107 mg/dL (12-15-22 @ 00:20)  POCT Blood Glucose.: 122 mg/dL (12-14-22 @ 17:43)  POCT Blood Glucose.: 123 mg/dL (12-14-22 @ 11:38)  POCT Blood Glucose.: 110 mg/dL (12-14-22 @ 09:06)      ________NUTRITION Dx_________    Pt. remains severely malnourished       PLAN/RECOMMENDATIONS:    1) If TF remains warranted, increase Glucerna 1.5 to goal of 50mL/hr + 1 packet NoCarb Prosource per day  2) If extubated and considering initiating PO, obtain swallow evaluation prior to advancing to diet.   3) Obtain daily weights    RDN remains available and will f/u PRN.          Stacey Santoyo RDN, CDN pager [phone]

## 2022-12-15 NOTE — PROGRESS NOTE ADULT - ASSESSMENT
ASSESSMENT AND PLAN:  Patient is a 63 yo F w/ PMHx of MM (diagnosed ~10 years ago, currently on Promacta treatments since June 2022 - most recently last Wednesday; followed by oncologist, Dr. Lopez at [facility]), ?ITP, Hep B, HTN, neuropathic R hip pain, recent R corneal tear admitted for AMS, code stroke called no acute infarct or hemorrhage, s/p LP findings not consistent with meningitis, course complicated by hypotension and hypoxia, placed on BiPAP, requiring pressors, admitted to MICU with sepsis likely secondary to pneumonia- intubated 11/30. Extubated 12/3 to face tent reintubated 12/7 in setting of increased WOB and hypercapneic respiratory failure unclear cause possibly HLH induced. Patient was downgraded to floors 12/12, however readmitted to MICU after respiratory failure 2/2 aspiration and cardiac arrest.     Neuro  #Intubated and Sedated  -respiratory failure 2/2 aspiration and cardiac arrest requiring intubation   - wean sedation as tolerated     #Code Stroke  - s/p code stroke -> no ischemia or hemorrhage on CT H+N  - s/p LP results not consistent with encephalitis/meningitis, ammonia level 30   - baseline is AOx3, neuro status at baseline   - Repeat CTH 12/8 to r/o neurological cause for respiratory distress causing reintubation/also unsymmetrical nasolabial folds seen on exam was negative.     #Post Cardiac Arrest  -initial CTH 12/13 wnl  >f/u repeat CTH on 12/15 to evaluate for anoxic brain injury     Cardiovascular  #Hypotension   -previously required midodrine upon downgrade to floors (10 BID) for HLH vs vasoplegia vs septic shock  -requiring pressors i/s/o sedation   -continue to wean as tolerated     # EKG changes- possible HLH induced cardiac complications?  # new onset A fib with RVR, resolved, pt has been in NSR   - suspect sepsis from E. Coli bacteremia with GI or urinary source?  - pt required levophed for intubation- titrating down currently on 0.2   - midodrine 10 q 8, Metoprolol Tartrate 12.5 mg BID   - ECHO 11/30 EF 55 normal LV/RV   - TSH wnl  - has been intermittently being diuresed with lasix  - EKG 12/7- new Twave inversions V2-V6, II, III, AVF- EKG 6 hrs later slightly improved, repeat stable  - Trop 100->102-> no longer trending (patient not a candidate for Asp/ hep given pancytopenia)   - POCUS 12/8 normal function no segmental changes, VTI 22  -- POCUS 12/12 - nl LV fx, mild diastolic dysfx, LLL consolidation vx atelectasis    #new onset MAR, has been in NSR with no ectomy on tele monitor  - MAR AF in 130's on 12/4, resolved, additional event 12/7 in setting of missed PO metoprolol resolved with IV lopressor    - continue Metoprolol tartrate 12.5 mg BID   - [id number] score is 3   - unable to anticoagulate in the setting of thrombocytopenia   - SCD's are in place   - continue telemetry monitoring     Pulmonary  #AHRF- likely secondary to PNA infection vs Aspiration   - s/p BiPAP, 10/5 on 50%, then intubated 11/30 for increased work of breathing  - Extubated 12/3 to face tent  - patient with increased WOB/ tachypnea overnight 12/6 after blood transfusion possible TACO? patient requiring BIPAP 10/5 50%   - Reintubated 12/7 in setting of WOB/respiratory distress and hypercapnic respiratory failure, and extubated on 12/10  - Reintubated 12/13 i/s/o aspiration and cardiac arrest   - restarted cefepime for empiric pseudomonal coverage i/s/o aspiration   - MRSA previously negative on 12/10; consider adding vanc if febrile through cefepime  - continue home dose of Prednisone 5 mg daily (MM)   - Duplex to r/o DVTs - negative 12/7  - s/p bronch 12/9 for optimization for extubation, airways noted to be erythematous however patent. BAL culture negative.      #Aspiration event  -c/f aspiration pneumonitis  -intubated 12/13 during code blue  -CPAP today  >f/u ABG    GI  #Diet  OGT in place; Tube feeds    #Elevated liver enzymes, - downtrending  - patient has reported hx of Hep B 2017 as per hemonc   - bili was elevated, peaked at 1.5- however now normalized. continue to monitor   - hepatitis panel - Hep B surface AG +, and the rest is negative    - confirmatory test - hepatitis B quantitative - negative    -Spoke with ROSA Montejo from hepatology office, Dr Florina Jones Re: continuation of Tenofovir. [facility] [phone]. 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 Coli bacteremia- pan sensitive 11/29   - antibiotics broadened to vanco (12/7-12/10) and meropenem (12/7- 12/11) - 5day course  - MRSA resent 12/8- negative  - Sputum cx 12/1, 12/7 - NRF, BAL Culture 12/9 neg , CSF Cx neg   - s/p ctx (12/5-12/7)    - s/p Cefepime 2g q8h (11/30- 12/5)  - s/p Azithromycin 500 mg started (12/2- 12/3) , legionella neg  - Bcx 12/2, 11/29, 12/7- neg   - Fungitel 39   - ID following     SKIN  # sacral decub-DTI., Wound care nurse recs in place     Endo  - no history of diabetes, A1C 6.0  - TSH wnl  - NPH 2 Q6h  - ISS   - cont monitoring     Heme  #MM  - as per Hemonc note MM in remission 5/22 has been on Promacta since 6/22 has received multiple treatments for MM in the last also PBSCT x 2 and haplo allo stem cell transplant in 2020- last spike noted sept 2022-was given belantamab- last treatment was on 11/23/22  - holding anti-myeloma therapy  -  s/p filgrastim held now as patient no longer neutropenic 12/7  - private Hemonc following [facility] recs appreciated     Pancytopenia  #concern for MAHA, possible HUS with E. Coli bacteremia? vs HLH?improving  - new thrombocytopenia, anemia, and +hemolysis labs (bili uptrending and now normalized)  -? 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   - Transfuse PRBC < 7  - Patient meets criteria for HLH (ferritin worsening 88466> 63737, trig 519->191, pancytopenia - 3 cell lines, persistent fever, IL2 8324, hepatitis) Us neg for splenomegaly   -- hem onc __ Concern for HLH__ Appears to be improving, PLTs and HGB improved  - continue home Promacta, 50 mg daily   - Spoke with hemonc at [facility] about HLH- states labs could also be elevated in setting of sepsis and at this point treatment for HLH would be to treat underlying cause of infection and patient would not be a candidate for high dose immunosuppressant (Risks outweigh benefits)   - trend HLH labs daily   - s/p neupogen D/C'd as patient no longer neutropenic     #DVT ppx  - hold off on DVT ppx for now in setting of pancytopenia   - SCD's in place   - Duplex LE - negative 12/7    Skin  #Suspected DTI on sacrum and ear as per nurse  - Wound care recs appreciated      Ethics  - updated son, he is pediatric resident and patient's HCP  - Pt is full code      Sarah Son MD   PGY-1   ASSESSMENT AND PLAN:  Patient is a 63 yo F w/ PMHx of MM (diagnosed ~10 years ago, currently on Promacta treatments since June 2022 - most recently last Wednesday; followed by oncologist, Dr. Lopez at [facility]), ?ITP, Hep B, HTN, neuropathic R hip pain, recent R corneal tear admitted for AMS, code stroke called no acute infarct or hemorrhage, s/p LP findings not consistent with meningitis, course complicated by hypotension and hypoxia, placed on BiPAP, requiring pressors, admitted to MICU with sepsis likely secondary to pneumonia- intubated 11/30. Extubated 12/3 to face tent reintubated 12/7 in setting of increased WOB and hypercapneic respiratory failure unclear cause possibly HLH induced. Patient was downgraded to floors 12/12, however readmitted to MICU after respiratory failure 2/2 aspiration and cardiac arrest.     Neuro  #Intubated and Sedated  -respiratory failure 2/2 aspiration and cardiac arrest requiring intubation   - AO3 today, alert and interactive even on prop 20 - plan to wean prop for Extub trial today    #Code Stroke  - s/p code stroke -> no ischemia or hemorrhage on CT H+N  - s/p LP results not consistent with encephalitis/meningitis, ammonia level 30   - baseline is AOx3, neuro status at baseline   - Repeat CTH 12/8 to r/o neurological cause for respiratory distress causing reintubation/also unsymmetrical nasolabial folds seen on exam was negative.     #Post Cardiac Arrest  -initial CTH 12/13 wnl  - No repeat CTH 12/15 as pt is awake and interactive, following commands     Cardiovascular  #Hypotension   -previously required midodrine upon downgrade to floors (10 BID) for HLH vs vasoplegia vs septic shock  -requiring pressors i/s/o sedation   -continue to wean as tolerated   - levo 0.07 on 12/15  - currently no midodrine     # EKG changes- possible HLH induced cardiac complications?  # new onset A fib with RVR, resolved, pt has been in NSR   - suspect sepsis from E. Coli bacteremia with GI or urinary source?  - pt required levophed for intubation- titrating down currently on 0.2   - midodrine 10 q 8, Metoprolol Tartrate 12.5 mg BID   - ECHO 11/30 EF 55 normal LV/RV   - TSH wnl  - has been intermittently being diuresed with lasix  - EKG 12/7- new Twave inversions V2-V6, II, III, AVF- EKG 6 hrs later slightly improved, repeat stable  - Trop 100->102-> no longer trending (patient not a candidate for Asp/ hep given pancytopenia)   - POCUS 12/8 normal function no segmental changes, VTI 22  -- POCUS 12/12 - nl LV fx, mild diastolic dysfx, LLL consolidation vx atelectasis    #new onset MAR, has been in NSR with no ectomy on tele monitor  - MAR AF in 130's on 12/4, resolved, additional event 12/7 in setting of missed PO metoprolol resolved with IV lopressor    - continue Metoprolol tartrate 12.5 mg BID   - [id number] score is 3   - unable to anticoagulate in the setting of thrombocytopenia   - SCD's are in place   - continue telemetry monitoring     Pulmonary  #AHRF- likely secondary to PNA infection vs Aspiration   - s/p BiPAP, 10/5 on 50%, then intubated 11/30 for increased work of breathing  - Extubated 12/3 to face tent  - patient with increased WOB/ tachypnea overnight 12/6 after blood transfusion possible TACO? patient requiring BIPAP 10/5 50%   - Reintubated 12/7 in setting of WOB/respiratory distress and hypercapnic respiratory failure, and extubated on 12/10  - Reintubated 12/13 i/s/o aspiration and cardiac arrest   - restarted cefepime for empiric pseudomonal coverage i/s/o aspiration   - MRSA previously negative on 12/10; consider adding vanc if febrile through cefepime  - continue home dose of Prednisone 5 mg daily (MM)   - Duplex to r/o DVTs - negative 12/7  - s/p bronch 12/9 for optimization for extubation, airways noted to be erythematous however patent. BAL culture negative.    - extubation trial 12/15 to T piece     #Aspiration event  -c/f aspiration pneumonitis  -intubated 12/13 during code blue  -CPAP today  >f/u ABG    GI  #Diet  OGT in place; Tube feeds    #Elevated liver enzymes, - downtrending  - patient has reported hx of Hep B 2017 as per hemonc   - bili was elevated, peaked at 1.5- however now normalized. continue to monitor   - hepatitis panel - Hep B surface AG +, and the rest is negative    - confirmatory test - hepatitis B quantitative - negative    -Spoke with ROSA Montejo from hepatology office, Dr Florina Jones Re: continuation of Tenofovir. [facility] [phone]. 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 Coli bacteremia- pan sensitive 11/29   - antibiotics broadened to vanco (12/7-12/10) and meropenem (12/7- 12/11) - 5day course  - MRSA resent 12/8- negative  - Sputum cx 12/1, 12/7 - NRF, BAL Culture 12/9 neg , CSF Cx neg   - s/p ctx (12/5-12/7)    - s/p Cefepime 2g q8h (11/30- 12/5)  - s/p Azithromycin 500 mg started (12/2- 12/3) , legionella neg  - Bcx 12/2, 11/29, 12/7- neg   - Fungitel 39   - ID following     SKIN  # sacral decub-DTI., Wound care nurse recs in place     Endo  - no history of diabetes, A1C 6.0  - TSH wnl  - NPH 2 Q6h  - ISS   - cont monitoring     Heme  #MM  - as per Hemonc note MM in remission 5/22 has been on Promacta since 6/22 has received multiple treatments for MM in the last also PBSCT x 2 and haplo allo stem cell transplant in 2020- last spike noted sept 2022-was given belantamab- last treatment was on 11/23/22  - holding anti-myeloma therapy  -  s/p filgrastim held now as patient no longer neutropenic 12/7  - private Hemonc following [facility] recs appreciated     Pancytopenia  #concern for MAHA, possible HUS with E. Coli bacteremia? vs HLH?improving  - new thrombocytopenia, anemia, and +hemolysis labs (bili uptrending and now normalized)  -? 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   - Transfuse PRBC < 7  - Patient meets criteria for HLH (ferritin worsening 37239> 12095, trig 519->191, pancytopenia - 3 cell lines, persistent fever, IL2 8324, hepatitis) Us neg for splenomegaly   -- hem onc __ Concern for HLH__ Appears to be improving, PLTs and HGB improved  - continue home Promacta, 50 mg daily   - Spoke with hemonc at [facility] about HLH- states labs could also be elevated in setting of sepsis and at this point treatment for HLH would be to treat underlying cause of infection and patient would not be a candidate for high dose immunosuppressant (Risks outweigh benefits)   - trend HLH labs daily   - s/p neupogen D/C'd as patient no longer neutropenic     #DVT ppx  - hold off on DVT ppx for now in setting of pancytopenia   - SCD's in place   - Duplex LE - negative 12/7    Skin  #Suspected DTI on sacrum and ear as per nurse  - Wound care recs appreciated      Ethics  - updated son, he is pediatric resident and patient's HCP  - Pt is full code

## 2022-12-15 NOTE — PROGRESS NOTE ADULT - SUBJECTIVE AND OBJECTIVE BOX
Sihne Garcias MD  Internal Medicine  Spect:  [phone]    PATIENT: MILY MARCELO, MRN: [id number]    CHIEF COMPLAINT: Patient is a 62y old  Female who presents with a chief complaint of slurred speech (14 Dec 2022 14:01)      INTERVAL HISTORY/OVERNIGHT EVENTS: No overnight events. At bedside, patient has been sleeping and eating well. Denies N/V/D/C. Last BM x1 regular yesterday. Denies abdominal pain. Denies chest pain or SOB, cough. Has been ambulating with assistance. Oriented to person, place, and time. Breathing comfortably on room air.    REVIEW OF SYSTEMS:    Constitutional:     [ ] negative [ ] fevers [ ] chills [ ] weight loss [ ] weight gain  HEENT:                  [ ] negative [ ] dry eyes [ ] eye irritation [ ] postnasal drip [ ] nasal congestion  CV:                         [ ] negative  [ ] chest pain [ ] orthopnea [ ] palpitations [ ] murmur  Resp:                     [ ] negative [ ] cough [ ] shortness of breath [ ] dyspnea [ ] wheezing [ ] sputum [ ] hemoptysis  GI:                          [ ] negative [ ] nausea [ ] vomiting [ ] diarrhea [ ] constipation [ ] abd pain [ ] dysphagia   :                        [ ] negative [ ] dysuria [ ] nocturia [ ] hematuria [ ] increased urinary frequency  Musculoskeletal: [ ] negative [ ] back pain [ ] myalgias [ ] arthralgias [ ] fracture  Skin:                       [ ] negative [ ] rash [ ] itch  Neurological:        [ ] negative [ ] headache [ ] dizziness [ ] syncope [ ] weakness [ ] numbness  Psychiatric:           [ ] negative [ ] anxiety [ ] depression  Endocrine:            [ ] negative [ ] diabetes [ ] thyroid problem  Heme/Lymph:      [ ] negative [ ] anemia [ ] bleeding problem  Allergic/Immune: [ ] negative [ ] itchy eyes [ ] nasal discharge [ ] hives [ ] angioedema    [ ] All other systems negative  [ ] Unable to assess ROS because ________.    MEDICATIONS:  MEDICATIONS  (STANDING):  albuterol    90 MICROgram(s) HFA Inhaler 4 Puff(s) Inhalation every 6 hours  artificial tears (preservative free) Ophthalmic Solution 1 Drop(s) Both EYES two times a day  cefepime   IVPB      cefepime   IVPB 2000 milliGRAM(s) IV Intermittent every 8 hours  chlorhexidine 0.12% Liquid 15 milliLiter(s) Oral Mucosa every 12 hours  chlorhexidine 2% Cloths 1 Application(s) Topical daily  dextrose 5%. 1000 milliLiter(s) (50 mL/Hr) IV Continuous <Continuous>  dextrose 5%. 1000 milliLiter(s) (100 mL/Hr) IV Continuous <Continuous>  dextrose 50% Injectable 25 Gram(s) IV Push once  dextrose 50% Injectable 12.5 Gram(s) IV Push once  dextrose 50% Injectable 25 Gram(s) IV Push once  doxazosin 1 milliGRAM(s) Oral at bedtime  glucagon  Injectable 1 milliGRAM(s) IntraMuscular once  insulin lispro (ADMELOG) corrective regimen sliding scale   SubCutaneous every 6 hours  insulin NPH human recombinant 2 Unit(s) SubCutaneous every 6 hours  multivitamin/minerals/iron Oral Solution (CENTRUM) 15 milliLiter(s) Enteral Tube daily  norepinephrine Infusion 0.05 MICROgram(s)/kG/Min (8.16 mL/Hr) IV Continuous <Continuous>  polyethylene glycol 3350 17 Gram(s) Oral two times a day  predniSONE   Tablet 5 milliGRAM(s) Oral daily  propofol Infusion 50 MICROgram(s)/kG/Min (26.1 mL/Hr) IV Continuous <Continuous>  senna 2 Tablet(s) Oral at bedtime  tenofovir alafenamide (VEMLIDY) 25 milliGRAM(s) Oral daily    MEDICATIONS  (PRN):  dextrose Oral Gel 15 Gram(s) Oral once PRN Blood Glucose LESS THAN 70 milliGRAM(s)/deciliter  melatonin 6 milliGRAM(s) Oral at bedtime PRN Insomnia      ALLERGIES: Allergies    Bactrim (Other)  fluconazole (Vomiting; Nausea)  levofloxacin (Vomiting; Nausea)  penicillin (Other)    Intolerances        OBJECTIVE:  ICU Vital Signs Last 24 Hrs  T(C): 37.2 (15 Dec 2022 04:00), Max: 37.2 (15 Dec 2022 04:00)  T(F): 98.9 (15 Dec 2022 04:00), Max: 98.9 (15 Dec 2022 04:00)  HR: 76 (15 Dec 2022 07:15) (69 - 86)  BP: 103/59 (15 Dec 2022 07:00) (86/53 - 138/64)  BP(mean): 73 (15 Dec 2022 07:00) (62 - 102)  ABP: --  ABP(mean): --  RR: 11 (15 Dec 2022 07:00) (11 - 24)  SpO2: 100% (15 Dec 2022 07:15) (97% - 100%)    O2 Parameters below as of 15 Dec 2022 07:00  Patient On (Oxygen Delivery Method): ventilator    O2 Concentration (%): 35      Mode: AC/ CMV (Assist Control/ Continuous Mandatory Ventilation)  RR (machine): 10  TV (machine): 380  FiO2: 35  PEEP: 5  ITime: 0.71  MAP: 8  PIP: 21    Adult Advanced Hemodynamics Last 24 Hrs  CVP(mm Hg): --  CVP(cm H2O): --  CO: --  CI: --  PA: --  PA(mean): --  PCWP: --  SVR: --  SVRI: --  PVR: --  PVRI: --  CAPILLARY BLOOD GLUCOSE      POCT Blood Glucose.: 126 mg/dL (15 Dec 2022 05:33)  POCT Blood Glucose.: 107 mg/dL (15 Dec 2022 00:20)  POCT Blood Glucose.: 122 mg/dL (14 Dec 2022 17:43)  POCT Blood Glucose.: 123 mg/dL (14 Dec 2022 11:38)  POCT Blood Glucose.: 110 mg/dL (14 Dec 2022 09:06)    CAPILLARY BLOOD GLUCOSE      POCT Blood Glucose.: 126 mg/dL (15 Dec 2022 05:33)    I&O's Summary    14 Dec 2022 07:01  -  15 Dec 2022 07:00  --------------------------------------------------------  IN: 2018.2 mL / OUT: 1335 mL / NET: 683.2 mL      Daily     Daily Weight in k.2 (15 Dec 2022 04:00)    PHYSICAL EXAMINATION:  General: Comfortable, no acute distress, cooperative with exam.  HEENT: PERRLA, EOMI, moist mucous membranes.  Respiratory: CTAB, normal respiratory effort, no coughing, wheezes, crackles, or rales.  CV: RRR, S1S2, no murmurs, rubs or gallops. No JVD. Distal pulses intact.  Abdominal: Soft, nontender, nondistended, no rebound or guarding, normal bowel sounds.  Neurology: AOx3, no focal neuro defects, [place] x 4.  Extremities: No pitting edema, + Peripheral pulses.  Incisions:   Tubes:    LABS:  ABG - ( 14 Dec 2022 12:15 )  pH, Arterial: 7.46  pH, Blood: x     /  pCO2: 39    /  pO2: 122   / HCO3: 28    / Base Excess: 3.6   /  SaO2: 99.4                                    7.1    4.37  )-----------( 74       ( 15 Dec 2022 00:42 )             23.0     12-15    139  |  108<H>  |  16  ----------------------------<  120<H>  3.8   |  26  |  0.56    Ca    8.4      15 Dec 2022 00:42  Phos  2.4     12-15  Mg     1.90     12-15    TPro  4.4<L>  /  Alb  2.3<L>  /  TBili  0.8  /  DBili  x   /  AST  50<H>  /  ALT  31  /  AlkPhos  121<H>  12-15    LIVER FUNCTIONS - ( 15 Dec 2022 00:42 )  Alb: 2.3 g/dL / Pro: 4.4 g/dL / ALK PHOS: 121 U/L / ALT: 31 U/L / AST: 50 U/L / GGT: x           PT/INR - ( 13 Dec 2022 16:30 )   PT: 13.7 sec;   INR: 1.18 ratio         PTT - ( 13 Dec 2022 16:30 )  PTT:26.8 sec        Urinalysis Basic - ( 13 Dec 2022 22:00 )    Color: Yellow / Appearance: Clear / S.028 / pH: x  Gluc: x / Ketone: Negative  / Bili: Negative / Urobili: <2 mg/dL   Blood: x / Protein: 100 mg/dL / Nitrite: Negative   Leuk Esterase: Negative / RBC: 7 /HPF / WBC 4 /HPF   Sq Epi: x / Non Sq Epi: 3 /HPF / Bacteria: Few        TELEMETRY:     EKG:     IMAGING:  Shine Garcias MD  Internal Medicine    PATIENT: MILY MARCELO, MRN: [id number]    CHIEF COMPLAINT: Patient is a 62y old  Female who presents with a chief complaint of slurred speech (14 Dec 2022 14:01)      INTERVAL HISTORY/OVERNIGHT EVENTS: Overnight, patient had possible extravasation of levophed in RUE, phentolamine locally injected. Otherwise, no acute issues overnight. At bedside, the patient was intubated, awake and alert, responding yes and no to questions, AO3 per yes/no assessment and following commands    REVIEW OF SYSTEMS:    Constitutional:     [ ] negative [ ] fevers [ ] chills [ ] weight loss [ ] weight gain  HEENT:                  [ ] negative [ ] dry eyes [ ] eye irritation [ ] postnasal drip [ ] nasal congestion  CV:                         [ ] negative  [ ] chest pain [ ] orthopnea [ ] palpitations [ ] murmur  Resp:                     [ ] negative [ ] cough [ ] shortness of breath [ ] dyspnea [ ] wheezing [ ] sputum [ ] hemoptysis  GI:                          [ ] negative [ ] nausea [ ] vomiting [ ] diarrhea [ ] constipation [ ] abd pain [ ] dysphagia   :                        [ ] negative [ ] dysuria [ ] nocturia [ ] hematuria [ ] increased urinary frequency  Musculoskeletal: [ ] negative [ ] back pain [ ] myalgias [ ] arthralgias [ ] fracture  Skin:                       [ ] negative [ ] rash [ ] itch  Neurological:        [ ] negative [ ] headache [ ] dizziness [ ] syncope [ ] weakness [ ] numbness  Psychiatric:           [ ] negative [ ] anxiety [ ] depression  Endocrine:            [ ] negative [ ] diabetes [ ] thyroid problem  Heme/Lymph:      [ ] negative [ ] anemia [ ] bleeding problem  Allergic/Immune: [ ] negative [ ] itchy eyes [ ] nasal discharge [ ] hives [ ] angioedema    [ ] All other systems negative  [ ] Unable to assess ROS because ________.    MEDICATIONS:  MEDICATIONS  (STANDING):  albuterol    90 MICROgram(s) HFA Inhaler 4 Puff(s) Inhalation every 6 hours  artificial tears (preservative free) Ophthalmic Solution 1 Drop(s) Both EYES two times a day  cefepime   IVPB      cefepime   IVPB 2000 milliGRAM(s) IV Intermittent every 8 hours  chlorhexidine 0.12% Liquid 15 milliLiter(s) Oral Mucosa every 12 hours  chlorhexidine 2% Cloths 1 Application(s) Topical daily  dextrose 5%. 1000 milliLiter(s) (50 mL/Hr) IV Continuous <Continuous>  dextrose 5%. 1000 milliLiter(s) (100 mL/Hr) IV Continuous <Continuous>  dextrose 50% Injectable 25 Gram(s) IV Push once  dextrose 50% Injectable 12.5 Gram(s) IV Push once  dextrose 50% Injectable 25 Gram(s) IV Push once  doxazosin 1 milliGRAM(s) Oral at bedtime  glucagon  Injectable 1 milliGRAM(s) IntraMuscular once  insulin lispro (ADMELOG) corrective regimen sliding scale   SubCutaneous every 6 hours  insulin NPH human recombinant 2 Unit(s) SubCutaneous every 6 hours  multivitamin/minerals/iron Oral Solution (CENTRUM) 15 milliLiter(s) Enteral Tube daily  norepinephrine Infusion 0.05 MICROgram(s)/kG/Min (8.16 mL/Hr) IV Continuous <Continuous>  polyethylene glycol 3350 17 Gram(s) Oral two times a day  predniSONE   Tablet 5 milliGRAM(s) Oral daily  propofol Infusion 50 MICROgram(s)/kG/Min (26.1 mL/Hr) IV Continuous <Continuous>  senna 2 Tablet(s) Oral at bedtime  tenofovir alafenamide (VEMLIDY) 25 milliGRAM(s) Oral daily    MEDICATIONS  (PRN):  dextrose Oral Gel 15 Gram(s) Oral once PRN Blood Glucose LESS THAN 70 milliGRAM(s)/deciliter  melatonin 6 milliGRAM(s) Oral at bedtime PRN Insomnia      ALLERGIES: Allergies    Bactrim (Other)  fluconazole (Vomiting; Nausea)  levofloxacin (Vomiting; Nausea)  penicillin (Other)    Intolerances        OBJECTIVE:  ICU Vital Signs Last 24 Hrs  T(C): 37.2 (15 Dec 2022 04:00), Max: 37.2 (15 Dec 2022 04:00)  T(F): 98.9 (15 Dec 2022 04:00), Max: 98.9 (15 Dec 2022 04:00)  HR: 76 (15 Dec 2022 07:15) (69 - 86)  BP: 103/59 (15 Dec 2022 07:00) (86/53 - 138/64)  BP(mean): 73 (15 Dec 2022 07:00) (62 - 102)  ABP: --  ABP(mean): --  RR: 11 (15 Dec 2022 07:00) (11 - 24)  SpO2: 100% (15 Dec 2022 07:15) (97% - 100%)    O2 Parameters below as of 15 Dec 2022 07:00  Patient On (Oxygen Delivery Method): ventilator    O2 Concentration (%): 35      Mode: AC/ CMV (Assist Control/ Continuous Mandatory Ventilation)  RR (machine): 10  TV (machine): 380  FiO2: 35  PEEP: 5  ITime: 0.71  MAP: 8  PIP: 21    Adult Advanced Hemodynamics Last 24 Hrs  CVP(mm Hg): --  CVP(cm H2O): --  CO: --  CI: --  PA: --  PA(mean): --  PCWP: --  SVR: --  SVRI: --  PVR: --  PVRI: --  CAPILLARY BLOOD GLUCOSE      POCT Blood Glucose.: 126 mg/dL (15 Dec 2022 05:33)  POCT Blood Glucose.: 107 mg/dL (15 Dec 2022 00:20)  POCT Blood Glucose.: 122 mg/dL (14 Dec 2022 17:43)  POCT Blood Glucose.: 123 mg/dL (14 Dec 2022 11:38)  POCT Blood Glucose.: 110 mg/dL (14 Dec 2022 09:06)    CAPILLARY BLOOD GLUCOSE      POCT Blood Glucose.: 126 mg/dL (15 Dec 2022 05:33)    I&O's Summary    14 Dec 2022 07:01  -  15 Dec 2022 07:00  --------------------------------------------------------  IN: 2018.2 mL / OUT: 1335 mL / NET: 683.2 mL      Daily     Daily Weight in k.2 (15 Dec 2022 04:00)    PHYSICAL EXAMINATION:  General: Comfortable, no acute distress, cooperative with exam.  HEENT: PERRLA, EOMI, moist mucous membranes.  Respiratory: CTAB, normal respiratory effort, no coughing, wheezes, crackles, or rales.  CV: RRR, S1S2, no murmurs, rubs or gallops. No JVD. Distal pulses intact.  Abdominal: Soft, nontender, nondistended, no rebound or guarding, normal bowel sounds.  Neurology: AOx3, no focal neuro defects, [place] x 4.  Extremities: No pitting edema, + Peripheral pulses.  Incisions:   Tubes:    LABS:  ABG - ( 14 Dec 2022 12:15 )  pH, Arterial: 7.46  pH, Blood: x     /  pCO2: 39    /  pO2: 122   / HCO3: 28    / Base Excess: 3.6   /  SaO2: 99.4                                    7.1    4.37  )-----------( 74       ( 15 Dec 2022 00:42 )             23.0     12-15    139  |  108<H>  |  16  ----------------------------<  120<H>  3.8   |  26  |  0.56    Ca    8.4      15 Dec 2022 00:42  Phos  2.4     12-15  Mg     1.90     12-15    TPro  4.4<L>  /  Alb  2.3<L>  /  TBili  0.8  /  DBili  x   /  AST  50<H>  /  ALT  31  /  AlkPhos  121<H>  -15    LIVER FUNCTIONS - ( 15 Dec 2022 00:42 )  Alb: 2.3 g/dL / Pro: 4.4 g/dL / ALK PHOS: 121 U/L / ALT: 31 U/L / AST: 50 U/L / GGT: x           PT/INR - ( 13 Dec 2022 16:30 )   PT: 13.7 sec;   INR: 1.18 ratio         PTT - ( 13 Dec 2022 16:30 )  PTT:26.8 sec        Urinalysis Basic - ( 13 Dec 2022 22:00 )    Color: Yellow / Appearance: Clear / S.028 / pH: x  Gluc: x / Ketone: Negative  / Bili: Negative / Urobili: <2 mg/dL   Blood: x / Protein: 100 mg/dL / Nitrite: Negative   Leuk Esterase: Negative / RBC: 7 /HPF / WBC 4 /HPF   Sq Epi: x / Non Sq Epi: 3 /HPF / Bacteria: Few        TELEMETRY:     EKG:     IMAGING:  Shine Garcias MD  Internal Medicine    PATIENT: MILY MARCELO, MRN: [id number]    CHIEF COMPLAINT: Patient is a 62y old  Female who presents with a chief complaint of slurred speech (14 Dec 2022 14:01)      INTERVAL HISTORY/OVERNIGHT EVENTS: Overnight, patient had possible extravasation of levophed in RUE, phentolamine locally injected. Otherwise, no acute issues overnight. At bedside, the patient was intubated, awake and alert, responding yes and no to questions, AO3 per yes/no assessment and following simple commands. Denies chest pain, and pain, F/C.    REVIEW OF SYSTEMS:    [x ] Unable to assess ROS fully because intubtated    MEDICATIONS:  MEDICATIONS  (STANDING):  albuterol    90 MICROgram(s) HFA Inhaler 4 Puff(s) Inhalation every 6 hours  artificial tears (preservative free) Ophthalmic Solution 1 Drop(s) Both EYES two times a day  cefepime   IVPB      cefepime   IVPB 2000 milliGRAM(s) IV Intermittent every 8 hours  chlorhexidine 0.12% Liquid 15 milliLiter(s) Oral Mucosa every 12 hours  chlorhexidine 2% Cloths 1 Application(s) Topical daily  dextrose 5%. 1000 milliLiter(s) (50 mL/Hr) IV Continuous <Continuous>  dextrose 5%. 1000 milliLiter(s) (100 mL/Hr) IV Continuous <Continuous>  dextrose 50% Injectable 25 Gram(s) IV Push once  dextrose 50% Injectable 12.5 Gram(s) IV Push once  dextrose 50% Injectable 25 Gram(s) IV Push once  doxazosin 1 milliGRAM(s) Oral at bedtime  glucagon  Injectable 1 milliGRAM(s) IntraMuscular once  insulin lispro (ADMELOG) corrective regimen sliding scale   SubCutaneous every 6 hours  insulin NPH human recombinant 2 Unit(s) SubCutaneous every 6 hours  multivitamin/minerals/iron Oral Solution (CENTRUM) 15 milliLiter(s) Enteral Tube daily  norepinephrine Infusion 0.05 MICROgram(s)/kG/Min (8.16 mL/Hr) IV Continuous <Continuous>  polyethylene glycol 3350 17 Gram(s) Oral two times a day  predniSONE   Tablet 5 milliGRAM(s) Oral daily  propofol Infusion 50 MICROgram(s)/kG/Min (26.1 mL/Hr) IV Continuous <Continuous>  senna 2 Tablet(s) Oral at bedtime  tenofovir alafenamide (VEMLIDY) 25 milliGRAM(s) Oral daily    MEDICATIONS  (PRN):  dextrose Oral Gel 15 Gram(s) Oral once PRN Blood Glucose LESS THAN 70 milliGRAM(s)/deciliter  melatonin 6 milliGRAM(s) Oral at bedtime PRN Insomnia      ALLERGIES: Allergies    Bactrim (Other)  fluconazole (Vomiting; Nausea)  levofloxacin (Vomiting; Nausea)  penicillin (Other)    Intolerances        OBJECTIVE:  ICU Vital Signs Last 24 Hrs  T(C): 37.2 (15 Dec 2022 04:00), Max: 37.2 (15 Dec 2022 04:00)  T(F): 98.9 (15 Dec 2022 04:00), Max: 98.9 (15 Dec 2022 04:00)  HR: 76 (15 Dec 2022 07:15) (69 - 86)  BP: 103/59 (15 Dec 2022 07:00) (86/53 - 138/64)  BP(mean): 73 (15 Dec 2022 07:00) (62 - 102)  ABP: --  ABP(mean): --  RR: 11 (15 Dec 2022 07:00) (11 - 24)  SpO2: 100% (15 Dec 2022 07:15) (97% - 100%)    O2 Parameters below as of 15 Dec 2022 07:00  Patient On (Oxygen Delivery Method): ventilator    O2 Concentration (%): 35      Mode: AC/ CMV (Assist Control/ Continuous Mandatory Ventilation)  RR (machine): 10  TV (machine): 380  FiO2: 35  PEEP: 5  ITime: 0.71  MAP: 8  PIP: 21    Adult Advanced Hemodynamics Last 24 Hrs  CVP(mm Hg): --  CVP(cm H2O): --  CO: --  CI: --  PA: --  PA(mean): --  PCWP: --  SVR: --  SVRI: --  PVR: --  PVRI: --  CAPILLARY BLOOD GLUCOSE      POCT Blood Glucose.: 126 mg/dL (15 Dec 2022 05:33)  POCT Blood Glucose.: 107 mg/dL (15 Dec 2022 00:20)  POCT Blood Glucose.: 122 mg/dL (14 Dec 2022 17:43)  POCT Blood Glucose.: 123 mg/dL (14 Dec 2022 11:38)  POCT Blood Glucose.: 110 mg/dL (14 Dec 2022 09:06)    CAPILLARY BLOOD GLUCOSE      POCT Blood Glucose.: 126 mg/dL (15 Dec 2022 05:33)    I&O's Summary    14 Dec 2022 07:01  -  15 Dec 2022 07:00  --------------------------------------------------------  IN: 2018.2 mL / OUT: 1335 mL / NET: 683.2 mL      Daily     Daily Weight in k.2 (15 Dec 2022 04:00)    PHYSICAL EXAMINATION:  General: Comfortable, no acute distress, cooperative with exam.  HEENT: PERRLA, EOMI, moist mucous membranes.  Respiratory: CTAB, normal respiratory effort, no coughing, wheezes, crackles, or rales.  CV: RRR, S1S2, no murmurs, rubs or gallops. No JVD. Distal pulses intact.  Abdominal: Soft, nontender, nondistended, no rebound or guarding, normal bowel sounds.  Neurology: AOx3, no focal neuro defects, [place] x 4.  Extremities: No pitting edema, + Peripheral pulses.  Incisions:   Tubes:    LABS:  ABG - ( 14 Dec 2022 12:15 )  pH, Arterial: 7.46  pH, Blood: x     /  pCO2: 39    /  pO2: 122   / HCO3: 28    / Base Excess: 3.6   /  SaO2: 99.4                                    7.1    4.37  )-----------( 74       ( 15 Dec 2022 00:42 )             23.0     12-15    139  |  108<H>  |  16  ----------------------------<  120<H>  3.8   |  26  |  0.56    Ca    8.4      15 Dec 2022 00:42  Phos  2.4     12-15  Mg     1.90     12-15    TPro  4.4<L>  /  Alb  2.3<L>  /  TBili  0.8  /  DBili  x   /  AST  50<H>  /  ALT  31  /  AlkPhos  121<H>  12-15    LIVER FUNCTIONS - ( 15 Dec 2022 00:42 )  Alb: 2.3 g/dL / Pro: 4.4 g/dL / ALK PHOS: 121 U/L / ALT: 31 U/L / AST: 50 U/L / GGT: x           PT/INR - ( 13 Dec 2022 16:30 )   PT: 13.7 sec;   INR: 1.18 ratio         PTT - ( 13 Dec 2022 16:30 )  PTT:26.8 sec        Urinalysis Basic - ( 13 Dec 2022 22:00 )    Color: Yellow / Appearance: Clear / S.028 / pH: x  Gluc: x / Ketone: Negative  / Bili: Negative / Urobili: <2 mg/dL   Blood: x / Protein: 100 mg/dL / Nitrite: Negative   Leuk Esterase: Negative / RBC: 7 /HPF / WBC 4 /HPF   Sq Epi: x / Non Sq Epi: 3 /HPF / Bacteria: Few        TELEMETRY:     EKG:     IMAGING:  Shine Garcias MD  Internal Medicine    PATIENT: MILY AMRCELO, MRN: [id number]    CHIEF COMPLAINT: Patient is a 62y old  Female who presents with a chief complaint of slurred speech (14 Dec 2022 14:01)      INTERVAL HISTORY/OVERNIGHT EVENTS: Overnight, patient had possible extravasation of levophed in RUE, phentolamine locally injected. Otherwise, no acute issues overnight. At bedside, the patient was intubated, awake and alert, responding yes and no to questions, AO3 per yes/no assessment and following simple commands. Denies chest pain, and pain, F/C.    REVIEW OF SYSTEMS:    [x ] Unable to assess ROS fully because intubtated    MEDICATIONS:  MEDICATIONS  (STANDING):  albuterol    90 MICROgram(s) HFA Inhaler 4 Puff(s) Inhalation every 6 hours  artificial tears (preservative free) Ophthalmic Solution 1 Drop(s) Both EYES two times a day  cefepime   IVPB      cefepime   IVPB 2000 milliGRAM(s) IV Intermittent every 8 hours  chlorhexidine 0.12% Liquid 15 milliLiter(s) Oral Mucosa every 12 hours  chlorhexidine 2% Cloths 1 Application(s) Topical daily  dextrose 5%. 1000 milliLiter(s) (50 mL/Hr) IV Continuous <Continuous>  dextrose 5%. 1000 milliLiter(s) (100 mL/Hr) IV Continuous <Continuous>  dextrose 50% Injectable 25 Gram(s) IV Push once  dextrose 50% Injectable 12.5 Gram(s) IV Push once  dextrose 50% Injectable 25 Gram(s) IV Push once  doxazosin 1 milliGRAM(s) Oral at bedtime  glucagon  Injectable 1 milliGRAM(s) IntraMuscular once  insulin lispro (ADMELOG) corrective regimen sliding scale   SubCutaneous every 6 hours  insulin NPH human recombinant 2 Unit(s) SubCutaneous every 6 hours  multivitamin/minerals/iron Oral Solution (CENTRUM) 15 milliLiter(s) Enteral Tube daily  norepinephrine Infusion 0.05 MICROgram(s)/kG/Min (8.16 mL/Hr) IV Continuous <Continuous>  polyethylene glycol 3350 17 Gram(s) Oral two times a day  predniSONE   Tablet 5 milliGRAM(s) Oral daily  propofol Infusion 50 MICROgram(s)/kG/Min (26.1 mL/Hr) IV Continuous <Continuous>  senna 2 Tablet(s) Oral at bedtime  tenofovir alafenamide (VEMLIDY) 25 milliGRAM(s) Oral daily    MEDICATIONS  (PRN):  dextrose Oral Gel 15 Gram(s) Oral once PRN Blood Glucose LESS THAN 70 milliGRAM(s)/deciliter  melatonin 6 milliGRAM(s) Oral at bedtime PRN Insomnia      ALLERGIES: Allergies    Bactrim (Other)  fluconazole (Vomiting; Nausea)  levofloxacin (Vomiting; Nausea)  penicillin (Other)    Intolerances        OBJECTIVE:  ICU Vital Signs Last 24 Hrs  T(C): 37.2 (15 Dec 2022 04:00), Max: 37.2 (15 Dec 2022 04:00)  T(F): 98.9 (15 Dec 2022 04:00), Max: 98.9 (15 Dec 2022 04:00)  HR: 76 (15 Dec 2022 07:15) (69 - 86)  BP: 103/59 (15 Dec 2022 07:00) (86/53 - 138/64)  BP(mean): 73 (15 Dec 2022 07:00) (62 - 102)  ABP: --  ABP(mean): --  RR: 11 (15 Dec 2022 07:00) (11 - 24)  SpO2: 100% (15 Dec 2022 07:15) (97% - 100%)    O2 Parameters below as of 15 Dec 2022 07:00  Patient On (Oxygen Delivery Method): ventilator    O2 Concentration (%): 35      Mode: AC/ CMV (Assist Control/ Continuous Mandatory Ventilation)  RR (machine): 10  TV (machine): 380  FiO2: 35  PEEP: 5  ITime: 0.71  MAP: 8  PIP: 21    CAPILLARY BLOOD GLUCOSE      POCT Blood Glucose.: 126 mg/dL (15 Dec 2022 05:33)  POCT Blood Glucose.: 107 mg/dL (15 Dec 2022 00:20)  POCT Blood Glucose.: 122 mg/dL (14 Dec 2022 17:43)  POCT Blood Glucose.: 123 mg/dL (14 Dec 2022 11:38)  POCT Blood Glucose.: 110 mg/dL (14 Dec 2022 09:06)    CAPILLARY BLOOD GLUCOSE      POCT Blood Glucose.: 126 mg/dL (15 Dec 2022 05:33)    I&O's Summary    14 Dec 2022 07:01  -  15 Dec 2022 07:00  --------------------------------------------------------  IN: 2018.2 mL / OUT: 1335 mL / NET: 683.2 mL      Daily     Daily Weight in k.2 (15 Dec 2022 04:00)    PHYSICAL EXAMINATION:  General: Comfortable, no acute distress, intubated and sedated with prop but awake and interactive.   HEENT: PERRLA, EOMI, moist mucous membranes.  Respiratory: CTAB, normal respiratory effort, no coughing, wheezes, crackles, or rales.  CV: RRR, S1S2, no murmurs, rubs or gallops. No JVD. Distal pulses intact.  Abdominal: Soft, nontender, nondistended, no rebound or guarding, normal bowel sounds.  Neurology: AOx3, no focal neuro defects, following simple commands, able to write to communicate  Extremities: No pitting edema, + Peripheral pulses. RUE with demarcated area, no extension of edema beyond marked area      LABS:  ABG - ( 14 Dec 2022 12:15 )  pH, Arterial: 7.46  pH, Blood: x     /  pCO2: 39    /  pO2: 122   / HCO3: 28    / Base Excess: 3.6   /  SaO2: 99.4                              7.1    4.37  )-----------( 74       ( 15 Dec 2022 00:42 )             23.0     12-15    139  |  108<H>  |  16  ----------------------------<  120<H>  3.8   |  26  |  0.56    Ca    8.4      15 Dec 2022 00:42  Phos  2.4     12-15  Mg     1.90     12-15    TPro  4.4<L>  /  Alb  2.3<L>  /  TBili  0.8  /  DBili  x   /  AST  50<H>  /  ALT  31  /  AlkPhos  121<H>  12-15    LIVER FUNCTIONS - ( 15 Dec 2022 00:42 )  Alb: 2.3 g/dL / Pro: 4.4 g/dL / ALK PHOS: 121 U/L / ALT: 31 U/L / AST: 50 U/L / GGT: x           PT/INR - ( 13 Dec 2022 16:30 )   PT: 13.7 sec;   INR: 1.18 ratio         PTT - ( 13 Dec 2022 16:30 )  PTT:26.8 sec        Urinalysis Basic - ( 13 Dec 2022 22:00 )    Color: Yellow / Appearance: Clear / S.028 / pH: x  Gluc: x / Ketone: Negative  / Bili: Negative / Urobili: <2 mg/dL   Blood: x / Protein: 100 mg/dL / Nitrite: Negative   Leuk Esterase: Negative / RBC: 7 /HPF / WBC 4 /HPF   Sq Epi: x / Non Sq Epi: 3 /HPF / Bacteria: Few

## 2022-12-15 NOTE — CHART NOTE - NSCHARTNOTEFT_GEN_A_CORE
: Karen Hightower    INDICATION: critical illness    PROCEDURE:  [x] LIMITED ECHO  [x] LIMITED CHEST  [ ] LIMITED RETROPERITONEAL  [ ] LIMITED ABDOMINAL  [ ] LIMITED DVT  [ ] NEEDLE GUIDANCE VASCULAR  [ ] NEEDLE GUIDANCE THORACENTESIS  [ ] NEEDLE GUIDANCE PARACENTESIS  [ ] NEEDLE GUIDANCE PERICARDIOCENTESIS  [ ] OTHER    FINDINGS:  A line predominant pattern with scattered B lines at bases  LVSF appears normal  RV < LV    INTERPRETATION:  A line predominant pattern  Normal cardiac function    Images uploaded on [employer] [facility]

## 2022-12-16 LAB
ALBUMIN SERPL ELPH-MCNC: 2.5 G/DL — LOW (ref 3.3–5)
ALP SERPL-CCNC: 114 U/L — SIGNIFICANT CHANGE UP (ref 40–120)
ALT FLD-CCNC: 27 U/L — SIGNIFICANT CHANGE UP (ref 4–33)
ANION GAP SERPL CALC-SCNC: 8 MMOL/L — SIGNIFICANT CHANGE UP (ref 7–14)
AST SERPL-CCNC: 44 U/L — HIGH (ref 4–32)
BASE EXCESS BLDV CALC-SCNC: 4 MMOL/L — HIGH (ref -2–3)
BASOPHILS # BLD AUTO: 0.02 K/UL — SIGNIFICANT CHANGE UP (ref 0–0.2)
BASOPHILS NFR BLD AUTO: 0.4 % — SIGNIFICANT CHANGE UP (ref 0–2)
BILIRUB SERPL-MCNC: 0.8 MG/DL — SIGNIFICANT CHANGE UP (ref 0.2–1.2)
BLOOD GAS VENOUS COMPREHENSIVE RESULT: SIGNIFICANT CHANGE UP
BUN SERPL-MCNC: 15 MG/DL — SIGNIFICANT CHANGE UP (ref 7–23)
CALCIUM SERPL-MCNC: 8.7 MG/DL — SIGNIFICANT CHANGE UP (ref 8.4–10.5)
CHLORIDE BLDV-SCNC: 109 MMOL/L — HIGH (ref 96–108)
CHLORIDE SERPL-SCNC: 105 MMOL/L — SIGNIFICANT CHANGE UP (ref 98–107)
CO2 BLDV-SCNC: 29.8 MMOL/L — HIGH (ref 22–26)
CO2 SERPL-SCNC: 27 MMOL/L — SIGNIFICANT CHANGE UP (ref 22–31)
CREAT SERPL-MCNC: 0.55 MG/DL — SIGNIFICANT CHANGE UP (ref 0.5–1.3)
CULTURE RESULTS: SIGNIFICANT CHANGE UP
EGFR: 104 ML/MIN/1.73M2 — SIGNIFICANT CHANGE UP
EOSINOPHIL # BLD AUTO: 0.14 K/UL — SIGNIFICANT CHANGE UP (ref 0–0.5)
EOSINOPHIL NFR BLD AUTO: 2.8 % — SIGNIFICANT CHANGE UP (ref 0–6)
GAS PNL BLDV: 139 MMOL/L — SIGNIFICANT CHANGE UP (ref 136–145)
GLUCOSE BLDV-MCNC: 86 MG/DL — SIGNIFICANT CHANGE UP (ref 70–99)
GLUCOSE SERPL-MCNC: 91 MG/DL — SIGNIFICANT CHANGE UP (ref 70–99)
HCO3 BLDV-SCNC: 28 MMOL/L — SIGNIFICANT CHANGE UP (ref 22–29)
HCT VFR BLD CALC: 23.2 % — LOW (ref 34.5–45)
HCT VFR BLDA CALC: 22 % — LOW (ref 34.5–46.5)
HGB BLD CALC-MCNC: 7.2 G/DL — LOW (ref 11.5–15.5)
HGB BLD-MCNC: 7.4 G/DL — LOW (ref 11.5–15.5)
IANC: 2.81 K/UL — SIGNIFICANT CHANGE UP (ref 1.8–7.4)
IMM GRANULOCYTES NFR BLD AUTO: 8 % — HIGH (ref 0–0.9)
LACTATE BLDV-MCNC: 0.9 MMOL/L — SIGNIFICANT CHANGE UP (ref 0.5–2)
LYMPHOCYTES # BLD AUTO: 1.04 K/UL — SIGNIFICANT CHANGE UP (ref 1–3.3)
LYMPHOCYTES # BLD AUTO: 20.8 % — SIGNIFICANT CHANGE UP (ref 13–44)
MAGNESIUM SERPL-MCNC: 1.7 MG/DL — SIGNIFICANT CHANGE UP (ref 1.6–2.6)
MCHC RBC-ENTMCNC: 31.9 GM/DL — LOW (ref 32–36)
MCHC RBC-ENTMCNC: 32.9 PG — SIGNIFICANT CHANGE UP (ref 27–34)
MCV RBC AUTO: 103.1 FL — HIGH (ref 80–100)
MONOCYTES # BLD AUTO: 0.6 K/UL — SIGNIFICANT CHANGE UP (ref 0–0.9)
MONOCYTES NFR BLD AUTO: 12 % — SIGNIFICANT CHANGE UP (ref 2–14)
NEUTROPHILS # BLD AUTO: 2.81 K/UL — SIGNIFICANT CHANGE UP (ref 1.8–7.4)
NEUTROPHILS NFR BLD AUTO: 56 % — SIGNIFICANT CHANGE UP (ref 43–77)
NRBC # BLD: 1 /100 WBCS — HIGH (ref 0–0)
NRBC # FLD: 0.07 K/UL — HIGH (ref 0–0)
PCO2 BLDV: 42 MMHG — SIGNIFICANT CHANGE UP (ref 39–42)
PH BLDV: 7.44 — HIGH (ref 7.32–7.43)
PHOSPHATE SERPL-MCNC: 2.1 MG/DL — LOW (ref 2.5–4.5)
PLATELET # BLD AUTO: 72 K/UL — LOW (ref 150–400)
PO2 BLDV: 58 MMHG — SIGNIFICANT CHANGE UP
POTASSIUM BLDV-SCNC: 3.9 MMOL/L — SIGNIFICANT CHANGE UP (ref 3.5–5.1)
POTASSIUM SERPL-MCNC: 3.7 MMOL/L — SIGNIFICANT CHANGE UP (ref 3.5–5.3)
POTASSIUM SERPL-SCNC: 3.7 MMOL/L — SIGNIFICANT CHANGE UP (ref 3.5–5.3)
PROT SERPL-MCNC: 4.6 G/DL — LOW (ref 6–8.3)
RBC # BLD: 2.25 M/UL — LOW (ref 3.8–5.2)
RBC # FLD: 17.2 % — HIGH (ref 10.3–14.5)
SAO2 % BLDV: 89.5 % — SIGNIFICANT CHANGE UP
SODIUM SERPL-SCNC: 140 MMOL/L — SIGNIFICANT CHANGE UP (ref 135–145)
SPECIMEN SOURCE: SIGNIFICANT CHANGE UP
WBC # BLD: 5.01 K/UL — SIGNIFICANT CHANGE UP (ref 3.8–10.5)
WBC # FLD AUTO: 5.01 K/UL — SIGNIFICANT CHANGE UP (ref 3.8–10.5)

## 2022-12-16 RX ORDER — POTASSIUM PHOSPHATE, MONOBASIC POTASSIUM PHOSPHATE, DIBASIC 236; 224 MG/ML; MG/ML
15 INJECTION, SOLUTION INTRAVENOUS ONCE
Refills: 0 | Status: COMPLETED | OUTPATIENT
Start: 2022-12-16 | End: 2022-12-16

## 2022-12-16 RX ORDER — SODIUM CHLORIDE 9 MG/ML
500 INJECTION, SOLUTION INTRAVENOUS ONCE
Refills: 0 | Status: COMPLETED | OUTPATIENT
Start: 2022-12-16 | End: 2022-12-16

## 2022-12-16 RX ORDER — MAGNESIUM SULFATE 500 MG/ML
2 VIAL (ML) INJECTION ONCE
Refills: 0 | Status: COMPLETED | OUTPATIENT
Start: 2022-12-16 | End: 2022-12-16

## 2022-12-16 RX ADMIN — Medication 25 GRAM(S): at 02:39

## 2022-12-16 RX ADMIN — CEFEPIME 100 MILLIGRAM(S): 1 INJECTION, POWDER, FOR SOLUTION INTRAMUSCULAR; INTRAVENOUS at 06:18

## 2022-12-16 RX ADMIN — CEFEPIME 100 MILLIGRAM(S): 1 INJECTION, POWDER, FOR SOLUTION INTRAMUSCULAR; INTRAVENOUS at 13:53

## 2022-12-16 RX ADMIN — Medication 1 DROP(S): at 06:20

## 2022-12-16 RX ADMIN — POTASSIUM PHOSPHATE, MONOBASIC POTASSIUM PHOSPHATE, DIBASIC 62.5 MILLIMOLE(S): 236; 224 INJECTION, SOLUTION INTRAVENOUS at 03:45

## 2022-12-16 RX ADMIN — Medication 1 DROP(S): at 18:06

## 2022-12-16 RX ADMIN — Medication 8.16 MICROGRAM(S)/KG/MIN: at 10:14

## 2022-12-16 RX ADMIN — CHLORHEXIDINE GLUCONATE 1 APPLICATION(S): 213 SOLUTION TOPICAL at 12:13

## 2022-12-16 RX ADMIN — SODIUM CHLORIDE 83.33 MILLILITER(S): 9 INJECTION, SOLUTION INTRAVENOUS at 11:30

## 2022-12-16 NOTE — SWALLOW BEDSIDE ASSESSMENT ADULT - SPECIFY REASON(S)
To assess swallow function
assessment of oropharyngeal swallow
to assess swallow function
to assess swallow mechanism

## 2022-12-16 NOTE — CHART NOTE - NSCHARTNOTEFT_GEN_A_CORE
: Karen Hightower    INDICATION: critical illness    PROCEDURE:  [x] LIMITED ECHO  [x] LIMITED CHEST  [ ] LIMITED RETROPERITONEAL  [ ] LIMITED ABDOMINAL  [ ] LIMITED DVT  [ ] NEEDLE GUIDANCE VASCULAR  [ ] NEEDLE GUIDANCE THORACENTESIS  [ ] NEEDLE GUIDANCE PARACENTESIS  [ ] NEEDLE GUIDANCE PERICARDIOCENTESIS  [ ] OTHER    FINDINGS:  Scattered B lines L > R  LVSF normal  RV < LV  IVC 1.2 cm    INTERPRETATION:  L side B lines  cardiac function normal  IVC 1.2 cm      Images uploaded on [employer] [facility]

## 2022-12-16 NOTE — SWALLOW BEDSIDE ASSESSMENT ADULT - SWALLOW EVAL: CURRENT DIET
Pureed with thin liquids
NPO, per MD order
NPO pending formal swallow evaluation per MD order
NPO, except medications as per MD order

## 2022-12-16 NOTE — SWALLOW BEDSIDE ASSESSMENT ADULT - ASR SWALLOW RECOMMEND DIAG
cinesophagram to objectively assess oral and pharyngeal stage of swallow given concern for aspiration PNA /multiple intubations/VFSS/MBS

## 2022-12-16 NOTE — PROGRESS NOTE ADULT - SUBJECTIVE AND OBJECTIVE BOX
Shine Garcias MD  Internal Medicine  Spect:  [phone]    PATIENT: MILY MARCELO, MRN: [id number]    CHIEF COMPLAINT: Patient is a 62y old  Female who presents with a chief complaint of slurred speech (16 Dec 2022 07:05)      INTERVAL HISTORY/OVERNIGHT EVENTS: No overnight events. This morning, the patient reports hoarseness. Otherwise resting comfortably, denies CP, SOB, abd pain, fevers.     REVIEW OF SYSTEMS:    [X ] All other systems negative    MEDICATIONS:  MEDICATIONS  (STANDING):  albuterol    90 MICROgram(s) HFA Inhaler 4 Puff(s) Inhalation every 6 hours  artificial tears (preservative free) Ophthalmic Solution 1 Drop(s) Both EYES two times a day  cefepime   IVPB      cefepime   IVPB 2000 milliGRAM(s) IV Intermittent every 8 hours  chlorhexidine 2% Cloths 1 Application(s) Topical daily  dextrose 5%. 1000 milliLiter(s) (100 mL/Hr) IV Continuous <Continuous>  dextrose 5%. 1000 milliLiter(s) (50 mL/Hr) IV Continuous <Continuous>  dextrose 50% Injectable 25 Gram(s) IV Push once  dextrose 50% Injectable 12.5 Gram(s) IV Push once  dextrose 50% Injectable 25 Gram(s) IV Push once  doxazosin 1 milliGRAM(s) Oral at bedtime  glucagon  Injectable 1 milliGRAM(s) IntraMuscular once  insulin lispro (ADMELOG) corrective regimen sliding scale   SubCutaneous every 6 hours  insulin NPH human recombinant 2 Unit(s) SubCutaneous every 6 hours  lactated ringers Bolus 500 milliLiter(s) IV Bolus once  multivitamin/minerals/iron Oral Solution (CENTRUM) 15 milliLiter(s) Enteral Tube daily  norepinephrine Infusion 0.05 MICROgram(s)/kG/Min (8.16 mL/Hr) IV Continuous <Continuous>  polyethylene glycol 3350 17 Gram(s) Oral two times a day  predniSONE   Tablet 5 milliGRAM(s) Oral daily  senna 2 Tablet(s) Oral at bedtime  tenofovir alafenamide (VEMLIDY) 25 milliGRAM(s) Oral daily    MEDICATIONS  (PRN):  dextrose Oral Gel 15 Gram(s) Oral once PRN Blood Glucose LESS THAN 70 milliGRAM(s)/deciliter  melatonin 6 milliGRAM(s) Oral at bedtime PRN Insomnia      ALLERGIES: Allergies    Bactrim (Other)  fluconazole (Vomiting; Nausea)  levofloxacin (Vomiting; Nausea)  penicillin (Other)    Intolerances    OBJECTIVE:  ICU Vital Signs Last 24 Hrs  T(C): 36.9 (16 Dec 2022 08:00), Max: 37.4 (16 Dec 2022 04:00)  T(F): 98.5 (16 Dec 2022 08:00), Max: 99.3 (16 Dec 2022 04:00)  HR: 80 (16 Dec 2022 10:00) (74 - 99)  BP: 107/54 (16 Dec 2022 10:00) (95/51 - 136/62)  BP(mean): 68 (16 Dec 2022 10:00) (64 - 85)  ABP: --  ABP(mean): --  RR: 12 (16 Dec 2022 10:00) (12 - 26)  SpO2: 100% (16 Dec 2022 10:00) (92% - 100%)    O2 Parameters below as of 16 Dec 2022 10:00  Patient On (Oxygen Delivery Method): nasal cannula  O2 Flow (L/min): 4        Mode: standby  FiO2: 35    POCT Blood Glucose.: 87 mg/dL (16 Dec 2022 06:16)  POCT Blood Glucose.: 92 mg/dL (15 Dec 2022 23:55)  POCT Blood Glucose.: 92 mg/dL (15 Dec 2022 18:47)  POCT Blood Glucose.: 152 mg/dL (15 Dec 2022 12:30)    CAPILLARY BLOOD GLUCOSE      POCT Blood Glucose.: 87 mg/dL (16 Dec 2022 06:16)    I&O's Summary    15 Dec 2022 07:01  -  16 Dec 2022 07:00  --------------------------------------------------------  IN: 607.3 mL / OUT: 1550 mL / NET: -942.7 mL    16 Dec 2022 07:01  -  16 Dec 2022 11:42  --------------------------------------------------------  IN: 9 mL / OUT: 200 mL / NET: -191 mL      Daily     Daily Weight in k.8 (16 Dec 2022 05:00)    PHYSICAL EXAMINATION:  General: Comfortable, no acute distress, cooperative with exam.  HEENT: PERRLA, EOMI, moist mucous membranes.  Respiratory: CTAB, normal respiratory effort, no coughing, wheezes, crackles, or rales.  CV: RRR, S1S2, no murmurs, rubs or gallops. No JVD. Distal pulses intact.  Abdominal: Soft, nontender, nondistended, no rebound or guarding, normal bowel sounds.  Neurology: AOx3, no focal neuro defects, [place] x 4.  Extremities: No pitting edema, + Peripheral pulses.  Skin: R. forearm demarcated, erythema minimal     LABS:  ABG - ( 15 Dec 2022 12:20 )  pH, Arterial: 7.49  pH, Blood: x     /  pCO2: 35    /  pO2: 174   / HCO3: 27    / Base Excess: 3.5   /  SaO2: 98.8                                    7.4    5.01  )-----------( 72       ( 16 Dec 2022 00:52 )             23.2     12-16    140  |  105  |  15  ----------------------------<  91  3.7   |  27  |  0.55    Ca    8.7      16 Dec 2022 00:52  Phos  2.1     12-16  Mg     1.70     12-16    TPro  4.6<L>  /  Alb  2.5<L>  /  TBili  0.8  /  DBili  x   /  AST  44<H>  /  ALT  27  /  AlkPhos  114  12-16    LIVER FUNCTIONS - ( 16 Dec 2022 00:52 )  Alb: 2.5 g/dL / Pro: 4.6 g/dL / ALK PHOS: 114 U/L / ALT: 27 U/L / AST: 44 U/L / GGT: x

## 2022-12-16 NOTE — SWALLOW BEDSIDE ASSESSMENT ADULT - ADDITIONAL RECOMMENDATIONS
1. Monitor for PO intake/tolerance   2. This service to follow up as schedule permits, reconsult this service as needed
This department will continue to follow the patient for diet tolerance/advancement, as schedule permits
1. This department to follow up as schedule permits for PO candidacy  2. Medical team advised to reconsult this department if there is a change in medical status

## 2022-12-16 NOTE — SWALLOW BEDSIDE ASSESSMENT ADULT - SWALLOW EVAL: RECOMMENDED DIET
Consider NPO with consideration of short-term non-oral means of nutrition for caloric/hydration/medication needs.
pureed and moderately thick liquids with aspiration precautions
Minced and moist with thin liquids as tolerated

## 2022-12-16 NOTE — PROGRESS NOTE ADULT - ASSESSMENT
ASSESSMENT AND PLAN:  Patient is a 61 yo F w/ PMHx of MM (diagnosed ~10 years ago, currently on Promacta treatments since June 2022 - most recently last Wednesday; followed by oncologist, Dr. Lopez at [facility]), ?ITP, Hep B, HTN, neuropathic R hip pain, recent R corneal tear admitted for AMS, code stroke called no acute infarct or hemorrhage, s/p LP findings not consistent with meningitis, course complicated by hypotension and hypoxia, placed on BiPAP, requiring pressors, admitted to MICU with sepsis likely secondary to pneumonia- intubated 11/30. Extubated 12/3 to face tent reintubated 12/7 in setting of increased WOB and hypercapneic respiratory failure unclear cause possibly HLH induced. Patient was downgraded to floors 12/12, however readmitted to MICU after respiratory failure 2/2 aspiration and cardiac arrest.     Neuro  #Intubated and Sedated  -respiratory failure 2/2 aspiration and cardiac arrest requiring intubation   - AO3 today, alert and interactive even on prop 20 - plan to wean prop for Extub trial today    #Code Stroke  - s/p code stroke -> no ischemia or hemorrhage on CT H+N  - s/p LP results not consistent with encephalitis/meningitis, ammonia level 30   - baseline is AOx3, neuro status at baseline   - Repeat CTH 12/8 to r/o neurological cause for respiratory distress causing reintubation/also unsymmetrical nasolabial folds seen on exam was negative.     #Post Cardiac Arrest  -initial CTH 12/13 wnl  - No repeat CTH 12/15 as pt is awake and interactive, following commands     Cardiovascular  #Hypotension   -previously required midodrine upon downgrade to floors (10 BID) for HLH vs vasoplegia vs septic shock  -requiring pressors i/s/o sedation   -continue to wean as tolerated   - levo 0.07 on 12/15  - currently no midodrine     # EKG changes- possible HLH induced cardiac complications?  # new onset A fib with RVR, resolved, pt has been in NSR   - suspect sepsis from E. Coli bacteremia with GI or urinary source?  - pt required levophed for intubation- titrating down currently on 0.2   - midodrine 10 q 8, Metoprolol Tartrate 12.5 mg BID   - ECHO 11/30 EF 55 normal LV/RV   - TSH wnl  - has been intermittently being diuresed with lasix  - EKG 12/7- new Twave inversions V2-V6, II, III, AVF- EKG 6 hrs later slightly improved, repeat stable  - Trop 100->102-> no longer trending (patient not a candidate for Asp/ hep given pancytopenia)   - POCUS 12/8 normal function no segmental changes, VTI 22  -- POCUS 12/12 - nl LV fx, mild diastolic dysfx, LLL consolidation vx atelectasis    #new onset MAR, has been in NSR with no ectomy on tele monitor  - MAR AF in 130's on 12/4, resolved, additional event 12/7 in setting of missed PO metoprolol resolved with IV lopressor    - continue Metoprolol tartrate 12.5 mg BID   - [id number] score is 3   - unable to anticoagulate in the setting of thrombocytopenia   - SCD's are in place   - continue telemetry monitoring     Pulmonary  #AHRF- likely secondary to PNA infection vs Aspiration   - s/p BiPAP, 10/5 on 50%, then intubated 11/30 for increased work of breathing  - Extubated 12/3 to face tent  - patient with increased WOB/ tachypnea overnight 12/6 after blood transfusion possible TACO? patient requiring BIPAP 10/5 50%   - Reintubated 12/7 in setting of WOB/respiratory distress and hypercapnic respiratory failure, and extubated on 12/10  - Reintubated 12/13 i/s/o aspiration and cardiac arrest   - restarted cefepime for empiric pseudomonal coverage i/s/o aspiration   - MRSA previously negative on 12/10; consider adding vanc if febrile through cefepime  - continue home dose of Prednisone 5 mg daily (MM)   - Duplex to r/o DVTs - negative 12/7  - s/p bronch 12/9 for optimization for extubation, airways noted to be erythematous however patent. BAL culture negative.    - extubation trial 12/15 to T piece     #Aspiration event  -c/f aspiration pneumonitis  -intubated 12/13 during code blue  -CPAP today  >f/u ABG    GI  #Diet  OGT in place; Tube feeds    #Elevated liver enzymes, - downtrending  - patient has reported hx of Hep B 2017 as per hemonc   - bili was elevated, peaked at 1.5- however now normalized. continue to monitor   - hepatitis panel - Hep B surface AG +, and the rest is negative    - confirmatory test - hepatitis B quantitative - negative    -Spoke with ROSA Montejo from hepatology office, Dr Florina oJnes Re: continuation of Tenofovir. [facility] [phone]. 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 Coli bacteremia- pan sensitive 11/29   - antibiotics broadened to vanco (12/7-12/10) and meropenem (12/7- 12/11) - 5day course  - MRSA resent 12/8- negative  - Sputum cx 12/1, 12/7 - NRF, BAL Culture 12/9 neg , CSF Cx neg   - s/p ctx (12/5-12/7)    - s/p Cefepime 2g q8h (11/30- 12/5)  - s/p Azithromycin 500 mg started (12/2- 12/3) , legionella neg  - Bcx 12/2, 11/29, 12/7- neg   - Fungitel 39   - ID following     SKIN  # sacral decub-DTI., Wound care nurse recs in place     Endo  - no history of diabetes, A1C 6.0  - TSH wnl  - NPH 2 Q6h  - ISS   - cont monitoring     Heme  #MM  - as per Hemonc note MM in remission 5/22 has been on Promacta since 6/22 has received multiple treatments for MM in the last also PBSCT x 2 and haplo allo stem cell transplant in 2020- last spike noted sept 2022-was given belantamab- last treatment was on 11/23/22  - holding anti-myeloma therapy  -  s/p filgrastim held now as patient no longer neutropenic 12/7  - private Hemonc following [facility] recs appreciated     Pancytopenia  #concern for MAHA, possible HUS with E. Coli bacteremia? vs HLH?improving  - new thrombocytopenia, anemia, and +hemolysis labs (bili uptrending and now normalized)  -? 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   - Transfuse PRBC < 7  - Patient meets criteria for HLH (ferritin worsening 26844> 27534, trig 519->191, pancytopenia - 3 cell lines, persistent fever, IL2 8324, hepatitis) Us neg for splenomegaly   -- hem onc __ Concern for HLH__ Appears to be improving, PLTs and HGB improved  - continue home Promacta, 50 mg daily   - Spoke with hemonc at [facility] about HLH- states labs could also be elevated in setting of sepsis and at this point treatment for HLH would be to treat underlying cause of infection and patient would not be a candidate for high dose immunosuppressant (Risks outweigh benefits)   - trend HLH labs daily   - s/p neupogen D/C'd as patient no longer neutropenic     #DVT ppx  - hold off on DVT ppx for now in setting of pancytopenia   - SCD's in place   - Duplex LE - negative 12/7    Skin  #Suspected DTI on sacrum and ear as per nurse  - Wound care recs appreciated      Ethics  - updated son, he is pediatric resident and patient's HCP  - Pt is full code   ASSESSMENT AND PLAN:  Patient is a 61 yo F w/ PMHx of MM (diagnosed ~10 years ago, currently on Promacta treatments since June 2022 - most recently last Wednesday; followed by oncologist, Dr. Lopez at [facility]), ?ITP, Hep B, HTN, neuropathic R hip pain, recent R corneal tear admitted for AMS, code stroke called no acute infarct or hemorrhage, s/p LP findings not consistent with meningitis, course complicated by hypotension and hypoxia, placed on BiPAP, requiring pressors, admitted to MICU with sepsis likely secondary to pneumonia- intubated 11/30. Extubated 12/3 to face tent reintubated 12/7 in setting of increased WOB and hypercapneic respiratory failure unclear cause possibly HLH induced. Patient was downgraded to floors 12/12, however readmitted to MICU after respiratory failure 2/2 aspiration and cardiac arrest.     Neuro  Off sedation, extubated and AOx3  Pending PT     #Code Stroke  - s/p code stroke -> no ischemia or hemorrhage on CT H+N  - s/p LP results not consistent with encephalitis/meningitis, ammonia level 30   - baseline is AOx3, neuro status at baseline   - Repeat CTH 12/8 to r/o neurological cause for respiratory distress causing reintubation/also unsymmetrical nasolabial folds seen on exam was negative.     #Post Cardiac Arrest  -initial CTH 12/13 wnl  - No repeat CTH 12/15 as pt is awake and interactive, following commands     Cardiovascular  #Hypotension   -previously required midodrine upon downgrade to floors (10 BID) for HLH vs vasoplegia vs septic shock  -requiring pressors i/s/o sedation   -continue to wean as tolerated   - levo 0.02 on 12/16  - currently no midodrine - no PO access pending S&S    # EKG changes- possible HLH induced cardiac complications?  # new onset A fib with RVR, resolved, pt has been in NSR   - suspect sepsis from E. Coli bacteremia with GI or urinary source?  - pt required levophed for intubation- titrating down currently on 0.2   - midodrine 10 q 8, Metoprolol Tartrate 12.5 mg BID   - ECHO 11/30 EF 55 normal LV/RV   - TSH wnl  - has been intermittently being diuresed with lasix  - EKG 12/7- new Twave inversions V2-V6, II, III, AVF- EKG 6 hrs later slightly improved, repeat stable  - Trop 100->102-> no longer trending (patient not a candidate for Asp/ hep given pancytopenia)   - POCUS 12/8 normal function no segmental changes, VTI 22  -- POCUS 12/12 - nl LV fx, mild diastolic dysfx, LLL consolidation vx atelectasis    #new onset MAR, has been in NSR with no ectomy on tele monitor  - MAR AF in 130's on 12/4, resolved, additional event 12/7 in setting of missed PO metoprolol resolved with IV lopressor    - continue Metoprolol tartrate 12.5 mg BID   - [id number] score is 3   - unable to anticoagulate in the setting of thrombocytopenia   - SCD's are in place   - continue telemetry monitoring     Pulmonary  #AHRF- likely secondary to PNA infection vs Aspiration   - s/p BiPAP, 10/5 on 50%, then intubated 11/30 for increased work of breathing  - Extubated 12/3 to face tent  - patient with increased WOB/ tachypnea overnight 12/6 after blood transfusion possible TACO? patient requiring BIPAP 10/5 50%   - Reintubated 12/7 in setting of WOB/respiratory distress and hypercapnic respiratory failure, and extubated on 12/10  - Reintubated 12/13 i/s/o aspiration and cardiac arrest   - restarted cefepime for empiric pseudomonal coverage i/s/o aspiration   - MRSA previously negative on 12/10; consider adding vanc if febrile through cefepime  - continue home dose of Prednisone 5 mg daily (MM)   - Duplex to r/o DVTs - negative 12/7  - s/p bronch 12/9 for optimization for extubation, airways noted to be erythematous however patent. BAL culture negative.    - extubated 12/15     #Aspiration event  -c/f aspiration pneumonitis  -intubated 12/13 during code blue  -CPAP today  >f/u ABG    GI  Pending S&S, NPO until then due to hx of aspirations    #Elevated liver enzymes, - downtrending  - patient has reported hx of Hep B 2017 as per hemonc   - bili was elevated, peaked at 1.5- however now normalized. continue to monitor   - hepatitis panel - Hep B surface AG +, and the rest is negative    - confirmatory test - hepatitis B quantitative - negative    -Spoke with ROSA Montejo from hepatology office, Dr Florina Jones Re: continuation of Tenofovir. [facility] [phone]. 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 Coli bacteremia- pan sensitive 11/29   - antibiotics broadened to vanco (12/7-12/10) and meropenem (12/7- 12/11) - 5day course  - MRSA resent 12/8- negative  - Sputum cx 12/1, 12/7 - NRF, BAL Culture 12/9 neg , CSF Cx neg   - s/p ctx (12/5-12/7)    - s/p Cefepime 2g q8h (11/30- 12/5)  - s/p Azithromycin 500 mg started (12/2- 12/3) , legionella neg  - Bcx 12/2, 11/29, 12/7- neg   - Fungitel 39   - ID following     SKIN  # sacral decub-DTI., Wound care nurse recs in place     Endo  - no history of diabetes, A1C 6.0  - TSH wnl  - NPH 2 Q6h  - ISS   - cont monitoring     Heme  #MM  - as per Hemonc note MM in remission 5/22 has been on Promacta since 6/22 has received multiple treatments for MM in the last also PBSCT x 2 and haplo allo stem cell transplant in 2020- last spike noted sept 2022-was given belantamab- last treatment was on 11/23/22  - holding anti-myeloma therapy  -  s/p filgrastim held now as patient no longer neutropenic 12/7  - private Hemonc following [facility] recs appreciated     Pancytopenia  #concern for MAHA, possible HUS with E. Coli bacteremia? vs HLH?improving  - new thrombocytopenia, anemia, and +hemolysis labs (bili uptrending and now normalized)  -? 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   - Transfuse PRBC < 7  - Patient meets criteria for HLH (ferritin worsening 76776> 20397, trig 519->191, pancytopenia - 3 cell lines, persistent fever, IL2 8324, hepatitis) Us neg for splenomegaly   -- hem onc __ Concern for HLH__ Appears to be improving, PLTs and HGB improved  - continue home Promacta, 50 mg daily   - Spoke with hemonc at [facility] about HLH- states labs could also be elevated in setting of sepsis and at this point treatment for HLH would be to treat underlying cause of infection and patient would not be a candidate for high dose immunosuppressant (Risks outweigh benefits)   - trend HLH labs daily   - s/p neupogen D/C'd as patient no longer neutropenic     #DVT ppx  - hold off on DVT ppx for now in setting of pancytopenia   - SCD's in place   - Duplex LE - negative 12/7    Skin  #Suspected DTI on sacrum and ear as per nurse  - Wound care recs appreciated      Ethics  - updated son, he is pediatric resident and patient's HCP  - Pt is full code

## 2022-12-16 NOTE — SWALLOW BEDSIDE ASSESSMENT ADULT - SWALLOW EVAL: DIAGNOSIS
1. Moderate oral dysphagia for regular solids marked by adequate oral aperture with slow and labored bolus manipulation with delayed and incomplete A-P transport. There was mild-moderate stasis in the lateral sulci requiring multiple reswallows to clear. 2. Functional oral phase for thin liquids, mildly and moderately thick liquids, and pureed marked by adequate bolus acceptance and manipulation with timely A-P transport. Oral cavity was swallow post the primary swallow. 3. Functional pharyngeal phase for thin liquids, mildly and moderately thick liquids, pureed and regular solids marked by initiation of the pharyngeal swallow with hyolaryngeal elevation and excursion upon digital palpation. There were no overt signs or symptoms of laryngeal penetration/aspiration.
Patient presents with oropharyngeal dysphagia given thin liquids, mildly thick liquids and puree marked by adequate bolus containment, slowed bolus manipulation, slowed anterior-posterior transport with adequate oral clearance post primary swallow. Pharyngeal stage marked by observed initiation of the pharyngeal swallow trigger judged via laryngeal palpation. Delayed cough response noted following PO trials possibly indicative of impaired airway protection.
1. Mild oral dysphagia for pureed and moderately thick liquids marked by prolonged manipulation resulting in delayed collection and transport. 2. Mild-moderate oral dysphagia for soft & bite-sized solids/minced & moist solids and mildly thick and thin liquids marked by prolonged/effortful mastication/manipulation and delayed collection and transport. Trace stasis observed post swallow with solids. Suspected posterior loss at BOT noted with mildly thick and thin liquids. 3. Mild pharyngeal dysphagia for pureed, solids, and moderately thick liquids marked by suspected delayed pharyngeal swallow trigger and hyolaryngeal elevation noted by digital palpation without evidence of airway penetration/aspiration. 4. Moderate pharyngeal dysphagia for mildly thick and thin liquids marked by suspected delayed pharyngeal swallow trigger and multiple swallows suggestive of pharyngeal stasis and/or penetration/aspiration. 5. Recommend pureed and moderately thick liquids with aspiration precautions.

## 2022-12-16 NOTE — SWALLOW BEDSIDE ASSESSMENT ADULT - COMMENTS
Per MICU Resident Note 12/16/22: "Patient is a 63 yo F w/ PMHx of MM (diagnosed ~10 years ago, currently on Promacta treatments since June 2022 - most recently last Wednesday; followed by oncologist, Dr. Lopez at [facility]), ?ITP, Hep B, HTN, neuropathic R hip pain, recent R corneal tear admitted for AMS, code stroke called no acute infarct or hemorrhage, s/p LP findings not consistent with meningitis, course complicated by hypotension and hypoxia, placed on BiPAP, requiring pressors, admitted to MICU with sepsis likely secondary to pneumonia- intubated 11/30. Extubated 12/3 to face tent reintubated 12/7 in setting of increased WOB and hypercapneic respiratory failure unclear cause possibly HLH induced. Patient was downgraded to floors 12/12, however readmitted to MICU after respiratory failure 2/2 aspiration and cardiac arrest."     Patient is familiar to this department as the patient was seen for a clinical swallow evaluation on 11/30, 12/5, a cinesophagram on 12/6, and a repeat clinical swallow evaluation on 12/11. See consults/reports for details.     Patient received upright in bed, awake, alert and communicative AAOx4. Harsh vocal quality noted with reduced vocal volume. Spo2 remained at 98% throughout the evaluation.

## 2022-12-16 NOTE — CONSULT NOTE ADULT - SUBJECTIVE AND OBJECTIVE BOX
ENT Progress Note    62F with MM initially admitted for septic shock 2/2 to PNA. Has been intubated/extubated multiple times this admission. On 12/13 was transferred to floor and had RRT/code after eating breakfast concerning for asp Pna. She is now off vent on 4L NC, and complaining of hoarseness since extubation. No choking and SLP saw no aspiration on their eval. They recommended minced and moist thin liquids. On exam, she is mildly hoarse, breathing comfortably, no noisy breathing. Per patient her voice is improved from yesterday.     PAST MEDICAL & SURGICAL HISTORY:  Multiple myeloma  Type 2 diabetes mellitus    Allergies    Bactrim (Other)  fluconazole (Vomiting; Nausea)  levofloxacin (Vomiting; Nausea)  penicillin (Other)    Intolerances    MEDICATIONS  (STANDING):  albuterol    90 MICROgram(s) HFA Inhaler 4 Puff(s) Inhalation every 6 hours  artificial tears (preservative free) Ophthalmic Solution 1 Drop(s) Both EYES two times a day  cefepime   IVPB      cefepime   IVPB 2000 milliGRAM(s) IV Intermittent every 8 hours  chlorhexidine 2% Cloths 1 Application(s) Topical daily  dextrose 5%. 1000 milliLiter(s) (50 mL/Hr) IV Continuous <Continuous>  dextrose 5%. 1000 milliLiter(s) (100 mL/Hr) IV Continuous <Continuous>  dextrose 50% Injectable 25 Gram(s) IV Push once  dextrose 50% Injectable 12.5 Gram(s) IV Push once  dextrose 50% Injectable 25 Gram(s) IV Push once  doxazosin 1 milliGRAM(s) Oral at bedtime  glucagon  Injectable 1 milliGRAM(s) IntraMuscular once  insulin lispro (ADMELOG) corrective regimen sliding scale   SubCutaneous every 6 hours  insulin NPH human recombinant 2 Unit(s) SubCutaneous every 6 hours  lactated ringers Bolus 500 milliLiter(s) IV Bolus once  multivitamin/minerals/iron Oral Solution (CENTRUM) 15 milliLiter(s) Enteral Tube daily  norepinephrine Infusion 0.05 MICROgram(s)/kG/Min (8.16 mL/Hr) IV Continuous <Continuous>  polyethylene glycol 3350 17 Gram(s) Oral two times a day  predniSONE   Tablet 5 milliGRAM(s) Oral daily  senna 2 Tablet(s) Oral at bedtime  tenofovir alafenamide (VEMLIDY) 25 milliGRAM(s) Oral daily    MEDICATIONS  (PRN):  dextrose Oral Gel 15 Gram(s) Oral once PRN Blood Glucose LESS THAN 70 milliGRAM(s)/deciliter  melatonin 6 milliGRAM(s) Oral at bedtime PRN Insomnia    Vital Signs Last 24 Hrs  T(C): 36.9 (16 Dec 2022 08:00), Max: 37.4 (16 Dec 2022 04:00)  T(F): 98.5 (16 Dec 2022 08:00), Max: 99.3 (16 Dec 2022 04:00)  HR: 80 (16 Dec 2022 10:00) (74 - 99)  BP: 107/54 (16 Dec 2022 10:00) (95/51 - 121/55)  BP(mean): 68 (16 Dec 2022 10:00) (64 - 74)  RR: 12 (16 Dec 2022 10:00) (12 - 26)  SpO2: 100% (16 Dec 2022 10:00) (92% - 100%)    Parameters below as of 16 Dec 2022 10:00  Patient On (Oxygen Delivery Method): nasal cannula  O2 Flow (L/min): 4      Physical Exam:  Alert, NAD  Voice: slightly hoarse  OC: tongue wnl, FOM, soft, OP clear  NC: dry mucosa bl   Nonlabored Respirations on 4L, no noisy breathing   [place]    Fiberoptic Nasopharyngolaryngoscopy (NPL):   Dry nasal mucosa   Nasopharynx wnl  BOT/vallecula normal  Epiglottis sharp  AE folds nonedematous  Arytenoids mobile  Vocal folds mobile bilaterally  No masses or lesions visualized in post cricoid space or pyriform sinuses bilaterally      12-15-22 @ 07:01  -  12-16-22 @ 07:00  --------------------------------------------------------  IN: 607.3 mL / OUT: 1550 mL / NET: -942.7 mL    12-16-22 @ 07:01  -  12-16-22 @ 13:03  --------------------------------------------------------  IN: 9 mL / OUT: 200 mL / NET: -191 mL                        7.4    5.01  )-----------( 72       ( 16 Dec 2022 00:52 )             23.2    12-16    140  |  105  |  15  ----------------------------<  91  3.7   |  27  |  0.55    Ca    8.7      16 Dec 2022 00:52  Phos  2.1     12-16  Mg     1.70     12-16    TPro  4.6<L>  /  Alb  2.5<L>  /  TBili  0.8  /  DBili  x   /  AST  44<H>  /  ALT  27  /  AlkPhos  114  12-16     A/P: 62yFemale with multiple intubation and extubations in this hospitalization with hoarse voice since then. Voice is improving daily. Scope exam showed bilateral cords mobile.   - Diet per SLP  - Reflux Precautions  - Can consider starting Pepcid   - saline sprays BID for nasal mucosa   - d/w attending

## 2022-12-16 NOTE — SWALLOW BEDSIDE ASSESSMENT ADULT - ASR SWALLOW LINGUAL MOBILITY
impaired left lateral movement/impaired right lateral movement
within functional limits
impaired anterior elevation

## 2022-12-17 DIAGNOSIS — T14.8XXA OTHER INJURY OF UNSPECIFIED BODY REGION, INITIAL ENCOUNTER: ICD-10-CM

## 2022-12-17 DIAGNOSIS — Z86.74 PERSONAL HISTORY OF SUDDEN CARDIAC ARREST: ICD-10-CM

## 2022-12-17 DIAGNOSIS — R78.81 BACTEREMIA: ICD-10-CM

## 2022-12-17 DIAGNOSIS — I95.9 HYPOTENSION, UNSPECIFIED: ICD-10-CM

## 2022-12-17 DIAGNOSIS — R74.01 ELEVATION OF LEVELS OF LIVER TRANSAMINASE LEVELS: ICD-10-CM

## 2022-12-17 DIAGNOSIS — E87.0 HYPEROSMOLALITY AND HYPERNATREMIA: ICD-10-CM

## 2022-12-17 LAB
ALBUMIN SERPL ELPH-MCNC: 2.6 G/DL — LOW (ref 3.3–5)
ALP SERPL-CCNC: 117 U/L — SIGNIFICANT CHANGE UP (ref 40–120)
ALT FLD-CCNC: 22 U/L — SIGNIFICANT CHANGE UP (ref 4–33)
ANION GAP SERPL CALC-SCNC: 10 MMOL/L — SIGNIFICANT CHANGE UP (ref 7–14)
AST SERPL-CCNC: 48 U/L — HIGH (ref 4–32)
BASE EXCESS BLDV CALC-SCNC: 1.3 MMOL/L — SIGNIFICANT CHANGE UP (ref -2–3)
BASOPHILS # BLD AUTO: 0.06 K/UL — SIGNIFICANT CHANGE UP (ref 0–0.2)
BASOPHILS NFR BLD AUTO: 1.1 % — SIGNIFICANT CHANGE UP (ref 0–2)
BILIRUB SERPL-MCNC: 1 MG/DL — SIGNIFICANT CHANGE UP (ref 0.2–1.2)
BLOOD GAS VENOUS COMPREHENSIVE RESULT: SIGNIFICANT CHANGE UP
BUN SERPL-MCNC: 14 MG/DL — SIGNIFICANT CHANGE UP (ref 7–23)
CALCIUM SERPL-MCNC: 9 MG/DL — SIGNIFICANT CHANGE UP (ref 8.4–10.5)
CHLORIDE BLDV-SCNC: 108 MMOL/L — SIGNIFICANT CHANGE UP (ref 96–108)
CHLORIDE SERPL-SCNC: 104 MMOL/L — SIGNIFICANT CHANGE UP (ref 98–107)
CO2 BLDV-SCNC: 28 MMOL/L — HIGH (ref 22–26)
CO2 SERPL-SCNC: 24 MMOL/L — SIGNIFICANT CHANGE UP (ref 22–31)
CREAT SERPL-MCNC: 0.51 MG/DL — SIGNIFICANT CHANGE UP (ref 0.5–1.3)
EGFR: 105 ML/MIN/1.73M2 — SIGNIFICANT CHANGE UP
EOSINOPHIL # BLD AUTO: 0.2 K/UL — SIGNIFICANT CHANGE UP (ref 0–0.5)
EOSINOPHIL NFR BLD AUTO: 3.7 % — SIGNIFICANT CHANGE UP (ref 0–6)
FERRITIN SERPL-MCNC: 7487 NG/ML — HIGH (ref 15–150)
GAS PNL BLDV: 139 MMOL/L — SIGNIFICANT CHANGE UP (ref 136–145)
GLUCOSE BLDV-MCNC: 71 MG/DL — SIGNIFICANT CHANGE UP (ref 70–99)
GLUCOSE SERPL-MCNC: 74 MG/DL — SIGNIFICANT CHANGE UP (ref 70–99)
HCO3 BLDV-SCNC: 27 MMOL/L — SIGNIFICANT CHANGE UP (ref 22–29)
HCT VFR BLD CALC: 24.9 % — LOW (ref 34.5–45)
HCT VFR BLDA CALC: 23 % — LOW (ref 34.5–46.5)
HGB BLD CALC-MCNC: 7.5 G/DL — LOW (ref 11.5–15.5)
HGB BLD-MCNC: 7.7 G/DL — LOW (ref 11.5–15.5)
IANC: 2.9 K/UL — SIGNIFICANT CHANGE UP (ref 1.8–7.4)
IMM GRANULOCYTES NFR BLD AUTO: 6.3 % — HIGH (ref 0–0.9)
LACTATE BLDV-MCNC: 1.4 MMOL/L — SIGNIFICANT CHANGE UP (ref 0.5–2)
LDH SERPL L TO P-CCNC: 881 U/L — HIGH (ref 135–225)
LYMPHOCYTES # BLD AUTO: 1.06 K/UL — SIGNIFICANT CHANGE UP (ref 1–3.3)
LYMPHOCYTES # BLD AUTO: 19.8 % — SIGNIFICANT CHANGE UP (ref 13–44)
MCHC RBC-ENTMCNC: 30.9 GM/DL — LOW (ref 32–36)
MCHC RBC-ENTMCNC: 32.4 PG — SIGNIFICANT CHANGE UP (ref 27–34)
MCV RBC AUTO: 104.6 FL — HIGH (ref 80–100)
MONOCYTES # BLD AUTO: 0.8 K/UL — SIGNIFICANT CHANGE UP (ref 0–0.9)
MONOCYTES NFR BLD AUTO: 14.9 % — HIGH (ref 2–14)
NEUTROPHILS # BLD AUTO: 2.9 K/UL — SIGNIFICANT CHANGE UP (ref 1.8–7.4)
NEUTROPHILS NFR BLD AUTO: 54.2 % — SIGNIFICANT CHANGE UP (ref 43–77)
NRBC # BLD: 1 /100 WBCS — HIGH (ref 0–0)
NRBC # FLD: 0.06 K/UL — HIGH (ref 0–0)
PCO2 BLDV: 45 MMHG — HIGH (ref 39–42)
PH BLDV: 7.38 — SIGNIFICANT CHANGE UP (ref 7.32–7.43)
PLATELET # BLD AUTO: 94 K/UL — LOW (ref 150–400)
PO2 BLDV: 46 MMHG — SIGNIFICANT CHANGE UP
POTASSIUM BLDV-SCNC: 3.6 MMOL/L — SIGNIFICANT CHANGE UP (ref 3.5–5.1)
POTASSIUM SERPL-MCNC: 3.5 MMOL/L — SIGNIFICANT CHANGE UP (ref 3.5–5.3)
POTASSIUM SERPL-SCNC: 3.5 MMOL/L — SIGNIFICANT CHANGE UP (ref 3.5–5.3)
PROT SERPL-MCNC: 5 G/DL — LOW (ref 6–8.3)
RBC # BLD: 2.38 M/UL — LOW (ref 3.8–5.2)
RBC # FLD: 17.6 % — HIGH (ref 10.3–14.5)
SAO2 % BLDV: 71.2 % — SIGNIFICANT CHANGE UP
SODIUM SERPL-SCNC: 138 MMOL/L — SIGNIFICANT CHANGE UP (ref 135–145)
WBC # BLD: 5.36 K/UL — SIGNIFICANT CHANGE UP (ref 3.8–10.5)
WBC # FLD AUTO: 5.36 K/UL — SIGNIFICANT CHANGE UP (ref 3.8–10.5)

## 2022-12-17 PROCEDURE — 99233 SBSQ HOSP IP/OBS HIGH 50: CPT | Mod: GC

## 2022-12-17 RX ORDER — NOREPINEPHRINE BITARTRATE/D5W 8 MG/250ML
0.05 PLASTIC BAG, INJECTION (ML) INTRAVENOUS
Qty: 8 | Refills: 0 | Status: DISCONTINUED | OUTPATIENT
Start: 2022-12-17 | End: 2022-12-17

## 2022-12-17 RX ORDER — SODIUM CHLORIDE 9 MG/ML
1000 INJECTION, SOLUTION INTRAVENOUS
Refills: 0 | Status: DISCONTINUED | OUTPATIENT
Start: 2022-12-17 | End: 2022-12-18

## 2022-12-17 RX ORDER — POTASSIUM CHLORIDE 20 MEQ
10 PACKET (EA) ORAL
Refills: 0 | Status: COMPLETED | OUTPATIENT
Start: 2022-12-17 | End: 2022-12-17

## 2022-12-17 RX ADMIN — SODIUM CHLORIDE 50 MILLILITER(S): 9 INJECTION, SOLUTION INTRAVENOUS at 11:36

## 2022-12-17 RX ADMIN — Medication 8.16 MICROGRAM(S)/KG/MIN: at 04:38

## 2022-12-17 RX ADMIN — Medication 1 DROP(S): at 17:28

## 2022-12-17 RX ADMIN — Medication 8.16 MICROGRAM(S)/KG/MIN: at 07:38

## 2022-12-17 RX ADMIN — Medication 1 DROP(S): at 09:12

## 2022-12-17 RX ADMIN — Medication 100 MILLIEQUIVALENT(S): at 12:15

## 2022-12-17 RX ADMIN — Medication 100 MILLIEQUIVALENT(S): at 09:12

## 2022-12-17 RX ADMIN — Medication 100 MILLIEQUIVALENT(S): at 10:54

## 2022-12-17 NOTE — PROGRESS NOTE ADULT - PROBLEM SELECTOR PLAN 6
#concern for MAHA, possible HUS with E. Coli bacteremia? vs HLH?improving  - new thrombocytopenia, anemia, and +hemolysis labs (bili uptrending and now normalized)  -? 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   - Transfuse PRBC < 7  - Patient meets criteria for HLH (ferritin worsening 38004> 67630, trig 519->191, pancytopenia - 3 cell lines, persistent fever, IL2 8324, hepatitis) Us neg for splenomegaly   -- hem onc __ Concern for HLH__ Appears to be improving, PLTs and HGB improved  - continue home Promacta, 50 mg daily   - Spoke with hemonc at [facility] about HLH- states labs could also be elevated in setting of sepsis and at this point treatment for HLH would be to treat underlying cause of infection and patient would not be a candidate for high dose immunosuppressant (Risks outweigh benefits)   - trend HLH labs daily   - s/p neupogen D/C'd as patient no longer neutropenic As per Hemonc note MM in remission 5/22 has been on Promacta since 6/22 has received multiple treatments for MM in the last also PBSCT x 2 and haplo allo stem cell transplant in 2020- last spike noted sept 2022-was given belantamab- last treatment was on 11/23/22    - holding anti-myeloma therapy  -  s/p filgrastim held now as patient no longer neutropenic 12/7  - private Hemonc following [facility] recs appreciated

## 2022-12-17 NOTE — DISCHARGE NOTE PROVIDER - NSDCMRMEDTOKEN_GEN_ALL_CORE_FT
acyclovir 400 mg oral tablet: 1 tab(s) orally 2 times a day  amLODIPine 5 mg oral tablet: 1 tab(s) orally once a day  atovaquone 750 mg/5 mL oral suspension: 5 milliliter(s) orally once a day  cholecalciferol 50 mcg (2000 intl units) oral tablet: 1 tab(s) orally once a day  gabapentin 400 mg oral tablet: 1 tab(s) orally 3 times a day  magnesium oxide 250 mg oral tablet: 2 tab(s) orally once a day, As Needed  metFORMIN 500 mg oral tablet, extended release: 1 tab(s) orally once a day  predniSONE 5 mg oral tablet: 1 tab(s) orally once a day  Promacta 50 mg oral tablet: 1 tab(s) orally once a day  pyridoxine 100 mg oral tablet: 1 tab(s) orally once a day  Timoptic 0.5% ophthalmic solution: 1 drop(s) to each affected eye once a day  Vemlidy 25 mg oral tablet: 1 tab(s) orally once a day   acyclovir 400 mg oral tablet: 1 tab(s) orally 2 times a day  amLODIPine 5 mg oral tablet: 1 tab(s) orally once a day  atovaquone 750 mg/5 mL oral suspension: 5 milliliter(s) orally once a day  cholecalciferol 50 mcg (2000 intl units) oral tablet: 1 tab(s) orally once a day  doxazosin 1 mg oral tablet: 1 tab(s) orally once a day (at bedtime)  gabapentin 400 mg oral tablet: 1 tab(s) orally 3 times a day  magnesium oxide 250 mg oral tablet: 2 tab(s) orally once a day, As Needed  metFORMIN 500 mg oral tablet, extended release: 1 tab(s) orally once a day  polyethylene glycol 3350 oral powder for reconstitution: 17 gram(s) orally 2 times a day  predniSONE 5 mg oral tablet: 1 tab(s) orally once a day  Promacta 50 mg oral tablet: 1 tab(s) orally once a day  pyridoxine 100 mg oral tablet: 1 tab(s) orally once a day  senna leaf extract oral tablet: 2 tab(s) orally once a day (at bedtime)  Timoptic 0.5% ophthalmic solution: 1 drop(s) to each affected eye once a day  Vemlidy 25 mg oral tablet: 1 tab(s) orally once a day   acyclovir 400 mg oral tablet: 1 tab(s) orally 2 times a day  amLODIPine 5 mg oral tablet: 1 tab(s) orally once a day  atovaquone 750 mg/5 mL oral suspension: 5 milliliter(s) orally once a day  cholecalciferol 50 mcg (2000 intl units) oral tablet: 1 tab(s) orally once a day  doxazosin 1 mg oral tablet: 1 tab(s) orally once a day (at bedtime)  gabapentin 400 mg oral tablet: 1 tab(s) orally 3 times a day  magnesium oxide 250 mg oral tablet: 2 tab(s) orally once a day, As Needed  metFORMIN 500 mg oral tablet, extended release: 1 tab(s) orally once a day  polyethylene glycol 3350 oral powder for reconstitution: 17 gram(s) orally 2 times a day  predniSONE 5 mg oral tablet: 1 tab(s) orally once a day  Promacta 50 mg oral tablet: 1 tab(s) orally once a day  pyridoxine 100 mg oral tablet: 1 tab(s) orally once a day  senna leaf extract oral tablet: 2 tab(s) orally once a day (at bedtime)  Timoptic 0.5% ophthalmic solution: 1 drop(s) to each affected eye once a day  Vemlidy 25 mg oral tablet: 1 tab(s) orally once a day  wheelchair: wheelchair    ICD code M62.81

## 2022-12-17 NOTE — PROGRESS NOTE ADULT - PROBLEM SELECTOR PLAN 4
Last positive BCx 11/29. 12/7 BCx No Growth Final, 12/13 and 12/14 BCx NGTD. s/p CTX 12/5- 12/7, Cefepime 11/30-12/5, Vanco 12/7-12/10 and Meropenem 12/7-12/11.     -Currently monitoring off abx  -ID following, brenton payan Pt has received total 1u PLT transfusion and 1.5u pRBC transfusion during this hospitalization. Per heme onc, concern for HLH. Pt s/p Promacta 12/6-12/13. Platelets have been steadily uptrending, with 12/17 AM PLT 94. s/p neupogen as pt is no longer neutropenic. Heme onc following    Diagnostics:  - direct dede negative  - some schistocytes on peripheral smear, confirmed with hematology/oncology, however higher suspicion for lab abnormalities are secondary to sepsis  - Per, patient met criteria for HLH during admission (ferritin worsening 21957> 48022, trig 519->191, pancytopenia - 3 cell lines, persistent fever, IL2 8324, hepatitis)       Management:  - Platelet transfusion protocol: <10, <20 with fever, <50 procedures/active bleeding/etc   - Transfuse pRBCs if Hgb < 7  - Trend HLH labs daily (ferritin, LDH) in addition to CBC/CMP

## 2022-12-17 NOTE — CHART NOTE - NSCHARTNOTEFT_GEN_A_CORE
MAR Accept Note  Transfer to: Medicine  Accepting Attending Physician: Dr. Neil  Assigned Room: [address] [place] [address]    Patient seen and examined.   Labs and data reviewed.   No findings precluding transfer of service.       HPI/MICU COURSE:   Please refer to MICU transfer note for full details. Patient is a 62 year-old female with history of MM (diagnosed 10 years ago, currently on Promacta treatments), HTN, neuropathic R hip pain, recent R corneal tear, who presented initially to [facility] ED for change in mental status. Code stroke called initially for the patient due to AMS, no acute infarct on imaging and LP wnl. Patient's course c/b hypoxic respiratory failure and hypotension likely 2/2 septic shock from PNA, intubated 11/30. The patient was treated with abx, and extubated however shortly after developed acute hypercapnic respiratory failure requiring reintubation on 12/7. It was initially thought that this was related HLH per heme/onc, along with EKG changes at the time (which resolved). The patient was weaned off of pressors and continued on Abx. The patient was eventually extubated and initially passed S&S eval. Shortly after, the patient had an aspiration event and cardiac arrest on 12/12 leading to reintubation (3rd time during this admission). The patient was empirically treated with cefepime 12/12-12/16, remained afebrile with no leukocytosis. Eventually weaned off pressors and extubated 12/15, now on nasal canula and hemodynamically stable.     The patient failed repeat S&S eval on 12/16, the patient declined NGT placement at the time and is currently NPO as on 12/17 since extubation. The patient believes she can swallow. Planned for cineesophagogram on 12/19.       FOR FOLLOW-UP:  [ ] F/u PO access - patient declining NGT at this time with capacity, currently maintained on D5 drip but will need PO access eventually  [ ] F/u cineesophagogram (scheduled 12/19)  [ ] F/u heme/onc recs for MM history and work-up of possible HLH  [ ] F/u TOV pilar Hutson MD  Internal Medicine PGY-3  [phone] / [phone]

## 2022-12-17 NOTE — PROGRESS NOTE ADULT - PROBLEM SELECTOR PLAN 11
DVT PPx: SCDs, holding off pharmacologic ppx i/s/o pancytopenia  Diet: currently NPO, pending cineesophagogram 12/19, pt declined NGT. Pt currently on D5LR 50 cc/hr  Dispo: Medically active        Code Status: Full Code  HCP: Pt's son  Bowel Regimen: Senna and Miralax  Insulin: SSI q6 while NPO, will readjust insulin based on requirements. DVT PPx: SCDs, holding off pharmacologic ppx i/s/o pancytopenia  Diet: currently NPO, pending cineesophagogram 12/19, pt declined NGT. Pt currently on D5LR 50 cc/hr  Dispo: Medically active        Code Status: Full Code  HCP: Pt's son  Bowel Regimen: Senna and Miralax  Insulin: SSI q6 while NPO, will readjust insulin based on requirements.  Miscellaneous: Because pt is NPO and does not want OGT/NGT, pt cannot receive PO Tenofovir, Doxazosin, or Prednisone as ordered

## 2022-12-17 NOTE — DISCHARGE NOTE PROVIDER - HOSPITAL COURSE
OUTSTANDING ISSUES AT DISCHARGE  #Multiple Myeloma  [ ]  #HLH  [ ]   #Aspiration Pneumonia  [ ]   #Cardiac Arrest    HOSPITAL COURSE  HPI:  Patient is a 62 yoF w/ pmhx of MM (diagnosed ~10 years ago, currently on Promacta treatments since June 2022 - most recently last Wednesday; followed by oncologist, Dr. Lopez at [facility]), HTN, neuropathic R hip pain, recent R corneal tear, presents to [facility] ED for change in mental status. LKW approximately 14:30. Patient was in normal state of health this morning as per son, Nacho. Patient went to a follow up ophtho appointment for a recently diagnosed corneal abrasion this past Monday. Patient was picked up by a friend around 10:00 for an optho appt with normal mental status, pt was just complaining of being sleepy. On the way back from appointment around 14:30, patient stopped responding to friend appropriately in the car and she appeared more tired & weaker than usual. She started having slurred speech when they arrived home and then son called EMS. In the ED, patient with persisting word finding difficulties and generalized weakness, but leaning to the R side. Patient reports some difficulty remembering what happened today, but reports she feels generally weak and very tired. Code stroke called, but TPA not given because of imporvement in pt's symptoms. Son reports patient's speech is close to baseline now. Patient then denied chest pain, SOB, HA, changes in vision, N/V, imbalance, numbness or tingling. Denies recent infection or sick contacts. Pt states that she had a similar episode a few years ago with fever which she was told was likely from a MM, she improved after getting steroids.     In the ED pt was febrile 105.5, , /84, RR 24 and saturating well on RA. Pt given 2L IVF bolus and 1x vanc/ceftriaxone/acyclovir/ampicillin (30 Nov 2022 02:42)    Hospital Course: Code stroke called initially for the patient due to AMS, no acute infarct on imaging and LP wnl. Patient's course c/b hypoxic respiratory failure and hypotension likely 2/2 septic shock from PNA, intubated 11/30. The patient was treated with abx, and extubated however shortly after developed acute hypercapnic respiratory failure requiring reintubation on 12/7. It was initially thought that this was related HLH per heme/onc, along with EKG changes at the time (which resolved). The patient was weaned off of pressors and continued on Abx. The patient was eventually extubated and initially passed S&S eval. Shortly after, the patient had an aspiration vent and cardiac arrest on 12/12 leading to reintubation (3rd time during this admission). The patient was empirically treated with cefepime 12/12-12/16, remained afebrile with no leukocytosis. Eventually weaned off pressors and extubated 12/15, now on nasal canula and hemodynamically stable.     The patient failed repeat S&S eval on 12/16, the patient declined NGT placement at the time and is currently NPO as on 12/17 since extubation. The patient believes she can swallow. Planned for cineesophagogram on 12/19.    HOSPITAL COURSE  HPI:  Patient is a 62 yoF w/ pmhx of MM (diagnosed ~10 years ago, currently on Promacta treatments since June 2022 - most recently last Wednesday; followed by oncologist, Dr. Lopez at [facility]), HTN, neuropathic R hip pain, recent R corneal tear, presents to [facility] ED for change in mental status. LKW approximately 14:30. Patient was in normal state of health this morning as per son, Nacho. Patient went to a follow up ophtho appointment for a recently diagnosed corneal abrasion this past Monday. Patient was picked up by a friend around 10:00 for an optho appt with normal mental status, pt was just complaining of being sleepy. On the way back from appointment around 14:30, patient stopped responding to friend appropriately in the car and she appeared more tired & weaker than usual. She started having slurred speech when they arrived home and then son called EMS. In the ED, patient with persisting word finding difficulties and generalized weakness, but leaning to the R side. Patient reports some difficulty remembering what happened today, but reports she feels generally weak and very tired. Code stroke called, but TPA not given because of imporvement in pt's symptoms. Son reports patient's speech is close to baseline now. Patient then denied chest pain, SOB, HA, changes in vision, N/V, imbalance, numbness or tingling. Denies recent infection or sick contacts. Pt states that she had a similar episode a few years ago with fever which she was told was likely from a MM, she improved after getting steroids.     In the ED pt was febrile 105.5, , /84, RR 24 and saturating well on RA. Pt given 2L IVF bolus and 1x vanc/ceftriaxone/acyclovir/ampicillin (30 Nov 2022 02:42)    Hospital Course: Code stroke called initially for the patient due to AMS, no acute infarct on imaging and LP wnl. Patient's course c/b hypoxic respiratory failure and hypotension likely 2/2 septic shock from PNA, intubated 11/30. The patient was treated with abx, and extubated however shortly after developed acute hypercapnic respiratory failure requiring reintubation on 12/7. It was initially thought that this was related HLH per heme/onc, along with EKG changes at the time (which resolved). The patient was weaned off of pressors and continued on Abx. The patient was eventually extubated and initially passed S&S eval. Shortly after, the patient had an aspiration vent and cardiac arrest on 12/12 leading to reintubation (3rd time during this admission). The patient was empirically treated with cefepime 12/12-12/16, remained afebrile with no leukocytosis. Eventually weaned off pressors and extubated 12/15, now on nasal canula and hemodynamically stable. Patient passed cineesophagram on 12/19 and has been tolerating soft bite sized diet. She was recommended for VINCENT by PT however declined placement and after a risk benefit discussion the patient decided that she would want to go home and follow up with PT on her own.    HOSPITAL COURSE  HPI:  Patient is a 62 yoF w/ pmhx of MM (diagnosed ~10 years ago, currently on Promacta treatments since June 2022 - most recently last Wednesday; followed by oncologist, Dr. Lopez at [facility]), HTN, neuropathic R hip pain, recent R corneal tear, presents to [facility] ED for change in mental status. LKW approximately 14:30. Patient was in normal state of health this morning as per son, Nacho. Patient went to a follow up ophtho appointment for a recently diagnosed corneal abrasion this past Monday. Patient was picked up by a friend around 10:00 for an optho appt with normal mental status, pt was just complaining of being sleepy. On the way back from appointment around 14:30, patient stopped responding to friend appropriately in the car and she appeared more tired & weaker than usual. She started having slurred speech when they arrived home and then son called EMS. In the ED, patient with persisting word finding difficulties and generalized weakness, but leaning to the R side. Patient reports some difficulty remembering what happened today, but reports she feels generally weak and very tired. Code stroke called, but TPA not given because of imporvement in pt's symptoms. Son reports patient's speech is close to baseline now. Patient then denied chest pain, SOB, HA, changes in vision, N/V, imbalance, numbness or tingling. Denies recent infection or sick contacts. Pt states that she had a similar episode a few years ago with fever which she was told was likely from a MM, she improved after getting steroids.     In the ED pt was febrile 105.5, , /84, RR 24 and saturating well on RA. Pt given 2L IVF bolus and 1x vanc/ceftriaxone/acyclovir/ampicillin (30 Nov 2022 02:42)    Hospital Course: Code stroke called initially for the patient due to AMS, no acute infarct on imaging and LP wnl. Patient's course c/b hypoxic respiratory failure and hypotension likely 2/2 septic shock from PNA, intubated 11/30. The patient was treated with abx, and extubated however shortly after developed acute hypercapnic respiratory failure requiring reintubation on 12/7. It was initially thought that this was related HLH per heme/onc, along with EKG changes at the time (which resolved). The patient was weaned off of pressors and continued on Abx. The patient was eventually extubated and initially passed S&S eval. Shortly after, the patient had an aspiration vent and cardiac arrest on 12/12 leading to reintubation (3rd time during this admission). The patient was empirically treated with cefepime 12/12-12/16, remained afebrile with no leukocytosis. Eventually weaned off pressors and extubated 12/15, now on nasal canula and hemodynamically stable. Patient passed cineesophagram on 12/19 and has been tolerating soft bite sized diet. She was recommended for VINCENT by PT however declined placement and after a risk benefit discussion the patient decided that she would want to go home and follow up with PT on her own.     On the last day of admission pt required PRBC transfusion due to anemia - se did not want to do further workup at this facility - she refused to allow for post--transfusion cbc.  Pt did not want to wait for SW to set up home care or wheelchair.  Risks of signing out AMA explained to patient, including death.  She understands.  Pt wishes to sign out AMA.

## 2022-12-17 NOTE — PROGRESS NOTE ADULT - SUBJECTIVE AND OBJECTIVE BOX
HPI  63 yo woman longstanding myeloma failed transplant adm for neuro event intubated for sepsis then developed HLH not treated clinically improved   recently reintubated for likely aspiration event now extubated  pmh sh fh unchanged 7 pt ros negative  physical  comfortable  vs  100 117/60 26 90 pct sat RA  lungs clear  cor s1s2  abd soft nontender  ext no edema    data  wbc 5360 hgb 7.7 plt 10238 cr 0.5 ast 48

## 2022-12-17 NOTE — DISCHARGE NOTE PROVIDER - NSDCCPCAREPLAN_GEN_ALL_CORE_FT
PRINCIPAL DISCHARGE DIAGNOSIS  Diagnosis: Slurred speech  Assessment and Plan of Treatment: Not found to have acute infarct      SECONDARY DISCHARGE DIAGNOSES  Diagnosis: History of cardiac arrest  Assessment and Plan of Treatment: Cardiac arrest on 12/13, likely due to aspiration. CTH after arrest showing no significant changes from prior CTH, and no new focal neuro deficits.    Diagnosis: Multiple myeloma  Assessment and Plan of Treatment: Heme onc followed while inpatient, did not continune    Diagnosis: Unspecified atrial fibrillation  Assessment and Plan of Treatment: New onset afib during hospitalization, CHADSVASC 3 but AC not started because of thrombocytopenia    Diagnosis: Acute respiratory failure with hypoxia  Assessment and Plan of Treatment: Aspiration PNA, multiple intubations and extubations, multiple transfers between floors and MICU.     PRINCIPAL DISCHARGE DIAGNOSIS  Diagnosis: Slurred speech  Assessment and Plan of Treatment: You were admitted to [facility] due to slurred speech. You were not found to have a stroke. You should continue to follow up with your PCP as well as your oncology team. Your speech improved during your hospitalization.      SECONDARY DISCHARGE DIAGNOSES  Diagnosis: Acute respiratory failure with hypoxia  Assessment and Plan of Treatment: You were diagnosed with aspiration pneumonia requiring intubation. You were treated in the MICU for maintaining your airway. Afterwards you required intubation for a third time after a cardiac arrest. Your respiratory status has since improved and you did not require oxygen on discharge. You should continue to follow up with your primary care physician and should continue with physical therapy.    Diagnosis: History of cardiac arrest  Assessment and Plan of Treatment: On 12/13 you suffered a cardiac arrest on 12/13, this was likely due to an aspiration event. A CT scan of your head after your cardiac arrest showed no significant changes compared to prior head imaging and you had no neurologic consequences.    Diagnosis: Multiple myeloma  Assessment and Plan of Treatment: Heme/onc followed your case during your hospitalization and were concerned about your low platelets. You did not require any chemotherapy while in the hospital, but due to your blood level being low you did receive a unit of red blood cells. You should continue to follow up with your cancer team after discharge.

## 2022-12-17 NOTE — PROGRESS NOTE ADULT - ASSESSMENT
refractory multiple myeloma after transplant unclear what therapy remains will f/u her usual oncologist [facility]  HLH in hospital untreated but apparently much better thrombocytopenia improved  recheck LDH, ferritin  s/p aspiration pneumonia, ongoing discussions refeeding, on going aspiration risk

## 2022-12-17 NOTE — PROGRESS NOTE ADULT - PROBLEM SELECTOR PLAN 3
#concern for MAHA, possible HUS with E. Coli bacteremia? vs HLH?improving  - new thrombocytopenia, anemia, and +hemolysis labs (bili uptrending and now normalized)  -? 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   - Transfuse PRBC < 7  - Patient meets criteria for HLH (ferritin worsening 11266> 94679, trig 519->191, pancytopenia - 3 cell lines, persistent fever, IL2 8324, hepatitis) Us neg for splenomegaly   -- hem onc __ Concern for HLH__ Appears to be improving, PLTs and HGB improved  - continue home Promacta, 50 mg daily   - Spoke with hemonc at [facility] about HLH- states labs could also be elevated in setting of sepsis and at this point treatment for HLH would be to treat underlying cause of infection and patient would not be a candidate for high dose immunosuppressant (Risks outweigh benefits)   - trend HLH labs daily   - s/p neupogen D/C'd as patient no longer neutropenic Pt has received total 1u PLT transfusion and 1.5u pRBC transfusion during this hospitalization. Per heme onc, concern for HLH. Pt s/p Promacta 12/6-12/13. Platelets have been steadily uptrending, with 12/17 AM PLT 94. s/p neupogen as pt is no longer neutropenic. Heme onc following    Diagnostics:  - direct dede negative  - some schistocytes on peripheral smear, confirmed with hematology/oncology, however higher suspicion for lab abnormalities are secondary to sepsis  - Per, patient met criteria for HLH during admission (ferritin worsening 13392> 89942, trig 519->191, pancytopenia - 3 cell lines, persistent fever, IL2 8324, hepatitis)       Management:  - Platelet transfusion protocol: <10, <20 with fever, <50 procedures/active bleeding/etc   - Transfuse pRBCs if Hgb < 7  - Trend HLH labs daily (ferritin, LDH) in addition to CBC/CMP Previously required midodrine upon downgrade to floors (10 BID) for HLH vs vasoplegia vs septic shock, Levo off since 12/17 AM    -Will CTM Vitals

## 2022-12-17 NOTE — PROGRESS NOTE ADULT - ASSESSMENT
Patient is a 63 yo F w/ PMHx of MM (diagnosed ~10 years ago, currently on Promacta treatments since June 2022 - most recently last Wednesday; followed by oncologist, Dr. Lopez at [facility]), ?ITP, Hep B, HTN, neuropathic R hip pain, recent R corneal tear admitted for AMS, code stroke called no acute infarct or hemorrhage, s/p LP findings not consistent with meningitis, course complicated by hypotension and hypoxia, placed on BiPAP, requiring pressors, admitted to MICU with sepsis likely secondary to pneumonia- intubated 11/30. Extubated 12/3 to face tent reintubated 12/7 in setting of increased WOB and hypercapnic respiratory failure unclear cause possibly HLH induced. Patient was downgraded to floors 12/12, however readmitted to MICU after respiratory failure 2/2 aspiration and cardiac arrest. Eventually weaned off pressors and extubated 12/15, now on nasal canula and hemodynamically stable, transferred to floors on 12/17.

## 2022-12-17 NOTE — DISCHARGE NOTE PROVIDER - CARE PROVIDER_API CALL
Florina Hernandez  [address] [place] [address], [address] [address]  Phone: [phone]  Fax: (   )    -  Established Patient  Follow Up Time:    Jignesh Hale  HEMATOLOGY  [address] [facility], [address]  [place] [address]  Phone: [phone]  Fax: [phone]  Established Patient  Follow Up Time: 2 weeks    Florina Hernandez  [address], [address] [facility] [place], [place] [address]  Phone: [phone]  Fax: (   )    -  Established Patient  Follow Up Time:     Filiberto Espinoza  [facility]  [address] [place] [address]  Phone: [phone]  Fax: [phone]  Established Patient  Follow Up Time: 2 weeks

## 2022-12-17 NOTE — CHART NOTE - NSCHARTNOTEFT_GEN_A_CORE
MICU Transfer Note    Transfer from: MICU  Transfer to:  Medicine  Accepting physician:      HOSPITAL COURSE:  HPI:  Patient is a 62 yoF w/ pmhx of MM (diagnosed ~10 years ago, currently on Promacta treatments since June 2022 - most recently last Wednesday; followed by oncologist, Dr. Lopez at [facility]), HTN, neuropathic R hip pain, recent R corneal tear, presents to [facility] ED for change in mental status. LKW approximately 14:30. Patient was in normal state of health this morning as per son, Nacho. Patient went to a follow up ophtho appointment for a recently diagnosed corneal abrasion this past Monday. Patient was picked up by a friend around 10:00 for an optho appt with normal mental status, pt was just complaining of being sleepy. On the way back from appointment around 14:30, patient stopped responding to friend appropriately in the car and she appeared more tired & weaker than usual. She started having slurred speech when they arrived home and then son called EMS. In the ED, patient with persisting word finding difficulties and generalized weakness, but leaning to the R side. Patient reports some difficulty remembering what happened today, but reports she feels generally weak and very tired. Code stroke called, but TPA not given because of imporvement in pt's symptoms. Son reports patient's speech is close to baseline now. Patient then denied chest pain, SOB, HA, changes in vision, N/V, imbalance, numbness or tingling. Denies recent infection or sick contacts. Pt states that she had a similar episode a few years ago with fever which she was told was likely from a MM, she improved after getting steroids.     In the ED pt was febrile 105.5, , /84, RR 24 and saturating well on RA. Pt given 2L IVF bolus and 1x vanc/ceftriaxone/acyclovir/ampicillin (30 Nov 2022 02:42)    Hospital Course: Code stroke called initially for the patient due to AMS, no acute infarct on imaging and LP wnl. Patient's course c/b hypoxic respiratory failure and hypotension likely 2/2 septic shock from PNA, intubated 11/30. The patient was treated with abx, and extubated however shortly after developed acute hypercapnic respiratory failure requiring reintubation on 12/7. It was initially thought that this was related HLH per heme/onc, along with EKG changes at the time (which resolved). The patient was weaned off of pressors and continued on Abx. The patient was eventually extubated and initially passed S&S eval. Shortly after, the patient had an aspiration vent and cardiac arrest on 12/12 leading to reintubation (3rd time during this admission). The patient was empirically treated with cefepime 12/12-12/16, remained afebrile with no leukocytosis. Eventually weaned off pressors and extubated 12/15, now on nasal canula and hemodynamically stable.     The patient failed repeat S&S eval on 12/16, the patient declined NGT placement at the time and is currently NPO as on 12/17 since extubation. The patient believes she can swallow. Planned for cineesophagogram on 12/19.     OBJECTIVE:    Vital Signs Last 24 Hrs  T(C): 36.7 (17 Dec 2022 12:00), Max: 37 (16 Dec 2022 16:00)  T(F): 98 (17 Dec 2022 12:00), Max: 98.6 (16 Dec 2022 16:00)  HR: 92 (17 Dec 2022 12:00) (83 - 100)  BP: 109/51 (17 Dec 2022 12:00) (78/49 - 137/95)  BP(mean): 69 (17 Dec 2022 12:00) (48 - 110)  RR: 21 (17 Dec 2022 12:00) (15 - 28)  SpO2: 95% (17 Dec 2022 12:00) (90% - 100%)    Parameters below as of 17 Dec 2022 11:00  Patient On (Oxygen Delivery Method): room air      I&O's Summary    16 Dec 2022 07:01  -  17 Dec 2022 07:00  --------------------------------------------------------  IN: 574 mL / OUT: 1005 mL / NET: -431 mL    17 Dec 2022 07:01  -  17 Dec 2022 13:52  --------------------------------------------------------  IN: 250 mL / OUT: 200 mL / NET: 50 mL        PHYSICAL EXAM:  General: NAD, resting comfortably  HEENT: NC/AT, PERRLA, EOMI, oropharnyx clear, no LAD  CV: RRR, normal S1,S2; no murmurs; no peripheral edema   Pulm: lungs CTAB, no crackles, rhonchi, or wheezes  Abd: soft, non-tender, non-distended, normal bowel sounds  Psych/Neuro: A&Ox3, nonfocal, strength grossly intact, normal affect  Skin: warm, dry    MEDICATIONS  (STANDING):  albuterol    90 MICROgram(s) HFA Inhaler 4 Puff(s) Inhalation every 6 hours  artificial tears (preservative free) Ophthalmic Solution 1 Drop(s) Both EYES two times a day  chlorhexidine 2% Cloths 1 Application(s) Topical daily  dextrose 5% + lactated ringers. 1000 milliLiter(s) (50 mL/Hr) IV Continuous <Continuous>  dextrose 5%. 1000 milliLiter(s) (50 mL/Hr) IV Continuous <Continuous>  dextrose 5%. 1000 milliLiter(s) (100 mL/Hr) IV Continuous <Continuous>  dextrose 50% Injectable 25 Gram(s) IV Push once  dextrose 50% Injectable 12.5 Gram(s) IV Push once  dextrose 50% Injectable 25 Gram(s) IV Push once  doxazosin 1 milliGRAM(s) Oral at bedtime  glucagon  Injectable 1 milliGRAM(s) IntraMuscular once  insulin lispro (ADMELOG) corrective regimen sliding scale   SubCutaneous every 6 hours  insulin NPH human recombinant 2 Unit(s) SubCutaneous every 6 hours  multivitamin/minerals/iron Oral Solution (CENTRUM) 15 milliLiter(s) Enteral Tube daily  polyethylene glycol 3350 17 Gram(s) Oral two times a day  predniSONE   Tablet 5 milliGRAM(s) Oral daily  senna 2 Tablet(s) Oral at bedtime  tenofovir alafenamide (VEMLIDY) 25 milliGRAM(s) Oral daily    MEDICATIONS  (PRN):  dextrose Oral Gel 15 Gram(s) Oral once PRN Blood Glucose LESS THAN 70 milliGRAM(s)/deciliter  melatonin 6 milliGRAM(s) Oral at bedtime PRN Insomnia        LABS                                            7.7                   Neurophils% (auto):   54.2   (12-17 @ 07:00):    5.36 )-----------(94           Lymphocytes% (auto):  19.8                                          24.9                   Eosinphils% (auto):   3.7      Manual%: Neutrophils x    ; Lymphocytes x    ; Eosinophils x    ; Bands%: x    ; Blasts x                                    138    |  104    |  14                  Calcium: 9.0   / iCa: x      (12-17 @ 07:00)    ----------------------------<  74        Magnesium: x                                3.5     |  24     |  0.51             Phosphorous: x        TPro  5.0    /  Alb  2.6    /  TBili  1.0    /  DBili  x      /  AST  48     /  ALT  22     /  AlkPhos  117    17 Dec 2022 07:00          ASSESSMENT & PLAN:  ASSESSMENT AND PLAN:  Patient is a 63 yo F w/ PMHx of MM (diagnosed ~10 years ago, currently on Promacta treatments since June 2022 - most recently last Wednesday; followed by oncologist, Dr. Lopez at [facility]), ?ITP, Hep B, HTN, neuropathic R hip pain, recent R corneal tear admitted for AMS, code stroke called no acute infarct or hemorrhage, s/p LP findings not consistent with meningitis, course complicated by hypotension and hypoxia, placed on BiPAP, requiring pressors, admitted to MICU with sepsis likely secondary to pneumonia- intubated 11/30. Extubated 12/3 to face tent reintubated 12/7 in setting of increased WOB and hypercapneic respiratory failure unclear cause possibly HLH induced. Patient was downgraded to floors 12/12, however readmitted to MICU after respiratory failure 2/2 aspiration and cardiac arrest.     Neuro  Off sedation, extubated and AOx3  Pt working with pt, OOB to chair    #Code Stroke  - s/p code stroke -> no ischemia or hemorrhage on CT H+N  - s/p LP results not consistent with encephalitis/meningitis, ammonia level 30   - baseline is AOx3, neuro status at baseline   - Repeat CTH 12/8 to r/o neurological cause for respiratory distress causing reintubation/also unsymmetrical nasolabial folds seen on exam was negative.     #Post Cardiac Arrest  -initial CTH 12/13 wnl  - No repeat CTH 12/15 as pt is awake and interactive, following commands     Cardiovascular  #Hypotension   -previously required midodrine upon downgrade to floors (10 BID) for HLH vs vasoplegia vs septic shock  -requiring pressors i/s/o sedation   - Levo off since 12/17 AM    # EKG changes- possible HLH induced cardiac complications?  # new onset A fib with RVR, resolved, pt has been in NSR   - suspect sepsis from E. Coli bacteremia with GI or urinary source?  - pt required levophed for intubation- titrating down currently on 0.2   - midodrine 10 q 8, Metoprolol Tartrate 12.5 mg BID   - ECHO 11/30 EF 55 normal LV/RV   - TSH wnl  - has been intermittently being diuresed with lasix  - EKG 12/7- new Twave inversions V2-V6, II, III, AVF- EKG 6 hrs later slightly improved, repeat stable  - Trop 100->102-> no longer trending (patient not a candidate for Asp/ hep given pancytopenia)   - POCUS 12/8 normal function no segmental changes, VTI 22  -- POCUS 12/12 - nl LV fx, mild diastolic dysfx, LLL consolidation vx atelectasis    #new onset MAR, has been in NSR with no ectomy on tele monitor  - MAR AF in 130's on 12/4, resolved, additional event 12/7 in setting of missed PO metoprolol resolved with IV lopressor    - continue Metoprolol tartrate 12.5 mg BID   - [id number] score is 3   - unable to anticoagulate in the setting of thrombocytopenia   - SCD's are in place   - continue telemetry monitoring     Pulmonary  #AHRF- likely secondary to PNA infection vs Aspiration   - s/p BiPAP, 10/5 on 50%, then intubated 11/30 for increased work of breathing  - Extubated 12/3 to face tent  - patient with increased WOB/ tachypnea overnight 12/6 after blood transfusion possible TACO? patient requiring BIPAP 10/5 50%   - Reintubated 12/7 in setting of WOB/respiratory distress and hypercapnic respiratory failure, and extubated on 12/10  - Reintubated 12/13 i/s/o aspiration and cardiac arrest   - restarted cefepime for empiric pseudomonal coverage i/s/o aspiration   - MRSA previously negative on 12/10; consider adding vanc if febrile through cefepime  - continue home dose of Prednisone 5 mg daily (MM)   - Duplex to r/o DVTs - negative 12/7  - s/p bronch 12/9 for optimization for extubation, airways noted to be erythematous however patent. BAL culture negative.    - extubated 12/15     #Aspiration event  -c/f aspiration pneumonitis  -intubated 12/13 during code blue  - extubated 12/15    GI  Failed forma S&S, pending cineesophagogram on 12/19  Pt declining NGT at this time and believes she can swallow, though failed S&S ib 12/17  Started D5LR @50cc while NPO, continue to encourage patient for NGT and involve family      #Elevated liver enzymes, - downtrending  - patient has reported hx of Hep B 2017 as per hemonc   - bili was elevated, peaked at 1.5- however now normalized. continue to monitor   - hepatitis panel - Hep B surface AG +, and the rest is negative    - confirmatory test - hepatitis B quantitative - negative    -Spoke with ROSA Montejo from hepatology office, Dr Florina Jones Re: continuation of Tenofovir. [facility] [phone]. 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 Coli bacteremia- pan sensitive 11/29   - antibiotics broadened to vanco (12/7-12/10) and meropenem (12/7- 12/11) - 5day course  - MRSA resent 12/8- negative  - Sputum cx 12/1, 12/7 - NRF, BAL Culture 12/9 neg , CSF Cx neg   - s/p ctx (12/5-12/7)    - s/p Cefepime 2g q8h (11/30- 12/5)  - s/p Azithromycin 500 mg started (12/2- 12/3) , legionella neg  - Bcx 12/2, 11/29, 12/7- neg   - Fungitel 39   - ID following     SKIN  # sacral decub-DTI., Wound care nurse recs in place     Endo  - no history of diabetes, A1C 6.0  - TSH wnl  - NPH 2 Q6h  - ISS   - cont monitoring     Heme  #MM  - as per Hemonc note MM in remission 5/22 has been on Promacta since 6/22 has received multiple treatments for MM in the last also PBSCT x 2 and haplo allo stem cell transplant in 2020- last spike noted sept 2022-was given belantamab- last treatment was on 11/23/22  - holding anti-myeloma therapy  -  s/p filgrastim held now as patient no longer neutropenic 12/7  - private Hemonc following [facility] recs appreciated     Pancytopenia  #concern for MAHA, possible HUS with E. Coli bacteremia? vs HLH?improving  - new thrombocytopenia, anemia, and +hemolysis labs (bili uptrending and now normalized)  -? 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   - Transfuse PRBC < 7  - Patient meets criteria for HLH (ferritin worsening 58815> 12335, trig 519->191, pancytopenia - 3 cell lines, persistent fever, IL2 8324, hepatitis) Us neg for splenomegaly   -- hem onc __ Concern for HLH__ Appears to be improving, PLTs and HGB improved  - continue home Promacta, 50 mg daily   - Spoke with hemonc at [facility] about HLH- states labs could also be elevated in setting of sepsis and at this point treatment for HLH would be to treat underlying cause of infection and patient would not be a candidate for high dose immunosuppressant (Risks outweigh benefits)   - trend HLH labs daily   - s/p neupogen D/C'd as patient no longer neutropenic     #DVT ppx  - hold off on DVT ppx for now in setting of pancytopenia   - SCD's in place   - Duplex LE - negative 12/7    Skin  #Suspected DTI on sacrum and ear as per nurse  - Wound care recs appreciated      Ethics  - updated son, he is pediatric resident and patient's HCP  - Pt is full code MICU Transfer Note    Transfer from: MICU  Transfer to:  Medicine  Accepting physician:      HOSPITAL COURSE:  HPI:  Patient is a 62 yoF w/ pmhx of MM (diagnosed ~10 years ago, currently on Promacta treatments since June 2022 - most recently last Wednesday; followed by oncologist, Dr. Lopez at [facility]), HTN, neuropathic R hip pain, recent R corneal tear, presents to [facility] ED for change in mental status. LKW approximately 14:30. Patient was in normal state of health this morning as per son, Nacho. Patient went to a follow up ophtho appointment for a recently diagnosed corneal abrasion this past Monday. Patient was picked up by a friend around 10:00 for an optho appt with normal mental status, pt was just complaining of being sleepy. On the way back from appointment around 14:30, patient stopped responding to friend appropriately in the car and she appeared more tired & weaker than usual. She started having slurred speech when they arrived home and then son called EMS. In the ED, patient with persisting word finding difficulties and generalized weakness, but leaning to the R side. Patient reports some difficulty remembering what happened today, but reports she feels generally weak and very tired. Code stroke called, but TPA not given because of imporvement in pt's symptoms. Son reports patient's speech is close to baseline now. Patient then denied chest pain, SOB, HA, changes in vision, N/V, imbalance, numbness or tingling. Denies recent infection or sick contacts. Pt states that she had a similar episode a few years ago with fever which she was told was likely from a MM, she improved after getting steroids.     In the ED pt was febrile 105.5, , /84, RR 24 and saturating well on RA. Pt given 2L IVF bolus and 1x vanc/ceftriaxone/acyclovir/ampicillin (30 Nov 2022 02:42)    Hospital Course: Code stroke called initially for the patient due to AMS, no acute infarct on imaging and LP wnl. Patient's course c/b hypoxic respiratory failure and hypotension likely 2/2 septic shock from PNA, intubated 11/30. The patient was treated with abx, and extubated however shortly after developed acute hypercapnic respiratory failure requiring reintubation on 12/7. It was initially thought that this was related HLH per heme/onc, along with EKG changes at the time (which resolved). The patient was weaned off of pressors and continued on Abx. The patient was eventually extubated and initially passed S&S eval. Shortly after, the patient had an aspiration vent and cardiac arrest on 12/12 leading to reintubation (3rd time during this admission). The patient was empirically treated with cefepime 12/12-12/16, remained afebrile with no leukocytosis. Eventually weaned off pressors and extubated 12/15, now on nasal canula and hemodynamically stable.     The patient failed repeat S&S eval on 12/16, the patient declined NGT placement at the time and is currently NPO as on 12/17 since extubation. The patient believes she can swallow. Planned for cineesophagogram on 12/19.     OBJECTIVE:    Vital Signs Last 24 Hrs  T(C): 36.7 (17 Dec 2022 12:00), Max: 37 (16 Dec 2022 16:00)  T(F): 98 (17 Dec 2022 12:00), Max: 98.6 (16 Dec 2022 16:00)  HR: 92 (17 Dec 2022 12:00) (83 - 100)  BP: 109/51 (17 Dec 2022 12:00) (78/49 - 137/95)  BP(mean): 69 (17 Dec 2022 12:00) (48 - 110)  RR: 21 (17 Dec 2022 12:00) (15 - 28)  SpO2: 95% (17 Dec 2022 12:00) (90% - 100%)    Parameters below as of 17 Dec 2022 11:00  Patient On (Oxygen Delivery Method): room air      I&O's Summary    16 Dec 2022 07:01  -  17 Dec 2022 07:00  --------------------------------------------------------  IN: 574 mL / OUT: 1005 mL / NET: -431 mL    17 Dec 2022 07:01  -  17 Dec 2022 13:52  --------------------------------------------------------  IN: 250 mL / OUT: 200 mL / NET: 50 mL        PHYSICAL EXAM:  General: NAD, resting comfortably  HEENT: NC/AT, PERRLA, EOMI, oropharnyx clear, no LAD  CV: RRR, normal S1,S2; no murmurs; no peripheral edema   Pulm: lungs CTAB, no crackles, rhonchi, or wheezes  Abd: soft, non-tender, non-distended, normal bowel sounds  Psych/Neuro: A&Ox3, nonfocal, strength grossly intact, normal affect  Skin: warm, dry    MEDICATIONS  (STANDING):  albuterol    90 MICROgram(s) HFA Inhaler 4 Puff(s) Inhalation every 6 hours  artificial tears (preservative free) Ophthalmic Solution 1 Drop(s) Both EYES two times a day  chlorhexidine 2% Cloths 1 Application(s) Topical daily  dextrose 5% + lactated ringers. 1000 milliLiter(s) (50 mL/Hr) IV Continuous <Continuous>  dextrose 5%. 1000 milliLiter(s) (50 mL/Hr) IV Continuous <Continuous>  dextrose 5%. 1000 milliLiter(s) (100 mL/Hr) IV Continuous <Continuous>  dextrose 50% Injectable 25 Gram(s) IV Push once  dextrose 50% Injectable 12.5 Gram(s) IV Push once  dextrose 50% Injectable 25 Gram(s) IV Push once  doxazosin 1 milliGRAM(s) Oral at bedtime  glucagon  Injectable 1 milliGRAM(s) IntraMuscular once  insulin lispro (ADMELOG) corrective regimen sliding scale   SubCutaneous every 6 hours  insulin NPH human recombinant 2 Unit(s) SubCutaneous every 6 hours  multivitamin/minerals/iron Oral Solution (CENTRUM) 15 milliLiter(s) Enteral Tube daily  polyethylene glycol 3350 17 Gram(s) Oral two times a day  predniSONE   Tablet 5 milliGRAM(s) Oral daily  senna 2 Tablet(s) Oral at bedtime  tenofovir alafenamide (VEMLIDY) 25 milliGRAM(s) Oral daily    MEDICATIONS  (PRN):  dextrose Oral Gel 15 Gram(s) Oral once PRN Blood Glucose LESS THAN 70 milliGRAM(s)/deciliter  melatonin 6 milliGRAM(s) Oral at bedtime PRN Insomnia        LABS                                            7.7                   Neurophils% (auto):   54.2   (12-17 @ 07:00):    5.36 )-----------(94           Lymphocytes% (auto):  19.8                                          24.9                   Eosinphils% (auto):   3.7      Manual%: Neutrophils x    ; Lymphocytes x    ; Eosinophils x    ; Bands%: x    ; Blasts x                                    138    |  104    |  14                  Calcium: 9.0   / iCa: x      (12-17 @ 07:00)    ----------------------------<  74        Magnesium: x                                3.5     |  24     |  0.51             Phosphorous: x        TPro  5.0    /  Alb  2.6    /  TBili  1.0    /  DBili  x      /  AST  48     /  ALT  22     /  AlkPhos  117    17 Dec 2022 07:00          ASSESSMENT & PLAN:  ASSESSMENT AND PLAN:  Patient is a 61 yo F w/ PMHx of MM (diagnosed ~10 years ago, currently on Promacta treatments since June 2022 - most recently last Wednesday; followed by oncologist, Dr. Lopez at [facility]), ?ITP, Hep B, HTN, neuropathic R hip pain, recent R corneal tear admitted for AMS, code stroke called no acute infarct or hemorrhage, s/p LP findings not consistent with meningitis, course complicated by hypotension and hypoxia, placed on BiPAP, requiring pressors, admitted to MICU with sepsis likely secondary to pneumonia- intubated 11/30. Extubated 12/3 to face tent reintubated 12/7 in setting of increased WOB and hypercapneic respiratory failure unclear cause possibly HLH induced. Patient was downgraded to floors 12/12, however readmitted to MICU after respiratory failure 2/2 aspiration and cardiac arrest.     Neuro  Off sedation, extubated and AOx3  Pt working with pt, OOB to chair    #Code Stroke  - s/p code stroke -> no ischemia or hemorrhage on CT H+N  - s/p LP results not consistent with encephalitis/meningitis, ammonia level 30   - baseline is AOx3, neuro status at baseline   - Repeat CTH 12/8 to r/o neurological cause for respiratory distress causing reintubation/also unsymmetrical nasolabial folds seen on exam was negative.     #Post Cardiac Arrest  -initial CTH 12/13 wnl  - No repeat CTH 12/15 as pt is awake and interactive, following commands     Cardiovascular  #Hypotension   -previously required midodrine upon downgrade to floors (10 BID) for HLH vs vasoplegia vs septic shock  -requiring pressors i/s/o sedation   - Levo off since 12/17 AM    # EKG changes- possible HLH induced cardiac complications?  # new onset A fib with RVR, resolved, pt has been in NSR   - suspect sepsis from E. Coli bacteremia with GI or urinary source?  - pt required levophed for intubation- titrating down currently on 0.2   - midodrine 10 q 8, Metoprolol Tartrate 12.5 mg BID   - ECHO 11/30 EF 55 normal LV/RV   - TSH wnl  - has been intermittently being diuresed with lasix  - EKG 12/7- new Twave inversions V2-V6, II, III, AVF- EKG 6 hrs later slightly improved, repeat stable  - Trop 100->102-> no longer trending (patient not a candidate for Asp/ hep given pancytopenia)   - POCUS 12/8 normal function no segmental changes, VTI 22  -- POCUS 12/12 - nl LV fx, mild diastolic dysfx, LLL consolidation vx atelectasis    #new onset MAR, has been in NSR with no ectomy on tele monitor  - MAR AF in 130's on 12/4, resolved, additional event 12/7 in setting of missed PO metoprolol resolved with IV lopressor    - continue Metoprolol tartrate 12.5 mg BID   - [id number] score is 3   - unable to anticoagulate in the setting of thrombocytopenia   - SCD's are in place   - continue telemetry monitoring     Pulmonary  #AHRF- likely secondary to PNA infection vs Aspiration   - s/p BiPAP, 10/5 on 50%, then intubated 11/30 for increased work of breathing  - Extubated 12/3 to face tent  - patient with increased WOB/ tachypnea overnight 12/6 after blood transfusion possible TACO? patient requiring BIPAP 10/5 50%   - Reintubated 12/7 in setting of WOB/respiratory distress and hypercapnic respiratory failure, and extubated on 12/10  - Reintubated 12/13 i/s/o aspiration and cardiac arrest   - restarted cefepime for empiric pseudomonal coverage i/s/o aspiration   - MRSA previously negative on 12/10; consider adding vanc if febrile through cefepime  - continue home dose of Prednisone 5 mg daily (MM)   - Duplex to r/o DVTs - negative 12/7  - s/p bronch 12/9 for optimization for extubation, airways noted to be erythematous however patent. BAL culture negative.    - extubated 12/15     #Aspiration event  -c/f aspiration pneumonitis  -intubated 12/13 during code blue  - extubated 12/15    GI  Failed forma S&S, pending cineesophagogram on 12/19  Pt declining NGT at this time and believes she can swallow, though failed S&S ib 12/17  Started D5LR @50cc while NPO, continue to encourage patient for NGT and involve family      #Elevated liver enzymes, - downtrending  - patient has reported hx of Hep B 2017 as per hemonc   - bili was elevated, peaked at 1.5- however now normalized. continue to monitor   - hepatitis panel - Hep B surface AG +, and the rest is negative    - confirmatory test - hepatitis B quantitative - negative    -Spoke with ROSA Montejo from hepatology office, Dr Florina Jones Re: continuation of Tenofovir. [facility] [phone]. 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 Coli bacteremia- pan sensitive 11/29   - antibiotics broadened to vanco (12/7-12/10) and meropenem (12/7- 12/11) - 5day course  - MRSA resent 12/8- negative  - Sputum cx 12/1, 12/7 - NRF, BAL Culture 12/9 neg , CSF Cx neg   - s/p ctx (12/5-12/7)    - s/p Cefepime 2g q8h (11/30- 12/5)  - s/p Azithromycin 500 mg started (12/2- 12/3) , legionella neg  - Bcx 12/2, 11/29, 12/7- neg   - Fungitel 39   - ID following     SKIN  # sacral decub-DTI., Wound care nurse recs in place     Endo  - no history of diabetes, A1C 6.0  - TSH wnl  - NPH 2 Q6h  - ISS   - cont monitoring     Heme  #MM  - as per Hemonc note MM in remission 5/22 has been on Promacta since 6/22 has received multiple treatments for MM in the last also PBSCT x 2 and haplo allo stem cell transplant in 2020- last spike noted sept 2022-was given belantamab- last treatment was on 11/23/22  - holding anti-myeloma therapy  -  s/p filgrastim held now as patient no longer neutropenic 12/7  - private Hemonc following [facility] recs appreciated     Pancytopenia  #concern for MAHA, possible HUS with E. Coli bacteremia? vs HLH?improving  - new thrombocytopenia, anemia, and +hemolysis labs (bili uptrending and now normalized)  -? 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   - Transfuse PRBC < 7  - Patient meets criteria for HLH (ferritin worsening 26288> 21260, trig 519->191, pancytopenia - 3 cell lines, persistent fever, IL2 8324, hepatitis) Us neg for splenomegaly   -- hem onc __ Concern for HLH__ Appears to be improving, PLTs and HGB improved  - continue home Promacta, 50 mg daily   - Spoke with hemonc at [facility] about HLH- states labs could also be elevated in setting of sepsis and at this point treatment for HLH would be to treat underlying cause of infection and patient would not be a candidate for high dose immunosuppressant (Risks outweigh benefits)   - trend HLH labs daily   - s/p neupogen D/C'd as patient no longer neutropenic     #DVT ppx  - hold off on DVT ppx for now in setting of pancytopenia   - SCD's in place   - Duplex LE - negative 12/7    Skin  #Suspected DTI on sacrum and ear as per nurse  - Wound care recs appreciated      Ethics  - updated son, he is pediatric resident and patient's HCP  - Pt is full code    TO DO:   [ ] f/u PO access - patient declining NGT at this time, has capacity  [ ] f/u cineesophagogram, scheduled 12/19   [ ] f/u Heme/onc recs for MM history, and working up possible HLH that they are suspecting MICU Transfer Note    Transfer from: MICU  Transfer to:  Medicine  Accepting physician:      HOSPITAL COURSE:  HPI:  Patient is a 62 yoF w/ pmhx of MM (diagnosed ~10 years ago, currently on Promacta treatments since June 2022 - most recently last Wednesday; followed by oncologist, Dr. Lopez at [facility]), HTN, neuropathic R hip pain, recent R corneal tear, presents to [facility] ED for change in mental status. LKW approximately 14:30. Patient was in normal state of health this morning as per son, Nacho. Patient went to a follow up ophtho appointment for a recently diagnosed corneal abrasion this past Monday. Patient was picked up by a friend around 10:00 for an optho appt with normal mental status, pt was just complaining of being sleepy. On the way back from appointment around 14:30, patient stopped responding to friend appropriately in the car and she appeared more tired & weaker than usual. She started having slurred speech when they arrived home and then son called EMS. In the ED, patient with persisting word finding difficulties and generalized weakness, but leaning to the R side. Patient reports some difficulty remembering what happened today, but reports she feels generally weak and very tired. Code stroke called, but TPA not given because of imporvement in pt's symptoms. Son reports patient's speech is close to baseline now. Patient then denied chest pain, SOB, HA, changes in vision, N/V, imbalance, numbness or tingling. Denies recent infection or sick contacts. Pt states that she had a similar episode a few years ago with fever which she was told was likely from a MM, she improved after getting steroids.     In the ED pt was febrile 105.5, , /84, RR 24 and saturating well on RA. Pt given 2L IVF bolus and 1x vanc/ceftriaxone/acyclovir/ampicillin (30 Nov 2022 02:42)    Hospital Course: Code stroke called initially for the patient due to AMS, no acute infarct on imaging and LP wnl. Patient's course c/b hypoxic respiratory failure and hypotension likely 2/2 septic shock from PNA, intubated 11/30. The patient was treated with abx, and extubated however shortly after developed acute hypercapnic respiratory failure requiring reintubation on 12/7. It was initially thought that this was related HLH per heme/onc, along with EKG changes at the time (which resolved). The patient was weaned off of pressors and continued on Abx. The patient was eventually extubated and initially passed S&S eval. Shortly after, the patient had an aspiration vent and cardiac arrest on 12/12 leading to reintubation (3rd time during this admission). The patient was empirically treated with cefepime 12/12-12/16, remained afebrile with no leukocytosis. Eventually weaned off pressors and extubated 12/15, now on nasal canula and hemodynamically stable.     The patient failed repeat S&S eval on 12/16, the patient declined NGT placement at the time and is currently NPO as on 12/17 since extubation. The patient believes she can swallow. Planned for cineesophagogram on 12/19.     OBJECTIVE:    Vital Signs Last 24 Hrs  T(C): 36.7 (17 Dec 2022 12:00), Max: 37 (16 Dec 2022 16:00)  T(F): 98 (17 Dec 2022 12:00), Max: 98.6 (16 Dec 2022 16:00)  HR: 92 (17 Dec 2022 12:00) (83 - 100)  BP: 109/51 (17 Dec 2022 12:00) (78/49 - 137/95)  BP(mean): 69 (17 Dec 2022 12:00) (48 - 110)  RR: 21 (17 Dec 2022 12:00) (15 - 28)  SpO2: 95% (17 Dec 2022 12:00) (90% - 100%)    Parameters below as of 17 Dec 2022 11:00  Patient On (Oxygen Delivery Method): room air      I&O's Summary    16 Dec 2022 07:01  -  17 Dec 2022 07:00  --------------------------------------------------------  IN: 574 mL / OUT: 1005 mL / NET: -431 mL    17 Dec 2022 07:01  -  17 Dec 2022 13:52  --------------------------------------------------------  IN: 250 mL / OUT: 200 mL / NET: 50 mL        PHYSICAL EXAM:  General: NAD, resting comfortably  HEENT: NC/AT, PERRLA, EOMI, oropharnyx clear, no LAD  CV: RRR, normal S1,S2; no murmurs; no peripheral edema   Pulm: lungs CTAB, no crackles, rhonchi, or wheezes  Abd: soft, non-tender, non-distended, normal bowel sounds  Psych/Neuro: A&Ox3, nonfocal, strength grossly intact, normal affect  Skin: warm, dry    MEDICATIONS  (STANDING):  albuterol    90 MICROgram(s) HFA Inhaler 4 Puff(s) Inhalation every 6 hours  artificial tears (preservative free) Ophthalmic Solution 1 Drop(s) Both EYES two times a day  chlorhexidine 2% Cloths 1 Application(s) Topical daily  dextrose 5% + lactated ringers. 1000 milliLiter(s) (50 mL/Hr) IV Continuous <Continuous>  dextrose 5%. 1000 milliLiter(s) (50 mL/Hr) IV Continuous <Continuous>  dextrose 5%. 1000 milliLiter(s) (100 mL/Hr) IV Continuous <Continuous>  dextrose 50% Injectable 25 Gram(s) IV Push once  dextrose 50% Injectable 12.5 Gram(s) IV Push once  dextrose 50% Injectable 25 Gram(s) IV Push once  doxazosin 1 milliGRAM(s) Oral at bedtime  glucagon  Injectable 1 milliGRAM(s) IntraMuscular once  insulin lispro (ADMELOG) corrective regimen sliding scale   SubCutaneous every 6 hours  insulin NPH human recombinant 2 Unit(s) SubCutaneous every 6 hours  multivitamin/minerals/iron Oral Solution (CENTRUM) 15 milliLiter(s) Enteral Tube daily  polyethylene glycol 3350 17 Gram(s) Oral two times a day  predniSONE   Tablet 5 milliGRAM(s) Oral daily  senna 2 Tablet(s) Oral at bedtime  tenofovir alafenamide (VEMLIDY) 25 milliGRAM(s) Oral daily    MEDICATIONS  (PRN):  dextrose Oral Gel 15 Gram(s) Oral once PRN Blood Glucose LESS THAN 70 milliGRAM(s)/deciliter  melatonin 6 milliGRAM(s) Oral at bedtime PRN Insomnia        LABS                                            7.7                   Neurophils% (auto):   54.2   (12-17 @ 07:00):    5.36 )-----------(94           Lymphocytes% (auto):  19.8                                          24.9                   Eosinphils% (auto):   3.7      Manual%: Neutrophils x    ; Lymphocytes x    ; Eosinophils x    ; Bands%: x    ; Blasts x                                    138    |  104    |  14                  Calcium: 9.0   / iCa: x      (12-17 @ 07:00)    ----------------------------<  74        Magnesium: x                                3.5     |  24     |  0.51             Phosphorous: x        TPro  5.0    /  Alb  2.6    /  TBili  1.0    /  DBili  x      /  AST  48     /  ALT  22     /  AlkPhos  117    17 Dec 2022 07:00          ASSESSMENT & PLAN:  ASSESSMENT AND PLAN:  Patient is a 63 yo F w/ PMHx of MM (diagnosed ~10 years ago, currently on Promacta treatments since June 2022 - most recently last Wednesday; followed by oncologist, Dr. Lopez at [facility]), ?ITP, Hep B, HTN, neuropathic R hip pain, recent R corneal tear admitted for AMS, code stroke called no acute infarct or hemorrhage, s/p LP findings not consistent with meningitis, course complicated by hypotension and hypoxia, placed on BiPAP, requiring pressors, admitted to MICU with sepsis likely secondary to pneumonia- intubated 11/30. Extubated 12/3 to face tent reintubated 12/7 in setting of increased WOB and hypercapneic respiratory failure unclear cause possibly HLH induced. Patient was downgraded to floors 12/12, however readmitted to MICU after respiratory failure 2/2 aspiration and cardiac arrest.     Neuro  Off sedation, extubated and AOx3  Pt working with pt, OOB to chair    #Code Stroke  - s/p code stroke -> no ischemia or hemorrhage on CT H+N  - s/p LP results not consistent with encephalitis/meningitis, ammonia level 30   - baseline is AOx3, neuro status at baseline   - Repeat CTH 12/8 to r/o neurological cause for respiratory distress causing reintubation/also unsymmetrical nasolabial folds seen on exam was negative.     #Post Cardiac Arrest  -initial CTH 12/13 wnl  - No repeat CTH 12/15 as pt is awake and interactive, following commands     Cardiovascular  #Hypotension   -previously required midodrine upon downgrade to floors (10 BID) for HLH vs vasoplegia vs septic shock  -requiring pressors i/s/o sedation   - Levo off since 12/17 AM    # EKG changes- possible HLH induced cardiac complications?  # new onset A fib with RVR, resolved, pt has been in NSR   - suspect sepsis from E. Coli bacteremia with GI or urinary source?  - pt required levophed for intubation- titrating down currently on 0.2   - midodrine 10 q 8, Metoprolol Tartrate 12.5 mg BID   - ECHO 11/30 EF 55 normal LV/RV   - TSH wnl  - has been intermittently being diuresed with lasix  - EKG 12/7- new Twave inversions V2-V6, II, III, AVF- EKG 6 hrs later slightly improved, repeat stable  - Trop 100->102-> no longer trending (patient not a candidate for Asp/ hep given pancytopenia)   - POCUS 12/8 normal function no segmental changes, VTI 22  -- POCUS 12/12 - nl LV fx, mild diastolic dysfx, LLL consolidation vx atelectasis    #new onset MAR, has been in NSR with no ectomy on tele monitor  - MAR AF in 130's on 12/4, resolved, additional event 12/7 in setting of missed PO metoprolol resolved with IV lopressor    - continue Metoprolol tartrate 12.5 mg BID   - [id number] score is 3   - unable to anticoagulate in the setting of thrombocytopenia   - SCD's are in place   - continue telemetry monitoring     Pulmonary  #AHRF- likely secondary to PNA infection vs Aspiration   - s/p BiPAP, 10/5 on 50%, then intubated 11/30 for increased work of breathing  - Extubated 12/3 to face tent  - patient with increased WOB/ tachypnea overnight 12/6 after blood transfusion possible TACO? patient requiring BIPAP 10/5 50%   - Reintubated 12/7 in setting of WOB/respiratory distress and hypercapnic respiratory failure, and extubated on 12/10  - Reintubated 12/13 i/s/o aspiration and cardiac arrest   - restarted cefepime for empiric pseudomonal coverage i/s/o aspiration   - MRSA previously negative on 12/10; consider adding vanc if febrile through cefepime  - continue home dose of Prednisone 5 mg daily (MM)   - Duplex to r/o DVTs - negative 12/7  - s/p bronch 12/9 for optimization for extubation, airways noted to be erythematous however patent. BAL culture negative.    - extubated 12/15     #Aspiration event  -c/f aspiration pneumonitis  -intubated 12/13 during code blue  - extubated 12/15    GI  Failed forma S&S, pending cineesophagogram on 12/19  Pt declining NGT at this time and believes she can swallow, though failed S&S ib 12/17  Started D5LR @50cc while NPO, continue to encourage patient for NGT and involve family      #Elevated liver enzymes, - downtrending  - patient has reported hx of Hep B 2017 as per hemonc   - bili was elevated, peaked at 1.5- however now normalized. continue to monitor   - hepatitis panel - Hep B surface AG +, and the rest is negative    - confirmatory test - hepatitis B quantitative - negative    -Spoke with ROSA Montejo from hepatology office, Dr Florina Jones Re: continuation of Tenofovir. [facility] [phone]. 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 Coli bacteremia- pan sensitive 11/29   - antibiotics broadened to vanco (12/7-12/10) and meropenem (12/7- 12/11) - 5day course  - MRSA resent 12/8- negative  - Sputum cx 12/1, 12/7 - NRF, BAL Culture 12/9 neg , CSF Cx neg   - s/p ctx (12/5-12/7)    - s/p Cefepime 2g q8h (11/30- 12/5)  - s/p Azithromycin 500 mg started (12/2- 12/3) , legionella neg  - Bcx 12/2, 11/29, 12/7- neg   - Fungitel 39   - ID following     SKIN  # sacral decub-DTI., Wound care nurse recs in place     Endo  - no history of diabetes, A1C 6.0  - TSH wnl  - NPH 2 Q6h  - ISS   - cont monitoring     Heme  #MM  - as per Hemonc note MM in remission 5/22 has been on Promacta since 6/22 has received multiple treatments for MM in the last also PBSCT x 2 and haplo allo stem cell transplant in 2020- last spike noted sept 2022-was given belantamab- last treatment was on 11/23/22  - holding anti-myeloma therapy  -  s/p filgrastim held now as patient no longer neutropenic 12/7  - private Hemonc following [facility] recs appreciated     Pancytopenia  #concern for MAHA, possible HUS with E. Coli bacteremia? vs HLH?improving  - new thrombocytopenia, anemia, and +hemolysis labs (bili uptrending and now normalized)  -? 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   - Transfuse PRBC < 7  - Patient meets criteria for HLH (ferritin worsening 12298> 44873, trig 519->191, pancytopenia - 3 cell lines, persistent fever, IL2 8324, hepatitis) Us neg for splenomegaly   -- hem onc __ Concern for HLH__ Appears to be improving, PLTs and HGB improved  - continue home Promacta, 50 mg daily   - Spoke with hemonc at [facility] about HLH- states labs could also be elevated in setting of sepsis and at this point treatment for HLH would be to treat underlying cause of infection and patient would not be a candidate for high dose immunosuppressant (Risks outweigh benefits)   - trend HLH labs daily   - s/p neupogen D/C'd as patient no longer neutropenic     #DVT ppx  - hold off on DVT ppx for now in setting of pancytopenia   - SCD's in place   - Duplex LE - negative 12/7    Skin  #Suspected DTI on sacrum and ear as per nurse  - Wound care recs appreciated      Ethics  - updated son, he is pediatric resident and patient's HCP  - Pt is full code    TO DO:   [ ] f/u PO access - patient declining NGT at this time, has capacity  [ ] f/u cineesophagogram, scheduled 12/19   [ ] f/u Heme/onc recs for MM history, and working up possible HLH that they are suspecting  [ ] f/u TOV tonight (provider to RN ordered for tonight 12/17) MICU Transfer Note    Transfer from: MICU  Transfer to:  Medicine - teams  Accepting physician: Dr Esther Neil       [facility] COURSE:  HPI:  Patient is a 62 yoF w/ pmhx of MM (diagnosed ~10 years ago, currently on Promacta treatments since June 2022 - most recently last Wednesday; followed by oncologist, Dr. Lopez at [facility]), HTN, neuropathic R hip pain, recent R corneal tear, presents to [facility] ED for change in mental status. LKW approximately 14:30. Patient was in normal state of health this morning as per son, Nacho. Patient went to a follow up ophtho appointment for a recently diagnosed corneal abrasion this past Monday. Patient was picked up by a friend around 10:00 for an optho appt with normal mental status, pt was just complaining of being sleepy. On the way back from appointment around 14:30, patient stopped responding to friend appropriately in the car and she appeared more tired & weaker than usual. She started having slurred speech when they arrived home and then son called EMS. In the ED, patient with persisting word finding difficulties and generalized weakness, but leaning to the R side. Patient reports some difficulty remembering what happened today, but reports she feels generally weak and very tired. Code stroke called, but TPA not given because of imporvement in pt's symptoms. Son reports patient's speech is close to baseline now. Patient then denied chest pain, SOB, HA, changes in vision, N/V, imbalance, numbness or tingling. Denies recent infection or sick contacts. Pt states that she had a similar episode a few years ago with fever which she was told was likely from a MM, she improved after getting steroids.     In the ED pt was febrile 105.5, , /84, RR 24 and saturating well on RA. Pt given 2L IVF bolus and 1x vanc/ceftriaxone/acyclovir/ampicillin (30 Nov 2022 02:42)    Hospital Course: Code stroke called initially for the patient due to AMS, no acute infarct on imaging and LP wnl. Patient's course c/b hypoxic respiratory failure and hypotension likely 2/2 septic shock from PNA, intubated 11/30. The patient was treated with abx, and extubated however shortly after developed acute hypercapnic respiratory failure requiring reintubation on 12/7. It was initially thought that this was related HLH per heme/onc, along with EKG changes at the time (which resolved). The patient was weaned off of pressors and continued on Abx. The patient was eventually extubated and initially passed S&S eval. Shortly after, the patient had an aspiration vent and cardiac arrest on 12/12 leading to reintubation (3rd time during this admission). The patient was empirically treated with cefepime 12/12-12/16, remained afebrile with no leukocytosis. Eventually weaned off pressors and extubated 12/15, now on nasal canula and hemodynamically stable.     The patient failed repeat S&S eval on 12/16, the patient declined NGT placement at the time and is currently NPO as on 12/17 since extubation. The patient believes she can swallow. Planned for cineesophagogram on 12/19.     OBJECTIVE:    Vital Signs Last 24 Hrs  T(C): 36.7 (17 Dec 2022 12:00), Max: 37 (16 Dec 2022 16:00)  T(F): 98 (17 Dec 2022 12:00), Max: 98.6 (16 Dec 2022 16:00)  HR: 92 (17 Dec 2022 12:00) (83 - 100)  BP: 109/51 (17 Dec 2022 12:00) (78/49 - 137/95)  BP(mean): 69 (17 Dec 2022 12:00) (48 - 110)  RR: 21 (17 Dec 2022 12:00) (15 - 28)  SpO2: 95% (17 Dec 2022 12:00) (90% - 100%)    Parameters below as of 17 Dec 2022 11:00  Patient On (Oxygen Delivery Method): room air      I&O's Summary    16 Dec 2022 07:01  -  17 Dec 2022 07:00  --------------------------------------------------------  IN: 574 mL / OUT: 1005 mL / NET: -431 mL    17 Dec 2022 07:01  -  17 Dec 2022 13:52  --------------------------------------------------------  IN: 250 mL / OUT: 200 mL / NET: 50 mL        PHYSICAL EXAM:  General: NAD, resting comfortably  HEENT: NC/AT, PERRLA, EOMI, oropharnyx clear, no LAD  CV: RRR, normal S1,S2; no murmurs; no peripheral edema   Pulm: lungs CTAB, no crackles, rhonchi, or wheezes  Abd: soft, non-tender, non-distended, normal bowel sounds  Psych/Neuro: A&Ox3, nonfocal, strength grossly intact, normal affect  Skin: warm, dry    MEDICATIONS  (STANDING):  albuterol    90 MICROgram(s) HFA Inhaler 4 Puff(s) Inhalation every 6 hours  artificial tears (preservative free) Ophthalmic Solution 1 Drop(s) Both EYES two times a day  chlorhexidine 2% Cloths 1 Application(s) Topical daily  dextrose 5% + lactated ringers. 1000 milliLiter(s) (50 mL/Hr) IV Continuous <Continuous>  dextrose 5%. 1000 milliLiter(s) (50 mL/Hr) IV Continuous <Continuous>  dextrose 5%. 1000 milliLiter(s) (100 mL/Hr) IV Continuous <Continuous>  dextrose 50% Injectable 25 Gram(s) IV Push once  dextrose 50% Injectable 12.5 Gram(s) IV Push once  dextrose 50% Injectable 25 Gram(s) IV Push once  doxazosin 1 milliGRAM(s) Oral at bedtime  glucagon  Injectable 1 milliGRAM(s) IntraMuscular once  insulin lispro (ADMELOG) corrective regimen sliding scale   SubCutaneous every 6 hours  insulin NPH human recombinant 2 Unit(s) SubCutaneous every 6 hours  multivitamin/minerals/iron Oral Solution (CENTRUM) 15 milliLiter(s) Enteral Tube daily  polyethylene glycol 3350 17 Gram(s) Oral two times a day  predniSONE   Tablet 5 milliGRAM(s) Oral daily  senna 2 Tablet(s) Oral at bedtime  tenofovir alafenamide (VEMLIDY) 25 milliGRAM(s) Oral daily    MEDICATIONS  (PRN):  dextrose Oral Gel 15 Gram(s) Oral once PRN Blood Glucose LESS THAN 70 milliGRAM(s)/deciliter  melatonin 6 milliGRAM(s) Oral at bedtime PRN Insomnia        LABS                                            7.7                   Neurophils% (auto):   54.2   (12-17 @ 07:00):    5.36 )-----------(94           Lymphocytes% (auto):  19.8                                          24.9                   Eosinphils% (auto):   3.7      Manual%: Neutrophils x    ; Lymphocytes x    ; Eosinophils x    ; Bands%: x    ; Blasts x                                    138    |  104    |  14                  Calcium: 9.0   / iCa: x      (12-17 @ 07:00)    ----------------------------<  74        Magnesium: x                                3.5     |  24     |  0.51             Phosphorous: x        TPro  5.0    /  Alb  2.6    /  TBili  1.0    /  DBili  x      /  AST  48     /  ALT  22     /  AlkPhos  117    17 Dec 2022 07:00          ASSESSMENT & PLAN:  ASSESSMENT AND PLAN:  Patient is a 63 yo F w/ PMHx of MM (diagnosed ~10 years ago, currently on Promacta treatments since June 2022 - most recently last Wednesday; followed by oncologist, Dr. Lopez at [facility]), ?ITP, Hep B, HTN, neuropathic R hip pain, recent R corneal tear admitted for AMS, code stroke called no acute infarct or hemorrhage, s/p LP findings not consistent with meningitis, course complicated by hypotension and hypoxia, placed on BiPAP, requiring pressors, admitted to MICU with sepsis likely secondary to pneumonia- intubated 11/30. Extubated 12/3 to face tent reintubated 12/7 in setting of increased WOB and hypercapneic respiratory failure unclear cause possibly HLH induced. Patient was downgraded to floors 12/12, however readmitted to MICU after respiratory failure 2/2 aspiration and cardiac arrest.     Neuro  Off sedation, extubated and AOx3  Pt working with pt, OOB to chair    #Code Stroke  - s/p code stroke -> no ischemia or hemorrhage on CT H+N  - s/p LP results not consistent with encephalitis/meningitis, ammonia level 30   - baseline is AOx3, neuro status at baseline   - Repeat CTH 12/8 to r/o neurological cause for respiratory distress causing reintubation/also unsymmetrical nasolabial folds seen on exam was negative.     #Post Cardiac Arrest  -initial CTH 12/13 wnl  - No repeat CTH 12/15 as pt is awake and interactive, following commands     Cardiovascular  #Hypotension   -previously required midodrine upon downgrade to floors (10 BID) for HLH vs vasoplegia vs septic shock  -requiring pressors i/s/o sedation   - Levo off since 12/17 AM    # EKG changes- possible HLH induced cardiac complications?  # new onset A fib with RVR, resolved, pt has been in NSR   - suspect sepsis from E. Coli bacteremia with GI or urinary source?  - pt required levophed for intubation- titrating down currently on 0.2   - midodrine 10 q 8, Metoprolol Tartrate 12.5 mg BID   - ECHO 11/30 EF 55 normal LV/RV   - TSH wnl  - has been intermittently being diuresed with lasix  - EKG 12/7- new Twave inversions V2-V6, II, III, AVF- EKG 6 hrs later slightly improved, repeat stable  - Trop 100->102-> no longer trending (patient not a candidate for Asp/ hep given pancytopenia)   - POCUS 12/8 normal function no segmental changes, VTI 22  -- POCUS 12/12 - nl LV fx, mild diastolic dysfx, LLL consolidation vx atelectasis    #new onset MAR, has been in NSR with no ectomy on tele monitor  - MAR AF in 130's on 12/4, resolved, additional event 12/7 in setting of missed PO metoprolol resolved with IV lopressor    - continue Metoprolol tartrate 12.5 mg BID   - [id number]-Rhmw score is 3   - unable to anticoagulate in the setting of thrombocytopenia   - SCD's are in place   - continue telemetry monitoring     Pulmonary  #AHRF- likely secondary to PNA infection vs Aspiration   - s/p BiPAP, 10/5 on 50%, then intubated 11/30 for increased work of breathing  - Extubated 12/3 to face tent  - patient with increased WOB/ tachypnea overnight 12/6 after blood transfusion possible TACO? patient requiring BIPAP 10/5 50%   - Reintubated 12/7 in setting of WOB/respiratory distress and hypercapnic respiratory failure, and extubated on 12/10  - Reintubated 12/13 i/s/o aspiration and cardiac arrest   - restarted cefepime for empiric pseudomonal coverage i/s/o aspiration   - MRSA previously negative on 12/10; consider adding vanc if febrile through cefepime  - continue home dose of Prednisone 5 mg daily (MM)   - Duplex to r/o DVTs - negative 12/7  - s/p bronch 12/9 for optimization for extubation, airways noted to be erythematous however patent. BAL culture negative.    - extubated 12/15     #Aspiration event  -c/f aspiration pneumonitis  -intubated 12/13 during code blue  - extubated 12/15    GI  Failed forma S&S, pending cineesophagogram on 12/19  Pt declining NGT at this time and believes she can swallow, though failed S&S ib 12/17  Started D5LR @50cc while NPO, continue to encourage patient for NGT and involve family      #Elevated liver enzymes, - downtrending  - patient has reported hx of Hep B 2017 as per hemonc   - bili was elevated, peaked at 1.5- however now normalized. continue to monitor   - hepatitis panel - Hep B surface AG +, and the rest is negative    - confirmatory test - hepatitis B quantitative - negative    -Spoke with ROSA Montejo from hepatology office, Dr Florina Jones Re: continuation of Tenofovir. [facility] [phone]. 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 Coli bacteremia- pan sensitive 11/29   - antibiotics broadened to vanco (12/7-12/10) and meropenem (12/7- 12/11) - 5day course  - MRSA resent 12/8- negative  - Sputum cx 12/1, 12/7 - NRF, BAL Culture 12/9 neg , CSF Cx neg   - s/p ctx (12/5-12/7)    - s/p Cefepime 2g q8h (11/30- 12/5)  - s/p Azithromycin 500 mg started (12/2- 12/3) , legionella neg  - Bcx 12/2, 11/29, 12/7- neg   - Fungitel 39   - ID following     SKIN  # sacral decub-DTI., Wound care nurse recs in place     Endo  - no history of diabetes, A1C 6.0  - TSH wnl  - NPH 2 Q6h  - ISS   - cont monitoring     Heme  #MM  - as per Hemonc note MM in remission 5/22 has been on Promacta since 6/22 has received multiple treatments for MM in the last also PBSCT x 2 and haplo allo stem cell transplant in 2020- last spike noted sept 2022-was given belantamab- last treatment was on 11/23/22  - holding anti-myeloma therapy  -  s/p filgrastim held now as patient no longer neutropenic 12/7  - private Hemonc following [facility] recs appreciated     Pancytopenia  #concern for MAHA, possible HUS with E. Coli bacteremia? vs HLH?improving  - new thrombocytopenia, anemia, and +hemolysis labs (bili uptrending and now normalized)  -? 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   - Transfuse PRBC < 7  - Patient meets criteria for HLH (ferritin worsening 90121> 93593, trig 519->191, pancytopenia - 3 cell lines, persistent fever, IL2 8324, hepatitis) Us neg for splenomegaly   -- hem onc __ Concern for HLH__ Appears to be improving, PLTs and HGB improved  - continue home Promacta, 50 mg daily   - Spoke with hemonc at [facility] about HLH- states labs could also be elevated in setting of sepsis and at this point treatment for HLH would be to treat underlying cause of infection and patient would not be a candidate for high dose immunosuppressant (Risks outweigh benefits)   - trend HLH labs daily   - s/p neupogen D/C'd as patient no longer neutropenic     #DVT ppx  - hold off on DVT ppx for now in setting of pancytopenia   - SCD's in place   - Duplex LE - negative 12/7    Skin  #Suspected DTI on sacrum and ear as per nurse  - Wound care recs appreciated      Ethics  - updated son, he is pediatric resident and patient's HCP  - Pt is full code    TO DO:   [ ] f/u PO access - patient declining NGT at this time, has capacity  [ ] f/u cineesophagogram, scheduled 12/19   [ ] f/u Heme/onc recs for MM history, and working up possible HLH that they are suspecting  [ ] f/u TOV tonight (provider to RN ordered for tonight 12/17)

## 2022-12-17 NOTE — PROGRESS NOTE ADULT - PROBLEM SELECTOR PLAN 2
On 12/13, pt aspirated while eating, developed respiratory failure and a code blue was called. Patient received 1 round of CPR and 1 dose of Epi before reachieving ROSC. Pt was subsequently transferred back to MICU    -initial CTH 12/13 wnl  -pt is awake and interactive, following commands. Will CTM mental status

## 2022-12-17 NOTE — PROGRESS NOTE ADULT - PROBLEM SELECTOR PLAN 1
#AHRF- likely secondary to PNA infection vs Aspiration, for which patient has received many antiboitics during this admission, including Cefepime, Vanc, and Meropenem. Pt has been intubated and extubated multiple times during this admission. On 12/13 pt's aspiration led to code blue and was subsequently intubated. Pt was most recently extubated on 12/15 has not been reintubated since. As of 12/17 pt on nasal cannula.     -Pt currently off antibiotics, will CTM. Pt afebrile and normotensive but tachy in 100s and requiring 2LNC  -12/13 Sputum Cx NGF, 12/14 BCx NGTD

## 2022-12-17 NOTE — DISCHARGE NOTE PROVIDER - NSDCCPTREATMENT_GEN_ALL_CORE_FT
PRINCIPAL PROCEDURE  Procedure: CT scan  Findings and Treatment: < end of copied text >  IMPRESSION:  Stable exam. No mass effect, hemorrhage or evidence of acute intracranial   pathology.< from: CT Head No Cont (12.08.22 @ 18:33) >  < end of copied text >  IMPRESSION:  No significant change from the prior exam.  No mass effect, hemorrhage or acute intracranial pathology identified.< from: CT Head No Cont (12.13.22 @ 22:30) >        SECONDARY PROCEDURE  Procedure: Chest xray, PA & lateral  Findings and Treatment:   < end of copied text >  FINDINGS:  Enteric tube is seen coursing down the esophagus entering the stomach   with tip not included on the exam.  Endotracheal tube is present in place. Lungs show no focal consolidations   or pneumothorax. Small layering right effusion may be present.Right   upper lobe consolidation has resolved.  The heart is not enlarged and no congestion to indicate edema.< from: Xray Chest 1 View-PORTABLE IMMEDIATE (Xray Chest 1 View-PORTABLE IMMEDIATE .) (12.13.22 @ 12:35) >  & CT Angio of chest  < end of copied text >  IMPRESSION:  No pulmonary embolism.  Consolidation due to pneumonia involving the dependent portion of the   right upper lobe with interval improvement since November 30, 2022.  Right lower lobe consolidation with associated volume loss increased   since November 30, 2022 suggestive of partial compressive atelectasis   adjacent to the increasing small right pleural effusion.  Patchy consolidationwithin the dependent portion of the left lower lobe   increased in size since November 30, 2022 suggestive of atelectasis given   its homogeneous enhancement although superimposed pneumonia is not   excluded.  Small right and trace left pleural effusions.< from: CT Angio Chest PE Protocol w/ IV Cont (12.08.22 @ 18:33) >

## 2022-12-17 NOTE — PROGRESS NOTE ADULT - PROBLEM SELECTOR PLAN 7
#Elevated liver enzymes, - downtrending  - patient has reported hx of Hep B 2017 as per hemonc   - bili was elevated, peaked at 1.5- however now normalized. continue to monitor   - hepatitis panel - Hep B surface AG +, and the rest is negative    - confirmatory test - hepatitis B quantitative - negative    -Spoke with ROSA Montejo from hepatology office, Dr Florina Jones Re: continuation of Tenofovir. [facility] [phone]. 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- outpt follow up after discharge- restarted Tenofovir 25 mg daily new onset A fib with RVR, likely i/s/o sepsis and/or HLH. Now resolved, pt has been in NSR since    Diagnostics:  - ECHO 11/30 EF 55 normal LV/RV   - TSH wnl  - EKG 12/7- new Twave inversions V2-V6, II, III, AVF- EKG 6 hrs later slightly improved, repeat stable  - Trop 100->102-> no longer trending (patient not a candidate for Asp/ hep given pancytopenia)   - POCUS 12/8 normal function no segmental changes, VTI 22  -- POCUS 12/12 - nl LV fx, mild diastolic dysfx, LLL consolidation vx atelectasis    Management:  -Will CTM Vital signs. If Tachycardic, consider EKG to make sure pt still sinus  -No AC because of thrombocytopenia, despite CHADSVASC 3

## 2022-12-17 NOTE — DISCHARGE NOTE PROVIDER - PROVIDER TOKENS
FREE:[LAST:[Kemal Jones],FIRST:[Florina],PHONE:[phone]],FAX:[(   )    -],ADDRESS:[[address] [place] [address], [place] [address]],ESTABLISHEDPATIENT:[T]] PROVIDER:[TOKEN:[[id number]],FOLLOWUP:[2 weeks],ESTABLISHEDPATIENT:[T]],FREE:[LAST:[Kemal Jones],FIRST:[Florina],PHONE:[phone]:[(   )    -],ADDRESS:[[address] [place] [address]],ESTABLISHEDPATIENT:[T]],PROVIDER:[TOKEN:[[id number]],FOLLOWUP:[2 weeks],ESTABLISHEDPATIENT:[T]]

## 2022-12-17 NOTE — PROGRESS NOTE ADULT - PROBLEM SELECTOR PLAN 10
DVT PPx: SCDs, holding off pharmacologic ppx i/s/o pancytopenia  Diet: currently NPO, pending cineesophagogram 12/19, pt declined NGT. Pt currently on D5LR 50 cc/hr  Dispo: Medically active        Code Status: Full Code  HCP: Pt's son  Bowel Regimen: Senna and Miralax #Suspected DTI on sacrum and ear as per nurse  - Wound care recs appreciated

## 2022-12-17 NOTE — PROGRESS NOTE ADULT - PROBLEM SELECTOR PLAN 5
As per Hemonc note MM in remission 5/22 has been on Promacta since 6/22 has received multiple treatments for MM in the last also PBSCT x 2 and haplo allo stem cell transplant in 2020- last spike noted sept 2022-was given belantamab- last treatment was on 11/23/22    - holding anti-myeloma therapy  -  s/p filgrastim held now as patient no longer neutropenic 12/7  - private Hemonc following [facility] recs appreciated Last positive BCx 11/29. 12/7 BCx No Growth Final, 12/13 and 12/14 BCx NGTD. s/p CTX 12/5- 12/7, Cefepime 11/30-12/5, Vanco 12/7-12/10 and Meropenem 12/7-12/11.     -Currently monitoring off abx  -ID following, brenton payan

## 2022-12-17 NOTE — PROGRESS NOTE ADULT - TIME BILLING
Medical management as above, reviewing chart and coordinating care with primary team/staff, as well as reviewing vitals, radiology, medication list, recent labs, and prior records.
patient care
Medical management as above, reviewing chart and coordinating care with primary team/staff, as well as reviewing vitals, radiology, medication list, recent labs, and prior records.
Medical management as above, reviewing chart and coordinating care with primary team/staff, as well as reviewing vitals, radiology, medication list, recent labs, and prior records.

## 2022-12-17 NOTE — PROGRESS NOTE ADULT - ASSESSMENT
ASSESSMENT AND PLAN:  Patient is a 63 yo F w/ PMHx of MM (diagnosed ~10 years ago, currently on Promacta treatments since June 2022 - most recently last Wednesday; followed by oncologist, Dr. Lopez at [facility]), ?ITP, Hep B, HTN, neuropathic R hip pain, recent R corneal tear admitted for AMS, code stroke called no acute infarct or hemorrhage, s/p LP findings not consistent with meningitis, course complicated by hypotension and hypoxia, placed on BiPAP, requiring pressors, admitted to MICU with sepsis likely secondary to pneumonia- intubated 11/30. Extubated 12/3 to face tent reintubated 12/7 in setting of increased WOB and hypercapneic respiratory failure unclear cause possibly HLH induced. Patient was downgraded to floors 12/12, however readmitted to MICU after respiratory failure 2/2 aspiration and cardiac arrest.     Neuro  Off sedation, extubated and AOx3  Pending PT     #Code Stroke  - s/p code stroke -> no ischemia or hemorrhage on CT H+N  - s/p LP results not consistent with encephalitis/meningitis, ammonia level 30   - baseline is AOx3, neuro status at baseline   - Repeat CTH 12/8 to r/o neurological cause for respiratory distress causing reintubation/also unsymmetrical nasolabial folds seen on exam was negative.     #Post Cardiac Arrest  -initial CTH 12/13 wnl  - No repeat CTH 12/15 as pt is awake and interactive, following commands     Cardiovascular  #Hypotension   -previously required midodrine upon downgrade to floors (10 BID) for HLH vs vasoplegia vs septic shock  -requiring pressors i/s/o sedation   -continue to wean as tolerated   - levo 0.02 on 12/16  - currently no midodrine - no PO access pending S&S    # EKG changes- possible HLH induced cardiac complications?  # new onset A fib with RVR, resolved, pt has been in NSR   - suspect sepsis from E. Coli bacteremia with GI or urinary source?  - pt required levophed for intubation- titrating down currently on 0.2   - midodrine 10 q 8, Metoprolol Tartrate 12.5 mg BID   - ECHO 11/30 EF 55 normal LV/RV   - TSH wnl  - has been intermittently being diuresed with lasix  - EKG 12/7- new Twave inversions V2-V6, II, III, AVF- EKG 6 hrs later slightly improved, repeat stable  - Trop 100->102-> no longer trending (patient not a candidate for Asp/ hep given pancytopenia)   - POCUS 12/8 normal function no segmental changes, VTI 22  -- POCUS 12/12 - nl LV fx, mild diastolic dysfx, LLL consolidation vx atelectasis    #new onset MAR, has been in NSR with no ectomy on tele monitor  - MAR AF in 130's on 12/4, resolved, additional event 12/7 in setting of missed PO metoprolol resolved with IV lopressor    - continue Metoprolol tartrate 12.5 mg BID   - [id number] score is 3   - unable to anticoagulate in the setting of thrombocytopenia   - SCD's are in place   - continue telemetry monitoring     Pulmonary  #AHRF- likely secondary to PNA infection vs Aspiration   - s/p BiPAP, 10/5 on 50%, then intubated 11/30 for increased work of breathing  - Extubated 12/3 to face tent  - patient with increased WOB/ tachypnea overnight 12/6 after blood transfusion possible TACO? patient requiring BIPAP 10/5 50%   - Reintubated 12/7 in setting of WOB/respiratory distress and hypercapnic respiratory failure, and extubated on 12/10  - Reintubated 12/13 i/s/o aspiration and cardiac arrest   - restarted cefepime for empiric pseudomonal coverage i/s/o aspiration   - MRSA previously negative on 12/10; consider adding vanc if febrile through cefepime  - continue home dose of Prednisone 5 mg daily (MM)   - Duplex to r/o DVTs - negative 12/7  - s/p bronch 12/9 for optimization for extubation, airways noted to be erythematous however patent. BAL culture negative.    - extubated 12/15     #Aspiration event  -c/f aspiration pneumonitis  -intubated 12/13 during code blue  -CPAP today  >f/u ABG    GI  Pending S&S, NPO until then due to hx of aspirations    #Elevated liver enzymes, - downtrending  - patient has reported hx of Hep B 2017 as per hemonc   - bili was elevated, peaked at 1.5- however now normalized. continue to monitor   - hepatitis panel - Hep B surface AG +, and the rest is negative    - confirmatory test - hepatitis B quantitative - negative    -Spoke with ROSA Montejo from hepatology office, Dr Florina Jones Re: continuation of Tenofovir. [facility] [phone]. 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 Coli bacteremia- pan sensitive 11/29   - antibiotics broadened to vanco (12/7-12/10) and meropenem (12/7- 12/11) - 5day course  - MRSA resent 12/8- negative  - Sputum cx 12/1, 12/7 - NRF, BAL Culture 12/9 neg , CSF Cx neg   - s/p ctx (12/5-12/7)    - s/p Cefepime 2g q8h (11/30- 12/5)  - s/p Azithromycin 500 mg started (12/2- 12/3) , legionella neg  - Bcx 12/2, 11/29, 12/7- neg   - Fungitel 39   - ID following     SKIN  # sacral decub-DTI., Wound care nurse recs in place     Endo  - no history of diabetes, A1C 6.0  - TSH wnl  - NPH 2 Q6h  - ISS   - cont monitoring     Heme  #MM  - as per Hemonc note MM in remission 5/22 has been on Promacta since 6/22 has received multiple treatments for MM in the last also PBSCT x 2 and haplo allo stem cell transplant in 2020- last spike noted sept 2022-was given belantamab- last treatment was on 11/23/22  - holding anti-myeloma therapy  -  s/p filgrastim held now as patient no longer neutropenic 12/7  - private Hemonc following [facility] recs appreciated     Pancytopenia  #concern for MAHA, possible HUS with E. Coli bacteremia? vs HLH?improving  - new thrombocytopenia, anemia, and +hemolysis labs (bili uptrending and now normalized)  -? 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   - Transfuse PRBC < 7  - Patient meets criteria for HLH (ferritin worsening 19173> 70600, trig 519->191, pancytopenia - 3 cell lines, persistent fever, IL2 8324, hepatitis) Us neg for splenomegaly   -- hem onc __ Concern for HLH__ Appears to be improving, PLTs and HGB improved  - continue home Promacta, 50 mg daily   - Spoke with hemonc at [facility] about HLH- states labs could also be elevated in setting of sepsis and at this point treatment for HLH would be to treat underlying cause of infection and patient would not be a candidate for high dose immunosuppressant (Risks outweigh benefits)   - trend HLH labs daily   - s/p neupogen D/C'd as patient no longer neutropenic     #DVT ppx  - hold off on DVT ppx for now in setting of pancytopenia   - SCD's in place   - Duplex LE - negative 12/7    Skin  #Suspected DTI on sacrum and ear as per nurse  - Wound care recs appreciated      Ethics  - updated son, he is pediatric resident and patient's HCP  - Pt is full code   ASSESSMENT AND PLAN:  Patient is a 63 yo F w/ PMHx of MM (diagnosed ~10 years ago, currently on Promacta treatments since June 2022 - most recently last Wednesday; followed by oncologist, Dr. Lopez at [facility]), ?ITP, Hep B, HTN, neuropathic R hip pain, recent R corneal tear admitted for AMS, code stroke called no acute infarct or hemorrhage, s/p LP findings not consistent with meningitis, course complicated by hypotension and hypoxia, placed on BiPAP, requiring pressors, admitted to MICU with sepsis likely secondary to pneumonia- intubated 11/30. Extubated 12/3 to face tent reintubated 12/7 in setting of increased WOB and hypercapneic respiratory failure unclear cause possibly HLH induced. Patient was downgraded to floors 12/12, however readmitted to MICU after respiratory failure 2/2 aspiration and cardiac arrest.     Neuro  Off sedation, extubated and AOx3  Pt working with pt, OOB to chair    #Code Stroke  - s/p code stroke -> no ischemia or hemorrhage on CT H+N  - s/p LP results not consistent with encephalitis/meningitis, ammonia level 30   - baseline is AOx3, neuro status at baseline   - Repeat CTH 12/8 to r/o neurological cause for respiratory distress causing reintubation/also unsymmetrical nasolabial folds seen on exam was negative.     #Post Cardiac Arrest  -initial CTH 12/13 wnl  - No repeat CTH 12/15 as pt is awake and interactive, following commands     Cardiovascular  #Hypotension   -previously required midodrine upon downgrade to floors (10 BID) for HLH vs vasoplegia vs septic shock  -requiring pressors i/s/o sedation   - Levo off since 12/17 AM    # EKG changes- possible HLH induced cardiac complications?  # new onset A fib with RVR, resolved, pt has been in NSR   - suspect sepsis from E. Coli bacteremia with GI or urinary source?  - pt required levophed for intubation- titrating down currently on 0.2   - midodrine 10 q 8, Metoprolol Tartrate 12.5 mg BID   - ECHO 11/30 EF 55 normal LV/RV   - TSH wnl  - has been intermittently being diuresed with lasix  - EKG 12/7- new Twave inversions V2-V6, II, III, AVF- EKG 6 hrs later slightly improved, repeat stable  - Trop 100->102-> no longer trending (patient not a candidate for Asp/ hep given pancytopenia)   - POCUS 12/8 normal function no segmental changes, VTI 22  -- POCUS 12/12 - nl LV fx, mild diastolic dysfx, LLL consolidation vx atelectasis    #new onset MAR, has been in NSR with no ectomy on tele monitor  - MAR AF in 130's on 12/4, resolved, additional event 12/7 in setting of missed PO metoprolol resolved with IV lopressor    - continue Metoprolol tartrate 12.5 mg BID   - [id number] score is 3   - unable to anticoagulate in the setting of thrombocytopenia   - SCD's are in place   - continue telemetry monitoring     Pulmonary  #AHRF- likely secondary to PNA infection vs Aspiration   - s/p BiPAP, 10/5 on 50%, then intubated 11/30 for increased work of breathing  - Extubated 12/3 to face tent  - patient with increased WOB/ tachypnea overnight 12/6 after blood transfusion possible TACO? patient requiring BIPAP 10/5 50%   - Reintubated 12/7 in setting of WOB/respiratory distress and hypercapnic respiratory failure, and extubated on 12/10  - Reintubated 12/13 i/s/o aspiration and cardiac arrest   - restarted cefepime for empiric pseudomonal coverage i/s/o aspiration   - MRSA previously negative on 12/10; consider adding vanc if febrile through cefepime  - continue home dose of Prednisone 5 mg daily (MM)   - Duplex to r/o DVTs - negative 12/7  - s/p bronch 12/9 for optimization for extubation, airways noted to be erythematous however patent. BAL culture negative.    - extubated 12/15     #Aspiration event  -c/f aspiration pneumonitis  -intubated 12/13 during code blue  - extubated 12/15    GI  Failed forma S&S, pending cineesophagogram on 12/19  Pt declining NGT at this time and believes she can swallow, though failed S&S ib 12/17  Started D5LR @50cc while NPO, continue to encourage patient for NGT and involve family      #Elevated liver enzymes, - downtrending  - patient has reported hx of Hep B 2017 as per hemonc   - bili was elevated, peaked at 1.5- however now normalized. continue to monitor   - hepatitis panel - Hep B surface AG +, and the rest is negative    - confirmatory test - hepatitis B quantitative - negative    -Spoke with ROSA Montejo from hepatology office, Dr Florina Jones Re: continuation of Tenofovir. [facility] [phone]. 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 Coli bacteremia- pan sensitive 11/29   - antibiotics broadened to vanco (12/7-12/10) and meropenem (12/7- 12/11) - 5day course  - MRSA resent 12/8- negative  - Sputum cx 12/1, 12/7 - NRF, BAL Culture 12/9 neg , CSF Cx neg   - s/p ctx (12/5-12/7)    - s/p Cefepime 2g q8h (11/30- 12/5)  - s/p Azithromycin 500 mg started (12/2- 12/3) , legionella neg  - Bcx 12/2, 11/29, 12/7- neg   - Fungitel 39   - ID following     SKIN  # sacral decub-DTI., Wound care nurse recs in place     Endo  - no history of diabetes, A1C 6.0  - TSH wnl  - NPH 2 Q6h  - ISS   - cont monitoring     Heme  #MM  - as per Hemonc note MM in remission 5/22 has been on Promacta since 6/22 has received multiple treatments for MM in the last also PBSCT x 2 and haplo allo stem cell transplant in 2020- last spike noted sept 2022-was given belantamab- last treatment was on 11/23/22  - holding anti-myeloma therapy  -  s/p filgrastim held now as patient no longer neutropenic 12/7  - private Hemonc following [facility] recs appreciated     Pancytopenia  #concern for MAHA, possible HUS with E. Coli bacteremia? vs HLH?improving  - new thrombocytopenia, anemia, and +hemolysis labs (bili uptrending and now normalized)  -? 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   - Transfuse PRBC < 7  - Patient meets criteria for HLH (ferritin worsening 33016> 00551, trig 519->191, pancytopenia - 3 cell lines, persistent fever, IL2 8324, hepatitis) Us neg for splenomegaly   -- hem onc __ Concern for HLH__ Appears to be improving, PLTs and HGB improved  - continue home Promacta, 50 mg daily   - Spoke with hemonc at [facility] about HLH- states labs could also be elevated in setting of sepsis and at this point treatment for HLH would be to treat underlying cause of infection and patient would not be a candidate for high dose immunosuppressant (Risks outweigh benefits)   - trend HLH labs daily   - s/p neupogen D/C'd as patient no longer neutropenic     #DVT ppx  - hold off on DVT ppx for now in setting of pancytopenia   - SCD's in place   - Duplex LE - negative 12/7    Skin  #Suspected DTI on sacrum and ear as per nurse  - Wound care recs appreciated      Ethics  - updated son, he is pediatric resident and patient's HCP  - Pt is full code

## 2022-12-17 NOTE — PROGRESS NOTE ADULT - SUBJECTIVE AND OBJECTIVE BOX
Interval hx: Pt transfered from MICU to floors this PM    MEDICATIONS  (STANDING):  albuterol    90 MICROgram(s) HFA Inhaler 4 Puff(s) Inhalation every 6 hours  artificial tears (preservative free) Ophthalmic Solution 1 Drop(s) Both EYES two times a day  chlorhexidine 2% Cloths 1 Application(s) Topical daily  dextrose 5% + lactated ringers. 1000 milliLiter(s) (50 mL/Hr) IV Continuous <Continuous>  dextrose 5%. 1000 milliLiter(s) (50 mL/Hr) IV Continuous <Continuous>  dextrose 5%. 1000 milliLiter(s) (100 mL/Hr) IV Continuous <Continuous>  dextrose 50% Injectable 25 Gram(s) IV Push once  dextrose 50% Injectable 12.5 Gram(s) IV Push once  dextrose 50% Injectable 25 Gram(s) IV Push once  doxazosin 1 milliGRAM(s) Oral at bedtime  glucagon  Injectable 1 milliGRAM(s) IntraMuscular once  insulin lispro (ADMELOG) corrective regimen sliding scale   SubCutaneous every 6 hours  insulin NPH human recombinant 2 Unit(s) SubCutaneous every 6 hours  multivitamin/minerals/iron Oral Solution (CENTRUM) 15 milliLiter(s) Enteral Tube daily  polyethylene glycol 3350 17 Gram(s) Oral two times a day  predniSONE   Tablet 5 milliGRAM(s) Oral daily  senna 2 Tablet(s) Oral at bedtime  tenofovir alafenamide (VEMLIDY) 25 milliGRAM(s) Oral daily    MEDICATIONS  (PRN):  dextrose Oral Gel 15 Gram(s) Oral once PRN Blood Glucose LESS THAN 70 milliGRAM(s)/deciliter  melatonin 6 milliGRAM(s) Oral at bedtime PRN Insomnia    Vital Signs (24 Hrs):  T(C): 37.3 (22 @ 16:00), Max: 37.3 (22 @ 16:00)  HR: 103 (22 @ 16:00) (83 - 103)  BP: 110/69 (22 @ 16:00) (78/49 - 135/53)  RR: 29 (22 @ 16:00) (15 - 29)  SpO2: 94% (22 @ 16:00) (90% - 100%)  Wt(kg): --  Daily     Daily Weight in k.2 (17 Dec 2022 06:47)    I&O's Summary    16 Dec 2022 07:01  -  17 Dec 2022 07:00  --------------------------------------------------------  IN: 574 mL / OUT: 1005 mL / NET: -431 mL    17 Dec 2022 07:01  -  17 Dec 2022 17:12  --------------------------------------------------------  IN: 550 mL / OUT: 375 mL / NET: 175 mL    Physical Exam:      LABS:  cret                        7.7    5.36  )-----------( 94       ( 17 Dec 2022 07:00 )             24.9     12-17    138  |  104  |  14  ----------------------------<  74  3.5   |  24  |  0.51    Ca    9.0      17 Dec 2022 07:00  Phos  2.1     12-16  Mg     1.70     12-16    TPro  5.0<L>  /  Alb  2.6<L>  /  TBili  1.0  /  DBili  x   /  AST  48<H>  /  ALT  22  /  AlkPhos  117  -17       Interval hx: Pt transfered from MICU to floors this PM    MEDICATIONS  (STANDING):  albuterol    90 MICROgram(s) HFA Inhaler 4 Puff(s) Inhalation every 6 hours  artificial tears (preservative free) Ophthalmic Solution 1 Drop(s) Both EYES two times a day  chlorhexidine 2% Cloths 1 Application(s) Topical daily  dextrose 5% + lactated ringers. 1000 milliLiter(s) (50 mL/Hr) IV Continuous <Continuous>  dextrose 5%. 1000 milliLiter(s) (50 mL/Hr) IV Continuous <Continuous>  dextrose 5%. 1000 milliLiter(s) (100 mL/Hr) IV Continuous <Continuous>  dextrose 50% Injectable 25 Gram(s) IV Push once  dextrose 50% Injectable 12.5 Gram(s) IV Push once  dextrose 50% Injectable 25 Gram(s) IV Push once  doxazosin 1 milliGRAM(s) Oral at bedtime  glucagon  Injectable 1 milliGRAM(s) IntraMuscular once  insulin lispro (ADMELOG) corrective regimen sliding scale   SubCutaneous every 6 hours  insulin NPH human recombinant 2 Unit(s) SubCutaneous every 6 hours  multivitamin/minerals/iron Oral Solution (CENTRUM) 15 milliLiter(s) Enteral Tube daily  polyethylene glycol 3350 17 Gram(s) Oral two times a day  predniSONE   Tablet 5 milliGRAM(s) Oral daily  senna 2 Tablet(s) Oral at bedtime  tenofovir alafenamide (VEMLIDY) 25 milliGRAM(s) Oral daily    MEDICATIONS  (PRN):  dextrose Oral Gel 15 Gram(s) Oral once PRN Blood Glucose LESS THAN 70 milliGRAM(s)/deciliter  melatonin 6 milliGRAM(s) Oral at bedtime PRN Insomnia    Vital Signs (24 Hrs):  T(C): 37.3 (22 @ 16:00), Max: 37.3 (22 @ 16:00)  HR: 103 (22 @ 16:00) (83 - 103)  BP: 110/69 (22 @ 16:00) (78/49 - 135/53)  RR: 29 (22 @ 16:00) (15 - 29)  SpO2: 94% (22 @ 16:00) (90% - 100%)  Wt(kg): --  Daily     Daily Weight in k.2 (17 Dec 2022 06:47)    I&O's Summary    16 Dec 2022 07:01  -  17 Dec 2022 07:00  --------------------------------------------------------  IN: 574 mL / OUT: 1005 mL / NET: -431 mL    17 Dec 2022 07:01  -  17 Dec 2022 17:12  --------------------------------------------------------  IN: 550 mL / OUT: 375 mL / NET: 175 mL    Physical Exam:  General: Comfortable, no acute distress, cooperative with exam.  HEENT: PERRLA, EOMI, moist mucous membranes.  Respiratory: CTAB, normal respiratory effort, no coughing, wheezes, crackles, or rales.  CV: RRR, S1S2, no murmurs, rubs or gallops. No JVD. Distal pulses intact.  Abdominal: Soft, nontender, nondistended, no rebound or guarding, normal bowel sounds.  Neurology: AOx3, no focal neuro defects, [place] x 4.  Extremities: No pitting edema, + Peripheral pulses.    LABS:  cret                        7.7    5.36  )-----------( 94       ( 17 Dec 2022 07:00 )             24.9     12-    138  |  104  |  14  ----------------------------<  74  3.5   |  24  |  0.51    Ca    9.0      17 Dec 2022 07:00  Phos  2.1     12-16  Mg     1.70     12-16    TPro  5.0<L>  /  Alb  2.6<L>  /  TBili  1.0  /  DBili  x   /  AST  48<H>  /  ALT  22  /  AlkPhos  117  12-       Interval hx: Pt transfered from MICU to floors this PM    MEDICATIONS  (STANDING):  albuterol    90 MICROgram(s) HFA Inhaler 4 Puff(s) Inhalation every 6 hours  artificial tears (preservative free) Ophthalmic Solution 1 Drop(s) Both EYES two times a day  chlorhexidine 2% Cloths 1 Application(s) Topical daily  dextrose 5% + lactated ringers. 1000 milliLiter(s) (50 mL/Hr) IV Continuous <Continuous>  dextrose 5%. 1000 milliLiter(s) (50 mL/Hr) IV Continuous <Continuous>  dextrose 5%. 1000 milliLiter(s) (100 mL/Hr) IV Continuous <Continuous>  dextrose 50% Injectable 25 Gram(s) IV Push once  dextrose 50% Injectable 12.5 Gram(s) IV Push once  dextrose 50% Injectable 25 Gram(s) IV Push once  doxazosin 1 milliGRAM(s) Oral at bedtime  glucagon  Injectable 1 milliGRAM(s) IntraMuscular once  insulin lispro (ADMELOG) corrective regimen sliding scale   SubCutaneous every 6 hours  insulin NPH human recombinant 2 Unit(s) SubCutaneous every 6 hours  multivitamin/minerals/iron Oral Solution (CENTRUM) 15 milliLiter(s) Enteral Tube daily  polyethylene glycol 3350 17 Gram(s) Oral two times a day  predniSONE   Tablet 5 milliGRAM(s) Oral daily  senna 2 Tablet(s) Oral at bedtime  tenofovir alafenamide (VEMLIDY) 25 milliGRAM(s) Oral daily    MEDICATIONS  (PRN):  dextrose Oral Gel 15 Gram(s) Oral once PRN Blood Glucose LESS THAN 70 milliGRAM(s)/deciliter  melatonin 6 milliGRAM(s) Oral at bedtime PRN Insomnia    Vital Signs (24 Hrs):  T(C): 37.3 (22 @ 16:00), Max: 37.3 (22 @ 16:00)  HR: 103 (22 @ 16:00) (83 - 103)  BP: 110/69 (22 @ 16:00) (78/49 - 135/53)  RR: 29 (22 @ 16:00) (15 - 29)  SpO2: 94% (22 @ 16:00) (90% - 100%)  Wt(kg): --  Daily     Daily Weight in k.2 (17 Dec 2022 06:47)    I&O's Summary    16 Dec 2022 07:01  -  17 Dec 2022 07:00  --------------------------------------------------------  IN: 574 mL / OUT: 1005 mL / NET: -431 mL    17 Dec 2022 07:01  -  17 Dec 2022 17:12  --------------------------------------------------------  IN: 550 mL / OUT: 375 mL / NET: 175 mL    Physical Exam:  General: Comfortable, no acute distress, cooperative with exam.  HEENT: PERRLA, EOMI, moist mucous membranes.  Respiratory: CTAB, normal respiratory effort, no coughing, wheezes, crackles, or rales.  CV: RRR, S1S2, no murmurs, rubs or gallops. No JVD. Distal pulses intact.  Abdominal: Soft, nontender, nondistended, no rebound or guarding, normal bowel sounds.  Neurology: AOx3, no focal neuro defects, [place] x 4.  Extremities: No pitting edema, + Peripheral pulses.    LABS:  cret                        7.7    5.36  )-----------( 94       ( 17 Dec 2022 07:00 )             24.9     12-    138  |  104  |  14  ----------------------------<  74  3.5   |  24  |  0.51    Ca    9.0      17 Dec 2022 07:00  Phos  2.1     12-16  Mg     1.70     12-16    TPro  5.0<L>  /  Alb  2.6<L>  /  TBili  1.0  /  DBili  x   /  AST  48<H>  /  ALT  22  /  AlkPhos  117  12-

## 2022-12-17 NOTE — PROGRESS NOTE ADULT - PROBLEM SELECTOR PLAN 8
DVT PPx: SCDs, holding off pharmacologic ppx i/s/o pancytopenia  Diet: pending cineesophagogram 12/19, pt declined NGT  Dispo: Medically active    Code Status: Full Code  HCP: Pt's son Resolved #Elevated liver enzymes, - downtrending  - patient has reported hx of Hep B 2017 as per hemonc   - bili was elevated, peaked at 1.5- however now normalized. continue to monitor   - hepatitis panel - Hep B surface AG +, and the rest is negative    - confirmatory test - hepatitis B quantitative - negative    -Spoke with ROSA Montejo from hepatology office, Dr Florina Jones Re: continuation of Tenofovir. [facility] [phone]. 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- outpt follow up after discharge- restarted Tenofovir 25 mg daily

## 2022-12-17 NOTE — PROGRESS NOTE ADULT - SUBJECTIVE AND OBJECTIVE BOX
Shine Garcias MD  Internal Medicine    PATIENT: MILY MARCELO, MRN: [id number]    CHIEF COMPLAINT: Patient is a 62y old  Female who presents with a chief complaint of slurred speech (17 Dec 2022 11:06)      INTERVAL HISTORY/OVERNIGHT EVENTS: No overnight events. This morning the patient is comfortable and denies F/C, N/V/D/C, abd pain, chest pain or SOB. The patient is breathing comfortably on RA.     REVIEW OF SYSTEMS:  [X] All other systems negative    MEDICATIONS:  MEDICATIONS  (STANDING):  albuterol    90 MICROgram(s) HFA Inhaler 4 Puff(s) Inhalation every 6 hours  artificial tears (preservative free) Ophthalmic Solution 1 Drop(s) Both EYES two times a day  chlorhexidine 2% Cloths 1 Application(s) Topical daily  dextrose 5% + lactated ringers. 1000 milliLiter(s) (50 mL/Hr) IV Continuous <Continuous>  dextrose 5%. 1000 milliLiter(s) (100 mL/Hr) IV Continuous <Continuous>  dextrose 5%. 1000 milliLiter(s) (50 mL/Hr) IV Continuous <Continuous>  dextrose 50% Injectable 25 Gram(s) IV Push once  dextrose 50% Injectable 12.5 Gram(s) IV Push once  dextrose 50% Injectable 25 Gram(s) IV Push once  doxazosin 1 milliGRAM(s) Oral at bedtime  glucagon  Injectable 1 milliGRAM(s) IntraMuscular once  insulin lispro (ADMELOG) corrective regimen sliding scale   SubCutaneous every 6 hours  insulin NPH human recombinant 2 Unit(s) SubCutaneous every 6 hours  multivitamin/minerals/iron Oral Solution (CENTRUM) 15 milliLiter(s) Enteral Tube daily  polyethylene glycol 3350 17 Gram(s) Oral two times a day  predniSONE   Tablet 5 milliGRAM(s) Oral daily  senna 2 Tablet(s) Oral at bedtime  tenofovir alafenamide (VEMLIDY) 25 milliGRAM(s) Oral daily    MEDICATIONS  (PRN):  dextrose Oral Gel 15 Gram(s) Oral once PRN Blood Glucose LESS THAN 70 milliGRAM(s)/deciliter  melatonin 6 milliGRAM(s) Oral at bedtime PRN Insomnia      ALLERGIES: Allergies    Bactrim (Other)  fluconazole (Vomiting; Nausea)  levofloxacin (Vomiting; Nausea)  penicillin (Other)    Intolerances        OBJECTIVE:  ICU Vital Signs Last 24 Hrs  T(C): 36.7 (17 Dec 2022 12:00), Max: 37 (16 Dec 2022 16:00)  T(F): 98 (17 Dec 2022 12:00), Max: 98.6 (16 Dec 2022 16:00)  HR: 92 (17 Dec 2022 12:00) (83 - 100)  BP: 109/51 (17 Dec 2022 12:00) (78/49 - 137/95)  BP(mean): 69 (17 Dec 2022 12:00) (48 - 110)  ABP: --  ABP(mean): --  RR: 21 (17 Dec 2022 12:00) (15 - 28)  SpO2: 95% (17 Dec 2022 12:00) (90% - 100%)    O2 Parameters below as of 17 Dec 2022 11:00  Patient On (Oxygen Delivery Method): room air            POCT Blood Glucose.: 79 mg/dL (17 Dec 2022 11:06)  POCT Blood Glucose.: 85 mg/dL (17 Dec 2022 06:13)  POCT Blood Glucose.: 79 mg/dL (16 Dec 2022 23:55)  POCT Blood Glucose.: 87 mg/dL (16 Dec 2022 19:09)    CAPILLARY BLOOD GLUCOSE      POCT Blood Glucose.: 79 mg/dL (17 Dec 2022 11:06)    I&O's Summary    16 Dec 2022 07:01  -  17 Dec 2022 07:00  --------------------------------------------------------  IN: 574 mL / OUT: 1005 mL / NET: -431 mL    17 Dec 2022 07:01  -  17 Dec 2022 12:37  --------------------------------------------------------  IN: 250 mL / OUT: 200 mL / NET: 50 mL      Daily     Daily Weight in k.2 (17 Dec 2022 06:47)    PHYSICAL EXAMINATION:  General: Comfortable, no acute distress, cooperative with exam.  HEENT: PERRLA, EOMI, moist mucous membranes.  Respiratory: CTAB, normal respiratory effort, no coughing, wheezes, crackles, or rales.  CV: RRR, S1S2, no murmurs, rubs or gallops. No JVD. Distal pulses intact.  Abdominal: Soft, nontender, nondistended, no rebound or guarding, normal bowel sounds.  Neurology: AOx3, no focal neuro defects, [place] x 4.  Extremities: No pitting edema, + Peripheral pulses.    LABS:                          7.7    5.36  )-----------( 94       ( 17 Dec 2022 07:00 )             24.9     12-17    138  |  104  |  14  ----------------------------<  74  3.5   |  24  |  0.51    Ca    9.0      17 Dec 2022 07:00  Phos  2.1     12-16  Mg     1.70     12-16    TPro  5.0<L>  /  Alb  2.6<L>  /  TBili  1.0  /  DBili  x   /  AST  48<H>  /  ALT  22  /  AlkPhos  117  12-17    LIVER FUNCTIONS - ( 17 Dec 2022 07:00 )  Alb: 2.6 g/dL / Pro: 5.0 g/dL / ALK PHOS: 117 U/L / ALT: 22 U/L / AST: 48 U/L / GGT: x

## 2022-12-18 LAB
CULTURE RESULTS: SIGNIFICANT CHANGE UP
SPECIMEN SOURCE: SIGNIFICANT CHANGE UP

## 2022-12-18 PROCEDURE — 99233 SBSQ HOSP IP/OBS HIGH 50: CPT | Mod: GC

## 2022-12-18 RX ADMIN — Medication 1 DROP(S): at 05:27

## 2022-12-18 RX ADMIN — Medication 1 DROP(S): at 18:44

## 2022-12-18 RX ADMIN — SODIUM CHLORIDE 50 MILLILITER(S): 9 INJECTION, SOLUTION INTRAVENOUS at 05:27

## 2022-12-18 RX ADMIN — CHLORHEXIDINE GLUCONATE 1 APPLICATION(S): 213 SOLUTION TOPICAL at 12:58

## 2022-12-18 NOTE — PROGRESS NOTE ADULT - PROBLEM SELECTOR PLAN 3
Previously required midodrine upon downgrade to floors (10 BID) for HLH vs vasoplegia vs septic shock, Levo off since 12/17 AM    -Will CTM Vitals

## 2022-12-18 NOTE — PROGRESS NOTE ADULT - PROBLEM SELECTOR PLAN 7
new onset A fib with RVR, likely i/s/o sepsis and/or HLH. Now resolved, pt has been in NSR since    Diagnostics:  - ECHO 11/30 EF 55 normal LV/RV   - TSH wnl  - EKG 12/7- new Twave inversions V2-V6, II, III, AVF- EKG 6 hrs later slightly improved, repeat stable  - Trop 100->102-> no longer trending (patient not a candidate for Asp/ hep given pancytopenia)   - POCUS 12/8 normal function no segmental changes, VTI 22  -- POCUS 12/12 - nl LV fx, mild diastolic dysfx, LLL consolidation vx atelectasis    Management:  -Will CTM Vital signs. If Tachycardic, consider EKG to make sure pt still sinus  -No AC because of thrombocytopenia, despite CHADSVASC 3

## 2022-12-18 NOTE — PROGRESS NOTE ADULT - ASSESSMENT
Patient is a 61 yo F w/ PMHx of MM (diagnosed ~10 years ago, currently on Promacta treatments since June 2022 - most recently last Wednesday; followed by oncologist, Dr. Lopez at [facility]), ?ITP, Hep B, HTN, neuropathic R hip pain, recent R corneal tear admitted for AMS, code stroke called no acute infarct or hemorrhage, s/p LP findings not consistent with meningitis, course complicated by hypotension and hypoxia, placed on BiPAP, requiring pressors, admitted to MICU with sepsis likely secondary to pneumonia- intubated 11/30. Extubated 12/3 to face tent reintubated 12/7 in setting of increased WOB and hypercapnic respiratory failure unclear cause possibly HLH induced. Patient was downgraded to floors 12/12, however readmitted to MICU after respiratory failure 2/2 aspiration and cardiac arrest. Eventually weaned off pressors and extubated 12/15, now on nasal canula and hemodynamically stable, transferred to floors on 12/17.

## 2022-12-18 NOTE — PROGRESS NOTE ADULT - SUBJECTIVE AND OBJECTIVE BOX
***************************************************************  [facility] (Ji[facility]Cheng) [facility] PGY1  Internal Medicine   ***************************************************************    MILY MARCELO  62y  MRN: [id number]    Patient is a 62y old  Female who presents with a chief complaint of slurred speech (17 Dec 2022 21:14)      Subjective: no events ON. Denies fever, CP, SOB, abn pain, N/V, dysuria. Tolerating diet.      MEDICATIONS  (STANDING):  albuterol    90 MICROgram(s) HFA Inhaler 4 Puff(s) Inhalation every 6 hours  artificial tears (preservative free) Ophthalmic Solution 1 Drop(s) Both EYES two times a day  chlorhexidine 2% Cloths 1 Application(s) Topical daily  dextrose 5% + lactated ringers. 1000 milliLiter(s) (50 mL/Hr) IV Continuous <Continuous>  dextrose 5%. 1000 milliLiter(s) (50 mL/Hr) IV Continuous <Continuous>  dextrose 5%. 1000 milliLiter(s) (100 mL/Hr) IV Continuous <Continuous>  dextrose 50% Injectable 25 Gram(s) IV Push once  dextrose 50% Injectable 12.5 Gram(s) IV Push once  dextrose 50% Injectable 25 Gram(s) IV Push once  doxazosin 1 milliGRAM(s) Oral at bedtime  glucagon  Injectable 1 milliGRAM(s) IntraMuscular once  insulin lispro (ADMELOG) corrective regimen sliding scale   SubCutaneous every 6 hours  multivitamin/minerals/iron Oral Solution (CENTRUM) 15 milliLiter(s) Enteral Tube daily  polyethylene glycol 3350 17 Gram(s) Oral two times a day  predniSONE   Tablet 5 milliGRAM(s) Oral daily  senna 2 Tablet(s) Oral at bedtime  tenofovir alafenamide (VEMLIDY) 25 milliGRAM(s) Oral daily    MEDICATIONS  (PRN):  dextrose Oral Gel 15 Gram(s) Oral once PRN Blood Glucose LESS THAN 70 milliGRAM(s)/deciliter  melatonin 6 milliGRAM(s) Oral at bedtime PRN Insomnia      Objective:    Vitals: Vital Signs Last 24 Hrs  T(C): 36.7 (12-18-22 @ 05:18), Max: 37.3 (12-17-22 @ 16:00)  T(F): 98.1 (12-18-22 @ 05:18), Max: 99.1 (12-17-22 @ 16:00)  HR: 91 (12-18-22 @ 05:18) (85 - 103)  BP: 112/66 (12-18-22 @ 05:18) (92/67 - 126/59)  BP(mean): 77 (12-17-22 @ 17:18) (65 - 80)  RR: 17 (12-18-22 @ 05:18) (16 - 29)  SpO2: 97% (12-18-22 @ 05:18) (90% - 99%)            I&O's Summary    16 Dec 2022 07:01  -  17 Dec 2022 07:00  --------------------------------------------------------  IN: 574 mL / OUT: 1005 mL / NET: -431 mL    17 Dec 2022 07:01  -  18 Dec 2022 06:58  --------------------------------------------------------  IN: 1150 mL / OUT: 585 mL / NET: 565 mL        PHYSICAL EXAM:  GENERAL: NAD  HEAD:  Atraumatic, Normocephalic  EYES: EOMI, conjunctiva and sclera clear  CHEST/LUNG: Clear to auscultation bilaterally; No rales, rhonchi, wheezing, or rubs  HEART: Regular rate and rhythm; No murmurs, rubs, or gallops  ABDOMEN: Soft, Nontender, Nondistended;   SKIN: No rashes or lesions  NERVOUS SYSTEM:  Alert & Oriented X3, no focal deficits    LABS:  12-17    138  |  104  |  14  ----------------------------<  74  3.5   |  24  |  0.51  12-16    140  |  105  |  15  ----------------------------<  91  3.7   |  27  |  0.55    Ca    9.0      17 Dec 2022 07:00  Ca    8.7      16 Dec 2022 00:52    TPro  5.0<L>  /  Alb  2.6<L>  /  TBili  1.0  /  DBili  x   /  AST  48<H>  /  ALT  22  /  AlkPhos  117  12-17  TPro  4.6<L>  /  Alb  2.5<L>  /  TBili  0.8  /  DBili  x   /  AST  44<H>  /  ALT  27  /  AlkPhos  114  12-16                                              7.7    5.36  )-----------( 94       ( 17 Dec 2022 07:00 )             24.9                         7.4    5.01  )-----------( 72       ( 16 Dec 2022 00:52 )             23.2     CAPILLARY BLOOD GLUCOSE      POCT Blood Glucose.: 94 mg/dL (17 Dec 2022 23:46)  POCT Blood Glucose.: 90 mg/dL (17 Dec 2022 17:17)  POCT Blood Glucose.: 79 mg/dL (17 Dec 2022 11:06)      RADIOLOGY & ADDITIONAL TESTS:    Imaging Personally Reviewed:  [x ] YES  [ ] NO    Consultants involved in case:   Consultant(s) Notes Reviewed:  [ x] YES  [ ] NO:   Care Discussed with Consultants/Other Providers [x ] YES  [ ] NO         ***************************************************************  [facility] (Ji-Cheng) [facility] PGY1  Internal Medicine   ***************************************************************    MILY MARCELO  62y  MRN: [id number]    Subjective: NAEO. Denies any acute symptoms, feels comfortable without SOB.     MEDICATIONS  (STANDING):  albuterol    90 MICROgram(s) HFA Inhaler 4 Puff(s) Inhalation every 6 hours  artificial tears (preservative free) Ophthalmic Solution 1 Drop(s) Both EYES two times a day  chlorhexidine 2% Cloths 1 Application(s) Topical daily  dextrose 5% + lactated ringers. 1000 milliLiter(s) (50 mL/Hr) IV Continuous <Continuous>  dextrose 5%. 1000 milliLiter(s) (50 mL/Hr) IV Continuous <Continuous>  dextrose 5%. 1000 milliLiter(s) (100 mL/Hr) IV Continuous <Continuous>  dextrose 50% Injectable 25 Gram(s) IV Push once  dextrose 50% Injectable 12.5 Gram(s) IV Push once  dextrose 50% Injectable 25 Gram(s) IV Push once  doxazosin 1 milliGRAM(s) Oral at bedtime  glucagon  Injectable 1 milliGRAM(s) IntraMuscular once  insulin lispro (ADMELOG) corrective regimen sliding scale   SubCutaneous every 6 hours  multivitamin/minerals/iron Oral Solution (CENTRUM) 15 milliLiter(s) Enteral Tube daily  polyethylene glycol 3350 17 Gram(s) Oral two times a day  predniSONE   Tablet 5 milliGRAM(s) Oral daily  senna 2 Tablet(s) Oral at bedtime  tenofovir alafenamide (VEMLIDY) 25 milliGRAM(s) Oral daily    MEDICATIONS  (PRN):  dextrose Oral Gel 15 Gram(s) Oral once PRN Blood Glucose LESS THAN 70 milliGRAM(s)/deciliter  melatonin 6 milliGRAM(s) Oral at bedtime PRN Insomnia      Objective:    Vitals: Vital Signs Last 24 Hrs  T(C): 36.7 (12-18-22 @ 05:18), Max: 37.3 (12-17-22 @ 16:00)  T(F): 98.1 (12-18-22 @ 05:18), Max: 99.1 (12-17-22 @ 16:00)  HR: 91 (12-18-22 @ 05:18) (85 - 103)  BP: 112/66 (12-18-22 @ 05:18) (92/67 - 126/59)  BP(mean): 77 (12-17-22 @ 17:18) (65 - 80)  RR: 17 (12-18-22 @ 05:18) (16 - 29)  SpO2: 97% (12-18-22 @ 05:18) (90% - 99%)            I&O's Summary    16 Dec 2022 07:01  -  17 Dec 2022 07:00  --------------------------------------------------------  IN: 574 mL / OUT: 1005 mL / NET: -431 mL    17 Dec 2022 07:01  -  18 Dec 2022 06:58  --------------------------------------------------------  IN: 1150 mL / OUT: 585 mL / NET: 565 mL        PHYSICAL EXAM:  GENERAL: NAD  HEAD:  Atraumatic, Normocephalic  EYES: EOMI, conjunctiva and sclera clear  CHEST/LUNG: Clear to auscultation bilaterally; No rales, rhonchi, wheezing, or rubs  HEART: Regular rate and rhythm; No murmurs, rubs, or gallops  ABDOMEN: Soft, Nontender, Nondistended;   SKIN: No rashes or lesions  NERVOUS SYSTEM:  Alert & Oriented X3, no focal deficits    LABS:  12-17    138  |  104  |  14  ----------------------------<  74  3.5   |  24  |  0.51  12-16    140  |  105  |  15  ----------------------------<  91  3.7   |  27  |  0.55    Ca    9.0      17 Dec 2022 07:00  Ca    8.7      16 Dec 2022 00:52    TPro  5.0<L>  /  Alb  2.6<L>  /  TBili  1.0  /  DBili  x   /  AST  48<H>  /  ALT  22  /  AlkPhos  117  12-17  TPro  4.6<L>  /  Alb  2.5<L>  /  TBili  0.8  /  DBili  x   /  AST  44<H>  /  ALT  27  /  AlkPhos  114  12-16                                              7.7    5.36  )-----------( 94       ( 17 Dec 2022 07:00 )             24.9                         7.4    5.01  )-----------( 72       ( 16 Dec 2022 00:52 )             23.2     CAPILLARY BLOOD GLUCOSE      POCT Blood Glucose.: 94 mg/dL (17 Dec 2022 23:46)  POCT Blood Glucose.: 90 mg/dL (17 Dec 2022 17:17)  POCT Blood Glucose.: 79 mg/dL (17 Dec 2022 11:06)      RADIOLOGY & ADDITIONAL TESTS:    Imaging Personally Reviewed:  [x ] YES  [ ] NO    Consultants involved in case:   Consultant(s) Notes Reviewed:  [ x] YES  [ ] NO:   Care Discussed with Consultants/Other Providers [x ] YES  [ ] NO

## 2022-12-18 NOTE — PROGRESS NOTE ADULT - PROBLEM SELECTOR PLAN 11
DVT PPx: SCDs, holding off pharmacologic ppx i/s/o pancytopenia  Diet: currently NPO, pending cineesophagogram 12/19, pt declined NGT. Pt currently on D5LR 50 cc/hr  Dispo: Medically active        Code Status: Full Code  HCP: Pt's son  Bowel Regimen: Senna and Miralax  Insulin: SSI q6 while NPO, will readjust insulin based on requirements.  Miscellaneous: Because pt is NPO and does not want OGT/NGT, pt cannot receive PO Tenofovir, Doxazosin, or Prednisone as ordered

## 2022-12-18 NOTE — PROGRESS NOTE ADULT - PROBLEM SELECTOR PLAN 4
Pt has received total 1u PLT transfusion and 1.5u pRBC transfusion during this hospitalization. Per heme onc, concern for HLH. Pt s/p Promacta 12/6-12/13. Platelets have been steadily uptrending, with 12/17 AM PLT 94. s/p neupogen as pt is no longer neutropenic. Heme onc following    Diagnostics:  - direct dede negative  - some schistocytes on peripheral smear, confirmed with hematology/oncology, however higher suspicion for lab abnormalities are secondary to sepsis  - Per, patient met criteria for HLH during admission (ferritin worsening 20913> 90140, trig 519->191, pancytopenia - 3 cell lines, persistent fever, IL2 8324, hepatitis)       Management:  - Platelet transfusion protocol: <10, <20 with fever, <50 procedures/active bleeding/etc   - Transfuse pRBCs if Hgb < 7  - Trend HLH labs daily (ferritin, LDH) in addition to CBC/CMP

## 2022-12-18 NOTE — PROGRESS NOTE ADULT - PROBLEM SELECTOR PLAN 8
#Elevated liver enzymes, - downtrending  - patient has reported hx of Hep B 2017 as per hemonc   - bili was elevated, peaked at 1.5- however now normalized. continue to monitor   - hepatitis panel - Hep B surface AG +, and the rest is negative    - confirmatory test - hepatitis B quantitative - negative    -Spoke with ROSA Montejo from hepatology office, Dr Florina Jones Re: continuation of Tenofovir. [facility] [phone]. 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- outpt follow up after discharge- restarted Tenofovir 25 mg daily

## 2022-12-18 NOTE — PROGRESS NOTE ADULT - PROBLEM SELECTOR PLAN 5
Last positive BCx 11/29. 12/7 BCx No Growth Final, 12/13 and 12/14 BCx NGTD. s/p CTX 12/5- 12/7, Cefepime 11/30-12/5, Vanco 12/7-12/10 and Meropenem 12/7-12/11.     -Currently monitoring off abx  -ID following, brenton payan

## 2022-12-18 NOTE — PROGRESS NOTE ADULT - PROBLEM SELECTOR PLAN 6
As per Hemonc note MM in remission 5/22 has been on Promacta since 6/22 has received multiple treatments for MM in the last also PBSCT x 2 and haplo allo stem cell transplant in 2020- last spike noted sept 2022-was given belantamab- last treatment was on 11/23/22    - holding anti-myeloma therapy  -  s/p filgrastim held now as patient no longer neutropenic 12/7  - private Hemonc following [facility] recs appreciated As per Hemonc note MM in remission 5/22 has been on Promacta since 6/22 has received multiple treatments for MM in the last also PBSCT x 2 and haplo allo stem cell transplant in 2020- last spike noted sept 2022-was given belantamab- last treatment was on 11/23/22    - holding anti-myeloma therapy - will t/b with heme/onc regarding whether patient needs prednisone, is unable to take given NPO status and refusal of NGT.   -  s/p filgrastim held now as patient no longer neutropenic 12/7  - private Hemonc following [facility] recs appreciated

## 2022-12-19 LAB
ALBUMIN SERPL ELPH-MCNC: 2.6 G/DL — LOW (ref 3.3–5)
ALP SERPL-CCNC: 109 U/L — SIGNIFICANT CHANGE UP (ref 40–120)
ALT FLD-CCNC: 17 U/L — SIGNIFICANT CHANGE UP (ref 4–33)
ANION GAP SERPL CALC-SCNC: 9 MMOL/L — SIGNIFICANT CHANGE UP (ref 7–14)
ANISOCYTOSIS BLD QL: SLIGHT — SIGNIFICANT CHANGE UP
AST SERPL-CCNC: 32 U/L — SIGNIFICANT CHANGE UP (ref 4–32)
BASOPHILS # BLD AUTO: 0.09 K/UL — SIGNIFICANT CHANGE UP (ref 0–0.2)
BASOPHILS NFR BLD AUTO: 1.8 % — SIGNIFICANT CHANGE UP (ref 0–2)
BILIRUB SERPL-MCNC: 1 MG/DL — SIGNIFICANT CHANGE UP (ref 0.2–1.2)
BUN SERPL-MCNC: 10 MG/DL — SIGNIFICANT CHANGE UP (ref 7–23)
CALCIUM SERPL-MCNC: 9 MG/DL — SIGNIFICANT CHANGE UP (ref 8.4–10.5)
CHLORIDE SERPL-SCNC: 108 MMOL/L — HIGH (ref 98–107)
CO2 SERPL-SCNC: 27 MMOL/L — SIGNIFICANT CHANGE UP (ref 22–31)
CREAT SERPL-MCNC: 0.53 MG/DL — SIGNIFICANT CHANGE UP (ref 0.5–1.3)
CULTURE RESULTS: SIGNIFICANT CHANGE UP
EGFR: 104 ML/MIN/1.73M2 — SIGNIFICANT CHANGE UP
EOSINOPHIL # BLD AUTO: 0.42 K/UL — SIGNIFICANT CHANGE UP (ref 0–0.5)
EOSINOPHIL NFR BLD AUTO: 8.9 % — HIGH (ref 0–6)
FERRITIN SERPL-MCNC: 5834 NG/ML — HIGH (ref 15–150)
GIANT PLATELETS BLD QL SMEAR: PRESENT — SIGNIFICANT CHANGE UP
GLUCOSE BLDC GLUCOMTR-MCNC: 112 MG/DL — HIGH (ref 70–99)
GLUCOSE BLDC GLUCOMTR-MCNC: 134 MG/DL — HIGH (ref 70–99)
GLUCOSE BLDC GLUCOMTR-MCNC: 138 MG/DL — HIGH (ref 70–99)
GLUCOSE SERPL-MCNC: 93 MG/DL — SIGNIFICANT CHANGE UP (ref 70–99)
HCT VFR BLD CALC: 24.3 % — LOW (ref 34.5–45)
HGB BLD-MCNC: 7.4 G/DL — LOW (ref 11.5–15.5)
HYPOCHROMIA BLD QL: SLIGHT — SIGNIFICANT CHANGE UP
IANC: 1.93 K/UL — SIGNIFICANT CHANGE UP (ref 1.8–7.4)
LDH SERPL L TO P-CCNC: 773 U/L — HIGH (ref 135–225)
LYMPHOCYTES # BLD AUTO: 0.77 K/UL — LOW (ref 1–3.3)
LYMPHOCYTES # BLD AUTO: 16.1 % — SIGNIFICANT CHANGE UP (ref 13–44)
MACROCYTES BLD QL: SIGNIFICANT CHANGE UP
MAGNESIUM SERPL-MCNC: 1.7 MG/DL — SIGNIFICANT CHANGE UP (ref 1.6–2.6)
MANUAL SMEAR VERIFICATION: SIGNIFICANT CHANGE UP
MCHC RBC-ENTMCNC: 30.5 GM/DL — LOW (ref 32–36)
MCHC RBC-ENTMCNC: 32.3 PG — SIGNIFICANT CHANGE UP (ref 27–34)
MCV RBC AUTO: 106.1 FL — HIGH (ref 80–100)
MICROCYTES BLD QL: SLIGHT — SIGNIFICANT CHANGE UP
MONOCYTES # BLD AUTO: 0.98 K/UL — HIGH (ref 0–0.9)
MONOCYTES NFR BLD AUTO: 20.5 % — HIGH (ref 2–14)
NEUTROPHILS # BLD AUTO: 2.08 K/UL — SIGNIFICANT CHANGE UP (ref 1.8–7.4)
NEUTROPHILS NFR BLD AUTO: 38.4 % — LOW (ref 43–77)
NEUTS BAND # BLD: 5.4 % — SIGNIFICANT CHANGE UP (ref 0–6)
NRBC # BLD: 2 /100 — HIGH (ref 0–0)
OVALOCYTES BLD QL SMEAR: SLIGHT — SIGNIFICANT CHANGE UP
PHOSPHATE SERPL-MCNC: 1.8 MG/DL — LOW (ref 2.5–4.5)
PLAT MORPH BLD: NORMAL — SIGNIFICANT CHANGE UP
PLATELET # BLD AUTO: 128 K/UL — LOW (ref 150–400)
PLATELET COUNT - ESTIMATE: ABNORMAL
POIKILOCYTOSIS BLD QL AUTO: SLIGHT — SIGNIFICANT CHANGE UP
POLYCHROMASIA BLD QL SMEAR: SLIGHT — SIGNIFICANT CHANGE UP
POTASSIUM SERPL-MCNC: 3.7 MMOL/L — SIGNIFICANT CHANGE UP (ref 3.5–5.3)
POTASSIUM SERPL-SCNC: 3.7 MMOL/L — SIGNIFICANT CHANGE UP (ref 3.5–5.3)
PROT SERPL-MCNC: 5 G/DL — LOW (ref 6–8.3)
RBC # BLD: 2.29 M/UL — LOW (ref 3.8–5.2)
RBC # FLD: 17.6 % — HIGH (ref 10.3–14.5)
RBC BLD AUTO: NORMAL — SIGNIFICANT CHANGE UP
SARS-COV-2 RNA SPEC QL NAA+PROBE: SIGNIFICANT CHANGE UP
SMUDGE CELLS # BLD: PRESENT — SIGNIFICANT CHANGE UP
SODIUM SERPL-SCNC: 144 MMOL/L — SIGNIFICANT CHANGE UP (ref 135–145)
SPECIMEN SOURCE: SIGNIFICANT CHANGE UP
VARIANT LYMPHS # BLD: 8.9 % — HIGH (ref 0–6)
WBC # BLD: 4.76 K/UL — SIGNIFICANT CHANGE UP (ref 3.8–10.5)
WBC # FLD AUTO: 4.76 K/UL — SIGNIFICANT CHANGE UP (ref 3.8–10.5)

## 2022-12-19 PROCEDURE — 99233 SBSQ HOSP IP/OBS HIGH 50: CPT | Mod: GC

## 2022-12-19 PROCEDURE — 74230 X-RAY XM SWLNG FUNCJ C+: CPT | Mod: 26

## 2022-12-19 RX ORDER — TENOFOVIR DISOPROXIL FUMARATE 300 MG/1
25 TABLET, FILM COATED ORAL DAILY
Refills: 0 | Status: DISCONTINUED | OUTPATIENT
Start: 2022-12-19 | End: 2022-12-20

## 2022-12-19 RX ORDER — POTASSIUM PHOSPHATE, MONOBASIC POTASSIUM PHOSPHATE, DIBASIC 236; 224 MG/ML; MG/ML
30 INJECTION, SOLUTION INTRAVENOUS ONCE
Refills: 0 | Status: COMPLETED | OUTPATIENT
Start: 2022-12-19 | End: 2022-12-19

## 2022-12-19 RX ORDER — LANOLIN ALCOHOL/MO/W.PET/CERES
6 CREAM (GRAM) TOPICAL AT BEDTIME
Refills: 0 | Status: DISCONTINUED | OUTPATIENT
Start: 2022-12-19 | End: 2022-12-20

## 2022-12-19 RX ORDER — DOXAZOSIN MESYLATE 4 MG
1 TABLET ORAL AT BEDTIME
Refills: 0 | Status: DISCONTINUED | OUTPATIENT
Start: 2022-12-19 | End: 2022-12-20

## 2022-12-19 RX ORDER — INSULIN LISPRO 100/ML
VIAL (ML) SUBCUTANEOUS
Refills: 0 | Status: DISCONTINUED | OUTPATIENT
Start: 2022-12-19 | End: 2022-12-20

## 2022-12-19 RX ORDER — SENNA PLUS 8.6 MG/1
2 TABLET ORAL AT BEDTIME
Refills: 0 | Status: DISCONTINUED | OUTPATIENT
Start: 2022-12-19 | End: 2022-12-20

## 2022-12-19 RX ORDER — POLYETHYLENE GLYCOL 3350 17 G/17G
17 POWDER, FOR SOLUTION ORAL
Refills: 0 | Status: DISCONTINUED | OUTPATIENT
Start: 2022-12-19 | End: 2022-12-20

## 2022-12-19 RX ORDER — INSULIN LISPRO 100/ML
VIAL (ML) SUBCUTANEOUS AT BEDTIME
Refills: 0 | Status: DISCONTINUED | OUTPATIENT
Start: 2022-12-19 | End: 2022-12-20

## 2022-12-19 RX ORDER — MULTIVIT-MIN/FERROUS GLUCONATE 9 MG/15 ML
15 LIQUID (ML) ORAL DAILY
Refills: 0 | Status: DISCONTINUED | OUTPATIENT
Start: 2022-12-19 | End: 2022-12-20

## 2022-12-19 RX ADMIN — Medication 1 MILLIGRAM(S): at 22:02

## 2022-12-19 RX ADMIN — POTASSIUM PHOSPHATE, MONOBASIC POTASSIUM PHOSPHATE, DIBASIC 83.33 MILLIMOLE(S): 236; 224 INJECTION, SOLUTION INTRAVENOUS at 10:16

## 2022-12-19 RX ADMIN — TENOFOVIR DISOPROXIL FUMARATE 25 MILLIGRAM(S): 300 TABLET, FILM COATED ORAL at 17:04

## 2022-12-19 RX ADMIN — Medication 5 MILLIGRAM(S): at 17:05

## 2022-12-19 RX ADMIN — Medication 1 DROP(S): at 17:05

## 2022-12-19 RX ADMIN — Medication 15 MILLILITER(S): at 17:05

## 2022-12-19 RX ADMIN — Medication 1 DROP(S): at 05:15

## 2022-12-19 RX ADMIN — CHLORHEXIDINE GLUCONATE 1 APPLICATION(S): 213 SOLUTION TOPICAL at 17:12

## 2022-12-19 NOTE — PROGRESS NOTE ADULT - PROBLEM SELECTOR PLAN 6
As per Hemonc note MM in remission 5/22 has been on Promacta since 6/22 has received multiple treatments for MM in the last also PBSCT x 2 and haplo allo stem cell transplant in 2020- last spike noted sept 2022-was given belantamab- last treatment was on 11/23/22    - holding anti-myeloma therapy - will t/b with heme/onc regarding whether patient needs prednisone, is unable to take given NPO status and refusal of NGT.   -  s/p filgrastim held now as patient no longer neutropenic 12/7  - private Hemonc following [facility] recs appreciated As per Hemonc note MM in remission 5/22 has been on Promacta since 6/22 has received multiple treatments for MM in the last also PBSCT x 2 and haplo allo stem cell transplant in 2020- last spike noted sept 2022-was given belantamab- last treatment was on 11/23/22    - holding anti-myeloma therapy - will t/b with heme/onc regarding whether patient needs prednisone, is unable to take given NPO status and refusal of NGT.   -  s/p filgrastim held now as patient no longer neutropenic 12/7  - private Hemonc following [facility] recs appreciated  - No need for chemotherapy while at [facility]

## 2022-12-19 NOTE — SWALLOW VFSS/MBS ASSESSMENT ADULT - DIAGNOSTIC IMPRESSIONS
full report to follow Patient presents with Mild Oropharyngeal Dysphagia. Oral stage marked by adequate bolus containment, slowed bolus manipulation, slowed mastication with adequate ability to break down soft & bite sized solids and regular solids, slowed anterior-posterior transport with piecemeal swallow (~ 3swallow per bolus) and mild oral residue for regular solids requiring clinician cue to initiate re-swallow for adequate oral clearance. Pharyngeal stage marked by delayed initiation of the pharyngeal swallow trigger (bolus head at the valleculae-Thin Liquids), reduced laryngeal elevation with reduced laryngeal vestibule closure, adequate tongue base retraction and adequate pharyngeal constriction. There was Laryngeal Penetration during the swallow for Mildly Thick Liquids and Thin Liquids with retrieval and adequate airway protection maintained. There was No Aspiration before, during or after the swallow for all PO trials.

## 2022-12-19 NOTE — SWALLOW VFSS/MBS ASSESSMENT ADULT - ORAL PHASE
within functional limits/Residue in oral cavity Delayed oral transit time/Reduced anterior - posterior transport/Residue in oral cavity Residue in oral cavity within functional limits

## 2022-12-19 NOTE — SWALLOW VFSS/MBS ASSESSMENT ADULT - ORAL PHASE COMMENTS
piecemeal swallow (~3 swallows); mild oral residue cleared with clinician cue to initiate re-swallow. trace oral residue post primary swallow cleared with spontaneous reswallow adequate pharyngeal clearance trace oral residue post primary swallow, cleared with spontaneous re-swallow

## 2022-12-19 NOTE — PROGRESS NOTE ADULT - ASSESSMENT
Patient is a 63 yo F w/ PMHx of MM (diagnosed ~10 years ago, currently on Promacta treatments since June 2022 - most recently last Wednesday; followed by oncologist, Dr. Lopez at [facility]), ?ITP, Hep B, HTN, neuropathic R hip pain, recent R corneal tear admitted for AMS, code stroke called no acute infarct or hemorrhage, s/p LP findings not consistent with meningitis, course complicated by hypotension and hypoxia, placed on BiPAP, requiring pressors, admitted to MICU with sepsis likely secondary to pneumonia- intubated 11/30. Extubated 12/3 to face tent reintubated 12/7 in setting of increased WOB and hypercapnic respiratory failure unclear cause possibly HLH induced. Patient was downgraded to floors 12/12, however readmitted to MICU after respiratory failure 2/2 aspiration and cardiac arrest. Eventually weaned off pressors and extubated 12/15, now on nasal canula and hemodynamically stable, transferred to floors on 12/17.

## 2022-12-19 NOTE — CHART NOTE - NSCHARTNOTEFT_GEN_A_CORE
Pt seen for malnutrition follow up.    Medical Course:  - Per chart, pt is 62 year old female PMHx  MM (diagnosed ~10 years ago, currently on Promacta treatments since 6/2022), ?ITP, Hep B, HTN, neuropathic right hip pain, recent right corneal tear admitted for AMS. Code stroke called, no acute infarct/hemorrhage. S/p LP, findings not consistent with meningitis. Course complicated by hypotension/hypoxia, s/p MICU stay with sepsis likely secondary to PNA requiring pressors. Intubated (11/30), extubated (12/3), reintubated (12/7). Patient was downgraded to floors (12/12), however readmitted to MICU after respiratory failure secondary to aspiration and cardiac arrest. Eventually weaned off pressors and extubated (12/15). Transferred to floors (12/17). Full code.    Nutrition Course:  - Pt with intermittent periods of NPO status/EN/PO diet since admission. Current diet order inclusive of NPO status (since 12/15), pt declines NGT.   - S/p cineesophagram this morning (12/19) which recommended soft and bite sized solids/thin liquids. Diet has since been upgraded. Pt endorses appetite and looking forward to consuming oral diet.  - Pt denies current GI distress, last BM 12/16 per flowsheets; ordered for Miralax 17 gm BID and senna 2 tablets qHS.  - Pt continues on steroids, fingersticks WDL. Labs notable for low phosphorus, repleted.     Diet Prescription:   - Soft and Bite Sized     Pertinent Medications:   - ADMELOG corrective regimen sliding scale, multivitamin/minerals/iron Oral Solution, polyethylene glycol, predniSONE Tablet, senna    Pertinent Labs:  - (12/19) Na 144 mmol/L Glu 93 mg/dL K+ 3.7 mmol/L Cr 0.53 mg/dL BUN 10 mg/dL Phos 1.8 mg/dL<L> Alb 2.6 g/dL<L> 12-09  - POCT: (12/19) , (12/18) 113-134    Weight: (12/17) 194.4 lbs / 88.2 kg, (12/14 dosing) 177.2 lbs / 80.4 kg, (12/9) 180.3 lbs / 81.8 kg, (11/30 dosing/admission) 191.8 lbs / 87.0 kg  Height: 71 in / 180.3 cm  IBW: 155 lbs / 70.5 kg +/-10%  BMI: 27.1 kg/m^2 (at most recent weight)    Physical Assessment, per flowsheets:  Edema/Pressure Injury: none noted    Estimated Needs:   [X] Recalculated, with consideration for ICU downgrade/extubation, based on ideal body weight 155 lbs / 70.5 kg  1767-2283 kcal daily @28-32 kcal/kg, 70.5-84.6 gm protein daily @1.0-1.2 gm/kg     Previous Nutrition Diagnosis: [X] Severe malnutrition in the context of acute illness, ongoing  New Nutrition Diagnosis: [X] not applicable     Interventions:   1) Recommend continue diet without therapeutic restrictions; texture/consistency per SLP recommendations/medical discretion.  2) RDN to provide Hormel Shake 1 PO daily (provides 520 kcal, 22 gm protein per 8.45oz serving).  3) Obtain weekly weights.  4) May continue multivitamin to provide micronutrient coverage.     Monitor & Evaluate:  PO intake, tolerance to diet/supplement, nutrition related lab values, weight trends, BMs/GI distress, hydration status, skin integrity.  Bella Person RDN, CDN #[id number]  Also available on [employer] Teams Pt seen for malnutrition follow up.     Medical Course:  - Per chart, pt is 62 year old female PMHx  MM (diagnosed ~10 years ago, currently on Promacta treatments since 6/2022), ?ITP, Hep B, HTN, neuropathic right hip pain, recent right corneal tear admitted for AMS. Code stroke called, no acute infarct/hemorrhage. S/p LP, findings not consistent with meningitis. Course complicated by hypotension/hypoxia, s/p MICU stay with sepsis likely secondary to PNA requiring pressors. Intubated (11/30), extubated (12/3), reintubated (12/7). Patient was downgraded to floors (12/12), however readmitted to MICU after respiratory failure secondary to aspiration and cardiac arrest. Eventually weaned off pressors and extubated (12/15). Transferred to floors (12/17). Full code.    Nutrition Course:  - Pt with intermittent periods of NPO status/EN/PO diet since admission. Current diet order inclusive of NPO status (since 12/15), pt declines NGT.   - S/p cineesophagram this morning (12/19) which recommended soft and bite sized solids/thin liquids. Diet has since been upgraded. Pt endorses appetite and looking forward to consuming oral diet.  - Pt denies current GI distress, last BM 12/16 per flowsheets; ordered for Miralax 17 gm BID and senna 2 tablets qHS.  - Pt continues on steroids, fingersticks WDL. Labs notable for low phosphorus, repleted.     Diet Prescription:   - Soft and Bite Sized     Pertinent Medications:   - ADMELOG corrective regimen sliding scale, multivitamin/minerals/iron Oral Solution, polyethylene glycol, predniSONE Tablet, senna    Pertinent Labs:  - (12/19) Na 144 mmol/L Glu 93 mg/dL K+ 3.7 mmol/L Cr 0.53 mg/dL BUN 10 mg/dL Phos 1.8 mg/dL<L> Alb 2.6 g/dL<L> 12-09  - POCT: (12/19) , (12/18) 113-134    Weight: (12/17) 194.4 lbs / 88.2 kg, (12/14 dosing) 177.2 lbs / 80.4 kg, (12/9) 180.3 lbs / 81.8 kg, (11/30 dosing/admission) 191.8 lbs / 87.0 kg  Height: 71 in / 180.3 cm  IBW: 155 lbs / 70.5 kg +/-10%  BMI: 27.1 kg/m^2 (at most recent weight)    Physical Assessment, per flowsheets:  Edema/Pressure Injury: none noted    Estimated Needs:   [X] Recalculated, with consideration for ICU downgrade/extubation, based on ideal body weight 155 lbs / 70.5 kg  7920-7212 kcal daily @28-32 kcal/kg, 70.5-84.6 gm protein daily @1.0-1.2 gm/kg     Previous Nutrition Diagnosis: [X] Severe malnutrition in the context of acute illness, ongoing  New Nutrition Diagnosis: [X] not applicable     Interventions:   1) Recommend continue diet without therapeutic restrictions; texture/consistency per SLP recommendations/medical discretion.  2) RDN to provide Hormel Shake 1 PO daily (provides 520 kcal, 22 gm protein per 8.45oz serving).  3) Obtain weekly weights.  4) May continue multivitamin to provide micronutrient coverage.     Monitor & Evaluate:  PO intake, tolerance to diet/supplement, nutrition related lab values, weight trends, BMs/GI distress, hydration status, skin integrity.  Bella Person RDN, CDN #[id number]  Also available on [employer] Teams Pt seen for malnutrition follow up.     Medical Course:  - Per chart, pt is 62 year old female PMHx  MM (diagnosed ~10 years ago, currently on Promacta treatments since 6/2022), ?ITP, Hep B, HTN, neuropathic right hip pain, recent right corneal tear admitted for AMS. Code stroke called, no acute infarct/hemorrhage. S/p LP, findings not consistent with meningitis. Course complicated by hypotension/hypoxia, s/p MICU stay with sepsis likely secondary to PNA requiring pressors. Intubated (11/30), extubated (12/3), reintubated (12/7). Patient was downgraded to floors (12/12), however readmitted to MICU after respiratory failure secondary to aspiration and cardiac arrest. Eventually weaned off pressors and extubated (12/15). Transferred to floors (12/17). Full code.    Nutrition Course:  - Pt with intermittent periods of NPO status/EN/PO diet since admission. Current diet order inclusive of NPO status (since 12/15), pt declines NGT.   - S/p cineesophagram this morning (12/19) which recommended soft and bite sized solids/thin liquids. Diet has since been upgraded. Pt endorses appetite and looking forward to consuming oral diet.  - Pt denies current GI distress, last BM 12/16 per flowsheets; ordered for Miralax 17 gm BID and senna 2 tablets qHS.  - Pt continues on steroids, fingersticks WDL. Labs notable for low phosphorus, repleted.     Diet Prescription:   - Soft and Bite Sized     Pertinent Medications:   - ADMELOG corrective regimen sliding scale, multivitamin/minerals/iron Oral Solution, polyethylene glycol, predniSONE Tablet, senna    Pertinent Labs:  - (12/19) Na 144 mmol/L Glu 93 mg/dL K+ 3.7 mmol/L Cr 0.53 mg/dL BUN 10 mg/dL Phos 1.8 mg/dL<L> Alb 2.6 g/dL<L> 12-09  - POCT: (12/19) , (12/18) 113-134    Weight: (12/17) 194.4 lbs / 88.2 kg, (12/14 dosing) 177.2 lbs / 80.4 kg, (12/9) 180.3 lbs / 81.8 kg, (11/30 dosing/admission) 191.8 lbs / 87.0 kg  Height: 71 in / 180.3 cm  IBW: 155 lbs / 70.5 kg +/-10%  BMI: 27.1 kg/m^2 (at most recent weight)    Physical Assessment, per flowsheets:  Edema/Pressure Injury: none noted    Estimated Needs:   [X] Recalculated, with consideration for ICU downgrade/extubation, based on ideal body weight 155 lbs / 70.5 kg  2277-7324 kcal daily @28-32 kcal/kg, 70.5-84.6 gm protein daily @1.0-1.2 gm/kg     Previous Nutrition Diagnosis: [X] Severe malnutrition in the context of acute illness, ongoing  New Nutrition Diagnosis: [X] not applicable     Interventions:   1) Recommend continue diet without therapeutic restrictions; texture/consistency per SLP recommendations/medical discretion.  2) RDN to provide Hormel Shake 1 PO daily (provides 520 kcal, 22 gm protein per 8.45oz serving).  3) Obtain weekly weights.  4) May continue multivitamin to provide micronutrient coverage.   5) Monitor electrolytes (K+, Mg, P) and replete to within desired limits as clinically indicated.     Monitor & Evaluate:  PO intake, tolerance to diet/supplement, nutrition related lab values, weight trends, BMs/GI distress, hydration status, skin integrity.  Bella Person RDN, CDN #[id number]  Also available on [employer] Teams

## 2022-12-19 NOTE — PROGRESS NOTE ADULT - PROBLEM SELECTOR PLAN 4
Pt has received total 1u PLT transfusion and 1.5u pRBC transfusion during this hospitalization. Per heme onc, concern for HLH. Pt s/p Promacta 12/6-12/13. Platelets have been steadily uptrending, with 12/17 AM PLT 94. s/p neupogen as pt is no longer neutropenic. Heme onc following    Diagnostics:  - direct dede negative  - some schistocytes on peripheral smear, confirmed with hematology/oncology, however higher suspicion for lab abnormalities are secondary to sepsis  - Per, patient met criteria for HLH during admission (ferritin worsening 08413> 93406, trig 519->191, pancytopenia - 3 cell lines, persistent fever, IL2 8324, hepatitis)       Management:  - Platelet transfusion protocol: <10, <20 with fever, <50 procedures/active bleeding/etc   - Transfuse pRBCs if Hgb < 7  - Trend HLH labs daily (ferritin, LDH) in addition to CBC/CMP Pt has received total 1u PLT transfusion and 1.5u pRBC transfusion during this hospitalization. Per heme onc, concern for HLH. Pt s/p Promacta 12/6-12/13. Platelets have been steadily uptrending, with 12/17 AM PLT 94. s/p neupogen as pt is no longer neutropenic. Heme onc following    Diagnostics:  - direct dede negative  - some schistocytes on peripheral smear, confirmed with hematology/oncology, however higher suspicion for lab abnormalities are secondary to sepsis  - Per, patient met criteria for HLH during admission (ferritin worsening 95506> 45733, trig 519->191, pancytopenia - 3 cell lines, persistent fever, IL2 8324, hepatitis) - now resolving w/o need to further monitor HLH labs      Management:  - Platelet transfusion protocol: <10, <20 with fever, <50 procedures/active bleeding/etc   - Transfuse pRBCs if Hgb < 7  - Trend HLH labs daily (ferritin, LDH) in addition to CBC/CMP

## 2022-12-19 NOTE — PROVIDER CONTACT NOTE (OTHER) - ASSESSMENT
pt is a&ox 4. Pt  bpm, /58 and Temp 100 oral
pt is a&ox3, pt is alert and talking, pt drinking water, talking on phone
pt is a&ox 4. Pt  bpm, other wise vitally stable, no c/o of pain or discomfort.

## 2022-12-19 NOTE — SWALLOW VFSS/MBS ASSESSMENT ADULT - ADDITIONAL RECOMMENDATIONS
1. This department to follow up as schedule permits for diet tolerance  2. Medical team advised to reconsult this department if there is a change in medical status/ability to tolerate recommended PO diet.
1. This service to follow up as schedule permits for diet tolerance/advancement and swallowing therapy. 2. Medical team advised to reconsult this service if change in medical status and/or patient's tolerance to recommended PO diet.

## 2022-12-19 NOTE — PROGRESS NOTE ADULT - PROBLEM SELECTOR PLAN 8
#Elevated liver enzymes, - downtrending  - patient has reported hx of Hep B 2017 as per hemonc   - bili was elevated, peaked at 1.5- however now normalized. continue to monitor   - hepatitis panel - Hep B surface AG +, and the rest is negative    - confirmatory test - hepatitis B quantitative - negative    -Spoke with ROSA Montejo from hepatology office, Dr Florina Jones Re: continuation of Tenofovir. [facility] [phone]. 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- outpt follow up after discharge- restarted Tenofovir 25 mg daily

## 2022-12-19 NOTE — PROVIDER CONTACT NOTE (OTHER) - RECOMMENDATIONS
notify md to see patient at bedside, ask patient to stop eating, sunction pt
Notify Provider
Notify Provider

## 2022-12-19 NOTE — PROGRESS NOTE ADULT - SUBJECTIVE AND OBJECTIVE BOX
INCOMPLETE NOTE    Mariano Hendrickson | PGY1| Pager: [phone]  Interval Events:    REVIEW OF SYSTEMS:  CONSTITUTIONAL: No weakness, fevers or chills  EYES/ENT: No visual changes;  No vertigo or throat pain   NECK: No pain or stiffness  RESPIRATORY: No cough, wheezing, hemoptysis; No shortness of breath  CARDIOVASCULAR: No chest pain or palpitations  GASTROINTESTINAL: No abdominal or epigastric pain. No nausea, vomiting, or hematemesis; No diarrhea or constipation. No melena or hematochezia.  GENITOURINARY: No dysuria, frequency or hematuria  NEUROLOGICAL: No numbness or weakness  SKIN: No itching, burning, rashes, or lesions   All other review of systems is negative unless indicated above.    OBJECTIVE:  ICU Vital Signs Last 24 Hrs  T(C): 36.5 (19 Dec 2022 06:00), Max: 37 (19 Dec 2022 03:00)  T(F): 97.7 (19 Dec 2022 06:00), Max: 98.6 (19 Dec 2022 03:00)  HR: 102 (19 Dec 2022 06:00) (91 - 102)  BP: 103/60 (19 Dec 2022 06:00) (103/60 - 118/61)  BP(mean): --  ABP: --  ABP(mean): --  RR: 19 (19 Dec 2022 06:00) (17 - 19)  SpO2: 95% (19 Dec 2022 06:00) (94% - 96%)    O2 Parameters below as of 19 Dec 2022 06:00  Patient On (Oxygen Delivery Method): nasal cannula  O2 Flow (L/min): 2            12-17 @ 07:01  -  12-18 @ 07:00  --------------------------------------------------------  IN: 1250 mL / OUT: 585 mL / NET: 665 mL      CAPILLARY BLOOD GLUCOSE      POCT Blood Glucose.: 96 mg/dL (19 Dec 2022 05:24)      PHYSICAL EXAM:  General: WN/WD NAD  Neurology: A&Ox3, nonfocal, [place] x 4  Eyes: PERRLA/ EOMI, Gross vision intact  ENT/Neck: Neck supple, trachea midline, No JVD, Gross hearing intact  Respiratory: CTA B/L, No wheezing, rales, rhonchi  CV: RRR, +S1/S2, -S3/S4, no murmurs, rubs or gallops  Abdominal: Soft, NT, ND +BS, No HSM  MSK: 5/5 strength UE/LE bilaterally  Extremities: No edema, 2+ peripheral pulses  Skin: No Rashes, Hematoma, Ecchymosis  Incisions:   Tubes:    HOSPITAL MEDICATIONS:  MEDICATIONS  (STANDING):  albuterol    90 MICROgram(s) HFA Inhaler 4 Puff(s) Inhalation every 6 hours  artificial tears (preservative free) Ophthalmic Solution 1 Drop(s) Both EYES two times a day  chlorhexidine 2% Cloths 1 Application(s) Topical daily  dextrose 5%. 1000 milliLiter(s) (50 mL/Hr) IV Continuous <Continuous>  dextrose 5%. 1000 milliLiter(s) (100 mL/Hr) IV Continuous <Continuous>  dextrose 50% Injectable 25 Gram(s) IV Push once  dextrose 50% Injectable 12.5 Gram(s) IV Push once  dextrose 50% Injectable 25 Gram(s) IV Push once  doxazosin 1 milliGRAM(s) Oral at bedtime  glucagon  Injectable 1 milliGRAM(s) IntraMuscular once  insulin lispro (ADMELOG) corrective regimen sliding scale   SubCutaneous every 6 hours  multivitamin/minerals/iron Oral Solution (CENTRUM) 15 milliLiter(s) Enteral Tube daily  polyethylene glycol 3350 17 Gram(s) Oral two times a day  predniSONE   Tablet 5 milliGRAM(s) Oral daily  senna 2 Tablet(s) Oral at bedtime  tenofovir alafenamide (VEMLIDY) 25 milliGRAM(s) Oral daily    MEDICATIONS  (PRN):  dextrose Oral Gel 15 Gram(s) Oral once PRN Blood Glucose LESS THAN 70 milliGRAM(s)/deciliter  melatonin 6 milliGRAM(s) Oral at bedtime PRN Insomnia      LABS:                        7.7    5.36  )-----------( 94       ( 17 Dec 2022 07:00 )             24.9     Hgb Trend: 7.7<--, 7.4<--, 7.1<--, 8.2<--, 8.1<--  12-17    138  |  104  |  14  ----------------------------<  74  3.5   |  24  |  0.51    Ca    9.0      17 Dec 2022 07:00    TPro  5.0<L>  /  Alb  2.6<L>  /  TBili  1.0  /  DBili  x   /  AST  48<H>  /  ALT  22  /  AlkPhos  117  12-17    Creatinine Trend: 0.51<--, 0.55<--, 0.56<--, 0.55<--, 0.41<--, 0.55<--        Venous Blood Gas:  12-17 @ 07:00  7.38/45/46/27/71.2  VBG Lactate: 1.4      MICROBIOLOGY:      Mariano Emeka | PGY1| Pager: [phone]  Interval Events: Patient states feeling well, cinesophagram scheduled for today,     REVIEW OF SYSTEMS:  CONSTITUTIONAL: No weakness, fevers or chills  EYES/ENT: No visual changes;  No vertigo or throat pain   NECK: No pain or stiffness  RESPIRATORY: No cough, wheezing, hemoptysis; No shortness of breath  CARDIOVASCULAR: No chest pain or palpitations  GASTROINTESTINAL: No abdominal or epigastric pain. No nausea, vomiting, or hematemesis; No diarrhea or constipation. No melena or hematochezia.  GENITOURINARY: No dysuria, frequency or hematuria  NEUROLOGICAL: No numbness or weakness  SKIN: No itching, burning, rashes, or lesions   All other review of systems is negative unless indicated above.    OBJECTIVE:  ICU Vital Signs Last 24 Hrs  T(C): 36.5 (19 Dec 2022 06:00), Max: 37 (19 Dec 2022 03:00)  T(F): 97.7 (19 Dec 2022 06:00), Max: 98.6 (19 Dec 2022 03:00)  HR: 102 (19 Dec 2022 06:00) (91 - 102)  BP: 103/60 (19 Dec 2022 06:00) (103/60 - 118/61)  BP(mean): --  ABP: --  ABP(mean): --  RR: 19 (19 Dec 2022 06:00) (17 - 19)  SpO2: 95% (19 Dec 2022 06:00) (94% - 96%)    O2 Parameters below as of 19 Dec 2022 06:00  Patient On (Oxygen Delivery Method): nasal cannula  O2 Flow (L/min): 2            12-17 @ 07:01  -  12-18 @ 07:00  --------------------------------------------------------  IN: 1250 mL / OUT: 585 mL / NET: 665 mL      CAPILLARY BLOOD GLUCOSE      POCT Blood Glucose.: 96 mg/dL (19 Dec 2022 05:24)      PHYSICAL EXAM:  General: WN/WD NAD  Neurology: A&Ox3, nonfocal, [place] x 4  Eyes: PERRLA/ EOMI, Gross vision intact  ENT/Neck: Neck supple, trachea midline, No JVD, Gross hearing intact  Respiratory: CTA B/L, No wheezing, rales, rhonchi  CV: RRR, +S1/S2, -S3/S4, no murmurs, rubs or gallops  Abdominal: Soft, NT, ND +BS, No HSM  MSK: 5/5 strength UE/LE bilaterally  Extremities: No edema, 2+ peripheral pulses  Skin: No Rashes, Hematoma, Ecchymosis  Incisions:   Tubes:    HOSPITAL MEDICATIONS:  MEDICATIONS  (STANDING):  albuterol    90 MICROgram(s) HFA Inhaler 4 Puff(s) Inhalation every 6 hours  artificial tears (preservative free) Ophthalmic Solution 1 Drop(s) Both EYES two times a day  chlorhexidine 2% Cloths 1 Application(s) Topical daily  dextrose 5%. 1000 milliLiter(s) (50 mL/Hr) IV Continuous <Continuous>  dextrose 5%. 1000 milliLiter(s) (100 mL/Hr) IV Continuous <Continuous>  dextrose 50% Injectable 25 Gram(s) IV Push once  dextrose 50% Injectable 12.5 Gram(s) IV Push once  dextrose 50% Injectable 25 Gram(s) IV Push once  doxazosin 1 milliGRAM(s) Oral at bedtime  glucagon  Injectable 1 milliGRAM(s) IntraMuscular once  insulin lispro (ADMELOG) corrective regimen sliding scale   SubCutaneous every 6 hours  multivitamin/minerals/iron Oral Solution (CENTRUM) 15 milliLiter(s) Enteral Tube daily  polyethylene glycol 3350 17 Gram(s) Oral two times a day  predniSONE   Tablet 5 milliGRAM(s) Oral daily  senna 2 Tablet(s) Oral at bedtime  tenofovir alafenamide (VEMLIDY) 25 milliGRAM(s) Oral daily    MEDICATIONS  (PRN):  dextrose Oral Gel 15 Gram(s) Oral once PRN Blood Glucose LESS THAN 70 milliGRAM(s)/deciliter  melatonin 6 milliGRAM(s) Oral at bedtime PRN Insomnia      LABS:                        7.7    5.36  )-----------( 94       ( 17 Dec 2022 07:00 )             24.9     Hgb Trend: 7.7<--, 7.4<--, 7.1<--, 8.2<--, 8.1<--  12-17    138  |  104  |  14  ----------------------------<  74  3.5   |  24  |  0.51    Ca    9.0      17 Dec 2022 07:00    TPro  5.0<L>  /  Alb  2.6<L>  /  TBili  1.0  /  DBili  x   /  AST  48<H>  /  ALT  22  /  AlkPhos  117  12-17    Creatinine Trend: 0.51<--, 0.55<--, 0.56<--, 0.55<--, 0.41<--, 0.55<--        Venous Blood Gas:  12-17 @ 07:00  7.38/45/46/27/71.2  VBG Lactate: 1.4      MICROBIOLOGY:      Mariano Nguyenbushra | PGY1| Pager: [phone]  Interval Events: Patient states feeling well, cinesophagram (soft and bite sized diet), likely to go to [facility]     REVIEW OF SYSTEMS:  CONSTITUTIONAL: No weakness, fevers or chills  EYES/ENT: No visual changes;  No vertigo or throat pain   NECK: No pain or stiffness  RESPIRATORY: No cough, wheezing, hemoptysis; No shortness of breath  CARDIOVASCULAR: No chest pain or palpitations  GASTROINTESTINAL: No abdominal or epigastric pain. No nausea, vomiting, or hematemesis; No diarrhea or constipation. No melena or hematochezia.  GENITOURINARY: No dysuria, frequency or hematuria  NEUROLOGICAL: No numbness or weakness  SKIN: No itching, burning, rashes, or lesions   All other review of systems is negative unless indicated above.    OBJECTIVE:  ICU Vital Signs Last 24 Hrs  T(C): 36.5 (19 Dec 2022 06:00), Max: 37 (19 Dec 2022 03:00)  T(F): 97.7 (19 Dec 2022 06:00), Max: 98.6 (19 Dec 2022 03:00)  HR: 102 (19 Dec 2022 06:00) (91 - 102)  BP: 103/60 (19 Dec 2022 06:00) (103/60 - 118/61)  BP(mean): --  ABP: --  ABP(mean): --  RR: 19 (19 Dec 2022 06:00) (17 - 19)  SpO2: 95% (19 Dec 2022 06:00) (94% - 96%)    O2 Parameters below as of 19 Dec 2022 06:00  Patient On (Oxygen Delivery Method): nasal cannula  O2 Flow (L/min): 2            12-17 @ 07:01  -  12-18 @ 07:00  --------------------------------------------------------  IN: 1250 mL / OUT: 585 mL / NET: 665 mL      CAPILLARY BLOOD GLUCOSE      POCT Blood Glucose.: 96 mg/dL (19 Dec 2022 05:24)      PHYSICAL EXAM:  General: WN/WD NAD  Neurology: A&Ox3, nonfocal, [place] x 4  Eyes: PERRLA/ EOMI, Gross vision intact  ENT/Neck: Neck supple, trachea midline, No JVD, Gross hearing intact  Respiratory: CTA B/L, No wheezing, rales, rhonchi  CV: RRR, +S1/S2, -S3/S4, no murmurs, rubs or gallops  Abdominal: Soft, NT, ND +BS, No HSM  MSK: 5/5 strength UE/LE bilaterally  Extremities: No edema, 2+ peripheral pulses  Skin: No Rashes, Hematoma, Ecchymosis  Incisions:   Tubes:    HOSPITAL MEDICATIONS:  MEDICATIONS  (STANDING):  albuterol    90 MICROgram(s) HFA Inhaler 4 Puff(s) Inhalation every 6 hours  artificial tears (preservative free) Ophthalmic Solution 1 Drop(s) Both EYES two times a day  chlorhexidine 2% Cloths 1 Application(s) Topical daily  dextrose 5%. 1000 milliLiter(s) (50 mL/Hr) IV Continuous <Continuous>  dextrose 5%. 1000 milliLiter(s) (100 mL/Hr) IV Continuous <Continuous>  dextrose 50% Injectable 25 Gram(s) IV Push once  dextrose 50% Injectable 12.5 Gram(s) IV Push once  dextrose 50% Injectable 25 Gram(s) IV Push once  doxazosin 1 milliGRAM(s) Oral at bedtime  glucagon  Injectable 1 milliGRAM(s) IntraMuscular once  insulin lispro (ADMELOG) corrective regimen sliding scale   SubCutaneous every 6 hours  multivitamin/minerals/iron Oral Solution (CENTRUM) 15 milliLiter(s) Enteral Tube daily  polyethylene glycol 3350 17 Gram(s) Oral two times a day  predniSONE   Tablet 5 milliGRAM(s) Oral daily  senna 2 Tablet(s) Oral at bedtime  tenofovir alafenamide (VEMLIDY) 25 milliGRAM(s) Oral daily    MEDICATIONS  (PRN):  dextrose Oral Gel 15 Gram(s) Oral once PRN Blood Glucose LESS THAN 70 milliGRAM(s)/deciliter  melatonin 6 milliGRAM(s) Oral at bedtime PRN Insomnia      LABS:                        7.7    5.36  )-----------( 94       ( 17 Dec 2022 07:00 )             24.9     Hgb Trend: 7.7<--, 7.4<--, 7.1<--, 8.2<--, 8.1<--  12-17    138  |  104  |  14  ----------------------------<  74  3.5   |  24  |  0.51    Ca    9.0      17 Dec 2022 07:00    TPro  5.0<L>  /  Alb  2.6<L>  /  TBili  1.0  /  DBili  x   /  AST  48<H>  /  ALT  22  /  AlkPhos  117  12-17    Creatinine Trend: 0.51<--, 0.55<--, 0.56<--, 0.55<--, 0.41<--, 0.55<--        Venous Blood Gas:  12-17 @ 07:00  7.38/45/46/27/71.2  VBG Lactate: 1.4      MICROBIOLOGY:

## 2022-12-19 NOTE — SWALLOW VFSS/MBS ASSESSMENT ADULT - DEMONSTRATES NEED FOR REFERRAL TO ANOTHER SERVICE
ENT consult at MD's discretion given hoarse/breathy vocal quality.
to ensure patient is meeting adequate caloric intake/Registered Dietitian

## 2022-12-19 NOTE — SWALLOW VFSS/MBS ASSESSMENT ADULT - RECOMMENDED CONSISTENCY
1. Soft & Bite Sized with Thin Liquids  2. Aspiration/Reflux Precautions  3. Daily Oral Hygiene
1) Puree with thin liquids via small single sips.  2) Feeding/Swallowing Guidelines: Upright positioning, allow for time to swallow before next teaspoon presentation, small single sips of thin liquids  3) Aspiration/Reflux precautions

## 2022-12-19 NOTE — PROVIDER CONTACT NOTE (OTHER) - BACKGROUND
pt is 61 y/o female admit diagnosis slurred speech pmh includes type 2 dm, multiple myeloma
pt is 63 y/o female admit diagnosis slurred speech pmh includes type 2 dm, multiple myeloma
pt is 61 y/o female admit diagnosis slurred speech pmh includes type 2 dm, multiple myeloma

## 2022-12-19 NOTE — CHART NOTE - NSCHARTNOTEFT_GEN_A_CORE
Ms. Kanwal Gupta is a 63 yo F w/ PMHx of MM, ?ITP, Hep B, HTN, neuropathic R hip pain, recent R corneal tear admitted for AMS, course complicated by hypotension and hypoxia, placed on BiPAP, requiring pressors, admitted with sepsis likely secondary to pneumonia. Patient will not need chemotherapy or HLH labs outpatient while at [facility], discussed with heme-onc team.    Ritika Vila MD PGY2

## 2022-12-20 VITALS
SYSTOLIC BLOOD PRESSURE: 140 MMHG | HEART RATE: 96 BPM | RESPIRATION RATE: 17 BRPM | TEMPERATURE: 99 F | OXYGEN SATURATION: 92 % | DIASTOLIC BLOOD PRESSURE: 60 MMHG

## 2022-12-20 LAB
ALBUMIN SERPL ELPH-MCNC: 2.7 G/DL — LOW (ref 3.3–5)
ALP SERPL-CCNC: 105 U/L — SIGNIFICANT CHANGE UP (ref 40–120)
ALT FLD-CCNC: 14 U/L — SIGNIFICANT CHANGE UP (ref 4–33)
ANION GAP SERPL CALC-SCNC: 9 MMOL/L — SIGNIFICANT CHANGE UP (ref 7–14)
AST SERPL-CCNC: 30 U/L — SIGNIFICANT CHANGE UP (ref 4–32)
BILIRUB SERPL-MCNC: 0.9 MG/DL — SIGNIFICANT CHANGE UP (ref 0.2–1.2)
BLD GP AB SCN SERPL QL: NEGATIVE — SIGNIFICANT CHANGE UP
BUN SERPL-MCNC: 11 MG/DL — SIGNIFICANT CHANGE UP (ref 7–23)
CALCIUM SERPL-MCNC: 8.8 MG/DL — SIGNIFICANT CHANGE UP (ref 8.4–10.5)
CHLORIDE SERPL-SCNC: 112 MMOL/L — HIGH (ref 98–107)
CO2 SERPL-SCNC: 26 MMOL/L — SIGNIFICANT CHANGE UP (ref 22–31)
CREAT SERPL-MCNC: 0.55 MG/DL — SIGNIFICANT CHANGE UP (ref 0.5–1.3)
EGFR: 104 ML/MIN/1.73M2 — SIGNIFICANT CHANGE UP
GLUCOSE BLDC GLUCOMTR-MCNC: 112 MG/DL — HIGH (ref 70–99)
GLUCOSE BLDC GLUCOMTR-MCNC: 123 MG/DL — HIGH (ref 70–99)
GLUCOSE BLDC GLUCOMTR-MCNC: 194 MG/DL — HIGH (ref 70–99)
GLUCOSE SERPL-MCNC: 107 MG/DL — HIGH (ref 70–99)
HCT VFR BLD CALC: 23 % — LOW (ref 34.5–45)
HCT VFR BLD CALC: 23.9 % — LOW (ref 34.5–45)
HGB BLD-MCNC: 6.9 G/DL — CRITICAL LOW (ref 11.5–15.5)
HGB BLD-MCNC: 7.2 G/DL — LOW (ref 11.5–15.5)
MAGNESIUM SERPL-MCNC: 1.7 MG/DL — SIGNIFICANT CHANGE UP (ref 1.6–2.6)
MCHC RBC-ENTMCNC: 30 GM/DL — LOW (ref 32–36)
MCHC RBC-ENTMCNC: 30.1 GM/DL — LOW (ref 32–36)
MCHC RBC-ENTMCNC: 31.5 PG — SIGNIFICANT CHANGE UP (ref 27–34)
MCHC RBC-ENTMCNC: 31.9 PG — SIGNIFICANT CHANGE UP (ref 27–34)
MCV RBC AUTO: 105 FL — HIGH (ref 80–100)
MCV RBC AUTO: 105.8 FL — HIGH (ref 80–100)
NRBC # BLD: 1 /100 WBCS — HIGH (ref 0–0)
NRBC # BLD: 1 /100 WBCS — HIGH (ref 0–0)
NRBC # FLD: 0.06 K/UL — HIGH (ref 0–0)
NRBC # FLD: 0.07 K/UL — HIGH (ref 0–0)
PHOSPHATE SERPL-MCNC: 2.1 MG/DL — LOW (ref 2.5–4.5)
PLATELET # BLD AUTO: 114 K/UL — LOW (ref 150–400)
PLATELET # BLD AUTO: 117 K/UL — LOW (ref 150–400)
POTASSIUM SERPL-MCNC: 3.6 MMOL/L — SIGNIFICANT CHANGE UP (ref 3.5–5.3)
POTASSIUM SERPL-SCNC: 3.6 MMOL/L — SIGNIFICANT CHANGE UP (ref 3.5–5.3)
PROT SERPL-MCNC: 4.8 G/DL — LOW (ref 6–8.3)
RBC # BLD: 2.19 M/UL — LOW (ref 3.8–5.2)
RBC # BLD: 2.26 M/UL — LOW (ref 3.8–5.2)
RBC # FLD: 17.8 % — HIGH (ref 10.3–14.5)
RBC # FLD: 17.9 % — HIGH (ref 10.3–14.5)
RH IG SCN BLD-IMP: POSITIVE — SIGNIFICANT CHANGE UP
SODIUM SERPL-SCNC: 147 MMOL/L — HIGH (ref 135–145)
WBC # BLD: 4.55 K/UL — SIGNIFICANT CHANGE UP (ref 3.8–10.5)
WBC # BLD: 5 K/UL — SIGNIFICANT CHANGE UP (ref 3.8–10.5)
WBC # FLD AUTO: 4.55 K/UL — SIGNIFICANT CHANGE UP (ref 3.8–10.5)
WBC # FLD AUTO: 5 K/UL — SIGNIFICANT CHANGE UP (ref 3.8–10.5)

## 2022-12-20 PROCEDURE — 99239 HOSP IP/OBS DSCHRG MGMT >30: CPT

## 2022-12-20 RX ORDER — ACETAMINOPHEN 500 MG
650 TABLET ORAL ONCE
Refills: 0 | Status: COMPLETED | OUTPATIENT
Start: 2022-12-20 | End: 2022-12-20

## 2022-12-20 RX ORDER — POLYETHYLENE GLYCOL 3350 17 G/17G
17 POWDER, FOR SOLUTION ORAL
Qty: 0 | Refills: 0 | DISCHARGE
Start: 2022-12-20

## 2022-12-20 RX ORDER — DOXAZOSIN MESYLATE 4 MG
1 TABLET ORAL
Qty: 0 | Refills: 0 | DISCHARGE
Start: 2022-12-20

## 2022-12-20 RX ORDER — SENNA PLUS 8.6 MG/1
2 TABLET ORAL
Qty: 0 | Refills: 0 | DISCHARGE
Start: 2022-12-20

## 2022-12-20 RX ADMIN — Medication 1: at 12:42

## 2022-12-20 RX ADMIN — Medication 1 DROP(S): at 05:55

## 2022-12-20 RX ADMIN — Medication 650 MILLIGRAM(S): at 16:03

## 2022-12-20 RX ADMIN — Medication 15 MILLILITER(S): at 11:32

## 2022-12-20 RX ADMIN — TENOFOVIR DISOPROXIL FUMARATE 25 MILLIGRAM(S): 300 TABLET, FILM COATED ORAL at 11:32

## 2022-12-20 RX ADMIN — Medication 1 DROP(S): at 18:20

## 2022-12-20 RX ADMIN — Medication 650 MILLIGRAM(S): at 15:17

## 2022-12-20 RX ADMIN — Medication 5 MILLIGRAM(S): at 05:55

## 2022-12-20 NOTE — PROGRESS NOTE ADULT - ASSESSMENT
Patient is a 61 yo F w/ PMHx of MM (diagnosed ~10 years ago, currently on Promacta treatments since June 2022 - most recently last Wednesday; followed by oncologist, Dr. Lopez at [facility]), ?ITP, Hep B, HTN, neuropathic R hip pain, recent R corneal tear admitted for AMS, code stroke called no acute infarct or hemorrhage, s/p LP findings not consistent with meningitis, course complicated by hypotension and hypoxia, placed on BiPAP, requiring pressors, admitted to MICU with sepsis likely secondary to pneumonia- intubated 11/30. Extubated 12/3 to face tent reintubated 12/7 in setting of increased WOB and hypercapnic respiratory failure unclear cause possibly HLH induced. Patient was downgraded to floors 12/12, however readmitted to MICU after respiratory failure 2/2 aspiration and cardiac arrest. Eventually weaned off pressors and extubated 12/15, now on nasal canula and hemodynamically stable, transferred to floors on 12/17.

## 2022-12-20 NOTE — CHART NOTE - NSCHARTNOTEFT_GEN_A_CORE
Patient has a mobility limitation that significantly impairs the pts ability to participate in one or more MRADLs such as toileting, eating, dressing, and bathing in customary locations in the home. The patients home provides adequate access between rooms for the use of the manual wheelchair. The wheelchair will significantly improve the patients ability to participate in MRADLs and will be used on a regular basis at home. The pts mobility limitation cannot be resolved by the use of a walker or cane. The pt has sufficient upper extremity function to safely propel the manual wheelchair that is provided in the home.

## 2022-12-20 NOTE — PROGRESS NOTE ADULT - SUBJECTIVE AND OBJECTIVE BOX
INCOMPLETE NOTE    Mariano Hendrickson | PGY1| Pager: [phone]  Interval Events:    REVIEW OF SYSTEMS:  CONSTITUTIONAL: No weakness, fevers or chills  EYES/ENT: No visual changes;  No vertigo or throat pain   NECK: No pain or stiffness  RESPIRATORY: No cough, wheezing, hemoptysis; No shortness of breath  CARDIOVASCULAR: No chest pain or palpitations  GASTROINTESTINAL: No abdominal or epigastric pain. No nausea, vomiting, or hematemesis; No diarrhea or constipation. No melena or hematochezia.  GENITOURINARY: No dysuria, frequency or hematuria  NEUROLOGICAL: No numbness or weakness  SKIN: No itching, burning, rashes, or lesions   All other review of systems is negative unless indicated above.    OBJECTIVE:  ICU Vital Signs Last 24 Hrs  T(C): 36.7 (20 Dec 2022 05:51), Max: 37.8 (19 Dec 2022 21:27)  T(F): 98 (20 Dec 2022 05:51), Max: 100 (19 Dec 2022 21:27)  HR: 99 (20 Dec 2022 05:51) (89 - 109)  BP: 115/63 (20 Dec 2022 05:51) (110/58 - 127/64)  BP(mean): --  ABP: --  ABP(mean): --  RR: 18 (20 Dec 2022 05:51) (17 - 18)  SpO2: 96% (20 Dec 2022 05:51) (95% - 98%)    O2 Parameters below as of 20 Dec 2022 05:51  Patient On (Oxygen Delivery Method): nasal cannula  O2 Flow (L/min): 1 12-19 @ 07:01  -  12-20 @ 06:55  --------------------------------------------------------  IN: 0 mL / OUT: 500 mL / NET: -500 mL      CAPILLARY BLOOD GLUCOSE      POCT Blood Glucose.: 138 mg/dL (19 Dec 2022 21:56)      PHYSICAL EXAM:  General: WN/WD NAD  Neurology: A&Ox3, nonfocal, WAKEFIELD x 4  Eyes: PERRLA/ EOMI, Gross vision intact  ENT/Neck: Neck supple, trachea midline, No JVD, Gross hearing intact  Respiratory: CTA B/L, No wheezing, rales, rhonchi  CV: RRR, +S1/S2, -S3/S4, no murmurs, rubs or gallops  Abdominal: Soft, NT, ND +BS, No HSM  MSK: 5/5 strength UE/LE bilaterally  Extremities: No edema, 2+ peripheral pulses  Skin: No Rashes, Hematoma, Ecchymosis  Incisions:   Tubes:    HOSPITAL MEDICATIONS:  MEDICATIONS  (STANDING):  albuterol    90 MICROgram(s) HFA Inhaler 4 Puff(s) Inhalation every 6 hours  artificial tears (preservative free) Ophthalmic Solution 1 Drop(s) Both EYES two times a day  chlorhexidine 2% Cloths 1 Application(s) Topical daily  dextrose 5%. 1000 milliLiter(s) (50 mL/Hr) IV Continuous <Continuous>  dextrose 5%. 1000 milliLiter(s) (100 mL/Hr) IV Continuous <Continuous>  dextrose 50% Injectable 25 Gram(s) IV Push once  dextrose 50% Injectable 12.5 Gram(s) IV Push once  dextrose 50% Injectable 25 Gram(s) IV Push once  doxazosin 1 milliGRAM(s) Oral at bedtime  glucagon  Injectable 1 milliGRAM(s) IntraMuscular once  insulin lispro (ADMELOG) corrective regimen sliding scale   SubCutaneous at bedtime  insulin lispro (ADMELOG) corrective regimen sliding scale   SubCutaneous three times a day before meals  multivitamin/minerals/iron Oral Solution (CENTRUM) 15 milliLiter(s) Enteral Tube daily  polyethylene glycol 3350 17 Gram(s) Oral two times a day  predniSONE   Tablet 5 milliGRAM(s) Oral daily  senna 2 Tablet(s) Oral at bedtime  tenofovir alafenamide (VEMLIDY) 25 milliGRAM(s) Oral daily    MEDICATIONS  (PRN):  dextrose Oral Gel 15 Gram(s) Oral once PRN Blood Glucose LESS THAN 70 milliGRAM(s)/deciliter  melatonin 6 milliGRAM(s) Oral at bedtime PRN Insomnia      LABS:                        7.4    4.76  )-----------( 128      ( 19 Dec 2022 06:09 )             24.3     Hgb Trend: 7.4<--, 7.7<--, 7.4<--, 7.1<--, 8.2<--  12-19    144  |  108<H>  |  10  ----------------------------<  93  3.7   |  27  |  0.53    Ca    9.0      19 Dec 2022 06:09  Phos  1.8     12-19  Mg     1.70     12-19    TPro  5.0<L>  /  Alb  2.6<L>  /  TBili  1.0  /  DBili  x   /  AST  32  /  ALT  17  /  AlkPhos  109  12-19    Creatinine Trend: 0.53<--, 0.51<--, 0.55<--, 0.56<--, 0.55<--, 0.41<--            MICROBIOLOGY:      Mariano Emeka | PGY1| Pager: [phone]  Interval Events: Hgb of 6.9, states feeling well, not requiring oxygen, wants to be d/c to home with follow up at her cancer center's rehab, states has been able to get out of bed with assistance. Understands risks of going home with cancer center rehab compared to the benefits of going to the [facility] placement short term with eventual cancer center rehab follow up.       OBJECTIVE:  ICU Vital Signs Last 24 Hrs  T(C): 36.7 (20 Dec 2022 05:51), Max: 37.8 (19 Dec 2022 21:27)  T(F): 98 (20 Dec 2022 05:51), Max: 100 (19 Dec 2022 21:27)  HR: 99 (20 Dec 2022 05:51) (89 - 109)  BP: 115/63 (20 Dec 2022 05:51) (110/58 - 127/64)  BP(mean): --  ABP: --  ABP(mean): --  RR: 18 (20 Dec 2022 05:51) (17 - 18)  SpO2: 96% (20 Dec 2022 05:51) (95% - 98%)    O2 Parameters below as of 20 Dec 2022 05:51  Patient On (Oxygen Delivery Method): nasal cannula  O2 Flow (L/min): 1            12-19 @ 07:01  -  12-20 @ 06:55  --------------------------------------------------------  IN: 0 mL / OUT: 500 mL / NET: -500 mL      CAPILLARY BLOOD GLUCOSE      POCT Blood Glucose.: 138 mg/dL (19 Dec 2022 21:56)      PHYSICAL EXAM:  General: WN/WD NAD  Neurology: A&Ox3, nonfocal, WAKEFIELD x 4  Eyes: PERRLA/ EOMI, Gross vision intact  ENT/Neck: Neck supple, trachea midline, No JVD, Gross hearing intact  Respiratory: CTA B/L, No wheezing, rales, rhonchi  CV: RRR, +S1/S2, -S3/S4, no murmurs, rubs or gallops  Abdominal: Soft, NT, ND +BS, No HSM  MSK: 5/5 strength UE/LE bilaterally  Extremities: No edema, 2+ peripheral pulses  Skin: No Rashes, Hematoma, Ecchymosis  Incisions:   Tubes:    HOSPITAL MEDICATIONS:  MEDICATIONS  (STANDING):  albuterol    90 MICROgram(s) HFA Inhaler 4 Puff(s) Inhalation every 6 hours  artificial tears (preservative free) Ophthalmic Solution 1 Drop(s) Both EYES two times a day  chlorhexidine 2% Cloths 1 Application(s) Topical daily  dextrose 5%. 1000 milliLiter(s) (50 mL/Hr) IV Continuous <Continuous>  dextrose 5%. 1000 milliLiter(s) (100 mL/Hr) IV Continuous <Continuous>  dextrose 50% Injectable 25 Gram(s) IV Push once  dextrose 50% Injectable 12.5 Gram(s) IV Push once  dextrose 50% Injectable 25 Gram(s) IV Push once  doxazosin 1 milliGRAM(s) Oral at bedtime  glucagon  Injectable 1 milliGRAM(s) IntraMuscular once  insulin lispro (ADMELOG) corrective regimen sliding scale   SubCutaneous at bedtime  insulin lispro (ADMELOG) corrective regimen sliding scale   SubCutaneous three times a day before meals  multivitamin/minerals/iron Oral Solution (CENTRUM) 15 milliLiter(s) Enteral Tube daily  polyethylene glycol 3350 17 Gram(s) Oral two times a day  predniSONE   Tablet 5 milliGRAM(s) Oral daily  senna 2 Tablet(s) Oral at bedtime  tenofovir alafenamide (VEMLIDY) 25 milliGRAM(s) Oral daily    MEDICATIONS  (PRN):  dextrose Oral Gel 15 Gram(s) Oral once PRN Blood Glucose LESS THAN 70 milliGRAM(s)/deciliter  melatonin 6 milliGRAM(s) Oral at bedtime PRN Insomnia      LABS:                        7.4    4.76  )-----------( 128      ( 19 Dec 2022 06:09 )             24.3     Hgb Trend: 7.4<--, 7.7<--, 7.4<--, 7.1<--, 8.2<--  12-19    144  |  108<H>  |  10  ----------------------------<  93  3.7   |  27  |  0.53    Ca    9.0      19 Dec 2022 06:09  Phos  1.8     12-19  Mg     1.70     12-19    TPro  5.0<L>  /  Alb  2.6<L>  /  TBili  1.0  /  DBili  x   /  AST  32  /  ALT  17  /  AlkPhos  109  12-19    Creatinine Trend: 0.53<--, 0.51<--, 0.55<--, 0.56<--, 0.55<--, 0.41<--            MICROBIOLOGY:

## 2022-12-20 NOTE — PROGRESS NOTE ADULT - PROBLEM SELECTOR PLAN 4
Pt has received total 1u PLT transfusion and 1.5u pRBC transfusion during this hospitalization. Per heme onc, concern for HLH. Pt s/p Promacta 12/6-12/13. Platelets have been steadily uptrending, with 12/17 AM PLT 94. s/p neupogen as pt is no longer neutropenic. Heme onc following    - no need to ctm HLH labs  - Platelet transfusion protocol: <10, <20 with fever, <50 procedures/active bleeding/etc   - Transfuse pRBCs if Hgb < 7

## 2022-12-20 NOTE — DISCHARGE NOTE NURSING/CASE MANAGEMENT/SOCIAL WORK - NSDCPEFALRISK_GEN_ALL_CORE
For information on Fall & Injury Prevention, visit: [email or web address] OR  [email or web address] OR  https://www.cdc.gov/steadi/patient.html

## 2022-12-20 NOTE — PROGRESS NOTE ADULT - PROVIDER SPECIALTY LIST ADULT
Critical Care
Heme/Onc
MICU
Critical Care
Heme/Onc
Heme/Onc
Infectious Disease
Infectious Disease
MICU
Infectious Disease
Internal Medicine
MICU
Critical Care
Heme/Onc
Infectious Disease
Internal Medicine
MICU
Internal Medicine
Internal Medicine

## 2022-12-20 NOTE — PROGRESS NOTE ADULT - PROBLEM SELECTOR PLAN 8
#Elevated liver enzymes, - downtrending  - patient has reported hx of Hep B 2017 as per hemonc   - bili was elevated, peaked at 1.5- however now normalized. continue to monitor   - hepatitis panel - Hep B surface AG +, and the rest is negative    - c/w tenofovir

## 2022-12-20 NOTE — DISCHARGE NOTE NURSING/CASE MANAGEMENT/SOCIAL WORK - PATIENT PORTAL LINK FT
You can access the FollowMyHealth Patient Portal offered by [facility] by registering at the following website: [email or web address]. By joining [employer]’s FollowMyHealth portal, you will also be able to view your health information using other applications (apps) compatible with our system.

## 2022-12-20 NOTE — PROGRESS NOTE ADULT - REASON FOR ADMISSION
slurred speech

## 2022-12-20 NOTE — CHART NOTE - NSCHARTNOTESELECT_GEN_ALL_CORE
A-Line removal/Event Note
Letter of Medical Necessity
MAR Accept Note/Event Note
MICU POCUS NOTE
Nutrition Services
POCUS Attending/Event Note
POCUS/Event Note
POCUS/Event Note
Event Note
Event Note
Follow Up/Nutrition Services
MAR Accept
MICU Accept Note
POCUS Attending/Event Note
POCUS Attending/Event Note
POCUS MICU
POCUS/Event Note
POCUS/Event Note
Possible Levophed Extravasation
Transfer Note

## 2022-12-20 NOTE — PROGRESS NOTE ADULT - NUTRITIONAL ASSESSMENT
This patient has been assessed with a concern for Malnutrition and has been determined to have a diagnosis/diagnoses of Severe protein-calorie malnutrition.    This patient is being managed with:   Diet NPO with Tube Feed-  Tube Feeding Modality: Orogastric  Glucerna 1.5 Earl (GLUCERNA1.5RTH)  Total Volume for 24 Hours (mL): 720  Continuous  Starting Tube Feed Rate {mL per Hour}: 10  Increase Tube Feed Rate by (mL): 10     Every 4 hours  Until Goal Tube Feed Rate (mL per Hour): 30  Tube Feed Duration (in Hours): 24  Tube Feed Start Time: 09:30  Entered: Dec 14 2022  3:28PM    
This patient has been assessed with a concern for Malnutrition and has been determined to have a diagnosis/diagnoses of Severe protein-calorie malnutrition.    This patient is being managed with:   Diet NPO-  Entered: Dec 15 2022  9:07PM    
This patient has been assessed with a concern for Malnutrition and has been determined to have a diagnosis/diagnoses of Severe protein-calorie malnutrition.    This patient is being managed with:   Diet Regular-  Consistent Carbohydrate {Evening Snack} (CSTCHOSN)  DASH/TLC {Sodium & Cholesterol Restricted} (DASH)  Minced and Moist (MINCEDMOIST)  Supplement Feeding Modality:  Oral  Ensure Plus High Protein Cans or Servings Per Day:  1       Frequency:  Daily  Entered: Dec 11 2022  9:56AM    
This patient has been assessed with a concern for Malnutrition and has been determined to have a diagnosis/diagnoses of Severe protein-calorie malnutrition.    This patient is being managed with:   Diet Soft and Bite Sized-  Entered: Dec 19 2022 12:46PM    
This patient has been assessed with a concern for Malnutrition and has been determined to have a diagnosis/diagnoses of Severe protein-calorie malnutrition.    This patient is being managed with:   Diet NPO with Tube Feed-  Tube Feeding Modality: Orogastric  Glucerna 1.2 Earl (GLUCERNARTH)  Total Volume for 24 Hours (mL): 720  Continuous  Starting Tube Feed Rate {mL per Hour}: 10  Increase Tube Feed Rate by (mL): 10     Every 4 hours  Until Goal Tube Feed Rate (mL per Hour): 30  Tube Feed Duration (in Hours): 24  Tube Feed Start Time: 09:30  Entered: Dec 14 2022  9:13AM    
This patient has been assessed with a concern for Malnutrition and has been determined to have a diagnosis/diagnoses of Severe protein-calorie malnutrition.    This patient is being managed with:   Diet NPO with Tube Feed-  Tube Feeding Modality: Orogastric  Pivot 1.5 Earl (PIVOT1.5RTH)  Total Volume for 24 Hours (mL): 1080  Continuous  Starting Tube Feed Rate {mL per Hour}: 15  Increase Tube Feed Rate by (mL): 10     Every 4 hours  Until Goal Tube Feed Rate (mL per Hour): 45  Tube Feed Duration (in Hours): 24  Tube Feed Start Time: 09:00  No Carb Prosource (1pkg = 15gms Protein)     Qty per Day:  2  Entered: Dec  8 2022  8:55AM    
This patient has been assessed with a concern for Malnutrition and has been determined to have a diagnosis/diagnoses of Severe protein-calorie malnutrition.    This patient is being managed with:   Diet NPO-  Entered: Dec 15 2022  9:07PM    
This patient has been assessed with a concern for Malnutrition and has been determined to have a diagnosis/diagnoses of Severe protein-calorie malnutrition.    This patient is being managed with:   Diet NPO-  Entered: Dec 15 2022  9:07PM    
This patient has been assessed with a concern for Malnutrition and has been determined to have a diagnosis/diagnoses of Severe protein-calorie malnutrition.    This patient is being managed with:   Diet NPO with Tube Feed-  Tube Feeding Modality: Orogastric  Pivot 1.5 Earl (PIVOT1.5RTH)  Total Volume for 24 Hours (mL): 1080  Continuous  Starting Tube Feed Rate {mL per Hour}: 15  Increase Tube Feed Rate by (mL): 10     Every 4 hours  Until Goal Tube Feed Rate (mL per Hour): 45  Tube Feed Duration (in Hours): 24  Tube Feed Start Time: 09:00  No Carb Prosource (1pkg = 15gms Protein)     Qty per Day:  2  Entered: Dec  8 2022  8:55AM    
This patient has been assessed with a concern for Malnutrition and has been determined to have a diagnosis/diagnoses of Severe protein-calorie malnutrition.    This patient is being managed with:   Diet Pureed-  Consistent Carbohydrate {Evening Snack} (CSTCHOSN)  DASH/TLC {Sodium & Cholesterol Restricted} (DASH)  Supplement Feeding Modality:  Oral  Ensure Plus High Protein Cans or Servings Per Day:  1       Frequency:  Daily  Entered: Dec 10 2022  4:44PM    
This patient has been assessed with a concern for Malnutrition and has been determined to have a diagnosis/diagnoses of Severe protein-calorie malnutrition.    This patient is being managed with:   Diet Regular-  Consistent Carbohydrate {Evening Snack} (CSTCHOSN)  DASH/TLC {Sodium & Cholesterol Restricted} (DASH)  Minced and Moist (MINCEDMOIST)  Supplement Feeding Modality:  Oral  Ensure Plus High Protein Cans or Servings Per Day:  1       Frequency:  Daily  Entered: Dec 11 2022  9:56AM    
This patient has been assessed with a concern for Malnutrition and has been determined to have a diagnosis/diagnoses of Severe protein-calorie malnutrition.    This patient is being managed with:   Diet NPO-  Entered: Dec 15 2022  9:07PM    
This patient has been assessed with a concern for Malnutrition and has been determined to have a diagnosis/diagnoses of Severe protein-calorie malnutrition.    This patient is being managed with:   Diet NPO-  Except Medications  Entered: Dec 13 2022  6:06PM    
This patient has been assessed with a concern for Malnutrition and has been determined to have a diagnosis/diagnoses of Severe protein-calorie malnutrition.    This patient is being managed with:   Diet NPO-  Entered: Dec 15 2022  9:07PM

## 2022-12-20 NOTE — PROGRESS NOTE ADULT - PROBLEM SELECTOR PLAN 6
As per Hemonc note MM in remission 5/22 has been on Promacta since 6/22 has received multiple treatments for MM in the last also PBSCT x 2 and haplo allo stem cell transplant in 2020- last spike noted sept 2022-was given belantamab- last treatment was on 11/23/22      - [facility] Hemonc following [facility] recs appreciated  - No need for chemotherapy while at [facility]

## 2022-12-20 NOTE — PROGRESS NOTE ADULT - PROBLEM SELECTOR PLAN 7
new onset A fib with RVR, likely i/s/o sepsis and/or HLH. Now resolved, pt has been in NSR since      -Will CTM Vital signs. If Tachycardic, consider EKG to make sure pt still sinus  -No AC because of thrombocytopenia, despite CHADSVASC 3

## 2022-12-28 LAB
CULTURE RESULTS: SIGNIFICANT CHANGE UP
SPECIMEN SOURCE: SIGNIFICANT CHANGE UP

## 2023-01-11 LAB
CULTURE RESULTS: SIGNIFICANT CHANGE UP
SPECIMEN SOURCE: SIGNIFICANT CHANGE UP

## 2024-01-26 NOTE — OCCUPATIONAL THERAPY INITIAL EVALUATION ADULT - NS ASR FOLLOW COMMAND OT EVAL
Received referral from KENA Whiteside NP for Dr. Brown regarding PVD, left message for patient to return call.   100% of the time/able to follow single-step instructions

## 2024-08-22 NOTE — PROGRESS NOTE ADULT - PROBLEM SELECTOR PROBLEM 5
Anesthesia Pre Eval Note    Anesthesia ROS/Med Hx        Anesthetic Complication History:    Patient does not have a history of anesthetic complications      Pulmonary Review:    Positive for COPD     Cardiovascular Review:   Exercise tolerance: poor (<4 METS)  Positive for valvular problems/murmurs  Positive for hypertension    GI/HEPATIC/RENAL Review:     Positive for renal disease - dialysis    End/Other Review:    Positive for anemia      Relevant Problems   ENT   (+) Bilateral sensorineural hearing loss      Genitourinary and Reproductive   (+) ESRD (end stage renal disease)  (CMD)      Hematology and Neoplasia   (+) Anemia in chronic kidney disease, on chronic dialysis  (CMD)      Mental Health   (+) Major depressive disorder with single episode, in full remission (CMD)      Pulmonary and Pneumonias   (+) Pulmonary emphysema  (CMD)       Physical Exam     Airway   Mallampati: II  TM Distance: >3 FB  Neck ROM: Full  Neck: Able to place in sniff position    Cardiovascular    Cardio Rhythm: Regular  Cardio Rate: Normal    Head Assessment  Head assessment: Normocephalic    General Assessment  General Assessment: Alert and oriented    Dental Exam    Patient has:  Poor Dentition    Pulmonary Exam    Breath sounds clear to auscultation:  Yes  Patient Demonstrates:  Non-labored Breathing    Abdominal Exam  Abdominal exam normal      Anesthesia Plan:    ASA Status: 3  Anesthesia Type: MAC    Induction: Intravenous  Preferred Airway Type: Mask  Patient does not have a difficult airway or is not at risk of aspiration.   Maintenance: TIVA  Premedication: None    Patient does not have an implantable electronic device requiring post procedure programming.     Post-op Pain Management: Per Surgeon      Checklist  Reviewed: Lab Results, Medications, Problem list, NPO Status, Allergies, Past Med History, Consultations, Patient Summary and Anesthesia Record  Consent/Risks Discussed Statement:  The proposed anesthetic plan,  including its risks and benefits, have been discussed with the Patient and Daughter along with the risks and benefits of alternatives. Questions were encouraged and answered and the patient and/or representative understands and agrees to proceed.    I have discussed elements of the patient's history or examination, as noted above and/or as follows, that place the patient at higher risk of complications; kidney disease.    I discussed with the patient (and/or patient's legal representative) the risks and benefits of the proposed anesthesia plan, MAC, which may include services performed by other anesthesia providers.    Alternative anesthesia plans, if available, were reviewed with the patient (and/or patient's legal representative). Discussion has been held with the patient (and/or patient's legal representative) regarding risks of anesthesia, which include allergic reaction, aspiration, vomiting, nausea, intra-operative awareness, dental injury, hypotension, conversion to general anesthesia, depressed breathing, oral injury and sore throat and emergent situations that may require change in anesthesia plan.    The patient (and/or patient's legal representative) has indicated understanding, his/her questions have been answered, and he/she wishes to proceed with the planned anesthetic.    Blood Products: Not Anticipated    Comments  Plan Comments: Patient is karenni speaking. Unfortunately the in person  became unavailable. He is very hard of hearing and the ipad  is not helpful. Agreed by all parties, especially patient and daughter, to have daughter interpret during the procedure instead of postponing surgery.      E coli bacteremia